# Patient Record
Sex: FEMALE | Race: OTHER | HISPANIC OR LATINO | Employment: UNEMPLOYED | URBAN - METROPOLITAN AREA
[De-identification: names, ages, dates, MRNs, and addresses within clinical notes are randomized per-mention and may not be internally consistent; named-entity substitution may affect disease eponyms.]

---

## 2017-01-09 ENCOUNTER — HOSPITAL ENCOUNTER (OUTPATIENT)
Dept: SLEEP CENTER | Facility: CLINIC | Age: 52
Discharge: HOME | End: 2017-01-09
Payer: COMMERCIAL

## 2017-01-09 DIAGNOSIS — G47.33 OSA (OBSTRUCTIVE SLEEP APNEA): ICD-10-CM

## 2017-01-09 PROCEDURE — 95810 POLYSOM 6/> YRS 4/> PARAM: CPT

## 2017-01-11 ENCOUNTER — TRANSCRIBE ORDERS (OUTPATIENT)
Dept: SLEEP CENTER | Facility: CLINIC | Age: 52
End: 2017-01-11

## 2017-01-11 DIAGNOSIS — G47.30 SLEEP-DISORDERED BREATHING: Primary | ICD-10-CM

## 2017-01-19 ENCOUNTER — ANESTHESIA EVENT (OUTPATIENT)
Dept: PERIOP | Facility: HOSPITAL | Age: 52
End: 2017-01-19
Payer: COMMERCIAL

## 2017-01-19 ENCOUNTER — HOSPITAL ENCOUNTER (OUTPATIENT)
Facility: HOSPITAL | Age: 52
Setting detail: OUTPATIENT SURGERY
Discharge: HOME | End: 2017-01-20
Attending: OBSTETRICS & GYNECOLOGY | Admitting: OBSTETRICS & GYNECOLOGY
Payer: COMMERCIAL

## 2017-01-19 ENCOUNTER — ANESTHESIA (OUTPATIENT)
Dept: PERIOP | Facility: HOSPITAL | Age: 52
End: 2017-01-19
Payer: COMMERCIAL

## 2017-01-19 PROBLEM — J45.909 ASTHMA: Status: ACTIVE | Noted: 2017-01-19

## 2017-01-19 PROBLEM — M19.90 ARTHRITIS: Status: ACTIVE | Noted: 2017-01-19

## 2017-01-19 PROBLEM — G89.29 CHRONIC LOW BACK PAIN: Status: ACTIVE | Noted: 2017-01-19

## 2017-01-19 PROBLEM — G47.33 OBSTRUCTIVE SLEEP APNEA SYNDROME: Status: ACTIVE | Noted: 2017-01-19

## 2017-01-19 PROBLEM — M54.50 CHRONIC LOW BACK PAIN: Status: ACTIVE | Noted: 2017-01-19

## 2017-01-19 PROBLEM — N39.46 MIXED INCONTINENCE: Status: ACTIVE | Noted: 2017-01-19

## 2017-01-19 PROBLEM — N39.3 STRESS INCONTINENCE IN FEMALE: Status: ACTIVE | Noted: 2017-01-19

## 2017-01-19 PROBLEM — K59.00 CONSTIPATION: Status: ACTIVE | Noted: 2017-01-19

## 2017-01-19 PROBLEM — E03.9 HYPOTHYROIDISM: Status: ACTIVE | Noted: 2017-01-19

## 2017-01-19 LAB
ABO GROUP BLD: NORMAL
BASOPHILS # BLD AUTO: 0.04 THOUSANDS/ΜL (ref 0–0.1)
BASOPHILS NFR BLD AUTO: 1 % (ref 0–1)
BLD GP AB SCN SERPL QL: NEGATIVE
EOSINOPHIL # BLD AUTO: 0.21 THOUSAND/ΜL (ref 0–0.61)
EOSINOPHIL NFR BLD AUTO: 3 % (ref 0–6)
ERYTHROCYTE [DISTWIDTH] IN BLOOD BY AUTOMATED COUNT: 13.3 % (ref 11.6–15.1)
EXT PREGNANCY TEST URINE: NORMAL
HCT VFR BLD AUTO: 37.7 % (ref 34.8–46.1)
HGB BLD-MCNC: 12.6 G/DL (ref 11.5–15.4)
LYMPHOCYTES # BLD AUTO: 2.43 THOUSANDS/ΜL (ref 0.6–4.47)
LYMPHOCYTES NFR BLD AUTO: 31 % (ref 14–44)
MCH RBC QN AUTO: 28.6 PG (ref 26.8–34.3)
MCHC RBC AUTO-ENTMCNC: 33.4 G/DL (ref 31.4–37.4)
MCV RBC AUTO: 86 FL (ref 82–98)
MONOCYTES # BLD AUTO: 1 THOUSAND/ΜL (ref 0.17–1.22)
MONOCYTES NFR BLD AUTO: 13 % (ref 4–12)
NEUTROPHILS # BLD AUTO: 4.19 THOUSANDS/ΜL (ref 1.85–7.62)
NEUTS SEG NFR BLD AUTO: 52 % (ref 43–75)
PLATELET # BLD AUTO: 387 THOUSANDS/UL (ref 149–390)
PMV BLD AUTO: 9.3 FL (ref 8.9–12.7)
RBC # BLD AUTO: 4.41 MILLION/UL (ref 3.81–5.12)
RH BLD: POSITIVE
WBC # BLD AUTO: 7.87 THOUSAND/UL (ref 4.31–10.16)

## 2017-01-19 PROCEDURE — 85025 COMPLETE CBC W/AUTO DIFF WBC: CPT | Performed by: OBSTETRICS & GYNECOLOGY

## 2017-01-19 PROCEDURE — 86900 BLOOD TYPING SEROLOGIC ABO: CPT | Performed by: OBSTETRICS & GYNECOLOGY

## 2017-01-19 PROCEDURE — C1771 REP DEV, URINARY, W/SLING: HCPCS | Performed by: OBSTETRICS & GYNECOLOGY

## 2017-01-19 PROCEDURE — 81025 URINE PREGNANCY TEST: CPT | Performed by: OBSTETRICS & GYNECOLOGY

## 2017-01-19 PROCEDURE — 86850 RBC ANTIBODY SCREEN: CPT | Performed by: OBSTETRICS & GYNECOLOGY

## 2017-01-19 PROCEDURE — 86901 BLOOD TYPING SEROLOGIC RH(D): CPT | Performed by: OBSTETRICS & GYNECOLOGY

## 2017-01-19 DEVICE — SINGLE INCISION SLING SYSTEM
Type: IMPLANTABLE DEVICE | Site: URINARY BLADDER | Status: FUNCTIONAL
Brand: ALTIS

## 2017-01-19 RX ORDER — MEPERIDINE HYDROCHLORIDE 50 MG/ML
12.5 INJECTION INTRAMUSCULAR; INTRAVENOUS; SUBCUTANEOUS AS NEEDED
Status: DISCONTINUED | OUTPATIENT
Start: 2017-01-19 | End: 2017-01-19 | Stop reason: HOSPADM

## 2017-01-19 RX ORDER — LEVOTHYROXINE SODIUM 0.12 MG/1
125 TABLET ORAL DAILY
COMMUNITY
End: 2018-02-14 | Stop reason: SDUPTHER

## 2017-01-19 RX ORDER — IBUPROFEN 600 MG/1
600 TABLET ORAL EVERY 6 HOURS SCHEDULED
Status: DISCONTINUED | OUTPATIENT
Start: 2017-01-19 | End: 2017-01-20 | Stop reason: HOSPADM

## 2017-01-19 RX ORDER — DOCUSATE SODIUM 100 MG/1
100 CAPSULE, LIQUID FILLED ORAL 2 TIMES DAILY PRN
COMMUNITY
End: 2018-04-10

## 2017-01-19 RX ORDER — ONDANSETRON 2 MG/ML
INJECTION INTRAMUSCULAR; INTRAVENOUS AS NEEDED
Status: DISCONTINUED | OUTPATIENT
Start: 2017-01-19 | End: 2017-01-19 | Stop reason: SURG

## 2017-01-19 RX ORDER — ONDANSETRON 2 MG/ML
4 INJECTION INTRAMUSCULAR; INTRAVENOUS EVERY 6 HOURS PRN
Status: DISCONTINUED | OUTPATIENT
Start: 2017-01-19 | End: 2017-01-20 | Stop reason: HOSPADM

## 2017-01-19 RX ORDER — MIDAZOLAM HYDROCHLORIDE 1 MG/ML
INJECTION INTRAMUSCULAR; INTRAVENOUS AS NEEDED
Status: DISCONTINUED | OUTPATIENT
Start: 2017-01-19 | End: 2017-01-19 | Stop reason: SURG

## 2017-01-19 RX ORDER — FENTANYL CITRATE/PF 50 MCG/ML
25 SYRINGE (ML) INJECTION
Status: DISCONTINUED | OUTPATIENT
Start: 2017-01-19 | End: 2017-01-19 | Stop reason: HOSPADM

## 2017-01-19 RX ORDER — PROPOFOL 10 MG/ML
INJECTION, EMULSION INTRAVENOUS AS NEEDED
Status: DISCONTINUED | OUTPATIENT
Start: 2017-01-19 | End: 2017-01-19 | Stop reason: SURG

## 2017-01-19 RX ORDER — PANTOPRAZOLE SODIUM 20 MG/1
20 TABLET, DELAYED RELEASE ORAL
Status: DISCONTINUED | OUTPATIENT
Start: 2017-01-20 | End: 2017-01-20 | Stop reason: HOSPADM

## 2017-01-19 RX ORDER — ACETAMINOPHEN 325 MG/1
650 TABLET ORAL EVERY 6 HOURS PRN
Status: DISCONTINUED | OUTPATIENT
Start: 2017-01-19 | End: 2017-01-20 | Stop reason: HOSPADM

## 2017-01-19 RX ORDER — DOCUSATE SODIUM 100 MG/1
100 CAPSULE, LIQUID FILLED ORAL 2 TIMES DAILY
Status: DISCONTINUED | OUTPATIENT
Start: 2017-01-19 | End: 2017-01-20 | Stop reason: HOSPADM

## 2017-01-19 RX ORDER — FENTANYL CITRATE 50 UG/ML
INJECTION, SOLUTION INTRAMUSCULAR; INTRAVENOUS AS NEEDED
Status: DISCONTINUED | OUTPATIENT
Start: 2017-01-19 | End: 2017-01-19 | Stop reason: SURG

## 2017-01-19 RX ORDER — PANTOPRAZOLE SODIUM 20 MG/1
20 TABLET, DELAYED RELEASE ORAL DAILY
COMMUNITY
End: 2018-04-10

## 2017-01-19 RX ORDER — DEXAMETHASONE SODIUM PHOSPHATE 4 MG/ML
INJECTION, SOLUTION INTRA-ARTICULAR; INTRALESIONAL; INTRAMUSCULAR; INTRAVENOUS; SOFT TISSUE AS NEEDED
Status: DISCONTINUED | OUTPATIENT
Start: 2017-01-19 | End: 2017-01-19 | Stop reason: SURG

## 2017-01-19 RX ORDER — CLINDAMYCIN PHOSPHATE 900 MG/50ML
900 INJECTION INTRAVENOUS ONCE
Status: COMPLETED | OUTPATIENT
Start: 2017-01-19 | End: 2017-01-19

## 2017-01-19 RX ORDER — SODIUM CHLORIDE 9 MG/ML
125 INJECTION, SOLUTION INTRAVENOUS CONTINUOUS
Status: DISCONTINUED | OUTPATIENT
Start: 2017-01-19 | End: 2017-01-19

## 2017-01-19 RX ORDER — ONDANSETRON 2 MG/ML
4 INJECTION INTRAMUSCULAR; INTRAVENOUS ONCE
Status: DISCONTINUED | OUTPATIENT
Start: 2017-01-19 | End: 2017-01-19 | Stop reason: HOSPADM

## 2017-01-19 RX ORDER — GENTAMICIN SULFATE 60 MG/50ML
1.5 INJECTION, SOLUTION INTRAVENOUS ONCE
Status: COMPLETED | OUTPATIENT
Start: 2017-01-19 | End: 2017-01-19

## 2017-01-19 RX ORDER — SODIUM CHLORIDE, SODIUM LACTATE, POTASSIUM CHLORIDE, CALCIUM CHLORIDE 600; 310; 30; 20 MG/100ML; MG/100ML; MG/100ML; MG/100ML
125 INJECTION, SOLUTION INTRAVENOUS CONTINUOUS
Status: DISCONTINUED | OUTPATIENT
Start: 2017-01-19 | End: 2017-01-20 | Stop reason: HOSPADM

## 2017-01-19 RX ORDER — MAGNESIUM HYDROXIDE 1200 MG/15ML
LIQUID ORAL AS NEEDED
Status: DISCONTINUED | OUTPATIENT
Start: 2017-01-19 | End: 2017-01-19 | Stop reason: HOSPADM

## 2017-01-19 RX ORDER — LIDOCAINE HYDROCHLORIDE AND EPINEPHRINE 20; 5 MG/ML; UG/ML
INJECTION, SOLUTION EPIDURAL; INFILTRATION; INTRACAUDAL; PERINEURAL AS NEEDED
Status: DISCONTINUED | OUTPATIENT
Start: 2017-01-19 | End: 2017-01-19 | Stop reason: SURG

## 2017-01-19 RX ORDER — PROPOFOL 10 MG/ML
INJECTION, EMULSION INTRAVENOUS CONTINUOUS PRN
Status: DISCONTINUED | OUTPATIENT
Start: 2017-01-19 | End: 2017-01-19 | Stop reason: SURG

## 2017-01-19 RX ADMIN — SODIUM CHLORIDE, SODIUM LACTATE, POTASSIUM CHLORIDE, AND CALCIUM CHLORIDE 125 ML/HR: .6; .31; .03; .02 INJECTION, SOLUTION INTRAVENOUS at 14:31

## 2017-01-19 RX ADMIN — PROPOFOL 50 MG: 10 INJECTION, EMULSION INTRAVENOUS at 11:50

## 2017-01-19 RX ADMIN — IBUPROFEN 600 MG: 600 TABLET ORAL at 17:48

## 2017-01-19 RX ADMIN — LIDOCAINE HYDROCHLORIDE 50 MG: 20 INJECTION, SOLUTION INTRAVENOUS at 11:16

## 2017-01-19 RX ADMIN — SODIUM CHLORIDE: 0.9 INJECTION, SOLUTION INTRAVENOUS at 11:50

## 2017-01-19 RX ADMIN — MIDAZOLAM HYDROCHLORIDE 2 MG: 1 INJECTION, SOLUTION INTRAMUSCULAR; INTRAVENOUS at 10:57

## 2017-01-19 RX ADMIN — DEXAMETHASONE SODIUM PHOSPHATE 4 MG: 4 INJECTION, SOLUTION INTRAMUSCULAR; INTRAVENOUS at 11:28

## 2017-01-19 RX ADMIN — GENTAMICIN SULFATE 120 MG: 60 INJECTION, SOLUTION INTRAVENOUS at 11:25

## 2017-01-19 RX ADMIN — CLINDAMYCIN PHOSPHATE 900 MG: 18 INJECTION, SOLUTION INTRAMUSCULAR; INTRAVENOUS at 11:18

## 2017-01-19 RX ADMIN — SODIUM CHLORIDE 125 ML/HR: 0.9 INJECTION, SOLUTION INTRAVENOUS at 10:03

## 2017-01-19 RX ADMIN — PROPOFOL 80 MCG/KG/MIN: 10 INJECTION, EMULSION INTRAVENOUS at 11:16

## 2017-01-19 RX ADMIN — LIDOCAINE HYDROCHLORIDE AND EPINEPHRINE 10 ML: 20; 5 INJECTION, SOLUTION EPIDURAL; INFILTRATION; INTRACAUDAL; PERINEURAL at 11:03

## 2017-01-19 RX ADMIN — FENTANYL CITRATE 100 MCG: 50 INJECTION, SOLUTION INTRAMUSCULAR; INTRAVENOUS at 10:57

## 2017-01-19 RX ADMIN — LIDOCAINE HYDROCHLORIDE AND EPINEPHRINE 5 ML: 20; 5 INJECTION, SOLUTION EPIDURAL; INFILTRATION; INTRACAUDAL; PERINEURAL at 11:57

## 2017-01-19 RX ADMIN — DOCUSATE SODIUM 100 MG: 100 CAPSULE, LIQUID FILLED ORAL at 17:47

## 2017-01-19 RX ADMIN — PROPOFOL 50 MG: 10 INJECTION, EMULSION INTRAVENOUS at 11:16

## 2017-01-19 RX ADMIN — ONDANSETRON HYDROCHLORIDE 4 MG: 2 INJECTION, SOLUTION INTRAVENOUS at 11:28

## 2017-01-20 VITALS
WEIGHT: 216.56 LBS | HEIGHT: 68 IN | DIASTOLIC BLOOD PRESSURE: 71 MMHG | TEMPERATURE: 98.7 F | BODY MASS INDEX: 32.82 KG/M2 | HEART RATE: 67 BPM | SYSTOLIC BLOOD PRESSURE: 125 MMHG | RESPIRATION RATE: 18 BRPM | OXYGEN SATURATION: 98 %

## 2017-01-20 PROBLEM — IMO0002 CYSTOCELE: Status: ACTIVE | Noted: 2017-01-20

## 2017-01-20 PROBLEM — N81.6 RECTOCELE: Status: ACTIVE | Noted: 2017-01-20

## 2017-01-20 PROBLEM — N36.41 HYPERMOBILITY OF URETHRA: Status: ACTIVE | Noted: 2017-01-20

## 2017-01-20 LAB
ERYTHROCYTE [DISTWIDTH] IN BLOOD BY AUTOMATED COUNT: 13.8 % (ref 11.6–15.1)
HCT VFR BLD AUTO: 34.2 % (ref 34.8–46.1)
HGB BLD-MCNC: 11.1 G/DL (ref 11.5–15.4)
MCH RBC QN AUTO: 28.5 PG (ref 26.8–34.3)
MCHC RBC AUTO-ENTMCNC: 32.5 G/DL (ref 31.4–37.4)
MCV RBC AUTO: 88 FL (ref 82–98)
PLATELET # BLD AUTO: 270 THOUSANDS/UL (ref 149–390)
PMV BLD AUTO: 10.3 FL (ref 8.9–12.7)
RBC # BLD AUTO: 3.89 MILLION/UL (ref 3.81–5.12)
WBC # BLD AUTO: 12.4 THOUSAND/UL (ref 4.31–10.16)

## 2017-01-20 PROCEDURE — 85027 COMPLETE CBC AUTOMATED: CPT | Performed by: OBSTETRICS & GYNECOLOGY

## 2017-01-20 RX ORDER — IBUPROFEN 600 MG/1
600 TABLET ORAL EVERY 6 HOURS SCHEDULED
Qty: 120 TABLET | Refills: 0
Start: 2017-01-20 | End: 2018-04-10

## 2017-01-20 RX ORDER — ACETAMINOPHEN 325 MG/1
TABLET ORAL
Qty: 30 TABLET | Refills: 0
Start: 2017-01-20 | End: 2020-01-20 | Stop reason: SDUPTHER

## 2017-01-20 RX ADMIN — DOCUSATE SODIUM 100 MG: 100 CAPSULE, LIQUID FILLED ORAL at 08:08

## 2017-01-20 RX ADMIN — IBUPROFEN 600 MG: 600 TABLET ORAL at 00:19

## 2017-01-20 RX ADMIN — LEVOTHYROXINE SODIUM 125 MCG: 100 TABLET ORAL at 05:45

## 2017-01-20 RX ADMIN — PANTOPRAZOLE SODIUM 20 MG: 20 TABLET, DELAYED RELEASE ORAL at 08:08

## 2017-01-20 RX ADMIN — SODIUM CHLORIDE, SODIUM LACTATE, POTASSIUM CHLORIDE, AND CALCIUM CHLORIDE 125 ML/HR: .6; .31; .03; .02 INJECTION, SOLUTION INTRAVENOUS at 00:45

## 2017-01-20 RX ADMIN — IBUPROFEN 600 MG: 600 TABLET ORAL at 05:46

## 2017-01-23 ENCOUNTER — HOSPITAL ENCOUNTER (OUTPATIENT)
Dept: SLEEP CENTER | Facility: CLINIC | Age: 52
Discharge: HOME | End: 2017-01-23
Payer: COMMERCIAL

## 2017-01-23 DIAGNOSIS — G47.30 SLEEP-DISORDERED BREATHING: ICD-10-CM

## 2017-01-26 ENCOUNTER — GENERIC CONVERSION - ENCOUNTER (OUTPATIENT)
Dept: OTHER | Facility: OTHER | Age: 52
End: 2017-01-26

## 2017-02-03 ENCOUNTER — APPOINTMENT (OUTPATIENT)
Dept: LAB | Facility: CLINIC | Age: 52
End: 2017-02-03
Payer: COMMERCIAL

## 2017-02-03 ENCOUNTER — GENERIC CONVERSION - ENCOUNTER (OUTPATIENT)
Dept: OTHER | Facility: OTHER | Age: 52
End: 2017-02-03

## 2017-02-03 ENCOUNTER — ALLSCRIPTS OFFICE VISIT (OUTPATIENT)
Dept: OTHER | Facility: OTHER | Age: 52
End: 2017-02-03

## 2017-02-03 DIAGNOSIS — E03.9 HYPOTHYROIDISM: ICD-10-CM

## 2017-02-03 DIAGNOSIS — G47.33 OBSTRUCTIVE SLEEP APNEA: ICD-10-CM

## 2017-02-03 LAB — TSH SERPL DL<=0.05 MIU/L-ACNC: 1.55 UIU/ML (ref 0.36–3.74)

## 2017-02-03 PROCEDURE — 36415 COLL VENOUS BLD VENIPUNCTURE: CPT

## 2017-02-03 PROCEDURE — 84443 ASSAY THYROID STIM HORMONE: CPT

## 2017-02-13 ENCOUNTER — ALLSCRIPTS OFFICE VISIT (OUTPATIENT)
Dept: OTHER | Facility: OTHER | Age: 52
End: 2017-02-13

## 2017-03-09 ENCOUNTER — GENERIC CONVERSION - ENCOUNTER (OUTPATIENT)
Dept: OTHER | Facility: OTHER | Age: 52
End: 2017-03-09

## 2017-04-26 ENCOUNTER — LAB (OUTPATIENT)
Dept: LAB | Facility: HOSPITAL | Age: 52
End: 2017-04-26
Payer: COMMERCIAL

## 2017-04-26 ENCOUNTER — TRANSCRIBE ORDERS (OUTPATIENT)
Dept: LAB | Facility: HOSPITAL | Age: 52
End: 2017-04-26

## 2017-04-26 DIAGNOSIS — E55.9 UNSPECIFIED VITAMIN D DEFICIENCY: ICD-10-CM

## 2017-04-26 DIAGNOSIS — R73.09 IMPAIRED GLUCOSE TOLERANCE TEST: ICD-10-CM

## 2017-04-26 DIAGNOSIS — E66.9 OBESITY, UNSPECIFIED: Primary | ICD-10-CM

## 2017-04-26 DIAGNOSIS — E78.5 HYPERLIPIDEMIA, UNSPECIFIED HYPERLIPIDEMIA TYPE: ICD-10-CM

## 2017-04-26 DIAGNOSIS — E66.9 OBESITY, UNSPECIFIED: ICD-10-CM

## 2017-04-26 LAB
25(OH)D3 SERPL-MCNC: 36.6 NG/ML (ref 30–100)
ALBUMIN SERPL BCP-MCNC: 3.7 G/DL (ref 3.5–5)
ALP SERPL-CCNC: 85 U/L (ref 46–116)
ALT SERPL W P-5'-P-CCNC: 27 U/L (ref 12–78)
ANION GAP SERPL CALCULATED.3IONS-SCNC: 7 MMOL/L (ref 4–13)
AST SERPL W P-5'-P-CCNC: 7 U/L (ref 5–45)
BILIRUB SERPL-MCNC: 0.37 MG/DL (ref 0.2–1)
BUN SERPL-MCNC: 13 MG/DL (ref 5–25)
CALCIUM SERPL-MCNC: 9.1 MG/DL (ref 8.3–10.1)
CHLORIDE SERPL-SCNC: 106 MMOL/L (ref 100–108)
CHOLEST SERPL-MCNC: 173 MG/DL (ref 50–200)
CO2 SERPL-SCNC: 26 MMOL/L (ref 21–32)
CREAT SERPL-MCNC: 0.66 MG/DL (ref 0.6–1.3)
EST. AVERAGE GLUCOSE BLD GHB EST-MCNC: 114 MG/DL
GFR SERPL CREATININE-BSD FRML MDRD: >60 ML/MIN/1.73SQ M
GLUCOSE P FAST SERPL-MCNC: 91 MG/DL (ref 65–99)
HBA1C MFR BLD: 5.6 % (ref 4.2–6.3)
HDLC SERPL-MCNC: 39 MG/DL (ref 40–60)
LDLC SERPL CALC-MCNC: 121 MG/DL (ref 0–100)
POTASSIUM SERPL-SCNC: 4 MMOL/L (ref 3.5–5.3)
PROT SERPL-MCNC: 7.2 G/DL (ref 6.4–8.2)
SODIUM SERPL-SCNC: 139 MMOL/L (ref 136–145)
T4 FREE SERPL-MCNC: 1.42 NG/DL (ref 0.76–1.46)
TRIGL SERPL-MCNC: 63 MG/DL
TSH SERPL DL<=0.05 MIU/L-ACNC: 0.31 UIU/ML (ref 0.36–3.74)

## 2017-04-26 PROCEDURE — 80053 COMPREHEN METABOLIC PANEL: CPT

## 2017-04-26 PROCEDURE — 84439 ASSAY OF FREE THYROXINE: CPT

## 2017-04-26 PROCEDURE — 83036 HEMOGLOBIN GLYCOSYLATED A1C: CPT

## 2017-04-26 PROCEDURE — 82306 VITAMIN D 25 HYDROXY: CPT

## 2017-04-26 PROCEDURE — 80061 LIPID PANEL: CPT

## 2017-04-26 PROCEDURE — 84443 ASSAY THYROID STIM HORMONE: CPT

## 2017-04-26 PROCEDURE — 36415 COLL VENOUS BLD VENIPUNCTURE: CPT

## 2017-05-19 ENCOUNTER — ALLSCRIPTS OFFICE VISIT (OUTPATIENT)
Dept: OTHER | Facility: OTHER | Age: 52
End: 2017-05-19

## 2017-05-19 DIAGNOSIS — Z12.39 ENCOUNTER FOR OTHER SCREENING FOR MALIGNANT NEOPLASM OF BREAST: ICD-10-CM

## 2017-05-19 DIAGNOSIS — N64.4 MASTODYNIA: ICD-10-CM

## 2017-06-22 ENCOUNTER — HOSPITAL ENCOUNTER (OUTPATIENT)
Dept: RADIOLOGY | Age: 52
Discharge: HOME | End: 2017-06-22
Payer: COMMERCIAL

## 2017-06-22 DIAGNOSIS — Z12.39 ENCOUNTER FOR OTHER SCREENING FOR MALIGNANT NEOPLASM OF BREAST: ICD-10-CM

## 2017-06-22 PROCEDURE — G0202 SCR MAMMO BI INCL CAD: HCPCS

## 2017-06-22 PROCEDURE — 77063 BREAST TOMOSYNTHESIS BI: CPT

## 2017-06-27 PROCEDURE — G0145 SCR C/V CYTO,THINLAYER,RESCR: HCPCS | Performed by: OBSTETRICS & GYNECOLOGY

## 2017-06-30 ENCOUNTER — ALLSCRIPTS OFFICE VISIT (OUTPATIENT)
Dept: OTHER | Facility: OTHER | Age: 52
End: 2017-06-30

## 2017-07-01 ENCOUNTER — LAB REQUISITION (OUTPATIENT)
Dept: LAB | Facility: HOSPITAL | Age: 52
End: 2017-07-01
Payer: COMMERCIAL

## 2017-07-01 DIAGNOSIS — Z01.419 ENCOUNTER FOR GYNECOLOGICAL EXAMINATION WITHOUT ABNORMAL FINDING: ICD-10-CM

## 2017-07-10 LAB
LAB AP GYN PRIMARY INTERPRETATION: NORMAL
LAB AP LMP: NORMAL
Lab: NORMAL

## 2017-07-28 ENCOUNTER — GENERIC CONVERSION - ENCOUNTER (OUTPATIENT)
Dept: OTHER | Facility: OTHER | Age: 52
End: 2017-07-28

## 2017-07-28 ENCOUNTER — APPOINTMENT (OUTPATIENT)
Dept: LAB | Facility: CLINIC | Age: 52
End: 2017-07-28
Payer: COMMERCIAL

## 2017-07-28 ENCOUNTER — ALLSCRIPTS OFFICE VISIT (OUTPATIENT)
Dept: OTHER | Facility: OTHER | Age: 52
End: 2017-07-28

## 2017-07-28 DIAGNOSIS — R13.10 DYSPHAGIA: ICD-10-CM

## 2017-07-28 DIAGNOSIS — G89.29 OTHER CHRONIC PAIN: ICD-10-CM

## 2017-07-28 DIAGNOSIS — M47.816 SPONDYLOSIS OF LUMBAR REGION WITHOUT MYELOPATHY OR RADICULOPATHY: ICD-10-CM

## 2017-07-28 DIAGNOSIS — K21.9 GASTRO-ESOPHAGEAL REFLUX DISEASE WITHOUT ESOPHAGITIS: ICD-10-CM

## 2017-07-28 DIAGNOSIS — R79.89 OTHER SPECIFIED ABNORMAL FINDINGS OF BLOOD CHEMISTRY: ICD-10-CM

## 2017-07-28 LAB
ALBUMIN SERPL BCP-MCNC: 3.4 G/DL (ref 3.5–5)
ALP SERPL-CCNC: 164 U/L (ref 46–116)
ALT SERPL W P-5'-P-CCNC: 185 U/L (ref 12–78)
ANION GAP SERPL CALCULATED.3IONS-SCNC: 8 MMOL/L (ref 4–13)
AST SERPL W P-5'-P-CCNC: 100 U/L (ref 5–45)
BILIRUB SERPL-MCNC: 0.38 MG/DL (ref 0.2–1)
BUN SERPL-MCNC: 12 MG/DL (ref 5–25)
CALCIUM SERPL-MCNC: 9 MG/DL (ref 8.3–10.1)
CHLORIDE SERPL-SCNC: 105 MMOL/L (ref 100–108)
CO2 SERPL-SCNC: 26 MMOL/L (ref 21–32)
CREAT SERPL-MCNC: 0.61 MG/DL (ref 0.6–1.3)
GFR SERPL CREATININE-BSD FRML MDRD: 106 ML/MIN/1.73SQ M
GLUCOSE P FAST SERPL-MCNC: 84 MG/DL (ref 65–99)
POTASSIUM SERPL-SCNC: 4.2 MMOL/L (ref 3.5–5.3)
PROT SERPL-MCNC: 7 G/DL (ref 6.4–8.2)
SODIUM SERPL-SCNC: 139 MMOL/L (ref 136–145)

## 2017-07-28 PROCEDURE — 80053 COMPREHEN METABOLIC PANEL: CPT

## 2017-07-28 PROCEDURE — 36415 COLL VENOUS BLD VENIPUNCTURE: CPT

## 2017-07-31 ENCOUNTER — ALLSCRIPTS OFFICE VISIT (OUTPATIENT)
Dept: OTHER | Facility: OTHER | Age: 52
End: 2017-07-31

## 2017-08-09 ENCOUNTER — GENERIC CONVERSION - ENCOUNTER (OUTPATIENT)
Dept: OTHER | Facility: OTHER | Age: 52
End: 2017-08-09

## 2017-08-09 ENCOUNTER — HOSPITAL ENCOUNTER (OUTPATIENT)
Dept: RADIOLOGY | Facility: HOSPITAL | Age: 52
Discharge: HOME | End: 2017-08-09
Attending: INTERNAL MEDICINE
Payer: COMMERCIAL

## 2017-08-09 DIAGNOSIS — R13.10 DYSPHAGIA: ICD-10-CM

## 2017-08-09 PROCEDURE — 74220 X-RAY XM ESOPHAGUS 1CNTRST: CPT

## 2017-08-10 ENCOUNTER — APPOINTMENT (OUTPATIENT)
Dept: LAB | Facility: CLINIC | Age: 52
End: 2017-08-10
Payer: COMMERCIAL

## 2017-08-10 ENCOUNTER — ALLSCRIPTS OFFICE VISIT (OUTPATIENT)
Dept: OTHER | Facility: OTHER | Age: 52
End: 2017-08-10

## 2017-08-10 DIAGNOSIS — R79.89 OTHER SPECIFIED ABNORMAL FINDINGS OF BLOOD CHEMISTRY: ICD-10-CM

## 2017-08-10 PROCEDURE — 86704 HEP B CORE ANTIBODY TOTAL: CPT

## 2017-08-10 PROCEDURE — 87340 HEPATITIS B SURFACE AG IA: CPT

## 2017-08-10 PROCEDURE — 86803 HEPATITIS C AB TEST: CPT

## 2017-08-10 PROCEDURE — 36415 COLL VENOUS BLD VENIPUNCTURE: CPT

## 2017-08-10 PROCEDURE — 86705 HEP B CORE ANTIBODY IGM: CPT

## 2017-08-11 ENCOUNTER — GENERIC CONVERSION - ENCOUNTER (OUTPATIENT)
Dept: OTHER | Facility: OTHER | Age: 52
End: 2017-08-11

## 2017-08-11 LAB
HBV CORE AB SER QL: NORMAL
HBV CORE IGM SER QL: NORMAL
HBV SURFACE AG SER QL: NORMAL
HCV AB SER QL: NORMAL

## 2017-08-15 ENCOUNTER — TRANSCRIBE ORDERS (OUTPATIENT)
Dept: ADMINISTRATIVE | Facility: HOSPITAL | Age: 52
End: 2017-08-15

## 2017-08-15 DIAGNOSIS — R10.9 ABDOMINAL PAIN, UNSPECIFIED LOCATION: Primary | ICD-10-CM

## 2017-08-16 ENCOUNTER — HOSPITAL ENCOUNTER (OUTPATIENT)
Dept: RADIOLOGY | Facility: HOSPITAL | Age: 52
Discharge: HOME | End: 2017-08-16
Payer: COMMERCIAL

## 2017-08-16 DIAGNOSIS — R10.9 ABDOMINAL PAIN, UNSPECIFIED LOCATION: ICD-10-CM

## 2017-08-16 PROCEDURE — 76705 ECHO EXAM OF ABDOMEN: CPT

## 2017-08-17 ENCOUNTER — HOSPITAL ENCOUNTER (OUTPATIENT)
Dept: ULTRASOUND IMAGING | Facility: CLINIC | Age: 52
Discharge: HOME | End: 2017-08-17
Payer: COMMERCIAL

## 2017-08-17 DIAGNOSIS — N64.4 MASTODYNIA: ICD-10-CM

## 2017-08-17 PROCEDURE — 76642 ULTRASOUND BREAST LIMITED: CPT

## 2017-08-22 ENCOUNTER — TRANSCRIBE ORDERS (OUTPATIENT)
Dept: ADMINISTRATIVE | Facility: HOSPITAL | Age: 52
End: 2017-08-22

## 2017-08-22 ENCOUNTER — APPOINTMENT (OUTPATIENT)
Dept: PHYSICAL THERAPY | Facility: REHABILITATION | Age: 52
End: 2017-08-22
Payer: COMMERCIAL

## 2017-08-22 DIAGNOSIS — G89.29 OTHER CHRONIC PAIN: ICD-10-CM

## 2017-08-22 DIAGNOSIS — M47.816 SPONDYLOSIS OF LUMBAR REGION WITHOUT MYELOPATHY OR RADICULOPATHY: ICD-10-CM

## 2017-08-22 DIAGNOSIS — R92.8 ABNORMAL FINDINGS ON DIAGNOSTIC IMAGING OF BREAST: Primary | ICD-10-CM

## 2017-08-22 PROCEDURE — 97161 PT EVAL LOW COMPLEX 20 MIN: CPT

## 2017-08-24 ENCOUNTER — APPOINTMENT (OUTPATIENT)
Dept: PHYSICAL THERAPY | Facility: REHABILITATION | Age: 52
End: 2017-08-24
Payer: COMMERCIAL

## 2017-08-24 PROCEDURE — 97110 THERAPEUTIC EXERCISES: CPT

## 2017-08-24 PROCEDURE — 97010 HOT OR COLD PACKS THERAPY: CPT

## 2017-08-24 PROCEDURE — 97140 MANUAL THERAPY 1/> REGIONS: CPT

## 2017-08-25 ENCOUNTER — GENERIC CONVERSION - ENCOUNTER (OUTPATIENT)
Dept: OTHER | Facility: OTHER | Age: 52
End: 2017-08-25

## 2017-08-30 ENCOUNTER — APPOINTMENT (OUTPATIENT)
Dept: PHYSICAL THERAPY | Facility: REHABILITATION | Age: 52
End: 2017-08-30
Payer: COMMERCIAL

## 2017-08-30 PROCEDURE — 97140 MANUAL THERAPY 1/> REGIONS: CPT

## 2017-08-30 PROCEDURE — 97110 THERAPEUTIC EXERCISES: CPT

## 2017-09-01 ENCOUNTER — APPOINTMENT (OUTPATIENT)
Dept: PHYSICAL THERAPY | Facility: REHABILITATION | Age: 52
End: 2017-09-01
Payer: COMMERCIAL

## 2017-09-01 PROCEDURE — 97140 MANUAL THERAPY 1/> REGIONS: CPT

## 2017-09-01 PROCEDURE — 97110 THERAPEUTIC EXERCISES: CPT

## 2017-09-06 ENCOUNTER — APPOINTMENT (OUTPATIENT)
Dept: PHYSICAL THERAPY | Facility: REHABILITATION | Age: 52
End: 2017-09-06
Payer: COMMERCIAL

## 2017-09-06 ENCOUNTER — ANESTHESIA EVENT (OUTPATIENT)
Dept: GASTROENTEROLOGY | Facility: HOSPITAL | Age: 52
End: 2017-09-06
Payer: COMMERCIAL

## 2017-09-06 PROCEDURE — 97010 HOT OR COLD PACKS THERAPY: CPT

## 2017-09-06 PROCEDURE — 97110 THERAPEUTIC EXERCISES: CPT

## 2017-09-06 PROCEDURE — 97140 MANUAL THERAPY 1/> REGIONS: CPT

## 2017-09-07 ENCOUNTER — ANESTHESIA (OUTPATIENT)
Dept: GASTROENTEROLOGY | Facility: HOSPITAL | Age: 52
End: 2017-09-07
Payer: COMMERCIAL

## 2017-09-07 ENCOUNTER — GENERIC CONVERSION - ENCOUNTER (OUTPATIENT)
Dept: OTHER | Facility: OTHER | Age: 52
End: 2017-09-07

## 2017-09-07 ENCOUNTER — HOSPITAL ENCOUNTER (OUTPATIENT)
Facility: HOSPITAL | Age: 52
Setting detail: OUTPATIENT SURGERY
Discharge: HOME | End: 2017-09-07
Attending: INTERNAL MEDICINE | Admitting: INTERNAL MEDICINE
Payer: COMMERCIAL

## 2017-09-07 VITALS
RESPIRATION RATE: 20 BRPM | WEIGHT: 200 LBS | TEMPERATURE: 97.9 F | SYSTOLIC BLOOD PRESSURE: 124 MMHG | OXYGEN SATURATION: 98 % | BODY MASS INDEX: 30.31 KG/M2 | HEART RATE: 66 BPM | DIASTOLIC BLOOD PRESSURE: 81 MMHG | HEIGHT: 68 IN

## 2017-09-07 DIAGNOSIS — K21.9 ESOPHAGEAL REFLUX: ICD-10-CM

## 2017-09-07 DIAGNOSIS — R13.10 DYSPHAGIA: ICD-10-CM

## 2017-09-07 PROCEDURE — 88305 TISSUE EXAM BY PATHOLOGIST: CPT | Performed by: INTERNAL MEDICINE

## 2017-09-07 PROCEDURE — 88342 IMHCHEM/IMCYTCHM 1ST ANTB: CPT | Performed by: INTERNAL MEDICINE

## 2017-09-07 RX ORDER — PROPOFOL 10 MG/ML
INJECTION, EMULSION INTRAVENOUS AS NEEDED
Status: DISCONTINUED | OUTPATIENT
Start: 2017-09-07 | End: 2017-09-07 | Stop reason: SURG

## 2017-09-07 RX ORDER — PROPOFOL 10 MG/ML
INJECTION, EMULSION INTRAVENOUS CONTINUOUS PRN
Status: DISCONTINUED | OUTPATIENT
Start: 2017-09-07 | End: 2017-09-07 | Stop reason: SURG

## 2017-09-07 RX ORDER — SODIUM CHLORIDE 9 MG/ML
125 INJECTION, SOLUTION INTRAVENOUS CONTINUOUS
Status: DISCONTINUED | OUTPATIENT
Start: 2017-09-07 | End: 2017-09-07 | Stop reason: HOSPADM

## 2017-09-07 RX ORDER — LIDOCAINE HYDROCHLORIDE 10 MG/ML
INJECTION, SOLUTION EPIDURAL; INFILTRATION; INTRACAUDAL; PERINEURAL AS NEEDED
Status: DISCONTINUED | OUTPATIENT
Start: 2017-09-07 | End: 2017-09-07 | Stop reason: SURG

## 2017-09-07 RX ADMIN — PROPOFOL 120 MCG/KG/MIN: 10 INJECTION, EMULSION INTRAVENOUS at 14:01

## 2017-09-07 RX ADMIN — SODIUM CHLORIDE 125 ML/HR: 0.9 INJECTION, SOLUTION INTRAVENOUS at 13:54

## 2017-09-07 RX ADMIN — PROPOFOL 80 MG: 10 INJECTION, EMULSION INTRAVENOUS at 14:01

## 2017-09-07 RX ADMIN — LIDOCAINE HYDROCHLORIDE 50 MG: 10 INJECTION, SOLUTION EPIDURAL; INFILTRATION; INTRACAUDAL; PERINEURAL at 14:01

## 2017-09-07 NOTE — OP NOTE
**** GI/ENDOSCOPY REPORT ****     PATIENT NAME: Lanie Lombardo - VISIT ID:  Patient ID: [id number]   YOB: 1965     INTRODUCTION: Esophagogastroduodenoscopy - A 50 female patient presents   for an outpatient Esophagogastroduodenoscopy at [address] [place] [address]  INDICATIONS: Dysphagia  Prior Heller mytopmy with Jaime fundoplication for   [facility] [employer]  CONSENT: The benefits, risks, and alternatives to the procedure were   discussed and informed consent was obtained from the patient  PREPARATION:  EKG, pulse, pulse oximetry and blood pressure were monitored   throughout the procedure  ASA Classification: Class 2 - Patient has mild   to moderate systemic disturbance that may or may not be related to the   disorder requiring surgery  Airway Assessment Classification: Airway class   2 - Visualization of the soft palate, fauces and uvula  Mallampati   Classification: Class 2 - Faucial pillars, soft palate visible  The   patient was kept NPO  MEDICATIONS: Anesthesia-check records     PROCEDURE:  The endoscope was passed without difficulty through the mouth   under direct visualization and advanced to the 2nd portion of the   duodenum  The scope was withdrawn and the mucosa was carefully examined  FINDINGS:   Esophagus: LA grad B erosive reflux-induced esophagitis was   found in the esophagus  A tongue of pink mucosa measuring less than 10 mm   noted at the distal esophagus  This is likely due to esophagitis  Two cold   forceps biopsies were taken to assess for Mcdonald's esophagus  Redundent   fold noted upon retroflexion from prior fundoplication  Scope was passed to   the stomach with out resistance at GEJ  Stomach: The antrum and body of   the stomach appeared to be normal   Duodenum: The duodenal bulb, 2nd   portion of the duodenum, and major papilla appeared to be normal      COMPLICATIONS: There were no complications       IMPRESSIONS: Mild eesophagitis at the distal esophagus  Two biopsies taken   from pink mucosa at the distal esophagus  Normal antrum and body of the   stomach  Normal duodenal bulb, 2nd portion of the duodenum, and major   papilla  RECOMMENDATIONS: Anti-reflux measures: Raise the head of the bed 4 to 6   inches  Avoid smoking  Avoid excess coffee, tea or other caffeinated   beverages  Avoid garments that fit tightly through the abdomen  Avoid   eating before bed  Start anti-reflux diet  Follow-up on the results of the   biopsy specimens  Continue omeprazole 40 mg po BID      ESTIMATED BLOOD LOSS:     PATHOLOGY SPECIMENS: Two cold forceps biopsies taken  Associated finding:   Esophagitis  PROCEDURE CODES:     ICD-9 Codes: 787 2 DYSPHAGIA     ICD-10 Codes: R13 10 Dysphagia, unspecified K20 9 Esophagitis, unspecified     PERFORMED BY: GABE Hay  on 09/07/2017  Version 1, electronically signed by GABE Brody  on 09/07/2017   at 14:14

## 2017-09-08 ENCOUNTER — APPOINTMENT (OUTPATIENT)
Dept: PHYSICAL THERAPY | Facility: REHABILITATION | Age: 52
End: 2017-09-08
Payer: COMMERCIAL

## 2017-09-08 PROCEDURE — 97110 THERAPEUTIC EXERCISES: CPT

## 2017-09-08 PROCEDURE — 97140 MANUAL THERAPY 1/> REGIONS: CPT

## 2017-09-11 ENCOUNTER — APPOINTMENT (OUTPATIENT)
Dept: PHYSICAL THERAPY | Facility: REHABILITATION | Age: 52
End: 2017-09-11
Payer: COMMERCIAL

## 2017-09-11 PROCEDURE — 97140 MANUAL THERAPY 1/> REGIONS: CPT

## 2017-09-11 PROCEDURE — 97110 THERAPEUTIC EXERCISES: CPT

## 2017-09-14 ENCOUNTER — APPOINTMENT (OUTPATIENT)
Dept: PHYSICAL THERAPY | Facility: REHABILITATION | Age: 52
End: 2017-09-14
Payer: COMMERCIAL

## 2017-09-14 PROCEDURE — 97110 THERAPEUTIC EXERCISES: CPT

## 2017-09-14 PROCEDURE — 97140 MANUAL THERAPY 1/> REGIONS: CPT

## 2017-09-20 ENCOUNTER — APPOINTMENT (OUTPATIENT)
Dept: PHYSICAL THERAPY | Facility: REHABILITATION | Age: 52
End: 2017-09-20
Payer: COMMERCIAL

## 2017-09-20 PROCEDURE — 97110 THERAPEUTIC EXERCISES: CPT

## 2017-09-20 PROCEDURE — 97140 MANUAL THERAPY 1/> REGIONS: CPT

## 2017-09-25 ENCOUNTER — APPOINTMENT (OUTPATIENT)
Dept: PHYSICAL THERAPY | Facility: REHABILITATION | Age: 52
End: 2017-09-25
Payer: COMMERCIAL

## 2017-09-28 ENCOUNTER — APPOINTMENT (OUTPATIENT)
Dept: PHYSICAL THERAPY | Facility: REHABILITATION | Age: 52
End: 2017-09-28
Payer: COMMERCIAL

## 2017-09-28 PROCEDURE — 97140 MANUAL THERAPY 1/> REGIONS: CPT

## 2017-09-28 PROCEDURE — 97110 THERAPEUTIC EXERCISES: CPT

## 2017-10-02 ENCOUNTER — APPOINTMENT (OUTPATIENT)
Dept: PHYSICAL THERAPY | Facility: REHABILITATION | Age: 52
End: 2017-10-02
Payer: COMMERCIAL

## 2017-10-02 PROCEDURE — 97110 THERAPEUTIC EXERCISES: CPT

## 2017-10-05 ENCOUNTER — APPOINTMENT (OUTPATIENT)
Dept: PHYSICAL THERAPY | Facility: REHABILITATION | Age: 52
End: 2017-10-05
Payer: COMMERCIAL

## 2017-10-05 PROCEDURE — 97110 THERAPEUTIC EXERCISES: CPT

## 2017-10-09 ENCOUNTER — ALLSCRIPTS OFFICE VISIT (OUTPATIENT)
Dept: OTHER | Facility: OTHER | Age: 52
End: 2017-10-09

## 2017-10-09 ENCOUNTER — GENERIC CONVERSION - ENCOUNTER (OUTPATIENT)
Dept: OTHER | Facility: OTHER | Age: 52
End: 2017-10-09

## 2017-10-09 DIAGNOSIS — A04.8 OTHER SPECIFIED BACTERIAL INTESTINAL INFECTIONS: ICD-10-CM

## 2017-10-09 DIAGNOSIS — E03.9 HYPOTHYROIDISM: ICD-10-CM

## 2017-10-09 DIAGNOSIS — R79.89 OTHER SPECIFIED ABNORMAL FINDINGS OF BLOOD CHEMISTRY: ICD-10-CM

## 2017-10-09 DIAGNOSIS — N63.0 MASS OF BREAST: ICD-10-CM

## 2017-10-10 ENCOUNTER — APPOINTMENT (OUTPATIENT)
Dept: PHYSICAL THERAPY | Facility: REHABILITATION | Age: 52
End: 2017-10-10
Payer: COMMERCIAL

## 2017-10-10 PROCEDURE — 97140 MANUAL THERAPY 1/> REGIONS: CPT

## 2017-10-10 PROCEDURE — 97110 THERAPEUTIC EXERCISES: CPT

## 2017-10-12 ENCOUNTER — APPOINTMENT (OUTPATIENT)
Dept: PHYSICAL THERAPY | Facility: REHABILITATION | Age: 52
End: 2017-10-12
Payer: COMMERCIAL

## 2017-10-12 PROCEDURE — 97110 THERAPEUTIC EXERCISES: CPT

## 2017-10-12 PROCEDURE — 97140 MANUAL THERAPY 1/> REGIONS: CPT

## 2017-10-12 PROCEDURE — 97112 NEUROMUSCULAR REEDUCATION: CPT

## 2017-10-17 ENCOUNTER — APPOINTMENT (OUTPATIENT)
Dept: PHYSICAL THERAPY | Facility: REHABILITATION | Age: 52
End: 2017-10-17
Payer: COMMERCIAL

## 2017-10-17 PROCEDURE — 97110 THERAPEUTIC EXERCISES: CPT

## 2017-10-19 ENCOUNTER — APPOINTMENT (OUTPATIENT)
Dept: PHYSICAL THERAPY | Facility: REHABILITATION | Age: 52
End: 2017-10-19
Payer: COMMERCIAL

## 2017-10-19 PROCEDURE — G8992 OTHER PT/OT  D/C STATUS: HCPCS

## 2017-10-19 PROCEDURE — 97110 THERAPEUTIC EXERCISES: CPT

## 2017-10-19 PROCEDURE — G8991 OTHER PT/OT GOAL STATUS: HCPCS

## 2017-10-24 ENCOUNTER — APPOINTMENT (OUTPATIENT)
Dept: PHYSICAL THERAPY | Facility: REHABILITATION | Age: 52
End: 2017-10-24
Payer: COMMERCIAL

## 2017-10-25 ENCOUNTER — GENERIC CONVERSION - ENCOUNTER (OUTPATIENT)
Dept: OTHER | Facility: OTHER | Age: 52
End: 2017-10-25

## 2017-10-25 ENCOUNTER — APPOINTMENT (OUTPATIENT)
Dept: LAB | Facility: CLINIC | Age: 52
End: 2017-10-25
Payer: COMMERCIAL

## 2017-10-25 DIAGNOSIS — R79.89 OTHER SPECIFIED ABNORMAL FINDINGS OF BLOOD CHEMISTRY: ICD-10-CM

## 2017-10-25 DIAGNOSIS — E03.9 HYPOTHYROIDISM: ICD-10-CM

## 2017-10-25 LAB
ALBUMIN SERPL BCP-MCNC: 3.7 G/DL (ref 3.5–5)
ALP SERPL-CCNC: 74 U/L (ref 46–116)
ALT SERPL W P-5'-P-CCNC: 27 U/L (ref 12–78)
ANION GAP SERPL CALCULATED.3IONS-SCNC: 9 MMOL/L (ref 4–13)
AST SERPL W P-5'-P-CCNC: 25 U/L (ref 5–45)
BILIRUB SERPL-MCNC: 0.5 MG/DL (ref 0.2–1)
BUN SERPL-MCNC: 13 MG/DL (ref 5–25)
CALCIUM SERPL-MCNC: 8.6 MG/DL (ref 8.3–10.1)
CHLORIDE SERPL-SCNC: 103 MMOL/L (ref 100–108)
CO2 SERPL-SCNC: 28 MMOL/L (ref 21–32)
CREAT SERPL-MCNC: 0.73 MG/DL (ref 0.6–1.3)
GFR SERPL CREATININE-BSD FRML MDRD: 96 ML/MIN/1.73SQ M
GLUCOSE P FAST SERPL-MCNC: 84 MG/DL (ref 65–99)
POTASSIUM SERPL-SCNC: 4.4 MMOL/L (ref 3.5–5.3)
PROT SERPL-MCNC: 7.2 G/DL (ref 6.4–8.2)
SODIUM SERPL-SCNC: 140 MMOL/L (ref 136–145)
T4 FREE SERPL-MCNC: 0.89 NG/DL (ref 0.76–1.46)
TSH SERPL DL<=0.05 MIU/L-ACNC: 20.83 UIU/ML (ref 0.36–3.74)

## 2017-10-25 PROCEDURE — 84443 ASSAY THYROID STIM HORMONE: CPT

## 2017-10-25 PROCEDURE — 80053 COMPREHEN METABOLIC PANEL: CPT

## 2017-10-25 PROCEDURE — 36415 COLL VENOUS BLD VENIPUNCTURE: CPT

## 2017-10-25 PROCEDURE — 84439 ASSAY OF FREE THYROXINE: CPT

## 2017-10-26 ENCOUNTER — APPOINTMENT (OUTPATIENT)
Dept: PHYSICAL THERAPY | Facility: REHABILITATION | Age: 52
End: 2017-10-26
Payer: COMMERCIAL

## 2017-10-28 NOTE — PROGRESS NOTES
Assessment    1  H  pylori infection (041 86) (A04 8)   2  Esophageal reflux (530 81) (K21 9)   3  Dysphagia (787 20) (R13 10)   4  Irritable bowel syndrome with constipation (564 1) (K58 1)    Plan  H  pylori infection    · Bismuth 262 MG Oral Tablet Chewable; CHEW AND SWALLOW 2 TABLETS 4TIMES DAILY   Rx By: Ortega Greta; Dispense: 14 Days ; #:112 Tablet Chewable; Refill: 0;For: H  pylori infection; DEUCE = N; Verified Transmission to [facility] Last Updated By: System, SureScripts; 10/9/2017 3:15:45 PM   · MetroNIDAZOLE 250 MG Oral Tablet; Take 1 tablet 4 times daily   Rx By: [place]; Dispense: 14 Days ; #:56 Tablet; Refill: 0;For: H  pylori infection; DEUCE = N; Verified Transmission to [facility]41 Last Updated By: System, SureScripts; 10/9/2017 3:15:43 PM   · Tetracycline HCl - 500 MG Oral Capsule; take 1 capsule 4 times daily   Rx By: [place]; Dispense: 14 Days ; #:56 Capsule; Refill: 0;H  pylori infection; DEUCE = N; Verified Transmission to [facility]375 Last Updated By: System, SureScripts; 10/9/2017 5:46:60 PM   · (1) HELICOBACTER PYLORI ANTIGEN, STOOL; Status:Active; Requested EZT:[id number];   Perform:[facility]; KAF:[id number]; Ordered;pylori infection; Ordered By:Noni Carlson;    Discussion/Summary  Discussion Summary:   1) Epigastric pain likely secondary to H  pylori infection: Stable  Confirmed on gastric biopsies from recent EGD  Since patient is allergic to PCN, I will prescribe quadruple therapy including metronidazole, tetracycline, Pepto-Bismol, and PPI twice daily for 2 weeks  After she completes therapy, she was instructed to give stool sample to test for H pylori stool antigen  This will help to confirm eradication  She was instructed to stop her PPI medication for 2 weeks prior to giving the stool sample as this can affect results    Chronic GERD with esophagitis: Recent EGD revealed LA class B erosive reflux induced esophagitis  Biopsies were negative for Mcdonald's esophagus  Continue PPI twice daily  I discussed in detail dietary and lifestyle modifications to prevent reflux  I gave her a handwritten list of foods to avoid including caffeine, carbonated beverages, tomato based products, citrus, spicy foods, chocolate  I encouraged her to avoid eating 2-3 hours before bedtime and to raise the head of her bed at night to prevent nighttime reflux  Dysphagia with history of achalasia s/p Heller myotomy and Jaime fundoplication: Stable  Recent barium swallow showed mild dysmotility from achalasia  No strictures or narrowing were found in the esophagus  I encouraged her to take small bites and chew carefully  I recommended she drink water after swallowing solid food  IBS with constipation: Stable  Her constipation is well controlled with MiraLAX daily  Continue MiraLAX 17 g daily  She may increase to twice daily if needed  Continue Bentyl for abdominal pain  Counseling Documentation With Imm: The patient was counseled regarding diagnostic results,-- instructions for management,-- risk factor reductions,-- prognosis,-- patient and family education,-- impressions,-- risks and benefits of treatment options,-- importance of compliance with treatment  Patient's Capacity to Self-Care: Patient is able to Self-Care  Patient Education: Educational resources provided: List of foods to avoid for reflux  Medication SE Review and Pt Understands Tx: Possible side effects of new medications were reviewed with the patient/guardian today  The treatment plan was reviewed with the patient/guardian  The patient/guardian understands and agrees with the treatment plan      Chief Complaint  Chief Complaint Free Text Note Form: Pt is here for her 2 month follow up; complains of abdominal pain, dysphagia and constipation  Chief Complaint Chronic Condition [facility]: Patient is here today for follow up of chronic conditions described in HPI  History of Present Illness  HPI: 26-year-old female with history of achalasia status post Heller myotomy and Jaime fundoplication in 9625 presenting for follow-up of epigastric pain, dysphagia, and constipation  She reports intermittent epigastric pain, nausea, and vomiting for the past 2-3 months  The pain feels like a burning sensation  THe pain happens randomly, not necessarily after eating  She underwent barium esophagram which showed mild dysmotility but was otherwise normal  EGD showed LA class B esophagitis  Gastric biopsies positive for H  pylori  She has not yet been treated for the infection  She still reports frequent dysphagia and coughing when trying to swallow solid food  She feels the food get stuck in her chest, but the sensation passes shortly after swallowing  She has frequent heartburn and indigestion after eating  She admits to eating spicy foods, ketchup, coffee, chocolate, and other acidic foods  Her constipation is generally well controlled with MiraLAX daily  She has a BM every other day  No rectal bleeding or abnorma weight loss  History Reviewed: The history was obtained today from the patient and I agree with the documented history  Review of Systems  Complete-Female GI Adult:  Constitutional: no fever-- and-- no chills  Eyes: no eyesight problems  ENT: no sore throat  Cardiovascular: no chest pain  Respiratory: cough, but-- no shortness of breath  Gastrointestinal: abdominal pain,-- constipation-- and-- dysphagia  Genitourinary: no dysuria  Musculoskeletal: no arthralgias  Integumentary: no rashes  Neurological: no headache  Psychiatric: no sleep disturbances  Hematologic/Lymphatic: no swollen glands in the neck  ROS Reviewed:   ROS reviewed  Active Problems  1  Achalasia (530 0) (K22 0)   2  Arthritis (716 90) (M19 90)   3  Asthma (493 90) (J45 909)   4  Atopic dermatitis (691 8) (L20 9)   5  Bacterial vaginosis (616 10,041 9) (N76 0,B96 89)   6   Bilateral edema of lower extremity (782 3) (R60 0)   7  Breast cancer screening (V76 10) (Z12 39)   8  Breast pain, right (611 71) (N64 4)   9  Chronic sinusitis with recurrent bronchitis (473 9,490) (J32 9,J40)   10  Constipation (564 00) (K59 00)   11  Depression (311) (F32 9)   12  Dysphagia (787 20) (R13 10)   13  Elevated LFTs (790 6) (R79 89)   14  Encounter for screening mammogram for breast cancer (V76 12) (Z12 31)   15  Encounter for smoking cessation counseling (V65 42,305 1) (Z71 6,Z72 0)   16  Esophageal reflux (530 81) (K21 9)   17  Headache, migraine (346 90) (G43 909)   18  Hypothyroidism (244 9) (E03 9)   19  Irritable bowel syndrome with constipation (564 1) (K58 1)   20  Need for immunization against influenza (V04 81) (Z23)   21  Need for pneumococcal vaccination (V03 82) (Z23)   22  Obesity (BMI 30-39 9) (278 00) (E66 9)   23  Other chronic pain (338 29) (G89 29)   24  Pain in eye, bilateral (379 91) (H57 13)   25  Pap smear for cervical cancer screening (V76 2) (Z12 4)   26  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 816)   27  Thyroid nodule (241 0) (E04 1)   28  Thyroiditis (245 9) (E06 9)   29  Tubular adenoma of colon (211 3) (D12 6)   30  Urge and stress incontinence (788 33) (N39 46)   31  Varicose veins with pain (454 8) (I83 819)   32  Visit for routine gyn exam (V7 31) (Z01 419)    Past Medical History  1  History of Acute upper respiratory infection (465 9) (J06 9)   2  History of Age At First Period 17 Years Old (Menarche)   3  History of Gastric ulcer (531 90) (K25 9)   4  History Of 4  Previous Pregnancies (V61 5)   5  History of urinary stone (V13 01) (Z87 442)   6  History of Obstructive sleep apnea (327 23) (G47 33)  Active Problems And Past Medical History Reviewed: The active problems and past medical history were reviewed and updated today  Surgical History    1  History of  Section   2  History of Heller Myotomy Laparoscopic Approach   3  History of Tubal Ligation   4  History of Vaginal Sling Operation For Stress Incontinence  Surgical History Reviewed: The surgical history was reviewed and updated today  Family History  Mother    1  Family history of Breast Cancer (V16 3)   2  Family history of malignant neoplasm of breast (V16 3) (Z80 3)   3  Family history of Hypertension  Father    4  Family history of Diabetes Mellitus (V18 0)   5  Family history of Hypertension  Grandparent    6  Family history of malignant neoplasm of breast (V16 3) (Z80 3)   7  Family history of Ovarian cancer  Maternal Grandmother    8  Family history of Malignant neoplasm of ovary, unspecified laterality  Maternal Grandfather    9  Family history of malignant neoplasm of tongue (V16 8) (Z80 8)  Maternal Aunt    10  Family history of Colon cancer  Family History Reviewed: The family history was reviewed and updated today  Social History     · Denied: Alcohol use (V49 89) (Z78 9)   · Current every day smoker (305 1) (F17 200)   · Denied: Drug use (305 90) (F19 90)   · Former smoker (G56 34) (J68 334)  Social History Reviewed: The social history was reviewed and updated today  Current Meds   1  Albuterol Sulfate (2 5 MG/3ML) 0 083% Inhalation Nebulization Solution; USE 1 UNIT DOSE IN NEBULIZER EVERY 4 HOURS AS NEEDED; Therapy: 98RIG0598 to (Last Rx:15Jun2017)  Requested for: 67DZQ5205 Ordered   2  Clotrimazole-Betamethasone 1-0 05 % External Cream; APPLY AND RUB IN A THIN FILM TO AFFECTED AREAS TWICE DAILY  (AM AND PM); Therapy: 83IZI3145 to (Evaluate:10Oct2017)  Requested for: 84Rec2928; Last Rx:13Kgf0103 Ordered   3  Colace 100 MG Oral Capsule; TAKE 1 CAPSULE TWICE DAILY; Therapy: 42ASE7564 to (Evaluate:03Jun2018)  Requested for: 28SKS9992; Last Rx:08Jun2017 Ordered   4  Cyclobenzaprine HCl - 7 5 MG Oral Tablet; TAKE 1 TABLET 3 TIMES DAILY AS NEEDED FOR MUSCLE SPASM;  Therapy: 66BDA0705 to (Evaluate:25Lsn1619)  Requested for: 10Aug2017; Last Rx:10Aug2017 Ordered   5  Dicyclomine HCl - 20 MG Oral Tablet; TAKE 1 TABLET EVERY 6 HOURS AS NEEDED; Therapy: 48KBD4508 to ()  Requested for: 50SWM0133; Last Rx:12Njs8921 Ordered   6  Fluticasone Propionate 50 MCG/ACT Nasal Suspension; use 1 to 2 sprays in each nostril once daily; Therapy: 45ETE9233 to ()  Requested for: 86PTJ0611; Last NN:26PWF9178 Ordered   7  Furosemide 20 MG Oral Tablet; take 1 tablet daily as needed; Therapy: 69ZVT2009 to (Evaluate:03Jun2018)  Requested for: 28XDL1806; Last Rx:08Jun2017 Ordered   8  Gabapentin 300 MG Oral Capsule; TAKE 1 CAPSULE AT BED TIME FOR 1 WEEK, THEN INCREASE DOSE TO TWICE A DAY; Therapy: 99KRA7675 to (PKNMOIDA:08ELA1025)  Requested for: 44WOA9434; Last Rx:30Jun2017 Ordered   9  Levothyroxine Sodium 125 MCG Oral Tablet; 1 tablet daily; Therapy: 98WKQ1559 to (Evaluate:94Itp5704)  Requested for: 21YBI0949; Last Rx:30Jun2017 Ordered   10  Loratadine 10 MG Oral Tablet; TAKE 1 TABLET DAILY; Therapy: 86Ilu2176 to (Evaluate:03Fft7865)  Requested for: 10Aug2017; Last  Rx:10Aug2017 Ordered   11  Pantoprazole Sodium 40 MG Oral Tablet Delayed Release; TAKE 1 TABLET TWICE DAILY  30 MINUTES BEFORE BREAKFAST AND DINNER; Therapy: 76ALE1612 to (JUZBFMDM:51KTM8286)  Requested for: 30AZG4262; Last  Rx:20Vnh4011 Ordered   12  Polyethylene Glycol 3350 Oral Powder; MIX 1 CAPFUL (17GM) IN 8 OUNCES OF WATER,  JUICE, OR TEA AND DRINK DAILY; Therapy: 05HYI4362 to (Evaluate:83Vgo5472)  Requested for: 10Aug2017; Last  Rx:10Aug2017 Ordered   13  Riboflavin 400 MG Oral Tablet; Take 1 tablet daily; Therapy: 25ZZJ4273 to ()  Requested for: 55IOG3257; Last  Rx:30Jun2017 Ordered   14  Sucralfate 1 GM Oral Tablet; TAKE 1 TABLET 3 times daily; Therapy: 14VPJ9963 to (Lyubov Saravia)  Requested for: 67TIL0305; Last  Rx:84Bjs9125 Ordered   15  Ventolin  (90 Base) MCG/ACT Inhalation Aerosol Solution; INHALE 2 PUFFS  EVERY 4-6 HOURS AS NEEDED;   Therapy: 62UBS2040 to Nichelle Gastelum)  Requested for: 30Jun2017; Last  Rx:30Jun2017 Ordered  Medication List Reviewed: The medication list was reviewed and updated today  Allergies  1  Penicillins  2  Latex   3  Seasonal    Vitals  Vital Signs    Recorded: 84WRC5021 02:43PM   Temperature 98 8 F, Tympanic   Heart Rate 76   Systolic 895, LUE, Sitting   Diastolic 77, LUE, Sitting   Height 5 ft 8 in   Weight 196 lb 4 oz   BMI Calculated 29 84   BSA Calculated 2 03   O2 Saturation 98, RA       Physical Exam   Constitutional  General appearance: No acute distress, well appearing and well nourished  Eyes No scleral icterus  Ears, Nose, Mouth, and Throat Moist mucous membranes  Pulmonary  Respiratory effort: No increased work of breathing or signs of respiratory distress  Auscultation of lungs: Clear to auscultation  Cardiovascular  Auscultation of heart: Normal rate and rhythm, normal S1 and S2, without murmurs  Examination of extremities for edema and/or varicosities: Normal    Abdomen  Abdomen: Abnormal  -- (Non-distended  Normal bowel sounds  Soft  Mild epigastric tenderness to palpation  No rebound or guarding)  Liver and spleen: No hepatomegaly or splenomegaly  Lymphatic  Palpation of lymph nodes in neck: No lymphadenopathy  Musculoskeletal  Gait and station: Normal    Skin  Skin and subcutaneous tissue: Normal without rashes or lesions     Psychiatric  Orientation to person, place, and time: Normal    Mood and affect: Normal          Results/Data  EGD 72Utt6013 04:56PM Roxann Guadarrama     Test Name Result Flag Reference   EGD [id number]       (1) TISSUE EXAM 07Sep2017 02:05PM Roxann Guadarrama     Test Name Result Flag Reference   LAB AP CASE REPORT (Report)       Surgical Pathology Report             Case: [id number]                  Authorizing Provider: Myriam Shen MD      Collected:      09/07/2017 1405       Ordering Location:   [address] [place] [address]   Received:      09/08/2017 [facility]                              Pathologist:      Lainey Peck DO                              Specimen:  Esophagus, Esophagus biopsy-cold   LAB AP FINAL DIAGNOSIS (Report)       A  Esophagus, biopsy: - Gastric glandular mucosa with acute and chronic inflammation and  bacterial organisms morphologically suggestive of  Helicobacter pylori, see note  - Junctional mucosa with chronic inflammation and reactive glandular  atypia  - No intestinal metaplasia or dysplasia identified  Note: Immunostain for Helicobacter pylori organisms is underway and the  results will be issued in an addendum  Interpretation performed at [address] [place] [address]  Electronically signed by Lainey Peck DO on 9/11/2017 at 9:49 AM   LAB AP SURGICAL ADDITIONAL INFORMATION (Report)       All controls performed with the immunohistochemical stains reported above  reacted appropriately  These tests were developed and their performance  characteristics determined by [facility] or  [address] [place] [address]  They may not be cleared or approved by the [place]  Food and Drug Administration  The FDA has determined that such clearance  or approval is not necessary  These tests are used for clinical purposes  They should not be regarded as investigational or for research  This  laboratory has been approved by [place] [address], designated as a high-complexity  laboratory and is qualified to perform these tests  LAB AP GROSS DESCRIPTION (Report)       A  The specimen is received in formalin, labeled with the patient's name  and hospital number, and is designated esophagus biopsy-cold, are  multiple irregularly shaped fragments of tan soft tissue measuring 0 5 x  0 5 x 0 1 cm in aggregate  Entirely submitted  One cassette  Note: The estimated total formalin fixation time based upon information  provided by the submitting clinician and the standard processing schedule  is 36 5 hours    RLR   LAB AP CLINICAL INFORMATION      Esophagitis  R/O Barretts   Esophagitis   R/O Barretts   LAB AP ADDENDUM 1        Immunostain for Helicobacter pylori is POSITIVE  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  Addendum electronically signed by Radha Castillo DO on 9/12/2017 at 1:20 PM     [facility] BARIUM SWALLOW [id number] 08:04AM Jt Lind Order Number: [id number]   - Patient Instructions: To schedule this appointment, please contact Central Scheduling at [phone]  Test Name Result Flag Reference   FL ESOPHAGRAM COMPLETE (Report)       BARIUM SWALLOW-ESOPHAGRAM   INDICATION: History of Heller myotomy for previous achalasia  Dysphagia  COMPARISON: Barium swallow 1/6/2016   IMAGES: 84   FLUOROSCOPY TIME:  3 33 MINUTES    TECHNIQUE:  The patient was given effervescent granules and barium by mouth and images of the esophagus were obtained  FINDINGS:   The esophagus is normal in caliber  Proximal esophagus is seen primarily in single-contrast without evidence of filling defect or extrinsic compression abnormality  Emptying of contrast from the esophagus is prompt on erect imaging  No mucosal lesion,   ulceration or evidence of fold thickening is seen  There was mild esophageal dysmotility noted during prone swallowing  Gastroesophageal reflux was not observed  There is no hiatal hernia  IMPRESSION:   Mild esophageal dysmotility  Otherwise, unremarkable esophagram     Workstation performed: [id number]   Signed by:  Thomas Troy DO  8/9/17     Health Management  Polyp, colonic   COLONOSCOPY; every 3 years; Last 37UZX2894; Next Due: 07GHQ5925; Active    Attending Note  Collaborating Physician Note: Collaborating Physician: I agree with the Advanced Practitioner note        Future Appointments    Date/Time Provider Specialty Site   12/04/2017 11:20 AM Kumar Garcia MD Gastroenterology Adult [facility] 9       Signatures   Electronically signed by Tyrese Michael, [facility]; Oct  9 2017  3:46PM EST                       (Author)    Electronically signed by : Corrina Alfaro MD; Oct 10 2017  9:14AM EST                       (Author)

## 2017-10-29 ENCOUNTER — GENERIC CONVERSION - ENCOUNTER (OUTPATIENT)
Dept: OTHER | Facility: OTHER | Age: 52
End: 2017-10-29

## 2017-10-31 ENCOUNTER — APPOINTMENT (OUTPATIENT)
Dept: PHYSICAL THERAPY | Facility: REHABILITATION | Age: 52
End: 2017-10-31
Payer: COMMERCIAL

## 2017-10-31 ENCOUNTER — APPOINTMENT (OUTPATIENT)
Dept: LAB | Facility: HOSPITAL | Age: 52
End: 2017-10-31
Payer: COMMERCIAL

## 2017-10-31 ENCOUNTER — HOSPITAL ENCOUNTER (OUTPATIENT)
Dept: ULTRASOUND IMAGING | Facility: CLINIC | Age: 52
Discharge: HOME | End: 2017-10-31
Payer: COMMERCIAL

## 2017-10-31 ENCOUNTER — HOSPITAL ENCOUNTER (OUTPATIENT)
Dept: MAMMOGRAPHY | Facility: CLINIC | Age: 52
Discharge: HOME | End: 2017-10-31
Payer: COMMERCIAL

## 2017-10-31 ENCOUNTER — TRANSCRIBE ORDERS (OUTPATIENT)
Dept: LAB | Facility: HOSPITAL | Age: 52
End: 2017-10-31

## 2017-10-31 VITALS — HEART RATE: 88 BPM | DIASTOLIC BLOOD PRESSURE: 68 MMHG | SYSTOLIC BLOOD PRESSURE: 110 MMHG

## 2017-10-31 DIAGNOSIS — R92.8 ABNORMAL SCREENING MAMMOGRAM: ICD-10-CM

## 2017-10-31 DIAGNOSIS — R92.8 ABNORMAL FINDINGS ON DIAGNOSTIC IMAGING OF BREAST: ICD-10-CM

## 2017-10-31 DIAGNOSIS — A04.8 OTHER SPECIFIED BACTERIAL INTESTINAL INFECTIONS: ICD-10-CM

## 2017-10-31 DIAGNOSIS — N63.0 MASS OF BREAST: ICD-10-CM

## 2017-10-31 PROCEDURE — 88305 TISSUE EXAM BY PATHOLOGIST: CPT | Performed by: INTERNAL MEDICINE

## 2017-10-31 PROCEDURE — 87338 HPYLORI STOOL AG IA: CPT

## 2017-10-31 PROCEDURE — G0206 DX MAMMO INCL CAD UNI: HCPCS

## 2017-10-31 PROCEDURE — 76642 ULTRASOUND BREAST LIMITED: CPT

## 2017-10-31 PROCEDURE — 19083 BX BREAST 1ST LESION US IMAG: CPT

## 2017-10-31 PROCEDURE — G0279 TOMOSYNTHESIS, MAMMO: HCPCS

## 2017-10-31 RX ORDER — LIDOCAINE HYDROCHLORIDE 10 MG/ML
5 INJECTION, SOLUTION INFILTRATION; PERINEURAL ONCE
Status: COMPLETED | OUTPATIENT
Start: 2017-10-31 | End: 2017-10-31

## 2017-10-31 RX ORDER — FUROSEMIDE 20 MG/1
TABLET ORAL
COMMUNITY
Start: 2017-06-08 | End: 2018-04-10

## 2017-10-31 RX ORDER — GABAPENTIN 300 MG/1
CAPSULE ORAL
COMMUNITY
Start: 2017-06-30 | End: 2018-04-10

## 2017-10-31 RX ORDER — METRONIDAZOLE 250 MG/1
TABLET ORAL
COMMUNITY
Start: 2017-10-09 | End: 2018-04-10

## 2017-10-31 RX ORDER — ALBUTEROL SULFATE 90 UG/1
AEROSOL, METERED RESPIRATORY (INHALATION)
COMMUNITY
Start: 2016-04-26 | End: 2018-01-26 | Stop reason: SDUPTHER

## 2017-10-31 RX ORDER — RIBOFLAVIN (VITAMIN B2) 400 MG
TABLET ORAL
COMMUNITY
Start: 2017-06-30 | End: 2018-04-10

## 2017-10-31 RX ADMIN — LIDOCAINE HYDROCHLORIDE 5 ML: 10 INJECTION, SOLUTION INFILTRATION; PERINEURAL at 11:03

## 2017-10-31 NOTE — DISCHARGE INSTR - OTHER ORDERS
POST LARGE CORE BREAST BIOPSY PATIENT INFORMATION      1  Place an ice pack inside your bra over the top of the dressing every hour for 20 minutes (20 minutes on, 60 minutes off) Do this until bedtime  2  Do not shower or bathe until the following morning       3  You may bathe your breast carefully with the steri-strips in place  Be careful    Not to loosen them  The steri-strips will fall off in 3-5 days  4  You may have mild discomfort, and you may have some bruising where the   Needle entered the skin  This should clear within 5-7 days  5  If you need medicine for discomfort, take acetaminophen products such as   Tylenol  You may also take Advil or Motrin products  6  Do not participate in strenuous activities such as-tennis, aerobics, skiing,  Weight lifting, etc  for 24 hours  Refrain from swimming for 72 hours  7  Wearing a bra for sleeping may be more comfortable for the first 24-48 hours  8  Watch for continued bleeding, pain or fever over 101     For procedures done at the [address] [place] [address] call:  Heriberto Villagomez RN at [phone] or  Giovanna Small RN at [phone]  After 4 PM call the Interventional Radiology Department at [phone] and ask to speak with the nurse on call  For procedures performed at [address] [place] [address] call: The Radiology Nurse at [phone]    After 4 PM call your physician, or go to the Emergency Department  9          The final results of your biopsy are usually available within one week

## 2017-10-31 NOTE — PROGRESS NOTES
Met with patient and Dr Fae Schlatter   regarding recommendation for;      __X___ RIGHT ______LEFT      __X___Ultrasound guided  ______Stereotactic  Breast biopsy  ___X__Verbalized understanding        Blood thinners:  _____yes _X____no    Date stopped: ___N/A________    Biopsy teaching sheet given:  _______yes ___X___no    Done as same day ad on

## 2017-10-31 NOTE — PROGRESS NOTES
Ice pack given:    __X___yes _____no    Discharge instructions signed by patient:    __X___yes _____no    Discharge instructions given to patient:    __X___yes _____no    Discharged via:    __X___amulatory    _____wheelchair    _____stretcher    Stable on discharge:    __X___yes ____no

## 2017-10-31 NOTE — PROGRESS NOTES
Procedure type:    __X__ultrasound guided _____stereotactic    Breast:    _____Left __X___Right    Location: 10:00 9CM FROM NIPPLE    Needle: 12G YAHIR    # of passes: 3    Clip: [facility]    Performed by: Dr Anastasia Park held for 5 minutes by: Ashu Sorto Strips:    __X___yes _____no    Band aid:    __X___yes_____no    Tape and guaze:    _____yes __X___no    Tolerated procedure:    __X___yes _____no

## 2017-11-01 ENCOUNTER — GENERIC CONVERSION - ENCOUNTER (OUTPATIENT)
Dept: OTHER | Facility: OTHER | Age: 52
End: 2017-11-01

## 2017-11-01 LAB — H PYLORI AG STL QL IA: NEGATIVE

## 2017-11-01 NOTE — PROGRESS NOTES
Post procedure call completed on 11/1/17 at 1100    Bleeding: _____yes __x___no    Pain: _____yes ___x___no    Redness/Swelling: ______yes __x____no    Band aid removed: __x___yes _____no    Steri-Strips intact: ___x___yes _____no

## 2017-11-06 ENCOUNTER — TRANSCRIBE ORDERS (OUTPATIENT)
Dept: ADMINISTRATIVE | Facility: HOSPITAL | Age: 52
End: 2017-11-06

## 2017-11-06 ENCOUNTER — GENERIC CONVERSION - ENCOUNTER (OUTPATIENT)
Dept: OTHER | Facility: OTHER | Age: 52
End: 2017-11-06

## 2017-11-06 DIAGNOSIS — G43.809 OTHER MIGRAINE WITHOUT STATUS MIGRAINOSUS, NOT INTRACTABLE: Primary | ICD-10-CM

## 2017-11-11 ENCOUNTER — HOSPITAL ENCOUNTER (OUTPATIENT)
Dept: RADIOLOGY | Facility: HOSPITAL | Age: 52
Discharge: HOME | End: 2017-11-11
Payer: COMMERCIAL

## 2017-11-11 DIAGNOSIS — G43.909 MIGRAINE WITHOUT STATUS MIGRAINOSUS, NOT INTRACTABLE: ICD-10-CM

## 2017-11-11 PROCEDURE — 70551 MRI BRAIN STEM W/O DYE: CPT

## 2017-12-04 ENCOUNTER — ALLSCRIPTS OFFICE VISIT (OUTPATIENT)
Dept: OTHER | Facility: OTHER | Age: 52
End: 2017-12-04

## 2017-12-06 NOTE — PROGRESS NOTES
Assessment    1  Esophageal reflux (530 81) (K21 9)   2  H  pylori infection (041 86) (A04 8)   3  Constipation (564 00) (K59 00)    Plan  Constipation    · Polyethylene Glycol 3350 Oral Powder (MiraLax); MIX 1 CAPFUL (17GM) IN 8OUNCES OF WATER, JUICE, OR TEA AND DRINK DAILY   Rx By: Lyle Lester; Dispense: 30 Days ; #:1 X 238 GM Bottle; Refill: 11; Constipation; DEUCE = N; Verified Transmission to [facility] Last Updated By: System, SureScripts; 12/4/2017 12:02:49 PM  Esophageal reflux    · Pantoprazole Sodium 40 MG Oral Tablet Delayed Release; TAKE 1 TABLETDAILY   Rx By: Lyle Lester; Dispense: 30 Days ; #:30 Tablet Delayed Release; Refill: 5;Esophageal reflux; DEUCE = N; Verified Transmission to [facility] Last Updated By: System, SureScripts; 12/4/2017 11:59:43 AM   · Follow-up visit in 6 months Evaluation and Treatment  Follow-up  Status: Hold For -Scheduling  Requested for: 05YJV1300   Ordered;Esophageal reflux; Ordered By: Lyle Lester Performed:  Due: 61MCR3435    Discussion/Summary  Discussion Summary:   1  H pylori infection-completed quadruple therapy as outlined above  H pylori stool antigen is negative  Gastroesophageal reflux disease-continue pantoprazole 40 mg once a day  Carafate 1 g t i d  Continue reflux precaution  Constipation-high-fiber diet  MiraLax 17 g daily  New prescription sent to her pharmacy  Personal history of adenomatous polyp- colonoscopy is recommended in 2018  Counseling Documentation With Imm: The patient was counseled regarding prognosis,-- patient and family education,-- impressions  Goals and Barriers: The patient has the current Goals: See above  The patent has the current Barriers: None  Chief Complaint  Chief Complaint Free Text Note Form: f/u for H  pylori infection  History of Present Illness  HPI: 70-year-old female here for follow-up visit   She had H pylori infection and completed quadruple therapy was metronidazole, tetracycline, Pepto-Bismol and PPI for 2 weeks  H pylori stool antigen was negative  reflux symptoms well controlled with pantoprazole 40 mg once a day and Carafate  also reports some improvement in her dysphagia  Review of Systems  Complete-Female GI Adult:  Constitutional: No fever, no chills, feels well, no tiredness, no recent weight gain or weight loss  Eyes: No complaints of eye pain, no red eyes, no eyesight problems, no discharge, no dry eyes, no itching of eyes  ENT: no complaints of earache, no loss of hearing, no nose bleeds, no nasal discharge, no sore throat, no hoarseness  Cardiovascular: No complaints of slow heart rate, no fast heart rate, no chest pain, no palpitations, no leg claudication, no lower extremity edema  Respiratory: No complaints of shortness of breath, no wheezing, no cough, no SOB on exertion, no orthopnea, no PND  Gastrointestinal: No complaints of abdominal pain, no constipation, no nausea or vomiting, no diarrhea, no bloody stools  Genitourinary: No complaints of dysuria, no incontinence, no pelvic pain, no dysmenorrhea, no vaginal discharge or bleeding  Musculoskeletal: No complaints of arthralgias, no myalgias, no joint swelling or stiffness, no limb pain or swelling  Integumentary: No complaints of skin rash or lesions, no itching, no skin wounds, no breast pain or lump  Neurological: No complaints of headache, no confusion, no convulsions, no numbness, no dizziness or fainting, no tingling, no limb weakness, no difficulty walking  Psychiatric: Not suicidal, no sleep disturbance, no anxiety or depression, no change in personality, no emotional problems  Endocrine: No complaints of proptosis, no hot flashes, no muscle weakness, no deepening of the voice, no feelings of weakness  Hematologic/Lymphatic: No complaints of swollen glands, no swollen glands in the neck, does not bleed easily, does not bruise easily  ROS Reviewed:   ROS reviewed  Active Problems  1  Achalasia (530 0) (K22 0)   2  Allergy, food (V15 05) (Z91 018)   3  Arthritis (716 90) (M19 90)   4  Asthma (493 90) (J45 909)   5  Atopic dermatitis (691 8) (L20 9)   6  Bilateral edema of lower extremity (782 3) (R60 0)   7  Breast cancer screening (V76 10) (Z12 39)   8  Breast pain, right (611 71) (N64 4)   9  Chronic sinusitis with recurrent bronchitis (473 9,490) (J32 9,J40)   10  Constipation (564 00) (K59 00)   11  Depression (311) (F32 9)   12  Dysphagia (787 20) (R13 10)   13  Encounter for screening mammogram for breast cancer (V76 12) (Z12 31)   14  Encounter for smoking cessation counseling (V65 42,305 1) (Z71 6,Z72 0)   15  Esophageal reflux (530 81) (K21 9)   16  H  pylori infection (041 86) (A04 8)   17  Headache, migraine (346 90) (G43 909)   18  Hypothyroidism (244 9) (E03 9)   19  Irritable bowel syndrome with constipation (564 1) (K58 1)   20  Lump or mass in breast (611 72) (N63 0)   21  Need for immunization against influenza (V04 81) (Z23)   22  Need for pneumococcal vaccination (V03 82) (Z23)   23  Obesity (BMI 30-39 9) (278 00) (E66 9)   24  Other chronic pain (338 29) (G89 29)   25  Pain in eye, bilateral (379 91) (H57 13)   26  Pap smear for cervical cancer screening (V76 2) (Z12 4)   27  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 816)   28  Thyroiditis (245 9) (E06 9)   29  Tubular adenoma of colon (211 3) (D12 6)   30  Urge and stress incontinence (788 33) (N39 46)   31  Varicose veins with pain (454 8) (I83 819)   32  Visit for routine gyn exam (V72 31) (Z01 419)    Past Medical History  1  History of Acute upper respiratory infection (465 9) (J06 9)   2  History of Age At First Period 17 Years Old (Menarche)   3  History of Bacterial vaginosis (616 10,041 9) (N76 0,B96 89)   4  History of Elevated LFTs (790 6) (R79 89)   5  History of Gastric ulcer (531 90) (K25 9)   6  History Of 4  Previous Pregnancies (V61 5)   7   History of thyroid nodule (V12 29) (Z86 39)   8  History of urinary stone (V13 01) (Z87 442)   9  History of Obstructive sleep apnea (327 23) (G47 33)  Active Problems And Past Medical History Reviewed: The active problems and past medical history were reviewed and updated today  Surgical History    1  History of  Section   2  History of Heller Myotomy Laparoscopic Approach   3  History of Tubal Ligation   4  History of Vaginal Sling Operation For Stress Incontinence  Surgical History Reviewed: The surgical history was reviewed and updated today  Family History  Mother    1  Family history of Breast Cancer (V16 3)   2  Family history of malignant neoplasm of breast (V16 3) (Z80 3)   3  Family history of Hypertension  Father    4  Family history of Diabetes Mellitus (V18 0)   5  Family history of Hypertension  Grandparent    6  Family history of malignant neoplasm of breast (V16 3) (Z80 3)   7  Family history of Ovarian cancer  Maternal Grandmother    8  Family history of Malignant neoplasm of ovary, unspecified laterality  Maternal Grandfather    9  Family history of malignant neoplasm of tongue (V16 8) (Z80 8)  Maternal Aunt    10  Family history of Colon cancer  Family History Reviewed: The family history was reviewed and updated today  Social History     · Denied: Alcohol use (V49 89) (Z78 9)   · Current every day smoker (305 1) (F17 200)   · Denied: Drug use (305 90) (F19 90)   · Former smoker (M85 69) (D31 401)  Social History Reviewed: The social history was reviewed and updated today  The social history was reviewed and is unchanged  Current Meds   1  Albuterol Sulfate (2 5 MG/3ML) 0 083% Inhalation Nebulization Solution; USE 1 UNIT DOSE IN NEBULIZER EVERY 4 HOURS AS NEEDED; Therapy: 08SNA5259 to (Last Rx:2017)  Requested for: 09BWD6618 Ordered   2  Clotrimazole-Betamethasone 1-0 05 % External Cream; APPLY AND RUB IN A THIN FILM TO AFFECTED AREAS TWICE DAILY  (AM AND PM);  Therapy: 2016 to (Evaluate:10Oct2017)  Requested for: 54Rqc7431; Last Rx:90Dhk4873 Ordered   3  Colace 100 MG Oral Capsule; TAKE 1 CAPSULE TWICE DAILY; Therapy: 71OLP1125 to (Evaluate:03Jun2018)  Requested for: 76GTO0111; Last Rx:08Jun2017 Ordered   4  Dicyclomine HCl - 20 MG Oral Tablet; TAKE 1 TABLET EVERY 6 HOURS AS NEEDED; Therapy: 11FST2900 to ([phone])  Requested for: 84IOV3927; Last Rx:72Kxr5248 Ordered   5  Fluticasone Propionate 50 MCG/ACT Nasal Suspension; use 1 to 2 sprays in each nostril once daily; Therapy: 34GCP6318 to (Daralyn Liter)  Requested for: 91LGA7620; Last Rx:07Nov2017 Ordered   6  Furosemide 20 MG Oral Tablet; take 1 tablet daily as needed; Therapy: 13BMS8937 to (Evaluate:03Jun2018)  Requested for: 77BDH5216; Last Rx:08Jun2017 Ordered   7  Gabapentin 300 MG Oral Capsule; TAKE 1 CAPSULE AT BED TIME FOR 1 WEEK, THEN INCREASE DOSE TO TWICE A DAY; Therapy: 75DUI2336 to (Teddyita [place])  Requested for: 29GEC8969; Last Rx:30Jun2017 Ordered   8  Levothyroxine Sodium 150 MCG Oral Tablet; TAKE 1 TABLET DAILY; Therapy: 93RTZ1036 to (Chikis Alcala)  Requested for: 16ZVP2299; Last Rx:06Nov2017 Ordered   9  Loratadine 10 MG Oral Tablet; TAKE 1 TABLET DAILY; Therapy: 40Xbs5296 to (Evaluate:64Olb0111)  Requested for: 27Egl9045; Last Rx:10Aug2017 Ordered   10  Meloxicam 15 MG Oral Tablet; TAKE 1 TABLET DAILY WITH FOOD; Therapy: 44QYN3848 to (Junita [place])  Requested for: 81GAJ8383; Last  Rx:09Nov2017 Ordered   11  Pantoprazole Sodium 40 MG Oral Tablet Delayed Release; TAKE 1 TABLET TWICE DAILY  30 MINUTES BEFORE BREAKFAST AND DINNER; Therapy: 67MRF0458 to (RWKNQLJM:63YJZ3038)  Requested for: 04GIS0051; Last  Rx:03Rri5116 Ordered   12  Polyethylene Glycol 3350 Oral Powder; MIX 1 CAPFUL (17GM) IN 8 OUNCES OF WATER,  JUICE, OR TEA AND DRINK DAILY; Therapy: 03LFC1261 to (Evaluate:40Qmw8307)  Requested for: 10Aug2017; Last  Rx:10Aug2017 Ordered   13  Riboflavin 400 MG Oral Tablet;  Take 1 tablet daily; Therapy: 03YVO3089 to ()  Requested for: 66VOQ3226; Last  Rx:30Jun2017 Ordered   14  Sucralfate 1 GM Oral Tablet; TAKE 1 TABLET 3 times daily; Therapy: 74DIY8672 to (Lyubov Saravia)  Requested for: 66JMB4290; Last  Rx:88Oro4121 Ordered   15  Tetracycline HCl - 500 MG Oral Capsule; take 1 capsule 4 times daily; Therapy: 80KUE9036 to ()  Requested for: 21XGU8789; Last  Rx:09Oct2017 Ordered   16  Topamax 50 MG Oral Tablet; TAKE 1/2 TABLET QHS for 1 week, then take 1 tablet QHS; Therapy: 23FQX3599 to (Last Rx:25Oct2017)  Requested for: 25Oct2017 Ordered   17  Ventolin  (90 Base) MCG/ACT Inhalation Aerosol Solution; INHALE 2 PUFFS  EVERY 4-6 HOURS AS NEEDED; Therapy: 11CHU3553 to [phone])  Requested for: ; Last  TO:52JQH7513 Ordered  Medication List Reviewed: The medication list was reviewed and updated today  Allergies  1  Penicillins  2  Latex   3  Seasonal    Vitals  Vital Signs    Recorded: 55CYX7028 11:44AM   Temperature 97 6 F, Tympanic   Heart Rate 59   Systolic 886   Diastolic 78   Height 5 ft 8 in   Weight 201 lb    BMI Calculated 30 56   BSA Calculated 2 05   O2 Saturation 99, RA       Physical Exam   Constitutional  General appearance: No acute distress, well appearing and well nourished  Ears, Nose, Mouth, and Throat  Oropharynx: Normal with no erythema, edema, exudate or lesions  Pulmonary  Respiratory effort: No increased work of breathing or signs of respiratory distress  Auscultation of lungs: Clear to auscultation  Cardiovascular  Auscultation of heart: Normal rate and rhythm, normal S1 and S2, without murmurs  Abdomen  Abdomen: Non-tender, no masses  Liver and spleen: No hepatomegaly or splenomegaly  Lymphatic  Palpation of lymph nodes in neck: No lymphadenopathy  Musculoskeletal  Digits and nails: Normal without clubbing or cyanosis     Skin  Skin and subcutaneous tissue: Normal without rashes or lesions  Neurologic  Reflexes: 2+ and symmetric  Psychiatric  Orientation to person, place, and time: Normal          Health Management  Polyp, colonic   COLONOSCOPY; every 3 years; Last 01CAN4118; Next Due: 68LYD2539;  Active    Future Appointments    Date/Time Provider Specialty Site   12/15/2017 09:05 AM Baron Oliverio DR Internal Medicine [address] PCP       Signatures   Electronically signed by : Kenyetta Samuels MD; Dec  4 2017  5:15PM EST                       (Author)

## 2017-12-15 ENCOUNTER — GENERIC CONVERSION - ENCOUNTER (OUTPATIENT)
Dept: OTHER | Facility: OTHER | Age: 52
End: 2017-12-15

## 2017-12-15 ENCOUNTER — APPOINTMENT (OUTPATIENT)
Dept: LAB | Facility: CLINIC | Age: 52
End: 2017-12-15
Payer: COMMERCIAL

## 2017-12-15 DIAGNOSIS — E03.9 HYPOTHYROIDISM: ICD-10-CM

## 2017-12-15 DIAGNOSIS — Z86.39 PERSONAL HISTORY OF OTHER ENDOCRINE, NUTRITIONAL AND METABOLIC DISEASE: ICD-10-CM

## 2017-12-15 LAB
T4 FREE SERPL-MCNC: 0.71 NG/DL (ref 0.76–1.46)
TSH SERPL DL<=0.05 MIU/L-ACNC: 38.7 UIU/ML (ref 0.36–3.74)

## 2017-12-15 PROCEDURE — 84443 ASSAY THYROID STIM HORMONE: CPT

## 2017-12-15 PROCEDURE — 84439 ASSAY OF FREE THYROXINE: CPT

## 2017-12-15 PROCEDURE — 36415 COLL VENOUS BLD VENIPUNCTURE: CPT

## 2018-01-02 ENCOUNTER — GENERIC CONVERSION - ENCOUNTER (OUTPATIENT)
Dept: INTERNAL MEDICINE CLINIC | Facility: CLINIC | Age: 53
End: 2018-01-02

## 2018-01-09 NOTE — MISCELLANEOUS
Dear Judy Gonzalez,  [address] [facility] office has attempted to contact you regarding results  Please give our office a call back at [phone]    Thank you,  [facility]      Electronically signed Aline Sun   Aug 11 2017  9:56AM EST

## 2018-01-09 NOTE — RESULT NOTES
Verified Results  ECHO COMPLETE WITH CONTRAST IF INDICATED, TTE / TRANSTHORACIC 30SIU9795 10:02AM Josiah Ulrich     Test Name Result Flag Reference   ECHO COMPLETE WITH CONTRAST IF INDICATED (Report)     Kali Ambrocio   [address], [place] [facility]   [phone]     Transthoracic Echocardiogram   2D, M-mode, Doppler, and Color Doppler     Study date: 2016     Patient: Gunnar Mike   MR number: [id number]   Account number: [de-identified]   : 1965   Age: 52 years   Gender: Female   Status: Outpatient   Location: Echo lab   Height: 67 in   Weight: 187 7 lb   BP: 120/ 70 mmHg     Indications: Bilateral LE edema  Diagnoses: R60 9 - Edema, unspecified     Sonographer: Melissa Gomez   Primary Physician: Martha Monte DO   Referring Physician: Jose Sewell MD   Group: [facility]   Interpreting Physician: Fay De Luna MD     SUMMARY     LEFT VENTRICLE:   Systolic function was normal  Ejection fraction was estimated to be 55 %  There were no regional wall motion abnormalities  RIGHT VENTRICLE:   The size was normal    Systolic function was normal      HISTORY: PRIOR HISTORY: Arthritis, edema, GERD, hypothyroidism  PROCEDURE: The procedure was performed in the echo lab  This was a routine   study  The transthoracic approach was used  The study included complete 2D   imaging, M-mode, complete spectral Doppler, and color Doppler  The heart rate   was 73 bpm, at the start of the study  Images were obtained from the   parasternal, apical, subcostal, and suprasternal notch acoustic windows  Image   quality was adequate  LEFT VENTRICLE: Size was normal  Systolic function was normal  Ejection   fraction was estimated to be 55 %  There were no regional wall motion   abnormalities   Wall thickness was normal  DOPPLER: Left ventricular diastolic   function parameters were normal      RIGHT VENTRICLE: The size was normal  Systolic function was normal  Wall   thickness was normal      LEFT ATRIUM: Size was normal      RIGHT ATRIUM: Size was normal      MITRAL VALVE: Valve structure was normal  There was normal leaflet separation  DOPPLER: The transmitral velocity was within the normal range  There was no   evidence for stenosis  There was no significant regurgitation  AORTIC VALVE: The valve was trileaflet  Leaflets exhibited normal thickness and   normal cuspal separation  DOPPLER: Transaortic velocity was within the normal   range  There was no evidence for stenosis  There was no significant   regurgitation  TRICUSPID VALVE: The valve structure was normal  There was normal leaflet   separation  DOPPLER: The transtricuspid velocity was within the normal range  There was no evidence for stenosis  There was no significant regurgitation  The   tricuspid jet envelope definition was inadequate for estimation of RV systolic   pressure  PULMONIC VALVE: Leaflets exhibited normal thickness, no calcification, and   normal cuspal separation  DOPPLER: The transpulmonic velocity was within the   normal range  There was no significant regurgitation  PERICARDIUM: There was no pericardial effusion  The pericardium was normal in   appearance  AORTA: The root exhibited normal size  SYSTEMIC VEINS: IVC: The inferior vena cava was normal in size       SYSTEM MEASUREMENT TABLES     2D   %FS: 37 95 %   AV Diam: 2 97 cm   Ao Diam: 2 84 cm   EDV(Teich): 132 08 ml   EF Biplane: 62 79 %   EF(Teich): 67 7 %   ESV(Teich): 42 66 ml   IVSd: 0 99 cm   LA Area: 17 2 cm2   LA Diam: 3 82 cm   LVEDV MOD A2C: 95 59 ml   LVEDV MOD A4C: 93 2 ml   LVEDV MOD BP: 96 72 ml   LVEF MOD A2C: 66 31 %   LVEF MOD A4C: 56 41 %   LVESV MOD A2C: 32 2 ml   LVESV MOD A4C: 40 62 ml   LVESV MOD BP: 35 99 ml   LVIDd: 5 24 cm   LVIDs: 3 25 cm   LVLd A2C: 8 45 cm   LVLd A4C: 8 01 cm   LVLs A2C: 6 3 cm   LVLs A4C: 6 27 cm   LVPWd: 0 86 cm   RA Area: 13 34 cm2   SV MOD A2C: 63 39 ml   SV MOD A4C: 52 57 ml   SV(Teich): 89 42 ml     MM   TAPSE: 2 44 cm     PW   E': 0 08 m/s   E/E': 9 26   MV A [place]: 0 57 m/s   MV Dec [place]: 3 78 m/s2   MV DecT: 202 96 ms   MV E [place]: 0 77 m/s   MV E/A Ratio: 1 33   MV PHT: 58 86 ms   MVA By PHT: 3 74 cm2     [employer] Accredited Echocardiography Laboratory     Prepared and electronically signed by     Hemal Aguilera MD   Signed [id number] 12:03:54

## 2018-01-10 NOTE — RESULT NOTES
Verified Results  US THYROID [id number] 01:46PM Dougie Parent Order Number: [id number]   TW Order Number: [id number]     Test Name Result Flag Reference   US THYROID (Report)     THYROID ULTRASOUND     INDICATION: Hypothyroidism     COMPARISON: 4/1/2014     TECHNIQUE:  Ultrasound of the thyroid was performed with a high frequency linear transducer in transverse and sagittal planes including volumetric imaging sweeps as well as traditional still imaging technique  FINDINGS:   Very heterogeneous echotexture bilaterally  Right gland: 4 2 x 1 4 x 1 6 cm  No dominant nodules  Left gland: 3 4 x 1 2 x 1 6 cm  No dominant nodules  Isthmus: 0 1 cm in AP dimension  No dominant nodules  IMPRESSION:      Very heterogeneous thyroid without discrete nodules         Workstation performed: [id number]     Signed by:   Khadijah Magaña MD   7/27/16

## 2018-01-11 NOTE — RESULT NOTES
Verified Results  (1) TISSUE EXAM [id number] 02:05PM Lisa Caballero     Test Name Result Flag Reference   LAB AP CASE REPORT (Report)     Surgical Pathology Report             Case: [id number]                   Authorizing Provider: Rosita Us MD      Collected:      09/07/2017 1405        Ordering Location:   [address] [place] [address]   Received:      09/08/2017 [address] [facility]                               Pathologist:      Thaddeus Vo DO                               Specimen:  Esophagus, Esophagus biopsy-cold   LAB AP FINAL DIAGNOSIS (Report)     A  Esophagus, biopsy:  - Gastric glandular mucosa with acute and chronic inflammation and   bacterial organisms morphologically suggestive of   Helicobacter pylori, see note  - Junctional mucosa with chronic inflammation and reactive glandular   atypia  - No intestinal metaplasia or dysplasia identified  Note: Immunostain for Helicobacter pylori organisms is underway and the   results will be issued in an addendum  Interpretation performed at [address]    Electronically signed by Thaddeus Vo DO on 9/11/2017 at 9:49 AM   LAB AP SURGICAL ADDITIONAL INFORMATION (Report)     All controls performed with the immunohistochemical stains reported above   reacted appropriately  These tests were developed and their performance   characteristics determined by [facility] or   [facility]  They may not be cleared or approved by the [place]  Food and Drug Administration  The FDA has determined that such clearance   or approval is not necessary  These tests are used for clinical purposes  They should not be regarded as investigational or for research  This   laboratory has been approved by CLIA 88, designated as a high-complexity   laboratory and is qualified to perform these tests  LAB AP GROSS DESCRIPTION (Report)     A   The specimen is received in formalin, labeled with the patient's name   and hospital number, and is designated esophagus biopsy-cold, are   multiple irregularly shaped fragments of tan soft tissue measuring 0 5 x   0 5 x 0 1 cm in aggregate  Entirely submitted  One cassette  Note: The estimated total formalin fixation time based upon information   provided by the submitting clinician and the standard processing schedule   is 36 5 hours  RLR   LAB AP CLINICAL INFORMATION      Esophagitis  R/O Barretts   Esophagitis   R/O Barretts   LAB AP ADDENDUM 1      Immunostain for Helicobacter pylori is POSITIVE  The findings are   consistent with Helicobacter pylori associated gastritis    Addendum electronically signed by Lizett Persaud DO on 9/12/2017 at 1:20 PM

## 2018-01-12 VITALS
DIASTOLIC BLOOD PRESSURE: 70 MMHG | BODY MASS INDEX: 28.04 KG/M2 | HEART RATE: 84 BPM | RESPIRATION RATE: 18 BRPM | SYSTOLIC BLOOD PRESSURE: 110 MMHG | TEMPERATURE: 98.5 F | WEIGHT: 185 LBS | HEIGHT: 68 IN

## 2018-01-12 NOTE — RESULT NOTES
Verified Results  (1) COMPREHENSIVE METABOLIC PANEL [id number] 39:42QA Fredi Hannah Order Number: [id number]     Test Name Result Flag Reference   SODIUM 139 mmol/L  136-145   POTASSIUM 4 2 mmol/L  3 5-5 3   CHLORIDE 105 mmol/L  100-108   CARBON DIOXIDE 26 mmol/L  21-32   ANION GAP (CALC) 8 mmol/L  4-13   BLOOD UREA NITROGEN 12 mg/dL  5-25   CREATININE 0 61 mg/dL  0 60-1 30   Standardized to IDMS reference method   CALCIUM 9 0 mg/dL  8 3-10 1   BILI, TOTAL 0 38 mg/dL  0 20-1 00   ALK PHOSPHATAS 164 U/L H    ALT (SGPT) 185 U/L H 12-78   AST(SGOT) 100 U/L H 5-45   ALBUMIN 3 4 g/dL L 3 5-5 0   TOTAL PROTEIN 7 0 g/dL  6 4-8 2   eGFR 106 ml/min/1 73sq m     National Kidney Disease Education Program recommendations are as follows:  GFR calculation is accurate only with a steady state creatinine  Chronic Kidney disease less than 60 ml/min/1 73 sq  meters  Kidney failure less than 15 ml/min/1 73 sq  meters     GLUCOSE FASTING 84 mg/dL  65-99

## 2018-01-12 NOTE — PROGRESS NOTES
Assessment    1  Urge and stress incontinence (788 33) (N39 46)    Plan  Spondylosis of lumbar region without myelopathy or radiculopathy    · Cyclobenzaprine HCl - 5 MG Oral Tablet; take one tablet every 8 hours as  needed for pain and muscle spasm   Rx By: Anderson Bolivar; Dispense: 0 Days ; #:30 Tablet; Refill: 3; For: Spondylosis of lumbar region without myelopathy or radiculopathy; DEUCE = N; Verified Transmission to [id number] Last Updated By: System, SureScripts; 8/25/2016 10:45:47 AM  Urge and stress incontinence    · VESIcare 5 MG Oral Tablet; Take 1 tablet by mouth dailly   Rx By: Erendira Nieto; Dispense: 30 Days ; #:30 Tablet; Refill: 3; For: Urge and stress incontinence; DEUCE = N; Verified Transmission to [facility] Last Updated By: System, SureScripts; 8/29/2016 8:48:08 PM   · Follow-up visit in 1 month Evaluation and Treatment  Follow-up  Status: Hold For -  Scheduling  Requested for: 54Jkx9894   Ordered; For: Urge and stress incontinence; Ordered By: Erendira Nieto Performed:  Due: 13Fuy1124    Discussion/Summary  Discussion Summary:   81HQ with mixed urinary incontinence  -Size 3 ring pessary with incontinence knob placed and tested  Pt satisfied with fit  -Will make appointment with PCP for continued right sided pelvic pain with normal USN  -RTC 1 month for follow up if pt not proceeding with surgical route  Chief Complaint  Chief Complaint Free Text Note Form: pt is here for her u/s results  History of Present Illness  HPI: 54yo here for USN results  Results discussed at length and all questions answered  Pt being evaluated by [place] for mixed urinary incontinence  Last note read possible surgery and initiation of Detrol  Pt desperate to begin therapy now due to extreme effect on quality of life  Discussed initiation of Vesicare and trial of pessary  Proper use and maintenance of pessary discussed  Active Problems    1  Achalasia (530 0) (K22 0)   2   Arthritis (890 90) (M19 90)   3  Asthma (493 90) (J45 909)   4  Atopic dermatitis (691 8) (L20 9)   5  Bacterial vaginosis (616 10,041 9) (N76 0,B96 89)   6  Bilateral edema of lower extremity (782 3) (R60 0)   7  Breast cancer screening (V76 10) (Z12 39)   8  Chronic low back pain (724 2,338 29) (M54 5,G89 29)   9  Chronic sinusitis (473 9) (J32 9)   10  Chronic sinusitis with recurrent bronchitis (473 9,490) (J32 9,J40)   11  Cyst of ovary, right (620 2) (N83 20)   12  Depression (311) (F32 9)   13  Dysphagia (787 20) (R13 10)   14  Dysuria (788 1) (R30 0)   15  Encounter for screening mammogram for breast cancer (V76 12) (Z12 31)   16  Encounter for smoking cessation counseling (V65 42,305 1) (Z71 6,Z72 0)   17  Esophageal reflux (530 81) (K21 9)   18  H  pylori infection (041 86) (A04 8)   19  Hospital discharge follow-up (V67 59) (Z09)   20  Hypothyroidism (244 9) (E03 9)   21  Need for immunization against influenza (V04 81) (Z23)   22  Need for pneumococcal vaccination (V03 82) (Z23)   23  Obstructive sleep apnea (327 23) (G47 33)   24  Pap smear for cervical cancer screening (V76 2) (Z12 4)   25  Pelvic pain in female (625 9) (R10 2)   26  Polyp, colonic (211 3) (K63 5)   27  Renal calcification (593 89) (N28 89)   28  SOB (shortness of breath) on exertion (786 05) (R06 02)   29  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 816)   30  Stress incontinence, female (625 6) (N39 3)   31  Thyroid nodule (241 0) (E04 1)   32  Thyroiditis (245 9) (E06 9)   33  Urgency of urination (788 63) (R39 15)   34  Vaginal odor (625 8) (N89 8)   35  Varicose veins with pain (454 8) (I83 819)   36  Visit for routine gyn exam (V72 31) (Z01 419)    Past Medical History    1  History of Acute upper respiratory infection (465 9) (J06 9)   2  History of Age At First Period 17 Years Old (Menarche)   3  History of Gastric ulcer (531 90) (K25 9)   4  History Of 4  Previous Pregnancies (V61 5)   5   History of urinary stone (V13 01) (O91 053)    Surgical History    1  History of  Section   2  History of Heller Myotomy Laparoscopic Approach   3  History of Tubal Ligation    Family History  Mother    1  Family history of Breast Cancer (V16 3)   2  Family history of Hypertension  Father    3  Family history of Diabetes Mellitus (V18 0)   4  Family history of Hypertension  Maternal Grandmother    5  Family history of Malignant neoplasm of ovary, unspecified laterality  Maternal Grandfather    6  Family history of malignant neoplasm of tongue (V16 8) (Z80 8)    Social History    · Denied: Alcohol use (V49 89) (Z78 9)   · Current every day smoker (305 1) (F17 200)   · Denied: Drug use (305 90) (F19 90)    Current Meds   1  Albuterol Sulfate (2 5 MG/3ML) 0 083% Inhalation Nebulization Solution; USE 1 UNIT   DOSE IN NEBULIZER EVERY 4 HOURS AS NEEDED; Therapy: 23DFL0499 to (Last Rx:2016)  Requested for: 2016 Ordered   2  Clotrimazole-Betamethasone 1-0 05 % External Cream; APPLY  AND RUB  IN A THIN   FILM TO AFFECTED AREAS TWICE DAILY  (AM AND PM); Therapy: 2016 to (Last Rx:45Opl4964)  Requested for: 24Azs9112 Ordered   3  Fluticasone Propionate 50 MCG/ACT Nasal Suspension; USE 1 TO 2 SPRAYS IN EACH   NOSTRIL ONCE DAILY; Therapy: 42OLN1778 to (Evaluate:2016)  Requested for: 06Jve9547; Last   Rx:71Thc4038 Ordered   4  Furosemide 20 MG Oral Tablet; take 1 tablet daily as needed; Therapy: 71CBD2985 to (Evaluate:50Fob9672)  Requested for: 58Kui1908; Last   Rx:66Nps7239 Ordered   5  Levothyroxine Sodium 137 MCG Oral Tablet; TAKE 1 TABLET DAILY AS DIRECTED; Therapy: 50CPQ7445 to (Evaluate:2017)  Requested for: 65Xqc7770; Last   Rx:35Ban0006 Ordered   6  Loratadine 10 MG Oral Tablet; TAKE 1 TABLET DAILY; Therapy: 58Cnv6802 to (Evaluate:2016)  Requested for: 38Fto8720; Last   Rx:23Sbw3669 Ordered   7  MetroNIDAZOLE 500 MG Oral Tablet; TAKE 1 TABLET TWICE DAILY UNTIL FINISHED;    Therapy: 39Wvs4129 to (Evaluate:83Cpb7664)  Requested for: 32Kqi3206; Last   Rx:80Xti2912 Ordered   8  Pantoprazole Sodium 40 MG Oral Tablet Delayed Release; TAKE 1 TABLET 30 MINUTES   BEFORE BREAKFAST DAILY; Therapy: 26RSW1773 to (Evaluate:17Jan2017)  Requested for: 76Nxs1231; Last   Rx:08Xxr2214 Ordered   9  Ventolin  (90 Base) MCG/ACT Inhalation Aerosol Solution; INHALE 2 PUFFS   EVERY 4-6 HOURS AS NEEDED; Therapy: 26Apr2016 to (Evaluate:31Mmd6155)  Requested for: 26Apr2016; Last   Rx:79Kta7474 Ordered    Allergies    1  Penicillins    2  Latex    Vitals  Vital Signs    Recorded: 18MUR4624 36:66AO   Systolic 219   Diastolic 64   Height 5 ft 7 in   Weight 209 lb 2 oz   BMI Calculated 32 75   BSA Calculated 2 06     Physical Exam    Constitutional   General appearance: No acute distress, well appearing and well nourished  Pulmonary   Respiratory effort: No increased work of breathing or signs of respiratory distress  Genitourinary   External genitalia: Normal and no lesions appreciated  relaxed genital hiatus  Vagina: Normal, no lesions or dryness appreciated  Urethra: Normal     Urethral meatus: Normal     Bladder: Abnormal   Palpation of the bladder revealed grade 2 cystocele  Psychiatric   Orientation to person, place, and time: Normal     Mood and affect: Normal        Results/Data  Screen Ebola Flowsheet 53Xen4752 02:15PM      Test Name Result Flag Reference   Exposure to Ebola Virus - Within 21 Days No       [address] [facility] 53Jxe3850 04:18PM Marisel Dress Order Number: [id number]    - Patient Instructions: To schedule this appointment, please contact Central Scheduling at [phone]  Test Name Result Flag Reference   US KIDNEY AND BLADDER (Report)     RENAL ULTRASOUND     INDICATION: Pelvic pain  History of renal stones  COMPARISON: CT scan from 7/11/2013       TECHNIQUE:  Ultrasound of the retroperitoneum was performed with a curvilinear transducer utilizing volumetric sweeps and still imaging techniques  FINDINGS:     KIDNEYS:   Symmetric and normal size  Right kidney: 12 5 x 4 7 cm  Normal echogenicity and contour  No suspicious masses detected  No hydronephrosis  No shadowing calculi  No perinephric fluid collections  Left kidney: 11 9 x 5 5 cm  Normal echogenicity and contour  No suspicious masses detected  No hydronephrosis  No shadowing calculi  No perinephric fluid collections  URETERS:   Nonvisualized  BLADDER:    Partially distended  No focal thickening or mass lesions  IMPRESSION:     Normal         Workstation performed: [id number]     Signed by: Jarred Conte MD   8/16/16     * US PELVIS COMPLETE [facility] AND TRANSVAGINAL) 41DSP8934 04:16PM Kath Sheets Order Number: [id number]    - Patient Instructions: To schedule this appointment, please contact Central Scheduling at [phone]   Order Number: [id number]    - Patient Instructions: To schedule this appointment, please contact Central Scheduling at [phone]  Test Name Result Flag Reference   US PELVIS COMPLETE (TRANSABDOMINAL AND TRANSVAGINAL) (Report)     PELVIC ULTRASOUND, COMPLETE     INDICATION: Right-sided pelvic pain  COMPARISON: Pelvic ultrasound 12/19/2015     TECHNIQUE:  Transabdominal pelvic ultrasound was performed in sagittal and transverse planes with a curvilinear transducer  Additional transvaginal imaging was performed to better evaluate the endometrium and ovaries  Imaging included volumetric    sweeps as well as traditional still imaging technique  FINDINGS:     UTERUS:   The uterus is anteverted in position, measuring 9 4 x 5 5 cm  Contour and echotexture appear normal      The cervix shows no suspicious abnormality  ENDOMETRIUM:    Normal caliber of 8 mm  Homogenous and normal in appearance  OVARIES/ADNEXA:   Right ovary: 2 4 x 1 8 cm  No suspicious right ovarian abnormality  Doppler flow within normal limits  Left ovary: 3 0 x 1 8 cm  No suspicious left ovarian abnormality  Doppler flow within normal limits  No suspicious adnexal mass or loculated collections  There is no free fluid  IMPRESSION:      Unremarkable pelvic ultrasound  Workstation performed: [id number]     Signed by: Александр Epperson MD   8/16/16     (1) URINALYSIS (will reflex a microscopy if leukocytes, occult blood, protein or nitrites are not within normal limits) 81ZMP0377 04:48PM [facility]     Test Name Result Flag Reference   COLOR Yellow     CLARITY Clear     SPECIFIC GRAVITY UA 1 024  1 003-1 030   PH UA 6 5  4 5-8 0   LEUKOCYTE ESTERASE UA Negative  Negative   NITRITE UA Negative  Negative   PROTEIN UA Negative mg/dl  Negative   GLUCOSE UA Negative mg/dl  Negative   KETONES UA Negative mg/dl  Negative   UROBILINOGEN UA 1 0 E U /dl  0 2, 1 0 E U /dl   BILIRUBIN UA Negative  Negative   BLOOD UA Negative  Negative     (1) URINE CULTURE 28Zpl1999 04:48PM [facility]     Test Name Result Flag Reference   CLINICAL REPORT (Report)     Test:        Urine culture  Specimen Type:   Urine  Specimen Date:   8/8/2016 4:48 PM  Result Date:    8/9/2016 2:04 PM  Result Status:   Final result  Resulting Lab:   [facility] [address] [place] [address]            Tel: [phone]      CULTURE                                       ------------------                                   No Growth <1000 cfu/mL     Health Management  Polyp, colonic   COLONOSCOPY; every 3 years; Last 86BVM9216; Next Due: 67BKQ3713;  Active    Future Appointments    Date/Time Provider Specialty Site   01/27/2017 08:30 AM Krishan Lorenzana MD Gastroenterology Adult [address]   09/15/2016 11:10 AM Specialty Clinic, ENT  [facility]   09/08/2016 02:30 PM Emeli Bateman 171, Urogyn Room  [facility] [place]   10/03/2016 02:45 PM [facility] Clinic Chief, Schedule  [facility]  [address]     Signatures   Electronically signed by : GABE Lopez ; Aug 29 2016  8:54PM EST                       (Author)    Electronically signed by : Chelsey Cadet MD; Sep  8 2016  9:48AM EST

## 2018-01-13 VITALS
HEART RATE: 76 BPM | DIASTOLIC BLOOD PRESSURE: 78 MMHG | TEMPERATURE: 98 F | BODY MASS INDEX: 33.55 KG/M2 | SYSTOLIC BLOOD PRESSURE: 120 MMHG | WEIGHT: 221.34 LBS | HEIGHT: 68 IN

## 2018-01-13 VITALS
DIASTOLIC BLOOD PRESSURE: 79 MMHG | WEIGHT: 192 LBS | HEIGHT: 68 IN | SYSTOLIC BLOOD PRESSURE: 123 MMHG | BODY MASS INDEX: 29.1 KG/M2

## 2018-01-13 VITALS
HEIGHT: 68 IN | HEART RATE: 80 BPM | SYSTOLIC BLOOD PRESSURE: 108 MMHG | BODY MASS INDEX: 28.07 KG/M2 | WEIGHT: 185.19 LBS | TEMPERATURE: 97.9 F | DIASTOLIC BLOOD PRESSURE: 70 MMHG

## 2018-01-13 VITALS
BODY MASS INDEX: 29.74 KG/M2 | WEIGHT: 196.25 LBS | HEIGHT: 68 IN | OXYGEN SATURATION: 98 % | HEART RATE: 76 BPM | SYSTOLIC BLOOD PRESSURE: 112 MMHG | TEMPERATURE: 98.8 F | DIASTOLIC BLOOD PRESSURE: 77 MMHG

## 2018-01-13 NOTE — RESULT NOTES
Verified Results  (1) TSH WITH FT4 REFLEX 25Oct2017 09:39AM Ruddy Ubiquigent Order Number: [id number]     Test Name Result Flag Reference   TSH 20 829 uIU/mL H 0 358-3 740   A FT4 has been ordered      Patients undergoing fluorescein dye angiography may retain small amounts of fluorescein in the body for 48-72 hours post procedure  Samples containing fluorescein can produce falsely depressed TSH values  If the patient had this procedure,a specimen should be resubmitted post fluorescein clearance  The recommended reference ranges for TSH during pregnancy are as follows:  First trimester 0 1 to 2 5 uIU/mL  Second trimester  0 2 to 3 0 uIU/mL  Third trimester 0 3 to 3 0 uIU/m     (1) COMPREHENSIVE METABOLIC PANEL 09SBL3700 89:38XP Rdudy Ub[employer]igent Order Number: [id number]     Test Name Result Flag Reference   SODIUM 140 mmol/L  136-145   POTASSIUM 4 4 mmol/L  3 5-5 3   CHLORIDE 103 mmol/L  100-108   CARBON DIOXIDE 28 mmol/L  21-32   ANION GAP (CALC) 9 mmol/L  4-13   BLOOD UREA NITROGEN 13 mg/dL  5-25   CREATININE 0 73 mg/dL  0 60-1 30   Standardized to IDMS reference method   CALCIUM 8 6 mg/dL  8 3-10 1   BILI, TOTAL 0 50 mg/dL  0 20-1 00   ALK PHOSPHATAS 74 U/L     ALT (SGPT) 27 U/L  12-78   Specimen collection should occur prior to Sulfasalazine administration due to the potential for falsely depressed results  AST(SGOT) 25 U/L  5-45   Specimen collection should occur prior to Sulfasalazine administration due to the potential for falsely depressed results  ALBUMIN 3 7 g/dL  3 5-5 0   TOTAL PROTEIN 7 2 g/dL  6 4-8 2   eGFR 96 ml/min/1 73sq m     [facility] Disease Education Program recommendations are as follows:  GFR calculation is accurate only with a steady state creatinine  Chronic Kidney disease less than 60 ml/min/1 73 sq  meters  Kidney failure less than 15 ml/min/1 73 sq  meters     GLUCOSE FASTING 84 mg/dL  65-99   Specimen collection should occur prior to Sulfasalazine administration due to the potential for falsely depressed results  Specimen collection should occur prior to Sulfapyridine administration due to the potential for falsely elevated results  (1) TSH WITH FT4 REFLEX 25Oct2017 09:39AM Lilo Garcia Order Number: [id number]     Test Name Result Flag Reference   TSH 20 829 uIU/mL H 0 358-3 740   A FT4 has been ordered      Patients undergoing fluorescein dye angiography may retain small amounts of fluorescein in the body for 48-72 hours post procedure  Samples containing fluorescein can produce falsely depressed TSH values  If the patient had this procedure,a specimen should be resubmitted post fluorescein clearance  The recommended reference ranges for TSH during pregnancy are as follows:  First trimester 0 1 to 2 5 uIU/mL  Second trimester  0 2 to 3 0 uIU/mL  Third trimester 0 3 to 3 0 uIU/m   T4,FREE 0 89 ng/dL  0 76-1 46   Specimen collection should occur prior to Sulfasalazine administration due to the potential for falsely elevated results

## 2018-01-14 VITALS
RESPIRATION RATE: 18 BRPM | OXYGEN SATURATION: 98 % | HEIGHT: 68 IN | SYSTOLIC BLOOD PRESSURE: 114 MMHG | TEMPERATURE: 99 F | BODY MASS INDEX: 33.34 KG/M2 | HEART RATE: 82 BPM | WEIGHT: 220 LBS | DIASTOLIC BLOOD PRESSURE: 72 MMHG

## 2018-01-14 VITALS
HEIGHT: 68 IN | TEMPERATURE: 98.3 F | HEART RATE: 68 BPM | WEIGHT: 190.04 LBS | DIASTOLIC BLOOD PRESSURE: 84 MMHG | SYSTOLIC BLOOD PRESSURE: 116 MMHG | BODY MASS INDEX: 28.8 KG/M2

## 2018-01-14 VITALS
WEIGHT: 189.6 LBS | TEMPERATURE: 98.1 F | SYSTOLIC BLOOD PRESSURE: 104 MMHG | DIASTOLIC BLOOD PRESSURE: 76 MMHG | HEART RATE: 80 BPM | HEIGHT: 68 IN | BODY MASS INDEX: 28.73 KG/M2

## 2018-01-15 NOTE — RESULT NOTES
Verified Results  (1) URINALYSIS w URINE C/S REFLEX (will reflex a microscopy if leukocytes, occult blood, or nitrites are not within normal limits) 29Jan2016 09:00AM Ruddy Montezjuanis Order Number: [id number]     Order Number: [id number]     Test Name Result Flag Reference   COLOR Yellow     CLARITY Clear     PH UA 5 5  4 5-8 0   LEUKOCYTE ESTERASE UA Negative  Negative   NITRITE UA Negative  Negative   PROTEIN UA Negative mg/dl  Negative   GLUCOSE UA Negative mg/dl  Negative   KETONES UA Negative mg/dl  Negative   UROBILINOGEN UA 0 2 E U /dl  0 2, 1 0 E U /dl   BILIRUBIN UA Negative  Negative   BLOOD UA Negative  Negative   SPECIFIC GRAVITY UA 1 028  1 003-1 030

## 2018-01-15 NOTE — RESULT NOTES
Verified Results  [facility] BARIUM SWALLOW 80FMT7237 08:04AM Jackie Williamson Order Number: [id number]    - Patient Instructions: To schedule this appointment, please contact Central Scheduling at [phone]  Test Name Result Flag Reference   FL ESOPHAGRAM COMPLETE (Report)     BARIUM SWALLOW-ESOPHAGRAM     INDICATION: History of Heller myotomy for previous achalasia  Dysphagia  COMPARISON: Barium swallow 1/6/2016     IMAGES: 84     FLUOROSCOPY TIME:  3 33 MINUTES      TECHNIQUE:   The patient was given effervescent granules and barium by mouth and images of the esophagus were obtained  FINDINGS:     The esophagus is normal in caliber  Proximal esophagus is seen primarily in single-contrast without evidence of filling defect or extrinsic compression abnormality  Emptying of contrast from the esophagus is prompt on erect imaging  No mucosal lesion,    ulceration or evidence of fold thickening is seen  There was mild esophageal dysmotility noted during prone swallowing  Gastroesophageal reflux was not observed  There is no hiatal hernia  IMPRESSION:     Mild esophageal dysmotility   Otherwise, unremarkable esophagram        Workstation performed: [id number]     Signed by:   Rafia Cabrera DO   8/9/17

## 2018-01-15 NOTE — RESULT NOTES
Verified Results  (1) TSH WITH FT4 REFLEX 02Ymh1858 11:44AM Too Roque Order Number: [id number]     Test Name Result Flag Reference   TSH 1 550 uIU/mL  0 358-3 740   Patients undergoing fluorescein dye angiography may retain small amounts of fluorescein in the body for 48-72 hours post procedure  Samples containing fluorescein can produce falsely depressed TSH values  If the patient had this procedure,a specimen should be resubmitted post fluorescein clearance            The recommended reference ranges for TSH during pregnancy are as follows:  First trimester 0 1 to 2 5 uIU/mL  Second trimester  0 2 to 3 0 uIU/mL  Third trimester 0 3 to 3 0 uIU/m

## 2018-01-15 NOTE — PROGRESS NOTES
Assessment    1  Achalasia (530 0) (K22 0)    Plan  Achalasia    · Follow-up PRN Evaluation and Treatment  Follow-up  Status: Complete  Done:  33HGY2618   Ordered; For: Achalasia; Ordered By: Brandon Jones Performed:  Due: 44NRH0651    Discussion/Summary    We had the pleasure of evaluating Diane Santiago following laparoscopic Heller myotomy and Jaime fundoplication  She is recovering well from surgery and Dr Rosa Mccoy has recommended slowly advancing her diet to regular over the next 2 weeks  She was also advised against heavy lifting until she is 6 weeks from surgery to avoid port site hernias  She understands and agrees with the plan as outlined above and will return to our office as needed  The treatment plan was reviewed with the patient/guardian  The patient/guardian understands and agrees with the treatment plan      Chief Complaint  no complaints      Post-Op  Thoracic History:   Diagnosis: 1  achalasia  Procedures: 1  laparoscopic Heller myotomy and [place] fundoplication January 6, 5207  HPI: Diane Santiago is a pleasant 82-year-old female with a history of achalasia  She underwent laparoscopic Heller myotomy and Jaime fundoplication January 6, 7674 for definitive treatment  Her postoperative course was unremarkable, she was advanced to a full liquid diet and discharged home on postoperative day one  She presents today for her first postoperative visit having undergone a chest x-ray January 18  This demonstrates mild left basilar atelectasis  On discussion with the patient, she has been tolerating liquids and some soft foods without difficulty  She denies dysphagia, reflux, regurgitation or bone pain  She does note early satiety  She is moving her bowels regularly  Active Problems    1  Achalasia (530 0) (K22 0)   2  Acute sinusitis (461 9) (J01 90)   3  Acute upper respiratory infection (465 9) (J06 9)   4  Arthritis (716 90) (M19 90)   5  Breast cancer screening (V76 10) (Z12 39)   6   Chronic sinusitis (473 9) (J32 9)   7  Cyst of ovary, right (620 2) (N83 20)   8  Depression (311) (F32 9)   9  Dysphagia (787 20) (R13 10)   10  Dysphagia (787 20) (R13 10)   11  Dysuria (788 1) (R30 0)   12  Encounter for smoking cessation counseling (V65 42,305 1) (Z71 6,Z72 0)   13  Esophageal dysmotilities (530 5) (K22 4)   14  Esophageal reflux (530 81) (K21 9)   15  H  pylori infection (041 86) (B96 81)   16  Hypothyroidism (244 9) (E03 9)   17  Localized swelling, mass or lump of neck (784 2) (R22 1)   18  Low back pain (724 2) (M54 5)   19  Need for immunization against influenza (V04 81) (Z23)   20  Pap smear for cervical cancer screening (V76 2) (Z12 4)   21  Polyp, colonic (211 3) (K63 5)   22  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 816)   23  Thyroiditis (245 9) (E06 9)   24  Urgency of urination (788 63) (R39 15)   25  Visit for routine gyn exam (V72 31) (Z01 419)    Social History    · Denied: Alcohol use (V49 89) (F10 99)   · Current every day smoker (305 1) (F17 200)   · Pt stated quit about a month ago   · Denied: Drug use (305 90) (F19 90)    Current Meds   1  Cyclobenzaprine HCl - 5 MG Oral Tablet; take one tablet every 8 hours as needed for pain   and muscle spasm; Therapy: 73NJV7770 to (Last Puente Blank)  Requested for: 25Jun2015 Ordered   2  Fluticasone Propionate 50 MCG/ACT Nasal Suspension; USE 1 TO 2 SPRAYS IN EACH   NOSTRIL ONCE DAILY; Therapy: 99PCV6040 to (Evaluate:23Oct2015); Last Rx:25Jun2015 Ordered   3  Levothyroxine Sodium 137 MCG Oral Tablet; TAKE 1 TABLET DAILY AS DIRECTED; Therapy: 68QVX6321 to (Evaluate:20Apr2016)  Requested for: 460 73 493; Last   Rx:23Oct2015 Ordered   4  Nicotine 14 MG/24HR Transdermal Patch 24 Hour; APPLY 1 PATCH DAILY AS   DIRECTED; Therapy: 51MBH7416 to (Lindsay Lorenzana)  Requested for: 989 38 317; Last   Rx:23Oct2015 Ordered   5  Omeprazole 20 MG Oral Capsule Delayed Release; TAKE 1 CAPSULE TWICE DAILY;    Therapy: 24GKU1759 to (Last MR:[id number])  Requested for: 20Oct2015 Ordered   6  Pantoprazole Sodium 40 MG Oral Tablet Delayed Release; TAKE 1 TABLET 30 MINUTES   BEFORE BREAKFAST DAILY; Therapy: 18VUA8506 to (Evaluate:89Dqf6097)  Requested for: 70TXE5778; Last   Rx:25Jun2015 Ordered    Allergies    1  Penicillins    2  Latex    Vitals   Recorded: [id number] 08:58AM   Temperature 96 9 F   Heart Rate 91   Systolic 729   Diastolic 63   Height 5 ft 7 in   Weight 188 lb    BMI Calculated 29 45   BSA Calculated 1 97     Physical Exam   General: Pleasant 46-year-old female in no acute distress  Respiratory: Clear to auscultation bilaterally  Cardiovascular: Regular rate and rhythm  GI: Abdomen is soft, nontender and nondistended  Incisions are healing well        Results/Data    X-ray Review Chest x-ray images January 18, 2016 were personally reviewed  Report is as follows: Cardiomediastinal silhouette appears unremarkable  There is mild left basilar atelectasis/scarring  The lungs are otherwise clear  No pneumothorax or pleural effusion  Visualized osseous structures appear within normal limits for the patient's age  IMPRESSION:    Mild left basilar atelectasis/scarring  Future Appointments    Date/Time Provider Specialty Site   01/26/2016 09:00 AM Anastacio Padilla MD Gastroenterology Adult [facility] GA[facility]OENTEROLOGY SPECIAL   01/28/2016 01:05 PM Soren Rowan MD Internal Medicine [address] PCP   01/22/2016 10:30 AM R Dire[address]-I[address] [place], Physician Schedule  [facility] [place]     End of Encounter Meds    1  Fluticasone Propionate 50 MCG/ACT Nasal Suspension; USE 1 TO 2 SPRAYS IN EACH   NOSTRIL ONCE DAILY; Therapy: 80DHO2576 to (Evaluate:23Oct2015); Last Rx:25Jun2015 Ordered    2  Nicotine 14 MG/24HR Transdermal Patch 24 Hour; APPLY 1 PATCH DAILY AS   DIRECTED; Therapy: 44ZCO8738 to (Serena Valdez)  Requested for: 460 73 493; Last   Rx:23Oct2015 Ordered    3   Pantoprazole Sodium 40 MG Oral Tablet Delayed Release; TAKE 1 TABLET 30 MINUTES   BEFORE BREAKFAST DAILY; Therapy: 51ZMY5493 to (Evaluate:66Mcd9601)  Requested for: 11XMJ0172; Last   Rx:25Jun2015 Ordered    4  Omeprazole 20 MG Oral Capsule Delayed Release; TAKE 1 CAPSULE TWICE DAILY; Therapy: 71UGA0604 to (Last Rx:20Oct2015)  Requested for: 20Oct2015 Ordered    5  Levothyroxine Sodium 137 MCG Oral Tablet; TAKE 1 TABLET DAILY AS DIRECTED; Therapy: 07KQE0164 to (Evaluate:20Apr2016)  Requested for: 460 73 493; Last   Rx:23Oct2015 Ordered    6  Cyclobenzaprine HCl - 5 MG Oral Tablet; take one tablet every 8 hours as needed for pain   and muscle spasm;    Therapy: 31OFG9464 to (Last Santiago Burnett)  Requested for: 25Jun2015 Ordered    Signatures   Electronically signed by : Kristie Bar, [facility]; Jan 19 2016  9:32AM EST                       (Author)    Electronically signed by : Naomy Razo MD; Jan 19 2016 11:33AM EST                       (Author)

## 2018-01-16 NOTE — RESULT NOTES
Discussion/Summary   Please let patient know stool test is negative for bacteria, good news! The antibiotics cleared the infection       Verified Results  (1) Tito Wells, STOOL [id number] 12:33PM Eulalio Anglin [facility] Number: [id number]     Test Name Result Flag Reference   H PYLORI ANTIGEN Negative  Negative   Performed at:  [facility] [address]  [id number]  : Austin Juárez MD, Phone:  [phone]

## 2018-01-16 NOTE — PROGRESS NOTES
Assessment    1  Stress incontinence, female (625 6) (N39 3)    Plan  Stress incontinence, female    · Follow-up visit in 2 weeks Evaluation and Treatment  Follow-up  Status: Hold For -  Scheduling  Requested for: 22Jan2016   Ordered; For: Stress incontinence, female; Ordered By: Bettey Carrel, Algie Stair Performed:  Due: 97DZM8855     1) urinary incontinence: likely stress incontinence  Discussed causes and concerns with patient, advised to f/u with urologic gynecology office in [facility] for full w/u  Discussion/Summary  Discussion Summary:   JAME: refer to [address] [place] [address]  Chief Complaint  Chief Complaint Free Text Note Form: Patient is here for incontinence  History of Present Illness  HPI: Pt is 51 y/o F who c/o urinary incontinence  Pt  describe incontinence during activities such as coughing running and lifting  She saw a urologist recently who prescribed cyclobenzaprine to relax her bladder  She has not had relief, and complains that incontinence has become worse since initiating medication  Hospital Based Practices Required Assessment:   Pain Assessment   the patient states they do not have pain  Abuse And Domestic Violence Screen    Yes, the patient is safe at home  The patient states no one is hurting them  Depression And Suicide Screen  No, the patient has not had thoughts of hurting themself  No, the patient has not felt depressed in the past 7 days  Prefered Language is  [place]  Primary Language is  English  Review of Systems   Female ROS: no dysuria and no bladder pain    The patient presents with complaints of urinary frequency (after starting cyclobenzaprine)  Focused-Female:   Constitutional: No fever, no chills, feels well, no tiredness, no recent weight gain or loss  ENT: no ear ache, no loss of hearing, no nosebleeds or nasal discharge, no sore throat or hoarseness     Cardiovascular: no complaints of slow or fast heart rate, no chest pain, no palpitations, no leg claudication or lower extremity edema  Respiratory: no complaints of shortness of breath, no wheezing, no dyspnea on exertion, no orthopnea or PND  Breasts: no complaints of breast pain, breast lump or nipple discharge  Gastrointestinal: no complaints of abdominal pain, no constipation, no nausea or diarrhea, no vomiting, no bloody stools  Genitourinary: as noted in HPI  Musculoskeletal: no complaints of arthralgia, no myalgia, no joint swelling or stiffness, no limb pain or swelling  Integumentary: no complaints of skin rash or lesion, no itching or dry skin, no skin wounds  Neurological: no complaints of headache, no confusion, no numbness or tingling, no dizziness or fainting  ROS Reviewed:   ROS reviewed  Active Problems    1  Achalasia (530 0) (K22 0)   2  Acute sinusitis (461 9) (J01 90)   3  Acute upper respiratory infection (465 9) (J06 9)   4  Arthritis (716 90) (M19 90)   5  Breast cancer screening (V76 10) (Z12 39)   6  Chronic sinusitis (473 9) (J32 9)   7  Cyst of ovary, right (620 2) (N83 20)   8  Depression (311) (F32 9)   9  Dysphagia (787 20) (R13 10)   10  Dysphagia (787 20) (R13 10)   11  Dysuria (788 1) (R30 0)   12  Encounter for smoking cessation counseling (V65 42,305 1) (Z71 6,Z72 0)   13  Esophageal dysmotilities (530 5) (K22 4)   14  Esophageal reflux (530 81) (K21 9)   15  H  pylori infection (041 86) (B96 81)   16  Hypothyroidism (244 9) (E03 9)   17  Localized swelling, mass or lump of neck (784 2) (R22 1)   18  Low back pain (724 2) (M54 5)   19  Need for immunization against influenza (V04 81) (Z23)   20  Pap smear for cervical cancer screening (V76 2) (Z12 4)   21  Polyp, colonic (211 3) (K63 5)   22  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 816)   23  Thyroiditis (245 9) (E06 9)   24  Urgency of urination (788 63) (R39 15)   25  Visit for routine gyn exam (V72 31) (Z01 405)    Past Medical History    1   History of Age At First Period 17 Years Old (Menarche)   2  History of Gastric ulcer (531 90) (K25 9)   3  History Of 4  Previous Pregnancies (V61 5)   4  History of urinary stone (V13 01) (H51 301)  Active Problems And Past Medical History Reviewed: The active problems and past medical history were reviewed and updated today  Surgical History    1  History of  Section   2  History of Tubal Ligation  Surgical History Reviewed: The surgical history was reviewed and updated today  Family History    1  Family history of Breast Cancer (V16 3)   2  Family history of Hypertension    3  Family history of Diabetes Mellitus (V18 0)   4  Family history of Hypertension    5  Family history of Malignant neoplasm of ovary, unspecified laterality    6  Family history of malignant neoplasm of tongue (V16 8) (Z80 8)  Family History Reviewed: The family history was reviewed and updated today  Social History    · Denied: Alcohol use (V49 89) (F10 99)   · Current every day smoker (305 1) (F17 200)   · Denied: Drug use (305 90) (F19 90)    Current Meds   1  Cyclobenzaprine HCl - 5 MG Oral Tablet; take one tablet every 8 hours as needed for pain   and muscle spasm; Therapy: 44MKA5787 to (Last Jose Escobedo)  Requested for: 2015 Ordered   2  Fluticasone Propionate 50 MCG/ACT Nasal Suspension; USE 1 TO 2 SPRAYS IN EACH   NOSTRIL ONCE DAILY; Therapy: 48QNX1726 to (Evaluate:2015); Last Rx:2015 Ordered   3  Levothyroxine Sodium 137 MCG Oral Tablet; TAKE 1 TABLET DAILY AS DIRECTED; Therapy: 31FGF9986 to (Evaluate:2016)  Requested for: 460 73 493; Last   Rx:2015 Ordered   4  Nicotine 14 MG/24HR Transdermal Patch 24 Hour; APPLY 1 PATCH DAILY AS   DIRECTED; Therapy: 64YSJ5489 to (Teofilo Nevarez)  Requested for: 460 73 493; Last   Rx:2015 Ordered   5  Omeprazole 20 MG Oral Capsule Delayed Release; TAKE 1 CAPSULE TWICE DAILY;    Therapy: 86RBN6694 to (Galen Gaona)  Requested for: 61YIK9750 Ordered   6  Pantoprazole Sodium 40 MG Oral Tablet Delayed Release; TAKE 1 TABLET 30 MINUTES   BEFORE BREAKFAST DAILY; Therapy: 59LZM6928 to (Evaluate:84Yvf0471)  Requested for: 75YEW1735; Last   Rx:25Jun2015 Ordered    Allergies    1  Penicillins    2  Latex    Vitals  Vital Signs [Data Includes: Current Encounter]    Recorded: 23KBF0525 41:33EG   Systolic 471   Diastolic 67   Height 5 ft 7 in   Weight 186 lb    BMI Calculated 29 13   BSA Calculated 1 96   LMP 78TDN6773     Physical Exam    Constitutional   General appearance: No acute distress, well appearing and well nourished  Neck   Neck: Normal, supple, trachea midline, no masses  Thyroid: Normal, no thyromegaly  Chest   Breasts: Normal and no dimpling or skin changes noted  Abdomen   Abdomen: Normal, non-tender, and no organomegaly noted  Psychiatric   Orientation to person, place, and time: Normal     Mood and affect: Normal        Results/Data  Encounter Results   Screen Ebola Flowsheet 42Lrm6755 10:47AM      Test Name Result Flag Reference   Exposure to Ebola Virus - Within 21 Days No         Health Management  Polyp, colonic   COLONOSCOPY; every 3 years; Last 04NFI7094; Next Due: 68SNA1270;  Active    Future Appointments    Date/Time Provider Specialty Site   01/26/2016 09:00 AM Pastor Ang MD Gastroenterology Adult [facility] GASTROENTEROLOGY SPECIAL   01/28/2016 01:05 PM Corky Mcarthur MD Internal Medicine [address] PCP     Signatures   Electronically signed by : GABE Longoria ; Jan 22 2016 12:06PM EST                       (Author)

## 2018-01-17 NOTE — PROGRESS NOTES
Assessment    1  Achalasia (530 0) (K22 0)   2  Dysphagia (787 20) (R13 10)   3  Esophageal reflux (530 81) (K21 9)    Plan  Achalasia, Polyp, colonic    · Follow-up visit in 1 year Evaluation and Treatment  Follow-up  Status: Complete  Done:  20RUA6945   Ordered; For: Achalasia, Polyp, colonic; Ordered By: Brian Nicole Performed:  Due: 17FSJ2793; Last Updated By: Flores Longoria; 1/27/2016 9:02:55 AM    Discussion/Summary  Discussion Summary:   52year old female with achalasia status post laparascopic Heller's myotomy and Jaime fundoplication  She is doing well  Her dysphagia has improved  She reports occasional heartburn and reflux symptoms  We reviewed her reflux precautions as outlined above  Continue protonix 40 mg po daily  Follow up with me in one year or sooner if symptomatic  Her next colonoscopy is due in Oct 2018  Chief Complaint  Chief Complaint Free Text Note Form: Follow up for achalasia      History of Present Illness  HPI: Azael Negron is here for follow up after Heller's myotomy for achalasia  Her dysphagia has significantly improved  She reports mild heartburn and reflux symptoms  Her weight is stable  She takes Protonix 40 mg daily   History Reviewed: The history was obtained today from the patient and I agree with the documented history  Review of Systems  Complete-Female GI Adult:   Constitutional: No fever, no chills, feels well, no tiredness, no recent weight gain or weight loss  Eyes: No complaints of eye pain, no red eyes, no eyesight problems, no discharge, no dry eyes, no itching of eyes  ENT: no complaints of earache, no loss of hearing, no nose bleeds, no nasal discharge, no sore throat, no hoarseness  Cardiovascular: No complaints of slow heart rate, no fast heart rate, no chest pain, no palpitations, no leg claudication, no lower extremity edema  Respiratory: No complaints of shortness of breath, no wheezing, no cough, no SOB on exertion, no orthopnea, no PND  Gastrointestinal: No complaints of abdominal pain, no constipation, no nausea or vomiting, no diarrhea, no bloody stools  Genitourinary: No complaints of dysuria, no incontinence, no pelvic pain, no dysmenorrhea, no vaginal discharge or bleeding  Musculoskeletal: No complaints of arthralgias, no myalgias, no joint swelling or stiffness, no limb pain or swelling  Integumentary: No complaints of skin rash or lesions, no itching, no skin wounds, no breast pain or lump  Neurological: No complaints of headache, no confusion, no convulsions, no numbness, no dizziness or fainting, no tingling, no limb weakness, no difficulty walking  Psychiatric: Not suicidal, no sleep disturbance, no anxiety or depression, no change in personality, no emotional problems  Endocrine: No complaints of proptosis, no hot flashes, no muscle weakness, no deepening of the voice, no feelings of weakness  Hematologic/Lymphatic: No complaints of swollen glands, no swollen glands in the neck, does not bleed easily, does not bruise easily  ROS Reviewed:   ROS reviewed  Active Problems    1  Achalasia (530 0) (K22 0)   2  Acute sinusitis (461 9) (J01 90)   3  Acute upper respiratory infection (465 9) (J06 9)   4  Arthritis (716 90) (M19 90)   5  Breast cancer screening (V76 10) (Z12 39)   6  Chronic sinusitis (473 9) (J32 9)   7  Cyst of ovary, right (620 2) (N83 20)   8  Depression (311) (F32 9)   9  Dysphagia (787 20) (R13 10)   10  Dysphagia (787 20) (R13 10)   11  Dysuria (788 1) (R30 0)   12  Encounter for smoking cessation counseling (V65 42,305 1) (Z71 6,Z72 0)   13  Esophageal dysmotilities (530 5) (K22 4)   14  Esophageal reflux (530 81) (K21 9)   15  H  pylori infection (041 86) (B96 81)   16  Hypothyroidism (244 9) (E03 9)   17  Localized swelling, mass or lump of neck (784 2) (R22 1)   18  Low back pain (724 2) (M54 5)   19  Need for immunization against influenza (V04 81) (Z23)   20   Pap smear for cervical cancer screening (V76 2) (Z12 4)   21  Polyp, colonic (211 3) (K63 5)   22  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 816)   23  Stress incontinence, female (625 6) (N39 3)   24  Thyroiditis (245 9) (E06 9)   25  Urgency of urination (788 63) (R39 15)   26  Visit for routine gyn exam (V72 31) (Z01 419)    Past Medical History    1  History of Age At First Period 17 Years Old (Menarche)   2  History of Gastric ulcer (531 90) (K25 9)   3  History Of 4  Previous Pregnancies (V61 5)   4  History of urinary stone (V13 01) (Z14 549)  Active Problems And Past Medical History Reviewed: The active problems and past medical history were reviewed and updated today  Surgical History    1  History of  Section   2  History of Tubal Ligation  Surgical History Reviewed: The surgical history was reviewed and updated today  Family History    1  Family history of Breast Cancer (V16 3)   2  Family history of Hypertension    3  Family history of Diabetes Mellitus (V18 0)   4  Family history of Hypertension    5  Family history of Malignant neoplasm of ovary, unspecified laterality    6  Family history of malignant neoplasm of tongue (V16 8) (Z80 8)  Family History Reviewed: The family history was reviewed and updated today  Social History    · Denied: Alcohol use (V49 89) (F10 99)   · Current every day smoker (305 1) (F17 200)   · Denied: Drug use (305 90) (F19 90)  Social History Reviewed: The social history was reviewed and updated today  The social history was reviewed and is unchanged  Current Meds   1  Fluticasone Propionate 50 MCG/ACT Nasal Suspension; USE 1 TO 2 SPRAYS IN EACH   NOSTRIL ONCE DAILY; Therapy: 71XVT3742 to (Evaluate:2015); Last Rx:2015 Ordered   2  Levothyroxine Sodium 137 MCG Oral Tablet; TAKE 1 TABLET DAILY AS DIRECTED; Therapy: 45EMW9593 to (Evaluate:2016)  Requested for: 460 73 493; Last   Rx:2015 Ordered   3   Nicotine 14 MG/24HR Transdermal Patch 24 Hour; APPLY 1 PATCH DAILY AS   DIRECTED; Therapy: 92DKD7843 to (Anna Marei Dunne)  Requested for: 460 73 493; Last   Rx:23Oct2015 Ordered   4  Omeprazole 20 MG Oral Capsule Delayed Release; TAKE 1 CAPSULE TWICE DAILY; Therapy: 72UON7412 to (Last Rx:73Ilv1413)  Requested for: 20Oct2015 Ordered   5  Pantoprazole Sodium 40 MG Oral Tablet Delayed Release; TAKE 1 TABLET 30 MINUTES   BEFORE BREAKFAST DAILY; Therapy: 83RUU8259 to (Evaluate:39Xqg7742)  Requested for: 34ILQ3376; Last   Rx:50Fnq0145 Ordered  Medication List Reviewed: The medication list was reviewed and updated today  Allergies    1  Penicillins    2  Latex    Vitals  Vital Signs [Data Includes: Current Encounter]    Recorded: 06DAZ7714 08:22AM   Temperature 96 9 F   Heart Rate 85   Respiration 16   Systolic 588   Diastolic 72   Height 5 ft 7 in   Weight 187 lb    BMI Calculated 29 29   BSA Calculated 1 96     Physical Exam    Constitutional   General appearance: No acute distress, well appearing and well nourished  Eyes   Conjunctiva and lids: No swelling, erythema or discharge  Pupils and irises: Equal, round and reactive to light  Ears, Nose, Mouth, and Throat   External inspection of ears and nose: Normal     Nasal mucosa, septum, and turbinates: Normal without edema or erythema  Oropharynx: Normal with no erythema, edema, exudate or lesions  Pulmonary   Respiratory effort: No increased work of breathing or signs of respiratory distress  Auscultation of lungs: Clear to auscultation  Cardiovascular   Palpation of heart: Normal PMI, no thrills  Auscultation of heart: Normal rate and rhythm, normal S1 and S2, without murmurs  Examination of extremities for edema and/or varicosities: Normal     Carotid pulses: Normal     Abdomen   Abdomen: Non-tender, no masses  Liver and spleen: No hepatomegaly or splenomegaly  Lymphatic   Palpation of lymph nodes in neck: No lymphadenopathy  Musculoskeletal   Gait and station: Normal     Digits and nails: Normal without clubbing or cyanosis  Inspection/palpation of joints, bones, and muscles: Normal     Skin   Skin and subcutaneous tissue: Normal without rashes or lesions  Neurologic   Cranial nerves: Cranial nerves 2-12 intact  Reflexes: 2+ and symmetric  Sensation: No sensory loss  Psychiatric   Orientation to person, place, and time: Normal     Mood and affect: Normal          Results/Data  Diagnostic Studies Reviewed:   Lab Review  Hb 11 9  Health Management  Polyp, colonic   COLONOSCOPY; every 3 years; Last 04BCP7935; Next Due: 39VZI1593;  Active    Future Appointments    Date/Time Provider Specialty Site   01/27/2017 08:30 AM Ramin Washburn MD Gastroenterology Adult [facility] GASTROENTEROLOGY SPECIAL   01/28/2016 01:05 PM Ria Tillman MD Internal Medicine [address] PCP     Signatures   Electronically signed by : Volodymyr Catalan MD; Jan 27 2016  9:53AM EST                       (Author)

## 2018-01-22 VITALS
BODY MASS INDEX: 33.5 KG/M2 | HEIGHT: 68 IN | SYSTOLIC BLOOD PRESSURE: 122 MMHG | DIASTOLIC BLOOD PRESSURE: 76 MMHG | WEIGHT: 221.05 LBS

## 2018-01-22 VITALS
DIASTOLIC BLOOD PRESSURE: 80 MMHG | BODY MASS INDEX: 29.74 KG/M2 | HEART RATE: 76 BPM | TEMPERATURE: 98 F | HEIGHT: 68 IN | WEIGHT: 196.21 LBS | SYSTOLIC BLOOD PRESSURE: 106 MMHG

## 2018-01-22 VITALS
BODY MASS INDEX: 33.23 KG/M2 | WEIGHT: 219.25 LBS | HEIGHT: 68 IN | DIASTOLIC BLOOD PRESSURE: 81 MMHG | SYSTOLIC BLOOD PRESSURE: 118 MMHG

## 2018-01-22 VITALS
HEART RATE: 80 BPM | HEIGHT: 68 IN | SYSTOLIC BLOOD PRESSURE: 110 MMHG | DIASTOLIC BLOOD PRESSURE: 80 MMHG | BODY MASS INDEX: 29.74 KG/M2 | TEMPERATURE: 97.7 F | WEIGHT: 196.21 LBS

## 2018-01-23 VITALS
TEMPERATURE: 97.6 F | OXYGEN SATURATION: 99 % | HEIGHT: 68 IN | DIASTOLIC BLOOD PRESSURE: 78 MMHG | WEIGHT: 201 LBS | HEART RATE: 59 BPM | BODY MASS INDEX: 30.46 KG/M2 | SYSTOLIC BLOOD PRESSURE: 114 MMHG

## 2018-01-24 VITALS
TEMPERATURE: 97.9 F | BODY MASS INDEX: 30.41 KG/M2 | HEIGHT: 68 IN | DIASTOLIC BLOOD PRESSURE: 82 MMHG | WEIGHT: 200.62 LBS | HEART RATE: 80 BPM | SYSTOLIC BLOOD PRESSURE: 110 MMHG

## 2018-01-26 DIAGNOSIS — J45.909 ASTHMA, UNSPECIFIED ASTHMA SEVERITY, UNSPECIFIED WHETHER COMPLICATED, UNSPECIFIED WHETHER PERSISTENT: Primary | ICD-10-CM

## 2018-01-30 ENCOUNTER — TELEPHONE (OUTPATIENT)
Dept: INTERNAL MEDICINE CLINIC | Facility: CLINIC | Age: 53
End: 2018-01-30

## 2018-01-30 RX ORDER — ALBUTEROL SULFATE 90 UG/1
2 AEROSOL, METERED RESPIRATORY (INHALATION) EVERY 4 HOURS PRN
Qty: 1 INHALER | Refills: 5 | Status: SHIPPED | OUTPATIENT
Start: 2018-01-30 | End: 2018-04-10 | Stop reason: SDUPTHER

## 2018-01-30 NOTE — TELEPHONE ENCOUNTER
----- Message from Love Jones DO sent at 1/30/2018  3:55 PM EST -----  Regarding: Patient requesting test results  Did patient specify which lab test results she wanted know about? The only recent labs I found were TSH and free T4 levels  If these are the results she is asking about, please inform her that her TSH level was high at 38 and that her free T4 level was slightly low at 0 7  If she has any further questions, please message me  Thanks

## 2018-01-31 NOTE — TELEPHONE ENCOUNTER
I called the patient and discussed plan with her  No changes to her medication or additional testing at this time  Instructed patient to get other tests that were ordered but not performed yet  She will get them done before next follow-up appointment  Patient will call to schedule appointment with PCP for 3 month follow-up  Thanks

## 2018-02-13 RX ORDER — CLOTRIMAZOLE AND BETAMETHASONE DIPROPIONATE 10; .64 MG/G; MG/G
CREAM TOPICAL
COMMUNITY
Start: 2016-04-26 | End: 2018-04-10 | Stop reason: SDUPTHER

## 2018-02-13 RX ORDER — TETRACYCLINE HYDROCHLORIDE 500 MG/1
1 CAPSULE ORAL 4 TIMES DAILY
COMMUNITY
Start: 2017-10-09 | End: 2018-04-10

## 2018-02-13 RX ORDER — RIBOFLAVIN (VITAMIN B2) 400 MG
1 TABLET ORAL DAILY
COMMUNITY
Start: 2017-06-30 | End: 2018-04-10

## 2018-02-13 RX ORDER — FLUTICASONE PROPIONATE 50 MCG
1-2 SPRAY, SUSPENSION (ML) NASAL DAILY
COMMUNITY
Start: 2014-03-27 | End: 2018-02-20 | Stop reason: SDUPTHER

## 2018-02-13 RX ORDER — PROCHLORPERAZINE MALEATE 10 MG
TABLET ORAL
Refills: 0 | COMMUNITY
Start: 2017-12-13 | End: 2020-01-20 | Stop reason: SDUPTHER

## 2018-02-13 RX ORDER — LORATADINE 10 MG/1
1 TABLET ORAL DAILY
COMMUNITY
Start: 2016-07-21 | End: 2018-04-10 | Stop reason: SDUPTHER

## 2018-02-13 RX ORDER — DOCUSATE SODIUM 100 MG/1
1 CAPSULE, LIQUID FILLED ORAL 2 TIMES DAILY
COMMUNITY
Start: 2017-03-09 | End: 2018-04-10

## 2018-02-13 RX ORDER — DICYCLOMINE HCL 20 MG
1 TABLET ORAL EVERY 6 HOURS PRN
COMMUNITY
Start: 2017-07-31 | End: 2018-03-14 | Stop reason: SDUPTHER

## 2018-02-13 RX ORDER — LEVOTHYROXINE SODIUM 0.15 MG/1
150 TABLET ORAL DAILY
Refills: 3 | COMMUNITY
Start: 2018-01-14 | End: 2018-04-10 | Stop reason: SDUPTHER

## 2018-02-13 RX ORDER — TOPIRAMATE 50 MG/1
TABLET, FILM COATED ORAL
Refills: 5 | COMMUNITY
Start: 2018-01-25 | End: 2018-04-10

## 2018-02-13 RX ORDER — ALBUTEROL SULFATE 90 UG/1
2 AEROSOL, METERED RESPIRATORY (INHALATION)
COMMUNITY
Start: 2016-04-26 | End: 2018-04-10

## 2018-02-13 RX ORDER — ALBUTEROL SULFATE 2.5 MG/3ML
SOLUTION RESPIRATORY (INHALATION) EVERY 4 HOURS PRN
Refills: 5 | COMMUNITY
Start: 2017-12-05 | End: 2018-02-20 | Stop reason: SDUPTHER

## 2018-02-13 RX ORDER — DOXEPIN HYDROCHLORIDE 25 MG/1
CAPSULE ORAL
Refills: 0 | COMMUNITY
Start: 2018-01-14 | End: 2020-10-13 | Stop reason: SINTOL

## 2018-02-13 RX ORDER — SUCRALFATE 1 G/1
1 TABLET ORAL AS NEEDED
COMMUNITY
Start: 2017-07-31 | End: 2018-06-21 | Stop reason: SDUPTHER

## 2018-02-13 RX ORDER — FUROSEMIDE 20 MG/1
1 TABLET ORAL DAILY PRN
COMMUNITY
Start: 2016-06-15 | End: 2018-04-10

## 2018-02-13 RX ORDER — METRONIDAZOLE 250 MG/1
1 TABLET ORAL 4 TIMES DAILY
COMMUNITY
Start: 2017-10-09 | End: 2018-04-10

## 2018-02-13 RX ORDER — PANTOPRAZOLE SODIUM 40 MG/1
1 TABLET, DELAYED RELEASE ORAL DAILY
COMMUNITY
Start: 2015-06-25 | End: 2018-06-21 | Stop reason: SDUPTHER

## 2018-02-13 RX ORDER — POLYETHYLENE GLYCOL 3350 17 G/17G
17 POWDER, FOR SOLUTION ORAL AS NEEDED
Refills: 11 | COMMUNITY
Start: 2017-12-04 | End: 2019-03-14 | Stop reason: SDUPTHER

## 2018-02-13 RX ORDER — BISMUTH SUBSALICYLATE 262 MG
2 TABLET,CHEWABLE ORAL 4 TIMES DAILY
COMMUNITY
Start: 2017-10-09 | End: 2018-04-10

## 2018-02-13 RX ORDER — HYDROXYZINE PAMOATE 50 MG/1
CAPSULE ORAL
Refills: 1 | COMMUNITY
Start: 2018-01-14 | End: 2018-04-10

## 2018-02-13 RX ORDER — TOPIRAMATE 50 MG/1
TABLET, FILM COATED ORAL
COMMUNITY
Start: 2017-10-25 | End: 2018-04-10 | Stop reason: SINTOL

## 2018-02-13 RX ORDER — OXYCODONE HYDROCHLORIDE AND ACETAMINOPHEN 5; 325 MG/1; MG/1
TABLET ORAL
Refills: 0 | COMMUNITY
Start: 2017-11-16 | End: 2018-04-10

## 2018-02-13 RX ORDER — VILAZODONE HYDROCHLORIDE 40 MG/1
40 TABLET ORAL DAILY
Refills: 0 | COMMUNITY
Start: 2018-01-14 | End: 2020-10-13 | Stop reason: DRUGHIGH

## 2018-02-13 RX ORDER — GABAPENTIN 300 MG/1
CAPSULE ORAL
COMMUNITY
Start: 2017-06-30 | End: 2018-04-10

## 2018-02-13 RX ORDER — LEVOTHYROXINE SODIUM 0.15 MG/1
1 TABLET ORAL DAILY
COMMUNITY
Start: 2014-03-27 | End: 2018-02-14 | Stop reason: SDUPTHER

## 2018-02-13 RX ORDER — ARIPIPRAZOLE 5 MG/1
5 TABLET ORAL
Refills: 1 | COMMUNITY
Start: 2018-01-14 | End: 2020-01-20 | Stop reason: SDUPTHER

## 2018-02-13 RX ORDER — MELOXICAM 15 MG/1
1 TABLET ORAL DAILY
COMMUNITY
Start: 2017-11-09 | End: 2018-04-10 | Stop reason: SDUPTHER

## 2018-02-14 ENCOUNTER — OFFICE VISIT (OUTPATIENT)
Dept: INTERNAL MEDICINE CLINIC | Facility: CLINIC | Age: 53
End: 2018-02-14
Payer: COMMERCIAL

## 2018-02-14 ENCOUNTER — APPOINTMENT (OUTPATIENT)
Dept: LAB | Facility: CLINIC | Age: 53
End: 2018-02-14
Payer: COMMERCIAL

## 2018-02-14 VITALS
WEIGHT: 194 LBS | DIASTOLIC BLOOD PRESSURE: 70 MMHG | SYSTOLIC BLOOD PRESSURE: 102 MMHG | HEIGHT: 68 IN | HEART RATE: 88 BPM | BODY MASS INDEX: 29.4 KG/M2 | TEMPERATURE: 98 F

## 2018-02-14 DIAGNOSIS — E03.9 HYPOTHYROIDISM, UNSPECIFIED TYPE: Primary | ICD-10-CM

## 2018-02-14 DIAGNOSIS — E03.9 HYPOTHYROIDISM, UNSPECIFIED TYPE: ICD-10-CM

## 2018-02-14 DIAGNOSIS — E03.9 HYPOTHYROIDISM: ICD-10-CM

## 2018-02-14 DIAGNOSIS — Z86.39 PERSONAL HISTORY OF OTHER ENDOCRINE, NUTRITIONAL AND METABOLIC DISEASE: ICD-10-CM

## 2018-02-14 PROBLEM — G43.719 INTRACTABLE CHRONIC MIGRAINE WITHOUT AURA AND WITHOUT STATUS MIGRAINOSUS: Status: ACTIVE | Noted: 2017-12-13

## 2018-02-14 PROBLEM — D12.6 TUBULAR ADENOMA OF COLON: Status: ACTIVE | Noted: 2017-02-03

## 2018-02-14 PROBLEM — E66.9 OBESITY (BMI 30-39.9): Status: ACTIVE | Noted: 2017-02-13

## 2018-02-14 PROBLEM — G43.909 HEADACHE, MIGRAINE: Status: ACTIVE | Noted: 2017-06-30

## 2018-02-14 PROBLEM — A04.8 H. PYLORI INFECTION: Status: ACTIVE | Noted: 2017-10-09

## 2018-02-14 PROBLEM — N63.0 BREAST MASS: Status: ACTIVE | Noted: 2017-10-25

## 2018-02-14 PROBLEM — G89.29 OTHER CHRONIC PAIN: Status: ACTIVE | Noted: 2017-06-30

## 2018-02-14 PROBLEM — Z91.018 FOOD ALLERGY: Status: ACTIVE | Noted: 2018-02-14

## 2018-02-14 PROBLEM — N64.4 BREAST PAIN, RIGHT: Status: ACTIVE | Noted: 2017-05-19

## 2018-02-14 LAB
CHOLEST SERPL-MCNC: 194 MG/DL (ref 50–200)
EST. AVERAGE GLUCOSE BLD GHB EST-MCNC: 123 MG/DL
HBA1C MFR BLD: 5.9 % (ref 4.2–6.3)
HDLC SERPL-MCNC: 50 MG/DL (ref 40–60)
LDLC SERPL CALC-MCNC: 131 MG/DL (ref 0–100)
T4 FREE SERPL-MCNC: 0.84 NG/DL (ref 0.76–1.46)
TRIGL SERPL-MCNC: 67 MG/DL
TSH SERPL DL<=0.05 MIU/L-ACNC: 24.8 UIU/ML (ref 0.36–3.74)

## 2018-02-14 PROCEDURE — 84443 ASSAY THYROID STIM HORMONE: CPT

## 2018-02-14 PROCEDURE — 80061 LIPID PANEL: CPT

## 2018-02-14 PROCEDURE — 36415 COLL VENOUS BLD VENIPUNCTURE: CPT

## 2018-02-14 PROCEDURE — 99213 OFFICE O/P EST LOW 20 MIN: CPT | Performed by: INTERNAL MEDICINE

## 2018-02-14 PROCEDURE — 83036 HEMOGLOBIN GLYCOSYLATED A1C: CPT

## 2018-02-14 PROCEDURE — 84439 ASSAY OF FREE THYROXINE: CPT

## 2018-02-14 RX ORDER — ARIPIPRAZOLE 5 MG/1
5 TABLET ORAL
COMMUNITY
Start: 2016-03-05 | End: 2018-05-24

## 2018-02-14 RX ORDER — LEVOTHYROXINE SODIUM 137 UG/1
150 TABLET ORAL
COMMUNITY
Start: 2017-02-09 | End: 2018-02-14 | Stop reason: SDUPTHER

## 2018-02-14 RX ORDER — BETAMETHASONE DIPROPIONATE 0.5 MG/G
CREAM TOPICAL
COMMUNITY
Start: 2016-01-28 | End: 2018-04-10

## 2018-02-14 RX ORDER — PANTOPRAZOLE SODIUM 40 MG/1
40 TABLET, DELAYED RELEASE ORAL
COMMUNITY
Start: 2016-02-17 | End: 2018-04-10

## 2018-02-14 RX ORDER — POLYETHYLENE GLYCOL 1000
POWDER (GRAM) MISCELLANEOUS
COMMUNITY
Start: 2017-02-03 | End: 2018-05-24

## 2018-02-14 RX ORDER — HYDROXYZINE PAMOATE 50 MG/1
50 CAPSULE ORAL 3 TIMES DAILY PRN
COMMUNITY
Start: 2016-02-23 | End: 2020-10-13 | Stop reason: CLARIF

## 2018-02-14 RX ORDER — DOCUSATE SODIUM 100 MG/1
CAPSULE, LIQUID FILLED ORAL
COMMUNITY
Start: 2017-01-13 | End: 2019-03-14 | Stop reason: ALTCHOICE

## 2018-02-14 RX ORDER — FUROSEMIDE 20 MG/1
TABLET ORAL
COMMUNITY
Start: 2017-02-05 | End: 2018-04-10

## 2018-02-14 RX ORDER — ACETAMINOPHEN 325 MG/1
TABLET ORAL
Qty: 30 TABLET | Refills: 0 | Status: CANCELLED
Start: 2018-02-14

## 2018-02-14 RX ORDER — VILAZODONE HYDROCHLORIDE 40 MG/1
40 TABLET ORAL
COMMUNITY
Start: 2016-02-23 | End: 2018-05-24

## 2018-02-14 RX ORDER — DOXEPIN HYDROCHLORIDE 25 MG/1
25 CAPSULE ORAL
COMMUNITY
Start: 2016-02-23 | End: 2018-04-10

## 2018-02-14 RX ORDER — TOPIRAMATE 25 MG/1
TABLET ORAL
COMMUNITY
Start: 2017-12-13 | End: 2018-04-10 | Stop reason: SINTOL

## 2018-02-14 RX ORDER — FLUTICASONE PROPIONATE 50 MCG
SPRAY, SUSPENSION (ML) NASAL
COMMUNITY
Start: 2017-02-09 | End: 2018-04-10

## 2018-02-14 RX ORDER — CLONAZEPAM 0.5 MG/1
0.5 TABLET ORAL DAILY
COMMUNITY
Start: 2016-01-31 | End: 2020-01-20

## 2018-02-14 RX ORDER — PROCHLORPERAZINE MALEATE 10 MG
TABLET ORAL
COMMUNITY
Start: 2017-12-13 | End: 2018-04-10

## 2018-02-14 RX ORDER — LORATADINE 10 MG/1
TABLET ORAL
COMMUNITY
Start: 2017-01-24 | End: 2018-04-10

## 2018-02-14 RX ORDER — PROMETHAZINE HYDROCHLORIDE AND CODEINE PHOSPHATE 6.25; 1 MG/5ML; MG/5ML
2.5 SYRUP ORAL EVERY 6 HOURS
COMMUNITY
Start: 2016-01-02 | End: 2019-02-07 | Stop reason: ALTCHOICE

## 2018-02-14 NOTE — PROGRESS NOTES
Assessment/Plan:     Diagnoses and all orders for this visit:    Hypothyroidism, unspecified type  -most recent TSH from December 2017 was elevated at 38 and free T4 slightly low at 0 7 however medication was titrated after this to 150 mcg daily  -in the setting of this will recheck TSH and free T4 at this time to determine further titration needs for patient   -will set up patient for follow-up with PCP Dr Carmella De La Vega  -if repeat laboratory studies are unimproved may have to consider possibility of poor absorption of levothyroxine and may have to initiate malabsorption workup at that time  Unknown food allergy  -patient instructed to keep food diary to determine offending agent  -will have patient seen by PCP Dr Carmella De La Vega in April or sooner if necessary at which time if food diarrhea unrevealing will have patient seen by Allergy specialist    Other orders  -     albuterol (2 5 mg/3 mL) 0 083 % nebulizer solution; Take by nebulization every 4 (four) hours as needed  -     ARIPiprazole (ABILIFY) 5 mg tablet; Take 5 mg by mouth daily at bedtime  -     Bismuth 262 MG CHEW; Chew 2 tablets 4 (four) times a day  -     clotrimazole-betamethasone (LOTRISONE) 1-0 05 % cream; Apply topically  -     dicyclomine (BENTYL) 20 mg tablet; Take 1 tablet by mouth every 6 (six) hours as needed  -     doxepin (SINEquan) 25 mg capsule; TAKE 1-2 CAPSULES BY MOUTH DAILY AT BEDTIME  -     fluticasone (FLONASE) 50 mcg/act nasal spray; 1-2 sprays into each nostril daily  -     hydrOXYzine pamoate (VISTARIL) 50 mg capsule; TAKE 1 CAPSULE BY MOUTH 2-3 TIMES A DAY FOR ANXIETY  -     levothyroxine 150 mcg tablet; Take 150 mcg by mouth daily  -     loratadine (CLARITIN) 10 mg tablet; Take 1 tablet by mouth daily  -     meloxicam (MOBIC) 15 mg tablet;  Take 1 tablet by mouth Daily  -     oxyCODONE-acetaminophen (PERCOCET) 5-325 mg per tablet; TAKE 1 TABLET BY MOUTH TWICE DAILY AS NEEDED FOR POST OPERATIVE PAIN  -     polyethylene glycol (GLYCOLAX) powder; Take 17 g by mouth daily Mix 1 capful in 8 ounces of water, juice or tea  -     Polyethylene Glycol 3350-GRX POWD; Take by mouth daily  -     prochlorperazine (COMPAZINE) 10 mg tablet; TAKE 1 TABLET TWICE A DAY AS NEEDED FOR HEADACHE OR NAUSEA, LIMIT 3 DAYS PER WEEK  -     sucralfate (CARAFATE) 1 g tablet; Take 1 tablet by mouth 3 (three) times a day  -     tetracycline (ACHROMYCIN,SUMYCIN) 500 MG capsule; Take 1 capsule by mouth 4 (four) times a day  -     topiramate (TOPAMAX) 50 MG tablet; Take by mouth  -     topiramate (TOPAMAX) 50 MG tablet; TAKE 1/2 TABLET AT BEDTIME FOR 1 WEEK, THEN TAKE 1 TABLET AT BEDTIME  -     VIIBRYD 40 MG tablet; Take 40 mg by mouth daily With food  -     docusate sodium (COLACE) 100 mg capsule; Take 1 capsule by mouth 2 (two) times a day  -     furosemide (LASIX) 20 mg tablet; Take 1 tablet by mouth daily as needed  -     gabapentin (NEURONTIN) 300 mg capsule; Take by mouth  -     Discontinue: levothyroxine 150 mcg tablet; Take 1 tablet by mouth daily  -     metroNIDAZOLE (FLAGYL) 250 mg tablet; Take 1 tablet by mouth 4 (four) times a day  -     pantoprazole (PROTONIX) 40 mg tablet; Take 1 tablet by mouth daily  -     Riboflavin 400 MG TABS; Take 1 tablet by mouth daily  -     albuterol (VENTOLIN HFA) 90 mcg/act inhaler; Inhale 2 puffs  -     betamethasone dipropionate (DIPROSONE) 0 05 % cream; Apply topically  -     clonazePAM (KlonoPIN) 0 5 mg tablet; Take 0 5 mg by mouth  -     docusate sodium (COLACE) 100 mg capsule; TAKE 1 CAPSULE TWICE DAILY AS NEEDED   -     Polyethylene Glycol 1000 POWD; MIX 1 CAPFUL (17GM) IN 8 OUNCES OF WATER, JUICE, OR TEA AND DRINK DAILY  -     promethazine-codeine (PHENERGAN WITH CODEINE) 6 25-10 mg/5 mL syrup; Take 2 5 mL by mouth every 6 (six) hours  -     topiramate (TOPAMAX) 25 mg tablet;   -     ARIPiprazole (ABILIFY) 5 mg tablet; Take 5 mg by mouth  -     doxepin (SINEquan) 25 mg capsule;  Take 25 mg by mouth  -     fluticasone (FLONASE) 50 mcg/act nasal spray;   -     furosemide (LASIX) 20 mg tablet; TAKE 1 TABLET DAILY AS NEEDED   -     hydrOXYzine pamoate (VISTARIL) 50 mg capsule; Take 50 mg by mouth  -     Discontinue: levothyroxine 137 mcg tablet; 150 mcg  -     loratadine (CLARITIN) 10 mg tablet; TAKE 1 TABLET DAILY  -     pantoprazole (PROTONIX) 40 mg tablet; Take 40 mg by mouth  -     prochlorperazine (COMPAZINE) 10 mg tablet; Take 1 tab twice a day as needed for headache or nausea, limit 3 days per week  -     topiramate (TOPAMAX) 25 mg tablet; Take 1 tab PO QHS for 1 week, 2 tabs po qhs for 1 week, 3 tabs po qhs for 1 week, after that 4 tabs po qhs  -     vilazodone (VIIBRYD) 40 mg tablet; Take 40 mg by mouth  -     acetaminophen (TYLENOL) 325 mg tablet; Every 4 hours as needed        Subjective:      Patient ID: Mili Cunningham is a 50 y o  female  Hypothyroidism  -patient was originally diagnosed with hypothyroidism approximately 23 years ago  Patient currently on 150 mcg of levothyroxine daily  Which was started in December of 2017 after the most recent lab work showed a TSH that was elevated at 38 and a free T4 that was slightly low at 0 7  The patient states she takes her levothyroxine in the early morning approximately 1 hour before any other medication or eating  She denies any side Effexor or difficulty taking the medication and takes it every day  The following portions of the patient's history were reviewed and updated as appropriate: allergies, current medications, past family history, past medical history, past social history, past surgical history and problem list     Review of Systems   Constitutional: Negative for fatigue and fever  HENT: Negative for trouble swallowing  Eyes: Negative for pain  Respiratory: Negative for cough and shortness of breath  Cardiovascular: Positive for leg swelling  Negative for chest pain and palpitations     Gastrointestinal: Negative for abdominal pain, blood in stool, constipation, diarrhea, nausea and vomiting  Genitourinary: Negative for dysuria  Musculoskeletal: Negative for neck pain and neck stiffness  Neurological: Negative for seizures, syncope and weakness  Psychiatric/Behavioral: Negative for agitation  All other systems reviewed and are negative  Objective:    Vitals:    02/14/18 1017   BP: 102/70   Pulse: 88   Temp: 98 °F (36 7 °C)        Physical Exam   Constitutional: She appears well-developed and well-nourished  No distress  HENT:   Head: Normocephalic and atraumatic  Eyes: Conjunctivae are normal  Pupils are equal, round, and reactive to light  No scleral icterus  Neck: No tracheal deviation present  Cardiovascular: Normal rate and regular rhythm  Pulmonary/Chest: Effort normal and breath sounds normal  No respiratory distress  Abdominal: Soft  Bowel sounds are normal  She exhibits no distension  There is no tenderness  Musculoskeletal: Edema: +1 edema in B/L lower extremities  Neurological: She is alert  Able to move all extremities bilaterally   Skin: Skin is warm and dry  She is not diaphoretic  No erythema  Psychiatric: She has a normal mood and affect

## 2018-02-17 DIAGNOSIS — J31.0 RHINITIS: Primary | ICD-10-CM

## 2018-02-19 ENCOUNTER — TRANSCRIBE ORDERS (OUTPATIENT)
Dept: ADMINISTRATIVE | Facility: HOSPITAL | Age: 53
End: 2018-02-19

## 2018-02-19 ENCOUNTER — HOSPITAL ENCOUNTER (OUTPATIENT)
Dept: ULTRASOUND IMAGING | Facility: CLINIC | Age: 53
Discharge: HOME | End: 2018-02-19
Payer: COMMERCIAL

## 2018-02-19 DIAGNOSIS — N64.4 BREAST PAIN: ICD-10-CM

## 2018-02-19 PROCEDURE — 76642 ULTRASOUND BREAST LIMITED: CPT

## 2018-02-19 RX ORDER — FLUTICASONE PROPIONATE 50 MCG
SPRAY, SUSPENSION (ML) NASAL
Qty: 1 BOTTLE | Refills: 2 | Status: SHIPPED | OUTPATIENT
Start: 2018-02-19 | End: 2018-04-10

## 2018-02-20 ENCOUNTER — TELEPHONE (OUTPATIENT)
Dept: INTERNAL MEDICINE CLINIC | Facility: CLINIC | Age: 53
End: 2018-02-20

## 2018-02-20 DIAGNOSIS — J45.909 ASTHMA: Primary | ICD-10-CM

## 2018-02-20 RX ORDER — FLUTICASONE PROPIONATE 50 MCG
1-2 SPRAY, SUSPENSION (ML) NASAL DAILY
Qty: 16 G | Refills: 0 | OUTPATIENT
Start: 2018-02-20

## 2018-02-20 RX ORDER — ALBUTEROL SULFATE 2.5 MG/3ML
SOLUTION RESPIRATORY (INHALATION) EVERY 4 HOURS PRN
Qty: 75 ML | Refills: 0 | OUTPATIENT
Start: 2018-02-20

## 2018-02-20 RX ORDER — FLUTICASONE PROPIONATE 50 MCG
1-2 SPRAY, SUSPENSION (ML) NASAL DAILY
Qty: 16 G | Refills: 0 | Status: SHIPPED | OUTPATIENT
Start: 2018-02-20 | End: 2018-12-03 | Stop reason: SDUPTHER

## 2018-02-20 RX ORDER — ALBUTEROL SULFATE 2.5 MG/3ML
2.5 SOLUTION RESPIRATORY (INHALATION) EVERY 4 HOURS PRN
Qty: 75 ML | Refills: 0 | Status: SHIPPED | OUTPATIENT
Start: 2018-02-20 | End: 2018-04-10 | Stop reason: SDUPTHER

## 2018-02-20 NOTE — TELEPHONE ENCOUNTER
Yudith,    He is in ICU so all of his tasks get pushed to my inbasket, he cant physically get tasks this month  Do you want me to text him to ask for him to correct it?

## 2018-02-20 NOTE — TELEPHONE ENCOUNTER
Hello for this patient's office visit on 02/14/18 in her vitals patient was marked as being pregnant, this is sending me a claim edit error asking for a last menstrual period date, please create an addendum and change that status for this patient's visit  Thank you

## 2018-02-20 NOTE — TELEPHONE ENCOUNTER
I dont know why epic prompted me to task Dr Viktoriya Mcintyre, hi can you please update this patient's visit info thanks

## 2018-03-01 ENCOUNTER — TRANSCRIBE ORDERS (OUTPATIENT)
Dept: LAB | Facility: HOSPITAL | Age: 53
End: 2018-03-01

## 2018-03-01 ENCOUNTER — APPOINTMENT (OUTPATIENT)
Dept: LAB | Facility: HOSPITAL | Age: 53
End: 2018-03-01
Payer: COMMERCIAL

## 2018-03-01 DIAGNOSIS — E03.9 HYPOTHYROIDISM, UNSPECIFIED TYPE: ICD-10-CM

## 2018-03-01 DIAGNOSIS — E03.9 HYPOTHYROIDISM, UNSPECIFIED TYPE: Primary | ICD-10-CM

## 2018-03-01 PROCEDURE — 86255 FLUORESCENT ANTIBODY SCREEN: CPT

## 2018-03-01 PROCEDURE — 36415 COLL VENOUS BLD VENIPUNCTURE: CPT

## 2018-03-01 PROCEDURE — 82784 ASSAY IGA/IGD/IGG/IGM EACH: CPT

## 2018-03-01 PROCEDURE — 83516 IMMUNOASSAY NONANTIBODY: CPT

## 2018-03-02 LAB
ENDOMYSIUM IGA SER QL: NEGATIVE
GLIADIN PEPTIDE IGA SER-ACNC: 4 UNITS (ref 0–19)
GLIADIN PEPTIDE IGG SER-ACNC: 4 UNITS (ref 0–19)
IGA SERPL-MCNC: 154 MG/DL (ref 87–352)
TTG IGA SER-ACNC: <2 U/ML (ref 0–3)
TTG IGG SER-ACNC: 3 U/ML (ref 0–5)

## 2018-03-14 DIAGNOSIS — K58.0 IRRITABLE BOWEL SYNDROME WITH DIARRHEA: Primary | ICD-10-CM

## 2018-03-14 RX ORDER — DICYCLOMINE HCL 20 MG
TABLET ORAL
Qty: 120 TABLET | Refills: 2 | Status: SHIPPED | OUTPATIENT
Start: 2018-03-14 | End: 2018-06-21 | Stop reason: SDUPTHER

## 2018-04-06 ENCOUNTER — TRANSCRIBE ORDERS (OUTPATIENT)
Dept: LAB | Facility: HOSPITAL | Age: 53
End: 2018-04-06

## 2018-04-06 ENCOUNTER — LAB (OUTPATIENT)
Dept: LAB | Facility: HOSPITAL | Age: 53
End: 2018-04-06
Payer: COMMERCIAL

## 2018-04-06 DIAGNOSIS — Z79.899 NEED FOR PROPHYLACTIC CHEMOTHERAPY: ICD-10-CM

## 2018-04-06 DIAGNOSIS — F33.0 MAJOR DEPRESSIVE DISORDER, RECURRENT EPISODE, MILD (HCC): ICD-10-CM

## 2018-04-06 DIAGNOSIS — F33.0 MAJOR DEPRESSIVE DISORDER, RECURRENT EPISODE, MILD (HCC): Primary | ICD-10-CM

## 2018-04-06 LAB
25(OH)D3 SERPL-MCNC: 30.8 NG/ML (ref 30–100)
ALBUMIN SERPL BCP-MCNC: 3.6 G/DL (ref 3.5–5)
ALP SERPL-CCNC: 83 U/L (ref 46–116)
ALT SERPL W P-5'-P-CCNC: 23 U/L (ref 12–78)
ANION GAP SERPL CALCULATED.3IONS-SCNC: 7 MMOL/L (ref 4–13)
AST SERPL W P-5'-P-CCNC: 13 U/L (ref 5–45)
BASOPHILS # BLD AUTO: 0.04 THOUSANDS/ΜL (ref 0–0.1)
BASOPHILS NFR BLD AUTO: 0 % (ref 0–1)
BILIRUB SERPL-MCNC: 0.3 MG/DL (ref 0.2–1)
BUN SERPL-MCNC: 15 MG/DL (ref 5–25)
CALCIUM SERPL-MCNC: 8.6 MG/DL (ref 8.3–10.1)
CHLORIDE SERPL-SCNC: 109 MMOL/L (ref 100–108)
CHOLEST SERPL-MCNC: 181 MG/DL (ref 50–200)
CO2 SERPL-SCNC: 26 MMOL/L (ref 21–32)
CREAT SERPL-MCNC: 0.72 MG/DL (ref 0.6–1.3)
EOSINOPHIL # BLD AUTO: 0.24 THOUSAND/ΜL (ref 0–0.61)
EOSINOPHIL NFR BLD AUTO: 3 % (ref 0–6)
ERYTHROCYTE [DISTWIDTH] IN BLOOD BY AUTOMATED COUNT: 13.3 % (ref 11.6–15.1)
GFR SERPL CREATININE-BSD FRML MDRD: 97 ML/MIN/1.73SQ M
GLUCOSE P FAST SERPL-MCNC: 99 MG/DL (ref 65–99)
HCT VFR BLD AUTO: 40.7 % (ref 34.8–46.1)
HDLC SERPL-MCNC: 51 MG/DL (ref 40–60)
HGB BLD-MCNC: 12.9 G/DL (ref 11.5–15.4)
LDLC SERPL CALC-MCNC: 116 MG/DL (ref 0–100)
LYMPHOCYTES # BLD AUTO: 3.59 THOUSANDS/ΜL (ref 0.6–4.47)
LYMPHOCYTES NFR BLD AUTO: 39 % (ref 14–44)
MCH RBC QN AUTO: 28.8 PG (ref 26.8–34.3)
MCHC RBC AUTO-ENTMCNC: 31.7 G/DL (ref 31.4–37.4)
MCV RBC AUTO: 91 FL (ref 82–98)
MONOCYTES # BLD AUTO: 1.01 THOUSAND/ΜL (ref 0.17–1.22)
MONOCYTES NFR BLD AUTO: 11 % (ref 4–12)
NEUTROPHILS # BLD AUTO: 4.25 THOUSANDS/ΜL (ref 1.85–7.62)
NEUTS SEG NFR BLD AUTO: 47 % (ref 43–75)
NONHDLC SERPL-MCNC: 130 MG/DL
NRBC BLD AUTO-RTO: 0 /100 WBCS
PLATELET # BLD AUTO: 370 THOUSANDS/UL (ref 149–390)
PMV BLD AUTO: 9.3 FL (ref 8.9–12.7)
POTASSIUM SERPL-SCNC: 4.1 MMOL/L (ref 3.5–5.3)
PROT SERPL-MCNC: 7.3 G/DL (ref 6.4–8.2)
RBC # BLD AUTO: 4.48 MILLION/UL (ref 3.81–5.12)
SODIUM SERPL-SCNC: 142 MMOL/L (ref 136–145)
TRIGL SERPL-MCNC: 72 MG/DL
TSH SERPL DL<=0.05 MIU/L-ACNC: 2.66 UIU/ML (ref 0.36–3.74)
WBC # BLD AUTO: 9.15 THOUSAND/UL (ref 4.31–10.16)

## 2018-04-06 PROCEDURE — 36415 COLL VENOUS BLD VENIPUNCTURE: CPT

## 2018-04-06 PROCEDURE — 85025 COMPLETE CBC W/AUTO DIFF WBC: CPT

## 2018-04-06 PROCEDURE — 84443 ASSAY THYROID STIM HORMONE: CPT

## 2018-04-06 PROCEDURE — 80053 COMPREHEN METABOLIC PANEL: CPT

## 2018-04-06 PROCEDURE — 80061 LIPID PANEL: CPT

## 2018-04-06 PROCEDURE — 82306 VITAMIN D 25 HYDROXY: CPT

## 2018-04-09 RX ORDER — BISMUTH SUBSALICYLATE 262 MG/1
2 TABLET, CHEWABLE ORAL
COMMUNITY
Start: 2017-10-09 | End: 2018-04-23

## 2018-04-09 RX ORDER — ACETAMINOPHEN 325 MG/1
325 TABLET ORAL
COMMUNITY
Start: 2017-01-20 | End: 2018-04-10

## 2018-04-09 RX ORDER — GABAPENTIN 100 MG/1
CAPSULE ORAL
COMMUNITY
Start: 2018-02-23 | End: 2018-05-24

## 2018-04-09 RX ORDER — LEVOFLOXACIN 500 MG/1
TABLET, FILM COATED ORAL
COMMUNITY
Start: 2018-03-06 | End: 2018-04-10

## 2018-04-10 ENCOUNTER — OFFICE VISIT (OUTPATIENT)
Dept: INTERNAL MEDICINE CLINIC | Facility: CLINIC | Age: 53
End: 2018-04-10
Payer: COMMERCIAL

## 2018-04-10 VITALS
DIASTOLIC BLOOD PRESSURE: 72 MMHG | BODY MASS INDEX: 31.49 KG/M2 | WEIGHT: 200.62 LBS | TEMPERATURE: 98.8 F | HEIGHT: 67 IN | HEART RATE: 84 BPM | SYSTOLIC BLOOD PRESSURE: 112 MMHG

## 2018-04-10 DIAGNOSIS — R21 SKIN RASH: ICD-10-CM

## 2018-04-10 DIAGNOSIS — L84 CORN: ICD-10-CM

## 2018-04-10 DIAGNOSIS — J45.909 ASTHMA, UNSPECIFIED ASTHMA SEVERITY, UNSPECIFIED WHETHER COMPLICATED, UNSPECIFIED WHETHER PERSISTENT: ICD-10-CM

## 2018-04-10 DIAGNOSIS — M54.9 BACK PAIN: ICD-10-CM

## 2018-04-10 DIAGNOSIS — Z12.11 SCREENING FOR COLON CANCER: Primary | ICD-10-CM

## 2018-04-10 DIAGNOSIS — E03.9 HYPOTHYROIDISM: ICD-10-CM

## 2018-04-10 DIAGNOSIS — M79.7 FIBROMYALGIA: ICD-10-CM

## 2018-04-10 DIAGNOSIS — J45.909 ASTHMA: ICD-10-CM

## 2018-04-10 DIAGNOSIS — J30.2 SEASONAL ALLERGIES: ICD-10-CM

## 2018-04-10 PROCEDURE — 99213 OFFICE O/P EST LOW 20 MIN: CPT | Performed by: INTERNAL MEDICINE

## 2018-04-10 RX ORDER — FLUTICASONE FUROATE AND VILANTEROL TRIFENATATE 100; 25 UG/1; UG/1
POWDER RESPIRATORY (INHALATION)
COMMUNITY
Start: 2018-04-05 | End: 2020-01-20 | Stop reason: SDUPTHER

## 2018-04-10 RX ORDER — CLOTRIMAZOLE AND BETAMETHASONE DIPROPIONATE 10; .64 MG/G; MG/G
CREAM TOPICAL 2 TIMES DAILY PRN
Qty: 30 G | Refills: 2 | Status: SHIPPED | OUTPATIENT
Start: 2018-04-10 | End: 2019-05-30 | Stop reason: SDUPTHER

## 2018-04-10 RX ORDER — ALBUTEROL SULFATE 2.5 MG/3ML
2.5 SOLUTION RESPIRATORY (INHALATION) EVERY 4 HOURS PRN
Qty: 75 ML | Refills: 3 | Status: SHIPPED | OUTPATIENT
Start: 2018-04-10 | End: 2018-06-14 | Stop reason: SDUPTHER

## 2018-04-10 RX ORDER — ALBUTEROL SULFATE 90 UG/1
2 AEROSOL, METERED RESPIRATORY (INHALATION) EVERY 4 HOURS PRN
Qty: 1 INHALER | Refills: 3 | Status: SHIPPED | OUTPATIENT
Start: 2018-04-10 | End: 2019-07-03 | Stop reason: SDUPTHER

## 2018-04-10 RX ORDER — LORATADINE 10 MG/1
10 TABLET ORAL DAILY
Qty: 90 TABLET | Refills: 0 | Status: SHIPPED | OUTPATIENT
Start: 2018-04-10 | End: 2020-01-20 | Stop reason: ALTCHOICE

## 2018-04-10 RX ORDER — MELOXICAM 15 MG/1
15 TABLET ORAL DAILY
Qty: 30 TABLET | Refills: 6 | Status: SHIPPED | OUTPATIENT
Start: 2018-04-10 | End: 2020-01-20 | Stop reason: SDUPTHER

## 2018-04-10 RX ORDER — LEVOTHYROXINE SODIUM 0.15 MG/1
150 TABLET ORAL DAILY
Qty: 90 TABLET | Refills: 3 | Status: SHIPPED | OUTPATIENT
Start: 2018-04-10 | End: 2018-07-25 | Stop reason: SDUPTHER

## 2018-04-10 RX ORDER — GABAPENTIN 300 MG/1
300 CAPSULE ORAL
Qty: 90 CAPSULE | Refills: 3 | Status: SHIPPED | OUTPATIENT
Start: 2018-04-10 | End: 2019-02-07

## 2018-04-10 NOTE — PROGRESS NOTES
INTERNAL MEDICINE FOLLOW-UP OFFICE VISIT  [facility]  [address] [place] [address]    NAME: Jacquelyn Moralez  AGE: 48 y o  SEX: female    DATE OF ENCOUNTER: 4/10/2018    Assessment and Plan     1  Screening for colon cancer  Patient last colonoscopy in 2015 at which time she found had adenomatous polyp  Patient follows with Dr Alfonso Wagoner regularly  Patient states she will call to schedule colonoscopy as she is due this year  2  Asthma  Well controlled on current regimen  Patient has not had to increase her albuterol use  - albuterol (2 5 mg/3 mL) 0 083 % nebulizer solution; Take 3 mL (2 5 mg total) by nebulization every 4 (four) hours as needed for wheezing or shortness of breath  Dispense: 75 mL; Refill: 3  - albuterol (VENTOLIN HFA) 90 mcg/act inhaler; Inhale 2 puffs every 4 (four) hours as needed for wheezing or shortness of breath  Dispense: 1 Inhaler; Refill: 3    3  Hypothyroidism  Patient had lab work done last week per her psychiatrist   TSH is 2 66  Prior levels had been quite elevated in the 20s to 30s range  Patient reports compliance with her levothyroxine  Patient denies any symptoms of fatigue, decreased energy, etc  Patient has a upcoming appointment with Endocrinology  - levothyroxine 150 mcg tablet; Take 1 tablet (150 mcg total) by mouth daily  Dispense: 90 tablet; Refill: 3    4  [place]  Patient reports over the past few weeks she has developed corn on the bottom her bilateral feet  Patient reports no increase in size however she continues to have pain  Patient elective see Podiatry for a definitive solution   - Ambulatory referral to Podiatry;  Future    Orders Placed This Encounter   Procedures    Ambulatory referral to Podiatry       - Counseling Documentation: patient was counseled regarding: diagnostic results, instructions for management, risk factor reductions, prognosis, patient and family education, impressions, risks and benefits of treatment options and importance of compliance with treatment  - Counseling Time: not applicable  - Medication Side Effects: Adverse side effects of medications were reviewed with the patient/guardian today  Chief Complaint     Chief Complaint   Patient presents with    Follow-up     foot  History of Present Illness     Ms Gerber Turpin is a 48 year lady with a past medical history significant for hypothyroidism, GERD, MICHAEL, depression, migraines, tubular adenoma of colon, chronic low back pain, and fibromyalgia who presents for follow up for her thyroid and refill of medications  Patient reports compliance with the levothyroxine  Patient reports she had improvement in her energy level  She denies depressed mood, fatigue, dry skin, hair loss, or abdominal pain  Patient reports some weight gain  Patient is complaining of bilateral foot pain  Patient reports from wearing high heels she has developed a corn and having pain in the same area  Patient denies any other trauma, bleeding, or infection in that area  Patient has tried home remedies that have not worked for her  Patient reports she has to use her albuterol nebulizer regularly at home however currently denies any wheezing, cough, chest pain, shortness of breath, dyspnea on exertion  Patient was a former smoker and quit in 2015  Patient follows with Gastroenterology on a regular basis  Patient last had a colonoscopy in 2015 that revealed tubular adenoma  Patient denies any weight loss, bloody stools, or change in stool caliber  Patient reports her GERD has been well controlled on current regmen  Patient follows regularly with Psychiatry for her chronic psychiatric issues  She denies any suicidal ideation, homicidal ideation, auditory/visual/tactile hallucinations    Patient refuses influenza vaccination          The following portions of the patient's history were reviewed and updated as appropriate: allergies, current medications, past family history, past medical history, past social history, past surgical history and problem list     Review of Systems     Review of Systems   Constitutional: Negative for activity change (Chronic), appetite change, chills, fatigue, fever and unexpected weight change  HENT: Negative for congestion, postnasal drip, rhinorrhea and voice change  Eyes: Negative for visual disturbance  Respiratory: Negative for apnea, cough, choking, shortness of breath and wheezing  Cardiovascular: Negative for chest pain, palpitations and leg swelling  Gastrointestinal: Negative for abdominal pain, blood in stool, constipation, diarrhea, nausea and vomiting  Genitourinary: Negative for vaginal bleeding  Musculoskeletal: Negative for arthralgias and back pain  Skin: Negative for color change  Neurological: Negative for tremors, weakness, light-headedness, numbness and headaches  Hematological: Negative for adenopathy  Psychiatric/Behavioral: Negative for agitation, behavioral problems, dysphoric mood, hallucinations, sleep disturbance and suicidal ideas  The patient is not nervous/anxious and is not hyperactive  Active Problem List     Patient Active Problem List   Diagnosis    Mixed incontinence    Stress incontinence in female    Obstructive sleep apnea syndrome    Hypothyroidism    Constipation    Chronic low back pain    Asthma    Arthritis    Gastritis    Rectocele    Cystocele    Hypermobility of urethra    Achalasia    Bilateral edema of lower extremity    Breast pain, right    Chronic sinusitis with recurrent bronchitis    Depression    Esophageal reflux    H  pylori infection    Headache, migraine    Intractable chronic migraine without aura and without status migrainosus    Breast mass    Obesity (BMI 30-39  9)    Other chronic pain    Spondylosis of lumbar region without myelopathy or radiculopathy    Thyroiditis    Tubular adenoma of colon    Food allergy       Objective /72 (BP Location: Right arm, Patient Position: Sitting, Cuff Size: Standard)   Pulse 84   Temp 98 8 °F (37 1 °C) (Oral)   Ht 5' 7" (1 702 m)   Wt 91 kg (200 lb 9 9 oz)   BMI 31 42 kg/m²     Physical Exam   Constitutional: She is oriented to person, place, and time  She appears well-developed and well-nourished  No distress  HENT:   Head: Normocephalic and atraumatic  Mouth/Throat: No oropharyngeal exudate  Eyes: Conjunctivae and EOM are normal  Pupils are equal, round, and reactive to light  No scleral icterus  Neck: Normal range of motion  Cardiovascular: Normal rate, regular rhythm, normal heart sounds and intact distal pulses  Exam reveals no gallop and no friction rub  No murmur heard  Pulmonary/Chest: Effort normal and breath sounds normal  No respiratory distress  She has no wheezes  She has no rales  She exhibits no tenderness  Abdominal: Soft  Bowel sounds are normal  She exhibits no distension and no mass  There is no tenderness  There is no rebound and no guarding  Musculoskeletal: She exhibits edema (trace pitting edema bilateral LE )  She exhibits no tenderness  Lymphadenopathy:     She has no cervical adenopathy  Neurological: She is alert and oriented to person, place, and time  No cranial nerve deficit  Skin: Skin is warm and dry  No rash noted  She is not diaphoretic  No erythema  No pallor  Psychiatric: She has a normal mood and affect  Her behavior is normal    Vitals reviewed        Pertinent Laboratory/Diagnostic Studies:  CBC:   Lab Results   Component Value Date/Time    WBC 9 15 04/06/2018 08:23 AM    WBC 15 61 (H) 01/07/2016 05:39 AM    RBC 4 48 04/06/2018 08:23 AM    RBC 4 00 01/07/2016 05:39 AM    HGB 12 9 04/06/2018 08:23 AM    HGB 11 0 (L) 01/07/2016 05:39 AM    HCT 40 7 04/06/2018 08:23 AM    HCT 34 7 (L) 01/07/2016 05:39 AM    MCV 91 04/06/2018 08:23 AM    MCV 87 01/07/2016 05:39 AM    MCH 28 8 04/06/2018 08:23 AM    MCH 27 5 01/07/2016 05:39 AM MCHC 31 7 04/06/2018 08:23 AM    MCHC 31 7 01/07/2016 05:39 AM    RDW 13 3 04/06/2018 08:23 AM    RDW 14 1 01/07/2016 05:39 AM    MPV 9 3 04/06/2018 08:23 AM    MPV 9 3 01/07/2016 05:39 AM     04/06/2018 08:23 AM     01/07/2016 05:39 AM    NRBC 0 04/06/2018 08:23 AM    NEUTOPHILPCT 47 04/06/2018 08:23 AM    NEUTOPHILPCT 63 12/03/2015 10:43 AM    LYMPHOPCT 39 04/06/2018 08:23 AM    LYMPHOPCT 24 12/03/2015 10:43 AM    MONOPCT 11 04/06/2018 08:23 AM    MONOPCT 12 12/03/2015 10:43 AM    EOSPCT 3 04/06/2018 08:23 AM    EOSPCT 1 12/03/2015 10:43 AM    BASOPCT 0 04/06/2018 08:23 AM    BASOPCT 1 05/01/2015 12:43 PM    NEUTROABS 4 25 04/06/2018 08:23 AM    NEUTROABS 5 67 12/03/2015 10:43 AM    LYMPHSABS 3 59 04/06/2018 08:23 AM    LYMPHSABS 2 16 12/03/2015 10:43 AM    MONOSABS 1 01 04/06/2018 08:23 AM    MONOSABS 1 08 12/03/2015 10:43 AM    EOSABS 0 24 04/06/2018 08:23 AM    EOSABS 0 09 12/03/2015 10:43 AM     Chemistry Profile:   Lab Results   Component Value Date/Time     04/06/2018 08:23 AM     01/07/2016 05:39 AM    K 4 1 04/06/2018 08:23 AM    K 4 3 01/07/2016 05:39 AM     (H) 04/06/2018 08:23 AM     01/07/2016 05:39 AM    CO2 26 04/06/2018 08:23 AM    CO2 24 01/07/2016 05:39 AM    ANIONGAP 7 04/06/2018 08:23 AM    ANIONGAP 6 01/07/2016 05:39 AM    BUN 15 04/06/2018 08:23 AM    BUN 12 01/07/2016 05:39 AM    CREATININE 0 72 04/06/2018 08:23 AM    CREATININE 0 62 01/07/2016 05:39 AM    GLUCOSE 86 07/21/2016 11:36 AM    GLUCOSE 104 01/07/2016 05:39 AM    CALCIUM 8 6 04/06/2018 08:23 AM    CALCIUM 8 3 01/07/2016 05:39 AM    AST 13 04/06/2018 08:23 AM    AST 56 (H) 12/03/2015 10:43 AM    ALT 23 04/06/2018 08:23 AM     (H) 12/03/2015 10:43 AM    ALKPHOS 83 04/06/2018 08:23 AM    ALKPHOS 86 12/03/2015 10:43 AM    PROT 7 3 04/06/2018 08:23 AM    PROT 6 6 12/03/2015 10:43 AM    BILITOT 0 30 04/06/2018 08:23 AM    BILITOT 0 36 12/03/2015 10:43 AM    EGFR 97 04/06/2018 08:23 AM CBC:   Results from last 6 Months  Lab Units 04/06/18  0823   WBC Thousand/uL 9 15   RBC Million/uL 4 48   HEMOGLOBIN g/dL 12 9   HEMATOCRIT % 40 7   MCV fL 91   MCH pg 28 8   MCHC g/dL 31 7   RDW % 13 3   MPV fL 9 3   PLATELETS Thousands/uL 370   NRBC AUTO /100 WBCs 0   NEUTROS PCT % 47   LYMPHS PCT % 39   MONOS PCT % 11   EOS PCT % 3   BASOS PCT % 0   NEUTROS ABS Thousands/µL 4 25   LYMPHS ABS Thousands/µL 3 59   MONOS ABS Thousand/µL 1 01   EOS ABS Thousand/µL 0 24     Chemistry Profile:   Results from last 6 Months  Lab Units 04/06/18  0823   SODIUM mmol/L 142   POTASSIUM mmol/L 4 1   CHLORIDE mmol/L 109*   CO2 mmol/L 26   ANION GAP mmol/L 7   BUN mg/dL 15   CREATININE mg/dL 0 72   GLUCOSE FASTING mg/dL 99   CALCIUM mg/dL 8 6   AST U/L 13   ALT U/L 23   ALK PHOS U/L 83   TOTAL PROTEIN g/dL 7 3   BILIRUBIN TOTAL mg/dL 0 30   EGFR ml/min/1 73sq m 97       Current Medications     Current Outpatient Prescriptions:     acetaminophen (TYLENOL) 325 mg tablet, Every 4 hours as needed, Disp: 30 tablet, Rfl: 0    ARIPiprazole (ABILIFY) 5 mg tablet, Take 5 mg by mouth daily at bedtime, Disp: , Rfl: 1    bismuth subsalicylate (PEPTO BISMOL) 262 MG chewable tablet, Chew 2 tablets, Disp: , Rfl:     Botulinum Toxin Type A 200 units SOLR, Inject 155 units into face and neck IM every 90 days, Disp: , Rfl:     BREO ELLIPTA, , Disp: , Rfl:     clonazePAM (KlonoPIN) 0 5 mg tablet, Take 0 5 mg by mouth, Disp: , Rfl:     clotrimazole-betamethasone (LOTRISONE) 1-0 05 % cream, Apply topically 2 (two) times a day as needed (skin rash), Disp: 30 g, Rfl: 2    dicyclomine (BENTYL) 20 mg tablet, TAKE 1 TABLET BY MOUTH EVERY 6 HOURS AS NEEDED, Disp: 120 tablet, Rfl: 2    doxepin (SINEquan) 25 mg capsule, TAKE 1-2 CAPSULES BY MOUTH DAILY AT BEDTIME , Disp: , Rfl: 0    gabapentin (NEURONTIN) 100 mg capsule, , Disp: , Rfl:     gabapentin (NEURONTIN) 300 mg capsule, Take 1 capsule (300 mg total) by mouth daily at bedtime, Disp: 90 capsule, Rfl: 3    hydrOXYzine pamoate (VISTARIL) 50 mg capsule, Take 50 mg by mouth, Disp: , Rfl:     levothyroxine 150 mcg tablet, Take 1 tablet (150 mcg total) by mouth daily, Disp: 90 tablet, Rfl: 3    loratadine (CLARITIN) 10 mg tablet, Take 1 tablet (10 mg total) by mouth daily, Disp: 90 tablet, Rfl: 0    meloxicam (MOBIC) 15 mg tablet, Take 1 tablet (15 mg total) by mouth daily, Disp: 30 tablet, Rfl: 6    pantoprazole (PROTONIX) 40 mg tablet, Take 1 tablet by mouth daily, Disp: , Rfl:     polyethylene glycol (GLYCOLAX) powder, Take 17 g by mouth daily Mix 1 capful in 8 ounces of water, juice or tea , Disp: , Rfl: 11    Polyethylene Glycol 1000 POWD, MIX 1 CAPFUL (17GM) IN 8 OUNCES OF WATER, JUICE, OR TEA AND DRINK DAILY  , Disp: , Rfl:     prochlorperazine (COMPAZINE) 10 mg tablet, TAKE 1 TABLET TWICE A DAY AS NEEDED FOR HEADACHE OR NAUSEA, LIMIT 3 DAYS PER WEEK, Disp: , Rfl: 0    promethazine-codeine (PHENERGAN WITH CODEINE) 6 25-10 mg/5 mL syrup, Take 2 5 mL by mouth every 6 (six) hours, Disp: , Rfl:     sucralfate (CARAFATE) 1 g tablet, Take 1 tablet by mouth 3 (three) times a day, Disp: , Rfl:     VIIBRYD 40 MG tablet, Take 40 mg by mouth daily With food, Disp: , Rfl: 0    albuterol (2 5 mg/3 mL) 0 083 % nebulizer solution, Take 3 mL (2 5 mg total) by nebulization every 4 (four) hours as needed for wheezing or shortness of breath, Disp: 75 mL, Rfl: 3    albuterol (VENTOLIN HFA) 90 mcg/act inhaler, Inhale 2 puffs every 4 (four) hours as needed for wheezing or shortness of breath, Disp: 1 Inhaler, Rfl: 3    ARIPiprazole (ABILIFY) 5 mg tablet, Take 5 mg by mouth, Disp: , Rfl:     docusate sodium (COLACE) 100 mg capsule, TAKE 1 CAPSULE TWICE DAILY AS NEEDED , Disp: , Rfl:     fluticasone (FLONASE) 50 mcg/act nasal spray, 1-2 sprays into each nostril daily, Disp: 16 g, Rfl: 0    Polyethylene Glycol 3350-GRX POWD, Take by mouth daily, Disp: , Rfl:     vilazodone (VIIBRYD) 40 mg tablet, Take 40 mg by mouth, Disp: , Rfl:     Health Maintenance     Health Maintenance   Topic Date Due    HIV SCREENING  1965    COLONOSCOPY  1965    Depression Screening PHQ-9  03/23/1979    DTaP,Tdap,and Td Vaccines (2 - Td) 05/22/2025    INFLUENZA VACCINE  Completed    PNEUMOCOCCAL POLYSACCHARIDE VACCINE AGE 2-64 HIGH RISK  Completed     Immunization History   Administered Date(s) Administered    Influenza Quadrivalent Preservative Free 3 years and older IM 11/06/2017    Influenza TIV (IM) 10/23/2015    Pneumococcal Polysaccharide PPV23 01/28/2016       Gal Hu MD   Internal Medicine PGY-1  4/10/2018 12:28 PM

## 2018-04-10 NOTE — PATIENT INSTRUCTIONS
The following appointments have been scheduled:      With: [facility]      Time: 10:20 AM            Length: 20 min      Date: 6/28/2018           Visit Type: PAM SCREENING BILAT W CAD      Made By: Cara Roldan on 7/3/2017      Hipotiroidismo   CUIDADO AMBULATORIO:   Hipotiroidismo  es dione afección que se desarrolla cuando la glándula tiroides no produce suficiente hormona tiroidea  Las hormonas de la tiroides ayudan a controlar la temperatura del cuerpo, el ritmo cardíaco, el crecimiento, y peso corporal   Los signos y síntomas más comunes incluyen los siguientes:  Lo signos y síntomas pueden desarrollarse lentamente, a veces en el transcurso de varios años  · Agotamiento    · Sensibilidad al frío    · Shellie de jan o disminución en la capacidad de concentración    · Shellie o debilidad muscular    · Estreñimiento     · Piel seca y escamosa o uñas quebradizas    · Adelgazamiento del sandy    · Periodos menstruales vish o irregulares    · Depresión o irritabilidad  Llame al 911 en ileana de presentar lo siguiente:   · Usted tiene dolor de pecho repentino o falta de aire  · Usted sufre dione convulsión  · Usted siente que se va a desmayar  Busque atención médica de inmediato si:   · Usted tiene diarrea, temblores o dificultad para dormir  · Janet piernas, tobillos o pies están inflamados  Pregúntele a beck Joanie Petra vitaminas y minerales son adecuados para usted  · Usted tiene fiebre  · Usted tiene escalofríos, tos o se siente débil y adolorido  · Usted tiene dolor e inflamación en los músculos y articulaciones  · Usted tiene comezón en la piel, inflamación o un sarpullido  · Janet signos y síntomas regresan o Charly Rico, aún después del [place]  · Usted tiene preguntas o inquietudes acerca de beck condición o cuidado  Tratamiento:  El medicamento para reemplazar la hormona tiroidea puede devolver beck nivel de hormona tiroidea a la normalidad   Solicite más información a pena médico sobre otros medicamentos que usted podría necesitar  Acuda a aneta consultas de control con pena médico según le indicaron  Es probable que tenga que regresar para realizarse más exámenes de elmer y controlar aneta niveles de la hormona tiroides  Los exámenes mostrarán si usted está recibiendo la cantidad correcta de medicamentos para la tiroides  Anote aneta preguntas para que se acuerde de hacerlas adan aneta visitas  © 2017 [address] [employer] Information is for End User's use only and may not be sold, redistributed or otherwise used for commercial purposes  All illustrations and images included in CareNotes® are the copyrighted property of A D A M , Inc  or Adi Ruiz  Esta información es sólo para uso en educación  Pena intención no es darle un consejo médico sobre enfermedades o tratamientos  Colsulte con pena Jennifer Bart farmacéutico antes de seguir cualquier régimen médico para saber si es seguro y efectivo para usted

## 2018-04-11 ENCOUNTER — TELEPHONE (OUTPATIENT)
Dept: GASTROENTEROLOGY | Facility: CLINIC | Age: 53
End: 2018-04-11

## 2018-04-11 NOTE — TELEPHONE ENCOUNTER
Cathleen Calvo  1965  [address]  [phone]  Cell Phone     Screened by: Katelyn Mora  ]    Referring Dr :     Pre- Screening:   Has patient been referred for a routine screening Colonoscopy? yes  Is the patient over 48years of age? yes    If the answer is YES to both questions, proceed to the medical questions  Do you have any of the following symptoms? Have you had a coronary or vascular stent within the last year? no    Have you had a heart attack or stroke in the last 6 months? no    Have you had intestinal surgery in the last 3 months? no    Do you have problems with:    Do you use:  Oxygen no  CPAP/BiPAP no    Have you been hospitalized in the last Month? no    Have you been diagnosed with a bleeding disorder or anemia? no    Have you had chest pain (angina) or breathing problems  (COPD) in the last 3 months? no     Do you have any difficulty walking up a flight of stairs? no    Have you had Kidney failure or insufficiency? no    Have you had heart valve surgery? no    Are you confined to a wheelchair? no    Do you take     Do you take insulin for Diabetes no  Name of medication:    : If patient answers NO to medical questions, then schedule procedure  If patient answers YES to medical questions, then schedule office appointment  Previous Colonoscopy (yes)   (if yes) Date and Facility of last colonoscopy? Patient scheduled for procedure:   Scheduled by:     Time:   Provider:   Location:     Insurance:   Referral Required? Were instructions Mailed? Were instructions sent to [facility]:   Was the prep sent to Pharmacy?      Comments: [[place]/DR MCKEON  ]

## 2018-04-11 NOTE — TELEPHONE ENCOUNTER
Pt is due for a colon recall with dr Boaz Hernandez in 10/2018  last colon done 10/2015 polyps removed per dr Boaz Hernandez pt should have colon repeat in 3 years (10/2018)   Pt is aware we will call her to sched in 10/2018 with dr Boaz Hernandez recall entered

## 2018-04-12 ENCOUNTER — OFFICE VISIT (OUTPATIENT)
Dept: OBGYN CLINIC | Facility: CLINIC | Age: 53
End: 2018-04-12
Payer: COMMERCIAL

## 2018-04-12 VITALS
HEIGHT: 68 IN | SYSTOLIC BLOOD PRESSURE: 123 MMHG | BODY MASS INDEX: 30.25 KG/M2 | DIASTOLIC BLOOD PRESSURE: 71 MMHG | WEIGHT: 199.6 LBS | HEART RATE: 78 BPM

## 2018-04-12 DIAGNOSIS — N81.10 FEMALE CYSTOCELE: Primary | ICD-10-CM

## 2018-04-12 PROCEDURE — 99213 OFFICE O/P EST LOW 20 MIN: CPT | Performed by: OBSTETRICS & GYNECOLOGY

## 2018-04-12 NOTE — PROGRESS NOTES
S:  The the patient presents to the office today for follow-up  She is status post a rectocele repair and a single incision sling in January of 2017  The patient states that she is doing well  She no longer has any stress associated leakage  However the patient presents today complaining of recurrent bulge,  rectal pressure, and a popping sensation with voiding  The patient is not sexually active and has no desire to be sexually active  She denies any abnormal vaginal bleeding or discharge    Objective:  Vital signs are stable she is afebrile    General examination she is awake alert oriented x4    Abdominal examination she is obese good bowel sounds were auscultated    Pelvic examination was performed  The patient had good vaginal tone with cable  Her vaginal mucosa rugae a appeared to be normal she was well estrogenized  Point AA was 0, point genital hiatus was three, perineal body was three, point AP and BP were at minus two    Rectal examination was performed this was normal with normal tone stool in vault, no fo foreign body noted  No pain was elicited    Assessment this is a 82-year-old  female with symptomatic vaginal bulge  She has attempted a size four ring pessary with knob which alleviated her symptoms however she desires to proceed with surgical management  Her vaginal apex/cervix is well supported      Plan:  Native tissue anterior repair    Cystoscopy

## 2018-04-17 ENCOUNTER — OFFICE VISIT (OUTPATIENT)
Dept: MULTI SPECIALTY CLINIC | Facility: CLINIC | Age: 53
End: 2018-04-17
Payer: COMMERCIAL

## 2018-04-17 VITALS
WEIGHT: 200.62 LBS | HEIGHT: 68 IN | BODY MASS INDEX: 30.41 KG/M2 | SYSTOLIC BLOOD PRESSURE: 100 MMHG | TEMPERATURE: 97.8 F | HEART RATE: 88 BPM | DIASTOLIC BLOOD PRESSURE: 70 MMHG

## 2018-04-17 DIAGNOSIS — L85.3 XEROSIS CUTIS: ICD-10-CM

## 2018-04-17 DIAGNOSIS — B35.1 ONYCHOMYCOSIS: Primary | ICD-10-CM

## 2018-04-17 DIAGNOSIS — L84 CORN: ICD-10-CM

## 2018-04-17 DIAGNOSIS — L84 CALLUS: ICD-10-CM

## 2018-04-17 PROCEDURE — 99213 OFFICE O/P EST LOW 20 MIN: CPT | Performed by: PODIATRIST

## 2018-04-17 RX ORDER — TERBINAFINE HYDROCHLORIDE 250 MG/1
250 TABLET ORAL DAILY
Qty: 30 TABLET | Refills: 2 | Status: SHIPPED | OUTPATIENT
Start: 2018-04-17 | End: 2018-07-16

## 2018-04-17 NOTE — PROGRESS NOTES
[facility] 48 y o  female MRN: [id number]  Encounter: [id number]    Assessment/Plan     Assessment:  1  Onychomycosis  2  Callus  3  Xerosis     Plan:  - Patient was seen/examined  All questions and concerns addressed  - calluses were sharply trimmed down to normal epithelium with 15 blade without incident    -patient was instructed to use pumice stone to reduce the growth of calluses  She was also instructed to apply over-the-counter lotion daily for her dry skin on her plantar feet  - CMP was reviewed  Hepatic function is WNL  Prescription for oral Lamisil was given  Repeat LFT prescription was given, and patient was instructed to get her repeat LFTs in 5 weeks  -RTC in 6 weeks      History of Present Illness     HPI:  Rolando Carey is a 48 y o  female who presents with discolored toenails bilaterally, and painful calluses bilaterally  She states that the calluses have been present for years, and she normally cuts them herself  However she states that they are growing into her foot and have become too painful to cut  She also states that she has discolored toenails  She states that 5 years ago she used oral Lamisil, which helped to discoloration of the toenails  She wishes to have the Lamisil prescribed to her again today  She states that she recently had blood work completed about 2 weeks ago, and she states all of her lab work came back  The patient denies any nausea, vomiting, fever, chills, shortness of breath, or chest pains  Consults  Review of Systems   Constitutional: Negative  HENT: Negative  Eyes: Negative  Respiratory: Negative  Cardiovascular: Negative  Gastrointestinal: Negative  Musculoskeletal: Negative   Skin:  Discolored toenails, calluses bilateral  Neurological: Negative          Historical Information   Past Medical History:   Diagnosis Date    Achalasia     Asthma     Constipation     Depression     Fibromyalgia     GERD (gastroesophageal reflux disease)     Hypothyroidism     Migraines      Past Surgical History:   Procedure Laterality Date     SECTION      NASAL SINUS SURGERY      OTHER SURGICAL HISTORY      achalasia repair    PILONIDAL CYST / SINUS EXCISION      NC CYSTOURETHROSCOPY N/A 2017    Procedure: CYSTOSCOPY;  Surgeon: Romayne Draft, MD;  [facility];  Service: UroGynecology           NC ESOPHAGOGASTRODUODENOSCOPY TRANSORAL DIAGNOSTIC N/A 2017    Procedure: ESOPHAGOGASTRODUODENOSCOPY (EGD); Surgeon: Lee Salazar MD;  [facility]; Service: Gastroenterology    NC POST COLPORRHAPHY,RECTUM/VAGINA N/A 2017    Procedure: COLPORRHAPHY POSTERIOR;  Surgeon: Romayne Draft, MD;  [facility];  Service: UroGynecology           NC SLING OPER STRES INCONTINENCE N/A 2017    Procedure: INSERTION PUBOVAGINAL SLING /SINGLE INCISION ;  Surgeon: Romayne Draft, MD;  [facility];  Service: UroGynecology           TONSILLECTOMY      TUBAL LIGATION       Social History   History   Alcohol Use No     History   Drug Use No     History   Smoking Status    Former Smoker    Packs/day: 1 50    Years: 30 00   Smokeless Tobacco    Never Used     Comment: 2016     Family History:   Family History   Problem Relation Age of Onset    Cancer Mother     Dementia Father     Stroke Father     Diabetes Father     Cancer Maternal Grandmother     Cancer Maternal Grandfather     Hypertension Maternal Grandfather     Stroke Paternal Grandmother        Meds/Allergies     (Not in a hospital admission)  Allergies   Allergen Reactions    Gabapentin      Annotation - [id number]: Neuropsych effects per patient      Latex Dermatitis    Penicillins Hives       Objective     Current Vitals:   Blood Pressure: 100/70 (18)  Pulse: 88 (18)  Temperature: 97 8 °F (36 6 °C) (18)  Temp Source: Oral (18)  Height: 5' 7 5" (171 5 cm) (18)  Weight - Scale: 91 kg (200 lb 9 9 oz) (04/17/18 0849)        /70 (BP Location: Left arm, Patient Position: Sitting, Cuff Size: Adult)   Pulse 88   Temp 97 8 °F (36 6 °C) (Oral)   Ht 5' 7 5" (1 715 m)   Wt 91 kg (200 lb 9 9 oz)   BMI 30 96 kg/m²       Lower Extremity Exam:    Musculoskeletal:  MMT is 5/5 to all compartments of the LE, +0/4 edema B/L, Digital ROM is intact, tenderness noted sub met 2 around the calluses  Pulses:   R DP is +2/4, R PT is +2/4, L DP is +2/4, L PT is +2/4, CFT< 3sec to all digits  Pedal hair is Present     Skin:  Sub met 2 IPK calluses noted bilaterally  Brown discoloration the toenails noted to all digits bilaterally  No open lesions noted bilaterally  Skin of the LE is normal texture, turgor  Neurologic:  Gross sensation is intact   Protective sensation is Intact

## 2018-04-23 ENCOUNTER — OFFICE VISIT (OUTPATIENT)
Dept: ENDOCRINOLOGY | Facility: CLINIC | Age: 53
End: 2018-04-23
Payer: COMMERCIAL

## 2018-04-23 VITALS
BODY MASS INDEX: 30.8 KG/M2 | SYSTOLIC BLOOD PRESSURE: 108 MMHG | DIASTOLIC BLOOD PRESSURE: 70 MMHG | WEIGHT: 203.2 LBS | HEIGHT: 68 IN | HEART RATE: 72 BPM

## 2018-04-23 DIAGNOSIS — E66.9 OBESITY (BMI 30-39.9): ICD-10-CM

## 2018-04-23 DIAGNOSIS — E03.9 HYPOTHYROIDISM, UNSPECIFIED TYPE: Primary | ICD-10-CM

## 2018-04-23 PROCEDURE — 99244 OFF/OP CNSLTJ NEW/EST MOD 40: CPT | Performed by: INTERNAL MEDICINE

## 2018-04-23 NOTE — PROGRESS NOTES
Ruth Geiger 48 y o  female MRN: [id number]    Encounter: [id number]      Assessment/Plan   Problem List Items Addressed This Visit     Hypothyroidism - Primary     TSH has improved-counseled on the importance of taking levothyroxine regularly and properly on an empty stomach just with water in at least 1 hour before breakfast   Continue current dose of levothyroxine and repeat thyroid function test in the next 6-8 weeks  If TSH stable will refer back to primary care         Relevant Orders    T4, free Lab Collect    TSH, 3rd generation Lab Collect    Thyroid Antibodies Panel Lab Collect    Obesity (BMI 30-39  9)     Patient counseled on metabolic complications of obesity, counseled on dietary and lifestyle modifications and weight loss  Referred for nutrition evaluation  Relevant Orders    Ambulatory referral to Nutrition Services        CC:   Hypothyroidism  History of Present Illness      HPI:  42-year-old woman referred here for evaluation of hypothyroidism,  History was hypothyroidism for the past 20 years , TSH was high in the past 6 months, dose of levothyroxine was increased from 125-150 mcg daily  It appears that she was taking levothyroxine along with sucralfate and other medications in the morning  Currently on LT4 -150 mcg daily for the past couple of months - takes regularly and properly  Doesn't take any iron , calcium , MVI  C/o fatigue - improved   Weight gain -20 lbs in the past 2 years ,  C/o constipation   Sleep disturbances   menstrual cycles irregular - for the past few months   Cols intolernace       Review of Systems   Constitutional: Negative for fatigue and unexpected weight change  Respiratory: Positive for cough  Negative for shortness of breath  Cardiovascular: Negative for chest pain, palpitations and leg swelling  Gastrointestinal: Positive for constipation  Negative for abdominal pain, diarrhea and nausea  Endocrine: Positive for polyuria   Negative for polydipsia  Musculoskeletal: Positive for arthralgias and myalgias  Skin: Negative for color change, pallor, rash and wound  Psychiatric/Behavioral: Positive for sleep disturbance  All other systems reviewed and are negative  Historical Information   Past Medical History:   Diagnosis Date    Achalasia     Asthma     Constipation     Depression     Fibromyalgia     GERD (gastroesophageal reflux disease)     Hypothyroidism     Migraines      Past Surgical History:   Procedure Laterality Date     SECTION      NASAL SINUS SURGERY      OTHER SURGICAL HISTORY      achalasia repair    PILONIDAL CYST / SINUS EXCISION      MO CYSTOURETHROSCOPY N/A 2017    Procedure: CYSTOSCOPY;  Surgeon: Edmundo Patricia MD;  [facility];  Service: UroGynecology           MO ESOPHAGOGASTRODUODENOSCOPY TRANSORAL DIAGNOSTIC N/A 2017    Procedure: ESOPHAGOGASTRODUODENOSCOPY (EGD); Surgeon: John Pedro MD;  [facility];   Service: Gastroenterology    MO POST COLPORRHAPHY,RECTUM/VAGINA N/A 2017    Procedure: COLPORRHAPHY POSTERIOR;  Surgeon: Edmundo Patricia MD;  [facility];  Service: UroGynecology           MO SLING OPER STRES INCONTINENCE N/A 2017    Procedure: [place] PUBOVAGINAL SLING /SINGLE INCISION ;  Surgeon: Edmundo Patricia MD;  [facility];  Service: UroGynecology           TONSILLECTOMY      TUBAL LIGATION       Social History   History   Alcohol Use No     History   Drug Use No     History   Smoking Status    Former Smoker    Packs/day: 1 50    Years: 30 00   Smokeless Tobacco    Never Used     Comment: 2016     Family History:   Family History   Problem Relation Age of Onset    Cancer Mother     Dementia Father     Stroke Father     Diabetes Father     Cancer Maternal Grandmother     Cancer Maternal Grandfather     Hypertension Maternal Grandfather     Stroke Paternal Grandmother     Thyroid disease unspecified Brother     Thyroid disease unspecified Maternal Aunt        Meds/Allergies   Current Outpatient Prescriptions   Medication Sig Dispense Refill    acetaminophen (TYLENOL) 325 mg tablet Every 4 hours as needed 30 tablet 0    albuterol (2 5 mg/3 mL) 0 083 % nebulizer solution Take 3 mL (2 5 mg total) by nebulization every 4 (four) hours as needed for wheezing or shortness of breath 75 mL 3    albuterol (VENTOLIN HFA) 90 mcg/act inhaler Inhale 2 puffs every 4 (four) hours as needed for wheezing or shortness of breath 1 Inhaler 3    ARIPiprazole (ABILIFY) 5 mg tablet Take 5 mg by mouth daily at bedtime  1    clonazePAM (KlonoPIN) 0 5 mg tablet Take 0 5 mg by mouth      clotrimazole-betamethasone (LOTRISONE) 1-0 05 % cream Apply topically 2 (two) times a day as needed (skin rash) 30 g 2    dicyclomine (BENTYL) 20 mg tablet TAKE 1 TABLET BY MOUTH EVERY 6 HOURS AS NEEDED 120 tablet 2    docusate sodium (COLACE) 100 mg capsule TAKE 1 CAPSULE TWICE DAILY AS NEEDED   doxepin (SINEquan) 25 mg capsule TAKE 1-2 CAPSULES BY MOUTH DAILY AT BEDTIME   0    fluticasone (FLONASE) 50 mcg/act nasal spray 1-2 sprays into each nostril daily 16 g 0    gabapentin (NEURONTIN) 100 mg capsule       gabapentin (NEURONTIN) 300 mg capsule Take 1 capsule (300 mg total) by mouth daily at bedtime 90 capsule 3    hydrOXYzine pamoate (VISTARIL) 50 mg capsule Take 50 mg by mouth      levothyroxine 150 mcg tablet Take 1 tablet (150 mcg total) by mouth daily 90 tablet 3    loratadine (CLARITIN) 10 mg tablet Take 1 tablet (10 mg total) by mouth daily 90 tablet 0    meloxicam (MOBIC) 15 mg tablet Take 1 tablet (15 mg total) by mouth daily 30 tablet 6    pantoprazole (PROTONIX) 40 mg tablet Take 1 tablet by mouth daily      polyethylene glycol (GLYCOLAX) powder Take 17 g by mouth as needed Mix 1 capful in 8 ounces of water, juice or tea  11    Polyethylene Glycol 1000 POWD MIX 1 CAPFUL (17GM) IN 8 OUNCES OF WATER, JUICE, OR TEA AND DRINK DAILY        Polyethylene Glycol 3350-GRX POWD Take by mouth daily      prochlorperazine (COMPAZINE) 10 mg tablet TAKE 1 TABLET TWICE A DAY AS NEEDED FOR HEADACHE OR NAUSEA, LIMIT 3 DAYS PER WEEK  0    promethazine-codeine (PHENERGAN WITH CODEINE) 6 25-10 mg/5 mL syrup Take 2 5 mL by mouth every 6 (six) hours      sucralfate (CARAFATE) 1 g tablet Take 1 tablet by mouth as needed        terbinafine (LamISIL) 250 mg tablet Take 1 tablet (250 mg total) by mouth daily for 90 days 30 tablet 2    VIIBRYD 40 MG tablet Take 40 mg by mouth daily With food  0    vilazodone (VIIBRYD) 40 mg tablet Take 40 mg by mouth      ARIPiprazole (ABILIFY) 5 mg tablet Take 5 mg by mouth      Botulinum Toxin Type A 200 units SOLR Inject 155 units into face and neck IM every 90 days      ELIOT MCLEOD        No current facility-administered medications for this visit  Allergies   Allergen Reactions    Gabapentin      Annotation - [id number]: Neuropsych effects per patient   Latex Dermatitis    Penicillins Hives       Objective   Vitals: Blood pressure 108/70, pulse 72, height 5' 8" (1 727 m), weight 92 2 kg (203 lb 3 2 oz), unknown if currently breastfeeding  Physical Exam   Constitutional: She is oriented to person, place, and time  She appears well-developed and well-nourished  No distress  HENT:   Head: Normocephalic and atraumatic  Mouth/Throat: No oropharyngeal exudate  Eyes: Conjunctivae and EOM are normal  No scleral icterus  Neck: Normal range of motion  Neck supple  No thyromegaly present  Cardiovascular: Normal rate, regular rhythm and normal heart sounds  No murmur heard  Pulmonary/Chest: Breath sounds normal  No respiratory distress  She has no wheezes  She has no rales  Abdominal: Soft  Bowel sounds are normal  She exhibits no distension  There is no tenderness  There is no rebound  Musculoskeletal: Normal range of motion  She exhibits no edema or deformity     Lymphadenopathy:     She has no cervical adenopathy  Neurological: She is alert and oriented to person, place, and time  Skin: Skin is warm and dry  No rash noted  No erythema  No pallor  Psychiatric: She has a normal mood and affect  Her behavior is normal  Judgment and thought content normal        The history was obtained from the review of the chart, patient  Lab Results:   Lab Results   Component Value Date/Time    TSH 3RD GENERATON 2 660 04/06/2018 08:23 AM    TSH 3RD GENERATON 24 800 (H) 02/14/2018 11:07 AM    TSH 3RD GENERATON 38 700 (H) 12/15/2017 10:05 AM    Free T4 0 84 02/14/2018 11:07 AM    Free T4 0 71 (L) 12/15/2017 10:05 AM    Free T4 0 89 10/25/2017 09:39 AM     Imaging Studies:     Study Result     THYROID ULTRASOUND     INDICATION: Hypothyroidism     COMPARISON: 4/1/2014     TECHNIQUE:   Ultrasound of the thyroid was performed with a high frequency linear transducer in transverse and sagittal planes including volumetric imaging sweeps as well as traditional still imaging technique      FINDINGS:  Very heterogeneous echotexture bilaterally      Right gland:  4 2 x 1 4 x 1 6 cm  No dominant nodules           Left gland:  3 4 x 1 2 x 1 6 cm  No dominant nodules           Isthmus: 0 1 cm in AP dimension  No dominant nodules           IMPRESSION:     Very heterogeneous thyroid without discrete nodules              I have personally reviewed pertinent reports  Portions of the record may have been created with voice recognition software  Occasional wrong word or "sound a like" substitutions may have occurred due to the inherent limitations of voice recognition software  Read the chart carefully and recognize, using context, where substitutions have occurred

## 2018-04-23 NOTE — LETTER
April 23, 2018     [place]  [address] [facility] [address] [place] [address]    Patient: Gideon Brooks   YOB: 1965   Date of Visit: 4/23/2018       Dear Dr Norman Strauss:    Thank you for referring Junaid Nelson to me for evaluation  Below are my notes for this consultation  If you have questions, please do not hesitate to call me  I look forward to following your patient along with you  Sincerely,        Venessa Wu MD        CC: No Recipients  Venessa Wu MD  4/23/2018  1:50 PM  Sign at close encounter   Gideon Brooks 48 y o  female MRN: [id number]    Encounter: [id number]      Assessment/Plan   Problem List Items Addressed This Visit     Hypothyroidism - Primary    Relevant Orders    T4, free Lab Collect    TSH, 3rd generation Lab Collect    Thyroid Antibodies Panel Lab Collect    Obesity (BMI 30-39  9)    Relevant Orders    Ambulatory referral to Nutrition Services        CC:   ***    History of Present Illness      HPI:  History was hypothyroidism for the past 20 years   Currently on LT4 -150 mcg daily for the past couple of months - takes regularly and properly  Doesn't take any iron , calcium , MVI  C/o fatigue - improved   Weight gain -20 lbs in the past 2 years ,  C/o constipation   Sleep disturbances   menstrual cycles irregular - for the past few months   Cols intolernace       Review of Systems   Constitutional: Negative for fatigue and unexpected weight change  Respiratory: Positive for cough  Negative for shortness of breath  Cardiovascular: Negative for chest pain, palpitations and leg swelling  Gastrointestinal: Positive for constipation  Negative for abdominal pain, diarrhea and nausea  Endocrine: Positive for polyuria  Negative for polydipsia  Musculoskeletal: Positive for arthralgias and myalgias  Skin: Negative for color change, pallor, rash and wound  Psychiatric/Behavioral: Positive for sleep disturbance  All other systems reviewed and are negative  Historical Information   Past Medical History:   Diagnosis Date    Achalasia     Asthma     Constipation     Depression     Fibromyalgia     GERD (gastroesophageal reflux disease)     Hypothyroidism     Migraines      Past Surgical History:   Procedure Laterality Date     SECTION      NASAL SINUS SURGERY      OTHER SURGICAL HISTORY      achalasia repair    PILONIDAL CYST / SINUS EXCISION      AR CYSTOURETHROSCOPY N/A 2017    Procedure: CYSTOSCOPY;  Surgeon: Calvin Zazueta MD;  [facility];  Service: UroGynecology           AR ESOPHAGOGASTRODUODENOSCOPY TRANSORAL DIAGNOSTIC N/A 2017    Procedure: ESOPHAGOGASTRODUODENOSCOPY (EGD); Surgeon: Elsa Mitchell MD;  [facility];   Service: Gastroenterology    AR POST COLPORRHAPHY,RECTUM/VAGINA N/A 2017    Procedure: COLPORRHAPHY POSTERIOR;  Surgeon: Calvin Zazueta MD;  [facility];  Service: UroGynecology           AR SLING OPER STRES INCONTINENCE N/A 2017    Procedure: Dao Limon PUBOVAGINAL SLING /SINGLE INCISION ;  Surgeon: Calvin Zazueta MD;  [facility];  Service: UroGynecology           TONSILLECTOMY      TUBAL LIGATION       Social History   History   Alcohol Use No     History   Drug Use No     History   Smoking Status    Former Smoker    Packs/day: 1 50    Years: 30 00   Smokeless Tobacco    Never Used     Comment: 2016     Family History:   Family History   Problem Relation Age of Onset    Cancer Mother     Dementia Father     Stroke Father     Diabetes Father     Cancer Maternal Grandmother     Cancer Maternal Grandfather     Hypertension Maternal Grandfather     Stroke Paternal Grandmother     Thyroid disease unspecified Brother     Thyroid disease unspecified Maternal Aunt        Meds/Allergies   Current Outpatient Prescriptions   Medication Sig Dispense Refill    acetaminophen (TYLENOL) 325 mg tablet Every 4 hours as needed 30 tablet 0    albuterol (2 5 mg/3 mL) 0 083 % nebulizer solution Take 3 mL (2 5 mg total) by nebulization every 4 (four) hours as needed for wheezing or shortness of breath 75 mL 3    albuterol (VENTOLIN HFA) 90 mcg/act inhaler Inhale 2 puffs every 4 (four) hours as needed for wheezing or shortness of breath 1 Inhaler 3    ARIPiprazole (ABILIFY) 5 mg tablet Take 5 mg by mouth daily at bedtime  1    clonazePAM (KlonoPIN) 0 5 mg tablet Take 0 5 mg by mouth      clotrimazole-betamethasone (LOTRISONE) 1-0 05 % cream Apply topically 2 (two) times a day as needed (skin rash) 30 g 2    dicyclomine (BENTYL) 20 mg tablet TAKE 1 TABLET BY MOUTH EVERY 6 HOURS AS NEEDED 120 tablet 2    docusate sodium (COLACE) 100 mg capsule TAKE 1 CAPSULE TWICE DAILY AS NEEDED   doxepin (SINEquan) 25 mg capsule TAKE 1-2 CAPSULES BY MOUTH DAILY AT BEDTIME   0    fluticasone (FLONASE) 50 mcg/act nasal spray 1-2 sprays into each nostril daily 16 g 0    gabapentin (NEURONTIN) 100 mg capsule       gabapentin (NEURONTIN) 300 mg capsule Take 1 capsule (300 mg total) by mouth daily at bedtime 90 capsule 3    hydrOXYzine pamoate (VISTARIL) 50 mg capsule Take 50 mg by mouth      levothyroxine 150 mcg tablet Take 1 tablet (150 mcg total) by mouth daily 90 tablet 3    loratadine (CLARITIN) 10 mg tablet Take 1 tablet (10 mg total) by mouth daily 90 tablet 0    meloxicam (MOBIC) 15 mg tablet Take 1 tablet (15 mg total) by mouth daily 30 tablet 6    pantoprazole (PROTONIX) 40 mg tablet Take 1 tablet by mouth daily      polyethylene glycol (GLYCOLAX) powder Take 17 g by mouth as needed Mix 1 capful in 8 ounces of water, juice or tea  11    Polyethylene Glycol 1000 POWD MIX 1 CAPFUL (17GM) IN 8 OUNCES OF WATER, JUICE, OR TEA AND DRINK DAILY        Polyethylene Glycol 3350-GRX POWD Take by mouth daily      prochlorperazine (COMPAZINE) 10 mg tablet TAKE 1 TABLET TWICE A DAY AS NEEDED FOR HEADACHE OR NAUSEA, LIMIT 3 DAYS PER WEEK 0    promethazine-codeine (PHENERGAN WITH CODEINE) 6 25-10 mg/5 mL syrup Take 2 5 mL by mouth every 6 (six) hours      sucralfate (CARAFATE) 1 g tablet Take 1 tablet by mouth as needed        terbinafine (LamISIL) 250 mg tablet Take 1 tablet (250 mg total) by mouth daily for 90 days 30 tablet 2    VIIBRYD 40 MG tablet Take 40 mg by mouth daily With food  0    vilazodone (VIIBRYD) 40 mg tablet Take 40 mg by mouth      ARIPiprazole (ABILIFY) 5 mg tablet Take 5 mg by mouth      Botulinum Toxin Type A 200 units SOLR Inject 155 units into face and neck IM every 90 days      BREO SHANI        No current facility-administered medications for this visit  Allergies   Allergen Reactions    Gabapentin      [facility] - [id number]: Neuropsych effects per patient   Latex Dermatitis    Penicillins Hives       Objective   Vitals: Blood pressure 108/70, pulse 72, height 5' 8" (1 727 m), weight 92 2 kg (203 lb 3 2 oz), unknown if currently breastfeeding  Physical Exam    The history was obtained from the review of the chart, {HISTORY OBTAINED FROM:5800855397}  Lab Results:   Lab Results   Component Value Date/Time    TSH 3RD GENERATON 2 660 04/06/2018 08:23 AM    TSH 3RD GENERATON 24 800 (H) 02/14/2018 11:07 AM    TSH 3RD GENERATON 38 700 (H) 12/15/2017 10:05 AM    Free T4 0 84 02/14/2018 11:07 AM    Free T4 0 71 (L) 12/15/2017 10:05 AM    Free T4 0 89 10/25/2017 09:39 AM     Imaging Studies:          {Results Review Statement:96112}    Portions of the record may have been created with voice recognition software  Occasional wrong word or "sound a like" substitutions may have occurred due to the inherent limitations of voice recognition software  Read the chart carefully and recognize, using context, where substitutions have occurred

## 2018-04-23 NOTE — LETTER
April 23, 2018     Moises Maher DO  [address] [facility] [address] [place] [address]    Patient: Cammie Record   YOB: 1965   Date of Visit: 4/23/2018       Dear Dr Moreno Lower:    Thank you for referring Dilia Pace to me for evaluation  Below are my notes for this consultation  If you have questions, please do not hesitate to call me  I look forward to following your patient along with you  Sincerely,        Devika Harris MD        CC: No Recipients  Devika Harris MD  4/23/2018  1:57 PM  Sign at close encounter   Cammie Record 48 y o  female MRN: [id number]    Encounter: [id number]      Assessment/Plan   Problem List Items Addressed This Visit     Hypothyroidism - Primary     TSH has improved-counseled on the importance of taking levothyroxine regularly and properly on an empty stomach just with water in at least 1 hour before breakfast   Continue current dose of levothyroxine and repeat thyroid function test in the next 6-8 weeks  If TSH stable will refer back to primary care         Relevant Orders    T4, free Lab Collect    TSH, 3rd generation Lab Collect    Thyroid Antibodies Panel Lab Collect    Obesity (BMI 30-39  9)     Patient counseled on metabolic complications of obesity, counseled on dietary and lifestyle modifications and weight loss  Referred for nutrition evaluation  Relevant Orders    Ambulatory referral to Nutrition Services        CC:   Hypothyroidism  History of Present Illness      HPI:  65-year-old woman referred here for evaluation of hypothyroidism,  History was hypothyroidism for the past 20 years , TSH was high in the past 6 months, dose of levothyroxine was increased from 125-150 mcg daily  It appears that she was taking levothyroxine along with sucralfate and other medications in the morning  Currently on LT4 -150 mcg daily for the past couple of months - takes regularly and properly    Doesn't take any iron , calcium , MVI  C/o fatigue - improved   Weight gain -20 lbs in the past 2 years ,  C/o constipation   Sleep disturbances   menstrual cycles irregular - for the past few months   Cols intolernace       Review of Systems   Constitutional: Negative for fatigue and unexpected weight change  Respiratory: Positive for cough  Negative for shortness of breath  Cardiovascular: Negative for chest pain, palpitations and leg swelling  Gastrointestinal: Positive for constipation  Negative for abdominal pain, diarrhea and nausea  Endocrine: Positive for polyuria  Negative for polydipsia  Musculoskeletal: Positive for arthralgias and myalgias  Skin: Negative for color change, pallor, rash and wound  Psychiatric/Behavioral: Positive for sleep disturbance  All other systems reviewed and are negative  Historical Information   Past Medical History:   Diagnosis Date    Achalasia     Asthma     Constipation     Depression     Fibromyalgia     GERD (gastroesophageal reflux disease)     Hypothyroidism     Migraines      Past Surgical History:   Procedure Laterality Date     SECTION      NASAL SINUS SURGERY      OTHER SURGICAL HISTORY      achalasia repair    PILONIDAL CYST / SINUS EXCISION      DE CYSTOURETHROSCOPY N/A 2017    Procedure: CYSTOSCOPY;  Surgeon: Adi Diggs MD;  [facility];  Service: UroGynecology           DE ESOPHAGOGASTRODUODENOSCOPY TRANSORAL DIAGNOSTIC N/A 2017    Procedure: ESOPHAGOGASTRODUODENOSCOPY (EGD); Surgeon: Elena Jacob MD;  [facility];   Service: Gastroenterology    DE POST COLPORRHAPHY,RECTUM/VAGINA N/A 2017    Procedure: COLPORRHAPHY POSTERIOR;  Surgeon: Adi Diggs MD;  [facility];  Service: UroGynecology           DE SLING OPER STRES INCONTINENCE N/A 2017    Procedure: INSERTION PUBOVAGINAL SLING /SINGLE INCISION ;  Surgeon: Adi Diggs MD;  [facility];  Service: UroGynecology           TONSILLECTOMY      TUBAL LIGATION       Social History   History   Alcohol Use No     History   Drug Use No     History   Smoking Status    Former Smoker    Packs/day: 1 50    Years: 30 00   Smokeless Tobacco    Never Used     Comment: 2016     Family History:   Family History   Problem Relation Age of Onset    Cancer Mother     Dementia Father     Stroke Father     Diabetes Father     Cancer Maternal Grandmother     Cancer Maternal Grandfather     Hypertension Maternal Grandfather     Stroke Paternal Grandmother     Thyroid disease unspecified Brother     Thyroid disease unspecified Maternal Aunt        Meds/Allergies   Current Outpatient Prescriptions   Medication Sig Dispense Refill    acetaminophen (TYLENOL) 325 mg tablet Every 4 hours as needed 30 tablet 0    albuterol (2 5 mg/3 mL) 0 083 % nebulizer solution Take 3 mL (2 5 mg total) by nebulization every 4 (four) hours as needed for wheezing or shortness of breath 75 mL 3    albuterol (VENTOLIN HFA) 90 mcg/act inhaler Inhale 2 puffs every 4 (four) hours as needed for wheezing or shortness of breath 1 Inhaler 3    ARIPiprazole (ABILIFY) 5 mg tablet Take 5 mg by mouth daily at bedtime  1    clonazePAM (KlonoPIN) 0 5 mg tablet Take 0 5 mg by mouth      clotrimazole-betamethasone (LOTRISONE) 1-0 05 % cream Apply topically 2 (two) times a day as needed (skin rash) 30 g 2    dicyclomine (BENTYL) 20 mg tablet TAKE 1 TABLET BY MOUTH EVERY 6 HOURS AS NEEDED 120 tablet 2    docusate sodium (COLACE) 100 mg capsule TAKE 1 CAPSULE TWICE DAILY AS NEEDED        doxepin (SINEquan) 25 mg capsule TAKE 1-2 CAPSULES BY MOUTH DAILY AT BEDTIME   0    fluticasone (FLONASE) 50 mcg/act nasal spray 1-2 sprays into each nostril daily 16 g 0    gabapentin (NEURONTIN) 100 mg capsule       gabapentin (NEURONTIN) 300 mg capsule Take 1 capsule (300 mg total) by mouth daily at bedtime 90 capsule 3    hydrOXYzine pamoate (VISTARIL) 50 mg capsule Take 50 mg by mouth  levothyroxine 150 mcg tablet Take 1 tablet (150 mcg total) by mouth daily 90 tablet 3    loratadine (CLARITIN) 10 mg tablet Take 1 tablet (10 mg total) by mouth daily 90 tablet 0    meloxicam (MOBIC) 15 mg tablet Take 1 tablet (15 mg total) by mouth daily 30 tablet 6    pantoprazole (PROTONIX) 40 mg tablet Take 1 tablet by mouth daily      polyethylene glycol (GLYCOLAX) powder Take 17 g by mouth as needed Mix 1 capful in 8 ounces of water, juice or tea  11    Polyethylene Glycol 1000 POWD MIX 1 CAPFUL (17GM) IN 8 OUNCES OF WATER, JUICE, OR TEA AND DRINK DAILY   Polyethylene Glycol 3350-GRX POWD Take by mouth daily      prochlorperazine (COMPAZINE) 10 mg tablet TAKE 1 TABLET TWICE A DAY AS NEEDED FOR HEADACHE OR NAUSEA, LIMIT 3 DAYS PER WEEK  0    promethazine-codeine (PHENERGAN WITH CODEINE) 6 25-10 mg/5 mL syrup Take 2 5 mL by mouth every 6 (six) hours      sucralfate (CARAFATE) 1 g tablet Take 1 tablet by mouth as needed        terbinafine (LamISIL) 250 mg tablet Take 1 tablet (250 mg total) by mouth daily for 90 days 30 tablet 2    VIIBRYD 40 MG tablet Take 40 mg by mouth daily With food  0    vilazodone (VIIBRYD) 40 mg tablet Take 40 mg by mouth      ARIPiprazole (ABILIFY) 5 mg tablet Take 5 mg by mouth      Botulinum Toxin Type A 200 units SOLR Inject 155 units into face and neck IM every 90 days      ELIOT MCLEOD        No current facility-administered medications for this visit  Allergies   Allergen Reactions    Gabapentin      [facility] - [id number]: Neuropsych effects per patient   Latex Dermatitis    Penicillins Hives       Objective   Vitals: Blood pressure 108/70, pulse 72, height 5' 8" (1 727 m), weight 92 2 kg (203 lb 3 2 oz), unknown if currently breastfeeding  Physical Exam   Constitutional: She is oriented to person, place, and time  She appears well-developed and well-nourished  No distress  HENT:   Head: Normocephalic and atraumatic     Mouth/Throat: No oropharyngeal exudate  Eyes: Conjunctivae and EOM are normal  No scleral icterus  Neck: Normal range of motion  Neck supple  No thyromegaly present  Cardiovascular: Normal rate, regular rhythm and normal heart sounds  No murmur heard  Pulmonary/Chest: Breath sounds normal  No respiratory distress  She has no wheezes  She has no rales  Abdominal: Soft  Bowel sounds are normal  She exhibits no distension  There is no tenderness  There is no rebound  Musculoskeletal: Normal range of motion  She exhibits no edema or deformity  Lymphadenopathy:     She has no cervical adenopathy  Neurological: She is alert and oriented to person, place, and time  Skin: Skin is warm and dry  No rash noted  No erythema  No pallor  Psychiatric: She has a normal mood and affect  Her behavior is normal  Judgment and thought content normal        The history was obtained from the review of the chart, patient  Lab Results:   Lab Results   Component Value Date/Time    TSH 3RD GENERATON 2 660 04/06/2018 08:23 AM    TSH 3RD GENERATON 24 800 (H) 02/14/2018 11:07 AM    TSH 3RD GENERATON 38 700 (H) 12/15/2017 10:05 AM    Free T4 0 84 02/14/2018 11:07 AM    Free T4 0 71 (L) 12/15/2017 10:05 AM    Free T4 0 89 10/25/2017 09:39 AM     Imaging Studies:     Study Result     THYROID ULTRASOUND     INDICATION: Hypothyroidism     COMPARISON: 4/1/2014     TECHNIQUE:   Ultrasound of the thyroid was performed with a high frequency linear transducer in transverse and sagittal planes including volumetric imaging sweeps as well as traditional still imaging technique      FINDINGS:  Very heterogeneous echotexture bilaterally      Right gland:  4 2 x 1 4 x 1 6 cm  No dominant nodules           Left gland:  3 4 x 1 2 x 1 6 cm  No dominant nodules           Isthmus: 0 1 cm in AP dimension    No dominant nodules           IMPRESSION:     Very heterogeneous thyroid without discrete nodules              I have personally reviewed pertinent reports  Portions of the record may have been created with voice recognition software  Occasional wrong word or "sound a like" substitutions may have occurred due to the inherent limitations of voice recognition software  Read the chart carefully and recognize, using context, where substitutions have occurred

## 2018-04-23 NOTE — ASSESSMENT & PLAN NOTE
Patient counseled on metabolic complications of obesity, counseled on dietary and lifestyle modifications and weight loss  Referred for nutrition evaluation

## 2018-04-23 NOTE — ASSESSMENT & PLAN NOTE
TSH has improved-counseled on the importance of taking levothyroxine regularly and properly on an empty stomach just with water in at least 1 hour before breakfast   Continue current dose of levothyroxine and repeat thyroid function test in the next 6-8 weeks    If TSH stable will refer back to primary care

## 2018-04-26 LAB — PREGNANCY TEST URINE (HISTORICAL): NEGATIVE

## 2018-05-10 ENCOUNTER — TRANSCRIBE ORDERS (OUTPATIENT)
Dept: NEUROSURGERY | Facility: CLINIC | Age: 53
End: 2018-05-10

## 2018-05-10 DIAGNOSIS — M54.5 LOW BACK PAIN, UNSPECIFIED BACK PAIN LATERALITY, UNSPECIFIED CHRONICITY, WITH SCIATICA PRESENCE UNSPECIFIED: Primary | ICD-10-CM

## 2018-05-16 ENCOUNTER — CLINICAL SUPPORT (OUTPATIENT)
Dept: NUTRITION | Facility: HOSPITAL | Age: 53
End: 2018-05-16
Attending: INTERNAL MEDICINE
Payer: COMMERCIAL

## 2018-05-16 VITALS — BODY MASS INDEX: 33.23 KG/M2 | HEIGHT: 66 IN | WEIGHT: 206.8 LBS

## 2018-05-16 DIAGNOSIS — E66.9 OBESITY (BMI 30-39.9): ICD-10-CM

## 2018-05-16 PROCEDURE — S9470 NUTRITIONAL COUNSELING, DIET: HCPCS

## 2018-05-16 NOTE — PROGRESS NOTES
Initial Nutrition Assessment Form    Patient Name: Ruth Geiger    YOB: 1965    Sex: Female     Assessment Date: 5/16/2018  Start Time: 1:15pm Stop Time: 2:15pm Total Minutes: 60min     Data:  Present at session: Self and Spouse Russel    Patient Concerns: " I am eating too much, I want to fit into a dress size 10"   Medical Dx/Reason for Referral: [id number]    Past Medical History:   Diagnosis Date    Achalasia     Asthma     Constipation     Depression     Fibromyalgia     GERD (gastroesophageal reflux disease)     Hypothyroidism     Migraines        Current Outpatient Prescriptions   Medication Sig Dispense Refill    acetaminophen (TYLENOL) 325 mg tablet Every 4 hours as needed 30 tablet 0    albuterol (2 5 mg/3 mL) 0 083 % nebulizer solution Take 3 mL (2 5 mg total) by nebulization every 4 (four) hours as needed for wheezing or shortness of breath 75 mL 3    albuterol (VENTOLIN HFA) 90 mcg/act inhaler Inhale 2 puffs every 4 (four) hours as needed for wheezing or shortness of breath 1 Inhaler 3    ARIPiprazole (ABILIFY) 5 mg tablet Take 5 mg by mouth daily at bedtime  1    ARIPiprazole (ABILIFY) 5 mg tablet Take 5 mg by mouth      Botulinum Toxin Type A 200 units SOLR Inject 155 units into face and neck IM every 90 days      BREO ELLIPTA       clonazePAM (KlonoPIN) 0 5 mg tablet Take 0 5 mg by mouth      clotrimazole-betamethasone (LOTRISONE) 1-0 05 % cream Apply topically 2 (two) times a day as needed (skin rash) 30 g 2    dicyclomine (BENTYL) 20 mg tablet TAKE 1 TABLET BY MOUTH EVERY 6 HOURS AS NEEDED 120 tablet 2    docusate sodium (COLACE) 100 mg capsule TAKE 1 CAPSULE TWICE DAILY AS NEEDED        doxepin (SINEquan) 25 mg capsule TAKE 1-2 CAPSULES BY MOUTH DAILY AT BEDTIME   0    fluticasone (FLONASE) 50 mcg/act nasal spray 1-2 sprays into each nostril daily 16 g 0    gabapentin (NEURONTIN) 100 mg capsule       gabapentin (NEURONTIN) 300 mg capsule Take 1 capsule (300 mg total) by mouth daily at bedtime 90 capsule 3    hydrOXYzine pamoate (VISTARIL) 50 mg capsule Take 50 mg by mouth      levothyroxine 150 mcg tablet Take 1 tablet (150 mcg total) by mouth daily 90 tablet 3    loratadine (CLARITIN) 10 mg tablet Take 1 tablet (10 mg total) by mouth daily 90 tablet 0    meloxicam (MOBIC) 15 mg tablet Take 1 tablet (15 mg total) by mouth daily 30 tablet 6    pantoprazole (PROTONIX) 40 mg tablet Take 1 tablet by mouth daily      polyethylene glycol (GLYCOLAX) powder Take 17 g by mouth as needed Mix 1 capful in 8 ounces of water, juice or tea  11    Polyethylene Glycol 1000 POWD MIX 1 CAPFUL (17GM) IN 8 OUNCES OF WATER, JUICE, OR TEA AND DRINK DAILY   Polyethylene Glycol 3350-GRX POWD Take by mouth daily      prochlorperazine (COMPAZINE) 10 mg tablet TAKE 1 TABLET TWICE A DAY AS NEEDED FOR HEADACHE OR NAUSEA, LIMIT 3 DAYS PER WEEK  0    promethazine-codeine (PHENERGAN WITH CODEINE) 6 25-10 mg/5 mL syrup Take 2 5 mL by mouth every 6 (six) hours      sucralfate (CARAFATE) 1 g tablet Take 1 tablet by mouth as needed        terbinafine (LamISIL) 250 mg tablet Take 1 tablet (250 mg total) by mouth daily for 90 days 30 tablet 2    VIIBRYD 40 MG tablet Take 40 mg by mouth daily With food  0    vilazodone (VIIBRYD) 40 mg tablet Take 40 mg by mouth       No current facility-administered medications for this visit  Additional Meds/Supplements: Vit B 12   Special Learning Needs: none   Height: HC Readings from Last 3 Encounters:   No data found for [facility]       Weight: Wt Readings from Last 3 Encounters:   04/23/18 92 2 kg (203 lb 3 2 oz)   04/17/18 91 kg (200 lb 9 9 oz)   04/12/18 90 5 kg (199 lb 9 6 oz)     Estimated body mass index is 33 13 kg/m² as calculated from the following:    Height as of this encounter: 5' 6 25" (1 683 m)  Weight as of this encounter: 93 8 kg (206 lb 12 8 oz)       Recent Weight Change: []Yes     []No  Amount: Lowest adult weight 150 lb in 2003 Energy Needs: Mirela Pulido Equation:  1469 kcal x 1 60=0121 kcal -500 kcal =1500kcal   Allergies   Allergen Reactions    Gabapentin      Annotation - [id number]: Neuropsych effects per patient   Latex Dermatitis    Penicillins Hives       History   Alcohol Use No       History   Smoking Status    Former Smoker    Packs/day: 1 50    Years: 30 00   Smokeless Tobacco    Never Used     Comment: 2016       Who shops? patient/spouse   Who cooks? patient/spouse   Exercise:  nothing sctrucrured    Prior Counseling? []Yes     [x]No  When: N/A   Why: N/A        Diet Hx:  Breakfast:9:00am  Bagel with butter  Grapefruit Juice   Lunch:skip/2:00pm  Skips or [facility] Utilities: no structured time maybe 5:00 or 7:00pm  [place] ham and cheese  Rice/Beans  Water or Juice or Lemonade       Snacks:admits to large snacking amounts especially at night Cheese, crackers, yogurt,ice cream        Nutrition Diagnosis:   Overweight/obesity  related to Excess energy intake as evidenced by  BMI more than normative standard for age and sex (obesity-grade I 26-30  9)       Medical Nutrition Therapy Intervention:  [x]Individualized Meal Plan 1500 kcal, 75 gram Protein []Understanding Lab Values   [x]Basic Pathophysiology of Disease []Food/Medication Interactions   [x]Food Diary myfitnesspal []Exercise   [x]Lifestyle/Behavior Modification Techniques []Medication, Mechanism of Action   [x]Label Reading serv/fiber/protein []Self Blood Glucose Monitoring   [x]Weight/BMI Goals 1st goal -10% (186 lb ) then BMI 25 [x]Other - provided my plate in [place] and Indonesian and AND 1500 kcal Nutrition Therapy   Other Notes:        Comprehension: []Excellent  []Very Good  [x]Good  []Fair   []Poor    Receptivity: []Excellent  []Very Good  [x]Good  []Fair   []Poor    Expected Compliance: []Excellent  []Very Good  [x]Good  []Fair   []Poor        Goals:  1 1-2 lb weight loss/week until BMI 25 acheived   2  Food Journal with myfitnesspal compliance with nutrition plan as discussed   3   Report reading food labels and consuming 2 serv fruit and 4-6 Cups Vegetables daily       Follow Up June 20,2018  Labs:  [facility]  Lab Results   Component Value Date     04/06/2018    K 4 1 04/06/2018     (H) 04/06/2018    CO2 26 04/06/2018    ANIONGAP 7 04/06/2018    BUN 15 04/06/2018    CREATININE 0 72 04/06/2018    GLUCOSE 86 07/21/2016    GLUF 99 04/06/2018    CALCIUM 8 6 04/06/2018    AST 13 04/06/2018    ALT 23 04/06/2018    ALKPHOS 83 04/06/2018    PROT 7 3 04/06/2018    BILITOT 0 30 04/06/2018    EGFR 97 04/06/2018       BMP  Lab Results   Component Value Date    GLUCOSE 86 07/21/2016    CALCIUM 8 6 04/06/2018     04/06/2018    K 4 1 04/06/2018    CO2 26 04/06/2018     (H) 04/06/2018    BUN 15 04/06/2018    CREATININE 0 72 04/06/2018       Lipids  Lab Results   Component Value Date    CHOL 181 04/06/2018    CHOL 194 02/14/2018    CHOL 173 04/26/2017     Lab Results   Component Value Date    HDL 51 04/06/2018    HDL 50 02/14/2018    HDL 39 (L) 04/26/2017     Lab Results   Component Value Date    LDLCALC 116 (H) 04/06/2018    LDLCALC 131 (H) 02/14/2018    LDLCALC 121 (H) 04/26/2017     Lab Results   Component Value Date    TRIG 72 04/06/2018    TRIG 67 02/14/2018    TRIG 63 04/26/2017     No components found for: CHOLHDL    Hemoglobin A1C  Lab Results   Component Value Date    HGBA1C 5 9 02/14/2018       Fasting Glucose  Lab Results   Component Value Date    GLUF 99 04/06/2018       Insulin     Thyroid  No results found for: TSH, C8GYGUX, T9ZSQFM, THYROIDAB    Hepatic Function Panel  Lab Results   Component Value Date    ALT 23 04/06/2018    AST 13 04/06/2018    ALKPHOS 83 04/06/2018    BILITOT 0 30 04/06/2018       Celiac Disease Antibody Panel  Endomysial IgA   Date Value Ref Range Status   03/01/2018 Negative Negative Final     Gliadin IgA   Date Value Ref Range Status   03/01/2018 4 0 - 19 units Final     Comment:                        Negative 0 - 19                     Weak Positive             20 - 30                     Moderate to Strong Positive   >30     Gliadin IgG   Date Value Ref Range Status   03/01/2018 4 0 - 19 units Final     Comment:                        Negative                   0 - 19                     Weak Positive             20 - 30                     Moderate to Strong Positive   >30     IgA   Date Value Ref Range Status   03/01/2018 154 87 - 352 mg/dL Final     Tissue Transglut Ab IGG   Date Value Ref Range Status   03/01/2018 3 0 - 5 U/mL Final     Comment:                                   Negative        0 - 5                                Weak Positive   6 - 9                                Positive           >9     TISSUE TRANSGLUTAMINASE IGA   Date Value Ref Range Status   03/01/2018 <2 0 - 3 U/mL Final     Comment:                                   Negative        0 -  3                                Weak Positive   4 - 10                                Positive           >10   Tissue Transglutaminase (tTG) has been identified   as the endomysial antigen  Studies have demonstr-   ated that endomysial IgA antibodies have over 99%   specificity for gluten sensitive enteropathy        Iron  No results found for: IRON, TIBC, FERRITIN    Vitamins  No results found for: VITAMIN B2   No results found for: NICOTINAMIDE, NICOTINIC ACID   No results found for: VITAMINB6  No results found for: KYTJITWV23  No results found for: VITB5  No results found for: X0UZWFCP  No results found for: THYROGLB  No results found for: VITAMIN K   No results found for: 25-HYDROXY VIT D   No results found for: [address]  [address] [place] [id number]

## 2018-05-24 ENCOUNTER — LAB (OUTPATIENT)
Dept: LAB | Facility: HOSPITAL | Age: 53
End: 2018-05-24
Payer: COMMERCIAL

## 2018-05-24 ENCOUNTER — TRANSCRIBE ORDERS (OUTPATIENT)
Dept: RADIOLOGY | Facility: HOSPITAL | Age: 53
End: 2018-05-24

## 2018-05-24 ENCOUNTER — TRANSCRIBE ORDERS (OUTPATIENT)
Dept: LAB | Facility: HOSPITAL | Age: 53
End: 2018-05-24

## 2018-05-24 ENCOUNTER — HOSPITAL ENCOUNTER (OUTPATIENT)
Dept: RADIOLOGY | Facility: HOSPITAL | Age: 53
Discharge: HOME | End: 2018-05-24
Payer: COMMERCIAL

## 2018-05-24 ENCOUNTER — OFFICE VISIT (OUTPATIENT)
Dept: NEUROSURGERY | Facility: CLINIC | Age: 53
End: 2018-05-24
Payer: COMMERCIAL

## 2018-05-24 VITALS
BODY MASS INDEX: 33.56 KG/M2 | RESPIRATION RATE: 16 BRPM | TEMPERATURE: 98 F | WEIGHT: 208.8 LBS | DIASTOLIC BLOOD PRESSURE: 59 MMHG | SYSTOLIC BLOOD PRESSURE: 121 MMHG | HEART RATE: 81 BPM | HEIGHT: 66 IN

## 2018-05-24 DIAGNOSIS — M54.5 LOW BACK PAIN, UNSPECIFIED BACK PAIN LATERALITY, UNSPECIFIED CHRONICITY, WITH SCIATICA PRESENCE UNSPECIFIED: ICD-10-CM

## 2018-05-24 DIAGNOSIS — G89.4 CHRONIC PAIN SYNDROME: ICD-10-CM

## 2018-05-24 DIAGNOSIS — B35.1 ONYCHOMYCOSIS: ICD-10-CM

## 2018-05-24 DIAGNOSIS — M79.651 RIGHT THIGH PAIN: ICD-10-CM

## 2018-05-24 DIAGNOSIS — M54.31 SCIATICA, RIGHT SIDE: Primary | ICD-10-CM

## 2018-05-24 DIAGNOSIS — E03.9 HYPOTHYROIDISM, UNSPECIFIED TYPE: ICD-10-CM

## 2018-05-24 DIAGNOSIS — E03.9 HYPOTHYROIDISM: ICD-10-CM

## 2018-05-24 DIAGNOSIS — M54.31 SCIATICA, RIGHT SIDE: ICD-10-CM

## 2018-05-24 DIAGNOSIS — M54.16 LUMBAR RADICULOPATHY: ICD-10-CM

## 2018-05-24 DIAGNOSIS — E03.9 ACQUIRED HYPOTHYROIDISM: Primary | ICD-10-CM

## 2018-05-24 DIAGNOSIS — M70.61 TROCHANTERIC BURSITIS, RIGHT HIP: ICD-10-CM

## 2018-05-24 LAB
ALBUMIN SERPL BCP-MCNC: 3.3 G/DL (ref 3.5–5)
ALP SERPL-CCNC: 81 U/L (ref 46–116)
ALT SERPL W P-5'-P-CCNC: 46 U/L (ref 12–78)
AST SERPL W P-5'-P-CCNC: 32 U/L (ref 5–45)
BILIRUB DIRECT SERPL-MCNC: 0.1 MG/DL (ref 0–0.2)
BILIRUB SERPL-MCNC: 0.27 MG/DL (ref 0.2–1)
PROT SERPL-MCNC: 6.8 G/DL (ref 6.4–8.2)
T4 FREE SERPL-MCNC: 0.96 NG/DL (ref 0.76–1.46)
TSH SERPL DL<=0.05 MIU/L-ACNC: 0.18 UIU/ML (ref 0.36–3.74)

## 2018-05-24 PROCEDURE — 72110 X-RAY EXAM L-2 SPINE 4/>VWS: CPT

## 2018-05-24 PROCEDURE — 80076 HEPATIC FUNCTION PANEL: CPT

## 2018-05-24 PROCEDURE — 86800 THYROGLOBULIN ANTIBODY: CPT

## 2018-05-24 PROCEDURE — 99203 OFFICE O/P NEW LOW 30 MIN: CPT | Performed by: PHYSICIAN ASSISTANT

## 2018-05-24 PROCEDURE — 84439 ASSAY OF FREE THYROXINE: CPT

## 2018-05-24 PROCEDURE — 83516 IMMUNOASSAY NONANTIBODY: CPT | Performed by: PHYSICIAN ASSISTANT

## 2018-05-24 PROCEDURE — 86376 MICROSOMAL ANTIBODY EACH: CPT

## 2018-05-24 PROCEDURE — 36415 COLL VENOUS BLD VENIPUNCTURE: CPT

## 2018-05-24 PROCEDURE — 84443 ASSAY THYROID STIM HORMONE: CPT

## 2018-05-24 PROCEDURE — 82784 ASSAY IGA/IGD/IGG/IGM EACH: CPT | Performed by: PHYSICIAN ASSISTANT

## 2018-05-24 PROCEDURE — 86255 FLUORESCENT ANTIBODY SCREEN: CPT | Performed by: PHYSICIAN ASSISTANT

## 2018-05-24 NOTE — PROGRESS NOTES
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  She has had MRI of the lumbar spine 3/22/18, the study was carefully reviewed in detail there is a mild amount of foraminal stenosis noted at right L3-L4, and slightly less at L4-L5, there is no central stenosis and/or other foraminal stenosis  The studies were also reviewed with the patient and her     On clinical examination she is exquisitely tender over the right trochanteric bursitis, as well as in the right sciatic notch tenderness  Motor examination of the lower extremities is 5 x 5, sensation is intact throughout, deep tendon reflexes are +2 and symmetric for Patella and Achilles  Straight leg raise is 90° bilaterally with some discomfort on the right at 90°, standing flexion is 80° again with some discomfort on the right  She has a history of physical therapy, August through September of 2017 with no improvement  She has seen pain management Dr Rolan Verde and received transforaminal lumbar injections on the right x2 (November and April), the patient feels as though she had 3rd, but records review only indicates the 2  She makes note that after a these episodes she had no relief  Flexion-extension films of the lumbar spine are requested, pain management consultation with Dr China Lopez is also requested I did discuss spinal cord stimulator trial with the patient for her chronic pain syndrome as this is more than 6 years in duration, in addition I suggested that her right hip issue (trochanteric bursitis) also should be addressed      Further follow-up is planned with Neurosurgery in approximately 7-8 weeks to assess for the effectiveness of these management modalities  These findings, impressions and recommendations are reviewed in great detail with the patient and her , they expressed understanding and agreement, their questions were answered completely and to their satisfaction  Follow up has been scheduled  Diagnoses and all orders for this visit:    Sciatica, right side  -     X-ray lumbar spine flexion and extension only 4+ views; Future  -     Ambulatory referral to Pain Management; Future    Trochanteric bursitis, right hip  -     Ambulatory referral to Pain Management; Future    Low back pain, unspecified back pain laterality, unspecified chronicity, with sciatica presence unspecified  -     X-ray lumbar spine flexion and extension only 4+ views; Future  -     Ambulatory referral to Pain Management; Future    Right thigh pain  -     X-ray lumbar spine flexion and extension only 4+ views; Future  -     Ambulatory referral to Pain Management; Future    Chronic pain syndrome  -     X-ray lumbar spine flexion and extension only 4+ views; Future  -     Ambulatory referral to Pain Management; Future    Lumbar radiculopathy  -     X-ray lumbar spine flexion and extension only 4+ views; Future  -     Ambulatory referral to Pain Management; Future          Return in about 7 weeks (around 7/12/2018)  Subjective:      Patient ID: Rolando Carey is a 48 y o  female  Very pleasant 57-year-old female, accompanied by her , presents for complaint of right low back, right buttocks, right hip, and right leg pain  As noted she reports this had its onset approximately 6 years ago, reporting this was approximately 2012 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  She has had recent MRI of her lumbar spine 3/22/18  She also has history of MRI of the lumbar spine 2/29/12 at which time she was told she had "bulging discs" of the lumbar spine      She has been followed by Dr Xavier Maldonado, Pain Management, and has history of right L3 and L4 lumbar transforaminal epidural steroid injection, 4/26/18, and right L4 and L5 lumbar transforaminal epidural steroid injection 11/16/17  Note she also reports she received a epidural steroid injection in March of 2018 although review of Dr Poornima Tavarez is records does not reveal this  The patient reports with these injections she had no relief   (please see records from Dr Poornima Tavarez in the chart)    She reports she has had a course of physical therapy in [facility], August through September of 2017 again with no improvement, she felt as though it action was exacerbated the symptoms  She reports her pain is present regardless of position, and is aggravated by standing, walking, twisting, bending, or sitting for an extended period of time  She reports the pain also affects her ability to sleep, she is awakened at times by her back pain  She is on Neurontin at this time for pain relief again with very little improved  The following portions of the patient's history were reviewed and updated as appropriate: allergies, current medications, past family history, past medical history, past social history and past surgical history  Review of Systems   HENT: Negative  Eyes: Negative  Respiratory: Negative  Cardiovascular: Negative  Gastrointestinal: Negative  Endocrine: Negative  Genitourinary: Negative  Musculoskeletal: Positive for back pain (constant low back into the right leg all the way down to foot and toes; per patient her back pain has been present since 2012) and gait problem (due to pain)  Neurological: Positive for weakness (right leg at times) and numbness (right leg)  Hematological: Negative  Psychiatric/Behavioral: Negative  All other systems reviewed and are negative  Objective:    Physical Exam   Constitutional: She is oriented to person, place, and time  She appears well-developed and well-nourished  HENT:   Head: Normocephalic and atraumatic  Eyes: Pupils are equal, round, and reactive to light  Cardiovascular: Normal rate, regular rhythm and normal heart sounds  Pulmonary/Chest: Effort normal and breath sounds normal    Musculoskeletal:   Back examination;    Straight leg raise seated 90° bilaterally with discomfort in the sciatic notch at 90° on the right  She is exquisitely tender to palpation over the right trochanteric bursa region  She has tenderness to palpation in the right sciatic notch but not the left  Deep tendon reflexes are intact and symmetric  Strength/power to the lower extremities is 5 x 5  She does have some hypersensitivity over the right thigh medially and laterally  Neurological: She is oriented to person, place, and time  She has a normal Heel to [employer]  Gait normal    Reflex Scores:       Patellar reflexes are 2+ on the right side and 2+ on the left side  Achilles reflexes are 2+ on the right side and 2+ on the left side  Neurologic Exam     Mental Status   Oriented to person, place, and time  Level of consciousness: alert    Cranial Nerves     CN III, IV, VI   Pupils are equal, round, and reactive to light      Motor Exam   Muscle bulk: normal  Overall muscle tone: normal    Strength   Right quadriceps: 5/5  Left quadriceps: 5/5  Right hamstrin/5  Left hamstrin/5  Right glutei: 5/5  Left glutei: 5/5  Right anterior tibial: 5/5  Left anterior tibial: 5/5  Right posterior tibial: 5/5  Left posterior tibial: 5/5    Sensory Exam   Light touch normal      Gait, Coordination, and Reflexes     Gait  Gait: normal    Coordination   Heel to shin coordination: normal    Reflexes   Right patellar: 2+  Left patellar: 2+  Right achilles: 2+  Left achilles: 2+

## 2018-05-24 NOTE — PATIENT INSTRUCTIONS
Consultation with pain management, Dr Rod Barnard as discussed  X-rays of the lumbar spine, flexion-extension views  Further follow-up with Neurosurgery in approximately 7 weeks post pain management consultation to assess for results

## 2018-05-24 NOTE — LETTER
May 24, 2018     Lewis Gillette MD  [address]    Patient: Cathleen Calvo   YOB: 1965   Date of Visit: 5/24/2018       Dear Dr Dana Sanchez:    Thank you for referring Holly Murphy to me for evaluation  Below are my notes for this consultation  If you have questions, please do not hesitate to call me  I look forward to following your patient along with you  Sincerely,        Iva Nuñez PA-C        CC: Nikko Marycruz, DO Iva Nuñez PA-C  5/24/2018  3:43 PM  Sign at close encounter  Assessment/Plan:    Very pleasant 30-year-old female, accompanied by her , presents with complaint of chronic low back pain on the right, right sciatic pain, right hip pain, and leg pain on the right  She reports her symptoms have been persistent since 2012, and makes mention that they had their onset 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  She has had MRI of the lumbar spine 3/22/18, the study was carefully reviewed in detail there is a mild amount of foraminal stenosis noted at right L3-L4, and slightly less at L4-L5, there is no central stenosis and/or other foraminal stenosis  The studies were also reviewed with the patient and her     On clinical examination she is exquisitely tender over the right trochanteric bursitis, as well as in the right sciatic notch tenderness  Motor examination of the lower extremities is 5 x 5, sensation is intact throughout, deep tendon reflexes are +2 and symmetric for Patella and Achilles  Straight leg raise is 90° bilaterally with some discomfort on the right at 90°, standing flexion is 80° again with some discomfort on the right  She has a history of physical therapy, August through September of 2017 with no improvement      She has seen pain management Dr Dana Sanchez and received transforaminal lumbar injections on the right x2 (November and April), the patient feels as though she had 3rd, but records review only indicates the 2  She makes note that after a these episodes she had no relief  Flexion-extension films of the lumbar spine are requested, pain management consultation with Dr Rico Covarrubias is also requested I did discuss spinal cord stimulator trial with the patient for her chronic pain syndrome as this is more than 6 years in duration, in addition I suggested that her right hip issue (trochanteric bursitis) also should be addressed  Further follow-up is planned with Neurosurgery in approximately 7-8 weeks to assess for the effectiveness of these management modalities  These findings, impressions and recommendations are reviewed in great detail with the patient and her , they expressed understanding and agreement, their questions were answered completely and to their satisfaction  Follow up has been scheduled  Diagnoses and all orders for this visit:    Sciatica, right side  -     X-ray lumbar spine flexion and extension only 4+ views; Future  -     Ambulatory referral to Pain Management; Future    Trochanteric bursitis, right hip  -     Ambulatory referral to Pain Management; Future    Low back pain, unspecified back pain laterality, unspecified chronicity, with sciatica presence unspecified  -     X-ray lumbar spine flexion and extension only 4+ views; Future  -     Ambulatory referral to Pain Management; Future    Right thigh pain  -     X-ray lumbar spine flexion and extension only 4+ views; Future  -     Ambulatory referral to Pain Management; Future    Chronic pain syndrome  -     X-ray lumbar spine flexion and extension only 4+ views; Future  -     Ambulatory referral to Pain Management; Future    Lumbar radiculopathy  -     X-ray lumbar spine flexion and extension only 4+ views; Future  -     Ambulatory referral to Pain Management; Future          Return in about 7 weeks (around 7/12/2018)  Subjective:      Patient ID: Thony Becker is a 48 y o  female      Very pleasant 82-year-old female, accompanied by her , presents for complaint of right low back, right buttocks, right hip, and right leg pain  As noted she reports this had its onset approximately 6 years ago, reporting this was approximately 2012 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  She has had recent MRI of her lumbar spine 3/22/18  She also has history of MRI of the lumbar spine 2/29/12 at which time she was told she had "bulging discs" of the lumbar spine  She has been followed by Dr Devonte Guzman, Pain Management, and has history of right L3 and L4 lumbar transforaminal epidural steroid injection, 4/26/18, and right L4 and L5 lumbar transforaminal epidural steroid injection 11/16/17  Note she also reports she received a epidural steroid injection in March of 2018 although review of Dr Devonte Guzman is records does not reveal this  The patient reports with these injections she had no relief   (please see records from Dr Devonte Guzman in the chart)    She reports she has had a course of physical therapy in [facility], August through September of 2017 again with no improvement, she felt as though it action was exacerbated the symptoms  She reports her pain is present regardless of position, and is aggravated by standing, walking, twisting, bending, or sitting for an extended period of time  She reports the pain also affects her ability to sleep, she is awakened at times by her back pain  She is on Neurontin at this time for pain relief again with very little improved  The following portions of the patient's history were reviewed and updated as appropriate: allergies, current medications, past family history, past medical history, past social history and past surgical history  Review of Systems   HENT: Negative  Eyes: Negative  Respiratory: Negative  Cardiovascular: Negative  Gastrointestinal: Negative  Endocrine: Negative  Genitourinary: Negative  Musculoskeletal: Positive for back pain (constant low back into the right leg all the way down to foot and toes; per patient her back pain has been present since 2012) and gait problem (due to pain)  Neurological: Positive for weakness (right leg at times) and numbness (right leg)  Hematological: Negative  Psychiatric/Behavioral: Negative  All other systems reviewed and are negative  Objective:    Physical Exam   Constitutional: She is oriented to person, place, and time  She appears well-developed and well-nourished  HENT:   Head: Normocephalic and atraumatic  Eyes: Pupils are equal, round, and reactive to light  Cardiovascular: Normal rate, regular rhythm and normal heart sounds  Pulmonary/Chest: Effort normal and breath sounds normal    Musculoskeletal:   Back examination;    Straight leg raise seated 90° bilaterally with discomfort in the sciatic notch at 90° on the right  She is exquisitely tender to palpation over the right trochanteric bursa region  She has tenderness to palpation in the right sciatic notch but not the left  Deep tendon reflexes are intact and symmetric  Strength/power to the lower extremities is 5 x 5  She does have some hypersensitivity over the right thigh medially and laterally  Neurological: She is oriented to person, place, and time  She has a normal Heel to [employer]  Gait normal    Reflex Scores:       Patellar reflexes are 2+ on the right side and 2+ on the left side  Achilles reflexes are 2+ on the right side and 2+ on the left side  Neurologic Exam     Mental Status   Oriented to person, place, and time  Level of consciousness: alert    Cranial Nerves     CN III, IV, VI   Pupils are equal, round, and reactive to light      Motor Exam   Muscle bulk: normal  Overall muscle tone: normal    Strength   Right quadriceps: 5/5  Left quadriceps: 5/5  Right hamstrin/5  Left hamstrin/5  Right glutei: 5/5  Left glutei: 5/5  Right anterior tibial: 5/5  Left anterior tibial: 5/5  Right posterior tibial: 5/5  Left posterior tibial: 5/5    Sensory Exam   Light touch normal      Gait, Coordination, and Reflexes     Gait  Gait: normal    Coordination   Heel to shin coordination: normal    Reflexes   Right patellar: 2+  Left patellar: 2+  Right achilles: 2+  Left achilles: 2+

## 2018-05-25 ENCOUNTER — TELEPHONE (OUTPATIENT)
Dept: ENDOCRINOLOGY | Facility: CLINIC | Age: 53
End: 2018-05-25

## 2018-05-25 DIAGNOSIS — E03.9 HYPOTHYROIDISM, UNSPECIFIED TYPE: Primary | ICD-10-CM

## 2018-05-25 LAB
ENDOMYSIUM IGA SER QL: NEGATIVE
GLIADIN PEPTIDE IGA SER-ACNC: 3 UNITS (ref 0–19)
GLIADIN PEPTIDE IGG SER-ACNC: 3 UNITS (ref 0–19)
IGA SERPL-MCNC: 137 MG/DL (ref 87–352)
THYROGLOB AB SERPL-ACNC: 349.2 IU/ML (ref 0–0.9)
THYROPEROXIDASE AB SERPL-ACNC: 324 IU/ML (ref 0–34)
TTG IGA SER-ACNC: <2 U/ML (ref 0–3)
TTG IGG SER-ACNC: <2 U/ML (ref 0–5)

## 2018-05-25 NOTE — TELEPHONE ENCOUNTER
----- Message from Shira Childs MD sent at 5/25/2018  8:48 AM EDT -----  Please call the patient regarding her abnormal result  tsh slightly low-decrease levothyroxine to 1 tablet Monday to Saturday and half on Sunday    Repeat thyroid function test, TSH and free T4 in 2 months

## 2018-05-25 NOTE — PROGRESS NOTES
Please call the patient regarding her abnormal result  tsh slightly low-decrease levothyroxine to 1 tablet Monday to Saturday and half on Sunday    Repeat thyroid function test, TSH and free T4 in 2 months

## 2018-05-29 ENCOUNTER — OFFICE VISIT (OUTPATIENT)
Dept: MULTI SPECIALTY CLINIC | Facility: CLINIC | Age: 53
End: 2018-05-29
Payer: COMMERCIAL

## 2018-05-29 ENCOUNTER — TELEPHONE (OUTPATIENT)
Dept: ENDOCRINOLOGY | Facility: CLINIC | Age: 53
End: 2018-05-29

## 2018-05-29 VITALS
BODY MASS INDEX: 33.16 KG/M2 | WEIGHT: 206.35 LBS | HEIGHT: 66 IN | TEMPERATURE: 98 F | DIASTOLIC BLOOD PRESSURE: 70 MMHG | HEART RATE: 80 BPM | SYSTOLIC BLOOD PRESSURE: 100 MMHG

## 2018-05-29 DIAGNOSIS — B35.1 ONYCHOMYCOSIS: ICD-10-CM

## 2018-05-29 PROCEDURE — 99213 OFFICE O/P EST LOW 20 MIN: CPT | Performed by: PODIATRIST

## 2018-05-29 RX ORDER — PROCHLORPERAZINE MALEATE 10 MG
TABLET ORAL
COMMUNITY
Start: 2018-05-23 | End: 2019-02-07 | Stop reason: SDUPTHER

## 2018-05-29 NOTE — PROGRESS NOTES
[address] [facility] 48 y o  female MRN: [id number]  Encounter: [id number]    Assessment/Plan     Assessment:  1  Onychomycosis  2  xerosis    Plan:  - Patient was seen/examined  All questions and concerns addressed  -  Reviewed LFT lab results with WNL hepatic function  Pt has already received full 3 mo lamisil prescription, instructed pt to c/w lamisil pulse therapy as LFT WNL  - advised pt that it takes up to a year for the nail to grow out and to see full results  - advised to continue use of pumice stone/OTC moisturizer but to avoid lotion in between toes  - RTC prn    History of Present Illness     HPI:  Tierra Nicole is a 48 y o  female who RTC for onychomycosis  Pt states she got her lab work last week and that she has been taking the medication daily  She does not see too much of a difference but does notice the nail is growing out with less yellow discoloration  She denies any other pedal complaints  The patient denies any nausea, vomiting, fever, chills, shortness of breath, or chest pains  Consults  Review of Systems   Constitutional: Negative  HENT: Negative  Eyes: Negative  Respiratory: Negative  Cardiovascular: Negative  Gastrointestinal: Negative  Musculoskeletal: Negative   Skin: Neg  Neurological: Negative          Historical Information   Past Medical History:   Diagnosis Date    Achalasia     Asthma     Constipation     Depression     Fibromyalgia     GERD (gastroesophageal reflux disease)     Hypothyroidism     Migraines      Past Surgical History:   Procedure Laterality Date     SECTION      NASAL SINUS SURGERY      OTHER SURGICAL HISTORY      achalasia repair    PILONIDAL CYST / SINUS EXCISION      PA CYSTOURETHROSCOPY N/A 2017    Procedure: CYSTOSCOPY;  Surgeon: Yun Gtz MD;  [facility];  Service: UroGynecology           PA ESOPHAGOGASTRODUODENOSCOPY TRANSORAL DIAGNOSTIC N/A 2017    Procedure: ESOPHAGOGASTRODUODENOSCOPY (EGD); Surgeon: Ruben Paiz MD;  [facility]; Service: Gastroenterology    OH POST COLPORRHAPHY,RECTUM/VAGINA N/A 1/19/2017    Procedure: COLPORRHAPHY POSTERIOR;  Surgeon: Karla Tavarez MD;  [facility];  Service: UroGynecology           OH SLING OPER STRES INCONTINENCE N/A 1/19/2017    Procedure: Twilla Claude PUBOVAGINAL SLING /SINGLE INCISION ;  Surgeon: Karla Tavarez MD;  [facility];  Service: UroGynecology           TONSILLECTOMY      TUBAL LIGATION       Social History   History   Alcohol Use No     History   Drug Use No     History   Smoking Status    Former Smoker    Packs/day: 1 50    Years: 30 00    Quit date: 2016   Smokeless Tobacco    Never Used     Family History:   Family History   Problem Relation Age of Onset    Cancer Mother     Dementia Father     Stroke Father     Diabetes Father     Cancer Maternal Grandmother     Cancer Maternal Grandfather     Hypertension Maternal Grandfather     Stroke Paternal Grandmother     Thyroid disease unspecified Brother     Thyroid disease unspecified Maternal Aunt        Meds/Allergies     (Not in a hospital admission)  Allergies   Allergen Reactions    Gabapentin      Annotation - [id number]: Neuropsych effects per patient      Latex Dermatitis    Penicillins Hives       Objective     Current Vitals:   Blood Pressure: 100/70 (05/29/18 0828)  Pulse: 80 (05/29/18 0828)  Temperature: 98 °F (36 7 °C) (05/29/18 0828)  Temp Source: Oral (05/29/18 0828)  Height: 5' 6 25" (168 3 cm) (05/29/18 5199)  Weight - Scale: 93 6 kg (206 lb 5 6 oz) (05/29/18 0828)        /70 (BP Location: Right arm, Patient Position: Sitting, Cuff Size: Standard)   Pulse 80   Temp 98 °F (36 7 °C) (Oral)   Ht 5' 6 25" (1 683 m)   Wt 93 6 kg (206 lb 5 6 oz)   BMI 33 05 kg/m²       Lower Extremity Exam:    Musculoskeletal:  MMT is 5/5 to all compartments of the LE, +0/4 edema B/L, Digital ROM is intact,      Pulses: R DP is +2/4, R PT is +2/4, L DP is +2/4, L PT is +2/4, CFT< 3sec to all digits  Pedal hair is Present     Skin:  No open Lesions  Mild xerosis noted on plantar heel b/l  No ID maceration  No edema, erythema or clinical signs of infection  Yellow discolored, dystrophic nails       Neurologic:  Gross sensation is intact   Protective sensation is Intact

## 2018-05-29 NOTE — TELEPHONE ENCOUNTER
----- Message from Krzysztof Martinez MD sent at 5/26/2018  2:32 PM EDT -----  Please call the patient regarding her abnormal result   Celiac screen negative

## 2018-06-14 ENCOUNTER — OFFICE VISIT (OUTPATIENT)
Dept: INTERNAL MEDICINE CLINIC | Facility: CLINIC | Age: 53
End: 2018-06-14
Payer: COMMERCIAL

## 2018-06-14 VITALS
DIASTOLIC BLOOD PRESSURE: 68 MMHG | HEART RATE: 100 BPM | TEMPERATURE: 98 F | SYSTOLIC BLOOD PRESSURE: 104 MMHG | BODY MASS INDEX: 33.38 KG/M2 | HEIGHT: 66 IN | WEIGHT: 207.67 LBS

## 2018-06-14 DIAGNOSIS — J45.41 MODERATE PERSISTENT ASTHMA WITH ACUTE EXACERBATION: ICD-10-CM

## 2018-06-14 PROCEDURE — 99213 OFFICE O/P EST LOW 20 MIN: CPT | Performed by: PHYSICIAN ASSISTANT

## 2018-06-14 RX ORDER — PREDNISONE 20 MG/1
40 TABLET ORAL DAILY
Qty: 10 TABLET | Refills: 0 | Status: SHIPPED | OUTPATIENT
Start: 2018-06-14 | End: 2018-06-19

## 2018-06-14 RX ORDER — ALBUTEROL SULFATE 2.5 MG/3ML
2.5 SOLUTION RESPIRATORY (INHALATION) EVERY 4 HOURS PRN
Qty: 75 ML | Refills: 1 | Status: SHIPPED | OUTPATIENT
Start: 2018-06-14 | End: 2018-12-19 | Stop reason: SDUPTHER

## 2018-06-14 RX ORDER — AZITHROMYCIN 250 MG/1
TABLET, FILM COATED ORAL
Qty: 6 TABLET | Refills: 0 | Status: SHIPPED | OUTPATIENT
Start: 2018-06-14 | End: 2018-06-18

## 2018-06-14 RX ORDER — BENZONATATE 100 MG/1
100 CAPSULE ORAL 3 TIMES DAILY PRN
Qty: 20 CAPSULE | Refills: 0 | Status: SHIPPED | OUTPATIENT
Start: 2018-06-14 | End: 2018-06-21

## 2018-06-14 NOTE — PATIENT INSTRUCTIONS
I have sent the prednisone and Tessalon on as we discussed at your visit today  I have also sent a refill for your solution for your nebulizer  Please continue to use as needed every 4-6 hours  As we discussed I have provided you with a written prescription for azithromycin  You are aware that you do not need to take this now however in the next 2 or 3 days if you feel the medication is not working or you feel your symptoms are getting worse then you may fill the antibiotic  Please call here as well if you have any changes in symptoms

## 2018-06-14 NOTE — PROGRESS NOTES
Assessment/Plan:    No problem-specific Assessment & Plan notes found for this encounter  Diagnoses and all orders for this visit:    Moderate persistent asthma with acute exacerbation  -     predniSONE 20 mg tablet; Take 2 tablets (40 mg total) by mouth daily for 5 days  -     benzonatate (TESSALON PERLES) 100 mg capsule; Take 1 capsule (100 mg total) by mouth 3 (three) times a day as needed for cough for up to 7 days  -     azithromycin (ZITHROMAX) 250 mg tablet; Take 2 pills together on day one then one pill a day for next four days  -     albuterol (2 5 mg/3 mL) 0 083 % nebulizer solution; Take 1 vial (2 5 mg total) by nebulization every 4 (four) hours as needed for wheezing or shortness of breath for up to 90 days          Subjective:      Patient ID: Pool Reyes is a 48 y o  female  Pt here for [facility] with c/o cough and congestion for the past week  Patient states  had the same before her  States the cough is the worst , is affecting her sleep as well  Not chest pain but hurts from coughing so much  Patient's past medical history is positive for asthma  Patient reports she has been using her nebulizer every 4-6 hours for the past 5 days with only slight improvement in symptoms  Patient confirmed penicillin allergy        The following portions of the patient's history were reviewed and updated as appropriate: allergies, current medications, past family history, past medical history, past social history, past surgical history and problem list     Review of Systems   Constitutional: Positive for chills and fatigue  Negative for fever  HENT: Positive for congestion, postnasal drip, sore throat and voice change  Eyes: Negative  Respiratory: Positive for cough, chest tightness and wheezing  Cardiovascular: Negative for chest pain and palpitations  Gastrointestinal: Negative for diarrhea, nausea and vomiting  Musculoskeletal: Positive for myalgias     Skin: Negative for rash    Allergic/Immunologic: Positive for environmental allergies  Objective:      /68 (BP Location: Right arm, Patient Position: Sitting, Cuff Size: Adult)   Pulse 100   Temp 98 °F (36 7 °C) (Oral)   Ht 5' 6 25" (1 683 m)   Wt 94 2 kg (207 lb 10 8 oz)   BMI 33 27 kg/m²          Physical Exam   Constitutional: She is oriented to person, place, and time  She appears well-developed and well-nourished  Non-toxic appearance  She does not have a sickly appearance  She appears ill  No distress  HENT:   Head: Normocephalic  Nose: Mucosal edema present  Mouth/Throat: Mucous membranes are normal  No oropharyngeal exudate, posterior oropharyngeal edema or posterior oropharyngeal erythema  Eyes: Pupils are equal, round, and reactive to light  Cardiovascular: Regular rhythm, S1 normal and S2 normal   Tachycardia present  Pulmonary/Chest: No accessory muscle usage  No respiratory distress  She has wheezes  Abdominal: Soft  Lymphadenopathy:     She has no cervical adenopathy  Neurological: She is alert and oriented to person, place, and time  Skin: Skin is warm and dry  Psychiatric: She has a normal mood and affect   Her behavior is normal

## 2018-06-21 DIAGNOSIS — K58.0 IRRITABLE BOWEL SYNDROME WITH DIARRHEA: ICD-10-CM

## 2018-06-21 DIAGNOSIS — K21.9 CHRONIC GERD: Primary | ICD-10-CM

## 2018-06-21 RX ORDER — PANTOPRAZOLE SODIUM 40 MG/1
TABLET, DELAYED RELEASE ORAL
Qty: 30 TABLET | Refills: 5 | Status: SHIPPED | OUTPATIENT
Start: 2018-06-21 | End: 2019-01-16 | Stop reason: SDUPTHER

## 2018-06-21 RX ORDER — SUCRALFATE 1 G/1
TABLET ORAL
Qty: 90 TABLET | Refills: 3 | Status: SHIPPED | OUTPATIENT
Start: 2018-06-21 | End: 2018-11-02 | Stop reason: SDUPTHER

## 2018-06-21 RX ORDER — DICYCLOMINE HCL 20 MG
TABLET ORAL
Qty: 120 TABLET | Refills: 2 | Status: SHIPPED | OUTPATIENT
Start: 2018-06-21 | End: 2018-10-01 | Stop reason: SDUPTHER

## 2018-06-25 ENCOUNTER — LAB (OUTPATIENT)
Dept: LAB | Facility: HOSPITAL | Age: 53
End: 2018-06-25
Attending: INTERNAL MEDICINE
Payer: COMMERCIAL

## 2018-06-25 DIAGNOSIS — E03.9 HYPOTHYROIDISM, UNSPECIFIED TYPE: ICD-10-CM

## 2018-06-25 LAB
T4 FREE SERPL-MCNC: 0.81 NG/DL (ref 0.76–1.46)
TSH SERPL DL<=0.05 MIU/L-ACNC: 1.93 UIU/ML (ref 0.36–3.74)

## 2018-06-25 PROCEDURE — 84443 ASSAY THYROID STIM HORMONE: CPT

## 2018-06-25 PROCEDURE — 84439 ASSAY OF FREE THYROXINE: CPT

## 2018-06-25 PROCEDURE — 36415 COLL VENOUS BLD VENIPUNCTURE: CPT

## 2018-06-27 ENCOUNTER — TELEPHONE (OUTPATIENT)
Dept: ENDOCRINOLOGY | Facility: CLINIC | Age: 53
End: 2018-06-27

## 2018-06-27 NOTE — PROGRESS NOTES
Please call the patient regarding her abnormal result   tsh ok- continue levothyroxine at current dose

## 2018-06-27 NOTE — TELEPHONE ENCOUNTER
Please call the patient regarding her abnormal result   tsh ok- continue levothyroxine at current dose    Left message for pt and to call if any further questions

## 2018-07-18 ENCOUNTER — TELEPHONE (OUTPATIENT)
Dept: SURGERY | Facility: HOSPITAL | Age: 53
End: 2018-07-18

## 2018-07-19 ENCOUNTER — HOSPITAL ENCOUNTER (OUTPATIENT)
Dept: INTERVENTIONAL RADIOLOGY/VASCULAR | Facility: HOSPITAL | Age: 53
Discharge: HOME | End: 2018-07-19
Payer: COMMERCIAL

## 2018-07-19 VITALS
SYSTOLIC BLOOD PRESSURE: 116 MMHG | WEIGHT: 210 LBS | HEART RATE: 70 BPM | BODY MASS INDEX: 32.96 KG/M2 | HEIGHT: 67 IN | DIASTOLIC BLOOD PRESSURE: 73 MMHG | OXYGEN SATURATION: 97 % | RESPIRATION RATE: 18 BRPM | TEMPERATURE: 97 F

## 2018-07-19 DIAGNOSIS — M54.50 LUMBAGO: ICD-10-CM

## 2018-07-19 DIAGNOSIS — M54.16 LUMBAR RADICULOPATHY: ICD-10-CM

## 2018-07-19 DIAGNOSIS — M54.17 LUMBOSACRAL NEURITIS: ICD-10-CM

## 2018-07-19 LAB — EXT PREGNANCY TEST URINE: NEGATIVE

## 2018-07-19 PROCEDURE — 99152 MOD SED SAME PHYS/QHP 5/>YRS: CPT

## 2018-07-19 PROCEDURE — 81025 URINE PREGNANCY TEST: CPT | Performed by: PHYSICAL MEDICINE & REHABILITATION

## 2018-07-19 PROCEDURE — 99153 MOD SED SAME PHYS/QHP EA: CPT

## 2018-07-19 RX ORDER — FENTANYL CITRATE 50 UG/ML
INJECTION, SOLUTION INTRAMUSCULAR; INTRAVENOUS CODE/TRAUMA/SEDATION MEDICATION
Status: COMPLETED | OUTPATIENT
Start: 2018-07-19 | End: 2018-07-19

## 2018-07-19 RX ORDER — LIDOCAINE HYDROCHLORIDE 10 MG/ML
INJECTION, SOLUTION EPIDURAL; INFILTRATION; INTRACAUDAL; PERINEURAL CODE/TRAUMA/SEDATION MEDICATION
Status: COMPLETED | OUTPATIENT
Start: 2018-07-19 | End: 2018-07-19

## 2018-07-19 RX ORDER — KETOROLAC TROMETHAMINE 30 MG/ML
INJECTION, SOLUTION INTRAMUSCULAR; INTRAVENOUS CODE/TRAUMA/SEDATION MEDICATION
Status: COMPLETED | OUTPATIENT
Start: 2018-07-19 | End: 2018-07-19

## 2018-07-19 RX ORDER — PAPAVERINE HCL 150 MG
CAPSULE, EXTENDED RELEASE ORAL CODE/TRAUMA/SEDATION MEDICATION
Status: COMPLETED | OUTPATIENT
Start: 2018-07-19 | End: 2018-07-19

## 2018-07-19 RX ORDER — MIDAZOLAM HYDROCHLORIDE 1 MG/ML
INJECTION INTRAMUSCULAR; INTRAVENOUS CODE/TRAUMA/SEDATION MEDICATION
Status: COMPLETED | OUTPATIENT
Start: 2018-07-19 | End: 2018-07-19

## 2018-07-19 RX ORDER — SODIUM CHLORIDE 9 MG/ML
50 INJECTION, SOLUTION INTRAVENOUS CONTINUOUS
Status: DISCONTINUED | OUTPATIENT
Start: 2018-07-19 | End: 2018-07-23 | Stop reason: HOSPADM

## 2018-07-19 RX ADMIN — LIDOCAINE HYDROCHLORIDE 5 ML: 10 INJECTION, SOLUTION EPIDURAL; INFILTRATION; INTRACAUDAL; PERINEURAL at 09:46

## 2018-07-19 RX ADMIN — MIDAZOLAM HYDROCHLORIDE 2 MG: 1 INJECTION, SOLUTION INTRAMUSCULAR; INTRAVENOUS at 09:32

## 2018-07-19 RX ADMIN — FENTANYL CITRATE 50 MCG: 50 INJECTION INTRAMUSCULAR; INTRAVENOUS at 09:33

## 2018-07-19 RX ADMIN — KETOROLAC TROMETHAMINE 30 MG: 30 INJECTION, SOLUTION INTRAMUSCULAR; INTRAVENOUS at 09:33

## 2018-07-19 RX ADMIN — SODIUM BICARBONATE 1.5 MEQ: 84 INJECTION PARENTERAL at 09:47

## 2018-07-19 RX ADMIN — DEXAMETHASONE SODIUM PHOSPHATE 10 MG: 10 INJECTION, SOLUTION INTRAMUSCULAR; INTRAVENOUS at 09:47

## 2018-07-19 NOTE — NURSING NOTE
Pt received back to Rm 442 bed 1  Pt AAOx3  VSS  2 band-aids C/D/I to right lower back  Pt denies pain  IV fluids infusion  Acme and PO fluids provided to pt  Pt tolerating without incidence  Call bell within reach

## 2018-07-19 NOTE — H&P
History of Present Illness: The patient is a 48 y o  female who presents with complaints of low back pain, lumbar bulging disc and right lower limb radiculopathy    Patient Active Problem List   Diagnosis    Mixed incontinence    Stress incontinence in female    Obstructive sleep apnea syndrome    Hypothyroidism    Constipation    Chronic low back pain    Asthma    Arthritis    Gastritis    Rectocele    Cystocele    Hypermobility of urethra    Achalasia    Bilateral edema of lower extremity    Breast pain, right    Chronic sinusitis with recurrent bronchitis    Depression    Esophageal reflux    H  pylori infection    Headache, migraine    Intractable chronic migraine without aura and without status migrainosus    Breast mass    Obesity (BMI 30-39  9)    Other chronic pain    Spondylosis of lumbar region without myelopathy or radiculopathy    Thyroiditis    Tubular adenoma of colon    Food allergy    POP-Q stage 2 cystocele    Moderate persistent asthma with exacerbation       Past Medical History:   Diagnosis Date    Achalasia     Asthma     Constipation     Depression     Elevated LFTs     last assessed 10/25/17    Fibromyalgia     Gastric ulcer     GERD (gastroesophageal reflux disease)     Hypothyroidism     Migraines     MICHAEL (obstructive sleep apnea)     last assessed 16    Thyroid nodule     last assessed 17       Past Surgical History:   Procedure Laterality Date     SECTION      MYOTOMY HELLER LAPAROSCOPIC  2016    Dr Anil Baldwin, Coincident lorena fundoplication    NASAL SINUS SURGERY      OTHER SURGICAL HISTORY      achalasia repair    PILONIDAL CYST / SINUS EXCISION      DC CYSTOURETHROSCOPY N/A 2017    Procedure: CYSTOSCOPY;  Surgeon: Michael Lagos MD;  [facility];  Service: UroGynecology           DC ESOPHAGOGASTRODUODENOSCOPY TRANSORAL DIAGNOSTIC N/A 2017    Procedure: ESOPHAGOGASTRODUODENOSCOPY (EGD);   Surgeon: Mary Faustin MD Ana;  [facility];   Service: Gastroenterology    ND POST COLPORRHAPHY,RECTUM/VAGINA N/A 1/19/2017    Procedure: COLPORRHAPHY POSTERIOR;  Surgeon: Ion Wagner MD;  [facility];  Service: UroGynecology           ND SLING OPER STRES INCONTINENCE N/A 1/19/2017    Procedure: INSERTION PUBOVAGINAL SLING /SINGLE INCISION ;  Surgeon: Ion Wagner MD;  [facility];  Service: UroGynecology           TONSILLECTOMY      TUBAL LIGATION           Current Outpatient Prescriptions:     acetaminophen (TYLENOL) 325 mg tablet, Every 4 hours as needed, Disp: 30 tablet, Rfl: 0    albuterol (2 5 mg/3 mL) 0 083 % nebulizer solution, Take 1 vial (2 5 mg total) by nebulization every 4 (four) hours as needed for wheezing or shortness of breath for up to 90 days, Disp: 75 mL, Rfl: 1    albuterol (VENTOLIN HFA) 90 mcg/act inhaler, Inhale 2 puffs every 4 (four) hours as needed for wheezing or shortness of breath, Disp: 1 Inhaler, Rfl: 3    ARIPiprazole (ABILIFY) 5 mg tablet, Take 5 mg by mouth daily at bedtime, Disp: , Rfl: 1    BREO ELLIPTA, 1 puff daily , Disp: , Rfl:     clonazePAM (KlonoPIN) 0 5 mg tablet, Take 0 5 mg by mouth daily  , Disp: , Rfl:     clotrimazole-betamethasone (LOTRISONE) 1-0 05 % cream, Apply topically 2 (two) times a day as needed (skin rash), Disp: 30 g, Rfl: 2    dicyclomine (BENTYL) 20 mg tablet, TAKE 1 TABLET BY MOUTH EVERY 6 HOURS AS NEEDED, Disp: 120 tablet, Rfl: 2    doxepin (SINEquan) 25 mg capsule, TAKE 1-2 CAPSULES BY MOUTH DAILY AT BEDTIME , Disp: , Rfl: 0    fluticasone (FLONASE) 50 mcg/act nasal spray, 1-2 sprays into each nostril daily, Disp: 16 g, Rfl: 0    gabapentin (NEURONTIN) 300 mg capsule, Take 1 capsule (300 mg total) by mouth daily at bedtime, Disp: 90 capsule, Rfl: 3    hydrOXYzine pamoate (VISTARIL) 50 mg capsule, Take 50 mg by mouth 3 (three) times a day as needed  , Disp: , Rfl:     levothyroxine 150 mcg tablet, Take 1 tablet (150 mcg total) by mouth daily, Disp: 90 tablet, Rfl: 3    loratadine (CLARITIN) 10 mg tablet, Take 1 tablet (10 mg total) by mouth daily, Disp: 90 tablet, Rfl: 0    meloxicam (MOBIC) 15 mg tablet, Take 1 tablet (15 mg total) by mouth daily, Disp: 30 tablet, Rfl: 6    pantoprazole (PROTONIX) 40 mg tablet, TAKE 1 TABLET DAILY  , Disp: 30 tablet, Rfl: 5    polyethylene glycol (GLYCOLAX) powder, Take 17 g by mouth as needed Mix 1 capful in 8 ounces of water, juice or tea  , Disp: , Rfl: 11    prochlorperazine (COMPAZINE) 10 mg tablet, TAKE 1 TABLET TWICE A DAY AS NEEDED FOR HEADACHE OR NAUSEA, LIMIT 3 DAYS PER WEEK, Disp: , Rfl: 0    prochlorperazine (COMPAZINE) 10 mg tablet, TAKE 1 TABLET TWICE A DAY AS NEEDED FOR HEADACHE OR NAUSEA, LIMIT 3 DAYS PER WEEK, Disp: , Rfl:     sucralfate (CARAFATE) 1 g tablet, TAKE 1 TABLET BY MOUTH 3 TIMES DAILY, Disp: 90 tablet, Rfl: 3    VIIBRYD 40 MG tablet, Take 40 mg by mouth daily With food, Disp: , Rfl: 0    Botulinum Toxin Type A 200 units SOLR, Inject 155 units into face and neck IM every 90 days, Disp: , Rfl:     docusate sodium (COLACE) 100 mg capsule, TAKE 1 CAPSULE TWICE DAILY AS NEEDED , Disp: , Rfl:     promethazine-codeine (PHENERGAN WITH CODEINE) 6 25-10 mg/5 mL syrup, Take 2 5 mL by mouth every 6 (six) hours, Disp: , Rfl:     Current Facility-Administered Medications:     sodium chloride 0 9 % infusion, 50 mL/hr, Intravenous, Continuous, Gene MD Ruth    Allergies   Allergen Reactions    Gabapentin      Annotation - [id number]: Neuropsych effects per patient      Latex Dermatitis    Penicillins Hives       Physical Exam:   Vitals:    07/19/18 0949   BP: 112/60   Pulse: 62   Resp:    Temp:    SpO2: 100%     General: Awake, Alert, Oriented x 3, Mood and affect appropriate  Respiratory: Respirations even and unlabored  Cardiovascular: Peripheral pulses intact; no edema  Musculoskeletal Exam:   Positive straight le raise right side, lumbar limited range of motion and spasm gMusculoskeletal Exam:        Assessment:   1  Lumbar radiculopathy    2  Lumbosacral neuritis    3   Lumbago        Plan:   Righ L4 and L5 transforaminal epidural steroid injections under fluoroscopic guidance tPlan:

## 2018-07-19 NOTE — DISCHARGE INSTRUCTIONS
epideural  Low Back Strain   WHAT YOU NEED TO KNOW:   Low back strain is an injury to your lower back muscles or tendons  Tendons are strong tissues that connect muscles to bones  The lower back supports most of your body weight and helps you move, twist, and bend  DISCHARGE INSTRUCTIONS:   Return to the emergency department if:   · You hear or feel a pop in your lower back  · You have increased swelling or pain in your lower back  · You have trouble moving your legs  · Your legs are numb  Contact your healthcare provider if:   · You have a fever  · Your pain does not go away, even after treatment  · You have questions or concerns about your condition or care  Medicines: The following medicines may be ordered by your healthcare provider:  · Acetaminophen decreases pain and fever  It is available without a doctor's order  Ask how much to take and how often to take it  Follow directions  Acetaminophen can cause liver damage if not taken correctly  · NSAIDs , such as ibuprofen, help decrease swelling, pain, and fever  This medicine is available with or without a doctor's order  NSAIDs can cause stomach bleeding or kidney problems in certain people  If you take blood thinner medicine, always ask your healthcare provider if NSAIDs are safe for you  Always read the medicine label and follow directions  · Muscle relaxers  help decrease pain and muscle spasms  · Prescription pain medicine  may be given  Ask how to take this medicine safely  · Take your medicine as directed  Contact your healthcare provider if you think your medicine is not helping or if you have side effects  Tell him or her if you are allergic to any medicine  Keep a list of the medicines, vitamins, and herbs you take  Include the amounts, and when and why you take them  Bring the list or the pill bottles to follow-up visits  Carry your medicine list with you in case of an emergency  Self-care:   · Rest  as directed  You may need to rest in bed for a period of time after your injury  Do not lift heavy objects  · Apply ice  on your back for 15 to 20 minutes every hour or as directed  Use an ice pack, or put crushed ice in a plastic bag  Cover it with a towel  Ice helps prevent tissue damage and decreases swelling and pain  · Apply heat  on your lower back for 20 to 30 minutes every 2 hours for as many days as directed  Heat helps decrease pain and muscle spasms  · Slowly start to increase your activity  as the pain decreases, or as directed  Prevent another low back strain:   · Use correct body movements  ¨ Bend at the hips and knees when you  objects  Do not bend from the waist  Use your leg muscles as you lift the load  Do not use your back  Keep the object close to your chest as you lift it  Try not to twist or lift anything above your waist     ¨ Change your position often when you stand for long periods of time  Rest one foot on a small box or footrest, and then switch to the other foot often  ¨ Try not to sit for long periods of time  When you do, sit in a straight-backed chair with your feet flat on the floor  ¨ Never reach, pull, or push while you are sitting  · Warm up before you exercise  Do exercises that strengthen your back muscles  Ask your healthcare provider about the best exercise plan for you  · Maintain a healthy weight  Ask your healthcare provider how much you should weigh  Ask him to help you create a weight loss plan if you are overweight  © 2017 [address] [employer] Information is for End User's use only and may not be sold, redistributed or otherwise used for commercial purposes  All illustrations and images included in CareNotes® are the copyrighted property of [employer]  or [employer]  The above information is an  only  It is not intended as medical advice for individual conditions or treatments   Talk to your doctor, nurse or pharmacist before following any medical regimen to see if it is safe and effective for you

## 2018-07-24 NOTE — PROGRESS NOTES
Established Patient Progress Note       Chief Complaint   Patient presents with    Hypothyroidism        History of Present Illness:     Princess Martins is a 48 y o  female with a history of hypothyroidism and prediabetes  For the hypothyroidism she is currently taking levothyroxine 150 mcg daily  She reports she is taking this regularly and properly  Her most recent TSH was 1 930 and T4 was 0 81  She reports weight gain  For the prediabetes her A1C in 2018 was 5 9  She has strong family history of type 2 diabetes  She is not currently on medication  Patient Active Problem List   Diagnosis    Mixed incontinence    Stress incontinence in female    Obstructive sleep apnea syndrome    Hypothyroidism    Constipation    Chronic low back pain    Asthma    Arthritis    Gastritis    Rectocele    Cystocele    Hypermobility of urethra    Achalasia    Bilateral edema of lower extremity    Breast pain, right    Chronic sinusitis with recurrent bronchitis    Depression    Esophageal reflux    H  pylori infection    Headache, migraine    Intractable chronic migraine without aura and without status migrainosus    Breast mass    Obesity (BMI 30-39  9)    Other chronic pain    Spondylosis of lumbar region without myelopathy or radiculopathy    Thyroiditis    Tubular adenoma of colon    Food allergy    POP-Q stage 2 cystocele    Moderate persistent asthma with exacerbation    Prediabetes      Past Medical History:   Diagnosis Date    Achalasia     Asthma     Constipation     Depression     Elevated LFTs     last assessed 10/25/17    Fibromyalgia     Gastric ulcer     GERD (gastroesophageal reflux disease)     Hypothyroidism     Migraines     MICHAEL (obstructive sleep apnea)     last assessed 16    Thyroid nodule     last assessed 17      Past Surgical History:   Procedure Laterality Date     SECTION      MYOTOMY HELLER LAPAROSCOPIC  2016    Dr Emma Caballero, Coincident lorena fundoplication    NASAL SINUS SURGERY      OTHER SURGICAL HISTORY      achalasia repair    PILONIDAL CYST / SINUS EXCISION      TN CYSTOURETHROSCOPY N/A 1/19/2017    Procedure: Marco Antonio Ananth;  Surgeon: Geovanny Luque MD;  Location[facility];  Service: UroGynecology           TN ESOPHAGOGASTRODUODENOSCOPY TRANSORAL DIAGNOSTIC N/A 9/7/2017    Procedure: ESOPHAGOGASTRODUODENOSCOPY (EGD); Surgeon: Nidia Merino MD;  Location: [facility]; Service: Gastroenterology    TN POST COLPORRHAPHY,RECTUM/VAGINA N/A 1/19/2017    Procedure: COLPORRHAPHY POSTERIOR;  Surgeon: Geovanny Luque MD;  [facility];  Service: UroGynecology           TN SLING OPER STRES INCONTINENCE N/A 1/19/2017    Procedure: Pop Vázquez PUBOVAGINAL SLING /SINGLE INCISION ;  Surgeon: Geovanny Luque MD;  [facility];  Service: UroGynecology           TONSILLECTOMY      TUBAL LIGATION        Family History   Problem Relation Age of Onset    Cancer Mother     Breast cancer Mother     Hypertension Mother     Dementia Father     Stroke Father     Diabetes Father     Hypertension Father     Cancer Maternal Grandmother     Ovarian cancer Maternal Grandmother         unspecified laterality    Cancer Maternal Grandfather     Hypertension Maternal Grandfather     Tongue cancer Maternal Grandfather     Stroke Paternal Grandmother     Thyroid disease unspecified Brother     Thyroid disease unspecified Maternal Aunt     Colon cancer Maternal Aunt     Breast cancer Family      Social History   Substance Use Topics    Smoking status: Former Smoker     Packs/day: 1 50     Years: 30 00     Quit date: 2016    Smokeless tobacco: Never Used      Comment: pt stated quit about a month ago, last assessed 4/30/14 per [employer]    Alcohol use No     Allergies   Allergen Reactions    Gabapentin      Annotation - [id number]: Neuropsych effects per patient      Latex Dermatitis    Penicillins Hives       Current Outpatient Prescriptions:     acetaminophen (TYLENOL) 325 mg tablet, Every 4 hours as needed, Disp: 30 tablet, Rfl: 0    albuterol (2 5 mg/3 mL) 0 083 % nebulizer solution, Take 1 vial (2 5 mg total) by nebulization every 4 (four) hours as needed for wheezing or shortness of breath for up to 90 days, Disp: 75 mL, Rfl: 1    albuterol (VENTOLIN HFA) 90 mcg/act inhaler, Inhale 2 puffs every 4 (four) hours as needed for wheezing or shortness of breath, Disp: 1 Inhaler, Rfl: 3    ARIPiprazole (ABILIFY) 5 mg tablet, Take 5 mg by mouth daily at bedtime, Disp: , Rfl: 1    Botulinum Toxin Type A 200 units SOLR, Inject 155 units into face and neck IM every 90 days, Disp: , Rfl:     BREO ELLIPTA, 1 puff daily , Disp: , Rfl:     clonazePAM (KlonoPIN) 0 5 mg tablet, Take 0 5 mg by mouth daily  , Disp: , Rfl:     clotrimazole-betamethasone (LOTRISONE) 1-0 05 % cream, Apply topically 2 (two) times a day as needed (skin rash), Disp: 30 g, Rfl: 2    dicyclomine (BENTYL) 20 mg tablet, TAKE 1 TABLET BY MOUTH EVERY 6 HOURS AS NEEDED, Disp: 120 tablet, Rfl: 2    docusate sodium (COLACE) 100 mg capsule, TAKE 1 CAPSULE TWICE DAILY AS NEEDED , Disp: , Rfl:     doxepin (SINEquan) 25 mg capsule, TAKE 1-2 CAPSULES BY MOUTH DAILY AT BEDTIME , Disp: , Rfl: 0    gabapentin (NEURONTIN) 300 mg capsule, Take 1 capsule (300 mg total) by mouth daily at bedtime, Disp: 90 capsule, Rfl: 3    hydrOXYzine pamoate (VISTARIL) 50 mg capsule, Take 50 mg by mouth 3 (three) times a day as needed  , Disp: , Rfl:     levothyroxine 150 mcg tablet, Take 1 tablet (150 mcg total) by mouth daily, Disp: 90 tablet, Rfl: 3    loratadine (CLARITIN) 10 mg tablet, Take 1 tablet (10 mg total) by mouth daily, Disp: 90 tablet, Rfl: 0    meloxicam (MOBIC) 15 mg tablet, Take 1 tablet (15 mg total) by mouth daily, Disp: 30 tablet, Rfl: 6    pantoprazole (PROTONIX) 40 mg tablet, TAKE 1 TABLET DAILY  , Disp: 30 tablet, Rfl: 5    polyethylene glycol (GLYCOLAX) powder, Take 17 g by mouth as needed Mix 1 capful in 8 ounces of water, juice or tea  , Disp: , Rfl: 11    prochlorperazine (COMPAZINE) 10 mg tablet, TAKE 1 TABLET TWICE A DAY AS NEEDED FOR HEADACHE OR NAUSEA, LIMIT 3 DAYS PER WEEK, Disp: , Rfl: 0    prochlorperazine (COMPAZINE) 10 mg tablet, TAKE 1 TABLET TWICE A DAY AS NEEDED FOR HEADACHE OR NAUSEA, LIMIT 3 DAYS PER WEEK, Disp: , Rfl:     promethazine-codeine (PHENERGAN WITH CODEINE) 6 25-10 mg/5 mL syrup, Take 2 5 mL by mouth every 6 (six) hours, Disp: , Rfl:     sucralfate (CARAFATE) 1 g tablet, TAKE 1 TABLET BY MOUTH 3 TIMES DAILY, Disp: 90 tablet, Rfl: 3    VIIBRYD 40 MG tablet, Take 40 mg by mouth daily With food, Disp: , Rfl: 0    fluticasone (FLONASE) 50 mcg/act nasal spray, 1-2 sprays into each nostril daily, Disp: 16 g, Rfl: 0    Review of Systems   Constitutional: Negative for chills and fever  Positive for weight gain    HENT: Negative for sore throat and trouble swallowing  Eyes: Negative for visual disturbance  Respiratory: Negative for shortness of breath  Cardiovascular: Negative for chest pain and palpitations  Gastrointestinal: Negative for abdominal pain, constipation and diarrhea  Endocrine: Negative for cold intolerance, heat intolerance, polydipsia, polyphagia and polyuria  Genitourinary: Negative for frequency  Musculoskeletal: Negative for arthralgias and myalgias  Skin: Negative for rash  Neurological: Negative for dizziness and tremors  Hematological: Negative for adenopathy  Psychiatric/Behavioral: Negative for sleep disturbance  All other systems reviewed and are negative  Physical Exam:  Body mass index is 32 53 kg/m²    /72   Pulse 70   Ht 5' 7" (1 702 m)   Wt 94 2 kg (207 lb 11 2 oz)   LMP 07/11/2018   BMI 32 53 kg/m²    Wt Readings from Last 3 Encounters:   07/25/18 94 2 kg (207 lb 11 2 oz)   07/19/18 95 3 kg (210 lb)   06/14/18 94 2 kg (207 lb 10 8 oz)       Physical Exam   Constitutional: She is oriented to person, place, and time  She appears well-developed and well-nourished  No distress  HENT:   Head: Normocephalic and atraumatic  Eyes: Conjunctivae are normal  Pupils are equal, round, and reactive to light  Neck: Normal range of motion  Neck supple  No thyromegaly present  Cardiovascular: Normal rate, regular rhythm and normal heart sounds  Pulmonary/Chest: Effort normal and breath sounds normal  No respiratory distress  She has no wheezes  She has no rales  Abdominal: Soft  Bowel sounds are normal  She exhibits no distension  There is no tenderness  Musculoskeletal: Normal range of motion  She exhibits no edema  Neurological: She is alert and oriented to person, place, and time  Skin: Skin is warm and dry  Psychiatric: She has a normal mood and affect  Vitals reviewed  Labs:        Component      Latest Ref Rng & Units 6/25/2018   Free T4      0 76 - 1 46 ng/dL 0 81   TSH 3RD GENERATON      0 358 - 3 740 uIU/mL 1 930        Component      Latest Ref Rng & Units 2/14/2018   Hemoglobin A1C      4 2 - 6 3 % 5 9       Impression & Plan:    Problem List Items Addressed This Visit     Hypothyroidism - Primary     Continue current dose of levothyroxine  Check TSH and T4 prior to next visit  Relevant Medications    levothyroxine 150 mcg tablet    Other Relevant Orders    T4, free    TSH, 3rd generation    Prediabetes     Focus on dietary and lifestyle modifications  Will continue to monitor for now  Relevant Orders    Hemoglobin A1C    Comprehensive metabolic panel    Lipid Panel with Direct LDL reflex          Orders Placed This Encounter   Procedures    T4, free     Standing Status:   Future     Standing Expiration Date:   7/25/2019    TSH, 3rd generation     This is a patient instruction: This test is non-fasting  Please drink two glasses of water morning of bloodwork          Standing Status:   Future     Standing Expiration Date:   7/25/2019    Hemoglobin A1C    Comprehensive metabolic panel     This is a patient instruction: Patient fasting for 8 hours or longer recommended   Lipid Panel with Direct LDL reflex     This is a patient instruction: This test requires patient fasting for 10-12 hours or longer  Drinking of black coffee or black tea is acceptable  Discussed with the patient and all questioned fully answered  She will call me if any problems arise  Follow-up appointment in 6 months       Counseled patient on diagnostic results, prognosis, risk and benefit of treatment options, instruction for management, importance of treatment compliance, Risk  factor reduction and impressions      Sera Meraz  Will Moraes

## 2018-07-25 ENCOUNTER — OFFICE VISIT (OUTPATIENT)
Dept: ENDOCRINOLOGY | Facility: CLINIC | Age: 53
End: 2018-07-25
Payer: COMMERCIAL

## 2018-07-25 VITALS
DIASTOLIC BLOOD PRESSURE: 72 MMHG | WEIGHT: 207.7 LBS | BODY MASS INDEX: 32.6 KG/M2 | SYSTOLIC BLOOD PRESSURE: 112 MMHG | HEART RATE: 70 BPM | HEIGHT: 67 IN

## 2018-07-25 DIAGNOSIS — E03.9 HYPOTHYROIDISM, UNSPECIFIED TYPE: Primary | ICD-10-CM

## 2018-07-25 DIAGNOSIS — R73.03 PREDIABETES: ICD-10-CM

## 2018-07-25 PROCEDURE — 99213 OFFICE O/P EST LOW 20 MIN: CPT | Performed by: NURSE PRACTITIONER

## 2018-07-25 RX ORDER — LEVOTHYROXINE SODIUM 0.15 MG/1
150 TABLET ORAL DAILY
Qty: 90 TABLET | Refills: 3 | Status: SHIPPED | OUTPATIENT
Start: 2018-07-25 | End: 2019-01-31 | Stop reason: SDUPTHER

## 2018-08-02 ENCOUNTER — HOSPITAL ENCOUNTER (OUTPATIENT)
Dept: RADIOLOGY | Age: 53
Discharge: HOME | End: 2018-08-02
Payer: COMMERCIAL

## 2018-08-02 ENCOUNTER — TELEPHONE (OUTPATIENT)
Dept: FAMILY MEDICINE CLINIC | Facility: CLINIC | Age: 53
End: 2018-08-02

## 2018-08-02 ENCOUNTER — TELEPHONE (OUTPATIENT)
Dept: INTERNAL MEDICINE CLINIC | Facility: CLINIC | Age: 53
End: 2018-08-02

## 2018-08-02 ENCOUNTER — TRANSCRIBE ORDERS (OUTPATIENT)
Dept: ADMINISTRATIVE | Age: 53
End: 2018-08-02

## 2018-08-02 DIAGNOSIS — Z12.39 SCREENING FOR BREAST CANCER: ICD-10-CM

## 2018-08-02 DIAGNOSIS — Z12.39 SCREENING FOR BREAST CANCER: Primary | ICD-10-CM

## 2018-08-02 DIAGNOSIS — Z12.31 ENCOUNTER FOR SCREENING MAMMOGRAM FOR MALIGNANT NEOPLASM OF BREAST: ICD-10-CM

## 2018-08-02 PROCEDURE — 77063 BREAST TOMOSYNTHESIS BI: CPT

## 2018-08-02 PROCEDURE — 77067 SCR MAMMO BI INCL CAD: CPT

## 2018-08-06 RX ORDER — TOPIRAMATE 25 MG/1
TABLET ORAL
Refills: 1 | COMMUNITY
Start: 2018-06-19 | End: 2020-01-20

## 2018-08-07 ENCOUNTER — OFFICE VISIT (OUTPATIENT)
Dept: INTERNAL MEDICINE CLINIC | Facility: CLINIC | Age: 53
End: 2018-08-07
Payer: COMMERCIAL

## 2018-08-07 ENCOUNTER — TELEPHONE (OUTPATIENT)
Dept: INTERNAL MEDICINE CLINIC | Facility: CLINIC | Age: 53
End: 2018-08-07

## 2018-08-07 VITALS
BODY MASS INDEX: 33.22 KG/M2 | HEIGHT: 67 IN | HEART RATE: 68 BPM | TEMPERATURE: 97.7 F | DIASTOLIC BLOOD PRESSURE: 80 MMHG | SYSTOLIC BLOOD PRESSURE: 122 MMHG | WEIGHT: 211.64 LBS

## 2018-08-07 DIAGNOSIS — H92.01 DISCOMFORT OF RIGHT EAR: ICD-10-CM

## 2018-08-07 DIAGNOSIS — N64.4 BREAST PAIN, RIGHT: Primary | ICD-10-CM

## 2018-08-07 DIAGNOSIS — Z23 NEED FOR SHINGLES VACCINE: ICD-10-CM

## 2018-08-07 PROCEDURE — 99213 OFFICE O/P EST LOW 20 MIN: CPT | Performed by: PHYSICIAN ASSISTANT

## 2018-08-07 NOTE — PATIENT INSTRUCTIONS
We will call breast center to find out what information you were provided and if require follow up   as discussed please stop using Q-Tips inside of her ears  There is no infection just some irritation from cleaning them    Please schedule follow up with your PCP    Script was provided for you to take to the pharmacy for the 1st in a series of two Shingrix vaccine

## 2018-08-07 NOTE — TELEPHONE ENCOUNTER
Called radiology department  Spoke with Perfecto Weinberg RN  She stated that the letter mailed to the patient says results are normal and should repeat a mammogram in 1 Year  However, they have to advise that she may benefit from an U/S for further evaluation being that they found dense breast tissue  She does have a family history of breast cancer, mom and maternal aunt  Being that she did have a 3 D mammogram the U/S is not usually covered and may need a pre auth  I Put her on the schedule to see you on 8/21 so that you may explain all this to her in person and determine wether she should go for her U/S or not  Patient was called and advised, but didn't express understanding

## 2018-08-07 NOTE — PROGRESS NOTES
Assessment/Plan:    No problem-specific Assessment & Plan notes found for this encounter  Diagnoses and all orders for this visit:    Breast pain, right    Need for shingles vaccine  -     Cancel: Zoster Vaccine Recombinant IM  -     Zoster Vac Recomb Adjuvanted ([address]) 50 MCG SUSR; Inject 0 5 mL into a muscle once for 1 dose    Discomfort of right ear          Subjective:      Patient ID: Tiffanie Hanson is a 48 y o  female  Pt states received a letter about her mammogram that she has scar tissue and to follow up  Advised patient there is nothing in her mammo about that  We will call breast center and advise if she requires any follow up  Pt states had a biopsy R breast and a clip in October 2017  Patient reports she is having some slight discomfort in the right breast but that this only started after having the mammogram     Patient confirm she did not have this pain prior to her visit  Patient is also reporting some discomfort in her right ear  Patient does admit that she uses Q-Tips every day to clean her ears  Patient also requesting a prescription for a shingles vaccine  Patient states that the pharmacy in [facility] has it and she will need the script to take their  The following portions of the patient's history were reviewed and updated as appropriate: allergies, current medications, past family history, past medical history, past social history, past surgical history and problem list     Review of Systems   Constitutional: Negative  HENT: Positive for ear pain  Negative for congestion, hearing loss, postnasal drip and tinnitus  Respiratory: Negative  Negative for wheezing  Cardiovascular: Negative  Musculoskeletal:        As noted in HPI slight breast discomfort after mammogram   Skin: Negative            Objective:      /80   Pulse 68   Temp 97 7 °F (36 5 °C)   Ht 5' 7" (1 702 m)   Wt 96 kg (211 lb 10 3 oz)   LMP 07/11/2018   BMI 33 15 kg/m² Physical Exam   Constitutional: She is oriented to person, place, and time  She appears well-developed and well-nourished  HENT:   Head: Normocephalic and atraumatic  Left Ear: External ear normal    Mouth/Throat: Oropharynx is clear and moist    Patient's ear exam is normal except for minimal redness to right canal with normal TM  Patient has no tenderness with manipulation no discharge or swelling  Patient has slight irritation in right ear status post Q-tip use  No Q-tip or fibers are noted to be in the canal    Cardiovascular: Normal rate and regular rhythm  Pulmonary/Chest: Effort normal and breath sounds normal  She has no wheezes  Neurological: She is alert and oriented to person, place, and time  Skin: Skin is warm and dry  Psychiatric: She has a normal mood and affect   Her behavior is normal

## 2018-08-12 NOTE — TELEPHONE ENCOUNTER
Deepak Ya is currently on nights, but will speak to the patient regarding the issue during her next appointment on the 21st

## 2018-08-15 ENCOUNTER — TELEPHONE (OUTPATIENT)
Dept: SURGERY | Facility: HOSPITAL | Age: 53
End: 2018-08-15

## 2018-08-15 RX ORDER — SODIUM CHLORIDE 9 MG/ML
50 INJECTION, SOLUTION INTRAVENOUS CONTINUOUS
Status: CANCELLED | OUTPATIENT
Start: 2018-08-15

## 2018-08-16 ENCOUNTER — HOSPITAL ENCOUNTER (OUTPATIENT)
Dept: INTERVENTIONAL RADIOLOGY/VASCULAR | Facility: HOSPITAL | Age: 53
Discharge: HOME | End: 2018-08-16
Admitting: PHYSICAL MEDICINE & REHABILITATION
Payer: COMMERCIAL

## 2018-08-16 VITALS
OXYGEN SATURATION: 97 % | WEIGHT: 210 LBS | HEART RATE: 69 BPM | SYSTOLIC BLOOD PRESSURE: 100 MMHG | RESPIRATION RATE: 16 BRPM | TEMPERATURE: 96.9 F | HEIGHT: 67 IN | BODY MASS INDEX: 32.96 KG/M2 | DIASTOLIC BLOOD PRESSURE: 70 MMHG

## 2018-08-16 DIAGNOSIS — M54.16 LUMBAR RADICULOPATHY: ICD-10-CM

## 2018-08-16 LAB — EXT PREGNANCY TEST URINE: NEGATIVE

## 2018-08-16 PROCEDURE — 81025 URINE PREGNANCY TEST: CPT | Performed by: PHYSICAL MEDICINE & REHABILITATION

## 2018-08-16 PROCEDURE — 99152 MOD SED SAME PHYS/QHP 5/>YRS: CPT

## 2018-08-16 RX ORDER — KETOROLAC TROMETHAMINE 30 MG/ML
INJECTION, SOLUTION INTRAMUSCULAR; INTRAVENOUS CODE/TRAUMA/SEDATION MEDICATION
Status: COMPLETED | OUTPATIENT
Start: 2018-08-16 | End: 2018-08-16

## 2018-08-16 RX ORDER — PAPAVERINE HCL 150 MG
CAPSULE, EXTENDED RELEASE ORAL CODE/TRAUMA/SEDATION MEDICATION
Status: DISCONTINUED | OUTPATIENT
Start: 2018-08-16 | End: 2018-08-20 | Stop reason: HOSPADM

## 2018-08-16 RX ORDER — SODIUM CHLORIDE 9 MG/ML
50 INJECTION, SOLUTION INTRAVENOUS CONTINUOUS
Status: DISCONTINUED | OUTPATIENT
Start: 2018-08-16 | End: 2018-08-20 | Stop reason: HOSPADM

## 2018-08-16 RX ORDER — LIDOCAINE HYDROCHLORIDE 10 MG/ML
INJECTION, SOLUTION EPIDURAL; INFILTRATION; INTRACAUDAL; PERINEURAL CODE/TRAUMA/SEDATION MEDICATION
Status: DISCONTINUED | OUTPATIENT
Start: 2018-08-16 | End: 2018-08-20 | Stop reason: HOSPADM

## 2018-08-16 RX ORDER — FENTANYL CITRATE 50 UG/ML
INJECTION, SOLUTION INTRAMUSCULAR; INTRAVENOUS CODE/TRAUMA/SEDATION MEDICATION
Status: COMPLETED | OUTPATIENT
Start: 2018-08-16 | End: 2018-08-16

## 2018-08-16 RX ORDER — MIDAZOLAM HYDROCHLORIDE 1 MG/ML
INJECTION INTRAMUSCULAR; INTRAVENOUS CODE/TRAUMA/SEDATION MEDICATION
Status: COMPLETED | OUTPATIENT
Start: 2018-08-16 | End: 2018-08-16

## 2018-08-16 RX ADMIN — SODIUM BICARBONATE 25 MEQ: 84 INJECTION PARENTERAL at 10:18

## 2018-08-16 RX ADMIN — KETOROLAC TROMETHAMINE 30 MG: 30 INJECTION, SOLUTION INTRAMUSCULAR; INTRAVENOUS at 10:18

## 2018-08-16 RX ADMIN — FENTANYL CITRATE 50 MCG: 50 INJECTION INTRAMUSCULAR; INTRAVENOUS at 10:20

## 2018-08-16 RX ADMIN — LIDOCAINE HYDROCHLORIDE 6 ML: 10 INJECTION, SOLUTION EPIDURAL; INFILTRATION; INTRACAUDAL; PERINEURAL at 10:18

## 2018-08-16 RX ADMIN — SODIUM CHLORIDE 50 ML/HR: 9 INJECTION, SOLUTION INTRAVENOUS at 08:45

## 2018-08-16 RX ADMIN — DEXAMETHASONE SODIUM PHOSPHATE 10 MG: 10 INJECTION, SOLUTION INTRAMUSCULAR; INTRAVENOUS at 10:18

## 2018-08-16 RX ADMIN — IOHEXOL 6 ML: 300 INJECTION, SOLUTION INTRAVENOUS at 10:18

## 2018-08-16 RX ADMIN — MIDAZOLAM HYDROCHLORIDE 2 MG: 1 INJECTION, SOLUTION INTRAMUSCULAR; INTRAVENOUS at 10:19

## 2018-08-16 NOTE — NURSING NOTE
No distress  Ready for discharge  Moves all extremities  Ambulating without difficulty  IV removed at 1110  Band-aids on back dry and intact  No drainage  Discharge instructions given

## 2018-08-16 NOTE — NURSING NOTE
Left via ambulatory with family  No distress  Prescription for Percocet was given prior to discharge

## 2018-08-16 NOTE — DISCHARGE INSTRUCTIONS
Epidural Steroid Injection   WHAT YOU NEED TO KNOW:   An epidural steroid injection (ERNESTINA) is a procedure to inject steroid medicine into the epidural space  The epidural space is between your spinal cord and vertebrae  Steroids reduce inflammation and fluid buildup in your spine that may be causing pain  You may be given pain medicine along with the steroids  DISCHARGE INSTRUCTIONS:   Seek care immediately if:   · Blood soaks through your bandage  · Your wound is red, swollen, or draining pus  · You have a fever or chills, severe back pain, and the procedure area is sensitive to the touch  · You have a seizure  · You have trouble moving your legs  · You have weakness or numbness in your legs  · You cannot control when you urinate or have a bowel movement  Contact your healthcare provider if:   · You have nausea or are vomiting  · Your face or neck is red for a few days and you feel warm  · You have more pain than you had before the procedure  · You have swelling in your hands or feet  · You have questions or concerns about your condition or care  Follow up with your healthcare provider as directed:  Write down your questions so you remember to ask them during your visits  Care for your wound as directed: You may remove the bandage before you go to bed the day of your procedure  You may take a shower, but do not take a bath for at least 24 hours  Self-care:   · Do not drive,  use machines, or do strenuous activity for 24 hours after your procedure or as directed  · Continue other treatments  as directed  Steroid injections alone will not control your pain  The injections are meant to be used with other treatments, such as physical therapy  © 2017 [address] [facility] Information is for End User's use only and may not be sold, redistributed or otherwise used for commercial purposes   All illustrations and images included in CareNotes® are the copyrighted property of [employer]  or [employer]  The above information is an  only  It is not intended as medical advice for individual conditions or treatments  Talk to your doctor, nurse or pharmacist before following any medical regimen to see if it is safe and effective for you

## 2018-08-16 NOTE — H&P
History of Present Illness: The patient is a 48 y o  female who presents with complaints of Low back pain left lumbar radiculopathy in    Patient Active Problem List   Diagnosis    Mixed incontinence    Stress incontinence in female    Obstructive sleep apnea syndrome    Hypothyroidism    Constipation    Chronic low back pain    Asthma    Arthritis    Gastritis    Rectocele    Cystocele    Hypermobility of urethra    Achalasia    Bilateral edema of lower extremity    Breast pain, right    Chronic sinusitis with recurrent bronchitis    Depression    Esophageal reflux    H  pylori infection    Headache, migraine    Intractable chronic migraine without aura and without status migrainosus    Breast mass    Obesity (BMI 30-39  9)    Other chronic pain    Spondylosis of lumbar region without myelopathy or radiculopathy    Thyroiditis    Tubular adenoma of colon    Food allergy    POP-Q stage 2 cystocele    Moderate persistent asthma with exacerbation    Prediabetes    Need for shingles vaccine    Discomfort of right ear       Past Medical History:   Diagnosis Date    Achalasia     Asthma     Constipation     Depression     Elevated LFTs     last assessed 10/25/17    Fibromyalgia     Gastric ulcer     GERD (gastroesophageal reflux disease)     Hypothyroidism     Migraines     MICHAEL (obstructive sleep apnea)     last assessed 16    Thyroid nodule     last assessed 17       Past Surgical History:   Procedure Laterality Date     SECTION      MYOTOMY HELLER LAPAROSCOPIC  2016    Dr Bravo Rosas, Coincident lorena fundoplication    NASAL SINUS SURGERY      OTHER SURGICAL HISTORY      achalasia repair    PILONIDAL CYST / SINUS EXCISION      TX CYSTOURETHROSCOPY N/A 2017    Procedure: CYSTOSCOPY;  Surgeon: Kapil Mccray MD;  [facility];  Service: UroGynecology           TX ESOPHAGOGASTRODUODENOSCOPY TRANSORAL DIAGNOSTIC N/A 2017    Procedure: ESOPHAGOGASTRODUODENOSCOPY (EGD); Surgeon: Lenora Servin MD;  [facility];   Service: Gastroenterology    SC POST COLPORRHAPHY,RECTUM/VAGINA N/A 1/19/2017    Procedure: COLPORRHAPHY POSTERIOR;  Surgeon: Gabe Wolff MD;  [facility];  Service: UroGynecology           SC SLING OPER STRES INCONTINENCE N/A 1/19/2017    Procedure: INSERTION PUBOVAGINAL SLING /SINGLE INCISION ;  Surgeon: Gabe Wolff MD;  [facility];  Service: UroGynecology           TONSILLECTOMY      TUBAL LIGATION           Current Outpatient Prescriptions:     acetaminophen (TYLENOL) 325 mg tablet, Every 4 hours as needed, Disp: 30 tablet, Rfl: 0    clotrimazole-betamethasone (LOTRISONE) 1-0 05 % cream, Apply topically 2 (two) times a day as needed (skin rash), Disp: 30 g, Rfl: 2    levothyroxine 150 mcg tablet, Take 1 tablet (150 mcg total) by mouth daily, Disp: 90 tablet, Rfl: 3    prochlorperazine (COMPAZINE) 10 mg tablet, TAKE 1 TABLET TWICE A DAY AS NEEDED FOR HEADACHE OR NAUSEA, LIMIT 3 DAYS PER WEEK, Disp: , Rfl: 0    prochlorperazine (COMPAZINE) 10 mg tablet, TAKE 1 TABLET TWICE A DAY AS NEEDED FOR HEADACHE OR NAUSEA, LIMIT 3 DAYS PER WEEK, Disp: , Rfl:     promethazine-codeine (PHENERGAN WITH CODEINE) 6 25-10 mg/5 mL syrup, Take 2 5 mL by mouth every 6 (six) hours, Disp: , Rfl:     albuterol (2 5 mg/3 mL) 0 083 % nebulizer solution, Take 1 vial (2 5 mg total) by nebulization every 4 (four) hours as needed for wheezing or shortness of breath for up to 90 days, Disp: 75 mL, Rfl: 1    albuterol (VENTOLIN HFA) 90 mcg/act inhaler, Inhale 2 puffs every 4 (four) hours as needed for wheezing or shortness of breath, Disp: 1 Inhaler, Rfl: 3    ARIPiprazole (ABILIFY) 5 mg tablet, Take 5 mg by mouth daily at bedtime, Disp: , Rfl: 1    Botulinum Toxin Type A 200 units SOLR, Inject 155 units into face and neck IM every 90 days, Disp: , Rfl:     BREO ELLIPTA, 1 puff daily , Disp: , Rfl:     clonazePAM (KlonoPIN) 0 5 mg tablet, Take 0 5 mg by mouth daily  , Disp: , Rfl:     dicyclomine (BENTYL) 20 mg tablet, TAKE 1 TABLET BY MOUTH EVERY 6 HOURS AS NEEDED, Disp: 120 tablet, Rfl: 2    docusate sodium (COLACE) 100 mg capsule, TAKE 1 CAPSULE TWICE DAILY AS NEEDED , Disp: , Rfl:     doxepin (SINEquan) 25 mg capsule, TAKE 1-2 CAPSULES BY MOUTH DAILY AT BEDTIME , Disp: , Rfl: 0    fluticasone (FLONASE) 50 mcg/act nasal spray, 1-2 sprays into each nostril daily, Disp: 16 g, Rfl: 0    gabapentin (NEURONTIN) 300 mg capsule, Take 1 capsule (300 mg total) by mouth daily at bedtime, Disp: 90 capsule, Rfl: 3    hydrOXYzine pamoate (VISTARIL) 50 mg capsule, Take 50 mg by mouth 3 (three) times a day as needed  , Disp: , Rfl:     loratadine (CLARITIN) 10 mg tablet, Take 1 tablet (10 mg total) by mouth daily, Disp: 90 tablet, Rfl: 0    meloxicam (MOBIC) 15 mg tablet, Take 1 tablet (15 mg total) by mouth daily, Disp: 30 tablet, Rfl: 6    pantoprazole (PROTONIX) 40 mg tablet, TAKE 1 TABLET DAILY  , Disp: 30 tablet, Rfl: 5    polyethylene glycol (GLYCOLAX) powder, Take 17 g by mouth as needed Mix 1 capful in 8 ounces of water, juice or tea  , Disp: , Rfl: 11    sucralfate (CARAFATE) 1 g tablet, TAKE 1 TABLET BY MOUTH 3 TIMES DAILY, Disp: 90 tablet, Rfl: 3    topiramate (TOPAMAX) 25 mg tablet, TAKE 4 PILLS AT BEDTIME , Disp: , Rfl: 1    VIIBRYD 40 MG tablet, Take 40 mg by mouth daily With food, Disp: , Rfl: 0    Current Facility-Administered Medications:     sodium chloride 0 9 % infusion, 50 mL/hr, Intravenous, Continuous, Asael Miranda MD, Last Rate: 50 mL/hr at 08/16/18 0845, 50 mL/hr at 08/16/18 0845    Allergies   Allergen Reactions    Gabapentin      Annotation - [id number]: Neuropsych effects per patient      Latex Dermatitis    Penicillins Hives       Physical Exam:   Vitals:    08/16/18 1015   BP: 125/64   Pulse: 67   Resp: 16   Temp:    SpO2: 100%     General: Awake, Alert, Oriented x 3, Mood and affect appropriate  Respiratory: Respirations even and unlabored  Cardiovascular: Peripheral pulses intact; no edema  Musculoskeletal Exam:   Positive straight leg raise left side only, low back pain, limited lumbar range of motion      Assessment:   1   Lumbar radiculopathy        Plan:  Left L4 and L5 transforaminal epidural steroid injections

## 2018-08-16 NOTE — NURSING NOTE
Received patient awake and alert post pain injection  VSS  Pain level 1/10  Bandaids dry and intact   Tolerating liquids and toast

## 2018-08-21 ENCOUNTER — OFFICE VISIT (OUTPATIENT)
Dept: INTERNAL MEDICINE CLINIC | Facility: CLINIC | Age: 53
End: 2018-08-21
Payer: COMMERCIAL

## 2018-08-21 VITALS
HEART RATE: 76 BPM | HEIGHT: 67 IN | BODY MASS INDEX: 32.53 KG/M2 | TEMPERATURE: 98.4 F | WEIGHT: 207.23 LBS | SYSTOLIC BLOOD PRESSURE: 110 MMHG | DIASTOLIC BLOOD PRESSURE: 80 MMHG

## 2018-08-21 DIAGNOSIS — N60.01 BREAST CYST, RIGHT: Primary | ICD-10-CM

## 2018-08-21 PROCEDURE — 99212 OFFICE O/P EST SF 10 MIN: CPT | Performed by: INTERNAL MEDICINE

## 2018-08-21 NOTE — PROGRESS NOTES
ESTABLISHMENT OF CARE VISIT    ASSESSMENT/PLAN:  Problem List Items Addressed This Visit     None      Visit Diagnoses     Breast cyst, right    -  Primary    Relevant Orders    Ambulatory Referral to Breast Surgery      Will refer patient to breast surgery for possible excision of this cyst   Patient is very frustrated with the pain and seems reasonable to have it evaluated for possible excision  On physical exam her right breast when compared to left is extremely tender to palpation in the right upper quadrant and in the axilla  There is a right mobile cyst that is palpable her however no adenopathy is appreciated  Skin is non erythematous, no rash, and the nipple is not inverted  A patient to follow up with gynecology for her consistent  She is perimenopausal period  Schedule a follow-up appointment in 3 months  HISTORY OF PRESENTING ILLNESS:  Shira Mendoza is a 48 y o  with PMH significant for asthma, hypothyroidism, chronic bilateral lower extremity edema, GERD, obesity, and chronic right breast this  Shira Mendoza is in clinic today for follow-up of her mammogram results and to discuss chronic right breast cyst   Patient had a mammogram performed on August 2nd, 2018 which exacerbated her right breast pain  Patient reports is breast pain is chronic and has always worsened with her     However since her mammogram the pain is significantly worse and patient is unable to carry things, lay flat on the bed, hurts with all kind of movement  Patient denies any fever, chills, weight loss, night sweats, hot flashes, or rashes  Patient has had this cyst for quite some time and in October of 2017 she underwent ultrasound-guided breast biopsy  The biopsy at that time was negative for any malignancy  Patient is very frustrated with this pain lying excision of the cyst      Of note patient has also had inconsistent periods with past years with the past few months has been having periods twice month  Patient reports her bleeding is stable  Patient also had some weight gain secondary to decreased exercise  Review of Systems   Constitutional: Negative for activity change, appetite change, chills, fatigue, fever and unexpected weight change  Respiratory: Negative for apnea, cough, choking, chest tightness, shortness of breath, wheezing and stridor  Cardiovascular: Positive for leg swelling  Negative for palpitations  Gastrointestinal: Negative for abdominal distention, abdominal pain, constipation, diarrhea, nausea and vomiting  Endocrine:        Breast pain     Genitourinary: Negative for difficulty urinating, vaginal bleeding and vaginal discharge  Musculoskeletal: Negative for arthralgias and back pain  Skin: Negative for color change and pallor  Neurological: Negative for dizziness, tremors, seizures, facial asymmetry, speech difficulty, numbness and headaches  Hematological: Negative for adenopathy  Does not bruise/bleed easily  Psychiatric/Behavioral: Negative for behavioral problems  OBJECTIVE:  Vitals:    08/21/18 1322   BP: 110/80   Pulse: 76   Temp: 98 4 °F (36 9 °C)   Weight: 94 kg (207 lb 3 7 oz)   Height: 5' 7" (1 702 m)     Physical Exam   Constitutional: She is oriented to person, place, and time  She appears well-developed and well-nourished  No distress  HENT:   Head: Normocephalic and atraumatic  Mouth/Throat: No oropharyngeal exudate  Eyes: Conjunctivae are normal  Pupils are equal, round, and reactive to light  Neck: Normal range of motion  Neck supple  No JVD present  Cardiovascular: Normal rate, regular rhythm, normal heart sounds and intact distal pulses  Exam reveals no gallop and no friction rub  No murmur heard  Pulmonary/Chest: Effort normal and breath sounds normal  No respiratory distress  She has no wheezes  She has no rales  Right breast exhibits mass   Right breast exhibits no inverted nipple, no nipple discharge, no skin change and no tenderness  Left breast exhibits no inverted nipple, no mass, no nipple discharge, no skin change and no tenderness  Right upper quadrant mobile mass, extremely tender to palpation  Nonerythematous, not warm to touch  No axillary adenopathy appreciated  Abdominal: Soft  Bowel sounds are normal  She exhibits no distension  There is no tenderness  There is no guarding  Genitourinary: There is breast tenderness  Musculoskeletal: She exhibits edema ( trace bilateral lower extremity edema)  She exhibits no tenderness  Lymphadenopathy:     She has no cervical adenopathy  Neurological: She is alert and oriented to person, place, and time  No cranial nerve deficit  Skin: Skin is warm and dry  She is not diaphoretic  No erythema           Current Outpatient Prescriptions:     acetaminophen (TYLENOL) 325 mg tablet, Every 4 hours as needed, Disp: 30 tablet, Rfl: 0    albuterol (2 5 mg/3 mL) 0 083 % nebulizer solution, Take 1 vial (2 5 mg total) by nebulization every 4 (four) hours as needed for wheezing or shortness of breath for up to 90 days, Disp: 75 mL, Rfl: 1    albuterol (VENTOLIN HFA) 90 mcg/act inhaler, Inhale 2 puffs every 4 (four) hours as needed for wheezing or shortness of breath, Disp: 1 Inhaler, Rfl: 3    ARIPiprazole (ABILIFY) 5 mg tablet, Take 5 mg by mouth daily at bedtime, Disp: , Rfl: 1    Botulinum Toxin Type A 200 units SOLR, Inject 155 units into face and neck IM every 90 days, Disp: , Rfl:     BREO ELLIPTA, 1 puff daily , Disp: , Rfl:     clonazePAM (KlonoPIN) 0 5 mg tablet, Take 0 5 mg by mouth daily  , Disp: , Rfl:     clotrimazole-betamethasone (LOTRISONE) 1-0 05 % cream, Apply topically 2 (two) times a day as needed (skin rash), Disp: 30 g, Rfl: 2    dicyclomine (BENTYL) 20 mg tablet, TAKE 1 TABLET BY MOUTH EVERY 6 HOURS AS NEEDED, Disp: 120 tablet, Rfl: 2    docusate sodium (COLACE) 100 mg capsule, TAKE 1 CAPSULE TWICE DAILY AS NEEDED , Disp: , Rfl:     doxepin (SINEquan) 25 mg capsule, TAKE 1-2 CAPSULES BY MOUTH DAILY AT BEDTIME , Disp: , Rfl: 0    fluticasone (FLONASE) 50 mcg/act nasal spray, 1-2 sprays into each nostril daily, Disp: 16 g, Rfl: 0    gabapentin (NEURONTIN) 300 mg capsule, Take 1 capsule (300 mg total) by mouth daily at bedtime, Disp: 90 capsule, Rfl: 3    hydrOXYzine pamoate (VISTARIL) 50 mg capsule, Take 50 mg by mouth 3 (three) times a day as needed  , Disp: , Rfl:     levothyroxine 150 mcg tablet, Take 1 tablet (150 mcg total) by mouth daily, Disp: 90 tablet, Rfl: 3    loratadine (CLARITIN) 10 mg tablet, Take 1 tablet (10 mg total) by mouth daily, Disp: 90 tablet, Rfl: 0    meloxicam (MOBIC) 15 mg tablet, Take 1 tablet (15 mg total) by mouth daily, Disp: 30 tablet, Rfl: 6    pantoprazole (PROTONIX) 40 mg tablet, TAKE 1 TABLET DAILY  , Disp: 30 tablet, Rfl: 5    polyethylene glycol (GLYCOLAX) powder, Take 17 g by mouth as needed Mix 1 capful in 8 ounces of water, juice or tea  , Disp: , Rfl: 11    prochlorperazine (COMPAZINE) 10 mg tablet, TAKE 1 TABLET TWICE A DAY AS NEEDED FOR HEADACHE OR NAUSEA, LIMIT 3 DAYS PER WEEK, Disp: , Rfl: 0    prochlorperazine (COMPAZINE) 10 mg tablet, TAKE 1 TABLET TWICE A DAY AS NEEDED FOR HEADACHE OR NAUSEA, LIMIT 3 DAYS PER WEEK, Disp: , Rfl:     promethazine-codeine (PHENERGAN WITH CODEINE) 6 25-10 mg/5 mL syrup, Take 2 5 mL by mouth every 6 (six) hours, Disp: , Rfl:     sucralfate (CARAFATE) 1 g tablet, TAKE 1 TABLET BY MOUTH 3 TIMES DAILY, Disp: 90 tablet, Rfl: 3    topiramate (TOPAMAX) 25 mg tablet, TAKE 4 PILLS AT BEDTIME , Disp: , Rfl: 1    VIIBRYD 40 MG tablet, Take 40 mg by mouth daily With food, Disp: , Rfl: 0    Past Medical History:   Diagnosis Date    Achalasia     Asthma     Constipation     Depression     Elevated LFTs     last assessed 10/25/17    Fibromyalgia     Gastric ulcer     GERD (gastroesophageal reflux disease)     Hypothyroidism     Migraines     MICHAEL (obstructive sleep apnea) last assessed 16    Thyroid nodule     last assessed 17     Past Surgical History:   Procedure Laterality Date     SECTION      MYOTOMY HELLER LAPAROSCOPIC  2016    Dr Cuba Encinas, Coincident lorena fundoplication    NASAL SINUS SURGERY      OTHER SURGICAL HISTORY      achalasia repair    PILONIDAL CYST / SINUS EXCISION      IA CYSTOURETHROSCOPY N/A 2017    Procedure: CYSTOSCOPY;  Surgeon: Christine Stanley MD;  [facility];  Service: UroGynecology           IA ESOPHAGOGASTRODUODENOSCOPY TRANSORAL DIAGNOSTIC N/A 2017    Procedure: ESOPHAGOGASTRODUODENOSCOPY (EGD); Surgeon: Rosita Us MD;  [facility]; Service: Gastroenterology    IA POST COLPORRHAPHY,RECTUM/VAGINA N/A 2017    Procedure: COLPORRHAPHY POSTERIOR;  Surgeon: Christine Stanley MD;  [facility];  Service: UroGynecology           IA SLING OPER STRES INCONTINENCE N/A 2017    Procedure: INSERTION PUBOVAGINAL SLING /SINGLE INCISION ;  Surgeon: Christine Stanley MD;  [facility];  Service: UroGynecology           TONSILLECTOMY      TUBAL LIGATION       Social History     Social History    Marital status: Single     Spouse name: N/A    Number of children: N/A    Years of education: N/A     Occupational History    Not on file       Social History Main Topics    Smoking status: Former Smoker     Packs/day: 1 50     Years: 30 00     Quit date:     Smokeless tobacco: Never Used      Comment: pt stated quit about a month ago, last assessed 14 per [employer]    Alcohol use No    Drug use: No    Sexual activity: No     Other Topics Concern    Not on file     Social History Narrative    No narrative on file     Family History   Problem Relation Age of Onset    Cancer Mother     Breast cancer Mother     Hypertension Mother     Dementia Father     Stroke Father     Diabetes Father     Hypertension Father     Cancer Maternal Grandmother     Ovarian cancer Maternal Grandmother         unspecified laterality    Cancer Maternal Grandfather     Hypertension Maternal Grandfather     Tongue cancer Maternal Grandfather     Stroke Paternal Grandmother     Thyroid disease unspecified Brother     Thyroid disease unspecified Maternal Aunt     Colon cancer Maternal Aunt     Breast cancer Family        ==  Cassandra Nuñez MD   Internal Medicine PGY-2  8/21/2018 1:52 PM    [facility]  [address]   [facility] , [address] [address] [facility] [facility]  Office: [phone]  Fax: [phone]

## 2018-08-24 ENCOUNTER — OFFICE VISIT (OUTPATIENT)
Dept: NEUROSURGERY | Facility: CLINIC | Age: 53
End: 2018-08-24
Payer: COMMERCIAL

## 2018-08-24 VITALS
HEIGHT: 67 IN | TEMPERATURE: 98.3 F | DIASTOLIC BLOOD PRESSURE: 70 MMHG | RESPIRATION RATE: 16 BRPM | SYSTOLIC BLOOD PRESSURE: 124 MMHG | BODY MASS INDEX: 32.33 KG/M2 | WEIGHT: 206 LBS | HEART RATE: 80 BPM

## 2018-08-24 DIAGNOSIS — G89.4 CHRONIC PAIN SYNDROME: ICD-10-CM

## 2018-08-24 DIAGNOSIS — M70.61 TROCHANTERIC BURSITIS OF RIGHT HIP: ICD-10-CM

## 2018-08-24 DIAGNOSIS — M54.16 LUMBAR RADICULOPATHY: Primary | ICD-10-CM

## 2018-08-24 PROCEDURE — 99213 OFFICE O/P EST LOW 20 MIN: CPT | Performed by: PHYSICIAN ASSISTANT

## 2018-08-24 NOTE — PATIENT INSTRUCTIONS
As discussed consultation with pain management, Dr Main to discuss spinal cord stimulator trial     Consultation as discussed with Orthopedic surgery to evaluate right hip pain  Further follow-up with Neurosurgery will be on an as-needed basis

## 2018-08-24 NOTE — PROGRESS NOTES
Assessment/Plan:    Very pleasant 61-year-old female, returns for 3 month follow-up, chronic right low back,  right buttocks, right hip, and right leg pain  She reports these are unchanged from her last visit  She has had flexion-extension films of lumbar spine 5/24/18 these were carefully reviewed in detail by Dr Pippa Latham and the revealed no lumbar instability  She has had MRI reviewed her last visit 3/22/18, this study was reviewed again by Dr Pippa Latham who concurs with Dr Zabrina Sultana findings, L3-L4 mild stenosis on the right, and slightly less foraminal stenosis on the right at L4-L5, no central stenosis or other foraminal narrowing is appreciated  There are no lesions requiring neurosurgical intervention  She has had epidural steroid injections right L4-L5 7/19/18, and left L4-L5 8/16/18, neither of which have given her any relief she reports  As reported in the past her symptoms had their origination approximately 2012 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  On examination today she is markedly tender over the right greater trochanter  As well as in the right sciatic notch  Motor examination lower extremities is 5 x 5, sensation she, sharp, doll is intact throughout, deep tendon reflexes are +2 and symmetric for the patella and Achilles  Straight leg raise again is 90° bilaterally without discomfort  Standing forward flexion is to approximately 80° this does result in some pain in the right hip and right back  Scooter's test is positive on the right negative on the left    Pain management consultation is advised for stay spinal cord stimulator trial     As she lives in [facility] [facility] and [place] management physician she will ask for someone locally, consultation with Dr Rico Covarrubias is requested      She also has persistent right hip pain which appears to be a trochanteric bursitis, orthopedic consultation is also requested  Further follow-up with Neurosurgery will be on an as-needed basis  These findings, impressions and recommendations are reviewed in great detail with the patient, she expressed understanding and agreement, her questions were answered completely and to her satisfaction  Diagnoses and all orders for this visit:    Lumbar radiculopathy  -     Ambulatory referral to Pain Management; Future    Chronic pain syndrome  -     Ambulatory referral to Pain Management; Future    Trochanteric bursitis of right hip  -     Ambulatory referral to Orthopedic Surgery; Future          No Follow-up on file  Subjective:      Patient ID: Jordan Hines is a 48 y o  female  HPI    The following portions of the patient's history were reviewed and updated as appropriate: allergies, current medications, past family history, past medical history, past social history and past surgical history  Review of Systems   HENT: Negative  Eyes: Negative  Respiratory: Negative  Cardiovascular: Negative  Gastrointestinal: Negative  Endocrine: Negative  Genitourinary: Negative  Musculoskeletal: Positive for back pain (radiates to right leg)  Skin: Negative  Allergic/Immunologic: Negative  Neurological: Positive for weakness (right leg)  Hematological: Negative  Psychiatric/Behavioral: Negative  Objective:    Physical Exam   Constitutional: She is oriented to person, place, and time  She appears well-developed  HENT:   Head: Normocephalic and atraumatic  Eyes: EOM are normal  Pupils are equal, round, and reactive to light  Cardiovascular: Normal rate, regular rhythm and normal heart sounds  Pulmonary/Chest: Effort normal and breath sounds normal    Neurological: She is alert and oriented to person, place, and time  She has normal reflexes  Gait normal    Skin: Skin is warm and dry  Psychiatric: She has a normal mood and affect         Neurologic Exam     Mental Status Oriented to person, place, and time  Level of consciousness: alert    Cranial Nerves     CN III, IV, VI   Pupils are equal, round, and reactive to light  Extraocular motions are normal      Motor Exam   Muscle bulk: normal  Overall muscle tone: normal  Right arm pronator drift: absent  Left arm pronator drift: absent    Sensory Exam   Light touch normal      Gait, Coordination, and Reflexes     Gait  Gait: normal     LUMBAR SPINE   5/24/18     INDICATION:   M54 31: Sciatica, right side  M54 5: Low back pain  M79 651: Pain in right thigh  G89 4: Chronic pain syndrome  M54 16: Radiculopathy, lumbar region      COMPARISON:  2/1/2012     VIEWS:  XR SPINE LUMBAR MINIMUM 4 VIEWS NON INJURY  Images: 4     FINDINGS:     There is no evidence of acute fracture or destructive osseous lesion      Minimal retrolisthesis at L2-L3 and L3-L4 noted      Mild disc space narrowing and spondylosis at L2-L3 identified      The pedicles appear intact      Visualized soft tissues appear unremarkable      IMPRESSION:     No acute osseous abnormality        Degenerative changes as described  MRI Lumbar Spine 3/22/18    History: Lower back pain  Injury in 2012     Procedure: MRI of the lumbar spine was obtained with the following  sequences: Sagittal T1, sagittal T2, sagittal STIR and axial T2  weighted images      Comparison: None     Findings: For the purposes of this dictation, the lumbar vertebrae  are labeled from a caudal to cranial direction, the first vertebra  with lumbar morphology is labeled as L5      Conus and lower thoracic cord: Normal signal intensity and position  of the conus medullaris  The cord terminates at the T12-L1 level      Marrow and Alignment: There are degenerative endplate the signal  changes at L2-3 with disc narrowing and suggestion of minimal  retrolisthesis  Mild retrolisthesis is also suggested at L3-4    There are oval lesions demonstrating hyperintense T1 and T2 signal  involving T12 and L1 bodies, compatible with hemangioma or focal fat  deposition  No compression fracture or marrow edema     L1-L2: Normal     L2-L3 : Diffuse annular bulge, mildly indenting the dural sac      L3-L4: Annual bulge and right foraminal disc protrusion which mildly  indents the dural sac and the right neural foramen     L4-L5: Annual bulge which results in bilateral inferior foraminal  narrowing, greater on the right side     L5-S1: Mild asymmetric annular bulge which results in slight  narrowing of left neural foramen     Impression:  1  Small disc protrusion at L3-4 as well as multilevel annular  bulges at L2-3 to L5-S1, without significant spinal stenosis

## 2018-08-27 ENCOUNTER — OFFICE VISIT (OUTPATIENT)
Dept: OBGYN CLINIC | Facility: HOSPITAL | Age: 53
End: 2018-08-27
Payer: COMMERCIAL

## 2018-08-27 ENCOUNTER — HOSPITAL ENCOUNTER (OUTPATIENT)
Dept: RADIOLOGY | Facility: HOSPITAL | Age: 53
Discharge: HOME | End: 2018-08-27
Payer: COMMERCIAL

## 2018-08-27 VITALS
WEIGHT: 211 LBS | HEIGHT: 68 IN | HEART RATE: 59 BPM | BODY MASS INDEX: 31.98 KG/M2 | SYSTOLIC BLOOD PRESSURE: 107 MMHG | DIASTOLIC BLOOD PRESSURE: 65 MMHG

## 2018-08-27 DIAGNOSIS — M70.61 TROCHANTERIC BURSITIS OF RIGHT HIP: ICD-10-CM

## 2018-08-27 PROCEDURE — 73502 X-RAY EXAM HIP UNI 2-3 VIEWS: CPT

## 2018-08-27 PROCEDURE — 20610 DRAIN/INJ JOINT/BURSA W/O US: CPT | Performed by: PHYSICIAN ASSISTANT

## 2018-08-27 PROCEDURE — 99203 OFFICE O/P NEW LOW 30 MIN: CPT | Performed by: PHYSICIAN ASSISTANT

## 2018-08-27 RX ADMIN — LIDOCAINE HYDROCHLORIDE 2 ML: 10 INJECTION, SOLUTION INFILTRATION; PERINEURAL at 09:39

## 2018-08-27 RX ADMIN — METHYLPREDNISOLONE ACETATE 2 ML: 40 INJECTION, SUSPENSION INTRA-ARTICULAR; INTRALESIONAL; INTRAMUSCULAR; SOFT TISSUE at 09:39

## 2018-08-27 RX ADMIN — BUPIVACAINE HYDROCHLORIDE 2 ML: 2.5 INJECTION, SOLUTION INFILTRATION; PERINEURAL at 09:39

## 2018-08-27 NOTE — PROGRESS NOTES
Assessment/Plan   Diagnoses and all orders for this visit:    Trochanteric bursitis of right hip  -     Ambulatory referral to Orthopedic Surgery  -     XR hip/pelv 2-3 vws right if performed; Future          Subjective   Patient ID: Shaheed Hummel is a 48 y o  female  Vitals:    18 [id number]   BP: 107/65   Pulse: 61     49 yo female comes in for an evaluation of her right hip  She has been having lateral hip pain since  and is seeing neuro and pain management for this  At her last visit, she was very tender over the greater trochanter and there was thought to be an element of trochanteric bursitis contributing to her current pain  Teresa Eliana states she had a trochanteric bursa cortisone injection a few years ago and it was very helpful  She would like to try that again  Her pain increases with walking, and with direct touch  She is unable to lay on her right side to sleep  The pain is dull in character, mild in severity            The following portions of the patient's history were reviewed and updated as appropriate: allergies, current medications, past family history, past medical history, past social history, past surgical history and problem list     Review of Systems  Ortho Exam  Past Medical History:   Diagnosis Date    Achalasia     Asthma     Constipation     Depression     Elevated LFTs     last assessed 10/25/17    Fibromyalgia     Gastric ulcer     GERD (gastroesophageal reflux disease)     Hypothyroidism     Migraines     MICHAEL (obstructive sleep apnea)     last assessed 16    Thyroid nodule     last assessed 17     Past Surgical History:   Procedure Laterality Date     SECTION      MYOTOMY HELLER LAPAROSCOPIC  2016    Dr Sarah Ortega, Coincident lorena fundoplication    NASAL SINUS SURGERY      OTHER SURGICAL HISTORY      achalasia repair    PILONIDAL CYST / SINUS EXCISION      WA CYSTOURETHROSCOPY N/A 2017    Procedure: CYSTOSCOPY;  Surgeon: Stanley Patterson MD;  Location: [facility];  Service: UroGynecology           ME ESOPHAGOGASTRODUODENOSCOPY TRANSORAL DIAGNOSTIC N/A 9/7/2017    Procedure: ESOPHAGOGASTRODUODENOSCOPY (EGD); Surgeon: Baylee Cruz MD;  Location: [facility]; Service: Gastroenterology    ME POST COLPORRHAPHY,RECTUM/VAGINA N/A 1/19/2017    Procedure: COLPORRHAPHY POSTERIOR;  Surgeon: Michael Lagos MD;  Location[facility];  Service: UroGynecology           ME SLING OPER STRES INCONTINENCE N/A 1/19/2017    Procedure: INSERTION PUBOVAGINAL SLING /SINGLE INCISION ;  Surgeon: Michael Lagos MD;  Location[facility];  Service: UroGynecology           TONSILLECTOMY      TUBAL LIGATION       Family History   Problem Relation Age of Onset    Cancer Mother     Breast cancer Mother     Hypertension Mother     Dementia Father     Stroke Father     Diabetes Father     Hypertension Father     Cancer Maternal Grandmother     Ovarian cancer Maternal Grandmother         unspecified laterality    Cancer Maternal Grandfather     Hypertension Maternal Grandfather     Tongue cancer Maternal Grandfather     Stroke Paternal Grandmother     Thyroid disease unspecified Brother     Thyroid disease unspecified Maternal Aunt     Colon cancer Maternal Aunt     Breast cancer Family      Social History     Occupational History    Not on file  Social History Main Topics    Smoking status: Former Smoker     Packs/day: 1 50     Years: 30 00     Quit date: 2016    Smokeless tobacco: Never Used      Comment: pt stated quit about a month ago, last assessed 4/30/14 per [employer]    Alcohol use No    Drug use: No    Sexual activity: No       Review of Systems   Constitutional: Negative  HENT: Negative  Eyes: Negative  Respiratory: Negative  Cardiovascular: Negative  Gastrointestinal: Negative  Endocrine: Negative  Genitourinary: Negative  Musculoskeletal: As below      Allergic/Immunologic: Negative  Neurological: Negative  Hematological: Negative  Psychiatric/Behavioral: Negative  Objective   Physical Exam      I have personally reviewed pertinent films in PACS and my interpretation is no fracture      · Constitutional: Awake, Alert, Oriented  · Eyes: EOMI  · Psych: Mood and affect appropriate  · Heart: regular rate and rhythm  · Lungs: No audible wheezing  · Abdomen: soft  · Lymph: no lymphedema       right Hip:  - Appearance   No swelling, discoloration, deformity, or ecchymosis  - Palpation   Greater trochanter  right tenderness to palpation  - ROM   Flexion: 90, ER: 30, IR: 20   - Special Tests   No pain with log rolling + Jaden  - Motor   normal 5/5 in all hip planes  - NVI distally    Large joint arthrocentesis  Date/Time: 8/27/2018 9:39 AM  Consent given by: patient  Site marked: site marked  Timeout: Immediately prior to procedure a time out was called to verify the correct patient, procedure, equipment, support staff and site/side marked as required   Supporting Documentation  Indications: pain   Procedure Details  Location: hip - R greater trochanteric bursa  Preparation: Patient was prepped and draped in the usual sterile fashion  Needle size: 22 G  Ultrasound guidance: no  Approach: lateral  Medications administered: 2 mL bupivacaine 0 25 %; 2 mL lidocaine 1 %; 2 mL methylPREDNISolone acetate 40 mg/mL    Patient tolerance: patient tolerated the procedure well with no immediate complications  Dressing:  Sterile dressing applied

## 2018-08-30 RX ORDER — LIDOCAINE HYDROCHLORIDE 10 MG/ML
2 INJECTION, SOLUTION INFILTRATION; PERINEURAL
Status: COMPLETED | OUTPATIENT
Start: 2018-08-27 | End: 2018-08-27

## 2018-08-30 RX ORDER — METHYLPREDNISOLONE ACETATE 40 MG/ML
2 INJECTION, SUSPENSION INTRA-ARTICULAR; INTRALESIONAL; INTRAMUSCULAR; SOFT TISSUE
Status: COMPLETED | OUTPATIENT
Start: 2018-08-27 | End: 2018-08-27

## 2018-08-30 RX ORDER — BUPIVACAINE HYDROCHLORIDE 2.5 MG/ML
2 INJECTION, SOLUTION INFILTRATION; PERINEURAL
Status: COMPLETED | OUTPATIENT
Start: 2018-08-27 | End: 2018-08-27

## 2018-10-01 DIAGNOSIS — K58.0 IRRITABLE BOWEL SYNDROME WITH DIARRHEA: ICD-10-CM

## 2018-10-03 RX ORDER — DICYCLOMINE HCL 20 MG
TABLET ORAL
Qty: 120 TABLET | Refills: 2 | Status: SHIPPED | OUTPATIENT
Start: 2018-10-03 | End: 2019-01-09 | Stop reason: SDUPTHER

## 2018-10-05 ENCOUNTER — OFFICE VISIT (OUTPATIENT)
Dept: OBGYN CLINIC | Facility: OTHER | Age: 53
End: 2018-10-05
Payer: COMMERCIAL

## 2018-10-05 VITALS
HEART RATE: 81 BPM | WEIGHT: 211 LBS | HEIGHT: 67 IN | BODY MASS INDEX: 33.12 KG/M2 | DIASTOLIC BLOOD PRESSURE: 88 MMHG | SYSTOLIC BLOOD PRESSURE: 116 MMHG

## 2018-10-05 DIAGNOSIS — M70.61 TROCHANTERIC BURSITIS OF RIGHT HIP: Primary | ICD-10-CM

## 2018-10-05 DIAGNOSIS — R20.0 NUMBNESS OF TOES: ICD-10-CM

## 2018-10-05 DIAGNOSIS — M25.551 PAIN IN RIGHT HIP: ICD-10-CM

## 2018-10-05 DIAGNOSIS — M79.604 RIGHT LEG PAIN: ICD-10-CM

## 2018-10-05 PROCEDURE — 99214 OFFICE O/P EST MOD 30 MIN: CPT | Performed by: ORTHOPAEDIC SURGERY

## 2018-10-05 NOTE — PROGRESS NOTES
Assessment:       1  Trochanteric bursitis of right hip    2  Pain in right hip    3  Numbness of toes    4  Right leg pain          Plan:        Explain my current clinical findings to Susanna woo today  Her right hip and lower extremity symptoms are nonspecific  Clinical exam does reveal trochanteric bursa tenderness but she also has multiple other areas of right hip and gluteal pain with discomfort on multiple hip exam   Notably, she also has tenderness of her right foot 2nd metatarsal space and her right foot 2nd toe numbness may potentially be secondary to a Morris's neuroma  At this time, I have advised her to do an EMG study of her lower extremities for further assessment following which she will be reviewed in the office  I have also referred for a course of physical therapy to focus on hip and back strengthening exercises and iliotibial band stretching  She may consider the use of a TENS machine for her right lateral hip pain as well  Subjective:     Patient ID: Ramirez Pedro is a 48 y o  female  Chief Complaint:    HPI   Susanna woo is a 73-year-old lady who is here today for evaluation of right hip pain  She was earlier seen in this regard on 8/27/2018 by orthopedic LORENZO Melton and was suspected to have a right trochanteric bursitis  She received a right trochanteric bursa cortisone injection which only gave her minimal improvement of symptoms  She continues to experience pain in her right hip  Further questioning reveals that Susanna woo has multiple areas of pain with start from her right upper gluteal area as well as the lower gluteal area and the lateral aspect of the hip  There is intermittent radiation of this pain down her right lateral thigh  Additionally, she complains of intermittent numbness of her right foot 2nd toe which only happens with prolonged weight-bearing and ambulation    Notably, she has also been on the care of neuro surgery in pain management in the past    An MRI of her lumbar spine early this year revealed L3-L4 mild stenosis on the right, slightly less foraminal stenosis on the right at L4-L5, no central stenosis or other foraminal narrowing     She has had epidural steroid injections of the right L4-L5 in July 2018 and left L4-L5 in August 2018 without significant improvement of her symptoms  All her symptoms reportedly started around 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  She denies any focal weakness of her right lower extremity this time and denies new onset bowel or bladder control problems  Social History     Occupational History    Not on file  Social History Main Topics    Smoking status: Former Smoker     Packs/day: 1 50     Years: 30 00     Quit date: 2016    Smokeless tobacco: Never Used      Comment: pt stated quit about a month ago, last assessed 4/30/14 per [employer]    Alcohol use No    Drug use: No    Sexual activity: No      Review of Systems   Constitutional: Negative  HENT: Negative  Eyes: Negative  Respiratory: Negative  Cardiovascular: Negative  Gastrointestinal: Negative  Endocrine: Negative  Genitourinary: Negative  Skin: Negative  Allergic/Immunologic: Negative  Neurological: Negative  Hematological: Negative  Psychiatric/Behavioral: Negative  Objective:     Ortho ExamPhysical Exam   Constitutional: She is oriented to person, place, and time  She appears well-developed and well-nourished  HENT:   Head: Normocephalic and atraumatic  Eyes: Pupils are equal, round, and reactive to light  Conjunctivae are normal    Cardiovascular: Normal rate and regular rhythm  Pulmonary/Chest: Effort normal  No respiratory distress  Neurological: She is alert and oriented to person, place, and time  No cranial nerve deficit  Skin: Skin is warm and dry  No erythema  Psychiatric: She has a normal mood and affect   Her behavior is normal  Judgment and thought content normal  Right hip examination:   Tender to palpation over the trochanteric bursa  Also has tenderness of the piriformis, gluteus medius and the SI joint  Jaden's test positive  SLR negative bilaterally  Multiple movements of the right hip cause discomfort including the NILA test         Right foot examination:   Tender to palpation over the 2nd intermetatarsal space with a positive Tinel sign and increased pain with metatarsal squeeze  I have personally reviewed pertinent films in PACS

## 2018-10-06 ENCOUNTER — APPOINTMENT (OUTPATIENT)
Dept: LAB | Facility: HOSPITAL | Age: 53
End: 2018-10-06
Payer: COMMERCIAL

## 2018-10-06 DIAGNOSIS — N92.0 EXCESSIVE OR FREQUENT MENSTRUATION: Primary | ICD-10-CM

## 2018-10-06 LAB
ALBUMIN SERPL BCP-MCNC: 3.4 G/DL (ref 3.5–5)
ALP SERPL-CCNC: 80 U/L (ref 46–116)
ALT SERPL W P-5'-P-CCNC: 23 U/L (ref 12–78)
ANION GAP SERPL CALCULATED.3IONS-SCNC: 4 MMOL/L (ref 4–13)
AST SERPL W P-5'-P-CCNC: 10 U/L (ref 5–45)
BASOPHILS # BLD AUTO: 0.06 THOUSANDS/ΜL (ref 0–0.1)
BASOPHILS NFR BLD AUTO: 1 % (ref 0–1)
BILIRUB SERPL-MCNC: 0.34 MG/DL (ref 0.2–1)
BUN SERPL-MCNC: 13 MG/DL (ref 5–25)
CALCIUM SERPL-MCNC: 8.7 MG/DL (ref 8.3–10.1)
CHLORIDE SERPL-SCNC: 104 MMOL/L (ref 100–108)
CHOLEST SERPL-MCNC: 181 MG/DL (ref 50–200)
CO2 SERPL-SCNC: 28 MMOL/L (ref 21–32)
CREAT SERPL-MCNC: 0.63 MG/DL (ref 0.6–1.3)
EOSINOPHIL # BLD AUTO: 0.13 THOUSAND/ΜL (ref 0–0.61)
EOSINOPHIL NFR BLD AUTO: 2 % (ref 0–6)
ERYTHROCYTE [DISTWIDTH] IN BLOOD BY AUTOMATED COUNT: 13.1 % (ref 11.6–15.1)
EST. AVERAGE GLUCOSE BLD GHB EST-MCNC: 114 MG/DL
GFR SERPL CREATININE-BSD FRML MDRD: 104 ML/MIN/1.73SQ M
GLUCOSE P FAST SERPL-MCNC: 86 MG/DL (ref 65–99)
HBA1C MFR BLD: 5.6 % (ref 4.2–6.3)
HCT VFR BLD AUTO: 39.4 % (ref 34.8–46.1)
HDLC SERPL-MCNC: 41 MG/DL (ref 40–60)
HGB BLD-MCNC: 12.4 G/DL (ref 11.5–15.4)
IMM GRANULOCYTES # BLD AUTO: 0.03 THOUSAND/UL (ref 0–0.2)
IMM GRANULOCYTES NFR BLD AUTO: 0 % (ref 0–2)
LDLC SERPL CALC-MCNC: 117 MG/DL (ref 0–100)
LYMPHOCYTES # BLD AUTO: 1.83 THOUSANDS/ΜL (ref 0.6–4.47)
LYMPHOCYTES NFR BLD AUTO: 23 % (ref 14–44)
MCH RBC QN AUTO: 28.6 PG (ref 26.8–34.3)
MCHC RBC AUTO-ENTMCNC: 31.5 G/DL (ref 31.4–37.4)
MCV RBC AUTO: 91 FL (ref 82–98)
MONOCYTES # BLD AUTO: 1.14 THOUSAND/ΜL (ref 0.17–1.22)
MONOCYTES NFR BLD AUTO: 14 % (ref 4–12)
NEUTROPHILS # BLD AUTO: 4.72 THOUSANDS/ΜL (ref 1.85–7.62)
NEUTS SEG NFR BLD AUTO: 60 % (ref 43–75)
NRBC BLD AUTO-RTO: 0 /100 WBCS
PLATELET # BLD AUTO: 408 THOUSANDS/UL (ref 149–390)
PMV BLD AUTO: 9.1 FL (ref 8.9–12.7)
POTASSIUM SERPL-SCNC: 4.2 MMOL/L (ref 3.5–5.3)
PROT SERPL-MCNC: 7.1 G/DL (ref 6.4–8.2)
RBC # BLD AUTO: 4.34 MILLION/UL (ref 3.81–5.12)
SODIUM SERPL-SCNC: 136 MMOL/L (ref 136–145)
TRIGL SERPL-MCNC: 114 MG/DL
TSH SERPL DL<=0.05 MIU/L-ACNC: 0.66 UIU/ML (ref 0.36–3.74)
WBC # BLD AUTO: 7.91 THOUSAND/UL (ref 4.31–10.16)

## 2018-10-06 PROCEDURE — 80061 LIPID PANEL: CPT | Performed by: NURSE PRACTITIONER

## 2018-10-06 PROCEDURE — 83036 HEMOGLOBIN GLYCOSYLATED A1C: CPT | Performed by: NURSE PRACTITIONER

## 2018-10-06 PROCEDURE — 36415 COLL VENOUS BLD VENIPUNCTURE: CPT | Performed by: NURSE PRACTITIONER

## 2018-10-06 PROCEDURE — 84443 ASSAY THYROID STIM HORMONE: CPT

## 2018-10-06 PROCEDURE — 80053 COMPREHEN METABOLIC PANEL: CPT | Performed by: NURSE PRACTITIONER

## 2018-10-06 PROCEDURE — 85025 COMPLETE CBC W/AUTO DIFF WBC: CPT

## 2018-10-09 ENCOUNTER — TELEPHONE (OUTPATIENT)
Dept: ENDOCRINOLOGY | Facility: CLINIC | Age: 53
End: 2018-10-09

## 2018-10-09 NOTE — TELEPHONE ENCOUNTER
----- Message from [place] sent at 10/9/2018  1:46 PM EDT -----  Please call the patient regarding her results   Lab results normal

## 2018-10-10 ENCOUNTER — HOSPITAL ENCOUNTER (OUTPATIENT)
Dept: NEUROLOGY | Facility: AMBULATORY SURGERY CENTER | Age: 53
Discharge: HOME | End: 2018-10-10
Payer: COMMERCIAL

## 2018-10-10 DIAGNOSIS — R20.0 NUMBNESS OF TOES: ICD-10-CM

## 2018-10-10 DIAGNOSIS — M79.604 RIGHT LEG PAIN: ICD-10-CM

## 2018-10-10 DIAGNOSIS — M25.551 PAIN IN RIGHT HIP: ICD-10-CM

## 2018-10-10 PROCEDURE — 95910 NRV CNDJ TEST 7-8 STUDIES: CPT

## 2018-10-10 PROCEDURE — 95910 NRV CNDJ TEST 7-8 STUDIES: CPT | Performed by: PSYCHIATRY & NEUROLOGY

## 2018-10-10 PROCEDURE — 95886 MUSC TEST DONE W/N TEST COMP: CPT | Performed by: PSYCHIATRY & NEUROLOGY

## 2018-10-10 PROCEDURE — 95886 MUSC TEST DONE W/N TEST COMP: CPT

## 2018-10-15 ENCOUNTER — OFFICE VISIT (OUTPATIENT)
Dept: OBGYN CLINIC | Facility: OTHER | Age: 53
End: 2018-10-15
Payer: COMMERCIAL

## 2018-10-15 VITALS
DIASTOLIC BLOOD PRESSURE: 76 MMHG | HEART RATE: 83 BPM | SYSTOLIC BLOOD PRESSURE: 116 MMHG | BODY MASS INDEX: 32.28 KG/M2 | HEIGHT: 68 IN | WEIGHT: 213 LBS

## 2018-10-15 DIAGNOSIS — M70.61 TROCHANTERIC BURSITIS OF RIGHT HIP: Primary | ICD-10-CM

## 2018-10-15 PROCEDURE — 99213 OFFICE O/P EST LOW 20 MIN: CPT | Performed by: ORTHOPAEDIC SURGERY

## 2018-10-15 NOTE — PROGRESS NOTES
Assessment:       1  Trochanteric bursitis of right hip          Plan:        Explain my current clinical findings and reviewed EMG findings with Landon Primrose  At this time, I have advised her to initiate physical therapy rehabilitation as well as do some TENS therapy for her lateral hip pain  In the future, if she does persist with significant hip symptoms, we can do a trial of diagnostic cortisone injections including a repeat trochanteric bursa injection versus intra-articular hip injection  Will see her back for follow-up in about 3 months time  Subjective:     Patient ID: Aubrie Rivers is a 48 y o  female  Chief Complaint:    HPI  Landon Primrose is here today for a follow-up of her right hip pain and lower extremity pain  Since her last office visit, she did have a nerve conduction study/EMG of bilateral lower extremities  This did not reveal any significant focal neuropathy or radiculopathy  She continues to experience pain at multiple areas involving her lower back and the right hip as well as the leg  She has previously had lumbar epidural injections which did not give her significant periods of relief  Lastly, she has had a right trochanteric bursa injection on 8/27/2018 which gave her brief period of relief  She has not yet started physical therapy rehabilitation for her right hip pain since she reports to be scheduled for an obstetrical procedure in about 1 week time  Currently, her most concerning pain is localized around the right lateral hip as well as around the ASIS with radiation bows posteriorly to the gluteal area as well as anteriorly to the joint line  Made worse with all types of right hip movement  Social History     Occupational History    Not on file       Social History Main Topics    Smoking status: Former Smoker     Packs/day: 1 50     Years: 30 00     Quit date: 2016    Smokeless tobacco: Never Used      Comment: pt stated quit about a month ago, last assessed 4/30/14 per [employer]    Alcohol use No    Drug use: No    Sexual activity: No      Review of Systems   Constitutional: Negative  HENT: Negative  Eyes: Positive for pain  Respiratory: Positive for cough  Cardiovascular: Negative  Gastrointestinal: Negative  Endocrine: Negative  Genitourinary: Positive for frequency  Skin: Negative  Allergic/Immunologic: Negative  Neurological: Negative  Hematological: Negative  Psychiatric/Behavioral: Positive for decreased concentration  The patient is nervous/anxious  Objective:     Ortho ExamPhysical Exam    Right hip examination:   Diffusely tender to palpation in multiple areas but most prominently over the greater trochanter as well as some tenderness over the anterior joint line, ASIS, gluteus medius and piriformis  Also has a positive NILA and FADIR  SLR negative bilaterally  I have personally reviewed pertinent films in PACS

## 2018-11-02 DIAGNOSIS — K21.9 CHRONIC GERD: ICD-10-CM

## 2018-11-02 DIAGNOSIS — K58.0 IRRITABLE BOWEL SYNDROME WITH DIARRHEA: ICD-10-CM

## 2018-11-02 RX ORDER — SUCRALFATE 1 G/1
TABLET ORAL
Qty: 90 TABLET | Refills: 3 | Status: SHIPPED | OUTPATIENT
Start: 2018-11-02 | End: 2019-03-14 | Stop reason: SDUPTHER

## 2018-11-30 RX ORDER — AZITHROMYCIN 250 MG/1
TABLET, FILM COATED ORAL
Refills: 0 | COMMUNITY
Start: 2018-11-08 | End: 2019-02-07 | Stop reason: ALTCHOICE

## 2018-11-30 RX ORDER — OXYCODONE HYDROCHLORIDE AND ACETAMINOPHEN 5; 325 MG/1; MG/1
1 TABLET ORAL 2 TIMES DAILY
Refills: 0 | COMMUNITY
Start: 2018-08-29 | End: 2021-03-25

## 2018-11-30 RX ORDER — METHOCARBAMOL 750 MG/1
750 TABLET, FILM COATED ORAL 3 TIMES DAILY
Refills: 0 | COMMUNITY
Start: 2018-11-22

## 2018-11-30 RX ORDER — IBUPROFEN 600 MG/1
600 TABLET ORAL 3 TIMES DAILY PRN
Refills: 0 | COMMUNITY
Start: 2018-08-29 | End: 2019-10-22

## 2018-11-30 RX ORDER — ZOLPIDEM TARTRATE 10 MG/1
TABLET ORAL
Refills: 0 | COMMUNITY
Start: 2018-09-25 | End: 2020-01-20 | Stop reason: ALTCHOICE

## 2018-11-30 RX ORDER — BUPROPION HYDROCHLORIDE 150 MG/1
150 TABLET ORAL EVERY MORNING
Refills: 0 | COMMUNITY
Start: 2018-10-11 | End: 2020-10-13 | Stop reason: ALTCHOICE

## 2018-12-03 ENCOUNTER — OFFICE VISIT (OUTPATIENT)
Dept: INTERNAL MEDICINE CLINIC | Facility: CLINIC | Age: 53
End: 2018-12-03
Payer: COMMERCIAL

## 2018-12-03 VITALS
DIASTOLIC BLOOD PRESSURE: 72 MMHG | SYSTOLIC BLOOD PRESSURE: 118 MMHG | HEART RATE: 84 BPM | BODY MASS INDEX: 32.01 KG/M2 | TEMPERATURE: 97.9 F | HEIGHT: 68 IN | WEIGHT: 211.2 LBS

## 2018-12-03 DIAGNOSIS — E03.9 HYPOTHYROIDISM, UNSPECIFIED TYPE: ICD-10-CM

## 2018-12-03 DIAGNOSIS — J45.909 ASTHMA: ICD-10-CM

## 2018-12-03 DIAGNOSIS — J20.8 ACUTE VIRAL BRONCHITIS: Primary | ICD-10-CM

## 2018-12-03 PROCEDURE — 3008F BODY MASS INDEX DOCD: CPT | Performed by: HOSPITALIST

## 2018-12-03 PROCEDURE — 99213 OFFICE O/P EST LOW 20 MIN: CPT | Performed by: HOSPITALIST

## 2018-12-03 RX ORDER — BENZONATATE 100 MG/1
100 CAPSULE ORAL 3 TIMES DAILY PRN
Qty: 20 CAPSULE | Refills: 0 | Status: SHIPPED | OUTPATIENT
Start: 2018-12-03 | End: 2019-02-07 | Stop reason: ALTCHOICE

## 2018-12-03 RX ORDER — GUAIFENESIN/DEXTROMETHORPHAN 100-10MG/5
5 SYRUP ORAL 3 TIMES DAILY PRN
Qty: 118 ML | Refills: 0 | Status: SHIPPED | OUTPATIENT
Start: 2018-12-03 | End: 2018-12-19 | Stop reason: SDUPTHER

## 2018-12-03 RX ORDER — IPRATROPIUM BROMIDE 21 UG/1
2 SPRAY, METERED NASAL EVERY 12 HOURS
Qty: 30 ML | Refills: 0 | Status: SHIPPED | OUTPATIENT
Start: 2018-12-03 | End: 2018-12-30 | Stop reason: SDUPTHER

## 2018-12-03 RX ORDER — FLUTICASONE PROPIONATE 50 MCG
1-2 SPRAY, SUSPENSION (ML) NASAL DAILY
Qty: 16 G | Refills: 0 | Status: SHIPPED | OUTPATIENT
Start: 2018-12-03 | End: 2019-03-04 | Stop reason: SDUPTHER

## 2018-12-03 NOTE — PROGRESS NOTES
INTERNAL MEDICINE FOLLOW-UP OFFICE VISIT  [facility]  [address] [place] [address]    NAME: Jaswant Hearn  AGE: 48 y o  SEX: female    DATE OF ENCOUNTER: 12/3/2018    Assessment and Plan     1  Asthma  · Patient has been generally mild persistent however since Thursday has had increased requirement of her albuterol  · Has been compliant with her Breo  She no longer smokes cigarettes  · Will not make any changes at this time  This change in her symptom control is likely secondary to acute illness  Will continue to monitor   - fluticasone (FLONASE) 50 mcg/act nasal spray; 1-2 sprays into each nostril daily  Dispense: 16 g; Refill: 0    2  Acute viral bronchitis  · Since Thursday patient has had rhinorrhea, sore throat, nonproductive cough, and some ear discomfort  Patient denies fever, myalgias, skin rash  Patient denies any sick contacts  · Patient currently appears nontoxic, erythematous nasal mucosa, postnasal drip, and bronchial hyper responsiveness on auscultation  Her tympanic membranes are nonerythematous, however there is cerumen  · At this time will treat as a viral bronchitis with supportive care  - dextromethorphan-guaiFENesin (ROBITUSSIN DM)  mg/5 mL syrup; Take 5 mL by mouth 3 (three) times a day as needed for cough  Dispense: 118 mL; Refill: 0  - benzonatate (TESSALON PERLES) 100 mg capsule; Take 1 capsule (100 mg total) by mouth 3 (three) times a day as needed for cough  Dispense: 20 capsule; Refill: 0  - ipratropium (ATROVENT) 0 03 % nasal spray; 2 sprays into each nostril every 12 (twelve) hours  Dispense: 30 mL; Refill: 0    3  Hypothyroidism, unspecified type  · TSH within normal limits in October of 2018  Will not make any changes in her regimen at this time  Continue Synthroid        No orders of the defined types were placed in this encounter       - Counseling Documentation: patient was counseled regarding: diagnostic results, instructions for management, risk factor reductions, prognosis, patient and family education, impressions, risks and benefits of treatment options and importance of compliance with treatment  - Medication Side Effects: Adverse side effects of medications were reviewed with the patient/guardian today  Chief Complaint     Chief Complaint   Patient presents with    Follow-up     routine 3 month; has bronchitis? History of Present Illness     Aileen Merida is a 42-year-old lady with a past medical history hypothyroidism, asthma, past nicotine dependence, prediabetes, and fibromyalgia presents for follow-up of chronic medical conditions and for acute complaints  Patient has been compliant with all her medications  She does admit to some weight gain recently  She also has baseline bilateral lower extremity edema that has been stable  She denies any nausea, vomiting, epigastric pain, diarrhea, abdominal pain, headaches, blurry vision, or cold intolerance     Since Thursday she has been experiencing cold-like symptoms with runny nose, sore throat, nonproductive cough  She denies fever, chills, myalgias, skin rash, recent sick contacts, or recent travel  She has had increased requirement of her albuterol is would comply with her Breo inhaler  She has not received her influenza vaccination  Reviewed patient's laboratory studies three  Patient has made changes in her diet and uses an air prior but continues to eat lots of red meat      Patient is up-to-date with her Pap smear and follows with doctors cuff [de-identified] in [place] who is her gynecologist   She recently had pelvic surgery with cyst removal         The following portions of the patient's history were reviewed and updated as appropriate: allergies, current medications, past family history, past medical history, past social history, past surgical history and problem list     Review of Systems     Review of Systems   Constitutional: Negative for activity change, appetite change, chills, diaphoresis, fatigue, fever and unexpected weight change  HENT: Positive for congestion, ear pain, postnasal drip, rhinorrhea, sinus pain and sore throat  Negative for sinus pressure, sneezing and voice change  Eyes: Negative for visual disturbance  Respiratory: Positive for cough and shortness of breath  Negative for apnea, chest tightness and wheezing  Cardiovascular: Positive for leg swelling  Negative for chest pain and palpitations  Gastrointestinal: Negative for abdominal distention, abdominal pain, constipation, diarrhea, nausea and vomiting  Genitourinary: Negative for dysuria and pelvic pain  Musculoskeletal: Negative for arthralgias and back pain  Skin: Negative for color change and pallor  Hematological: Negative for adenopathy  Does not bruise/bleed easily  Psychiatric/Behavioral: Negative for agitation and behavioral problems  Active Problem List     Patient Active Problem List   Diagnosis    Mixed incontinence    Stress incontinence in female    Obstructive sleep apnea syndrome    Hypothyroidism    Constipation    Chronic low back pain    Asthma    Arthritis    Gastritis    Rectocele    Cystocele    Hypermobility of urethra    Achalasia    Bilateral edema of lower extremity    Breast pain, right    Chronic sinusitis with recurrent bronchitis    Depression    Esophageal reflux    H  pylori infection    Headache, migraine    Intractable chronic migraine without aura and without status migrainosus    Breast mass    Obesity (BMI 30-39  9)    Other chronic pain    Spondylosis of lumbar region without myelopathy or radiculopathy    Thyroiditis    Tubular adenoma of colon    Food allergy    POP-Q stage 2 cystocele    Moderate persistent asthma with exacerbation    Prediabetes    Need for shingles vaccine    Discomfort of right ear    Trochanteric bursitis of right hip    Pain in right hip    Numbness of toes    Acute viral bronchitis       Objective     /72   Pulse 84   Temp 97 9 °F (36 6 °C)   Ht 5' 8" (1 727 m)   Wt 95 8 kg (211 lb 3 2 oz)   BMI 32 11 kg/m²     Physical Exam   Constitutional: She is oriented to person, place, and time  Vital signs are normal  She appears well-developed and well-nourished  No distress  HENT:   Head: Normocephalic  Right Ear: Hearing normal  No swelling or tenderness  Tympanic membrane is not injected, not scarred and not erythematous  No middle ear effusion  Left Ear: Hearing normal  No swelling or tenderness  Tympanic membrane is not injected, not scarred and not erythematous  No middle ear effusion  Nose: Mucosal edema and rhinorrhea present  Cerumen in bilateral ears  Post nasal drip, erythematous oropharynx, no pharyngeal exudates    No cervical adenopathy    Eyes: Pupils are equal, round, and reactive to light  Conjunctivae are normal  No scleral icterus  Cardiovascular: Normal rate, regular rhythm, normal heart sounds and intact distal pulses  Exam reveals no gallop  No murmur heard  Pulmonary/Chest: Effort normal  She has no decreased breath sounds  She has no rhonchi  She has no rales  Bronchial hyperresponsiveness  Bilateral lung fields otherwise clear to ausculation, no wheezing  Abdominal: Soft  Bowel sounds are normal  She exhibits no distension  There is no tenderness  There is no rebound  Musculoskeletal: She exhibits edema  Lymphadenopathy:     She has no cervical adenopathy  Neurological: She is alert and oriented to person, place, and time  A cranial nerve deficit is present  Skin: Skin is warm and dry  She is not diaphoretic  No erythema  Psychiatric: She has a normal mood and affect   Her behavior is normal        Pertinent Laboratory/Diagnostic Studies:  CBC:   Lab Results   Component Value Date/Time    WBC 7 91 10/06/2018 08:53 AM    WBC 15 61 (H) 01/07/2016 05:39 AM    RBC 4 34 10/06/2018 08:53 AM    RBC 4 00 01/07/2016 05:39 AM    HGB 12 4 10/06/2018 08:53 AM    HGB 11 0 (L) 01/07/2016 05:39 AM    HCT 39 4 10/06/2018 08:53 AM    HCT 34 7 (L) 01/07/2016 05:39 AM    MCV 91 10/06/2018 08:53 AM    MCV 87 01/07/2016 05:39 AM    MCH 28 6 10/06/2018 08:53 AM    MCH 27 5 01/07/2016 05:39 AM    MCHC 31 5 10/06/2018 08:53 AM    MCHC 31 7 01/07/2016 05:39 AM    RDW 13 1 10/06/2018 08:53 AM    RDW 14 1 01/07/2016 05:39 AM    MPV 9 1 10/06/2018 08:53 AM    MPV 9 3 01/07/2016 05:39 AM     (H) 10/06/2018 08:53 AM     01/07/2016 05:39 AM    NRBC 0 10/06/2018 08:53 AM    NEUTOPHILPCT 60 10/06/2018 08:53 AM    NEUTOPHILPCT 63 12/03/2015 10:43 AM    LYMPHOPCT 23 10/06/2018 08:53 AM    LYMPHOPCT 24 12/03/2015 10:43 AM    MONOPCT 14 (H) 10/06/2018 08:53 AM    MONOPCT 12 12/03/2015 10:43 AM    EOSPCT 2 10/06/2018 08:53 AM    EOSPCT 1 12/03/2015 10:43 AM    BASOPCT 1 10/06/2018 08:53 AM    BASOPCT 1 05/01/2015 12:43 PM    NEUTROABS 4 72 10/06/2018 08:53 AM    NEUTROABS 5 67 12/03/2015 10:43 AM    LYMPHSABS 1 83 10/06/2018 08:53 AM    LYMPHSABS 2 16 12/03/2015 10:43 AM    MONOSABS 1 14 10/06/2018 08:53 AM    MONOSABS 1 08 12/03/2015 10:43 AM    EOSABS 0 13 10/06/2018 08:53 AM    EOSABS 0 09 12/03/2015 10:43 AM     Chemistry Profile:   Lab Results   Component Value Date/Time     01/07/2016 05:39 AM    K 4 2 10/06/2018 08:53 AM    K 4 3 01/07/2016 05:39 AM     10/06/2018 08:53 AM     01/07/2016 05:39 AM    CO2 28 10/06/2018 08:53 AM    CO2 24 01/07/2016 05:39 AM    ANIONGAP 6 01/07/2016 05:39 AM    BUN 13 10/06/2018 08:53 AM    BUN 12 01/07/2016 05:39 AM    CREATININE 0 63 10/06/2018 08:53 AM    CREATININE 0 62 01/07/2016 05:39 AM    GLUC 86 07/21/2016 11:36 AM    GLUF 86 10/06/2018 08:53 AM    GLUCOSE 104 01/07/2016 05:39 AM    CALCIUM 8 7 10/06/2018 08:53 AM    CALCIUM 8 3 01/07/2016 05:39 AM    AST 10 10/06/2018 08:53 AM    AST 56 (H) 12/03/2015 10:43 AM    ALT 23 10/06/2018 08:53 AM     (H) 12/03/2015 10:43 AM    ALKPHOS 80 10/06/2018 08:53 AM    ALKPHOS 86 12/03/2015 10:43 AM    PROT 6 6 12/03/2015 10:43 AM    BILITOT 0 36 12/03/2015 10:43 AM    EGFR 104 10/06/2018 08:53 AM     Coagulation Studies:   Lab Results   Component Value Date/Time    PROTIME 12 8 12/24/2015 11:37 AM    INR 0 98 12/24/2015 11:37 AM    PTT 27 12/24/2015 11:37 AM     Cardiac Studies: No results found for: NTBNP, BNP, TROPONINI, POCTROP  Hepatology:   Lab Results   Component Value Date/Time    LIPASE 109 05/01/2015 12:43 PM     Endocrine Studies:   Lab Results   Component Value Date/Time    HGBA1C 5 6 10/06/2018 08:53 AM    [id number] 0 658 10/06/2018 08:53 AM    QWM6HFSFUBHK 0 885 10/23/2015 11:40 AM    FREET4 0 81 06/25/2018 08:16 AM    FREET4 1 1 10/09/2014 10:01 AM    THYMICROANTI 324 (H) 05/24/2018 04:05 PM    THYMICROANTI 401 (H) 10/09/2014 10:01 AM    THGAB 349 2 (H) 05/24/2018 04:05 PM    THGAB 573 4 (H) 10/09/2014 10:01 AM    TRIG 114 10/06/2018 08:53 AM    TRIG 63 12/03/2015 10:43 AM    CHOL 207 12/03/2015 10:43 AM    CHOLESTEROL 181 10/06/2018 08:53 AM    HDL 41 10/06/2018 08:53 AM    HDL 51 12/03/2015 10:43 AM    LDLCALC 117 (H) 10/06/2018 08:53 AM    LDLCALC 143 (H) 12/03/2015 10:43 AM    NONHDLC 130 04/06/2018 08:23 AM    [id number] 30 8 04/06/2018 08:23 AM     Iron Studies: No results found for: LABIRON, IRON, TIBC, FERRITIN  Health Maintenance:   Lab Results   Component Value Date/Time    HEPCAB Non-reactive 08/10/2017 10:38 AM     Toxicology: No results found for: Moise Cordial, BDZUR, COCAINEUR, COCAINEUR, OPIATEUR, PCPUR, THCUR, ETOH, ACTMNPHEN, SALICYLATE  Urine Protein/Creatinine Ratio: No results found for: Florette Sing, UTPCR, LABMICR, MICROALBCRE  Urinalysis:   Lab Results   Component Value Date/Time    COLORU Yellow 08/08/2016 04:48 PM    CLARITYU Clear 08/08/2016 04:48 PM    SPECGRAV 1 024 08/08/2016 04:48 PM    SPECGRAV 1 015 08/08/2016 02:18 PM    PHUR 6 5 08/08/2016 04:48 PM    PHUR 6 5 08/08/2016 02:18 PM    LEUKOCYTESUR Negative 08/08/2016 04:48 PM    LEUKOCYTESUR Negative 08/08/2016 02:18 PM    NITRITE Negative 08/08/2016 04:48 PM    NITRITE Negative 08/08/2016 02:18 PM    PROTEINUA Negative 08/08/2016 02:18 PM    GLUCOSEU Negative 08/08/2016 04:48 PM    KETONESU Negative 08/08/2016 04:48 PM    KETONESU negative 08/08/2016 02:18 PM    BILIRUBINUR Negative 08/08/2016 04:48 PM    BILIRUBINUR Negative 08/08/2016 02:18 PM    BLOODU Negative 08/08/2016 04:48 PM    BLOODU Negative 08/08/2016 02:18 PM      Urine Micro: No results found for: RBCUA, WBCUA, EPIS, BACTERIA, AMORPHPHOS  Urine Dip: No components found for: SLAMBCOLORUA, SLAMBCLAIRTY, SLAMBSG, SLAMBPHUA, SLAMBLEUKOUA, SLAMBNITRITE, SLAMBGLUCOSE, SLAMBKETONES, SLAMBBILIRUB, [address]  Hematology: No results found for: FOLATE, [id number], LDH, IRF, RETICCTPCT, RETIC, RETICHGB  CBC:   Results from last 6 Months  Lab Units 10/06/18  0853   WBC Thousand/uL 7 91   RBC Million/uL 4 34   HEMOGLOBIN g/dL 12 4   HEMATOCRIT % 39 4   MCV fL 91   MCH pg 28 6   MCHC g/dL 31 5   RDW % 13 1   MPV fL 9 1   PLATELETS Thousands/uL 408*   NRBC AUTO /100 WBCs 0   NEUTROS PCT % 60   LYMPHS PCT % 23   MONOS PCT % 14*   EOS PCT % 2   BASOS PCT % 1   NEUTROS ABS Thousands/µL 4 72   LYMPHS ABS Thousands/µL 1 83   MONOS ABS Thousand/µL 1 14   EOS ABS Thousand/µL 0 13     Chemistry Profile:   Results from last 6 Months  Lab Units 10/06/18  0853   POTASSIUM mmol/L 4 2   CHLORIDE mmol/L 104   CO2 mmol/L 28   BUN mg/dL 13   CREATININE mg/dL 0 63   GLUCOSE FASTING mg/dL 86   CALCIUM mg/dL 8 7   AST U/L 10   ALT U/L 23   ALK PHOS U/L 80   EGFR ml/min/1 73sq m 104     Coagulation Studies:     Cardiac Studies:     Hepatology:     Endocrine Studies:   Results from last 6 Months  Lab Units 10/06/18  0853 06/25/18  0816   HEMOGLOBIN A1C % 5 6  --    TSH 3RD GENERATON uIU/mL 0 658 1 930   FREE T4 ng/dL  --  0 81   TRIGLYCERIDES mg/dL 114  --    CHOLESTEROL mg/dL 181  --    HDL mg/dL 41  --    LDL CALC mg/dL 117*  --      Iron Studies:     Health Maintenance:     Toxicology:     Urinalysis:       Invalid input(s): URIBILINOGEN   Urine Micro:     Urine Dip:  Invalid input(s): SLAMBCOLORUA, SLAMBCLAIRTY, SLAMBSG, SLAMBPHUA, SLAMBLEUKOUA, SLAMBNITRITE, SLAMBGLUCOSE, SLAMBKETONES, SLAMBBILIRUB, SLAMBBLOODUA    Current Medications     Current Outpatient Prescriptions:     acetaminophen (TYLENOL) 325 mg tablet, Every 4 hours as needed, Disp: 30 tablet, Rfl: 0    albuterol (VENTOLIN HFA) 90 mcg/act inhaler, Inhale 2 puffs every 4 (four) hours as needed for wheezing or shortness of breath, Disp: 1 Inhaler, Rfl: 3    ARIPiprazole (ABILIFY) 5 mg tablet, Take 5 mg by mouth daily at bedtime, Disp: , Rfl: 1    Botulinum Toxin Type A 200 units SOLR, Inject 155 units into face and neck IM every 90 days, Disp: , Rfl:     BREO ELLIPTA, 1 puff daily , Disp: , Rfl:     buPROPion (WELLBUTRIN XL) 150 mg 24 hr tablet, Take 150 mg by mouth every morning, Disp: , Rfl: 0    clonazePAM (KlonoPIN) 0 5 mg tablet, Take 0 5 mg by mouth daily  , Disp: , Rfl:     clotrimazole-betamethasone (LOTRISONE) 1-0 05 % cream, Apply topically 2 (two) times a day as needed (skin rash), Disp: 30 g, Rfl: 2    diclofenac sodium (VOLTAREN) 1 %, APPLY 4 GRAMS TO AFFECTED AREA(S) THREE TIMES A DAY, Disp: , Rfl: 3    dicyclomine (BENTYL) 20 mg tablet, TAKE 1 TABLET BY MOUTH EVERY 6 HOURS AS NEEDED, Disp: 120 tablet, Rfl: 2    docusate sodium (COLACE) 100 mg capsule, TAKE 1 CAPSULE TWICE DAILY AS NEEDED , Disp: , Rfl:     doxepin (SINEquan) 25 mg capsule, TAKE 1-2 CAPSULES BY MOUTH DAILY AT BEDTIME , Disp: , Rfl: 0    fluticasone (FLONASE) 50 mcg/act nasal spray, 1-2 sprays into each nostril daily, Disp: 16 g, Rfl: 0    gabapentin (NEURONTIN) 300 mg capsule, Take 1 capsule (300 mg total) by mouth daily at bedtime, Disp: 90 capsule, Rfl: 3    hydrOXYzine pamoate (VISTARIL) 50 mg capsule, Take 50 mg by mouth 3 (three) times a day as needed  , Disp: , Rfl:     ibuprofen (MOTRIN) 600 mg tablet, Take 600 mg by mouth 3 (three) times a day as needed, Disp: , Rfl: 0    levothyroxine 150 mcg tablet, Take 1 tablet (150 mcg total) by mouth daily, Disp: 90 tablet, Rfl: 3    loratadine (CLARITIN) 10 mg tablet, Take 1 tablet (10 mg total) by mouth daily, Disp: 90 tablet, Rfl: 0    meloxicam (MOBIC) 15 mg tablet, Take 1 tablet (15 mg total) by mouth daily, Disp: 30 tablet, Rfl: 6    methocarbamol (ROBAXIN) 750 mg tablet, Take 750 mg by mouth 3 (three) times a day, Disp: , Rfl: 0    Nerve Stimulator (TENS THERAPY PAIN RELIEF) VIKASH, 1 application by Does not apply route 2 (two) times a day as needed (twice daily for hip pain), Disp: 1 Device, Rfl: 0    oxyCODONE-acetaminophen (PERCOCET) 5-325 mg per tablet, Take 1 tablet by mouth 2 (two) times a day, Disp: , Rfl: 0    pantoprazole (PROTONIX) 40 mg tablet, TAKE 1 TABLET DAILY  , Disp: 30 tablet, Rfl: 5    polyethylene glycol (GLYCOLAX) powder, Take 17 g by mouth as needed Mix 1 capful in 8 ounces of water, juice or tea  , Disp: , Rfl: 11    prochlorperazine (COMPAZINE) 10 mg tablet, TAKE 1 TABLET TWICE A DAY AS NEEDED FOR HEADACHE OR NAUSEA, LIMIT 3 DAYS PER WEEK, Disp: , Rfl: 0    prochlorperazine (COMPAZINE) 10 mg tablet, TAKE 1 TABLET TWICE A DAY AS NEEDED FOR HEADACHE OR NAUSEA, LIMIT 3 DAYS PER WEEK, Disp: , Rfl:     promethazine-codeine (PHENERGAN WITH CODEINE) 6 25-10 mg/5 mL syrup, Take 2 5 mL by mouth every 6 (six) hours, Disp: , Rfl:     sucralfate (CARAFATE) 1 g tablet, TAKE 1 TABLET BY MOUTH 3 TIMES DAILY, Disp: 90 tablet, Rfl: 3    topiramate (TOPAMAX) 25 mg tablet, TAKE 4 PILLS AT BEDTIME , Disp: , Rfl: 1    VIIBRYD 40 MG tablet, Take 40 mg by mouth daily With food, Disp: , Rfl: 0    zolpidem (AMBIEN) 10 mg tablet, TAKE 1 TABLETS AS NEEDED FOR SLEEP, Disp: , Rfl: 0    azithromycin (ZITHROMAX) 250 mg tablet, TWO TABLETS ON DAY 1, THEN ONE TABLET DAILY  , Disp: , Rfl: 0   benzonatate (TESSALON PERLES) 100 mg capsule, Take 1 capsule (100 mg total) by mouth 3 (three) times a day as needed for cough, Disp: 20 capsule, Rfl: 0    dextromethorphan-guaiFENesin (ROBITUSSIN DM)  mg/5 mL syrup, Take 5 mL by mouth 3 (three) times a day as needed for cough, Disp: 118 mL, Rfl: 0    ipratropium (ATROVENT) 0 03 % nasal spray, 2 sprays into each nostril every 12 (twelve) hours, Disp: 30 mL, Rfl: 0    Health Maintenance     Health Maintenance   Topic Date Due    CRC Screening: Colonoscopy  10/15/2018    INFLUENZA VACCINE  01/03/2019 (Originally 7/1/2018)    Pneumococcal PPSV23 Highest Risk Adult (2 of 3 - PCV13) 06/03/2019 (Originally 1/28/2017)    MAMMOGRAM  08/02/2019    DTaP,Tdap,and Td Vaccines (2 - Td) 05/22/2025     Immunization History   Administered Date(s) Administered    Influenza 11/07/2006, 11/15/2014    Influenza Quadrivalent Preservative Free 3 years and older IM 11/06/2017    Influenza TIV (IM) 10/23/2015    Pneumococcal Polysaccharide PPV23 01/28/2016    Tdap 05/22/2015       Daysi Elizabeth MD   Internal Medicine PGY-2  12/3/2018 9:42 AM

## 2018-12-19 DIAGNOSIS — J20.8 ACUTE VIRAL BRONCHITIS: ICD-10-CM

## 2018-12-19 DIAGNOSIS — J45.41 MODERATE PERSISTENT ASTHMA WITH ACUTE EXACERBATION: ICD-10-CM

## 2018-12-19 RX ORDER — ALBUTEROL SULFATE 2.5 MG/3ML
SOLUTION RESPIRATORY (INHALATION)
Qty: 75 ML | Refills: 0 | Status: SHIPPED | OUTPATIENT
Start: 2018-12-19 | End: 2019-03-04 | Stop reason: SDUPTHER

## 2018-12-19 RX ORDER — GUAIFENESIN AND DEXTROMETHORPHAN HYDROBROMIDE 100; 10 MG/5ML; MG/5ML
5 LIQUID ORAL 3 TIMES DAILY PRN
Qty: 118 ML | Refills: 0 | Status: SHIPPED | OUTPATIENT
Start: 2018-12-19 | End: 2019-02-07 | Stop reason: ALTCHOICE

## 2018-12-30 DIAGNOSIS — J20.8 ACUTE VIRAL BRONCHITIS: ICD-10-CM

## 2019-01-03 RX ORDER — IPRATROPIUM BROMIDE 21 UG/1
SPRAY, METERED NASAL
Qty: 30 ML | Refills: 1 | Status: SHIPPED | OUTPATIENT
Start: 2019-01-03 | End: 2019-02-26 | Stop reason: SDUPTHER

## 2019-01-09 DIAGNOSIS — K58.0 IRRITABLE BOWEL SYNDROME WITH DIARRHEA: ICD-10-CM

## 2019-01-09 RX ORDER — DICYCLOMINE HCL 20 MG
TABLET ORAL
Qty: 120 TABLET | Refills: 2 | Status: SHIPPED | OUTPATIENT
Start: 2019-01-09 | End: 2019-11-11 | Stop reason: SDUPTHER

## 2019-01-15 ENCOUNTER — OFFICE VISIT (OUTPATIENT)
Dept: OBGYN CLINIC | Facility: OTHER | Age: 54
End: 2019-01-15
Payer: COMMERCIAL

## 2019-01-15 VITALS
SYSTOLIC BLOOD PRESSURE: 123 MMHG | DIASTOLIC BLOOD PRESSURE: 81 MMHG | HEIGHT: 68 IN | BODY MASS INDEX: 32.58 KG/M2 | WEIGHT: 215 LBS | HEART RATE: 69 BPM

## 2019-01-15 DIAGNOSIS — E66.09 CLASS 1 OBESITY DUE TO EXCESS CALORIES WITH BODY MASS INDEX (BMI) OF 34.0 TO 34.9 IN ADULT, UNSPECIFIED WHETHER SERIOUS COMORBIDITY PRESENT: Primary | ICD-10-CM

## 2019-01-15 DIAGNOSIS — M70.61 TROCHANTERIC BURSITIS OF BOTH HIPS: ICD-10-CM

## 2019-01-15 DIAGNOSIS — M54.5 CHRONIC BILATERAL LOW BACK PAIN, WITH SCIATICA PRESENCE UNSPECIFIED: ICD-10-CM

## 2019-01-15 DIAGNOSIS — G89.29 CHRONIC BILATERAL LOW BACK PAIN, WITH SCIATICA PRESENCE UNSPECIFIED: ICD-10-CM

## 2019-01-15 DIAGNOSIS — M70.62 TROCHANTERIC BURSITIS OF BOTH HIPS: ICD-10-CM

## 2019-01-15 PROBLEM — E66.811 CLASS 1 OBESITY DUE TO EXCESS CALORIES WITH BODY MASS INDEX (BMI) OF 34.0 TO 34.9 IN ADULT: Status: ACTIVE | Noted: 2019-01-15

## 2019-01-15 PROCEDURE — 99214 OFFICE O/P EST MOD 30 MIN: CPT | Performed by: ORTHOPAEDIC SURGERY

## 2019-01-16 ENCOUNTER — TELEPHONE (OUTPATIENT)
Dept: GASTROENTEROLOGY | Facility: CLINIC | Age: 54
End: 2019-01-16

## 2019-01-16 DIAGNOSIS — K21.9 CHRONIC GERD: ICD-10-CM

## 2019-01-16 DIAGNOSIS — K58.0 IRRITABLE BOWEL SYNDROME WITH DIARRHEA: ICD-10-CM

## 2019-01-16 RX ORDER — PANTOPRAZOLE SODIUM 40 MG/1
40 TABLET, DELAYED RELEASE ORAL DAILY
Qty: 30 TABLET | Refills: 0 | Status: SHIPPED | OUTPATIENT
Start: 2019-01-16 | End: 2019-03-14 | Stop reason: SDUPTHER

## 2019-01-16 NOTE — TELEPHONE ENCOUNTER
I refilled this for 1 month but she has not been seen in over a year, can you please schedule her for a follow-up?

## 2019-01-16 NOTE — PROGRESS NOTES
Assessment:       1  Class 1 obesity due to excess calories with body mass index (BMI) of 34 0 to 34 9 in adult, unspecified whether serious comorbidity present    2  Chronic bilateral low back pain, with sciatica presence unspecified    3  Trochanteric bursitis of both hips          Plan:        I explained my current clinical findings to Pratik [place]  Her back, hip and lower extremity symptoms seem to be multifactorial   I counseled her on healthy weight loss and explained that this may improve several of her current symptoms  Referral to weight management was also made in this regard  She has already had multiple cortisone injections of her lumbar spine as well as of her right trochanteric bursa with no significant duration of relief of symptoms  Hence, we would defer any further cortisone injections at this time  Her refer her for a course of physical therapy for her back and hip pain  If symptoms persist, she may consider following up with pain management for chronic pain  Subjective:     Patient ID: Mili Cunningham is a 48 y o  female  Chief Complaint:   Lower back and hip pain    HPI  Namrata Moran is here today for a follow-up of her multiple body joint area pain including her lower back and bilateral hips as well as bilateral lower extremities  In past, her evaluation and management has included epidural steroid injections of bilateral L4-L5 levels without symptom improvement  Subsequently, she has also had right trochanteric bursa cortisone injection for suspicion of trochanteric bursitis and this only provided a brief period of relief  Now she reports a recurrence of her low back pain which radiates to her bilateral lower extremity but most prominently to her bilateral hips on the lateral aspect  Slightly worse on the right as compared to the left  Also reports associated tenderness of the bilateral hips on the lateral aspect as well as diffuse bilateral gluteal pain   Reportedly has mild intermittent tingling sensation of bilateral lower extremities no persistent numbness or new onset/worsening focal weakness of her lower extremities  Her low back pain is no and worse with prolonged ambulation and her hip pain is made worse with prolonged ambulation as well as sleeping on her sides  Background history is notable for fibromyalgia, migraines, gastroesophageal reflux disease, obstructive sleep apnea, depression  She is a former smoker with a 45 pack year history smoking and reportedly quit smoking in 2016  Social History     Occupational History    Not on file  Social History Main Topics    Smoking status: Former Smoker     Packs/day: 1 50     Years: 30 00     Quit date: 2016    Smokeless tobacco: Never Used      Comment: pt stated quit about a month ago, last assessed 4/30/14 per [employer]    Alcohol use No    Drug use: No    Sexual activity: No      Review of Systems   Constitutional: Negative  HENT: Negative  Eyes: Negative  Respiratory: Negative  Cardiovascular: Negative  Gastrointestinal: Negative  Endocrine: Negative  Genitourinary: Negative  Skin: Negative  Allergic/Immunologic: Negative  Neurological: Negative  Hematological: Negative  Psychiatric/Behavioral: Negative  Objective:     Ortho ExamPhysical Exam   Constitutional: She is oriented to person, place, and time  She appears well-developed and well-nourished  Obese   HENT:   Head: Normocephalic and atraumatic  Eyes: Pupils are equal, round, and reactive to light  Conjunctivae are normal    Cardiovascular: Normal rate and regular rhythm  Pulmonary/Chest: Effort normal  No respiratory distress  Neurological: She is alert and oriented to person, place, and time  No cranial nerve deficit  Skin: Skin is warm and dry  No erythema  Psychiatric: She has a normal mood and affect   Her behavior is normal  Judgment and thought content normal        Lumbar spine exam:  Some diffuse tenderness to palpation of the lower lumbar spine more prominently at the L4-S1, bilateral SI joints, bilateral gluteal area as well as bilateral greater trochanter  NILA induces discomfort bilaterally in the lower back  SLR positive on the left at 80° and on the right at 70°  Normal sensation to light touch of bilateral lower extremity all dermatomes and grade 5/5 strength of bilateral lower extremity all myotomes  I have personally reviewed pertinent films in PACS

## 2019-01-31 ENCOUNTER — OFFICE VISIT (OUTPATIENT)
Dept: ENDOCRINOLOGY | Facility: CLINIC | Age: 54
End: 2019-01-31
Payer: COMMERCIAL

## 2019-01-31 VITALS
HEART RATE: 69 BPM | SYSTOLIC BLOOD PRESSURE: 110 MMHG | BODY MASS INDEX: 30.01 KG/M2 | WEIGHT: 198 LBS | DIASTOLIC BLOOD PRESSURE: 80 MMHG | HEIGHT: 68 IN

## 2019-01-31 DIAGNOSIS — E66.09 CLASS 1 OBESITY DUE TO EXCESS CALORIES WITH BODY MASS INDEX (BMI) OF 34.0 TO 34.9 IN ADULT, UNSPECIFIED WHETHER SERIOUS COMORBIDITY PRESENT: ICD-10-CM

## 2019-01-31 DIAGNOSIS — E03.9 HYPOTHYROIDISM, UNSPECIFIED TYPE: Primary | ICD-10-CM

## 2019-01-31 DIAGNOSIS — R73.03 PREDIABETES: ICD-10-CM

## 2019-01-31 DIAGNOSIS — E06.3 HASHIMOTO'S THYROIDITIS: ICD-10-CM

## 2019-01-31 PROCEDURE — 99214 OFFICE O/P EST MOD 30 MIN: CPT | Performed by: INTERNAL MEDICINE

## 2019-01-31 RX ORDER — LEVOTHYROXINE SODIUM 0.15 MG/1
150 TABLET ORAL DAILY
Qty: 90 TABLET | Refills: 3 | Status: SHIPPED | OUTPATIENT
Start: 2019-01-31 | End: 2019-03-04 | Stop reason: SDUPTHER

## 2019-01-31 NOTE — PROGRESS NOTES
Sami Amezcua 48 y o  female MRN: [id number]    Encounter: [id number]      Assessment/Plan     Problem List Items Addressed This Visit     Hypothyroidism - Primary     Will repeat thyroid function test-if TSH stable she can follow up with her primary care-she will need monitoring of thyroid function tests every 6 months         Relevant Medications    levothyroxine 150 mcg tablet    Other Relevant Orders    TSH, 3rd generation Lab Collect    T4, free Lab Collect    Prediabetes     Counseled on dietary and lifestyle modifications and weight loss  Will repeat fasting glucose as well as hemoglobin A1c         Relevant Orders    Comprehensive metabolic panel Lab Collect    HEMOGLOBIN A1C W/ EAG ESTIMATION Lab Collect    Class 1 obesity due to excess calories with body mass index (BMI) of 34 0 to 34 9 in adult    Relevant Orders    Comprehensive metabolic panel Lab Collect    HEMOGLOBIN A1C W/ EAG ESTIMATION Lab Collect    Hashimoto's thyroiditis    Relevant Medications    levothyroxine 150 mcg tablet        CC:   Hypothyroidism and pre diabetes    History of Present Illness     HPI:  65-year-old woman with history of hypothyroidism secondary to Hashimoto thyroiditis, pre diabetes and obesity here for follow-up  For hypothyroidism she is currently on Levothyroxine 150 mcg daily and has been taking it regularly and properly  Generally feels well, fatigue has improved since starting CPAP for sleep apnea  Lost 10 lbs in the past year      Sleep apnea - started on CPAP    Review of Systems   Constitutional: Positive for fatigue  Negative for unexpected weight change  Respiratory: Negative for cough and shortness of breath  Cardiovascular: Positive for leg swelling  Negative for palpitations  Gastrointestinal: Negative for constipation, diarrhea, nausea and vomiting  Musculoskeletal: Positive for arthralgias  Negative for gait problem  Skin: Negative for color change and wound     Psychiatric/Behavioral: Negative for sleep disturbance  All other systems reviewed and are negative  Historical Information   Past Medical History:   Diagnosis Date    Achalasia     Asthma     Constipation     Depression     Elevated LFTs     last assessed 10/25/17    Fibromyalgia     Gastric ulcer     GERD (gastroesophageal reflux disease)     Hypothyroidism     Migraines     MICHAEL (obstructive sleep apnea)     last assessed 16    Thyroid nodule     last assessed 17     Past Surgical History:   Procedure Laterality Date     SECTION      MYOTOMY HELLER LAPAROSCOPIC  2016    Dr Nichelle Perrin, Coincident lorena fundoplication    NASAL SINUS SURGERY      OTHER SURGICAL HISTORY      achalasia repair    PILONIDAL CYST / SINUS EXCISION      ME CYSTOURETHROSCOPY N/A 2017    Procedure: CYSTOSCOPY;  Surgeon: Rosamaria Archuleta MD;  [facility];  Service: UroGynecology           ME ESOPHAGOGASTRODUODENOSCOPY TRANSORAL DIAGNOSTIC N/A 2017    Procedure: ESOPHAGOGASTRODUODENOSCOPY (EGD); Surgeon: Indigo Mccoy MD;  [facility];   Service: Gastroenterology    ME POST COLPORRHAPHY,RECTUM/VAGINA N/A 2017    Procedure: COLPORRHAPHY POSTERIOR;  Surgeon: Rosamaria Archuleta MD;  [facility];  Service: UroGynecology           ME SLING OPER STRES INCONTINENCE N/A 2017    Procedure: INSERTION PUBOVAGINAL SLING /SINGLE INCISION ;  Surgeon: Rosamaria Archuleta MD;  [facility];  Service: UroGynecology           TONSILLECTOMY      TUBAL LIGATION      US GUIDED BREAST BIOPSY RIGHT COMPLETE Right 10/31/2017     Social History   History   Alcohol Use No     History   Drug Use No     History   Smoking Status    Former Smoker    Packs/day: 1 50    Years: 30 00    Quit date:    Smokeless Tobacco    Never Used     Comment: pt stated quit about a month ago, last assessed 14 per [employer]     Family History:   Family History   Problem Relation Age of Onset    Cancer Mother    Rick Soto Breast cancer Mother     Hypertension Mother     Dementia Father     Stroke Father     Diabetes Father     Hypertension Father     Cancer Maternal Grandmother     Ovarian cancer Maternal Grandmother         unspecified laterality    Cancer Maternal Grandfather     Hypertension Maternal Grandfather     Tongue cancer Maternal Grandfather     Stroke Paternal Grandmother     Thyroid disease unspecified Brother     Thyroid disease unspecified Maternal Aunt     Colon cancer Maternal Aunt     Breast cancer Family        Meds/Allergies   Current Outpatient Prescriptions   Medication Sig Dispense Refill    acetaminophen (TYLENOL) 325 mg tablet Every 4 hours as needed 30 tablet 0    albuterol (2 5 mg/3 mL) 0 083 % nebulizer solution USE 1 VIAL VIA NEBULIZER EVERY 4 HOURS AS NEEDED FOR WHEEZING OR SHORTNESS OF BREATH 75 mL 0    albuterol (VENTOLIN HFA) 90 mcg/act inhaler Inhale 2 puffs every 4 (four) hours as needed for wheezing or shortness of breath 1 Inhaler 3    ARIPiprazole (ABILIFY) 5 mg tablet Take 5 mg by mouth daily at bedtime  1    azithromycin (ZITHROMAX) 250 mg tablet TWO TABLETS ON DAY 1, THEN ONE TABLET DAILY    0    benzonatate (TESSALON PERLES) 100 mg capsule Take 1 capsule (100 mg total) by mouth 3 (three) times a day as needed for cough 20 capsule 0    Botulinum Toxin Type A 200 units SOLR Inject 155 units into face and neck IM every 90 days      BREO ELLIPTA 1 puff daily       buPROPion (WELLBUTRIN XL) 150 mg 24 hr tablet Take 150 mg by mouth every morning  0    clonazePAM (KlonoPIN) 0 5 mg tablet Take 0 5 mg by mouth daily        clotrimazole-betamethasone (LOTRISONE) 1-0 05 % cream Apply topically 2 (two) times a day as needed (skin rash) 30 g 2    diclofenac sodium (VOLTAREN) 1 % APPLY 4 GRAMS TO AFFECTED AREA(S) THREE TIMES A DAY  3    dicyclomine (BENTYL) 20 mg tablet TAKE 1 TABLET BY MOUTH EVERY 6 HOURS AS NEEDED 120 tablet 2    docusate sodium (COLACE) 100 mg capsule TAKE 1 CAPSULE TWICE DAILY AS NEEDED   doxepin (SINEquan) 25 mg capsule TAKE 1-2 CAPSULES BY MOUTH DAILY AT BEDTIME   0    EXTRA ACTION COUGH 100-10 MG/5ML oral syrup TAKE 5 ML BY MOUTH 3 (THREE) TIMES A DAY AS NEEDED FOR COUGH 118 mL 0    fluticasone (FLONASE) 50 mcg/act nasal spray 1-2 sprays into each nostril daily 16 g 0    gabapentin (NEURONTIN) 300 mg capsule Take 1 capsule (300 mg total) by mouth daily at bedtime 90 capsule 3    hydrOXYzine pamoate (VISTARIL) 50 mg capsule Take 50 mg by mouth 3 (three) times a day as needed        ibuprofen (MOTRIN) 600 mg tablet Take 600 mg by mouth 3 (three) times a day as needed  0    ipratropium (ATROVENT) 0 03 % nasal spray USE 2 SPRAYS INTO EACH NOSTRIL EVERY 12 (TWELVE) HOURS 30 mL 1    levothyroxine 150 mcg tablet Take 1 tablet (150 mcg total) by mouth daily 90 tablet 3    loratadine (CLARITIN) 10 mg tablet Take 1 tablet (10 mg total) by mouth daily 90 tablet 0    meloxicam (MOBIC) 15 mg tablet Take 1 tablet (15 mg total) by mouth daily 30 tablet 6    methocarbamol (ROBAXIN) 750 mg tablet Take 750 mg by mouth 3 (three) times a day  0    Nerve Stimulator (TENS THERAPY PAIN RELIEF) VIKASH 1 application by Does not apply route 2 (two) times a day as needed (twice daily for hip pain) 1 Device 0    oxyCODONE-acetaminophen (PERCOCET) 5-325 mg per tablet Take 1 tablet by mouth 2 (two) times a day  0    pantoprazole (PROTONIX) 40 mg tablet Take 1 tablet (40 mg total) by mouth daily 30 tablet 0    polyethylene glycol (GLYCOLAX) powder Take 17 g by mouth as needed Mix 1 capful in 8 ounces of water, juice or tea     11    prochlorperazine (COMPAZINE) 10 mg tablet TAKE 1 TABLET TWICE A DAY AS NEEDED FOR HEADACHE OR NAUSEA, LIMIT 3 DAYS PER WEEK  0    prochlorperazine (COMPAZINE) 10 mg tablet TAKE 1 TABLET TWICE A DAY AS NEEDED FOR HEADACHE OR NAUSEA, LIMIT 3 DAYS PER WEEK      promethazine-codeine (PHENERGAN WITH CODEINE) 6 25-10 mg/5 mL syrup Take 2 5 mL by mouth every 6 (six) hours      sucralfate (CARAFATE) 1 g tablet TAKE 1 TABLET BY MOUTH 3 TIMES DAILY 90 tablet 3    topiramate (TOPAMAX) 25 mg tablet TAKE 4 PILLS AT BEDTIME  1    VIIBRYD 40 MG tablet Take 40 mg by mouth daily With food  0    zolpidem (AMBIEN) 10 mg tablet TAKE 1 TABLETS AS NEEDED FOR SLEEP  0     No current facility-administered medications for this visit  Allergies   Allergen Reactions    Gabapentin      Annotation - [id number]: Neuropsych effects per patient   Latex Dermatitis    Penicillins Hives       Objective   Vitals: Blood pressure 110/80, pulse 69, height 5' 8" (1 727 m), weight 89 8 kg (198 lb), unknown if currently breastfeeding  Physical Exam   Constitutional: She is oriented to person, place, and time  She appears well-developed and well-nourished  No distress  HENT:   Head: Normocephalic and atraumatic  Mouth/Throat: No oropharyngeal exudate  Eyes: Conjunctivae and EOM are normal  No scleral icterus  Neck: Normal range of motion  Neck supple  No thyromegaly present  Cardiovascular: Normal rate, regular rhythm and normal heart sounds  Pulmonary/Chest: Effort normal and breath sounds normal  No respiratory distress  She has no wheezes  She has no rales  Abdominal: Soft  Bowel sounds are normal  She exhibits no distension  There is no tenderness  There is no rebound  Musculoskeletal: Normal range of motion  She exhibits no edema or deformity  Lymphadenopathy:     She has no cervical adenopathy  Neurological: She is alert and oriented to person, place, and time  Skin: Skin is warm and dry  No rash noted  No erythema  No pallor  Psychiatric: She has a normal mood and affect  Her behavior is normal  Judgment and thought content normal        The history was obtained from the review of the chart, patient      Lab Results:   Lab Results   Component [facility] Date/[facility]    [facility] 10/06/2018 08:53 AM    TSH 3RD [facility] 1 930 06/25/2018 08:16 AM TSH 3RD GENERATON 0 180 (L) 05/24/2018 04:05 PM    Free T4 0 81 06/25/2018 08:16 AM    Free T4 0 96 05/24/2018 04:05 PM    Free T4 0 84 02/14/2018 11:07 AM       Imaging Studies:   Results for orders placed during the hospital encounter of 07/26/16   US thyroid    Impression    Very heterogeneous thyroid without discrete nodules  Workstation performed: [id number]         I have personally reviewed pertinent reports  Portions of the record may have been created with voice recognition software  Occasional wrong word or "sound a like" substitutions may have occurred due to the inherent limitations of voice recognition software  Read the chart carefully and recognize, using context, where substitutions have occurred

## 2019-01-31 NOTE — ASSESSMENT & PLAN NOTE
Will repeat thyroid function test-if TSH stable she can follow up with her primary care-she will need monitoring of thyroid function tests every 6 months

## 2019-01-31 NOTE — ASSESSMENT & PLAN NOTE
Counseled on dietary and lifestyle modifications and weight loss    Will repeat fasting glucose as well as hemoglobin A1c

## 2019-02-06 ENCOUNTER — TELEPHONE (OUTPATIENT)
Dept: SURGERY | Facility: HOSPITAL | Age: 54
End: 2019-02-06

## 2019-02-07 ENCOUNTER — HOSPITAL ENCOUNTER (OUTPATIENT)
Dept: INTERVENTIONAL RADIOLOGY/VASCULAR | Facility: HOSPITAL | Age: 54
Discharge: HOME | End: 2019-02-07
Payer: COMMERCIAL

## 2019-02-07 VITALS
OXYGEN SATURATION: 98 % | BODY MASS INDEX: 29.86 KG/M2 | HEART RATE: 72 BPM | TEMPERATURE: 97 F | DIASTOLIC BLOOD PRESSURE: 80 MMHG | RESPIRATION RATE: 18 BRPM | SYSTOLIC BLOOD PRESSURE: 143 MMHG | WEIGHT: 197 LBS | HEIGHT: 68 IN

## 2019-02-07 DIAGNOSIS — M51.16 NEURITIS OR RADICULITIS DUE TO RUPTURE OF LUMBAR INTERVERTEBRAL DISC: ICD-10-CM

## 2019-02-07 DIAGNOSIS — M99.53 INTERVERTEBRAL DISC STENOSIS OF NEURAL CANAL OF LUMBAR REGION: ICD-10-CM

## 2019-02-07 LAB — EXT PREGNANCY TEST URINE: NEGATIVE

## 2019-02-07 PROCEDURE — 99152 MOD SED SAME PHYS/QHP 5/>YRS: CPT

## 2019-02-07 PROCEDURE — 99153 MOD SED SAME PHYS/QHP EA: CPT

## 2019-02-07 PROCEDURE — 81025 URINE PREGNANCY TEST: CPT | Performed by: PHYSICAL MEDICINE & REHABILITATION

## 2019-02-07 RX ORDER — PAPAVERINE HCL 150 MG
CAPSULE, EXTENDED RELEASE ORAL CODE/TRAUMA/SEDATION MEDICATION
Status: COMPLETED | OUTPATIENT
Start: 2019-02-07 | End: 2019-02-07

## 2019-02-07 RX ORDER — MIDAZOLAM HYDROCHLORIDE 1 MG/ML
INJECTION INTRAMUSCULAR; INTRAVENOUS CODE/TRAUMA/SEDATION MEDICATION
Status: COMPLETED | OUTPATIENT
Start: 2019-02-07 | End: 2019-02-07

## 2019-02-07 RX ORDER — SODIUM CHLORIDE 9 MG/ML
50 INJECTION, SOLUTION INTRAVENOUS CONTINUOUS
Status: DISCONTINUED | OUTPATIENT
Start: 2019-02-07 | End: 2019-02-11 | Stop reason: HOSPADM

## 2019-02-07 RX ORDER — LIDOCAINE HYDROCHLORIDE 10 MG/ML
INJECTION, SOLUTION EPIDURAL; INFILTRATION; INTRACAUDAL; PERINEURAL CODE/TRAUMA/SEDATION MEDICATION
Status: COMPLETED | OUTPATIENT
Start: 2019-02-07 | End: 2019-02-07

## 2019-02-07 RX ORDER — FENTANYL CITRATE 50 UG/ML
INJECTION, SOLUTION INTRAMUSCULAR; INTRAVENOUS CODE/TRAUMA/SEDATION MEDICATION
Status: COMPLETED | OUTPATIENT
Start: 2019-02-07 | End: 2019-02-07

## 2019-02-07 RX ORDER — KETOROLAC TROMETHAMINE 30 MG/ML
INJECTION, SOLUTION INTRAMUSCULAR; INTRAVENOUS CODE/TRAUMA/SEDATION MEDICATION
Status: COMPLETED | OUTPATIENT
Start: 2019-02-07 | End: 2019-02-07

## 2019-02-07 RX ADMIN — SODIUM CHLORIDE 50 ML/HR: 9 INJECTION, SOLUTION INTRAVENOUS at 10:05

## 2019-02-07 RX ADMIN — MIDAZOLAM HYDROCHLORIDE 1 MG: 1 INJECTION, SOLUTION INTRAMUSCULAR; INTRAVENOUS at 10:35

## 2019-02-07 RX ADMIN — KETOROLAC TROMETHAMINE 30 MG: 30 INJECTION, SOLUTION INTRAMUSCULAR; INTRAVENOUS at 10:35

## 2019-02-07 RX ADMIN — LIDOCAINE HYDROCHLORIDE 3 ML: 10 INJECTION, SOLUTION EPIDURAL; INFILTRATION; INTRACAUDAL; PERINEURAL at 11:02

## 2019-02-07 RX ADMIN — IOHEXOL 50 ML: 300 INJECTION, SOLUTION INTRAVENOUS at 11:08

## 2019-02-07 RX ADMIN — MIDAZOLAM HYDROCHLORIDE 1 MG: 1 INJECTION, SOLUTION INTRAMUSCULAR; INTRAVENOUS at 10:52

## 2019-02-07 RX ADMIN — MIDAZOLAM HYDROCHLORIDE 1 MG: 1 INJECTION, SOLUTION INTRAMUSCULAR; INTRAVENOUS at 10:40

## 2019-02-07 RX ADMIN — FENTANYL CITRATE 50 MCG: 50 INJECTION INTRAMUSCULAR; INTRAVENOUS at 10:35

## 2019-02-07 RX ADMIN — LIDOCAINE HYDROCHLORIDE 5 ML: 10 INJECTION, SOLUTION EPIDURAL; INFILTRATION; INTRACAUDAL; PERINEURAL at 11:02

## 2019-02-07 RX ADMIN — FENTANYL CITRATE 25 MCG: 50 INJECTION INTRAMUSCULAR; INTRAVENOUS at 11:02

## 2019-02-07 RX ADMIN — MIDAZOLAM HYDROCHLORIDE 1 MG: 1 INJECTION, SOLUTION INTRAMUSCULAR; INTRAVENOUS at 11:02

## 2019-02-07 RX ADMIN — DEXAMETHASONE SODIUM PHOSPHATE 10 MG: 10 INJECTION, SOLUTION INTRAMUSCULAR; INTRAVENOUS at 11:02

## 2019-02-07 RX ADMIN — FENTANYL CITRATE 25 MCG: 50 INJECTION INTRAMUSCULAR; INTRAVENOUS at 10:52

## 2019-02-07 NOTE — DISCHARGE INSTRUCTIONS
Epidural Steroid Injection   WHAT YOU NEED TO KNOW:   An epidural steroid injection (ERNESTINA) is a procedure to inject steroid medicine into the epidural space  The epidural space is between your spinal cord and vertebrae  Steroids reduce inflammation and fluid buildup in your spine that may be causing pain  You may be given pain medicine along with the steroids  DISCHARGE INSTRUCTIONS:   Seek care immediately if:   · Blood soaks through your bandage  · Your wound is red, swollen, or draining pus  · You have a fever or chills, severe back pain, and the procedure area is sensitive to the touch  · You have a seizure  · You have trouble moving your legs  · You have weakness or numbness in your legs  · You cannot control when you urinate or have a bowel movement  Contact your healthcare provider if:   · You have nausea or are vomiting  · Your face or neck is red for a few days and you feel warm  · You have more pain than you had before the procedure  · You have swelling in your hands or feet  · You have questions or concerns about your condition or care  Follow up with your healthcare provider as directed:  Write down your questions so you remember to ask them during your visits  Care for your wound as directed: You may remove the bandage before you go to bed the day of your procedure  You may take a shower, but do not take a bath for at least 24 hours  Self-care:   · Do not drive,  use machines, or do strenuous activity for 24 hours after your procedure or as directed  · Continue other treatments  as directed  Steroid injections alone will not control your pain  The injections are meant to be used with other treatments, such as physical therapy  © 2017 [address] [facility] Information is for End User's use only and may not be sold, redistributed or otherwise used for commercial purposes   All illustrations and images included in CareNotes® are the copyrighted property of [employer]  or [employer]  The above information is an  only  It is not intended as medical advice for individual conditions or treatments  Talk to your doctor, nurse or pharmacist before following any medical regimen to see if it is safe and effective for you  Lumbar Disc Herniation   WHAT YOU NEED TO KNOW:   Lumbar disc herniation occurs when a disc in your lumbar spine (lower back) bulges out  Lumbar discs are spongy cushions between the vertebrae (bones) in your spine  The herniated disc may press on your nerves or spinal cord  DISCHARGE INSTRUCTIONS:   Seek care immediately if:   · You cannot control when you urinate or have a bowel movement  · You are unable to move one or both of your legs  · You lose feeling in your groin or buttocks  Contact your healthcare provider if:   · You have numbness in one or both of your legs  · You have low back pain while resting  · You have trouble moving one or both of your legs  · You begin leaking urine or bowel movement, and it is not normal for you  · Your pain gets worse, even after you take medicine  · You have questions or concerns about your condition or care  Medicines: You may need any of the following:  · NSAIDs , such as ibuprofen, help decrease swelling, pain, and fever  NSAIDs can cause stomach bleeding or kidney problems in certain people  If you take blood thinner medicine, always ask your healthcare provider if NSAIDs are safe for you  Always read the medicine label and follow directions  · Prescription pain medicine  may be given  Ask how to take this medicine safely  · Muscle relaxers  decrease pain and muscle spasms  · Take your medicine as directed  Contact your healthcare provider if you think your medicine is not helping or if you have side effects  Tell him or her if you are allergic to any medicine  Keep a list of the medicines, vitamins, and herbs you take   Include the amounts, and when and why you take them  Bring the list or the pill bottles to follow-up visits  Carry your medicine list with you in case of an emergency  Rest:  Your healthcare provider may have you rest in bed for a few days  It is best to rest on your side with your knees bent  Put a cushion between your knees to help decrease the pressure on your spine and nerves  Ask how long you should rest and when you can return to your daily activities  Heat:  Apply heat on your lower back for 20 to 30 minutes every 2 hours for as many days as directed  Heat helps decrease pain and muscle spasms  Physical therapy:  A physical therapist teaches you exercises to help improve movement and strength, and to decrease pain  A physical therapist can teach you safe ways to bend, lift, sit, and stand to help relieve back pain  Exercise and activity:  Exercises that do not stress your back muscles may help decrease your pain  Examples of low-stress exercises are walking, swimming, and biking  Avoid heavy lifting while your back is healing  Try not to sit for long periods of time  Talk to your healthcare provider before you start any new exercise program   Follow up with your healthcare provider as directed:  Write down your questions so you remember to ask them during your visits  © 2017 [address] [employer]  Information is for End User's use only and may not be sold, redistributed or otherwise used for commercial purposes  All illustrations and images included in CareNotes® are the copyrighted property of [employer]  or [address]  The above information is an  only  It is not intended as medical advice for individual conditions or treatments  Talk to your doctor, nurse or pharmacist before following any medical regimen to see if it is safe and effective for you

## 2019-02-07 NOTE — H&P
History of Present Illness: The patient is a 48 y o  female who presents with complaints of low back pain, lumbar spinal stenosis and lumbar radiculopathy    Patient Active Problem List   Diagnosis    Mixed incontinence    Stress incontinence in female    Obstructive sleep apnea syndrome    Hypothyroidism    Constipation    Chronic low back pain    Asthma    Arthritis    Gastritis    Rectocele    Cystocele    Hypermobility of urethra    Achalasia    Bilateral edema of lower extremity    Breast pain, right    Chronic sinusitis with recurrent bronchitis    Depression    Esophageal reflux    H  pylori infection    Headache, migraine    Intractable chronic migraine without aura and without status migrainosus    Breast mass    Obesity (BMI 30-39  9)    Other chronic pain    Spondylosis of lumbar region without myelopathy or radiculopathy    Thyroiditis    Tubular adenoma of colon    Food allergy    POP-Q stage 2 cystocele    Moderate persistent asthma with exacerbation    Prediabetes    Need for shingles vaccine    Discomfort of right ear    Trochanteric bursitis of right hip    Pain in right hip    Numbness of toes    Acute viral bronchitis    Class 1 obesity due to excess calories with body mass index (BMI) of 34 0 to 34 9 in adult    Hashimoto's thyroiditis       Past Medical History:   Diagnosis Date    Achalasia     Asthma     Constipation     Depression     Elevated LFTs     last assessed 10/25/17    Fibromyalgia     Gastric ulcer     GERD (gastroesophageal reflux disease)     Hypothyroidism     Kidney stone     Migraines     Migraines     MICHAEL (obstructive sleep apnea)     last assessed 16    Sleep apnea 2019    Uses CPAP    Thyroid nodule     last assessed 17       Past Surgical History:   Procedure Laterality Date     SECTION      MYOTOMY HELLER LAPAROSCOPIC  2016    Dr Brook Caceres, Coincident lorena fundoplication    NASAL SINUS SURGERY  OTHER SURGICAL HISTORY      achalasia repair    PILONIDAL CYST / SINUS EXCISION      UT CYSTOURETHROSCOPY N/A 1/19/2017    Procedure: Ly Ram;  Surgeon: Arnie Adams MD;  [facility];  Service: UroGynecology           UT ESOPHAGOGASTRODUODENOSCOPY TRANSORAL DIAGNOSTIC N/A 9/7/2017    Procedure: ESOPHAGOGASTRODUODENOSCOPY (EGD); Surgeon: Joseph Connell MD;  [facility];   Service: Gastroenterology    UT POST COLPORRHAPHY,RECTUM/VAGINA N/A 1/19/2017    Procedure: COLPORRHAPHY POSTERIOR;  Surgeon: Arnie Adams MD;  [facility];  Service: UroGynecology           UT SLING OPER STRES INCONTINENCE N/A 1/19/2017    Procedure: INSERTION PUBOVAGINAL SLING /SINGLE INCISION ;  Surgeon: Arnie Adams MD;  [facility];  Service: UroGynecology           TONSILLECTOMY      TUBAL LIGATION      US GUIDED BREAST BIOPSY RIGHT COMPLETE Right 10/31/2017         Current Outpatient Prescriptions:     acetaminophen (TYLENOL) 325 mg tablet, Every 4 hours as needed, Disp: 30 tablet, Rfl: 0    albuterol (2 5 mg/3 mL) 0 083 % nebulizer solution, USE 1 VIAL VIA NEBULIZER EVERY 4 HOURS AS NEEDED FOR WHEEZING OR SHORTNESS OF BREATH, Disp: 75 mL, Rfl: 0    albuterol (VENTOLIN HFA) 90 mcg/act inhaler, Inhale 2 puffs every 4 (four) hours as needed for wheezing or shortness of breath, Disp: 1 Inhaler, Rfl: 3    ARIPiprazole (ABILIFY) 5 mg tablet, Take 5 mg by mouth daily at bedtime, Disp: , Rfl: 1    Botulinum Toxin Type A 200 units SOLR, Inject 155 units into face and neck IM every 90 days, Disp: , Rfl:     BREO ELLIPTA, 1 puff daily , Disp: , Rfl:     buPROPion (WELLBUTRIN XL) 150 mg 24 hr tablet, Take 150 mg by mouth every morning, Disp: , Rfl: 0    clonazePAM (KlonoPIN) 0 5 mg tablet, Take 0 5 mg by mouth daily  , Disp: , Rfl:     clotrimazole-betamethasone (LOTRISONE) 1-0 05 % cream, Apply topically 2 (two) times a day as needed (skin rash), Disp: 30 g, Rfl: 2    diclofenac sodium (VOLTAREN) 1 %, APPLY 4 GRAMS TO AFFECTED AREA(S) THREE TIMES A DAY, Disp: , Rfl: 3    dicyclomine (BENTYL) 20 mg tablet, TAKE 1 TABLET BY MOUTH EVERY 6 HOURS AS NEEDED, Disp: 120 tablet, Rfl: 2    docusate sodium (COLACE) 100 mg capsule, TAKE 1 CAPSULE TWICE DAILY AS NEEDED , Disp: , Rfl:     doxepin (SINEquan) 25 mg capsule, TAKE 1-2 CAPSULES BY MOUTH DAILY AT BEDTIME , Disp: , Rfl: 0    fluticasone (FLONASE) 50 mcg/act nasal spray, 1-2 sprays into each nostril daily, Disp: 16 g, Rfl: 0    ibuprofen (MOTRIN) 600 mg tablet, Take 600 mg by mouth 3 (three) times a day as needed, Disp: , Rfl: 0    ipratropium (ATROVENT) 0 03 % nasal spray, USE 2 SPRAYS INTO EACH NOSTRIL EVERY 12 (TWELVE) HOURS, Disp: 30 mL, Rfl: 1    levothyroxine 150 mcg tablet, Take 1 tablet (150 mcg total) by mouth daily, Disp: 90 tablet, Rfl: 3    loratadine (CLARITIN) 10 mg tablet, Take 1 tablet (10 mg total) by mouth daily, Disp: 90 tablet, Rfl: 0    meloxicam (MOBIC) 15 mg tablet, Take 1 tablet (15 mg total) by mouth daily, Disp: 30 tablet, Rfl: 6    methocarbamol (ROBAXIN) 750 mg tablet, Take 750 mg by mouth 3 (three) times a day, Disp: , Rfl: 0    pantoprazole (PROTONIX) 40 mg tablet, Take 1 tablet (40 mg total) by mouth daily, Disp: 30 tablet, Rfl: 0    prochlorperazine (COMPAZINE) 10 mg tablet, TAKE 1 TABLET TWICE A DAY AS NEEDED FOR HEADACHE OR NAUSEA, LIMIT 3 DAYS PER WEEK, Disp: , Rfl: 0    zolpidem (AMBIEN) 10 mg tablet, TAKE 1 TABLETS AS NEEDED FOR SLEEP, Disp: , Rfl: 0    hydrOXYzine pamoate (VISTARIL) 50 mg capsule, Take 50 mg by mouth 3 (three) times a day as needed  , Disp: , Rfl:     Nerve Stimulator (TENS THERAPY PAIN RELIEF) VIKASH, 1 application by Does not apply route 2 (two) times a day as needed (twice daily for hip pain), Disp: 1 Device, Rfl: 0    oxyCODONE-acetaminophen (PERCOCET) 5-325 mg per tablet, Take 1 tablet by mouth 2 (two) times a day, Disp: , Rfl: 0    polyethylene glycol (GLYCOLAX) powder, Take 17 g by mouth as needed Mix 1 capful in 8 ounces of water, juice or tea  , Disp: , Rfl: 11    sucralfate (CARAFATE) 1 g tablet, TAKE 1 TABLET BY MOUTH 3 TIMES DAILY, Disp: 90 tablet, Rfl: 3    topiramate (TOPAMAX) 25 mg tablet, TAKE 4 PILLS AT BEDTIME , Disp: , Rfl: 1    VIIBRYD 40 MG tablet, Take 40 mg by mouth daily With food, Disp: , Rfl: 0    Current Facility-Administered Medications:     sodium chloride 0 9 % infusion, 50 mL/hr, Intravenous, Continuous, Asael Miranda MD, Stopped at 02/07/19 1200    Allergies   Allergen Reactions    Gabapentin      Annotation - [id number]: Neuropsych effects per patient   Latex Dermatitis    Penicillins Hives       Physical Exam:   Vitals:    02/07/19 1145   BP: 143/80   Pulse: 72   Resp: 18   Temp:    SpO2:      General: Awake, Alert, Oriented x 3, Mood and affect appropriate  Respiratory: Respirations even and unlabored  Cardiovascular: Peripheral pulses intact; no edema  Musculoskeletal Exam:  Positive straight leg raise bilaterally, limited lumbar range of motion      Assessment:   1  Intervertebral disc stenosis of neural canal of lumbar region    2   Neuritis or radiculitis due to rupture of lumbar intervertebral disc        Plan:  Bilateral L5 transforaminal epidural injection

## 2019-02-12 DIAGNOSIS — K21.9 CHRONIC GERD: ICD-10-CM

## 2019-02-13 RX ORDER — PANTOPRAZOLE SODIUM 40 MG/1
TABLET, DELAYED RELEASE ORAL
Qty: 30 TABLET | Refills: 0 | OUTPATIENT
Start: 2019-02-13

## 2019-02-26 DIAGNOSIS — J20.8 ACUTE VIRAL BRONCHITIS: ICD-10-CM

## 2019-02-26 RX ORDER — IPRATROPIUM BROMIDE 21 UG/1
SPRAY, METERED NASAL
Qty: 30 ML | Refills: 1 | Status: SHIPPED | OUTPATIENT
Start: 2019-02-26 | End: 2019-03-04 | Stop reason: SDUPTHER

## 2019-02-28 ENCOUNTER — OFFICE VISIT (OUTPATIENT)
Dept: GYNECOLOGY | Facility: CLINIC | Age: 54
End: 2019-02-28
Payer: COMMERCIAL

## 2019-02-28 VITALS
BODY MASS INDEX: 29.86 KG/M2 | HEIGHT: 68 IN | DIASTOLIC BLOOD PRESSURE: 76 MMHG | SYSTOLIC BLOOD PRESSURE: 124 MMHG | WEIGHT: 197 LBS

## 2019-02-28 DIAGNOSIS — N20.0 LEFT NEPHROLITHIASIS: ICD-10-CM

## 2019-02-28 DIAGNOSIS — R19.7 DIARRHEA, UNSPECIFIED TYPE: ICD-10-CM

## 2019-02-28 DIAGNOSIS — N83.202 LEFT OVARIAN CYST: ICD-10-CM

## 2019-02-28 DIAGNOSIS — R14.0 ABDOMINAL BLOATING: ICD-10-CM

## 2019-02-28 DIAGNOSIS — R10.30 LOWER ABDOMINAL PAIN: Primary | ICD-10-CM

## 2019-02-28 DIAGNOSIS — Z98.890 HISTORY OF ENDOMETRIAL ABLATION: ICD-10-CM

## 2019-02-28 PROCEDURE — 99215 OFFICE O/P EST HI 40 MIN: CPT | Performed by: OBSTETRICS & GYNECOLOGY

## 2019-02-28 RX ORDER — BETAMETHASONE DIPROPIONATE 0.5 MG/G
CREAM TOPICAL
COMMUNITY
Start: 2016-01-28 | End: 2019-07-03 | Stop reason: SDUPTHER

## 2019-02-28 RX ORDER — PROMETHAZINE HYDROCHLORIDE AND CODEINE PHOSPHATE 6.25; 1 MG/5ML; MG/5ML
2.5 SOLUTION ORAL EVERY 6 HOURS PRN
COMMUNITY
Start: 2016-01-02 | End: 2019-03-08

## 2019-02-28 RX ORDER — BISMUTH SUBSALICYLATE 262 MG/1
2 TABLET, CHEWABLE ORAL
COMMUNITY
Start: 2017-10-09 | End: 2022-05-19 | Stop reason: SDUPTHER

## 2019-02-28 RX ORDER — GABAPENTIN 300 MG/1
300 CAPSULE ORAL
Status: ON HOLD | COMMUNITY
Start: 2017-06-30 | End: 2019-04-25

## 2019-02-28 RX ORDER — GUAIFENESIN 100 MG/5ML
LIQUID ORAL
Refills: 0 | COMMUNITY
Start: 2018-12-19 | End: 2019-03-04 | Stop reason: SDUPTHER

## 2019-02-28 RX ORDER — A/SINGAPORE/GP1908/2015 IVR-180 (H1N1) (AN A/MICHIGAN/45/2015-LIKE VIRUS), A/SINGAPORE/GP2050/2015 (H3N2) (AN A/HONG KONG/4801/2014 - LIKE VIRUS), B/UTAH/9/2014 (A B/PHUKET/3073/2013-LIKE VIRUS), B/HONG KONG/259/2010 (A B/BRISBANE/60/08-LIKE VIRUS) 15; 15; 15; 15 UG/.5ML; UG/.5ML; UG/.5ML; UG/.5ML
INJECTION, SUSPENSION INTRAMUSCULAR
COMMUNITY
Start: 2019-01-14 | End: 2022-05-23 | Stop reason: ALTCHOICE

## 2019-02-28 NOTE — PROGRESS NOTES
Assessment/Plan:  Simple cyst of the left ovary, 3 9 cm- symptoms are more severe than this would typically generate  Left nephrolithiasis  Recent bloating and diarrhea   History of chronic back pain  Discussed ovarian cysts are usually limited and resolve within a month or so  She will obtain the operative report, pathology report, history and physical, and labs from her October surgery  Another ultrasound may be of value  Continue Naprosyn for now  RTO 1 wk  I reviewed the CT scan, ultrasound, and labs from her  visit to the emergency room  There was a simple 3 9 cm cyst of the left ovary without evidence of torsion  GC and chlamydia cultures were negative  Hemoglobin was 12 8 and a white count was 8 4  Platelets were elevated at 457,000  A urinalysis revealed trace protein and 15 ketones  There was no blood  Today's visit took 50 minutes  > than 50% was spent on counseling and review of records  Diagnoses and all orders for this visit:    Lower abdominal pain    Left ovarian cyst    History of endometrial ablation    Left nephrolithiasis    Other orders  -     FLUCELVAX QUADRIVALENT  -     COUGH SYRUP 100 MG/5ML syrup; TAKE 5 ML BY MOUTH 3 (THREE) TIMES A DAY AS NEEDED FOR COUGH  -     gabapentin (NEURONTIN) 300 mg capsule; Take 300 mg by mouth  -     betamethasone dipropionate (DIPROSONE) 0 05 % cream; Apply topically  -     Pseudoephedrine-APAP  MG TABS; Take 325 mg by mouth  -     Promethazine-Codeine 6 25-10 MG/5ML SOLN; Take 2 5 mL by mouth every 6 (six) hours as needed  -     bismuth subsalicylate (PEPTO BISMOL) 262 MG chewable tablet; Chew 2 tablets  -     Albuterol Sulfate 108 (90 Base) MCG/ACT AEPB; Inhale 2 puffs              Subjective:        Patient ID: Sami Amezcua is a 48 y o  female      Lovely Padmini is here for a 2nd opinion regarding a left ovarian cyst    She is a 61-year-old  4 para 3 black female who developed lower abdominal pain at the end of January  She described this cramping and is if everything was going to fall out of her vagina she initially thought she had a kidney stone and began hydrating herself  The pain gradually got worse and was associated with nausea and vomiting  She felt pressure from the vagina to the rectum  Bloating and diarrhea were present as well as chills  She used to have a history of constipation  Urinary frequency is present which she attributes to the cyst pushing on her bladder  She was seen at [facility] emergency room on February 14th  A CT scan ultrasound were performed which revealed a left ovarian cyst    She was placed on naproxen  The pain is interfering with her daily activities  It is worse with bending over, sitting for an extended period time and standing  She is not employed  She saw Dr Thaddeus García 2 weeks ago following the visit to the emergency room and he just suggested performing another ultrasound  Last October 23rd she underwent a laparoscopy for a left ovarian cystectomy, lysis of adhesions, and an endometrial ablation was performed for abnormal periods  She did not understand that ovarian cysts can be recurrent  She was also concerned because [place] questioned her about a procedure that was billed for but not performed  There word fraud was used which scared her  She had a 1 day menses in December and bled on February 23rd  The following portions of the patient's history were reviewed and updated as appropriate: She  has a past medical history of Achalasia, Asthma, Constipation, Depression, Elevated LFTs, Fibromyalgia, Gastric ulcer, GERD (gastroesophageal reflux disease), Hypothyroidism, Kidney stone, Migraines, Migraines, MICHAEL (obstructive sleep apnea), Sleep apnea (01/05/2019), and Thyroid nodule    Patient Active Problem List    Diagnosis Date Noted    Hashimoto's thyroiditis 01/31/2019    Class 1 obesity due to excess calories with body mass index (BMI) of 34 0 to 34 9 in adult 01/15/2019    Acute viral bronchitis 2018    Trochanteric bursitis of right hip 10/05/2018    Pain in right hip 10/05/2018    Numbness of toes 10/05/2018    Need for shingles vaccine 2018    Discomfort of right ear 2018    Prediabetes 2018    Moderate persistent asthma with exacerbation 2018    POP-Q stage 2 cystocele 2018    Food allergy 2018    Intractable chronic migraine without aura and without status migrainosus 2017    Breast mass 10/25/2017    H  pylori infection 10/09/2017    Headache, migraine 2017    Other chronic pain 2017    Breast pain, right 2017    Obesity (BMI 30-39 9) 2017    Tubular adenoma of colon 2017    Rectocele 2017    Cystocele 2017    Hypermobility of urethra 2017    Mixed incontinence 2017    Stress incontinence in female 2017    Obstructive sleep apnea syndrome 2017    Hypothyroidism 2017    Constipation 2017    Chronic low back pain 2017    Asthma 2017    Arthritis 2017    Chronic sinusitis with recurrent bronchitis 2016    Bilateral edema of lower extremity 2016    Achalasia 10/23/2015    Spondylosis of lumbar region without myelopathy or radiculopathy 10/23/2015    Gastritis 2015    Thyroiditis 2014    Depression 2014    Esophageal reflux 10/08/2012   PMH:  Menarche 14  G4, P4; , C/S for Breech,  x 2  Bronchitis  Tubal ligation '04  Tobacco abuse- quit '15  Hypothyroidism 30  Pilonidal cystectomy  Sinusitis  Asthma '12  Migraine headaches  Insomnia  Achalasia '15  IBS  Chronic back pain from arthritis  Stress urinary incontinence- TV Sling '16  Sleep apnea   She  has a past surgical history that includes  section; Other surgical history;  Nasal sinus surgery; Pilonidal cyst / sinus excision; Tubal ligation; Tonsillectomy; pr sling oper stres incontinence (N/A, 1/19/2017); pr post colporrhaphy,rectum/vagina (N/A, 1/19/2017); pr cystourethroscopy (N/A, 1/19/2017); pr esophagogastroduodenoscopy transoral diagnostic (N/A, 9/7/2017); MYOTOMY HELLER LAPAROSCOPIC (01/06/2016); and US guided breast biopsy right complete (Right, 10/31/2017)  Her family history includes Breast cancer in her family and mother; Cancer in her maternal grandfather, maternal grandmother, and mother; Colon cancer in her maternal aunt; Dementia in her father; Diabetes in her father; Hypertension in her father, maternal grandfather, and mother; Ovarian cancer in her maternal grandmother; Stroke in her father and paternal grandmother; Thyroid disease unspecified in her brother and maternal aunt; Tongue cancer in her maternal grandfather  She  reports that she quit smoking about 3 years ago  She has a 45 00 pack-year smoking history  She has never used smokeless tobacco  She reports that she does not drink alcohol or use drugs   '91  Not employed    Current Outpatient Medications   Medication Sig Dispense Refill    Albuterol Sulfate 108 (90 Base) MCG/ACT AEPB Inhale 2 puffs      betamethasone dipropionate (DIPROSONE) 0 05 % cream Apply topically      bismuth subsalicylate (PEPTO BISMOL) 262 MG chewable tablet Chew 2 tablets      gabapentin (NEURONTIN) 300 mg capsule Take 300 mg by mouth      Promethazine-Codeine 6 25-10 MG/5ML SOLN Take 2 5 mL by mouth every 6 (six) hours as needed      Pseudoephedrine-APAP  MG TABS Take 325 mg by mouth      acetaminophen (TYLENOL) 325 mg tablet Every 4 hours as needed 30 tablet 0    albuterol (2 5 mg/3 mL) 0 083 % nebulizer solution USE 1 VIAL VIA NEBULIZER EVERY 4 HOURS AS NEEDED FOR WHEEZING OR SHORTNESS OF BREATH 75 mL 0    albuterol (VENTOLIN HFA) 90 mcg/act inhaler Inhale 2 puffs every 4 (four) hours as needed for wheezing or shortness of breath 1 Inhaler 3    ARIPiprazole (ABILIFY) 5 mg tablet Take 5 mg by mouth daily at bedtime  1    Botulinum Toxin Type A 200 units SOLR Inject 155 units into face and neck IM every 90 days      BREO ELLIPTA 1 puff daily       buPROPion (WELLBUTRIN XL) 150 mg 24 hr tablet Take 150 mg by mouth every morning  0    clonazePAM (KlonoPIN) 0 5 mg tablet Take 0 5 mg by mouth daily        clotrimazole-betamethasone (LOTRISONE) 1-0 05 % cream Apply topically 2 (two) times a day as needed (skin rash) 30 g 2    COUGH SYRUP 100 MG/5ML syrup TAKE 5 ML BY MOUTH 3 (THREE) TIMES A DAY AS NEEDED FOR COUGH  0    diclofenac sodium (VOLTAREN) 1 % APPLY 4 GRAMS TO AFFECTED AREA(S) THREE TIMES A DAY  3    dicyclomine (BENTYL) 20 mg tablet TAKE 1 TABLET BY MOUTH EVERY 6 HOURS AS NEEDED 120 tablet 2    docusate sodium (COLACE) 100 mg capsule TAKE 1 CAPSULE TWICE DAILY AS NEEDED        doxepin (SINEquan) 25 mg capsule TAKE 1-2 CAPSULES BY MOUTH DAILY AT BEDTIME   0    FLUCELVAX QUADRIVALENT       fluticasone (FLONASE) 50 mcg/act nasal spray 1-2 sprays into each nostril daily 16 g 0    hydrOXYzine pamoate (VISTARIL) 50 mg capsule Take 50 mg by mouth 3 (three) times a day as needed        ibuprofen (MOTRIN) 600 mg tablet Take 600 mg by mouth 3 (three) times a day as needed  0    ipratropium (ATROVENT) 0 03 % nasal spray USE 2 SPRAYS INTO EACH NOSTRIL EVERY 12 (TWELVE) HOURS 30 mL 1    levothyroxine 150 mcg tablet Take 1 tablet (150 mcg total) by mouth daily 90 tablet 3    loratadine (CLARITIN) 10 mg tablet Take 1 tablet (10 mg total) by mouth daily 90 tablet 0    meloxicam (MOBIC) 15 mg tablet Take 1 tablet (15 mg total) by mouth daily 30 tablet 6    methocarbamol (ROBAXIN) 750 mg tablet Take 750 mg by mouth 3 (three) times a day  0    Nerve Stimulator (TENS THERAPY PAIN RELIEF) VIKASH 1 application by Does not apply route 2 (two) times a day as needed (twice daily for hip pain) 1 Device 0    oxyCODONE-acetaminophen (PERCOCET) 5-325 mg per tablet Take 1 tablet by mouth 2 (two) times a day  0    pantoprazole (PROTONIX) 40 mg tablet Take 1 tablet (40 mg total) by mouth daily 30 tablet 0    polyethylene glycol (GLYCOLAX) powder Take 17 g by mouth as needed Mix 1 capful in 8 ounces of water, juice or tea  11    prochlorperazine (COMPAZINE) 10 mg tablet TAKE 1 TABLET TWICE A DAY AS NEEDED FOR HEADACHE OR NAUSEA, LIMIT 3 DAYS PER WEEK  0    sucralfate (CARAFATE) 1 g tablet TAKE 1 TABLET BY MOUTH 3 TIMES DAILY 90 tablet 3    topiramate (TOPAMAX) 25 mg tablet TAKE 4 PILLS AT BEDTIME  1    VIIBRYD 40 MG tablet Take 40 mg by mouth daily With food  0    zolpidem (AMBIEN) 10 mg tablet TAKE 1 TABLETS AS NEEDED FOR SLEEP  0     No current facility-administered medications for this visit        Current Outpatient Medications on File Prior to Visit   Medication Sig    Albuterol Sulfate 108 (90 Base) MCG/ACT AEPB Inhale 2 puffs    betamethasone dipropionate (DIPROSONE) 0 05 % cream Apply topically    bismuth subsalicylate (PEPTO BISMOL) 262 MG chewable tablet Chew 2 tablets    gabapentin (NEURONTIN) 300 mg capsule Take 300 mg by mouth    Promethazine-Codeine 6 25-10 MG/5ML SOLN Take 2 5 mL by mouth every 6 (six) hours as needed    Pseudoephedrine-APAP  MG TABS Take 325 mg by mouth    acetaminophen (TYLENOL) 325 mg tablet Every 4 hours as needed    albuterol (2 5 mg/3 mL) 0 083 % nebulizer solution USE 1 VIAL VIA NEBULIZER EVERY 4 HOURS AS NEEDED FOR WHEEZING OR SHORTNESS OF BREATH    albuterol (VENTOLIN HFA) 90 mcg/act inhaler Inhale 2 puffs every 4 (four) hours as needed for wheezing or shortness of breath    ARIPiprazole (ABILIFY) 5 mg tablet Take 5 mg by mouth daily at bedtime    Botulinum Toxin Type A 200 units SOLR Inject 155 units into face and neck IM every 90 days    BREO ELLIPTA 1 puff daily     buPROPion (WELLBUTRIN XL) 150 mg 24 hr tablet Take 150 mg by mouth every morning    clonazePAM (KlonoPIN) 0 5 mg tablet Take 0 5 mg by mouth daily      clotrimazole-betamethasone (LOTRISONE) 1-0 05 % cream Apply topically 2 (two) times a day as needed (skin rash)    COUGH SYRUP 100 MG/5ML syrup TAKE 5 ML BY MOUTH 3 (THREE) TIMES A DAY AS NEEDED FOR COUGH    diclofenac sodium (VOLTAREN) 1 % APPLY 4 GRAMS TO AFFECTED AREA(S) THREE TIMES A DAY    dicyclomine (BENTYL) 20 mg tablet TAKE 1 TABLET BY MOUTH EVERY 6 HOURS AS NEEDED    docusate sodium (COLACE) 100 mg capsule TAKE 1 CAPSULE TWICE DAILY AS NEEDED   doxepin (SINEquan) 25 mg capsule TAKE 1-2 CAPSULES BY MOUTH DAILY AT BEDTIME   FLUCELVAX QUADRIVALENT     fluticasone (FLONASE) 50 mcg/act nasal spray 1-2 sprays into each nostril daily    hydrOXYzine pamoate (VISTARIL) 50 mg capsule Take 50 mg by mouth 3 (three) times a day as needed      ibuprofen (MOTRIN) 600 mg tablet Take 600 mg by mouth 3 (three) times a day as needed    ipratropium (ATROVENT) 0 03 % nasal spray USE 2 SPRAYS INTO EACH NOSTRIL EVERY 12 (TWELVE) HOURS    levothyroxine 150 mcg tablet Take 1 tablet (150 mcg total) by mouth daily    loratadine (CLARITIN) 10 mg tablet Take 1 tablet (10 mg total) by mouth daily    meloxicam (MOBIC) 15 mg tablet Take 1 tablet (15 mg total) by mouth daily    methocarbamol (ROBAXIN) 750 mg tablet Take 750 mg by mouth 3 (three) times a day    Nerve Stimulator (TENS THERAPY PAIN RELIEF) VIKASH 1 application by Does not apply route 2 (two) times a day as needed (twice daily for hip pain)    oxyCODONE-acetaminophen (PERCOCET) 5-325 mg per tablet Take 1 tablet by mouth 2 (two) times a day    pantoprazole (PROTONIX) 40 mg tablet Take 1 tablet (40 mg total) by mouth daily    polyethylene glycol (GLYCOLAX) powder Take 17 g by mouth as needed Mix 1 capful in 8 ounces of water, juice or tea       prochlorperazine (COMPAZINE) 10 mg tablet TAKE 1 TABLET TWICE A DAY AS NEEDED FOR HEADACHE OR NAUSEA, LIMIT 3 DAYS PER WEEK    sucralfate (CARAFATE) 1 g tablet TAKE 1 TABLET BY MOUTH 3 TIMES DAILY    topiramate (TOPAMAX) 25 mg tablet TAKE 4 PILLS AT BEDTIME   VIIBRYD 40 MG tablet Take 40 mg by mouth daily With food    zolpidem (AMBIEN) 10 mg tablet TAKE 1 TABLETS AS NEEDED FOR SLEEP     No current facility-administered medications on file prior to visit  She is allergic to gabapentin; latex; and penicillins       Review of Systems   Constitutional: Positive for chills  Negative for activity change, appetite change, fatigue and unexpected weight change  See the HPI for part of the review of systems   Eyes: Negative for visual disturbance  Respiratory: Negative for cough, chest tightness, shortness of breath and wheezing  Cardiovascular: Negative for chest pain, palpitations and leg swelling  Breast: Patient denies tenderness, nipple discharge, masses, or erythema  Gastrointestinal: Positive for abdominal distention and diarrhea  Negative for abdominal pain, blood in stool, constipation, nausea and vomiting  Endocrine: Negative for cold intolerance and heat intolerance  Genitourinary: Positive for dyspareunia ([place] she is to be 3 times a week  It was last attempted 2 days ago but was too painful to complete and she had to stop) and frequency  Negative for decreased urine volume, difficulty urinating, dysuria, hematuria, menstrual problem, pelvic pain, urgency, vaginal bleeding, vaginal discharge and vaginal pain  Musculoskeletal: Positive for arthralgias (Both hips) and back pain (Chronic back pain from arthritis  )  Skin: Negative for rash  Neurological: Negative for weakness, light-headedness, numbness and headaches  Hematological: Does not bruise/bleed easily  Psychiatric/Behavioral: Negative for agitation, behavioral problems and sleep disturbance  The patient is not nervous/anxious            Objective:    Vitals:    02/28/19 1152   BP: 124/76   BP Location: Right arm   Patient Position: Sitting   Cuff Size: Large   Weight: 89 4 kg (197 lb)   Height: 5' 8" (1 727 m) Physical Exam   Constitutional: She appears well-developed and well-nourished  No distress  HENT:   Head: Normocephalic and atraumatic  Pulmonary/Chest: Effort normal and breath sounds normal  No respiratory distress  Abdominal: Soft  She exhibits no distension and no mass  There is tenderness in the right upper quadrant and suprapubic area  There is guarding and CVA tenderness (Left)  There is no rebound  Genitourinary: Pelvic exam was performed with patient supine  There is no rash, tenderness or injury on the right labia  There is no rash, tenderness, lesion or injury on the left labia  Uterus is tender  Uterus is not deviated, not enlarged and not fixed  Cervix exhibits motion tenderness  Cervix exhibits no discharge  Right adnexum displays no mass, no tenderness and no fullness  Left adnexum displays fullness  Left adnexum displays no mass and no tenderness  There is tenderness in the vagina  No erythema or bleeding in the vagina  No foreign body in the vagina  No signs of injury around the vagina  No vaginal discharge found  Musculoskeletal:        Right upper leg: She exhibits no swelling  Left upper leg: She exhibits no edema  Right lower leg: She exhibits no edema  Left lower leg: She exhibits no edema  Raising right leg causes back pain  Raising left leg causes left thigh pain  Nursing note and vitals reviewed

## 2019-03-04 ENCOUNTER — OFFICE VISIT (OUTPATIENT)
Dept: BARIATRICS | Facility: CLINIC | Age: 54
End: 2019-03-04
Payer: COMMERCIAL

## 2019-03-04 ENCOUNTER — EVALUATION (OUTPATIENT)
Dept: PHYSICAL THERAPY | Facility: OTHER | Age: 54
End: 2019-03-04
Payer: COMMERCIAL

## 2019-03-04 ENCOUNTER — OFFICE VISIT (OUTPATIENT)
Dept: INTERNAL MEDICINE CLINIC | Facility: CLINIC | Age: 54
End: 2019-03-04

## 2019-03-04 VITALS
RESPIRATION RATE: 18 BRPM | BODY MASS INDEX: 29.34 KG/M2 | HEART RATE: 94 BPM | TEMPERATURE: 98.4 F | HEIGHT: 68 IN | SYSTOLIC BLOOD PRESSURE: 110 MMHG | WEIGHT: 193.6 LBS | DIASTOLIC BLOOD PRESSURE: 64 MMHG

## 2019-03-04 VITALS
WEIGHT: 192.24 LBS | SYSTOLIC BLOOD PRESSURE: 112 MMHG | BODY MASS INDEX: 30.17 KG/M2 | DIASTOLIC BLOOD PRESSURE: 78 MMHG | HEIGHT: 67 IN | HEART RATE: 96 BPM | TEMPERATURE: 98 F

## 2019-03-04 DIAGNOSIS — E66.09 CLASS 1 OBESITY DUE TO EXCESS CALORIES WITH BODY MASS INDEX (BMI) OF 34.0 TO 34.9 IN ADULT, UNSPECIFIED WHETHER SERIOUS COMORBIDITY PRESENT: ICD-10-CM

## 2019-03-04 DIAGNOSIS — J30.2 SEASONAL ALLERGIES: ICD-10-CM

## 2019-03-04 DIAGNOSIS — E03.9 HYPOTHYROIDISM, UNSPECIFIED TYPE: ICD-10-CM

## 2019-03-04 DIAGNOSIS — M54.5 CHRONIC BILATERAL LOW BACK PAIN, WITH SCIATICA PRESENCE UNSPECIFIED: ICD-10-CM

## 2019-03-04 DIAGNOSIS — K21.9 GASTROESOPHAGEAL REFLUX DISEASE, ESOPHAGITIS PRESENCE NOT SPECIFIED: ICD-10-CM

## 2019-03-04 DIAGNOSIS — J06.9 VIRAL UPPER RESPIRATORY TRACT INFECTION: Primary | ICD-10-CM

## 2019-03-04 DIAGNOSIS — R21 SKIN RASH: ICD-10-CM

## 2019-03-04 DIAGNOSIS — M70.62 TROCHANTERIC BURSITIS OF BOTH HIPS: ICD-10-CM

## 2019-03-04 DIAGNOSIS — F32.A DEPRESSION, UNSPECIFIED DEPRESSION TYPE: ICD-10-CM

## 2019-03-04 DIAGNOSIS — J45.909 ASTHMA: ICD-10-CM

## 2019-03-04 DIAGNOSIS — J20.8 ACUTE VIRAL BRONCHITIS: ICD-10-CM

## 2019-03-04 DIAGNOSIS — E66.3 OVERWEIGHT: Primary | ICD-10-CM

## 2019-03-04 DIAGNOSIS — J45.41 MODERATE PERSISTENT ASTHMA WITH ACUTE EXACERBATION: ICD-10-CM

## 2019-03-04 DIAGNOSIS — G47.33 OBSTRUCTIVE SLEEP APNEA SYNDROME: ICD-10-CM

## 2019-03-04 DIAGNOSIS — G89.29 CHRONIC BILATERAL LOW BACK PAIN, WITH SCIATICA PRESENCE UNSPECIFIED: ICD-10-CM

## 2019-03-04 DIAGNOSIS — M70.61 TROCHANTERIC BURSITIS OF BOTH HIPS: ICD-10-CM

## 2019-03-04 DIAGNOSIS — G47.33 OSA (OBSTRUCTIVE SLEEP APNEA): ICD-10-CM

## 2019-03-04 PROCEDURE — 97162 PT EVAL MOD COMPLEX 30 MIN: CPT

## 2019-03-04 PROCEDURE — 99213 OFFICE O/P EST LOW 20 MIN: CPT | Performed by: INTERNAL MEDICINE

## 2019-03-04 PROCEDURE — 99244 OFF/OP CNSLTJ NEW/EST MOD 40: CPT | Performed by: PHYSICIAN ASSISTANT

## 2019-03-04 RX ORDER — ALBUTEROL SULFATE 2.5 MG/3ML
2.5 SOLUTION RESPIRATORY (INHALATION) EVERY 6 HOURS PRN
Qty: 75 ML | Refills: 1 | Status: SHIPPED | OUTPATIENT
Start: 2019-03-04 | End: 2019-07-03 | Stop reason: SDUPTHER

## 2019-03-04 RX ORDER — FLUTICASONE PROPIONATE 50 MCG
1-2 SPRAY, SUSPENSION (ML) NASAL DAILY
Qty: 16 G | Refills: 0 | Status: SHIPPED | OUTPATIENT
Start: 2019-03-04 | End: 2019-07-03 | Stop reason: SDUPTHER

## 2019-03-04 RX ORDER — CETIRIZINE HYDROCHLORIDE 10 MG/1
10 TABLET ORAL DAILY
Qty: 30 TABLET | Refills: 3 | Status: SHIPPED | OUTPATIENT
Start: 2019-03-04 | End: 2020-01-20 | Stop reason: SDUPTHER

## 2019-03-04 RX ORDER — GUAIFENESIN 100 MG/5ML
100 LIQUID ORAL 3 TIMES DAILY PRN
Qty: 120 ML | Refills: 0 | Status: SHIPPED | OUTPATIENT
Start: 2019-03-04 | End: 2019-03-08

## 2019-03-04 RX ORDER — LEVOTHYROXINE SODIUM 0.15 MG/1
150 TABLET ORAL DAILY
Qty: 90 TABLET | Refills: 3 | Status: SHIPPED | OUTPATIENT
Start: 2019-03-04 | End: 2019-04-03 | Stop reason: DRUGHIGH

## 2019-03-04 RX ORDER — IPRATROPIUM BROMIDE 21 UG/1
2 SPRAY, METERED NASAL EVERY 6 HOURS PRN
Qty: 30 ML | Refills: 1 | Status: SHIPPED | OUTPATIENT
Start: 2019-03-04 | End: 2019-10-22

## 2019-03-04 RX ORDER — BENZONATATE 200 MG/1
200 CAPSULE ORAL 3 TIMES DAILY PRN
Qty: 30 CAPSULE | Refills: 1 | Status: SHIPPED | OUTPATIENT
Start: 2019-03-04 | End: 2019-06-03 | Stop reason: ALTCHOICE

## 2019-03-04 NOTE — PATIENT INSTRUCTIONS
Goals: Food log (ie ) www myfitnesspal com,sparkpeople  com,loseit com,calorieking  com,etc    No sugary beverages  At least 64oz of water daily  Increase physical activity by 10 minutes daily   Gradually increase physical activity to a goal of 5 days per week for 30 minutes of MODERATE intensity PLUS 2 days per week of FULL BODY resistance training  5746-3092 calories per day  5-10 servings of fruits and vegetables per day

## 2019-03-04 NOTE — PROGRESS NOTES
Assessment/Plan:    Overweight  -Discussed options of [facility]-Intensive Lifestyle Intervention Program, Very Low Calorie Diet-VLCD and Conservative Program and the role of weight loss medications   -Initial weight loss goal of 5-10% weight loss for improved health  -Screening labs: reviewed  -Patient is interested in pursuing conservative program    Hypothyroidism  -most recent TSH WNL   -on levothyroxine    Obstructive sleep apnea syndrome  -compliant with CPAP  -weight loss should improve this condition    Depression  -On Abilify, Wellbutrin and Viibryd  -followed by psychiatry  -previous elevated HgbA1c, most recent has normalized but is due for repeat again  Can consider Metformin to help with insulin resistance associated with some of her psychiatric medicaions    Esophageal reflux  -hx achalasia s/p Heller Myotomy  -on PPI  -weight loss may help improve symptoms    Goals:    Food log (ie ) www myfitnesspal com,sparkpeople  com,loseit com,calorieking  com,etc    No sugary beverages  At least 64oz of water daily  Increase physical activity by 10 minutes daily  Gradually increase physical activity to a goal of 5 days per week for 30 minutes of MODERATE intensity PLUS 2 days per week of FULL BODY resistance training  6232-7906 calories per day  5-10 servings of fruits and vegetables per day    Follow up in approximately 1 month with Non-Surgical Physician/Advanced Practitioner  Diagnoses and all orders for this visit:    Overweight  -     Ambulatory referral to Weight Management    Hypothyroidism, unspecified type    Obstructive sleep apnea syndrome    Depression, unspecified depression type    Gastroesophageal reflux disease, esophagitis presence not specified          Subjective:   Chief Complaint   Patient presents with    Consult     mwm consult  Patient ID: Fern Fraga  is a 48 y o  female with excess weight/obesity here to pursue weight managment      Past Medical History:   Diagnosis Date  Achalasia     Asthma     Constipation     Depression     Elevated LFTs     last assessed 10/25/17    Fibromyalgia     Gastric ulcer     GERD (gastroesophageal reflux disease)     Hypothyroidism     Kidney stone     Migraines     Migraines     MICHAEL (obstructive sleep apnea)     last assessed 7/21/16    Sleep apnea 01/05/2019    Uses CPAP    Thyroid nodule     last assessed 11/6/17         HPI:  Obesity/Excess Weight:  Severity: Mild  Onset:  20 years ago when diagnosed with hypothyroidism, reports gained more weight when she had surgery to correct achalasia   Modifiers: reports cut out juice, soda, bread, eating small portion of starches  Contributing factors: Poor Food Choices  Associated symptoms: edema, decreased mobility, unable to do certain activities    Goals: 150-160 lbs    Wakes: 730 AM  B: skips  L (3-4pm) protein + salad +  Fried plantains  S: snacks on fruit, potato chips, sweets    Hydration: water 5-7 bottles of water, occasional fruit juice  Exercise: no formal exercise      The following portions of the patient's history were reviewed and updated as appropriate: allergies, current medications, past family history, past medical history, past social history, past surgical history and problem list     Review of Systems   Constitutional: Negative for chills and fever  HENT: Negative for sore throat  Respiratory: Positive for cough (has URI)  Negative for shortness of breath  Cardiovascular: Negative for chest pain and palpitations  Gastrointestinal: Positive for diarrhea and vomiting (reports a recent few episodes)  Negative for abdominal pain (lower left abdominal pain - has csyt on left ovary), constipation and nausea  Genitourinary: Negative for dysuria  Musculoskeletal: Positive for arthralgias and back pain  Skin: Negative for rash  Neurological: Positive for dizziness (occasionally) and headaches (Hx Migraine's - no known food triggers)     Psychiatric/Behavioral: Reports feels down when she has a bad headache     Objective:    /64 (BP Location: Left arm, Patient Position: Sitting, Cuff Size: Large)   Pulse 94   Temp 98 4 °F (36 9 °C) (Tympanic)   Resp 18   Ht 5' 8" (1 727 m)   Wt 87 8 kg (193 lb 9 6 oz)   BMI 29 44 kg/m²     Physical Exam   Nursing note and vitals reviewed  Constitutional   General appearance: Abnormal   well developed and overweight  Eyes No conjunctival pallor  Ears, Nose, Mouth, and Throat Oral mucosa moist    Pulmonary   Respiratory effort: No increased work of breathing or signs of respiratory distress  Auscultation of lungs: Clear to auscultation, equal breath sounds bilaterally, no wheezes, no rales, no rhonci  Cardiovascular   Auscultation of heart: Normal rate and rhythm, normal S1 and S2, without murmurs  Examination of extremities for edema and/or varicosities: Normal   no edema  Abdomen   Abdomen: Abnormal   Bowel sounds were normal  The abdomen was soft and nontender     Musculoskeletal   Gait and station: Normal     Psychiatric   Orientation to person, place and time: Normal     Affect: appropriate

## 2019-03-04 NOTE — PROGRESS NOTES
Assessment/Plan   Diagnoses and all orders for this visit:    Viral upper respiratory tract infection  -     COUGH SYRUP 100 MG/5ML syrup; Take 5 mL (100 mg total) by mouth 3 (three) times a day as needed for cough  -     benzonatate (TESSALON) 200 MG capsule; Take 1 capsule (200 mg total) by mouth 3 (three) times a day as needed for cough    Hypothyroidism, unspecified type  -     levothyroxine 150 mcg tablet; Take 1 tablet (150 mcg total) by mouth daily    Asthma  -     fluticasone (FLONASE) 50 mcg/act nasal spray; 1-2 sprays into each nostril daily    Skin rash  -     hydrocortisone 2 5 % cream; Apply topically 4 (four) times a day as needed (scalp/hairline rash)    Moderate persistent asthma with acute exacerbation  -     albuterol (2 5 mg/3 mL) 0 083 % nebulizer solution; Take 1 vial (2 5 mg total) by nebulization every 6 (six) hours as needed for wheezing or shortness of breath    Acute viral bronchitis  -     ipratropium (ATROVENT) 0 03 % nasal spray; 2 sprays into each nostril every 6 (six) hours as needed for rhinitis    Seasonal allergies  -     cetirizine (ZyrTEC) 10 mg tablet; Take 1 tablet (10 mg total) by mouth daily    MICHAEL (obstructive sleep apnea)  -     Cpap New DME      Plan:   1  Viral URI- tessalon perles, cough syrup sent to pharmacy    2  Seasonal allergies- Flonase and atrovent nasal spray sent to pharmacy  Change loratidine to zyrtec  Please call: in 7-10 days if symptoms do not improve and will consider antibiotic therapy      Subjective   Patient ID: Cathy Pickett is a 48 y o  female  CC: Cold symptoms/mef refill    History of Present Illness: The patient is here with complaints of cold symptoms/nasal congestion  She started with a cough three weeks ago  The cough is dry  Then she got a sore throat and nasal congestion, body aches 1 week ago  She did have the flu shot last month  +chills, denies fevers  She has been drinking a lot of water, but not juice   Denies recent travel or sick contacts  Does not still have a sore throat  Flonase and atrovent nasal spray was working for her nasal congestion but she ran out  Also feels like she has allergies with sneezing and itching  Cough drops, cough syrup, mucinex have not helped her cold symptoms  Claritin was working for her allergies in the past, but is no longer working  Review of Systems   Constitutional: Positive for chills and fatigue  Negative for diaphoresis and fever  HENT: Positive for congestion, rhinorrhea, sinus pain and sneezing  Negative for ear discharge, ear pain, sore throat, tinnitus and trouble swallowing  Respiratory: Negative for chest tightness and shortness of breath  Cardiovascular: Positive for leg swelling  Negative for chest pain  Gastrointestinal: Negative for abdominal pain  Musculoskeletal: Positive for myalgias  Skin: Negative for rash  Neurological: Positive for dizziness  Negative for weakness and headaches  Objective   Vitals:    03/04/19 1624   BP: 112/78   Pulse: 96   Temp: 98 °F (36 7 °C)     Physical Exam  GEN: AAOx3, NAD  HEENT: MM moist  No scleral icterus  +boggy and enlarged nasal turbinates  No tonsillar erythema/swelling or exudates  Bilateral tympanic membranes WNL  No lymphadenopathy noted  +sinus pain over maxillary sinuses     CV: RRR, nml S1/S2, no murmur appreciated  Lungs: CTA bilaterally, no w/r/r  Skin: No rashes or lesions noted  Psych: Appears anxious      Current Outpatient Medications:     albuterol (2 5 mg/3 mL) 0 083 % nebulizer solution, USE 1 VIAL VIA NEBULIZER EVERY 4 HOURS AS NEEDED FOR WHEEZING OR SHORTNESS OF BREATH, Disp: 75 mL, Rfl: 0    albuterol (VENTOLIN HFA) 90 mcg/act inhaler, Inhale 2 puffs every 4 (four) hours as needed for wheezing or shortness of breath, Disp: 1 Inhaler, Rfl: 3    Albuterol Sulfate 108 (90 Base) MCG/ACT AEPB, Inhale 2 puffs, Disp: , Rfl:     ARIPiprazole (ABILIFY) 5 mg tablet, Take 5 mg by mouth daily at bedtime, Disp: , Rfl: 1    betamethasone dipropionate (DIPROSONE) 0 05 % cream, Apply topically, Disp: , Rfl:     bismuth subsalicylate (PEPTO BISMOL) 262 MG chewable tablet, Chew 2 tablets, Disp: , Rfl:     Botulinum Toxin Type A 200 units SOLR, Inject 155 units into face and neck IM every 90 days, Disp: , Rfl:     BREO ELLIPTA, 1 puff daily , Disp: , Rfl:     buPROPion (WELLBUTRIN XL) 150 mg 24 hr tablet, Take 150 mg by mouth every morning, Disp: , Rfl: 0    clonazePAM (KlonoPIN) 0 5 mg tablet, Take 0 5 mg by mouth daily  , Disp: , Rfl:     clotrimazole-betamethasone (LOTRISONE) 1-0 05 % cream, Apply topically 2 (two) times a day as needed (skin rash), Disp: 30 g, Rfl: 2    COUGH SYRUP 100 MG/5ML syrup, TAKE 5 ML BY MOUTH 3 (THREE) TIMES A DAY AS NEEDED FOR COUGH, Disp: , Rfl: 0    diclofenac sodium (VOLTAREN) 1 %, APPLY 4 GRAMS TO AFFECTED AREA(S) THREE TIMES A DAY, Disp: , Rfl: 3    dicyclomine (BENTYL) 20 mg tablet, TAKE 1 TABLET BY MOUTH EVERY 6 HOURS AS NEEDED, Disp: 120 tablet, Rfl: 2    docusate sodium (COLACE) 100 mg capsule, TAKE 1 CAPSULE TWICE DAILY AS NEEDED , Disp: , Rfl:     doxepin (SINEquan) 25 mg capsule, TAKE 1-2 CAPSULES BY MOUTH DAILY AT BEDTIME , Disp: , Rfl: 0    FLUCELVAX QUADRIVALENT, , Disp: , Rfl:     fluticasone (FLONASE) 50 mcg/act nasal spray, 1-2 sprays into each nostril daily, Disp: 16 g, Rfl: 0    gabapentin (NEURONTIN) 300 mg capsule, Take 300 mg by mouth, Disp: , Rfl:     hydrOXYzine pamoate (VISTARIL) 50 mg capsule, Take 50 mg by mouth 3 (three) times a day as needed  , Disp: , Rfl:     ibuprofen (MOTRIN) 600 mg tablet, Take 600 mg by mouth 3 (three) times a day as needed, Disp: , Rfl: 0    ipratropium (ATROVENT) 0 03 % nasal spray, USE 2 SPRAYS INTO EACH NOSTRIL EVERY 12 (TWELVE) HOURS, Disp: 30 mL, Rfl: 1    levothyroxine 150 mcg tablet, Take 1 tablet (150 mcg total) by mouth daily, Disp: 90 tablet, Rfl: 3    loratadine (CLARITIN) 10 mg tablet, Take 1 tablet (10 mg total) by mouth daily, Disp: 90 tablet, Rfl: 0    meloxicam (MOBIC) 15 mg tablet, Take 1 tablet (15 mg total) by mouth daily, Disp: 30 tablet, Rfl: 6    methocarbamol (ROBAXIN) 750 mg tablet, Take 750 mg by mouth 3 (three) times a day, Disp: , Rfl: 0    Nerve Stimulator (TENS THERAPY PAIN RELIEF) VIKASH, 1 application by Does not apply route 2 (two) times a day as needed (twice daily for hip pain), Disp: 1 Device, Rfl: 0    oxyCODONE-acetaminophen (PERCOCET) 5-325 mg per tablet, Take 1 tablet by mouth 2 (two) times a day, Disp: , Rfl: 0    pantoprazole (PROTONIX) 40 mg tablet, Take 1 tablet (40 mg total) by mouth daily, Disp: 30 tablet, Rfl: 0    polyethylene glycol (GLYCOLAX) powder, Take 17 g by mouth as needed Mix 1 capful in 8 ounces of water, juice or tea  , Disp: , Rfl: 11    prochlorperazine (COMPAZINE) 10 mg tablet, TAKE 1 TABLET TWICE A DAY AS NEEDED FOR HEADACHE OR NAUSEA, LIMIT 3 DAYS PER WEEK, Disp: , Rfl: 0    Promethazine-Codeine 6 25-10 MG/5ML SOLN, Take 2 5 mL by mouth every 6 (six) hours as needed, Disp: , Rfl:     Pseudoephedrine-APAP  MG TABS, Take 325 mg by mouth, Disp: , Rfl:     sucralfate (CARAFATE) 1 g tablet, TAKE 1 TABLET BY MOUTH 3 TIMES DAILY, Disp: 90 tablet, Rfl: 3    topiramate (TOPAMAX) 25 mg tablet, TAKE 4 PILLS AT BEDTIME , Disp: , Rfl: 1    VIIBRYD 40 MG tablet, Take 40 mg by mouth daily With food, Disp: , Rfl: 0    zolpidem (AMBIEN) 10 mg tablet, TAKE 1 TABLETS AS NEEDED FOR SLEEP, Disp: , Rfl: 0    acetaminophen (TYLENOL) 325 mg tablet, Every 4 hours as needed (Patient not taking: Reported on 3/4/2019), Disp: 30 tablet, Rfl: 0        DO Kyara Hinkle [facility] Internal Medicine PGY-3    [facility]  [address] [facility], [address] [place] [address]  [phone]

## 2019-03-04 NOTE — PROGRESS NOTES
PT Evaluation     Today's date: 3/4/2019  Patient name: Ramirez Pedro  : 1965  MRN: [id number]  Referring provider: Ronal Fair MD  Dx:   Encounter Diagnosis     ICD-10-CM    1  Chronic bilateral low back pain, with sciatica presence unspecified M54 5 Ambulatory referral to Physical Therapy    G89 29    2  Trochanteric bursitis of both hips M70 61 Ambulatory referral to Physical Therapy    M70 62    3  Class 1 obesity due to excess calories with body mass index (BMI) of 34 0 to 34 9 in adult, unspecified whether serious comorbidity present E66 09 Ambulatory referral to Physical Therapy    Z68 34        Start Time: 1100  Stop Time: 1150  Total time in clinic (min): 50 minutes    Assessment  Assessment details: Ramirez Pedro is a pleasant, 48 y o  Female who presents with chronic R hip and low back pain with patient's chief complaint being R hip pain at greater trochanter that radiates into R buttock and second toe with numbness  Patient is unable to complete ADLs without difficulty and is currently not working partially due to presence of pain since   Impairments:  1  R hip pain   2  Chronic LBP  3  Decreased hip strength  Understanding of Dx/Px/POC: good   Prognosis: good    Goals  STGs to be achieved in 4 weeks:   1  Patient will be independent and compliant with HEP  2  Patient will achieve full AROM with < 5/10 pain  3  Patient will achieve 4/5 R hip strength  LTGs to be achieved in 8 weeks:  1  Patient will report improved FOTO score to 55   2  Patient will achieve 5/5 R hip strength  3  Patient will achieve full, painfree AROM of R hip         Plan  Patient would benefit from: skilled physical therapy  Planned modality interventions: low level laser therapy  Planned therapy interventions: manual therapy, joint mobilization, behavior modification, therapeutic exercise, stretching, strengthening, patient education, neuromuscular re-education, graded exercise, graded activity, home exercise program, flexibility and functional ROM exercises  Frequency: 2x week  Duration in weeks: 8        Subjective Evaluation    History of Present Illness  Date of onset: 3/4/2012  Mechanism of injury: R hip pain began in  due to 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  Pain has been constant since then  Received cortisone shot for LBP recently with mild improvements in pain  Pain  Current pain ratin  At best pain ratin  At worst pain ratin  Quality: sharp    Treatments  Previous treatment: injection treatment  Patient Goals  Patient goals for therapy: decreased pain and independence with ADLs/IADLs          Objective     Tenderness     Right Hip   Tenderness in the greater trochanter  Additional Tenderness Details  Posterolateral thigh    Active Range of Motion   Left Hip   Normal active range of motion    Additional Active Range of Motion Details  R AROM limited due to pain    Passive Range of Motion   Left Hip   Normal passive range of motion    Right Hip   Normal passive range of motion  Flexion: with pain  Abduction: with pain  External rotation (90/90): with pain  Internal rotation (90/90): with pain    Joint Play     Additional Joint Play Details  Joint Mobility WFL    Strength/Myotome Testing     Left Hip   Planes of Motion   Flexion: 4  Extension: 4  Abduction: 4    Isolated Muscles   Gluteus alber: 4-    Right Hip   Planes of Motion   Flexion: 3+  Extension: 3+  Abduction: 3+    Isolated Muscles   Gluteus maximums: 3+    Tests     Right Hip   Negative Ely's, NILA, FADIR, long sit, modified Jaden and scour       Additional Tests Details  SLR - difficult to determine presence of neural tension vs  Hamstring tightness; will continue to assess    General Comments:      Lumbar Comments  Pain with AROM throughout directions; difficult to determine change in back pain compared to presence of R hip pain - will continue to assess throughout plan of care    Hypomobile lumbar and thoracic spine     Hypersensitive sensation in S1 and S2 dermatome    Patient points to spinous process of lumbar spine as painful    Patient also reports numbness in 2nd toe of RLE  Flowsheet Rows      Most Recent Value   PT/OT G-Codes   Current Score  36   Projected Score  53          Precautions: History of Chronic Pain     Daily Treatment Diary     Manual                                                     KT Taping              Laser                 Exercise Diary  3/4            Bike             HS Stretch 10" x10            SLR  2x10            Bridging 2x10            Slump Nerve Glides 5" 2x10            Clams             SLR Hip Abd               Mini Squats                          PBall Crushers             TrA Bracing                                                                                                                                       Modalities

## 2019-03-04 NOTE — ASSESSMENT & PLAN NOTE
-Discussed options of [facility]-Intensive Lifestyle Intervention Program, Very Low Calorie Diet-VLCD and Conservative Program and the role of weight loss medications   -Initial weight loss goal of 5-10% weight loss for improved health  -Screening labs: reviewed  -Patient is interested in pursuing conservative program

## 2019-03-04 NOTE — ASSESSMENT & PLAN NOTE
-On Abilify, Wellbutrin and Viibryd  -followed by psychiatry  -previous elevated HgbA1c, most recent has normalized but is due for repeat again   Can consider Metformin to help with insulin resistance associated with some of her psychiatric medicaions

## 2019-03-05 ENCOUNTER — TELEPHONE (OUTPATIENT)
Dept: INTERNAL MEDICINE CLINIC | Facility: CLINIC | Age: 54
End: 2019-03-05

## 2019-03-05 DIAGNOSIS — M79.89 LEG SWELLING: Primary | ICD-10-CM

## 2019-03-05 RX ORDER — FUROSEMIDE 20 MG/1
20 TABLET ORAL DAILY PRN
Qty: 30 TABLET | Refills: 2 | Status: SHIPPED | OUTPATIENT
Start: 2019-03-05 | End: 2021-11-17 | Stop reason: SDUPTHER

## 2019-03-05 NOTE — TELEPHONE ENCOUNTER
Patient called in requesting refills on Surosemidem 20mg, did not see this on patients med list, she stated its a water pill and it was prescribed to her by her prev pcp here in the clinic    You saw pt yesterday 3/4/19 and she stated you were aware about her needing this medication

## 2019-03-05 NOTE — TELEPHONE ENCOUNTER
There is no medication called Surosemidem, I am guessing she is requesting Furosemide 20 mg which is a water pill  Will send to pharmacy now

## 2019-03-06 ENCOUNTER — OFFICE VISIT (OUTPATIENT)
Dept: PHYSICAL THERAPY | Facility: OTHER | Age: 54
End: 2019-03-06
Payer: COMMERCIAL

## 2019-03-06 DIAGNOSIS — M54.5 CHRONIC BILATERAL LOW BACK PAIN, WITH SCIATICA PRESENCE UNSPECIFIED: ICD-10-CM

## 2019-03-06 DIAGNOSIS — G89.29 CHRONIC BILATERAL LOW BACK PAIN, WITH SCIATICA PRESENCE UNSPECIFIED: ICD-10-CM

## 2019-03-06 DIAGNOSIS — M70.62 TROCHANTERIC BURSITIS OF BOTH HIPS: Primary | ICD-10-CM

## 2019-03-06 DIAGNOSIS — M70.61 TROCHANTERIC BURSITIS OF BOTH HIPS: Primary | ICD-10-CM

## 2019-03-06 PROCEDURE — 97112 NEUROMUSCULAR REEDUCATION: CPT | Performed by: PHYSICAL THERAPIST

## 2019-03-06 PROCEDURE — 97110 THERAPEUTIC EXERCISES: CPT | Performed by: PHYSICAL THERAPIST

## 2019-03-06 NOTE — PROGRESS NOTES
Daily Note     Today's date: 3/6/2019  Patient name: Ramirez Pedro  : 1965  MRN: [id number]  Referring provider: Ronal Fair MD  Dx:   Encounter Diagnosis     ICD-10-CM    1  Trochanteric bursitis of both hips M70 61     M70 62    2  Chronic bilateral low back pain, with sciatica presence unspecified M54 5     G89 29        Start Time: 0900  Stop Time: 1000  Total time in clinic (min): 60 minutes   9:00-9:30 IEP  9:30-10:00 1 on 1     Subjective: Patient reports R hip pain was irritated after last session with no improvements in pain  Objective: See treatment diary below      Assessment: Tolerated treatment well  Patient exhibited good technique with therapeutic exercises and would benefit from continued PT  Repeated prone press up created no change in numbness of 2nd toe  Incorporated core stability today and more hip strengthening  Addition of laser therapy - assess at next visit  Patient taking several tests with R leg hanging off edge of table to manage pain  Plan: Continue per plan of care  Progress treatment as tolerated  Continue to assess back pain and parasthesias throughout treatment         Precautions: History of Chronic Pain     Daily Treatment Diary     Manual  3/6                                                   KT Taping              Laser R hip 15 W 4min                Exercise Diary  3/4 3/6           Bike  5'           HS Stretch 10" x10 10" x10           SLR  2x10 2x10           Bridging 2x10 2x10           Slump Nerve Glides 5" 2x10 x20           Clams  2x10           SLR Hip Abd   2x10           Mini Squats  NV           LTR  x20           PBall Crushers  5" x20           TrA Bracing   5" x20                                                                                                                                    Modalities

## 2019-03-07 ENCOUNTER — OFFICE VISIT (OUTPATIENT)
Dept: GYNECOLOGY | Facility: CLINIC | Age: 54
End: 2019-03-07

## 2019-03-07 ENCOUNTER — TRANSCRIBE ORDERS (OUTPATIENT)
Dept: GASTROENTEROLOGY | Facility: CLINIC | Age: 54
End: 2019-03-07

## 2019-03-07 ENCOUNTER — TELEPHONE (OUTPATIENT)
Dept: GASTROENTEROLOGY | Facility: CLINIC | Age: 54
End: 2019-03-07

## 2019-03-07 VITALS
DIASTOLIC BLOOD PRESSURE: 60 MMHG | WEIGHT: 194.2 LBS | SYSTOLIC BLOOD PRESSURE: 112 MMHG | BODY MASS INDEX: 29.43 KG/M2 | HEART RATE: 102 BPM | HEIGHT: 68 IN

## 2019-03-07 DIAGNOSIS — R14.0 ABDOMINAL BLOATING: ICD-10-CM

## 2019-03-07 DIAGNOSIS — N83.202 LEFT OVARIAN CYST: ICD-10-CM

## 2019-03-07 DIAGNOSIS — R19.7 DIARRHEA, UNSPECIFIED TYPE: ICD-10-CM

## 2019-03-07 DIAGNOSIS — N20.0 LEFT NEPHROLITHIASIS: ICD-10-CM

## 2019-03-07 DIAGNOSIS — Z98.890 HISTORY OF ENDOMETRIAL ABLATION: ICD-10-CM

## 2019-03-07 DIAGNOSIS — R10.30 LOWER ABDOMINAL PAIN: Primary | ICD-10-CM

## 2019-03-07 DIAGNOSIS — K22.0 ACHALASIA OF DIGESTIVE TRACT: ICD-10-CM

## 2019-03-07 PROCEDURE — 99214 OFFICE O/P EST MOD 30 MIN: CPT | Performed by: OBSTETRICS & GYNECOLOGY

## 2019-03-07 NOTE — PROGRESS NOTES
Assessment/Plan:  Left ovarian cyst with symptoms however out of proportion to the 3 9 cm mass  Diarrhea and history of achalasia- she has an appointment with her gastroenterologist March 14th and a colonoscopy is planned  Review of the records suggest she received good care from Dr Solitario Read  If her pain persists after the diarrhea is controlled then she should see Dr Solitario Read again  It seems the endometrial ablation is working  I was able to review the records with the patient- history and physical, operative report, and pathology report  She presented with menorrhagia and a 3 cm left lower quadrant cyst which could have represented a cystic degeneration of a fibroid or a left ovarian cyst    A preoperative endometrial biopsy was normal   She underwent a D&C, endometrial ablation followed by a laparoscopy with lysis of adhesions and removal of a 4 cm cyst on the left ovary and a small undescribed right ovarian cyst    The sigmoid colon serosa was partially denuded and required over-sewing which Dr Solitario Read did himself  The uterus was 9-10 week size  The pathology report on both ovarian cyst walls revealed benign fibro connective tissue with a focus of luteal cells    Today's visit took 26 minutes with more than 70% spent on counseling and review of records with the patient  Diagnoses and all orders for this visit:    Lower abdominal pain    Left ovarian cyst    History of endometrial ablation    Left nephrolithiasis              Subjective:        Patient ID: Benjamin Louise is a 48 y o  female  Lexie Dorantes feels the same  She was able to obtain medical records from [facility]   The diarrhea persists        The following portions of the patient's history were reviewed and updated as appropriate: She  has a past medical history of Achalasia, Asthma, Constipation, Depression, Elevated LFTs, Fibromyalgia, Gastric ulcer, GERD (gastroesophageal reflux disease), Hypothyroidism, Kidney stone, Migraines, Migraines, MICHAEL (obstructive sleep apnea), Sleep apnea (2019), and Thyroid nodule  Patient Active Problem List    Diagnosis Date Noted    Lower abdominal pain 2019    Left ovarian cyst 2019    History of endometrial ablation 2019    Left nephrolithiasis 2019    Abdominal bloating 2019    Diarrhea 2019    Hashimoto's thyroiditis 2019    Overweight 01/15/2019    Acute viral bronchitis 2018    Trochanteric bursitis of right hip 10/05/2018    Pain in right hip 10/05/2018    Numbness of toes 10/05/2018    Need for shingles vaccine 2018    Discomfort of right ear 2018    Prediabetes 2018    Moderate persistent asthma with exacerbation 2018    POP-Q stage 2 cystocele 2018    Food allergy 2018    Intractable chronic migraine without aura and without status migrainosus 2017    Breast mass 10/25/2017    H  pylori infection 10/09/2017    Headache, migraine 2017    Other chronic pain 2017    Breast pain, right 2017    Obesity (BMI 30-39 9) 2017    Tubular adenoma of colon 2017    Rectocele 2017    Cystocele 2017    Hypermobility of urethra 2017    Mixed incontinence 2017    Stress incontinence in female 2017    Obstructive sleep apnea syndrome 2017    Hypothyroidism 2017    Constipation 2017    Chronic low back pain 2017    Asthma 2017    Arthritis 2017    Chronic sinusitis with recurrent bronchitis 2016    Bilateral edema of lower extremity 2016    Achalasia 10/23/2015    Spondylosis of lumbar region without myelopathy or radiculopathy 10/23/2015    Gastritis 2015    Thyroiditis 2014    Depression 2014    Esophageal reflux 10/08/2012     She  has a past surgical history that includes  section; Other surgical history;  Nasal sinus surgery; Pilonidal cyst / sinus excision; Tubal ligation; Tonsillectomy; pr sling oper stres incontinence (N/A, 1/19/2017); pr post colporrhaphy,rectum/vagina (N/A, 1/19/2017); pr cystourethroscopy (N/A, 1/19/2017); pr esophagogastroduodenoscopy transoral diagnostic (N/A, 9/7/2017); MYOTOMY HELLER LAPAROSCOPIC (01/06/2016); US guided breast biopsy right complete (Right, 10/31/2017); Endometrial ablation; and Ovarian cyst removal   Her family history includes Breast cancer in her family and mother; Cancer in her maternal grandfather, maternal grandmother, and mother; Colon cancer in her maternal aunt; Dementia in her father; Diabetes in her father; Hypertension in her father, maternal grandfather, and mother; Ovarian cancer in her maternal grandmother; Stroke in her father and paternal grandmother; Thyroid disease unspecified in her brother and maternal aunt; Tongue cancer in her maternal grandfather  She  reports that she quit smoking about 3 years ago  She has a 45 00 pack-year smoking history  She has never used smokeless tobacco  She reports that she does not drink alcohol or use drugs    Current Outpatient Medications   Medication Sig Dispense Refill    acetaminophen (TYLENOL) 325 mg tablet Every 4 hours as needed (Patient not taking: Reported on 3/4/2019) 30 tablet 0    albuterol (2 5 mg/3 mL) 0 083 % nebulizer solution Take 1 vial (2 5 mg total) by nebulization every 6 (six) hours as needed for wheezing or shortness of breath 75 mL 1    albuterol (VENTOLIN HFA) 90 mcg/act inhaler Inhale 2 puffs every 4 (four) hours as needed for wheezing or shortness of breath 1 Inhaler 3    Albuterol Sulfate 108 (90 Base) MCG/ACT AEPB Inhale 2 puffs      ARIPiprazole (ABILIFY) 5 mg tablet Take 5 mg by mouth daily at bedtime  1    benzonatate (TESSALON) 200 MG capsule Take 1 capsule (200 mg total) by mouth 3 (three) times a day as needed for cough 30 capsule 1    betamethasone dipropionate (DIPROSONE) 0 05 % cream Apply topically      bismuth subsalicylate (PEPTO BISMOL) 262 MG chewable tablet Chew 2 tablets      Botulinum Toxin Type A 200 units SOLR Inject 155 units into face and neck IM every 90 days      BREO ELLIPTA 1 puff daily       buPROPion (WELLBUTRIN XL) 150 mg 24 hr tablet Take 150 mg by mouth every morning  0    cetirizine (ZyrTEC) 10 mg tablet Take 1 tablet (10 mg total) by mouth daily 30 tablet 3    clonazePAM (KlonoPIN) 0 5 mg tablet Take 0 5 mg by mouth daily        clotrimazole-betamethasone (LOTRISONE) 1-0 05 % cream Apply topically 2 (two) times a day as needed (skin rash) 30 g 2    COUGH SYRUP 100 MG/5ML syrup Take 5 mL (100 mg total) by mouth 3 (three) times a day as needed for cough 120 mL 0    diclofenac sodium (VOLTAREN) 1 % APPLY 4 GRAMS TO AFFECTED AREA(S) THREE TIMES A DAY  3    dicyclomine (BENTYL) 20 mg tablet TAKE 1 TABLET BY MOUTH EVERY 6 HOURS AS NEEDED 120 tablet 2    docusate sodium (COLACE) 100 mg capsule TAKE 1 CAPSULE TWICE DAILY AS NEEDED        doxepin (SINEquan) 25 mg capsule TAKE 1-2 CAPSULES BY MOUTH DAILY AT BEDTIME   0    FLUCELVAX QUADRIVALENT       fluticasone (FLONASE) 50 mcg/act nasal spray 1-2 sprays into each nostril daily 16 g 0    furosemide (LASIX) 20 mg tablet Take 1 tablet (20 mg total) by mouth daily as needed (leg swelling) 30 tablet 2    gabapentin (NEURONTIN) 300 mg capsule Take 300 mg by mouth      hydrocortisone 2 5 % cream Apply topically 4 (four) times a day as needed (scalp/hairline rash) 30 g 0    hydrOXYzine pamoate (VISTARIL) 50 mg capsule Take 50 mg by mouth 3 (three) times a day as needed        ibuprofen (MOTRIN) 600 mg tablet Take 600 mg by mouth 3 (three) times a day as needed  0    ipratropium (ATROVENT) 0 03 % nasal spray 2 sprays into each nostril every 6 (six) hours as needed for rhinitis 30 mL 1    levothyroxine 150 mcg tablet Take 1 tablet (150 mcg total) by mouth daily 90 tablet 3    loratadine (CLARITIN) 10 mg tablet Take 1 tablet (10 mg total) by mouth daily 90 tablet 0    meloxicam (MOBIC) 15 mg tablet Take 1 tablet (15 mg total) by mouth daily 30 tablet 6    methocarbamol (ROBAXIN) 750 mg tablet Take 750 mg by mouth 3 (three) times a day  0    Nerve Stimulator (TENS THERAPY PAIN RELIEF) VIKASH 1 application by Does not apply route 2 (two) times a day as needed (twice daily for hip pain) 1 Device 0    oxyCODONE-acetaminophen (PERCOCET) 5-325 mg per tablet Take 1 tablet by mouth 2 (two) times a day  0    pantoprazole (PROTONIX) 40 mg tablet Take 1 tablet (40 mg total) by mouth daily 30 tablet 0    polyethylene glycol (GLYCOLAX) powder Take 17 g by mouth as needed Mix 1 capful in 8 ounces of water, juice or tea  11    prochlorperazine (COMPAZINE) 10 mg tablet TAKE 1 TABLET TWICE A DAY AS NEEDED FOR HEADACHE OR NAUSEA, LIMIT 3 DAYS PER WEEK  0    Promethazine-Codeine 6 25-10 MG/5ML SOLN Take 2 5 mL by mouth every 6 (six) hours as needed      Pseudoephedrine-APAP  MG TABS Take 325 mg by mouth      sucralfate (CARAFATE) 1 g tablet TAKE 1 TABLET BY MOUTH 3 TIMES DAILY 90 tablet 3    topiramate (TOPAMAX) 25 mg tablet TAKE 4 PILLS AT BEDTIME  1    VIIBRYD 40 MG tablet Take 40 mg by mouth daily With food  0    zolpidem (AMBIEN) 10 mg tablet TAKE 1 TABLETS AS NEEDED FOR SLEEP  0     No current facility-administered medications for this visit        Current Outpatient Medications on File Prior to Visit   Medication Sig    acetaminophen (TYLENOL) 325 mg tablet Every 4 hours as needed (Patient not taking: Reported on 3/4/2019)    albuterol (2 5 mg/3 mL) 0 083 % nebulizer solution Take 1 vial (2 5 mg total) by nebulization every 6 (six) hours as needed for wheezing or shortness of breath    albuterol (VENTOLIN HFA) 90 mcg/act inhaler Inhale 2 puffs every 4 (four) hours as needed for wheezing or shortness of breath    Albuterol Sulfate 108 (90 Base) MCG/ACT AEPB Inhale 2 puffs    ARIPiprazole (ABILIFY) 5 mg tablet Take 5 mg by mouth daily at bedtime    benzonatate (TESSALON) 200 MG capsule Take 1 capsule (200 mg total) by mouth 3 (three) times a day as needed for cough    betamethasone dipropionate (DIPROSONE) 0 05 % cream Apply topically    bismuth subsalicylate (PEPTO BISMOL) 262 MG chewable tablet Chew 2 tablets    Botulinum Toxin Type A 200 units SOLR Inject 155 units into face and neck IM every 90 days    BREO ELLIPTA 1 puff daily     buPROPion (WELLBUTRIN XL) 150 mg 24 hr tablet Take 150 mg by mouth every morning    cetirizine (ZyrTEC) 10 mg tablet Take 1 tablet (10 mg total) by mouth daily    clonazePAM (KlonoPIN) 0 5 mg tablet Take 0 5 mg by mouth daily      clotrimazole-betamethasone (LOTRISONE) 1-0 05 % cream Apply topically 2 (two) times a day as needed (skin rash)    COUGH SYRUP 100 MG/5ML syrup Take 5 mL (100 mg total) by mouth 3 (three) times a day as needed for cough    diclofenac sodium (VOLTAREN) 1 % APPLY 4 GRAMS TO AFFECTED AREA(S) THREE TIMES A DAY    dicyclomine (BENTYL) 20 mg tablet TAKE 1 TABLET BY MOUTH EVERY 6 HOURS AS NEEDED    docusate sodium (COLACE) 100 mg capsule TAKE 1 CAPSULE TWICE DAILY AS NEEDED   doxepin (SINEquan) 25 mg capsule TAKE 1-2 CAPSULES BY MOUTH DAILY AT BEDTIME      FLUCELVAX QUADRIVALENT     fluticasone (FLONASE) 50 mcg/act nasal spray 1-2 sprays into each nostril daily    furosemide (LASIX) 20 mg tablet Take 1 tablet (20 mg total) by mouth daily as needed (leg swelling)    gabapentin (NEURONTIN) 300 mg capsule Take 300 mg by mouth    hydrocortisone 2 5 % cream Apply topically 4 (four) times a day as needed (scalp/hairline rash)    hydrOXYzine pamoate (VISTARIL) 50 mg capsule Take 50 mg by mouth 3 (three) times a day as needed      ibuprofen (MOTRIN) 600 mg tablet Take 600 mg by mouth 3 (three) times a day as needed    ipratropium (ATROVENT) 0 03 % nasal spray 2 sprays into each nostril every 6 (six) hours as needed for rhinitis    levothyroxine 150 mcg tablet Take 1 tablet (150 mcg total) by mouth daily    loratadine (CLARITIN) 10 mg tablet Take 1 tablet (10 mg total) by mouth daily    meloxicam (MOBIC) 15 mg tablet Take 1 tablet (15 mg total) by mouth daily    methocarbamol (ROBAXIN) 750 mg tablet Take 750 mg by mouth 3 (three) times a day    Nerve Stimulator (TENS THERAPY PAIN RELIEF) VIKASH 1 application by Does not apply route 2 (two) times a day as needed (twice daily for hip pain)    oxyCODONE-acetaminophen (PERCOCET) 5-325 mg per tablet Take 1 tablet by mouth 2 (two) times a day    pantoprazole (PROTONIX) 40 mg tablet Take 1 tablet (40 mg total) by mouth daily    polyethylene glycol (GLYCOLAX) powder Take 17 g by mouth as needed Mix 1 capful in 8 ounces of water, juice or tea   prochlorperazine (COMPAZINE) 10 mg tablet TAKE 1 TABLET TWICE A DAY AS NEEDED FOR HEADACHE OR NAUSEA, LIMIT 3 DAYS PER WEEK    Promethazine-Codeine 6 25-10 MG/5ML SOLN Take 2 5 mL by mouth every 6 (six) hours as needed    Pseudoephedrine-APAP  MG TABS Take 325 mg by mouth    sucralfate (CARAFATE) 1 g tablet TAKE 1 TABLET BY MOUTH 3 TIMES DAILY    topiramate (TOPAMAX) 25 mg tablet TAKE 4 PILLS AT BEDTIME   VIIBRYD 40 MG tablet Take 40 mg by mouth daily With food    zolpidem (AMBIEN) 10 mg tablet TAKE 1 TABLETS AS NEEDED FOR SLEEP     No current facility-administered medications on file prior to visit  She is allergic to gabapentin; latex; and penicillins       Review of Systems   Constitutional: Positive for chills  Negative for activity change, appetite change, fatigue and unexpected weight change  Eyes: Negative for visual disturbance  Respiratory: Negative for cough, chest tightness, shortness of breath and wheezing  Cardiovascular: Negative for chest pain, palpitations and leg swelling  Gastrointestinal: Positive for abdominal distention and diarrhea  Negative for abdominal pain, blood in stool, constipation, nausea and vomiting     Endocrine: Negative for cold intolerance and heat intolerance  Genitourinary: Positive for dyspareunia (Gabbs she is to be 3 times a week  It was last attempted 2 days ago but was too painful to complete and she had to stop) and frequency  Negative for decreased urine volume, difficulty urinating, dysuria, hematuria, menstrual problem, pelvic pain, urgency, vaginal bleeding, vaginal discharge and vaginal pain  Musculoskeletal: Positive for arthralgias (Both hips) and back pain (Chronic back pain from arthritis  )  Skin: Negative for rash  Neurological: Negative for weakness, light-headedness, numbness and headaches  Hematological: Does not bruise/bleed easily  Psychiatric/Behavioral: Negative for agitation, behavioral problems and sleep disturbance  The patient is not nervous/anxious  Objective: There were no vitals filed for this visit  Physical Exam   Constitutional: She appears well-developed and well-nourished  No distress  HENT:   Head: Normocephalic and atraumatic  Pulmonary/Chest: Effort normal  No respiratory distress  Abdominal: Soft  She exhibits no distension and no mass  There is tenderness in the right upper quadrant and suprapubic area  There is guarding and CVA tenderness (Now right whereas last week was the left)  There is no rebound  Genitourinary: Pelvic exam was performed with patient supine  There is no rash, tenderness or injury on the right labia  There is no rash, tenderness, lesion or injury on the left labia  Uterus is tender  Uterus is not deviated, not enlarged and not fixed  Cervix exhibits motion tenderness  Cervix exhibits no discharge  Right adnexum displays no mass, no tenderness and no fullness  Left adnexum displays fullness  Left adnexum displays no mass and no tenderness  There is tenderness in the vagina  No erythema or bleeding in the vagina  No foreign body in the vagina  No signs of injury around the vagina  No vaginal discharge found     Nursing note and vitals reviewed

## 2019-03-08 ENCOUNTER — TELEPHONE (OUTPATIENT)
Dept: MULTI SPECIALTY CLINIC | Facility: CLINIC | Age: 54
End: 2019-03-08

## 2019-03-08 ENCOUNTER — TELEPHONE (OUTPATIENT)
Dept: INTERNAL MEDICINE CLINIC | Facility: CLINIC | Age: 54
End: 2019-03-08

## 2019-03-08 DIAGNOSIS — J06.9 VIRAL UPPER RESPIRATORY TRACT INFECTION: ICD-10-CM

## 2019-03-08 DIAGNOSIS — J20.8 ACUTE VIRAL BRONCHITIS: ICD-10-CM

## 2019-03-08 DIAGNOSIS — J20.8 ACUTE VIRAL BRONCHITIS: Primary | ICD-10-CM

## 2019-03-08 RX ORDER — GUAIFENESIN 100 MG/5ML
LIQUID ORAL
Qty: 118 ML | Refills: 0 | OUTPATIENT
Start: 2019-03-08

## 2019-03-08 RX ORDER — GUAIFENESIN/DEXTROMETHORPHAN 100-10MG/5
5 SYRUP ORAL 3 TIMES DAILY PRN
Qty: 118 ML | Refills: 0 | Status: SHIPPED | OUTPATIENT
Start: 2019-03-08 | End: 2019-06-03 | Stop reason: ALTCHOICE

## 2019-03-08 NOTE — TELEPHONE ENCOUNTER
Patient called stating that this medication is not covered by insurance  benzonatate (TESSALON) 200 MG capsule    Can you please send an alternative?   Dr Bolivar patient, you are covering her team this week

## 2019-03-08 NOTE — TELEPHONE ENCOUNTER
I saw this message after I sent over the Mucinex pills  Can you call patient and tell her I sent dextromethorphan-guaifenesin solution that she wanted and she can take that instead of the Mucinex pills I  sent to her pharmacy  She should not take both   Thanks

## 2019-03-08 NOTE — TELEPHONE ENCOUNTER
Patient called, she was given cough syrup on 3/4/19 patient stated that this medication is not covered by insurance and this is not what she requested  Patient requested Dextromethorphan-Guaifenesin 100-10 MG/5ML 5  She stated that it helped her the last time that she had the same symptoms back in December  Can you refill this for the patient? Dr Bolivar patient, you are covering her team this week

## 2019-03-11 ENCOUNTER — TELEPHONE (OUTPATIENT)
Dept: INTERNAL MEDICINE CLINIC | Facility: CLINIC | Age: 54
End: 2019-03-11

## 2019-03-11 ENCOUNTER — OFFICE VISIT (OUTPATIENT)
Dept: PHYSICAL THERAPY | Facility: OTHER | Age: 54
End: 2019-03-11
Payer: COMMERCIAL

## 2019-03-11 DIAGNOSIS — M54.5 CHRONIC BILATERAL LOW BACK PAIN, WITH SCIATICA PRESENCE UNSPECIFIED: ICD-10-CM

## 2019-03-11 DIAGNOSIS — M70.61 TROCHANTERIC BURSITIS OF BOTH HIPS: Primary | ICD-10-CM

## 2019-03-11 DIAGNOSIS — M70.62 TROCHANTERIC BURSITIS OF BOTH HIPS: Primary | ICD-10-CM

## 2019-03-11 DIAGNOSIS — G89.29 CHRONIC BILATERAL LOW BACK PAIN, WITH SCIATICA PRESENCE UNSPECIFIED: ICD-10-CM

## 2019-03-11 PROCEDURE — 97112 NEUROMUSCULAR REEDUCATION: CPT | Performed by: PHYSICAL THERAPIST

## 2019-03-11 PROCEDURE — 97140 MANUAL THERAPY 1/> REGIONS: CPT | Performed by: PHYSICAL THERAPIST

## 2019-03-11 PROCEDURE — 97110 THERAPEUTIC EXERCISES: CPT | Performed by: PHYSICAL THERAPIST

## 2019-03-11 NOTE — PROGRESS NOTES
Daily Note     Today's date: 3/11/2019  Patient name: Jaswant Hearn  : 1965  MRN: [id number]  Referring provider: Estefania Chandler MD  Dx:   Encounter Diagnosis     ICD-10-CM    1  Trochanteric bursitis of both hips M70 61     M70 62    2  Chronic bilateral low back pain, with sciatica presence unspecified M54 5     G89 29        Start Time: 0845  Stop Time: 0940  Total time in clinic (min): 55 minutes   1 on 1 for entirety        Subjective: Patient reports no improvements in hip pain with 7/10 pain  Continues to move throughout the day between walking and sitting to manage pain  Objective: See treatment diary below      Assessment: Tolerated treatment well  Patient exhibited good technique with therapeutic exercises and would benefit from continued PT  Patient reports increase in LE symptoms with palpation of R piriformis  Implemented piriformis stretching  Patient reports improvements in pain following trigger point release of R piriformis with decrease to 6/10  Plan: Continue per plan of care  Progress treatment as tolerated           Precautions: History of Chronic Pain     Daily Treatment Diary     Manual  3/6 3/11           TrP Release Piriformis  ES                                     KT Taping              Laser R hip 15 W 4min                Exercise Diary  3/4 3/6 3/11          Bike  5' 7 min          HS Stretch 10" x10 10" x10 10" x10          Piriformis Stretch   10" x10          SLR 3 way with glute press 2x10 2x10 3x10          Slump Nerve Glides 5" 2x10 x20 x20          Clams  2x10 PTB 3x10          Mini Squats  NV 2x10          LTR  x20 x20          PBall Crushers  5" x20 10" x20          TrA Bracing   5" x20 10" x20          PBall Walkouts   NV                                                                                                                      Modalities

## 2019-03-11 NOTE — TELEPHONE ENCOUNTER
ALYSSA for Neita Odell sent to [facility] 3/11/19

## 2019-03-12 ENCOUNTER — OFFICE VISIT (OUTPATIENT)
Dept: PHYSICAL THERAPY | Facility: OTHER | Age: 54
End: 2019-03-12
Payer: COMMERCIAL

## 2019-03-12 DIAGNOSIS — M70.62 TROCHANTERIC BURSITIS OF BOTH HIPS: Primary | ICD-10-CM

## 2019-03-12 DIAGNOSIS — M54.5 CHRONIC BILATERAL LOW BACK PAIN, WITH SCIATICA PRESENCE UNSPECIFIED: ICD-10-CM

## 2019-03-12 DIAGNOSIS — M70.61 TROCHANTERIC BURSITIS OF BOTH HIPS: Primary | ICD-10-CM

## 2019-03-12 DIAGNOSIS — G89.29 CHRONIC BILATERAL LOW BACK PAIN, WITH SCIATICA PRESENCE UNSPECIFIED: ICD-10-CM

## 2019-03-12 PROCEDURE — 97140 MANUAL THERAPY 1/> REGIONS: CPT | Performed by: PHYSICAL THERAPIST

## 2019-03-12 PROCEDURE — 97112 NEUROMUSCULAR REEDUCATION: CPT | Performed by: PHYSICAL THERAPIST

## 2019-03-12 PROCEDURE — 97110 THERAPEUTIC EXERCISES: CPT | Performed by: PHYSICAL THERAPIST

## 2019-03-12 NOTE — PROGRESS NOTES
Daily Note     Today's date: 3/12/2019  Patient name: Jackson Marte  : 1965  MRN: [id number]  Referring provider: Penelope Stevens MD  Dx:   Encounter Diagnosis     ICD-10-CM    1  Trochanteric bursitis of both hips M70 61     M70 62    2  Chronic bilateral low back pain, with sciatica presence unspecified M54 5     G89 29        Start Time: 1325  Stop Time: 1425  Total time in clinic (min): 60 minutes   1:25-2:10 1 on 1   2:10-2:25 IEP       Subjective: Patient continues to report no change in hip pain or parasthesias into R leg  Objective: See treatment diary below      Assessment: Tolerated treatment well  Patient exhibited good technique with therapeutic exercises and would benefit from continued PT  Assessment of lumbar spine and SI today with patient having complete relief of parasthesias with prone long-axis distraction  Plan: Continue per plan of care  Progress treatment as tolerated  Continue with LA distraction in treatment to address parasthesias  Progress strengthening as able         Precautions: History of Chronic Pain     Daily Treatment Diary     Manual  3/6 3/11 3/12     TrP Release Piriformis  ES      Prone LA Distraction   ES             KT Taping         Laser R hip 15 W 4min           Exercise Diary  3/4 3/6 3/11 3/12     Bike  5' 7 min 8 min L2     HS Stretch 10" x10 10" x10 10" x10      Piriformis Stretch   10" x10 30" x3     SLR 3 way with glute press 2x10 2x10 3x10 3x10     Slump Nerve Glides 5" 2x10 x20 x20 x20     Clams  2x10 PTB 3x10      Mini Squats  NV 2x10 3x10     LTR  x20 x20 20     PBall Crushers  5" x20 10" x20 2min     TrA Bracing   5" x20 10" x20      PBall Walkouts   NV      Supine Hip Flexor Stretch    30" x3     Step Ups                                                                   Modalities

## 2019-03-14 ENCOUNTER — OFFICE VISIT (OUTPATIENT)
Dept: GASTROENTEROLOGY | Facility: CLINIC | Age: 54
End: 2019-03-14
Payer: COMMERCIAL

## 2019-03-14 VITALS
BODY MASS INDEX: 29.64 KG/M2 | TEMPERATURE: 99.2 F | DIASTOLIC BLOOD PRESSURE: 78 MMHG | HEIGHT: 68 IN | WEIGHT: 195.6 LBS | HEART RATE: 76 BPM | SYSTOLIC BLOOD PRESSURE: 117 MMHG

## 2019-03-14 DIAGNOSIS — K22.0 ACHALASIA: ICD-10-CM

## 2019-03-14 DIAGNOSIS — R13.19 ESOPHAGEAL DYSPHAGIA: Primary | ICD-10-CM

## 2019-03-14 DIAGNOSIS — K21.9 CHRONIC GERD: ICD-10-CM

## 2019-03-14 DIAGNOSIS — D12.6 TUBULAR ADENOMA OF COLON: ICD-10-CM

## 2019-03-14 DIAGNOSIS — K58.1 IRRITABLE BOWEL SYNDROME WITH CONSTIPATION: ICD-10-CM

## 2019-03-14 PROBLEM — R19.7 DIARRHEA: Status: RESOLVED | Noted: 2019-02-28 | Resolved: 2019-03-14

## 2019-03-14 PROBLEM — A04.8 H. PYLORI INFECTION: Status: RESOLVED | Noted: 2017-10-09 | Resolved: 2019-03-14

## 2019-03-14 PROCEDURE — 99214 OFFICE O/P EST MOD 30 MIN: CPT | Performed by: PHYSICIAN ASSISTANT

## 2019-03-14 RX ORDER — POLYETHYLENE GLYCOL 3350 17 G/17G
17 POWDER, FOR SOLUTION ORAL DAILY
Qty: 578 G | Refills: 3 | Status: SHIPPED | OUTPATIENT
Start: 2019-03-14 | End: 2019-09-19 | Stop reason: SDUPTHER

## 2019-03-14 RX ORDER — PANTOPRAZOLE SODIUM 40 MG/1
40 TABLET, DELAYED RELEASE ORAL DAILY
Qty: 30 TABLET | Refills: 0 | Status: SHIPPED | OUTPATIENT
Start: 2019-03-14 | End: 2019-04-17 | Stop reason: SDUPTHER

## 2019-03-14 RX ORDER — SUCRALFATE 1 G/1
1 TABLET ORAL 3 TIMES DAILY
Qty: 90 TABLET | Refills: 3 | Status: SHIPPED | OUTPATIENT
Start: 2019-03-14 | End: 2019-08-26 | Stop reason: SDUPTHER

## 2019-03-14 NOTE — LETTER
March 14, 2019     Tisha Canada MD  [address] [place] [address]    Patient: Dash Brown   YOB: 1965   Date of Visit: 3/14/2019       Dear Dr Royetta Paget: Thank you for referring Dot Fleming to me for evaluation  Below are my notes for this consultation  If you have questions, please do not hesitate to call me  I look forward to following your patient along with you  Sincerely,        Michelle Unger PA-C        CC: No Recipients  Michelle Unger PA-C  3/14/2019  1:49 PM  Sign at close encounter  [facility] - Outpatient Follow-up Note  Dash Brown 48 y o  female MRN: [id number]  Encounter: [id number]          ASSESSMENT AND PLAN:      1  Esophageal dysphagia  2  Achalasia  3  Chronic GERD  She has history of dysphagia secondary to achalasia status post Heller myotomy and fundoplication in 0520  She had recurrent dysphagia in 2017 and underwent EGD which showed LA class B erosive esophagitis  Prior barium esophagram showed mild esophageal dysmotility following surgery  She again reports recurrent dysphagia for solids over the past 6 months  Unclear whether she has recurrent reflux esophagitis, structural abnormality including stricture, ring, less likely malignancy, or esophageal dysmotility  Recommend repeat EGD  Sent refills for Protonix 40 mg daily and Carafate 3 times daily  - Case request operating room: ESOPHAGOGASTRODUODENOSCOPY (EGD), COLONOSCOPY; Standing  - Case request operating room: ESOPHAGOGASTRODUODENOSCOPY (EGD), COLONOSCOPY  - pantoprazole (PROTONIX) 40 mg tablet; Take 1 tablet (40 mg total) by mouth daily  Dispense: 30 tablet; Refill: 0  - sucralfate (CARAFATE) 1 g tablet; Take 1 tablet (1 g total) by mouth 3 (three) times a day  Dispense: 90 tablet; Refill: 3    4  Irritable bowel syndrome with constipation  Continue MiraLAX 17 g daily and add Metamucil twice daily   Gave patient educational handout in German on high-fiber diet  Continue drinking plenty of water daily  - psyllium (METAMUCIL) 58 6 % powder; Take 1 packet by mouth 2 (two) times a day  Dispense: 60 packet; Refill: 3  - polyethylene glycol (GLYCOLAX) powder; Take 17 g by mouth daily Mix 1 capful in 8 ounces of water, juice or tea  Dispense: 578 g; Refill: 3    5  Tubular adenoma of colon  She is due for colonoscopy for screening for personal history of adenoma  We will schedule colonoscopy today  Gave bowel prep instructions for GoLYTELY/Dulcolax  - polyethylene glycol (GOLYTELY) 4000 mL solution; Take 4,000 mL by mouth once for 1 dose  Dispense: 4000 mL; Refill: 0  - Case request operating room: ESOPHAGOGASTRODUODENOSCOPY (EGD), COLONOSCOPY; Standing  - Case request operating room: ESOPHAGOGASTRODUODENOSCOPY (EGD), COLONOSCOPY    Follow-up after procedures  ______________________________________________________________________    SUBJECTIVE:  65-year-old female with hypothyroidism, obstructive sleep apnea, and migraines presenting for office follow-up of achalasia, GERD, and IBS  She underwent Heller myotomy and fundoplication for achalasia in 2015  Her dysphagia significantly improved following the surgery and she gained a considerable amount of weight back  She now reports dysphagia for solid foods for the past 6 months  She is particularly bothered by rice, bread, and cake  She needs to drink lots of water to get the food down  She has occasional heartburn but this seems controlled with Protonix 40 mg daily and sucralfate  She sleeps with the head of her bed raised at night to prevent regurgitation  She has occasional upper abdominal pain if she lies on her stomach  She takes MiraLax daily for constipation  She has 1 bowel movement daily which ranges between 2 and 4 on the [facility] stool chart  She feels like she strains to have a bowel movement  She denies any blood in the stool  She is due for colonoscopy for personal history of tubular adenoma  REVIEW OF SYSTEMS IS OTHERWISE NEGATIVE  Historical Information   Past Medical History:   Diagnosis Date    Achalasia     Asthma     Constipation     Depression     Diarrhea 2019    Elevated LFTs     last assessed 10/25/17    Fibromyalgia     Gastric ulcer     GERD (gastroesophageal reflux disease)     H  pylori infection 10/9/2017    [facility] [id number]: Treated, f/u stool antigen negative    Hypothyroidism     Kidney stone     Migraines     Migraines     MICHAEL (obstructive sleep apnea)     last assessed 16    Sleep apnea 2019    Uses CPAP    Thyroid nodule     last assessed 17     Past Surgical History:   Procedure Laterality Date     SECTION      COLONOSCOPY      ENDOMETRIAL ABLATION      MYOTOMY HELLER LAPAROSCOPIC  2016    Dr Marely Ricardo, Coincident lorena fundoplication    NASAL SINUS SURGERY      OTHER SURGICAL HISTORY      achalasia repair    OVARIAN CYST REMOVAL      PILONIDAL CYST / SINUS EXCISION      FL CYSTOURETHROSCOPY N/A 2017    Procedure: CYSTOSCOPY;  Surgeon: Olin Hammans, MD;  [facility];  Service: UroGynecology           FL ESOPHAGOGASTRODUODENOSCOPY TRANSORAL DIAGNOSTIC N/A 2017    Procedure: ESOPHAGOGASTRODUODENOSCOPY (EGD); Surgeon: Geovani Hughes MD;  [facility];   Service: Gastroenterology    FL POST COLPORRHAPHY,RECTUM/VAGINA N/A 2017    Procedure: COLPORRHAPHY POSTERIOR;  Surgeon: Olin Hammans, MD;  [facility];  Service: UroGynecology           FL SLING OPER STRES INCONTINENCE N/A 2017    Procedure: INSERTION PUBOVAGINAL SLING /SINGLE INCISION ;  Surgeon: Olin Hammans, MD;  [facility];  Service: UroGynecology           TONSILLECTOMY      TUBAL LIGATION      UPPER GASTROINTESTINAL ENDOSCOPY      US GUIDED BREAST BIOPSY RIGHT COMPLETE Right 10/31/2017     Social History   Social History     Substance and Sexual Activity   Alcohol Use No     Social History     Substance and Sexual Activity   Drug Use No     Social History     Tobacco Use   Smoking Status Former Smoker    Packs/day: 1 50    Years: 30 00    Pack years: 37 1    Last attempt to quit: 2016    Years since quitting: 3 2   Smokeless Tobacco Never Used   Tobacco Comment    pt stated quit about a month ago, last assessed 4/30/14 per [employer]     Family History   Problem Relation Age of Onset    Cancer Mother     Breast cancer Mother     Hypertension Mother     Dementia Father     Stroke Father     Diabetes Father     Hypertension Father     Cancer Maternal Grandmother         ovarian    Ovarian cancer Maternal Grandmother         unspecified laterality    Cancer Maternal Grandfather     Hypertension Maternal Grandfather     Tongue cancer Maternal Grandfather     Stroke Paternal Grandmother     Thyroid disease unspecified Brother     Thyroid disease unspecified Maternal Aunt     Colon cancer Maternal Aunt     Breast cancer Family     Diabetes Sister     No Known Problems Daughter     No Known Problems Son     No Known Problems Daughter     No Known Problems Son        Meds/Allergies       Current Outpatient Medications:     acetaminophen (TYLENOL) 325 mg tablet    albuterol (2 5 mg/3 mL) 0 083 % nebulizer solution    albuterol (VENTOLIN HFA) 90 mcg/act inhaler    Albuterol Sulfate 108 (90 Base) MCG/ACT AEPB    ARIPiprazole (ABILIFY) 5 mg tablet    benzonatate (TESSALON) 200 MG capsule    betamethasone dipropionate (DIPROSONE) 0 05 % cream    bismuth subsalicylate (PEPTO BISMOL) 262 MG chewable tablet    Botulinum Toxin Type A 200 units SOLR    BREO ELLIPTA    buPROPion (WELLBUTRIN XL) 150 mg 24 hr tablet    cetirizine (ZyrTEC) 10 mg tablet    clonazePAM (KlonoPIN) 0 5 mg tablet    clotrimazole-betamethasone (LOTRISONE) 1-0 05 % cream    dextromethorphan-guaiFENesin (ROBITUSSIN DM)  mg/5 mL syrup    diclofenac sodium (VOLTAREN) 1 %    dicyclomine (BENTYL) 20 mg tablet   doxepin (SINEquan) 25 mg capsule    FLUCELVAX QUADRIVALENT    fluticasone (FLONASE) 50 mcg/act nasal spray    furosemide (LASIX) 20 mg tablet    gabapentin (NEURONTIN) 300 mg capsule    hydrocortisone 2 5 % cream    hydrOXYzine pamoate (VISTARIL) 50 mg capsule    ibuprofen (MOTRIN) 600 mg tablet    ipratropium (ATROVENT) 0 03 % nasal spray    levothyroxine 150 mcg tablet    loratadine (CLARITIN) 10 mg tablet    meloxicam (MOBIC) 15 mg tablet    methocarbamol (ROBAXIN) 750 mg tablet    Nerve Stimulator (TENS THERAPY PAIN RELIEF) VIKASH    oxyCODONE-acetaminophen (PERCOCET) 5-325 mg per tablet    pantoprazole (PROTONIX) 40 mg tablet    polyethylene glycol (GLYCOLAX) powder    prochlorperazine (COMPAZINE) 10 mg tablet    Pseudoephedrine-APAP  MG TABS    sucralfate (CARAFATE) 1 g tablet    topiramate (TOPAMAX) 25 mg tablet    VIIBRYD 40 MG tablet    zolpidem (AMBIEN) 10 mg tablet    polyethylene glycol (GOLYTELY) 4000 mL solution    psyllium (METAMUCIL) 58 6 % powder    Allergies   Allergen Reactions    Gabapentin      Annotation - [id number]: Neuropsych effects per patient   Latex Dermatitis    Penicillins Hives           Objective     Blood pressure 117/78, pulse 76, temperature 99 2 °F (37 3 °C), temperature source Tympanic, height 5' 8" (1 727 m), weight 88 7 kg (195 lb 9 6 oz), last menstrual period 02/23/2019, not currently breastfeeding  Body mass index is 29 74 kg/m²  PHYSICAL EXAM:      General Appearance:   Alert, cooperative, no distress   HEENT:   Normocephalic, atraumatic, anicteric      Neck:  Supple, symmetrical, trachea midline   Lungs:   Clear to auscultation bilaterally; no rales, rhonchi or wheezing; respirations unlabored    Heart[de-identified]   Regular rate and rhythm; no murmur, rub, or gallop     Abdomen:   Soft, non-tender, non-distended; normal bowel sounds; no masses, no organomegaly    Genitalia:   Deferred    Rectal:   Deferred    Extremities:  No cyanosis, clubbing or edema    Pulses:  2+ and symmetric    Skin:  No jaundice, rashes, or lesions    Lymph nodes:  No palpable cervical lymphadenopathy        Lab Results:   No visits with results within 1 Day(s) from this visit  Latest known visit with results is:   Hospital Outpatient Visit on 02/07/2019   Component Date Value    EXT Preg Test, Ur 02/07/2019 Negative          Radiology Results:   No results found

## 2019-03-14 NOTE — PATIENT INSTRUCTIONS
Dieta con alto contenido de Eitzen   LO QUE NECESITA SABER:   Catherene Punches dieta con alto contenido de fibra incluye alimentos con dione victor m cantidad de fibra  La fibra es la parte de las frutas, los vegetales y los granos, que no se descompone al ser ingerida por beck cuerpo  La fibra ayuda a regular las evacuaciones intestinales  La fibra además ayuda a bajar el colesterol, controlar la glucosa en la elmer en las personas que sufren de diabetes y para aliviar el estreñimiento  También la fibra podría ayudarle a controlar beck peso debido a que le da la sensación de llenura más rápidamente  La mayoría de los adultos deberían consumir entre 25 a 35 gramos de fibra al día  Consulte con beck dietista o médico sobre la cantidad de fibra que usted necesita  INSTRUCCIONES SOBRE EL [facility]:   Buenas smith de fibra:   · Alimentos que contienen al menos 4 gramos de fibra por porción:      ¨ ? a ½ de dione taza de cereal con alto contenido de fibra (niko la etiqueta nutricional en la caja)    ¨ ½ taza de moras o frambuesas    ¨ 4 ciruelas pasas    ¨ 1 alcachofa cocida    ¨ ½ taza de legumbres cocidas, doug lentejas o frijoles rojos o pintos    · Workable contienen 1 a 3 gramos de Rachna por porción:      ¨ 1 rebanada de pan de naomy integral, pan integral de montelongo o pan de montelongo    ¨ ½ taza de arroz integral cocido    ¨ 4 galletas integrales    ¨ 1 taza de anaya    ¨ ½ taza de cereal con 1 a 3 gramos de Eitzen por porción (niko la etiqueta nutricional en la caja)    ¨ 1 porción pequeña de fruta doug manzana, banana, dandy, kiwi o naranja    ¨ 3 dátiles    ¨ ½ taza de albaricoques enlatados, ensalada de frutas, duraznos o peras    ¨ ½ taza de verduras crudas o cocidas, doug zanahorias, coliflor, repollo, espinaca, calabaza o maíz  Formas en que puede aumentar la fibra en beck dieta:   · Escoja arroz integral o yecenia en vez de arroz samuel      · Use harina de grano integral en las recetas en vez de harina jennifer       · Durham Armor legumbres y arvejas a los guisos o las sopas  · Memo Lather y verduras frescas con la cascara en vez de jugos  Otras pautas dietéticas que debe seguir:   · Geralynn Nipper a pena dieta lentamente  Usted podría presentar malestar o inflamación estomacal y gases si añade fibra a pena dieta demasiado rápido  · Jaylene mucho líquido a medida que agrega fibra a pena dieta  Es posible que tenga náuseas o desarrolle estreñimiento si no dana suficiente agua  Pregunte cuánto líquido debe brigette cada día y cuáles líquidos son los más adecuados para usted  © 2017 [address] [employer] Information is for End User's use only and may not be sold, redistributed or otherwise used for commercial purposes  All illustrations and images included in CareNotes® are the copyrighted property of A D A M , Inc  or Adi Ruiz  Esta información es sólo para uso en educación  Pena intención no es darle un consejo médico sobre enfermedades o tratamientos  Colsulte con pena [place] farmacéutico antes de seguir cualquier régimen médico para saber si es seguro y efectivo para usted  Colon/EGD scheduled 4/25/19 with Ana at [facility] instructions given in office

## 2019-03-14 NOTE — PROGRESS NOTES
[facility] - Outpatient Follow-up Note  Molly De La Rosa 48 y o  female MRN: [id number]  Encounter: [id number]          ASSESSMENT AND PLAN:      1  Esophageal dysphagia  2  Achalasia  3  Chronic GERD  She has history of dysphagia secondary to achalasia status post Heller myotomy and fundoplication in 2554  She had recurrent dysphagia in 2017 and underwent EGD which showed LA class B erosive esophagitis  Prior barium esophagram showed mild esophageal dysmotility following surgery  She again reports recurrent dysphagia for solids over the past 6 months  Unclear whether she has recurrent reflux esophagitis, structural abnormality including stricture, ring, less likely malignancy, or esophageal dysmotility  Recommend repeat EGD  Sent refills for Protonix 40 mg daily and Carafate 3 times daily  - Case request operating room: ESOPHAGOGASTRODUODENOSCOPY (EGD), COLONOSCOPY; Standing  - Case request operating room: ESOPHAGOGASTRODUODENOSCOPY (EGD), COLONOSCOPY  - pantoprazole (PROTONIX) 40 mg tablet; Take 1 tablet (40 mg total) by mouth daily  Dispense: 30 tablet; Refill: 0  - sucralfate (CARAFATE) 1 g tablet; Take 1 tablet (1 g total) by mouth 3 (three) times a day  Dispense: 90 tablet; Refill: 3    4  Irritable bowel syndrome with constipation  Continue MiraLAX 17 g daily and add Metamucil twice daily  Gave patient educational handout in Korean on high-fiber diet  Continue drinking plenty of water daily  - psyllium (METAMUCIL) 58 6 % powder; Take 1 packet by mouth 2 (two) times a day  Dispense: 60 packet; Refill: 3  - polyethylene glycol (GLYCOLAX) powder; Take 17 g by mouth daily Mix 1 capful in 8 ounces of water, juice or tea  Dispense: 578 g; Refill: 3    5  Tubular adenoma of colon  She is due for colonoscopy for screening for personal history of adenoma  We will schedule colonoscopy today  Gave bowel prep instructions for GoLYTELY/Dulcolax      - polyethylene glycol (GOLYTELY) 4000 mL solution; Take 4,000 mL by mouth once for 1 dose  Dispense: 4000 mL; Refill: 0  - Case request operating room: ESOPHAGOGASTRODUODENOSCOPY (EGD), COLONOSCOPY; Standing  - Case request operating room: ESOPHAGOGASTRODUODENOSCOPY (EGD), COLONOSCOPY    Follow-up after procedures  ______________________________________________________________________    SUBJECTIVE:  53-year-old female with hypothyroidism, obstructive sleep apnea, and migraines presenting for office follow-up of achalasia, GERD, and IBS  She underwent Heller myotomy and fundoplication for achalasia in 2015  Her dysphagia significantly improved following the surgery and she gained a considerable amount of weight back  She now reports dysphagia for solid foods for the past 6 months  She is particularly bothered by rice, bread, and cake  She needs to drink lots of water to get the food down  She has occasional heartburn but this seems controlled with Protonix 40 mg daily and sucralfate  She sleeps with the head of her bed raised at night to prevent regurgitation  She has occasional upper abdominal pain if she lies on her stomach  She takes MiraLax daily for constipation  She has 1 bowel movement daily which ranges between 2 and 4 on the [facility] stool chart  She feels like she strains to have a bowel movement  She denies any blood in the stool  She is due for colonoscopy for personal history of tubular adenoma  REVIEW OF SYSTEMS IS OTHERWISE NEGATIVE        Historical Information   Past Medical History:   Diagnosis Date    Achalasia     Asthma     Constipation     Depression     Diarrhea 2/28/2019    Elevated LFTs     last assessed 10/25/17    Fibromyalgia     Gastric ulcer     GERD (gastroesophageal reflux disease)     H  pylori infection 10/9/2017    Annotation - [id number]: Treated, f/u stool antigen negative    Hypothyroidism     Kidney stone     Migraines     Migraines     MICHAEL (obstructive sleep apnea)     last assessed 7/21/16  Sleep apnea 2019    Uses CPAP    Thyroid nodule     last assessed 17     Past Surgical History:   Procedure Laterality Date     SECTION      COLONOSCOPY      ENDOMETRIAL ABLATION      MYOTOMY HELLER LAPAROSCOPIC  2016    Dr José Luis Galvin, Coincident lorena fundoplication    NASAL SINUS SURGERY      OTHER SURGICAL HISTORY      achalasia repair    OVARIAN CYST REMOVAL      PILONIDAL CYST / SINUS EXCISION      TX CYSTOURETHROSCOPY N/A 2017    Procedure: CYSTOSCOPY;  Surgeon: Malcolm Courtney MD;  [facility];  Service: UroGynecology           TX ESOPHAGOGASTRODUODENOSCOPY TRANSORAL DIAGNOSTIC N/A 2017    Procedure: ESOPHAGOGASTRODUODENOSCOPY (EGD); Surgeon: Yovanny Crandall MD;  [facility];   Service: Gastroenterology    TX POST COLPORRHAPHY,RECTUM/VAGINA N/A 2017    Procedure: COLPORRHAPHY POSTERIOR;  Surgeon: Malcolm Courtney MD;  [facility];  Service: UroGynecology           TX SLING OPER STRES INCONTINENCE N/A 2017    Procedure: INSERTION PUBOVAGINAL SLING /SINGLE INCISION ;  Surgeon: Malcolm Courtney MD;  [facility];  Service: UroGynecology           TONSILLECTOMY      TUBAL LIGATION      UPPER GASTROINTESTINAL ENDOSCOPY      US GUIDED BREAST BIOPSY RIGHT COMPLETE Right 10/31/2017     Social History   Social History     Substance and Sexual Activity   Alcohol Use No     Social History     Substance and Sexual Activity   Drug Use No     Social History     Tobacco Use   Smoking Status Former Smoker    Packs/day: 1 50    Years: 30 00    Pack years: 37 1    Last attempt to quit: 2016    Years since quitting: 3 2   Smokeless Tobacco Never Used   Tobacco Comment    pt stated quit about a month ago, last assessed 14 per [employer]     Family History   Problem Relation Age of Onset    Cancer Mother     Breast cancer Mother     Hypertension Mother     Dementia Father     Stroke Father     Diabetes Father     Hypertension Father  Cancer Maternal Grandmother         ovarian    Ovarian cancer Maternal Grandmother         unspecified laterality    Cancer Maternal Grandfather     Hypertension Maternal Grandfather     Tongue cancer Maternal Grandfather     Stroke Paternal Grandmother     Thyroid disease unspecified Brother     Thyroid disease unspecified Maternal Aunt     Colon cancer Maternal Aunt     Breast cancer Family     Diabetes Sister     No Known Problems Daughter     No Known Problems Son     No Known Problems Daughter     No Known Problems Son        Meds/Allergies       Current Outpatient Medications:     acetaminophen (TYLENOL) 325 mg tablet    albuterol (2 5 mg/3 mL) 0 083 % nebulizer solution    albuterol (VENTOLIN HFA) 90 mcg/act inhaler    Albuterol Sulfate 108 (90 Base) MCG/ACT AEPB    ARIPiprazole (ABILIFY) 5 mg tablet    benzonatate (TESSALON) 200 MG capsule    betamethasone dipropionate (DIPROSONE) 0 05 % cream    bismuth subsalicylate (PEPTO BISMOL) 262 MG chewable tablet    Botulinum Toxin Type A 200 units SOLR    BREO ELLIPTA    buPROPion (WELLBUTRIN XL) 150 mg 24 hr tablet    cetirizine (ZyrTEC) 10 mg tablet    clonazePAM (KlonoPIN) 0 5 mg tablet    clotrimazole-betamethasone (LOTRISONE) 1-0 05 % cream    dextromethorphan-guaiFENesin (ROBITUSSIN DM)  mg/5 mL syrup    diclofenac sodium (VOLTAREN) 1 %    dicyclomine (BENTYL) 20 mg tablet    doxepin (SINEquan) 25 mg capsule    FLUCELVAX QUADRIVALENT    fluticasone (FLONASE) 50 mcg/act nasal spray    furosemide (LASIX) 20 mg tablet    gabapentin (NEURONTIN) 300 mg capsule    hydrocortisone 2 5 % cream    hydrOXYzine pamoate (VISTARIL) 50 mg capsule    ibuprofen (MOTRIN) 600 mg tablet    ipratropium (ATROVENT) 0 03 % nasal spray    levothyroxine 150 mcg tablet    loratadine (CLARITIN) 10 mg tablet    meloxicam (MOBIC) 15 mg tablet    methocarbamol (ROBAXIN) 750 mg tablet    Nerve Stimulator (TENS THERAPY PAIN RELIEF) VIKASH    oxyCODONE-acetaminophen (PERCOCET) 5-325 mg per tablet    pantoprazole (PROTONIX) 40 mg tablet    polyethylene glycol (GLYCOLAX) powder    prochlorperazine (COMPAZINE) 10 mg tablet    Pseudoephedrine-APAP  MG TABS    sucralfate (CARAFATE) 1 g tablet    topiramate (TOPAMAX) 25 mg tablet    VIIBRYD 40 MG tablet    zolpidem (AMBIEN) 10 mg tablet    polyethylene glycol (GOLYTELY) 4000 mL solution    psyllium (METAMUCIL) 58 6 % powder    Allergies   Allergen Reactions    Gabapentin      Annotation - [id number]: Neuropsych effects per patient   Latex Dermatitis    Penicillins Hives           Objective     Blood pressure 117/78, pulse 76, temperature 99 2 °F (37 3 °C), temperature source Tympanic, height 5' 8" (1 727 m), weight 88 7 kg (195 lb 9 6 oz), last menstrual period 02/23/2019, not currently breastfeeding  Body mass index is 29 74 kg/m²  PHYSICAL EXAM:      General Appearance:   Alert, cooperative, no distress   HEENT:   Normocephalic, atraumatic, anicteric      Neck:  Supple, symmetrical, trachea midline   Lungs:   Clear to auscultation bilaterally; no rales, rhonchi or wheezing; respirations unlabored    Heart[de-identified]   Regular rate and rhythm; no murmur, rub, or gallop  Abdomen:   Soft, non-tender, non-distended; normal bowel sounds; no masses, no organomegaly    Genitalia:   Deferred    Rectal:   Deferred    Extremities:  No cyanosis, clubbing or edema    Pulses:  2+ and symmetric    Skin:  No jaundice, rashes, or lesions    Lymph nodes:  No palpable cervical lymphadenopathy        Lab Results:   No visits with results within 1 Day(s) from this visit  Latest known visit with results is:   Hospital Outpatient Visit on 02/07/2019   Component Date Value    EXT Preg Test, Ur 02/07/2019 Negative          Radiology Results:   No results found

## 2019-03-18 ENCOUNTER — OFFICE VISIT (OUTPATIENT)
Dept: PHYSICAL THERAPY | Facility: OTHER | Age: 54
End: 2019-03-18
Payer: COMMERCIAL

## 2019-03-18 DIAGNOSIS — M70.61 TROCHANTERIC BURSITIS OF BOTH HIPS: Primary | ICD-10-CM

## 2019-03-18 DIAGNOSIS — G89.29 CHRONIC BILATERAL LOW BACK PAIN, WITH SCIATICA PRESENCE UNSPECIFIED: ICD-10-CM

## 2019-03-18 DIAGNOSIS — M54.5 CHRONIC BILATERAL LOW BACK PAIN, WITH SCIATICA PRESENCE UNSPECIFIED: ICD-10-CM

## 2019-03-18 DIAGNOSIS — M70.62 TROCHANTERIC BURSITIS OF BOTH HIPS: Primary | ICD-10-CM

## 2019-03-18 PROCEDURE — 97110 THERAPEUTIC EXERCISES: CPT | Performed by: PHYSICAL THERAPIST

## 2019-03-18 PROCEDURE — 97112 NEUROMUSCULAR REEDUCATION: CPT | Performed by: PHYSICAL THERAPIST

## 2019-03-20 ENCOUNTER — OFFICE VISIT (OUTPATIENT)
Dept: PHYSICAL THERAPY | Facility: OTHER | Age: 54
End: 2019-03-20
Payer: COMMERCIAL

## 2019-03-20 DIAGNOSIS — M70.62 TROCHANTERIC BURSITIS OF BOTH HIPS: Primary | ICD-10-CM

## 2019-03-20 DIAGNOSIS — M54.5 CHRONIC BILATERAL LOW BACK PAIN, WITH SCIATICA PRESENCE UNSPECIFIED: ICD-10-CM

## 2019-03-20 DIAGNOSIS — M70.61 TROCHANTERIC BURSITIS OF BOTH HIPS: Primary | ICD-10-CM

## 2019-03-20 DIAGNOSIS — G89.29 CHRONIC BILATERAL LOW BACK PAIN, WITH SCIATICA PRESENCE UNSPECIFIED: ICD-10-CM

## 2019-03-20 PROCEDURE — 97110 THERAPEUTIC EXERCISES: CPT | Performed by: PEDIATRICS

## 2019-03-20 PROCEDURE — 97112 NEUROMUSCULAR REEDUCATION: CPT | Performed by: PEDIATRICS

## 2019-03-20 NOTE — PROGRESS NOTES
Daily Note     Today's date: 3/20/2019  Patient name: Ramirez Pedro  : 1965  MRN: [id number]  Referring provider: Ronal Fair MD  Dx:   Encounter Diagnosis     ICD-10-CM    1  Trochanteric bursitis of both hips M70 61     M70 62    2  Chronic bilateral low back pain, with sciatica presence unspecified M54 5     G89 29                 Subjective: Pt  Reports no changes since last treatment session  Objective: See treatment diary below  Precautions: History of Chronic Pain     Daily Treatment Diary     Manual  3/6 3/11 3/12 3/20    TrP Release Piriformis  ES      Prone LA Distraction   ES             KT Taping         Laser R hip 15 W 4min           Exercise Diary  3/4 3/6 3/11 3/12 3/18 3/20   Bike  5' 7 min 8 min L2 10m L2 10 min L2   HS Stretch 10" x10 10" x10 10" x10      Piriformis Stretch   10" x10 30" x3 30" x3 30" x 3   SLR 3 way with glute press 2x10 2x10 3x10 3x10 3x10 3 x 10   Slump Nerve Glides 5" 2x10 x20 x20 x20 x20 X 20   Clams  2x10 PTB 3x10      Mini Squats  NV 2x10 3x10 3x10 3 x 10   LTR  x20 x20 20 20x 20 x   PBall Crushers  5" x20 10" x20 2min 10" x20 3 x 10  5"   TrA Bracing   5" x20 10" x20      PBall Walkouts   NV      Supine Hip Flexor Stretch    30" x3 30" x3 30" x 3   Step Ups         Bridges      10x2 2 x 10                                                    Modalities                                                           Assessment: Tolerated treatment well  Pt  Demonstrates good knowledge of current ex program   She requires cuing during clamshells to avoid lumbar compensation  Also requires cuing during squats Patient would benefit from continued PT      Plan: Continue per plan of care

## 2019-03-25 ENCOUNTER — OFFICE VISIT (OUTPATIENT)
Dept: PHYSICAL THERAPY | Facility: OTHER | Age: 54
End: 2019-03-25
Payer: COMMERCIAL

## 2019-03-25 DIAGNOSIS — M70.62 TROCHANTERIC BURSITIS OF BOTH HIPS: Primary | ICD-10-CM

## 2019-03-25 DIAGNOSIS — M70.61 TROCHANTERIC BURSITIS OF BOTH HIPS: Primary | ICD-10-CM

## 2019-03-25 DIAGNOSIS — G89.29 CHRONIC BILATERAL LOW BACK PAIN, WITH SCIATICA PRESENCE UNSPECIFIED: ICD-10-CM

## 2019-03-25 DIAGNOSIS — M54.5 CHRONIC BILATERAL LOW BACK PAIN, WITH SCIATICA PRESENCE UNSPECIFIED: ICD-10-CM

## 2019-03-25 PROCEDURE — 97140 MANUAL THERAPY 1/> REGIONS: CPT | Performed by: PHYSICAL THERAPIST

## 2019-03-25 PROCEDURE — 97112 NEUROMUSCULAR REEDUCATION: CPT | Performed by: PHYSICAL THERAPIST

## 2019-03-25 PROCEDURE — 97110 THERAPEUTIC EXERCISES: CPT | Performed by: PHYSICAL THERAPIST

## 2019-03-25 NOTE — PROGRESS NOTES
Daily Note     Today's date: 3/25/2019  Patient name: Prachi Ledezma  : 1965  MRN: [id number]  Referring provider: Kate Magana MD  Dx:   Encounter Diagnosis     ICD-10-CM    1  Trochanteric bursitis of both hips M70 61     M70 62    2  Chronic bilateral low back pain, with sciatica presence unspecified M54 5     G89 29      Start Time: 915  Stop Time: 1015  Total time in clinic (min): 60 minutes  9:15-9:55 1 on 1  9:55-10:15 IEP    Subjective: Patient's primary compliant today is R lateral hip pain 7/10; reports no numbness of 2nd toe today  Objective: See treatment diary below    Assessment: Tolerated treatment well  Patient would benefit from continued PT  Patient reports feeling good throughout session with decreased pain  Mild numbness/tingling into 2nd toe at end of treatment session today with completion of long axis distraction with numbness relief  Progress to weightbearing exercises for strengthening  Plan: Continue per plan of care  Continue to reassess at each visit to determine cause of pain - complete manuals each treatment session       Precautions: History of Chronic Pain     Daily Treatment Diary     Manual  3/6 3/11 3/12 3/25    TrP Release Piriformis  ES      Prone LA Distraction   ES ES            KT Taping         Laser R hip 15 W 4min           Exercise Diary  3/12 3/18 3/20 3/25    Bike 8 min L2 10m L2 10 min L2 8' L3    HS Stretch    Manual 10" x10    Piriformis Stretch 30" x3 30" x3 30" x 3 manual 10" x10    SLR 3 way with glute press 3x10 3x10 3 x 10 3 x 10 2#    Slump Nerve Glides x20 x20 X 20 x20    Clams        Mini Squats 3x10 3x10 3 x 10 3x10    LTR 20 20x 20 x     PBall Crushers 2min 10" x20 3 x 10  5"     TrA Bracing         PBall Walkouts        Supine Hip Flexor Stretch 30" x3 30" x3 30" x 3     Step Ups    6" 3x10    Bridges   10x2 2 x 10 3x10    Band sidestepping    OTB 3 laps                                         Modalities

## 2019-03-27 ENCOUNTER — OFFICE VISIT (OUTPATIENT)
Dept: PHYSICAL THERAPY | Facility: OTHER | Age: 54
End: 2019-03-27
Payer: COMMERCIAL

## 2019-03-27 DIAGNOSIS — M70.61 TROCHANTERIC BURSITIS OF BOTH HIPS: Primary | ICD-10-CM

## 2019-03-27 DIAGNOSIS — G89.29 CHRONIC BILATERAL LOW BACK PAIN, WITH SCIATICA PRESENCE UNSPECIFIED: ICD-10-CM

## 2019-03-27 DIAGNOSIS — M70.62 TROCHANTERIC BURSITIS OF BOTH HIPS: Primary | ICD-10-CM

## 2019-03-27 DIAGNOSIS — M54.5 CHRONIC BILATERAL LOW BACK PAIN, WITH SCIATICA PRESENCE UNSPECIFIED: ICD-10-CM

## 2019-03-27 PROCEDURE — 97112 NEUROMUSCULAR REEDUCATION: CPT | Performed by: PHYSICAL THERAPIST

## 2019-03-27 PROCEDURE — 97110 THERAPEUTIC EXERCISES: CPT | Performed by: PHYSICAL THERAPIST

## 2019-03-27 PROCEDURE — 97140 MANUAL THERAPY 1/> REGIONS: CPT | Performed by: PHYSICAL THERAPIST

## 2019-03-27 NOTE — PROGRESS NOTES
Daily Note     Today's date: 3/27/2019  Patient name: Lucio Padilla  : 1965  MRN: [id number]  Referring provider: Sandra Coe MD  Dx:   Encounter Diagnosis     ICD-10-CM    1  Trochanteric bursitis of both hips M70 61     M70 62    2  Chronic bilateral low back pain, with sciatica presence unspecified M54 5     G89 29      Start Time: 09  Stop Time: 1005  Total time in clinic (min): 45 minutes  1 on 1 for entirety     Subjective: Patient comes to PT today with 5/10 R hip pain with mild numbness in 2nd right toe  Reports overall improvements in pain levels since last visit  Objective: See treatment diary below    Assessment: Tolerated treatment well  Patient would benefit from continued PT  Relief of numbness in second toe following LA distraction  Continued strengthening  Plan: Continue per plan of care  Continue to reassess at each visit to determine cause of pain - complete manuals each treatment session  Patient scheduled two more visits for next week but has operation 19  Instructed patient to call after operation to return when able       Precautions: History of Chronic Pain     Daily Treatment Diary     Manual  3/6 3/11 3/12 3/25 3/27   TrP Release Piriformis  ES      Prone LA Distraction   ES ES ES           KT Taping         Laser R hip 15 W 4min           Exercise Diary  3/12 3/18 3/20 3/25 3/27   Bike 8 min L2 10m L2 10 min L2 8' L3 8' L3   HS Stretch    Manual 10" x10 Manual 10" x10   Piriformis Stretch 30" x3 30" x3 30" x 3 manual 10" x10 Manual 10" x10   SLR 3 way with glute press 3x10 3x10 3 x 10 3 x 10 2# 2# 3x10   Slump Nerve Glides x20 x20 X 20 x20 5" x20   Mini Squats 3x10 3x10 3 x 10 3x10 3x10   LTR 20 20x 20 x  x20   PBall Crushers 2min 10" x20 3 x 10  5"     Supine Hip Flexor Stretch 30" x3 30" x3 30" x 3     Step Ups    6" 3x10 8" 3x10   Bridges   10x2 2 x 10 3x10 3x10   Band sidestepping    OTB 3 laps  GTB 4 laps    Rockerboard AP/ML     NV Modalities

## 2019-03-29 ENCOUNTER — LAB (OUTPATIENT)
Dept: LAB | Facility: HOSPITAL | Age: 54
End: 2019-03-29
Attending: INTERNAL MEDICINE
Payer: COMMERCIAL

## 2019-03-29 DIAGNOSIS — R73.03 PREDIABETES: ICD-10-CM

## 2019-03-29 DIAGNOSIS — E66.09 CLASS 1 OBESITY DUE TO EXCESS CALORIES WITH BODY MASS INDEX (BMI) OF 34.0 TO 34.9 IN ADULT, UNSPECIFIED WHETHER SERIOUS COMORBIDITY PRESENT: ICD-10-CM

## 2019-03-29 DIAGNOSIS — E03.9 HYPOTHYROIDISM, UNSPECIFIED TYPE: ICD-10-CM

## 2019-03-29 LAB
ALBUMIN SERPL BCP-MCNC: 3.6 G/DL (ref 3.5–5)
ALP SERPL-CCNC: 91 U/L (ref 46–116)
ALT SERPL W P-5'-P-CCNC: 31 U/L (ref 12–78)
ANION GAP SERPL CALCULATED.3IONS-SCNC: 2 MMOL/L (ref 4–13)
AST SERPL W P-5'-P-CCNC: 15 U/L (ref 5–45)
BILIRUB SERPL-MCNC: 0.48 MG/DL (ref 0.2–1)
BUN SERPL-MCNC: 12 MG/DL (ref 5–25)
CALCIUM SERPL-MCNC: 8.7 MG/DL (ref 8.3–10.1)
CHLORIDE SERPL-SCNC: 106 MMOL/L (ref 100–108)
CO2 SERPL-SCNC: 27 MMOL/L (ref 21–32)
CREAT SERPL-MCNC: 0.68 MG/DL (ref 0.6–1.3)
EST. AVERAGE GLUCOSE BLD GHB EST-MCNC: 108 MG/DL
GFR SERPL CREATININE-BSD FRML MDRD: 101 ML/MIN/1.73SQ M
GLUCOSE P FAST SERPL-MCNC: 84 MG/DL (ref 65–99)
HBA1C MFR BLD: 5.4 % (ref 4.2–6.3)
POTASSIUM SERPL-SCNC: 3.9 MMOL/L (ref 3.5–5.3)
PROT SERPL-MCNC: 7 G/DL (ref 6.4–8.2)
SODIUM SERPL-SCNC: 135 MMOL/L (ref 136–145)
T4 FREE SERPL-MCNC: 1.7 NG/DL (ref 0.76–1.46)
TSH SERPL DL<=0.05 MIU/L-ACNC: 0.28 UIU/ML (ref 0.36–3.74)

## 2019-03-29 PROCEDURE — 83036 HEMOGLOBIN GLYCOSYLATED A1C: CPT

## 2019-03-29 PROCEDURE — 80053 COMPREHEN METABOLIC PANEL: CPT

## 2019-03-29 PROCEDURE — 36415 COLL VENOUS BLD VENIPUNCTURE: CPT

## 2019-03-29 PROCEDURE — 84439 ASSAY OF FREE THYROXINE: CPT

## 2019-03-29 PROCEDURE — 84443 ASSAY THYROID STIM HORMONE: CPT

## 2019-04-01 ENCOUNTER — OFFICE VISIT (OUTPATIENT)
Dept: PHYSICAL THERAPY | Facility: OTHER | Age: 54
End: 2019-04-01
Payer: COMMERCIAL

## 2019-04-01 DIAGNOSIS — M54.5 CHRONIC BILATERAL LOW BACK PAIN, WITH SCIATICA PRESENCE UNSPECIFIED: ICD-10-CM

## 2019-04-01 DIAGNOSIS — M70.61 TROCHANTERIC BURSITIS OF BOTH HIPS: Primary | ICD-10-CM

## 2019-04-01 DIAGNOSIS — G89.29 CHRONIC BILATERAL LOW BACK PAIN, WITH SCIATICA PRESENCE UNSPECIFIED: ICD-10-CM

## 2019-04-01 DIAGNOSIS — M70.62 TROCHANTERIC BURSITIS OF BOTH HIPS: Primary | ICD-10-CM

## 2019-04-01 PROCEDURE — 97112 NEUROMUSCULAR REEDUCATION: CPT | Performed by: PHYSICAL THERAPIST

## 2019-04-01 PROCEDURE — 97140 MANUAL THERAPY 1/> REGIONS: CPT | Performed by: PHYSICAL THERAPIST

## 2019-04-01 PROCEDURE — 97110 THERAPEUTIC EXERCISES: CPT | Performed by: PHYSICAL THERAPIST

## 2019-04-02 ENCOUNTER — OFFICE VISIT (OUTPATIENT)
Dept: PHYSICAL THERAPY | Facility: OTHER | Age: 54
End: 2019-04-02
Payer: COMMERCIAL

## 2019-04-02 DIAGNOSIS — G89.29 CHRONIC BILATERAL LOW BACK PAIN, WITH SCIATICA PRESENCE UNSPECIFIED: ICD-10-CM

## 2019-04-02 DIAGNOSIS — J30.2 SEASONAL ALLERGIES: ICD-10-CM

## 2019-04-02 DIAGNOSIS — M70.61 TROCHANTERIC BURSITIS OF BOTH HIPS: Primary | ICD-10-CM

## 2019-04-02 DIAGNOSIS — M70.62 TROCHANTERIC BURSITIS OF BOTH HIPS: Primary | ICD-10-CM

## 2019-04-02 DIAGNOSIS — M54.5 CHRONIC BILATERAL LOW BACK PAIN, WITH SCIATICA PRESENCE UNSPECIFIED: ICD-10-CM

## 2019-04-02 PROCEDURE — 97110 THERAPEUTIC EXERCISES: CPT | Performed by: PHYSICAL THERAPIST

## 2019-04-02 PROCEDURE — 97112 NEUROMUSCULAR REEDUCATION: CPT | Performed by: PHYSICAL THERAPIST

## 2019-04-02 PROCEDURE — 97140 MANUAL THERAPY 1/> REGIONS: CPT | Performed by: PHYSICAL THERAPIST

## 2019-04-03 ENCOUNTER — TELEPHONE (OUTPATIENT)
Dept: ENDOCRINOLOGY | Facility: CLINIC | Age: 54
End: 2019-04-03

## 2019-04-03 DIAGNOSIS — E03.9 HYPOTHYROIDISM, UNSPECIFIED TYPE: ICD-10-CM

## 2019-04-03 DIAGNOSIS — E03.9 HYPOTHYROIDISM, UNSPECIFIED TYPE: Primary | ICD-10-CM

## 2019-04-03 RX ORDER — LORATADINE 10 MG/1
10 TABLET ORAL DAILY
Qty: 90 TABLET | Refills: 0 | OUTPATIENT
Start: 2019-04-03

## 2019-04-03 RX ORDER — LEVOTHYROXINE SODIUM 0.15 MG/1
150 TABLET ORAL
COMMUNITY
End: 2020-01-20 | Stop reason: SDUPTHER

## 2019-04-09 ENCOUNTER — TELEPHONE (OUTPATIENT)
Dept: INTERNAL MEDICINE CLINIC | Facility: CLINIC | Age: 54
End: 2019-04-09

## 2019-04-12 ENCOUNTER — ANESTHESIA EVENT (OUTPATIENT)
Dept: PERIOP | Facility: AMBULARY SURGERY CENTER | Age: 54
End: 2019-04-12
Payer: COMMERCIAL

## 2019-04-15 ENCOUNTER — OFFICE VISIT (OUTPATIENT)
Dept: BARIATRICS | Facility: CLINIC | Age: 54
End: 2019-04-15
Payer: COMMERCIAL

## 2019-04-15 VITALS
BODY MASS INDEX: 29.13 KG/M2 | RESPIRATION RATE: 18 BRPM | HEIGHT: 68 IN | WEIGHT: 192.2 LBS | SYSTOLIC BLOOD PRESSURE: 118 MMHG | DIASTOLIC BLOOD PRESSURE: 70 MMHG | HEART RATE: 88 BPM | TEMPERATURE: 97.7 F

## 2019-04-15 DIAGNOSIS — R63.5 ABNORMAL WEIGHT GAIN: Primary | ICD-10-CM

## 2019-04-15 DIAGNOSIS — E66.3 OVERWEIGHT: ICD-10-CM

## 2019-04-15 PROCEDURE — 99214 OFFICE O/P EST MOD 30 MIN: CPT | Performed by: PHYSICIAN ASSISTANT

## 2019-04-17 DIAGNOSIS — K21.9 CHRONIC GERD: ICD-10-CM

## 2019-04-17 RX ORDER — PANTOPRAZOLE SODIUM 40 MG/1
TABLET, DELAYED RELEASE ORAL
Qty: 90 TABLET | Refills: 2 | Status: SHIPPED | OUTPATIENT
Start: 2019-04-17 | End: 2019-05-15 | Stop reason: SDUPTHER

## 2019-04-23 ENCOUNTER — TELEPHONE (OUTPATIENT)
Dept: GASTROENTEROLOGY | Facility: CLINIC | Age: 54
End: 2019-04-23

## 2019-04-25 ENCOUNTER — HOSPITAL ENCOUNTER (OUTPATIENT)
Facility: AMBULARY SURGERY CENTER | Age: 54
Setting detail: OUTPATIENT SURGERY
Discharge: HOME | End: 2019-04-25
Attending: INTERNAL MEDICINE | Admitting: INTERNAL MEDICINE
Payer: COMMERCIAL

## 2019-04-25 ENCOUNTER — ANESTHESIA (OUTPATIENT)
Dept: PERIOP | Facility: AMBULARY SURGERY CENTER | Age: 54
End: 2019-04-25
Payer: COMMERCIAL

## 2019-04-25 VITALS
OXYGEN SATURATION: 97 % | DIASTOLIC BLOOD PRESSURE: 78 MMHG | BODY MASS INDEX: 28.49 KG/M2 | WEIGHT: 188 LBS | TEMPERATURE: 97.1 F | SYSTOLIC BLOOD PRESSURE: 119 MMHG | RESPIRATION RATE: 17 BRPM | HEART RATE: 81 BPM | HEIGHT: 68 IN

## 2019-04-25 DIAGNOSIS — R13.19 ESOPHAGEAL DYSPHAGIA: ICD-10-CM

## 2019-04-25 DIAGNOSIS — D12.6 TUBULAR ADENOMA OF COLON: ICD-10-CM

## 2019-04-25 PROCEDURE — 43235 EGD DIAGNOSTIC BRUSH WASH: CPT | Performed by: INTERNAL MEDICINE

## 2019-04-25 PROCEDURE — NC001 PR NO CHARGE: Performed by: INTERNAL MEDICINE

## 2019-04-25 PROCEDURE — 88305 TISSUE EXAM BY PATHOLOGIST: CPT | Performed by: PATHOLOGY

## 2019-04-25 PROCEDURE — 45380 COLONOSCOPY AND BIOPSY: CPT | Performed by: INTERNAL MEDICINE

## 2019-04-25 RX ORDER — SODIUM CHLORIDE, SODIUM LACTATE, POTASSIUM CHLORIDE, CALCIUM CHLORIDE 600; 310; 30; 20 MG/100ML; MG/100ML; MG/100ML; MG/100ML
125 INJECTION, SOLUTION INTRAVENOUS CONTINUOUS
Status: DISCONTINUED | OUTPATIENT
Start: 2019-04-25 | End: 2019-04-25 | Stop reason: HOSPADM

## 2019-04-25 RX ORDER — LIDOCAINE HYDROCHLORIDE 20 MG/ML
INJECTION, SOLUTION INFILTRATION; PERINEURAL AS NEEDED
Status: DISCONTINUED | OUTPATIENT
Start: 2019-04-25 | End: 2019-04-25 | Stop reason: SURG

## 2019-04-25 RX ORDER — SODIUM CHLORIDE 9 MG/ML
INJECTION, SOLUTION INTRAVENOUS CONTINUOUS PRN
Status: DISCONTINUED | OUTPATIENT
Start: 2019-04-25 | End: 2019-04-25 | Stop reason: SURG

## 2019-04-25 RX ORDER — PROPOFOL 10 MG/ML
INJECTION, EMULSION INTRAVENOUS AS NEEDED
Status: DISCONTINUED | OUTPATIENT
Start: 2019-04-25 | End: 2019-04-25 | Stop reason: SURG

## 2019-04-25 RX ADMIN — PROPOFOL 40 MG: 10 INJECTION, EMULSION INTRAVENOUS at 09:51

## 2019-04-25 RX ADMIN — PROPOFOL 30 MG: 10 INJECTION, EMULSION INTRAVENOUS at 09:46

## 2019-04-25 RX ADMIN — PROPOFOL 40 MG: 10 INJECTION, EMULSION INTRAVENOUS at 09:43

## 2019-04-25 RX ADMIN — PROPOFOL 30 MG: 10 INJECTION, EMULSION INTRAVENOUS at 10:04

## 2019-04-25 RX ADMIN — PROPOFOL 30 MG: 10 INJECTION, EMULSION INTRAVENOUS at 09:48

## 2019-04-25 RX ADMIN — SODIUM CHLORIDE: 0.9 INJECTION, SOLUTION INTRAVENOUS at 09:35

## 2019-04-25 RX ADMIN — PROPOFOL 50 MG: 10 INJECTION, EMULSION INTRAVENOUS at 09:40

## 2019-04-25 RX ADMIN — PROPOFOL 40 MG: 10 INJECTION, EMULSION INTRAVENOUS at 09:44

## 2019-04-25 RX ADMIN — PROPOFOL 40 MG: 10 INJECTION, EMULSION INTRAVENOUS at 09:57

## 2019-04-25 RX ADMIN — PROPOFOL 30 MG: 10 INJECTION, EMULSION INTRAVENOUS at 10:14

## 2019-04-25 RX ADMIN — PROPOFOL 40 MG: 10 INJECTION, EMULSION INTRAVENOUS at 09:59

## 2019-04-25 RX ADMIN — PROPOFOL 40 MG: 10 INJECTION, EMULSION INTRAVENOUS at 10:10

## 2019-04-25 RX ADMIN — LIDOCAINE HYDROCHLORIDE 5 ML: 20 INJECTION, SOLUTION INFILTRATION; PERINEURAL at 09:37

## 2019-04-25 RX ADMIN — PROPOFOL 150 MG: 10 INJECTION, EMULSION INTRAVENOUS at 09:37

## 2019-04-25 RX ADMIN — PROPOFOL 40 MG: 10 INJECTION, EMULSION INTRAVENOUS at 09:54

## 2019-05-08 ENCOUNTER — TELEPHONE (OUTPATIENT)
Dept: INTERNAL MEDICINE CLINIC | Facility: CLINIC | Age: 54
End: 2019-05-08

## 2019-05-15 ENCOUNTER — OFFICE VISIT (OUTPATIENT)
Dept: GASTROENTEROLOGY | Facility: CLINIC | Age: 54
End: 2019-05-15
Payer: COMMERCIAL

## 2019-05-15 VITALS
WEIGHT: 196.6 LBS | HEART RATE: 73 BPM | TEMPERATURE: 99.4 F | DIASTOLIC BLOOD PRESSURE: 73 MMHG | HEIGHT: 68 IN | SYSTOLIC BLOOD PRESSURE: 130 MMHG | BODY MASS INDEX: 29.8 KG/M2

## 2019-05-15 DIAGNOSIS — K21.9 GASTROESOPHAGEAL REFLUX DISEASE, ESOPHAGITIS PRESENCE NOT SPECIFIED: ICD-10-CM

## 2019-05-15 DIAGNOSIS — K21.9 CHRONIC GERD: ICD-10-CM

## 2019-05-15 DIAGNOSIS — K22.0 ACHALASIA: ICD-10-CM

## 2019-05-15 DIAGNOSIS — K58.1 IRRITABLE BOWEL SYNDROME WITH CONSTIPATION: ICD-10-CM

## 2019-05-15 DIAGNOSIS — R13.10 DYSPHAGIA, UNSPECIFIED TYPE: Primary | ICD-10-CM

## 2019-05-15 PROCEDURE — 99214 OFFICE O/P EST MOD 30 MIN: CPT | Performed by: PHYSICIAN ASSISTANT

## 2019-05-15 RX ORDER — IBUPROFEN 800 MG/1
TABLET ORAL
COMMUNITY
Start: 2019-05-12 | End: 2020-01-20

## 2019-05-15 RX ORDER — MONTELUKAST SODIUM 10 MG/1
10 TABLET ORAL DAILY
Refills: 6 | COMMUNITY
Start: 2019-05-08

## 2019-05-15 RX ORDER — AZITHROMYCIN 250 MG/1
TABLET, FILM COATED ORAL
Refills: 0 | COMMUNITY
Start: 2019-05-08 | End: 2019-06-03 | Stop reason: ALTCHOICE

## 2019-05-15 RX ORDER — PANTOPRAZOLE SODIUM 40 MG/1
40 TABLET, DELAYED RELEASE ORAL 2 TIMES DAILY
Qty: 90 TABLET | Refills: 2 | Status: SHIPPED | OUTPATIENT
Start: 2019-05-15 | End: 2019-09-19 | Stop reason: SDUPTHER

## 2019-05-15 RX ORDER — BROMPHENIRAMINE MALEATE, PSEUDOEPHEDRINE HYDROCHLORIDE, AND DEXTROMETHORPHAN HYDROBROMIDE 2; 30; 10 MG/5ML; MG/5ML; MG/5ML
SYRUP ORAL
Refills: 2 | COMMUNITY
Start: 2019-05-08 | End: 2019-06-03 | Stop reason: ALTCHOICE

## 2019-05-30 DIAGNOSIS — R21 SKIN RASH: ICD-10-CM

## 2019-05-30 RX ORDER — CLOTRIMAZOLE AND BETAMETHASONE DIPROPIONATE 10; .64 MG/G; MG/G
CREAM TOPICAL 2 TIMES DAILY PRN
Qty: 30 G | Refills: 0 | Status: SHIPPED | OUTPATIENT
Start: 2019-05-30 | End: 2019-06-06 | Stop reason: ALTCHOICE

## 2019-06-03 ENCOUNTER — OFFICE VISIT (OUTPATIENT)
Dept: BARIATRICS | Facility: CLINIC | Age: 54
End: 2019-06-03
Payer: COMMERCIAL

## 2019-06-03 VITALS
RESPIRATION RATE: 16 BRPM | BODY MASS INDEX: 30.37 KG/M2 | TEMPERATURE: 97.8 F | HEIGHT: 68 IN | WEIGHT: 200.4 LBS | SYSTOLIC BLOOD PRESSURE: 120 MMHG | DIASTOLIC BLOOD PRESSURE: 70 MMHG | HEART RATE: 76 BPM

## 2019-06-03 DIAGNOSIS — R63.5 ABNORMAL WEIGHT GAIN: Primary | ICD-10-CM

## 2019-06-03 DIAGNOSIS — Z87.898 HISTORY OF PREDIABETES: ICD-10-CM

## 2019-06-03 DIAGNOSIS — R73.03 PREDIABETES: ICD-10-CM

## 2019-06-03 DIAGNOSIS — E66.9 CLASS 1 OBESITY: ICD-10-CM

## 2019-06-03 PROBLEM — E66.811 CLASS 1 OBESITY: Status: ACTIVE | Noted: 2017-02-13

## 2019-06-03 PROCEDURE — 99214 OFFICE O/P EST MOD 30 MIN: CPT | Performed by: PHYSICIAN ASSISTANT

## 2019-06-04 ENCOUNTER — LAB (OUTPATIENT)
Dept: LAB | Facility: HOSPITAL | Age: 54
End: 2019-06-04
Attending: INTERNAL MEDICINE
Payer: COMMERCIAL

## 2019-06-04 DIAGNOSIS — Z87.898 HISTORY OF PREDIABETES: ICD-10-CM

## 2019-06-04 DIAGNOSIS — E03.9 HYPOTHYROIDISM, UNSPECIFIED TYPE: ICD-10-CM

## 2019-06-04 LAB
INSULIN SERPL-ACNC: 4.2 MU/L (ref 3–25)
T4 FREE SERPL-MCNC: 1.29 NG/DL (ref 0.76–1.46)
TSH SERPL DL<=0.05 MIU/L-ACNC: 0.47 UIU/ML (ref 0.36–3.74)

## 2019-06-04 PROCEDURE — 84443 ASSAY THYROID STIM HORMONE: CPT

## 2019-06-04 PROCEDURE — 84439 ASSAY OF FREE THYROXINE: CPT

## 2019-06-04 PROCEDURE — 36415 COLL VENOUS BLD VENIPUNCTURE: CPT

## 2019-06-04 PROCEDURE — 83525 ASSAY OF INSULIN: CPT

## 2019-06-05 ENCOUNTER — TELEPHONE (OUTPATIENT)
Dept: SURGERY | Facility: HOSPITAL | Age: 54
End: 2019-06-05

## 2019-06-05 RX ORDER — SODIUM CHLORIDE 9 MG/ML
50 INJECTION, SOLUTION INTRAVENOUS CONTINUOUS
Status: CANCELLED | OUTPATIENT
Start: 2019-06-05

## 2019-06-06 ENCOUNTER — HOSPITAL ENCOUNTER (OUTPATIENT)
Dept: INTERVENTIONAL RADIOLOGY/VASCULAR | Facility: HOSPITAL | Age: 54
Discharge: HOME | End: 2019-06-06
Admitting: PHYSICAL MEDICINE & REHABILITATION
Payer: COMMERCIAL

## 2019-06-06 ENCOUNTER — TELEPHONE (OUTPATIENT)
Dept: ENDOCRINOLOGY | Facility: CLINIC | Age: 54
End: 2019-06-06

## 2019-06-06 VITALS
RESPIRATION RATE: 18 BRPM | SYSTOLIC BLOOD PRESSURE: 118 MMHG | WEIGHT: 194 LBS | HEART RATE: 73 BPM | DIASTOLIC BLOOD PRESSURE: 72 MMHG | OXYGEN SATURATION: 97 % | TEMPERATURE: 97.5 F | HEIGHT: 67 IN | BODY MASS INDEX: 30.45 KG/M2

## 2019-06-06 DIAGNOSIS — M51.16 NEURITIS OR RADICULITIS DUE TO RUPTURE OF LUMBAR INTERVERTEBRAL DISC: ICD-10-CM

## 2019-06-06 DIAGNOSIS — Z87.898 HISTORY OF PREDIABETES: ICD-10-CM

## 2019-06-06 DIAGNOSIS — E66.9 CLASS 1 OBESITY: ICD-10-CM

## 2019-06-06 DIAGNOSIS — R73.03 PREDIABETES: Primary | ICD-10-CM

## 2019-06-06 DIAGNOSIS — M99.53 INTERVERTEBRAL DISC STENOSIS OF NEURAL CANAL OF LUMBAR REGION: ICD-10-CM

## 2019-06-06 PROCEDURE — 99152 MOD SED SAME PHYS/QHP 5/>YRS: CPT

## 2019-06-06 PROCEDURE — 99153 MOD SED SAME PHYS/QHP EA: CPT

## 2019-06-06 RX ORDER — MIDAZOLAM HYDROCHLORIDE 1 MG/ML
INJECTION INTRAMUSCULAR; INTRAVENOUS CODE/TRAUMA/SEDATION MEDICATION
Status: COMPLETED | OUTPATIENT
Start: 2019-06-06 | End: 2019-06-06

## 2019-06-06 RX ORDER — LIDOCAINE HYDROCHLORIDE 10 MG/ML
INJECTION, SOLUTION EPIDURAL; INFILTRATION; INTRACAUDAL; PERINEURAL CODE/TRAUMA/SEDATION MEDICATION
Status: COMPLETED | OUTPATIENT
Start: 2019-06-06 | End: 2019-06-06

## 2019-06-06 RX ORDER — KETOROLAC TROMETHAMINE 30 MG/ML
INJECTION, SOLUTION INTRAMUSCULAR; INTRAVENOUS CODE/TRAUMA/SEDATION MEDICATION
Status: COMPLETED | OUTPATIENT
Start: 2019-06-06 | End: 2019-06-06

## 2019-06-06 RX ORDER — SODIUM CHLORIDE 9 MG/ML
50 INJECTION, SOLUTION INTRAVENOUS CONTINUOUS
Status: DISCONTINUED | OUTPATIENT
Start: 2019-06-06 | End: 2019-06-10 | Stop reason: HOSPADM

## 2019-06-06 RX ORDER — METFORMIN HYDROCHLORIDE 500 MG/1
TABLET, EXTENDED RELEASE ORAL
Qty: 60 TABLET | Refills: 1 | Status: SHIPPED | OUTPATIENT
Start: 2019-06-06 | End: 2019-07-29 | Stop reason: SDUPTHER

## 2019-06-06 RX ORDER — PAPAVERINE HCL 150 MG
CAPSULE, EXTENDED RELEASE ORAL CODE/TRAUMA/SEDATION MEDICATION
Status: COMPLETED | OUTPATIENT
Start: 2019-06-06 | End: 2019-06-06

## 2019-06-06 RX ORDER — FENTANYL CITRATE 50 UG/ML
INJECTION, SOLUTION INTRAMUSCULAR; INTRAVENOUS CODE/TRAUMA/SEDATION MEDICATION
Status: COMPLETED | OUTPATIENT
Start: 2019-06-06 | End: 2019-06-06

## 2019-06-06 RX ADMIN — LIDOCAINE HYDROCHLORIDE 5 ML: 10 INJECTION, SOLUTION EPIDURAL; INFILTRATION; INTRACAUDAL; PERINEURAL at 11:23

## 2019-06-06 RX ADMIN — FENTANYL CITRATE 25 MCG: 50 INJECTION INTRAMUSCULAR; INTRAVENOUS at 11:24

## 2019-06-06 RX ADMIN — KETOROLAC TROMETHAMINE 30 MG: 30 INJECTION, SOLUTION INTRAMUSCULAR; INTRAVENOUS at 11:17

## 2019-06-06 RX ADMIN — MIDAZOLAM HYDROCHLORIDE 2 MG: 1 INJECTION, SOLUTION INTRAMUSCULAR; INTRAVENOUS at 11:14

## 2019-06-06 RX ADMIN — FENTANYL CITRATE 25 MCG: 50 INJECTION INTRAMUSCULAR; INTRAVENOUS at 11:18

## 2019-06-06 RX ADMIN — FENTANYL CITRATE 50 MCG: 50 INJECTION INTRAMUSCULAR; INTRAVENOUS at 11:14

## 2019-06-06 RX ADMIN — MIDAZOLAM HYDROCHLORIDE 1 MG: 1 INJECTION, SOLUTION INTRAMUSCULAR; INTRAVENOUS at 11:18

## 2019-06-06 RX ADMIN — SODIUM CHLORIDE 50 ML/HR: 0.9 INJECTION, SOLUTION INTRAVENOUS at 08:28

## 2019-06-06 RX ADMIN — MIDAZOLAM HYDROCHLORIDE 1 MG: 1 INJECTION, SOLUTION INTRAMUSCULAR; INTRAVENOUS at 11:24

## 2019-06-06 RX ADMIN — DEXAMETHASONE SODIUM PHOSPHATE 10 MG: 10 INJECTION, SOLUTION INTRAMUSCULAR; INTRAVENOUS at 11:21

## 2019-06-06 RX ADMIN — IOHEXOL 3 ML: 300 INJECTION, SOLUTION INTRAVENOUS at 11:17

## 2019-06-11 ENCOUNTER — TELEPHONE (OUTPATIENT)
Dept: GASTROENTEROLOGY | Facility: CLINIC | Age: 54
End: 2019-06-11

## 2019-07-03 ENCOUNTER — TELEPHONE (OUTPATIENT)
Dept: INTERNAL MEDICINE CLINIC | Facility: CLINIC | Age: 54
End: 2019-07-03

## 2019-07-03 ENCOUNTER — OFFICE VISIT (OUTPATIENT)
Dept: INTERNAL MEDICINE CLINIC | Facility: CLINIC | Age: 54
End: 2019-07-03

## 2019-07-03 VITALS
BODY MASS INDEX: 30.41 KG/M2 | WEIGHT: 200.62 LBS | HEIGHT: 68 IN | DIASTOLIC BLOOD PRESSURE: 80 MMHG | HEART RATE: 80 BPM | TEMPERATURE: 97.4 F | SYSTOLIC BLOOD PRESSURE: 126 MMHG

## 2019-07-03 DIAGNOSIS — N63.0 BREAST MASS: Primary | ICD-10-CM

## 2019-07-03 DIAGNOSIS — J45.909 ASTHMA, UNSPECIFIED ASTHMA SEVERITY, UNSPECIFIED WHETHER COMPLICATED, UNSPECIFIED WHETHER PERSISTENT: ICD-10-CM

## 2019-07-03 DIAGNOSIS — J45.41 MODERATE PERSISTENT ASTHMA WITH ACUTE EXACERBATION: ICD-10-CM

## 2019-07-03 DIAGNOSIS — J45.909 ASTHMA: ICD-10-CM

## 2019-07-03 PROCEDURE — 99213 OFFICE O/P EST LOW 20 MIN: CPT | Performed by: INTERNAL MEDICINE

## 2019-07-03 RX ORDER — TRIAMCINOLONE ACETONIDE 1 MG/G
CREAM TOPICAL
COMMUNITY
Start: 2019-06-24 | End: 2020-01-20

## 2019-07-03 RX ORDER — SUMATRIPTAN 100 MG/1
100 TABLET, FILM COATED ORAL
COMMUNITY
Start: 2019-06-05 | End: 2020-01-20 | Stop reason: SDUPTHER

## 2019-07-03 RX ORDER — BETAMETHASONE DIPROPIONATE 0.5 MG/G
CREAM TOPICAL DAILY
Qty: 30 G | Refills: 0 | Status: SHIPPED | OUTPATIENT
Start: 2019-07-03 | End: 2020-01-20 | Stop reason: SDUPTHER

## 2019-07-03 RX ORDER — ALBUTEROL SULFATE 90 UG/1
2 AEROSOL, METERED RESPIRATORY (INHALATION) EVERY 4 HOURS PRN
Qty: 1 INHALER | Refills: 0 | Status: SHIPPED | OUTPATIENT
Start: 2019-07-03 | End: 2020-01-20 | Stop reason: SDUPTHER

## 2019-07-03 RX ORDER — NAPROXEN 500 MG/1
500 TABLET ORAL EVERY 12 HOURS
Refills: 2 | COMMUNITY
Start: 2019-05-31 | End: 2020-01-20

## 2019-07-03 RX ORDER — FLUTICASONE PROPIONATE 50 MCG
1-2 SPRAY, SUSPENSION (ML) NASAL DAILY
Qty: 16 G | Refills: 0 | Status: SHIPPED | OUTPATIENT
Start: 2019-07-03 | End: 2020-01-20 | Stop reason: SDUPTHER

## 2019-07-03 RX ORDER — ALBUTEROL SULFATE 2.5 MG/3ML
2.5 SOLUTION RESPIRATORY (INHALATION) EVERY 6 HOURS PRN
Qty: 75 ML | Refills: 0 | Status: SHIPPED | OUTPATIENT
Start: 2019-07-03 | End: 2020-01-20 | Stop reason: SDUPTHER

## 2019-07-03 NOTE — TELEPHONE ENCOUNTER
Patient states arm are tingling and nubmness is new, but chest pain has come and gone for months  Patient denies SOB, palpitations, nausea, or headache  She will attend today's appointment  Patient is also asking for referral to Pain Specialist and Allergist   New referrals needed  Please place orders

## 2019-07-03 NOTE — TELEPHONE ENCOUNTER
Patient called office to report B/l Breast pain, lump in right breast, numbness and tingling down her right arm, chest pains  Patient states symptoms started months but getting worse   Telephone call transferred to [facility] to discuss further  Appointment was scheduled for 7/3/19 with Dr Jes Marino as well

## 2019-07-03 NOTE — TELEPHONE ENCOUNTER
Patient seen today in office by myself  Orders placed for breast US and general surgery consult  Allergy/Asthma meds refilled

## 2019-07-04 NOTE — PROGRESS NOTES
INTERNAL MEDICINE FOLLOW-UP OFFICE VISIT  [facility]  [address] [place] [address]    NAME: Jordan Hines  AGE: 48 y o  SEX: female    DATE OF ENCOUNTER: 7/3/2019    Assessment and Plan     Breast mass: Significant previous workup including 10/17 breast biopsy negative for atypia or malignancy, 2/18 mammogram birad 2 with stable appearing 7mm right breast cyst, 8/18 mammogram birad 1  Patient reports continued right breast pain without signs or symptoms of infection  Without nipple discharge  With history of mother with breast ca at age 62  Patient desires surgical management of breast mass  -US breast ordered  -Ambulatory referral to general surgery  Orders Placed This Encounter   Procedures    US BREAST BILATERAL LIMITED (DIAGNOSTIC)    Ambulatory referral to General Surgery       Chief Complaint     Chief Complaint   Patient presents with    Breast Mass     right breast lump       History of Present Illness     HPI     48year old female with three year history of right breast mass presents desiring surgical removal of the mass  Previous workup includes  10/17 breast biopsy negative for atypia or malignancy, 2/18 mammogram birad 2 with stable appearing 7mm right breast cyst, 8/18 mammogram birad 1  Patient reports continued right breast pain without signs or symptoms of infection  Without nipple discharge or retraction  With history of mother with breast ca at age 62  Patient reports that the mass increases in size and in pain level during menses  The following portions of the patient's history were reviewed and updated as appropriate: allergies, current medications, past family history, past medical history, past social history, past surgical history and problem list     Review of Systems     Review of Systems   Constitutional: Negative for activity change, appetite change, chills, diaphoresis, fatigue, fever and unexpected weight change  Respiratory: Negative for apnea, cough, choking, chest tightness, shortness of breath, wheezing and stridor  Cardiovascular: Negative for chest pain, palpitations and leg swelling  Gastrointestinal: Negative for abdominal distention, abdominal pain, anal bleeding, blood in stool, constipation, diarrhea, nausea, rectal pain and vomiting  Musculoskeletal: Negative for arthralgias, back pain, gait problem, joint swelling, myalgias, neck pain and neck stiffness  Skin: Negative for color change, pallor, rash and wound  Neurological: Negative for dizziness, tremors, seizures, syncope, facial asymmetry, speech difficulty, weakness, light-headedness, numbness and headaches         Active Problem List     Patient Active Problem List   Diagnosis    Mixed incontinence    Stress incontinence in female    Obstructive sleep apnea syndrome    Hypothyroidism    Irritable bowel syndrome with constipation    Chronic low back pain    Asthma    Arthritis    Gastritis    Rectocele    Cystocele    Hypermobility of urethra    Achalasia    Bilateral edema of lower extremity    Breast pain, right    Chronic sinusitis with recurrent bronchitis    Depression    Esophageal reflux    Headache, migraine    Intractable chronic migraine without aura and without status migrainosus    Breast mass    Class 1 obesity    Other chronic pain    Spondylosis of lumbar region without myelopathy or radiculopathy    Thyroiditis    Tubular adenoma of colon    Food allergy    POP-Q stage 2 cystocele    Moderate persistent asthma with exacerbation    Prediabetes    Need for shingles vaccine    Discomfort of right ear    Trochanteric bursitis of right hip    Pain in right hip    Numbness of toes    Acute viral bronchitis    Overweight    Hashimoto's thyroiditis    Lower abdominal pain    Left ovarian cyst    History of endometrial ablation    Left nephrolithiasis    Abdominal bloating    Achalasia of digestive tract    Esophageal dysphagia       Objective     /80 (BP Location: Left arm, Patient Position: Sitting, Cuff Size: Adult)   Pulse 80   Temp (!) 97 4 °F (36 3 °C) (Oral)   Ht 5' 8" (1 727 m)   Wt 91 kg (200 lb 9 9 oz)   BMI 30 50 kg/m²     Physical Exam   Constitutional: She is oriented to person, place, and time  She appears well-developed and well-nourished  No distress  HENT:   Head: Normocephalic and atraumatic  Right Ear: External ear normal    Left Ear: External ear normal    Nose: Nose normal    Mouth/Throat: Oropharynx is clear and moist  No oropharyngeal exudate  Cardiovascular: Normal rate, regular rhythm, normal heart sounds and intact distal pulses  Exam reveals no gallop and no friction rub  No murmur heard  Pulmonary/Chest: Effort normal and breath sounds normal  No stridor  No respiratory distress  She has no wheezes  She has no rales  She exhibits no tenderness  Abdominal: Soft  Bowel sounds are normal  She exhibits no distension and no mass  There is no tenderness  There is no rebound and no guarding  No hernia  Neurological: She is alert and oriented to person, place, and time  She displays normal reflexes  No cranial nerve deficit or sensory deficit  She exhibits normal muscle tone  Coordination normal    Skin: She is not diaphoretic  External inspection of the breast without abnormalities  No erythema or warmth over the breasts  No nipple discharge or retraction  Palpation reveals Lateral right sided breast mass at 9 o clock from examiners perspective  ~ 1 cm in length and ~ 5mm in width  Vitals reviewed        Pertinent Laboratory/Diagnostic Studies:  CBC:   Lab Results   Component Value Date/Time    WBC 7 91 10/06/2018 08:53 AM    WBC 15 61 (H) 01/07/2016 05:39 AM    RBC 4 34 10/06/2018 08:53 AM    RBC 4 00 01/07/2016 05:39 AM    HGB 12 4 10/06/2018 08:53 AM    HGB 11 0 (L) 01/07/2016 05:39 AM    HCT 39 4 10/06/2018 08:53 AM    HCT 34 7 (L) 01/07/2016 05:39 AM    MCV 91 10/06/2018 08:53 AM    MCV 87 01/07/2016 05:39 AM    MCH 28 6 10/06/2018 08:53 AM    MCH 27 5 01/07/2016 05:39 AM    MCHC 31 5 10/06/2018 08:53 AM    MCHC 31 7 01/07/2016 05:39 AM    RDW 13 1 10/06/2018 08:53 AM    RDW 14 1 01/07/2016 05:39 AM    MPV 9 1 10/06/2018 08:53 AM    MPV 9 3 01/07/2016 05:39 AM     (H) 10/06/2018 08:53 AM     01/07/2016 05:39 AM    NRBC 0 10/06/2018 08:53 AM    NEUTOPHILPCT 60 10/06/2018 08:53 AM    NEUTOPHILPCT 63 12/03/2015 10:43 AM    LYMPHOPCT 23 10/06/2018 08:53 AM    LYMPHOPCT 24 12/03/2015 10:43 AM    MONOPCT 14 (H) 10/06/2018 08:53 AM    MONOPCT 12 12/03/2015 10:43 AM    EOSPCT 2 10/06/2018 08:53 AM    EOSPCT 1 12/03/2015 10:43 AM    BASOPCT 1 10/06/2018 08:53 AM    BASOPCT 1 05/01/2015 12:43 PM    NEUTROABS 4 72 10/06/2018 08:53 AM    NEUTROABS 5 67 12/03/2015 10:43 AM    LYMPHSABS 1 83 10/06/2018 08:53 AM    LYMPHSABS 2 16 12/03/2015 10:43 AM    MONOSABS 1 14 10/06/2018 08:53 AM    MONOSABS 1 08 12/03/2015 10:43 AM    EOSABS 0 13 10/06/2018 08:53 AM    EOSABS 0 09 12/03/2015 10:43 AM     Chemistry Profile:   Lab Results   Component Value Date/Time     01/07/2016 05:39 AM    K 3 9 03/29/2019 10:08 AM    K 4 3 01/07/2016 05:39 AM     03/29/2019 10:08 AM     01/07/2016 05:39 AM    CO2 27 03/29/2019 10:08 AM    CO2 24 01/07/2016 05:39 AM    ANIONGAP 6 01/07/2016 05:39 AM    BUN 12 03/29/2019 10:08 AM    BUN 12 01/07/2016 05:39 AM    CREATININE 0 68 03/29/2019 10:08 AM    CREATININE 0 62 01/07/2016 05:39 AM    GLUC 86 07/21/2016 11:36 AM    GLUF 84 03/29/2019 10:08 AM    GLUCOSE 104 01/07/2016 05:39 AM    CALCIUM 8 7 03/29/2019 10:08 AM    CALCIUM 8 3 01/07/2016 05:39 AM    AST 15 03/29/2019 10:08 AM    AST 56 (H) 12/03/2015 10:43 AM    ALT 31 03/29/2019 10:08 AM     (H) 12/03/2015 10:43 AM    ALKPHOS 91 03/29/2019 10:08 AM    ALKPHOS 86 12/03/2015 10:43 AM    PROT 6 6 12/03/2015 10:43 AM    BILITOT 0 36 12/03/2015 10:43 AM EGFR 101 03/29/2019 10:08 AM     CBC:     Chemistry Profile:   Results from last 6 Months   Lab Units 03/29/19  1008   POTASSIUM mmol/L 3 9   CHLORIDE mmol/L 106   CO2 mmol/L 27   BUN mg/dL 12   CREATININE mg/dL 0 68   GLUCOSE FASTING mg/dL 84   CALCIUM mg/dL 8 7   AST U/L 15   ALT U/L 31   ALK PHOS U/L 91   EGFR ml/min/1 73sq m 101       Current Medications     Current Outpatient Medications:     SUMAtriptan (IMITREX) 100 mg tablet, Take 100 mg by mouth, Disp: , Rfl:     acetaminophen (TYLENOL) 325 mg tablet, Every 4 hours as needed, Disp: 30 tablet, Rfl: 0    albuterol (2 5 mg/3 mL) 0 083 % nebulizer solution, Take 1 vial (2 5 mg total) by nebulization every 6 (six) hours as needed for wheezing or shortness of breath, Disp: 75 mL, Rfl: 0    albuterol (VENTOLIN HFA) 90 mcg/act inhaler, Inhale 2 puffs every 4 (four) hours as needed for wheezing or shortness of breath, Disp: 1 Inhaler, Rfl: 0    ARIPiprazole (ABILIFY) 5 mg tablet, Take 5 mg by mouth daily at bedtime, Disp: , Rfl: 1    betamethasone dipropionate (DIPROSONE) 0 05 % cream, Apply topically daily, Disp: 30 g, Rfl: 0    bismuth subsalicylate (PEPTO BISMOL) 262 MG chewable tablet, Chew 2 tablets, Disp: , Rfl:     Botulinum Toxin Type A 200 units SOLR, Inject 155 units into face and neck IM every 90 days, Disp: , Rfl:     BREO ELLIPTA, 1 puff daily , Disp: , Rfl:     buPROPion (WELLBUTRIN XL) 150 mg 24 hr tablet, Take 150 mg by mouth every morning, Disp: , Rfl: 0    cetirizine (ZyrTEC) 10 mg tablet, Take 1 tablet (10 mg total) by mouth daily, Disp: 30 tablet, Rfl: 3    clonazePAM (KlonoPIN) 0 5 mg tablet, Take 0 5 mg by mouth daily  , Disp: , Rfl:     diclofenac sodium (VOLTAREN) 1 %, APPLY 4 GRAMS TO AFFECTED AREA(S) THREE TIMES A DAY, Disp: , Rfl: 3    dicyclomine (BENTYL) 20 mg tablet, TAKE 1 TABLET BY MOUTH EVERY 6 HOURS AS NEEDED, Disp: 120 tablet, Rfl: 2    doxepin (SINEquan) 25 mg capsule, TAKE 1-2 CAPSULES BY MOUTH DAILY AT BEDTIME , Disp: , Rfl: 0    FLUCELVAX QUADRIVALENT, , Disp: , Rfl:     fluticasone (FLONASE) 50 mcg/act nasal spray, 1-2 sprays into each nostril daily, Disp: 16 g, Rfl: 0    furosemide (LASIX) 20 mg tablet, Take 1 tablet (20 mg total) by mouth daily as needed (leg swelling), Disp: 30 tablet, Rfl: 2    hydrOXYzine pamoate (VISTARIL) 50 mg capsule, Take 50 mg by mouth 3 (three) times a day as needed  , Disp: , Rfl:     ibuprofen (MOTRIN) 600 mg tablet, Take 600 mg by mouth 3 (three) times a day as needed, Disp: , Rfl: 0    ibuprofen (MOTRIN) 800 mg tablet, , Disp: , Rfl:     ipratropium (ATROVENT) 0 02 % nebulizer solution, , Disp: , Rfl:     ipratropium (ATROVENT) 0 03 % nasal spray, 2 sprays into each nostril every 6 (six) hours as needed for rhinitis, Disp: 30 mL, Rfl: 1    levothyroxine 150 mcg tablet, Take 150 mcg by mouth 1 tab mon-sat and 1/2 on Sunday, Disp: , Rfl:     loratadine (CLARITIN) 10 mg tablet, Take 1 tablet (10 mg total) by mouth daily, Disp: 90 tablet, Rfl: 0    meloxicam (MOBIC) 15 mg tablet, Take 1 tablet (15 mg total) by mouth daily, Disp: 30 tablet, Rfl: 6    metFORMIN (GLUCOPHAGE-XR) 500 mg 24 hr tablet, Take 1 tablet daily in AM for 2 weeks and then increase to 2 tablets daily, Disp: 60 tablet, Rfl: 1    methocarbamol (ROBAXIN) 750 mg tablet, Take 750 mg by mouth 3 (three) times a day, Disp: , Rfl: 0    montelukast (SINGULAIR) 10 mg tablet, Take 10 mg by mouth daily, Disp: , Rfl: 6    naproxen (NAPROSYN) 500 mg tablet, Take 500 mg by mouth every 12 (twelve) hours, Disp: , Rfl: 2    Nerve Stimulator (TENS THERAPY PAIN RELIEF) VIKASH, 1 application by Does not apply route 2 (two) times a day as needed (twice daily for hip pain), Disp: 1 Device, Rfl: 0    oxyCODONE-acetaminophen (PERCOCET) 5-325 mg per tablet, Take 1 tablet by mouth 2 (two) times a day, Disp: , Rfl: 0    pantoprazole (PROTONIX) 40 mg tablet, Take 1 tablet (40 mg total) by mouth 2 (two) times a day, Disp: 90 tablet, Rfl: 2    polyethylene glycol (GLYCOLAX) powder, Take 17 g by mouth daily Mix 1 capful in 8 ounces of water, juice or tea , Disp: 578 g, Rfl: 3    prochlorperazine (COMPAZINE) 10 mg tablet, TAKE 1 TABLET TWICE A DAY AS NEEDED FOR HEADACHE OR NAUSEA, LIMIT 3 DAYS PER WEEK, Disp: , Rfl: 0    psyllium (METAMUCIL) 58 6 % powder, Take 1 packet by mouth 2 (two) times a day, Disp: 60 packet, Rfl: 3    sodium chloride (BRONCHO SALINE) inhaler solution, Take 1 spray by nebulization as needed for wheezing, Disp: 90 mL, Rfl: 0    sucralfate (CARAFATE) 1 g tablet, Take 1 tablet (1 g total) by mouth 3 (three) times a day, Disp: 90 tablet, Rfl: 3    topiramate (TOPAMAX) 25 mg tablet, TAKE 4 PILLS AT BEDTIME , Disp: , Rfl: 1    triamcinolone (KENALOG) 0 1 % cream, , Disp: , Rfl:     VIIBRYD 40 MG tablet, Take 40 mg by mouth daily With food, Disp: , Rfl: 0    zolpidem (AMBIEN) 10 mg tablet, TAKE 1 TABLETS AS NEEDED FOR SLEEP, Disp: , Rfl: 0    Health Maintenance     Health Maintenance   Topic Date Due    BMI: Followup Plan  03/23/1985    Pneumococcal Vaccine: Pediatrics (0 to 5 Years) and At-Risk Patients (6 to 59 Years) (2 of 3 - PCV13) 01/28/2017    INFLUENZA VACCINE  07/01/2019    MAMMOGRAM  08/02/2019    BMI: Adult  07/03/2020    CRC Screening: Colonoscopy  04/25/2022    DTaP,Tdap,and Td Vaccines (2 - Td) 05/22/2025    Pneumococcal Vaccine: 65+ Years (1 of 2 - PCV13) 03/23/2032    HEPATITIS B VACCINES  Aged Out     Immunization History   Administered Date(s) Administered    INFLUENZA 11/07/2006, 11/15/2014, 11/01/2017, 12/10/2018, 01/14/2019    Influenza Injectable, MDCK, Preservative Free, Quadrivalent, 0 5 mL 01/14/2019    Influenza Quadrivalent Preservative Free 3 years and older IM 11/06/2017    Influenza TIV (IM) 10/23/2015    Pneumococcal Polysaccharide PPV23 01/28/2016    Tdap 05/22/2015       Roberta Medina  7/3/2019 9:34 PM

## 2019-07-08 ENCOUNTER — HOSPITAL ENCOUNTER (OUTPATIENT)
Dept: ULTRASOUND IMAGING | Facility: CLINIC | Age: 54
Discharge: HOME | End: 2019-07-08
Payer: COMMERCIAL

## 2019-07-08 ENCOUNTER — HOSPITAL ENCOUNTER (OUTPATIENT)
Dept: MAMMOGRAPHY | Facility: CLINIC | Age: 54
Discharge: HOME | End: 2019-07-08
Payer: COMMERCIAL

## 2019-07-08 VITALS — WEIGHT: 200 LBS | BODY MASS INDEX: 30.31 KG/M2 | HEIGHT: 68 IN

## 2019-07-08 DIAGNOSIS — N63.0 BREAST MASS: ICD-10-CM

## 2019-07-08 PROCEDURE — 76642 ULTRASOUND BREAST LIMITED: CPT

## 2019-07-08 PROCEDURE — 77066 DX MAMMO INCL CAD BI: CPT

## 2019-07-08 PROCEDURE — G0279 TOMOSYNTHESIS, MAMMO: HCPCS

## 2019-07-17 ENCOUNTER — TRANSCRIBE ORDERS (OUTPATIENT)
Dept: ADMINISTRATIVE | Facility: HOSPITAL | Age: 54
End: 2019-07-17

## 2019-07-17 DIAGNOSIS — Z12.39 BREAST SCREENING, UNSPECIFIED: Primary | ICD-10-CM

## 2019-07-24 ENCOUNTER — HOSPITAL ENCOUNTER (OUTPATIENT)
Dept: GASTROENTEROLOGY | Facility: HOSPITAL | Age: 54
Discharge: HOME | End: 2019-07-24
Attending: INTERNAL MEDICINE

## 2019-07-24 VITALS
DIASTOLIC BLOOD PRESSURE: 73 MMHG | HEART RATE: 75 BPM | TEMPERATURE: 98.4 F | SYSTOLIC BLOOD PRESSURE: 119 MMHG | OXYGEN SATURATION: 98 % | RESPIRATION RATE: 18 BRPM

## 2019-07-24 NOTE — PERIOPERATIVE NURSING NOTE
Patient brought in the room and educated on procedure  Lidocaine 2% topical solution inserted via nostrils  Manometry catheter inserted via left nostril and positioned and secured  10 liquid swallow, 10 viscous swallow and 1 rapid swallow performed  Patient tolerated procedure  Catheter removed intact  Discharge instructions given to patient and patient left the room in stable condition

## 2019-07-29 ENCOUNTER — OFFICE VISIT (OUTPATIENT)
Dept: BARIATRICS | Facility: CLINIC | Age: 54
End: 2019-07-29
Payer: COMMERCIAL

## 2019-07-29 VITALS
RESPIRATION RATE: 18 BRPM | SYSTOLIC BLOOD PRESSURE: 118 MMHG | HEART RATE: 88 BPM | HEIGHT: 68 IN | TEMPERATURE: 98 F | BODY MASS INDEX: 29.48 KG/M2 | DIASTOLIC BLOOD PRESSURE: 72 MMHG | WEIGHT: 194.5 LBS

## 2019-07-29 DIAGNOSIS — R73.03 PREDIABETES: ICD-10-CM

## 2019-07-29 DIAGNOSIS — R63.5 ABNORMAL WEIGHT GAIN: Primary | ICD-10-CM

## 2019-07-29 DIAGNOSIS — Z87.898 HISTORY OF PREDIABETES: ICD-10-CM

## 2019-07-29 DIAGNOSIS — E66.9 CLASS 1 OBESITY: ICD-10-CM

## 2019-07-29 PROCEDURE — 99214 OFFICE O/P EST MOD 30 MIN: CPT | Performed by: PHYSICIAN ASSISTANT

## 2019-07-29 RX ORDER — METFORMIN HYDROCHLORIDE 500 MG/1
TABLET, EXTENDED RELEASE ORAL
Qty: 60 TABLET | Refills: 1 | Status: SHIPPED | OUTPATIENT
Start: 2019-07-29 | End: 2019-07-29 | Stop reason: CLARIF

## 2019-07-29 RX ORDER — METFORMIN HYDROCHLORIDE 500 MG/1
TABLET, EXTENDED RELEASE ORAL
Qty: 60 TABLET | Refills: 1 | Status: SHIPPED | OUTPATIENT
Start: 2019-07-29 | End: 2020-01-20 | Stop reason: SDUPTHER

## 2019-07-29 NOTE — PROGRESS NOTES
Assessment/Plan:    Class 1 obesity  -Patient is pursuing Conservative Program  -Initial weight loss goal of 5-10% weight loss for improved health  -HgbA1c and fasting insulin within normal limits  Still may see some benefit from metformin due to insulin resistance associated with Abilify  Started at last appointment and tolerating well  -inconsistent with dietary changes, interval eating  She is not food logging or measuring portions   -discussed benefit of food logging to help keep her in recommended calorie range for weight loss  -reviewed appropriate portion sizes and benefit of measuring to allow for better accuracy  -would like her to increase fruits and vegetables  -she has increased exercise in the form of walking    Initial: 193 6 lbs (200 4 kbs at highest)  Current: 194 5 lbs lbs   Change: +0 9 lbs from initial, -5 9 lbs from last visit    Prediabetes  -most recent HgbA1c and fasting insulin WNL  -informed of increased chance of insulin resistance due to medication side effects related to Abilify  -dietary and lifestyle changes  -Started on Metformin and tolerating well    Goals:    Food log (ie ) www myfitnesspal com,sparkpeople  com,loseit com,calorieking  com,etc    No sugary beverages  At least 64oz of water daily  Increase physical activity by 10 minutes daily  Gradually increase physical activity to a goal of 5 days per week for 30 minutes of MODERATE intensity PLUS 2 days per week of FULL BODY resistance training  8564-4386 calories per day  5-10 servings of fruits and vegetables per day  Uncle Ranjeet's whole grain rice  Serving size: 3 oz for lean protein, 1 cup of nonstarchy veggies, 1/2 cup starch   Serving of nuts: 1/4 cup   1 oz cheese = serving size    Follow up in approximately 2 months with Non-Surgical Physician/Advanced Practitioner  25 minute visit, >50% face-to-face time spent counseling patient on diet behavior and exercise modification for weight loss       Diagnoses and all orders for this visit:    Abnormal weight gain  Comments:  see plan under class 1 obesity  Orders:  -     metFORMIN (GLUCOPHAGE-XR) 500 mg 24 hr tablet; Take 2 tablets daily in AM    Class 1 obesity  -     Discontinue: metFORMIN (GLUCOPHAGE-XR) 500 mg 24 hr tablet; Take 1 tablet daily in AM for 2 weeks and then increase to 2 tablets daily    Prediabetes  -     metFORMIN (GLUCOPHAGE-XR) 500 mg 24 hr tablet; Take 2 tablets daily in AM    History of prediabetes  -     Discontinue: metFORMIN (GLUCOPHAGE-XR) 500 mg 24 hr tablet; Take 1 tablet daily in AM for 2 weeks and then increase to 2 tablets daily          Subjective:   Chief Complaint   Patient presents with    Follow-up     8wk fu        Patient ID: Josette Headley  is a 48 y o  female with excess weight/obesity here to pursue weight managment  Patient is pursuing Conservative Program      HPI Presents for MWM follow up  Continue to skip meals and eats a large meal/snack in the evening  Has incorporated regular walking daily  Tolerating metformin without side effects  Food logging: denies  Increased appetite/cravings: + hunger in evening after dinner  Fruit/Vegetable servings: 2 servings of fruits, 1-2 veggies per day   Exercise: walking daily x 2 hours   Hydration:  Doing well with hydration - exclusively water    The following portions of the patient's history were reviewed and updated as appropriate: allergies, current medications, past family history, past medical history, past social history, past surgical history and problem list     Review of Systems   Gastrointestinal: Negative for abdominal pain and diarrhea     Psychiatric/Behavioral:        Reports mood stable       Objective:    /72 (BP Location: Left arm, Patient Position: Sitting, Cuff Size: Large)   Pulse 88   Temp 98 °F (36 7 °C) (Tympanic)   Resp 18   Ht 5' 8" (1 727 m)   Wt 88 2 kg (194 lb 8 oz)   LMP 06/02/2019 (Exact Date)   BMI 29 57 kg/m²      Physical Exam   Constitutional: She is oriented to person, place, and time  She appears well-developed  HENT:   Head: Normocephalic  Pulmonary/Chest: Effort normal    Musculoskeletal: Normal range of motion  Neurological: She is alert and oriented to person, place, and time  Psychiatric: She has a normal mood and affect  Her behavior is normal  Thought content normal    Nursing note and vitals reviewed

## 2019-07-29 NOTE — ASSESSMENT & PLAN NOTE
-Patient is pursuing Conservative Program  -Initial weight loss goal of 5-10% weight loss for improved health  -HgbA1c and fasting insulin within normal limits  Still may see some benefit from metformin due to insulin resistance associated with Abilify  Started at last appointment and tolerating well  -inconsistent with dietary changes, interval eating  She is not food logging or measuring portions   -discussed benefit of food logging to help keep her in recommended calorie range for weight loss    -reviewed appropriate portion sizes and benefit of measuring to allow for better accuracy  -would like her to increase fruits and vegetables  -she has increased exercise in the form of walking    Initial: 193 6 lbs (200 4 kbs at highest)  Current: 194 5 lbs lbs   Change: +0 9 lbs from initial, -5 9 lbs from last visit

## 2019-07-29 NOTE — PATIENT INSTRUCTIONS
Goals: Food log (ie ) www myfitnesspal com,sparkpeople  com,better.it com,calorieking  com,etc    No sugary beverages  At least 64oz of water daily  Increase physical activity by 10 minutes daily   Gradually increase physical activity to a goal of 5 days per week for 30 minutes of MODERATE intensity PLUS 2 days per week of FULL BODY resistance training  [id number] calories per day  5-10 servings of fruits and vegetables per day  Uncle Ranjeet's whole grain rice  Serving size: 3 oz for lean protein, 1 cup of nonstarchy veggies, 1/2 cup starch   Serving of nuts: 1/4 cup   1 oz cheese = serving size

## 2019-08-19 ENCOUNTER — TRANSCRIBE ORDERS (OUTPATIENT)
Dept: RADIOLOGY | Facility: HOSPITAL | Age: 54
End: 2019-08-19

## 2019-08-19 ENCOUNTER — HOSPITAL ENCOUNTER (OUTPATIENT)
Dept: RADIOLOGY | Facility: HOSPITAL | Age: 54
Discharge: HOME | End: 2019-08-19
Payer: COMMERCIAL

## 2019-08-19 DIAGNOSIS — M25.551 PAIN OF RIGHT HIP JOINT: ICD-10-CM

## 2019-08-19 DIAGNOSIS — M25.551 PAIN OF RIGHT HIP JOINT: Primary | ICD-10-CM

## 2019-08-19 DIAGNOSIS — M25.571 RIGHT ANKLE PAIN, UNSPECIFIED CHRONICITY: ICD-10-CM

## 2019-08-19 PROCEDURE — 73502 X-RAY EXAM HIP UNI 2-3 VIEWS: CPT

## 2019-08-19 PROCEDURE — 73610 X-RAY EXAM OF ANKLE: CPT

## 2019-08-26 DIAGNOSIS — K21.9 CHRONIC GERD: ICD-10-CM

## 2019-08-26 RX ORDER — SUCRALFATE 1 G/1
1 TABLET ORAL 3 TIMES DAILY
Qty: 90 TABLET | Refills: 3 | Status: SHIPPED | OUTPATIENT
Start: 2019-08-26 | End: 2019-09-19 | Stop reason: HOSPADM

## 2019-09-16 ENCOUNTER — TELEPHONE (OUTPATIENT)
Dept: GASTROENTEROLOGY | Facility: AMBULARY SURGERY CENTER | Age: 54
End: 2019-09-16

## 2019-09-18 ENCOUNTER — DOCUMENTATION (OUTPATIENT)
Dept: GASTROENTEROLOGY | Facility: MEDICAL CENTER | Age: 54
End: 2019-09-18

## 2019-09-18 NOTE — PROGRESS NOTES
Indication- dysphagia    Esophageal manometry-    Esophageal motility- 10/10 study showed esophageal contractibility consistent with ineffective esophageal motility  Mean DCI was 5 mmHg  s cm  LES- median IRP is 9 mmHg  Impedance- poor clearance of esophageal liquid swallow    Lemitar classification- ineffective esophageal motility with diminutive hiatal hernia

## 2019-09-18 NOTE — TELEPHONE ENCOUNTER
I sent message to our motility nurse as this was not part of the reading workque    I placed reading under documentation

## 2019-09-19 ENCOUNTER — OFFICE VISIT (OUTPATIENT)
Dept: GASTROENTEROLOGY | Facility: CLINIC | Age: 54
End: 2019-09-19
Payer: COMMERCIAL

## 2019-09-19 VITALS
BODY MASS INDEX: 29.52 KG/M2 | TEMPERATURE: 99 F | HEART RATE: 86 BPM | DIASTOLIC BLOOD PRESSURE: 85 MMHG | WEIGHT: 194.8 LBS | SYSTOLIC BLOOD PRESSURE: 133 MMHG | HEIGHT: 68 IN

## 2019-09-19 DIAGNOSIS — D12.6 TUBULAR ADENOMA OF COLON: ICD-10-CM

## 2019-09-19 DIAGNOSIS — K21.9 CHRONIC GERD: ICD-10-CM

## 2019-09-19 DIAGNOSIS — K22.0 ACHALASIA: ICD-10-CM

## 2019-09-19 DIAGNOSIS — K58.1 IRRITABLE BOWEL SYNDROME WITH CONSTIPATION: ICD-10-CM

## 2019-09-19 DIAGNOSIS — K22.4 INEFFECTIVE ESOPHAGEAL MOTILITY: Primary | ICD-10-CM

## 2019-09-19 PROCEDURE — 99214 OFFICE O/P EST MOD 30 MIN: CPT | Performed by: PHYSICIAN ASSISTANT

## 2019-09-19 RX ORDER — DICYCLOMINE HYDROCHLORIDE 10 MG/1
10 CAPSULE ORAL EVERY 6 HOURS PRN
Qty: 30 CAPSULE | Refills: 2 | Status: SHIPPED | OUTPATIENT
Start: 2019-09-19 | End: 2019-10-22

## 2019-09-19 RX ORDER — PANTOPRAZOLE SODIUM 40 MG/1
40 TABLET, DELAYED RELEASE ORAL 2 TIMES DAILY
Qty: 180 TABLET | Refills: 2 | Status: SHIPPED | OUTPATIENT
Start: 2019-09-19 | End: 2020-09-09

## 2019-09-19 RX ORDER — TRIAMCINOLONE ACETONIDE 55 UG/1
2 SPRAY, METERED NASAL DAILY
COMMUNITY
Start: 2019-08-27 | End: 2020-01-20

## 2019-09-19 RX ORDER — POLYETHYLENE GLYCOL 3350 17 G/17G
17 POWDER, FOR SOLUTION ORAL DAILY
Qty: 578 G | Refills: 3 | Status: SHIPPED | OUTPATIENT
Start: 2019-09-19 | End: 2020-01-20 | Stop reason: SDUPTHER

## 2019-09-19 RX ORDER — LEVOCETIRIZINE DIHYDROCHLORIDE 5 MG/1
5 TABLET, FILM COATED ORAL
COMMUNITY
Start: 2019-08-27 | End: 2020-01-20

## 2019-09-19 NOTE — PROGRESS NOTES
[facility] - Outpatient Follow-up Note  Ange Lopez 48 y o  female MRN: [id number]  Encounter: [id number]          ASSESSMENT AND PLAN:      1  Ineffective esophageal motility  2  Achalasia  3  Chronic GERD  She was initially diagnosed with achalasia and underwent Heller myotomy and fundoplication in 3223  Her symptoms significantly improved following surgery, however, she now reports dysphagia for dry solid food for the past year  EGD from April 2019 revealed mild esophagitis, widely patent GE junction, and evidence of prior fundoplication  Her esophagus was empirically dilated to 60 Fr which did temporarily relieve her dysphagia  Recent esophageal manometry consistent with ineffective esophageal motility (IEM)  We discussed that IEM has an association with acid reflux and behavioral modifications are essential and include:     -Continue pantoprazole 40 mg twice daily   -We discussed the importance of eating small, frequent meals    -Sit upright while eating    -Wait 3 hours after eating before laying down     -Swallow pills with at least 8 oz of water and remain upright for 1 hour after taking them  -Exercise before meals, not afterward    -Discussed the importance of weight loss     -Swallow once per bite  Wait 15 seconds after every swallow before initiating a second swallow, as swallowing right away can diminish esophageal motility   -We can consider repeat EGD with dilation or Botox injection to the GE junction if symptoms persist    4  Irritable bowel syndrome with constipation  Well controlled with MiraLAX 17 g daily  - polyethylene glycol (GLYCOLAX) powder; Take 17 g by mouth daily Mix 1 capful in 8 ounces of water, juice or tea  Dispense: 578 g; Refill: 3    5  Tubular adenoma of colon  She had 3 adenomas removed during recent colonoscopy from April 2019  Recommend repeat colonoscopy in 3 years      Follow-up in 3 months with   Ana  ______________________________________________________________________    SUBJECTIVE:  17-year-old female with hypothyroidism, obstructive sleep apnea, migraines, GERD, and IBS presenting for follow-up  She underwent Heller myotomy and fundoplication for achalasia in 2015  Her dysphagia significantly improved following the surgery and she gained weight  However she reports dysphagia for solid, dry foods for the past year  She has difficulty swallowing rice, bread, and meat  She feels the food gets stuck around her supraclavicular notch  She needs to drink lots of water to get the food down  Sometimes she regurgitates "chunks" of saliva  She denies odynophagia and chest pain with swallowing  Her heartburn is generally well controlled with Protonix 40 mg twice daily  She gets occasional heartburn after eating greasy and spicy foods  She takes MiraLax daily for constipation  She has 1 bowel movement daily which ranges between 2 and 4 on the [facility] stool chart  She denies any blood in the stool  Her weight is stable overall  Esophageal manometry showed ineffective esophageal motility  She underwent recent EGD in April 2019 which revealed mild esophagitis in the distal esophagus, widely patent GE junction, redundant fold in the fundus from prior fundoplication  Empiric dilation was performed using a Savary dilator  She was dilated up to 60 Fr  She does admit that her dysphagia improved after the dilation  However now her symptoms are back  REVIEW OF SYSTEMS IS OTHERWISE NEGATIVE        Historical Information   Past Medical History:   Diagnosis Date    Achalasia     Asthma     Constipation     Depression     Diarrhea 2/28/2019    Elevated LFTs     last assessed 10/25/17    Fibromyalgia     Gastric ulcer     GERD (gastroesophageal reflux disease)     H  pylori infection 10/9/2017    Annotation - [id number]: Treated, f/u stool antigen negative    Hypothyroidism     Kidney stone     Migraines  Migraines     MICHAEL (obstructive sleep apnea)     last assessed 16    Sleep apnea 2019    Uses CPAP    Thyroid nodule     last assessed 17     Past Surgical History:   Procedure Laterality Date    BREAST BIOPSY       SECTION      COLONOSCOPY      ENDOMETRIAL ABLATION      MYOTOMY HELLER LAPAROSCOPIC  2016    Dr Anil Baldwin, Coincident lorena fundoplication    NASAL SINUS SURGERY      OTHER SURGICAL HISTORY      achalasia repair    OVARIAN CYST REMOVAL      PILONIDAL CYST / SINUS EXCISION      GA COLONOSCOPY FLX DX W/COLLJ SPEC WHEN PFRMD N/A 2019    Procedure: COLONOSCOPY;  Surgeon: Baylee Cruz MD;  [facility]; Service: Gastroenterology    GA CYSTOURETHROSCOPY N/A 2017    Procedure: Sheryle Lehmann;  Surgeon: Michael Lagos MD;  [facility];  Service: UroGynecology           GA ESOPHAGOGASTRODUODENOSCOPY TRANSORAL DIAGNOSTIC N/A 2017    Procedure: ESOPHAGOGASTRODUODENOSCOPY (EGD); Surgeon: Baylee Cruz MD;  [facility]; Service: Gastroenterology    GA ESOPHAGOGASTRODUODENOSCOPY TRANSORAL DIAGNOSTIC N/A 2019    Procedure: ESOPHAGOGASTRODUODENOSCOPY (EGD); Surgeon: Baylee Cruz MD;  [facility];   Service: Gastroenterology    GA POST COLPORRHAPHY,RECTUM/VAGINA N/A 2017    Procedure: COLPORRHAPHY POSTERIOR;  Surgeon: Michael Lagos MD;  [facility];  Service: UroGynecology           GA SLING OPER STRES INCONTINENCE N/A 2017    Procedure: INSERTION PUBOVAGINAL SLING /SINGLE INCISION ;  Surgeon: Michael Lagos MD;  [facility];  Service: UroGynecology           TONSILLECTOMY      TUBAL LIGATION      UPPER GASTROINTESTINAL ENDOSCOPY      US GUIDED BREAST BIOPSY RIGHT COMPLETE Right 10/31/2017     Social History   Social History     Substance and Sexual Activity   Alcohol Use No     Social History     Substance and Sexual Activity   Drug Use No     Social History     Tobacco Use   Smoking Status Former Smoker    Packs/day: 1 50    Years: 30 00    Pack years: 45 00    Last attempt to quit: 2016    Years since quitting: 3 7   Smokeless Tobacco Never Used   Tobacco Comment    pt stated quit about a month ago, last assessed 4/30/14 per [employer]     Family History   Problem Relation Age of Onset    Cancer Mother     Breast cancer Mother 62    Hypertension Mother     Dementia Father     Stroke Father     Diabetes Father     Hypertension Father     Cancer Maternal Grandmother         ovarian    Ovarian cancer Maternal Grandmother         unspecified laterality    Cancer Maternal Grandfather     Hypertension Maternal Grandfather     Tongue cancer Maternal Grandfather     Stroke Paternal Grandmother     Thyroid disease unspecified Brother     Thyroid disease unspecified Maternal Aunt     Colon cancer Maternal Aunt     Breast cancer Family     Diabetes Sister     BRCA1 Negative Sister     BRCA2 Negative Sister     No Known Problems Daughter     No Known Problems Son     No Known Problems Daughter     No Known Problems Son        Meds/Allergies       Current Outpatient Medications:     acetaminophen (TYLENOL) 325 mg tablet    albuterol (2 5 mg/3 mL) 0 083 % nebulizer solution    albuterol (VENTOLIN HFA) 90 mcg/act inhaler    ARIPiprazole (ABILIFY) 5 mg tablet    betamethasone dipropionate (DIPROSONE) 0 05 % cream    bismuth subsalicylate (PEPTO BISMOL) 262 MG chewable tablet    Botulinum Toxin Type A 200 units SOLR    BREO ELLIPTA    buPROPion (WELLBUTRIN XL) 150 mg 24 hr tablet    cetirizine (ZyrTEC) 10 mg tablet    clonazePAM (KlonoPIN) 0 5 mg tablet    diclofenac sodium (VOLTAREN) 1 %    dicyclomine (BENTYL) 20 mg tablet    doxepin (SINEquan) 25 mg capsule    FLUCELVAX QUADRIVALENT    fluticasone (FLONASE) 50 mcg/act nasal spray    furosemide (LASIX) 20 mg tablet    hydrOXYzine pamoate (VISTARIL) 50 mg capsule    ibuprofen (MOTRIN) 600 mg tablet    ibuprofen (MOTRIN) 800 mg tablet    ipratropium (ATROVENT) 0 02 % nebulizer solution    ipratropium (ATROVENT) 0 03 % nasal spray    levocetirizine (XYZAL) 5 MG tablet    levothyroxine 150 mcg tablet    loratadine (CLARITIN) 10 mg tablet    meloxicam (MOBIC) 15 mg tablet    metFORMIN (GLUCOPHAGE-XR) 500 mg 24 hr tablet    methocarbamol (ROBAXIN) 750 mg tablet    montelukast (SINGULAIR) 10 mg tablet    naproxen (NAPROSYN) 500 mg tablet    Nerve Stimulator (TENS THERAPY PAIN RELIEF) VIKASH    oxyCODONE-acetaminophen (PERCOCET) 5-325 mg per tablet    pantoprazole (PROTONIX) 40 mg tablet    polyethylene glycol (GLYCOLAX) powder    prochlorperazine (COMPAZINE) 10 mg tablet    psyllium (METAMUCIL) 58 6 % powder    sodium chloride (BRONCHO SALINE) inhaler solution    SUMAtriptan (IMITREX) 100 mg tablet    topiramate (TOPAMAX) 25 mg tablet    triamcinolone (KENALOG) 0 1 % cream    Triamcinolone Acetonide 55 MCG/ACT AERO    VIIBRYD 40 MG tablet    zolpidem (AMBIEN) 10 mg tablet    Allergies   Allergen Reactions    Gabapentin      Annotation - [id number]: Neuropsych effects per patient   Latex Dermatitis    Penicillins Hives           Objective     Blood pressure 133/85, pulse 86, temperature 99 °F (37 2 °C), temperature source Tympanic, height 5' 8" (1 727 m), weight 88 4 kg (194 lb 12 8 oz), last menstrual period 06/02/2019, not currently breastfeeding  Body mass index is 29 62 kg/m²        PHYSICAL EXAM:      General Appearance:   Alert, cooperative, no distress   HEENT:   Normocephalic, atraumatic, anicteric      Neck:  Supple, symmetrical, trachea midline   Lungs:   Clear to auscultation bilaterally   Heart[de-identified]   Regular rate and rhythm   Abdomen:   Soft, non-tender, non-distended; normal bowel sounds; no masses, no organomegaly    Genitalia:   Deferred    Rectal:   Deferred    Extremities:  No cyanosis, clubbing or edema    Pulses:  2+ and symmetric    Skin:  No jaundice, rashes, or lesions    Lymph nodes:  No palpable cervical lymphadenopathy        Lab Results:   No visits with results within 1 Day(s) from this visit  Latest known visit with results is:   Lab on 06/04/2019   Component Date Value    Free T4 06/04/2019 1 29     TSH 3RD GENERATON 06/04/2019 0 466     Insulin, Fasting 06/04/2019 4 2          Radiology Results:   No results found

## 2019-09-19 NOTE — LETTER
September 19, 2019     Austin Bo MD  [facility] [address]    Patient: Tierra Nicole   YOB: 1965   Date of Visit: 9/19/2019       Dear Dr Kiser Beams: Thank you for referring Alfonso Hollis to me for evaluation  Below are my notes for this consultation  If you have questions, please do not hesitate to call me  I look forward to following your patient along with you  Sincerely,        Jose Raul Morales PA-C        CC: No Recipients  Jose Raul Morales PA-C  9/19/2019  3:59 PM  Sign at close encounter  [facility] - Outpatient Follow-up Note  Tierra Nicole 48 y o  female MRN: [id number]  Encounter: [id number]          ASSESSMENT AND PLAN:      1  Ineffective esophageal motility  2  Achalasia  3  Chronic GERD  She was initially diagnosed with achalasia and underwent Heller myotomy and fundoplication in 7911  Her symptoms significantly improved following surgery, however, she now reports dysphagia for dry solid food for the past year  EGD from April 2019 revealed mild esophagitis, widely patent GE junction, and evidence of prior fundoplication  Her esophagus was empirically dilated to 60 Fr which did temporarily relieve her dysphagia  Recent esophageal manometry consistent with ineffective esophageal motility (IEM)  We discussed that IEM has an association with acid reflux and behavioral modifications are essential and include:     -Continue pantoprazole 40 mg twice daily   -We discussed the importance of eating small, frequent meals    -Sit upright while eating    -Wait 3 hours after eating before laying down     -Swallow pills with at least 8 oz of water and remain upright for 1 hour after taking them  -Exercise before meals, not afterward    -Discussed the importance of weight loss     -Swallow once per bite   Wait 15 seconds after every swallow before initiating a second swallow, as swallowing right away can diminish esophageal motility   -We can consider repeat EGD with dilation or Botox injection to the GE junction if symptoms persist    4  Irritable bowel syndrome with constipation  Well controlled with MiraLAX 17 g daily  - polyethylene glycol (GLYCOLAX) powder; Take 17 g by mouth daily Mix 1 capful in 8 ounces of water, juice or tea  Dispense: 578 g; Refill: 3    5  Tubular adenoma of colon  She had 3 adenomas removed during recent colonoscopy from April 2019  Recommend repeat colonoscopy in 3 years  Follow-up in 3 months with Dr Alanna Jett  ______________________________________________________________________    SUBJECTIVE:  61-year-old female with hypothyroidism, obstructive sleep apnea, migraines , GERD, and IBS presenting for follow-up  She underwent Heller myotomy and fundoplication for achalasia in 2015  Her dysphagia significantly improved following the surgery and she gained weight  However she reports dysphagia for solid, dry foods for the past year  She  has difficulty swallowing rice, bread, and meat  She feels the food gets stuck around her supraclavicular notch  She needs to drink lots of water to get the food down  Sometimes she regurgitates "chunks" of saliva  She denies odynophagia and chest pain with swallowing  Her heartburn is generally well controlled with Protonix 40 mg twice daily  She gets occasional heartburn after eating greasy and spicy foods  She takes MiraLax daily for constipation  She has 1 bowel movement daily which ranges between 2 and 4 on the [facility] stool chart  She denies any blood in the stool  Her weight is stable overall  Esophageal manometry showed ineffective esophageal motility  She underwent recent EGD in April 2019 which revealed mild esophagitis in the distal esophagus, widely patent GE junction, redundant fold in the fundus from prior fundoplication  Empiric dilation was performed using a Savary dilator  She was dilated up to 60 Fr    She does admit that her dysphagia improved after the dilation  However now her symptoms are back  REVIEW OF SYSTEMS IS OTHERWISE NEGATIVE  Historical Information   Past Medical History:   Diagnosis Date    Achalasia     Asthma     Constipation     Depression     Diarrhea 2019    Elevated LFTs     last assessed 10/25/17    Fibromyalgia     Gastric ulcer     GERD (gastroesophageal reflux disease)     H  pylori infection 10/9/2017    [facility] [id number]: Treated, f/u stool antigen negative    Hypothyroidism     Kidney stone     Migraines     Migraines     MICHAEL (obstructive sleep apnea)     last assessed 16    Sleep apnea 2019    Uses CPAP    Thyroid nodule     last assessed 17     Past Surgical History:   Procedure Laterality Date    BREAST BIOPSY       SECTION      COLONOSCOPY      ENDOMETRIAL ABLATION      MYOTOMY HELLER LAPAROSCOPIC  2016    Dr Rj Stephens, Coincident lorena fundoplication    NASAL SINUS SURGERY      OTHER SURGICAL HISTORY      achalasia repair    OVARIAN CYST REMOVAL      PILONIDAL CYST / SINUS EXCISION      GA COLONOSCOPY FLX DX W/COLLJ SPEC WHEN PFRMD N/A 2019    Procedure: COLONOSCOPY;  Surgeon: Elena Jacob MD;  [facility]; Service: Gastroenterology    GA CYSTOURETHROSCOPY N/A 2017    Procedure: Diaz Harrington;  Surgeon: Adi Diggs MD;  [facility];  Service: UroGynecology           GA ESOPHAGOGASTRODUODENOSCOPY TRANSORAL DIAGNOSTIC N/A 2017    Procedure: ESOPHAGOGASTRODUODENOSCOPY (EGD); Surgeon: Elena Jacob MD;  [facility]; Service: Gastroenterology    GA ESOPHAGOGASTRODUODENOSCOPY TRANSORAL DIAGNOSTIC N/A 2019    Procedure: ESOPHAGOGASTRODUODENOSCOPY (EGD); Surgeon: Elena Jacob MD;  [facility];   Service: Gastroenterology    GA POST COLPORRHAPHY,RECTUM/VAGINA N/A 2017    Procedure: COLPORRHAPHY POSTERIOR;  Surgeon: Adi Diggs MD;  [facility];  Service: UroGynecology           GA SLING OPER STRES INCONTINENCE N/A 1/19/2017    Procedure: INSERTION PUBOVAGINAL SLING /SINGLE INCISION ;  Surgeon: Ira Alvarez MD;  [facility];  Service: UroGynecology           TONSILLECTOMY      TUBAL LIGATION      UPPER GASTROINTESTINAL ENDOSCOPY      US GUIDED BREAST BIOPSY RIGHT COMPLETE Right 10/31/2017     Social History   Social History     Substance and Sexual Activity   Alcohol Use No     Social History     Substance and Sexual Activity   Drug Use No     Social History     Tobacco Use   Smoking Status Former Smoker    Packs/day: 1 50    Years: 30 00    Pack years: 37 1    Last attempt to quit: 2016    Years since quitting: 3 7   Smokeless Tobacco Never Used   Tobacco Comment    pt stated quit about a month ago, last assessed 4/30/14 per [employer]     Family History   Problem Relation Age of Onset    Cancer Mother     Breast cancer Mother 62    Hypertension Mother     Dementia Father     Stroke Father     Diabetes Father     Hypertension Father     Cancer Maternal Grandmother         ovarian    Ovarian cancer Maternal Grandmother         unspecified laterality    Cancer Maternal Grandfather     Hypertension Maternal Grandfather     Tongue cancer Maternal Grandfather     Stroke Paternal Grandmother     Thyroid disease unspecified Brother     Thyroid disease unspecified Maternal Aunt     Colon cancer Maternal Aunt     Breast cancer Family     Diabetes Sister     BRCA1 Negative Sister     BRCA2 Negative Sister     No Known Problems Daughter     No Known Problems Son     No Known Problems Daughter     No Known Problems Son        Meds/Allergies       Current Outpatient Medications:     acetaminophen (TYLENOL) 325 mg tablet    albuterol (2 5 mg/3 mL) 0 083 % nebulizer solution    albuterol (VENTOLIN HFA) 90 mcg/act inhaler    ARIPiprazole (ABILIFY) 5 mg tablet    betamethasone dipropionate (DIPROSONE) 0 05 % cream    bismuth subsalicylate (PEPTO BISMOL) 262 MG chewable tablet    Botulinum Toxin Type A 200 units SOLR    BREO ELLIPTA    buPROPion (WELLBUTRIN XL) 150 mg 24 hr tablet    cetirizine (ZyrTEC) 10 mg tablet    clonazePAM (KlonoPIN) 0 5 mg tablet    diclofenac sodium (VOLTAREN) 1 %    dicyclomine (BENTYL) 20 mg tablet    doxepin (SINEquan) 25 mg capsule    FLUCELVAX QUADRIVALENT    fluticasone (FLONASE) 50 mcg/act nasal spray    furosemide (LASIX) 20 mg tablet    hydrOXYzine pamoate (VISTARIL) 50 mg capsule    ibuprofen (MOTRIN) 600 mg tablet    ibuprofen (MOTRIN) 800 mg tablet    ipratropium (ATROVENT) 0 02 % nebulizer solution    ipratropium (ATROVENT) 0 03 % nasal spray    levocetirizine (XYZAL) 5 MG tablet    levothyroxine 150 mcg tablet    loratadine (CLARITIN) 10 mg tablet    meloxicam (MOBIC) 15 mg tablet    metFORMIN (GLUCOPHAGE-XR) 500 mg 24 hr tablet    methocarbamol (ROBAXIN) 750 mg tablet    montelukast (SINGULAIR) 10 mg tablet    naproxen (NAPROSYN) 500 mg tablet    Nerve Stimulator (TENS THERAPY PAIN RELIEF) VIKASH    oxyCODONE-acetaminophen (PERCOCET) 5-325 mg per tablet    pantoprazole (PROTONIX) 40 mg tablet    polyethylene glycol (GLYCOLAX) powder    prochlorperazine (COMPAZINE) 10 mg tablet    psyllium (METAMUCIL) 58 6 % powder    sodium chloride (BRONCHO SALINE) inhaler solution    SUMAtriptan (IMITREX) 100 mg tablet    topiramate (TOPAMAX) 25 mg tablet    triamcinolone (KENALOG) 0 1 % cream    Triamcinolone Acetonide 55 MCG/ACT AERO    VIIBRYD 40 MG tablet    zolpidem (AMBIEN) 10 mg tablet    Allergies   Allergen Reactions    Gabapentin      Annotation - [id number]: Neuropsych effects per patient   Latex Dermatitis    Penicillins Hives           Objective     Blood pressure 133/85, pulse 86, temperature 99 °F (37 2 °C), temperature source Tympanic, height 5' 8" (1 727 m), weight 88 4 kg (194 lb 12 8 oz), last menstrual period 06/02/2019, not currently breastfeeding   Body mass index is 29 62 kg/m²       PHYSICAL EXAM:      General Appearance:   Alert, cooperative, no distress   HEENT:   Normocephalic, atraumatic, anicteric      Neck:  Supple, symmetrical, trachea midline   Lungs:   Clear to auscultation bilaterally   Heart[de-identified]   Regular rate and rhythm   Abdomen:   Soft, non-tender, non-distended; normal bowel sounds; no masses, no organomegaly    Genitalia:   Deferred    Rectal:   Deferred    Extremities:  No cyanosis, clubbing or edema    Pulses:  2+ and symmetric    Skin:  No jaundice, rashes, or lesions    Lymph nodes:  No palpable cervical lymphadenopathy        Lab Results:   No visits with results within 1 Day(s) from this visit  Latest known visit with results is:   Lab on 06/04/2019   Component Date Value    Free T4 06/04/2019 1 29     TSH 3RD GENERATON 06/04/2019 0 466     Insulin, Fasting 06/04/2019 4 2          Radiology Results:   No results found

## 2019-09-19 NOTE — PATIENT INSTRUCTIONS
Eat small frequent meals (6 kyree per day)  Liquid moist foods  Never lay down after eating, wait 2-3 hours before lying down  Drink 8-12 oz of water with your pills  Sit upright for 1 hour after taking pills    Avoid acidic foods that trigger acid reflux

## 2019-09-24 ENCOUNTER — TELEPHONE (OUTPATIENT)
Dept: GASTROENTEROLOGY | Facility: MEDICAL CENTER | Age: 54
End: 2019-09-24

## 2019-09-24 NOTE — TELEPHONE ENCOUNTER
Adryan Dahl,    This is a regular EGD please schedule at [facility] or Sarah Beth Batch has opening at [address] end on 10/3/2019  Thanks

## 2019-09-24 NOTE — TELEPHONE ENCOUNTER
LMOM for patient to call office to schedule EGD w/esophageal dilation   Can offer 10/3/19 with Dr Dinesh Rashid at [facility]

## 2019-09-24 NOTE — TELEPHONE ENCOUNTER
----- Message from Karen Gray PA-C sent at 9/24/2019  8:34 AM EDT -----  Please schedule Jodi Tillman for EGD with esophageal dilation with Dr Luis Enrique Hill at outpatient endo center  I entered orders  Thanks!

## 2019-09-26 NOTE — TELEPHONE ENCOUNTER
Called and spoke to patient to schedule EGD with dilation  Patient is scheduled 10/22/19 with Dr Abeba Machado at [facility]  Instructions mailed to patient's home

## 2019-10-08 ENCOUNTER — ANESTHESIA EVENT (OUTPATIENT)
Dept: GASTROENTEROLOGY | Facility: AMBULARY SURGERY CENTER | Age: 54
End: 2019-10-08

## 2019-10-22 ENCOUNTER — ANESTHESIA (OUTPATIENT)
Dept: GASTROENTEROLOGY | Facility: AMBULARY SURGERY CENTER | Age: 54
End: 2019-10-22

## 2019-10-22 ENCOUNTER — HOSPITAL ENCOUNTER (OUTPATIENT)
Dept: GASTROENTEROLOGY | Facility: AMBULARY SURGERY CENTER | Age: 54
Setting detail: OUTPATIENT SURGERY
Discharge: HOME | End: 2019-10-22
Payer: COMMERCIAL

## 2019-10-22 VITALS
RESPIRATION RATE: 18 BRPM | TEMPERATURE: 97.3 F | OXYGEN SATURATION: 98 % | SYSTOLIC BLOOD PRESSURE: 122 MMHG | HEIGHT: 67 IN | WEIGHT: 198 LBS | HEART RATE: 85 BPM | BODY MASS INDEX: 31.08 KG/M2 | DIASTOLIC BLOOD PRESSURE: 77 MMHG

## 2019-10-22 DIAGNOSIS — K21.9 CHRONIC GERD: ICD-10-CM

## 2019-10-22 DIAGNOSIS — K22.4 INEFFECTIVE ESOPHAGEAL MOTILITY: ICD-10-CM

## 2019-10-22 DIAGNOSIS — R13.10 DYSPHAGIA, UNSPECIFIED TYPE: ICD-10-CM

## 2019-10-22 PROCEDURE — C1726 CATH, BAL DIL, NON-VASCULAR: HCPCS

## 2019-10-22 PROCEDURE — 43249 ESOPH EGD DILATION <30 MM: CPT | Performed by: INTERNAL MEDICINE

## 2019-10-22 RX ORDER — PROPOFOL 10 MG/ML
INJECTION, EMULSION INTRAVENOUS AS NEEDED
Status: DISCONTINUED | OUTPATIENT
Start: 2019-10-22 | End: 2019-10-22 | Stop reason: SURG

## 2019-10-22 RX ORDER — LIDOCAINE HYDROCHLORIDE 10 MG/ML
INJECTION, SOLUTION INFILTRATION; PERINEURAL AS NEEDED
Status: DISCONTINUED | OUTPATIENT
Start: 2019-10-22 | End: 2019-10-22 | Stop reason: SURG

## 2019-10-22 RX ORDER — SODIUM CHLORIDE, SODIUM LACTATE, POTASSIUM CHLORIDE, CALCIUM CHLORIDE 600; 310; 30; 20 MG/100ML; MG/100ML; MG/100ML; MG/100ML
125 INJECTION, SOLUTION INTRAVENOUS CONTINUOUS
Status: DISCONTINUED | OUTPATIENT
Start: 2019-10-22 | End: 2019-10-26 | Stop reason: HOSPADM

## 2019-10-22 RX ADMIN — PROPOFOL 50 MG: 10 INJECTION, EMULSION INTRAVENOUS at 10:39

## 2019-10-22 RX ADMIN — PROPOFOL 50 MG: 10 INJECTION, EMULSION INTRAVENOUS at 10:38

## 2019-10-22 RX ADMIN — LIDOCAINE HYDROCHLORIDE 50 MG: 10 INJECTION, SOLUTION INFILTRATION; PERINEURAL at 10:36

## 2019-10-22 RX ADMIN — PROPOFOL 100 MG: 10 INJECTION, EMULSION INTRAVENOUS at 10:36

## 2019-10-22 RX ADMIN — SODIUM CHLORIDE, SODIUM LACTATE, POTASSIUM CHLORIDE, AND CALCIUM CHLORIDE: .6; .31; .03; .02 INJECTION, SOLUTION INTRAVENOUS at 10:15

## 2019-10-22 RX ADMIN — PROPOFOL 50 MG: 10 INJECTION, EMULSION INTRAVENOUS at 10:41

## 2019-10-22 NOTE — ANESTHESIA POSTPROCEDURE EVALUATION
Post-Op Assessment Note    CV Status:  Stable    Pain management: adequate     Mental Status:  Alert and awake   Hydration Status:  Euvolemic   PONV Controlled:  Controlled   Airway Patency:  Patent   Post Op Vitals Reviewed: Yes      Staff: CRNA           BP   116/68   Temp     Pulse 87   Resp 16   SpO2 96

## 2019-10-22 NOTE — H&P
History and Physical - [facility]  Charo Solis 48 y o  female MRN: [id number]    HPI: Charo Solis is a 48y o  year old female with history of plegia status post Heller's myotomy  now with dysphagia  He has for EGD with dilation  Review of Systems    Historical Information   Past Medical History:   Diagnosis Date    Achalasia     Asthma     Constipation     Depression     Diarrhea 2019    Elevated LFTs     last assessed 10/25/17    Fibromyalgia     Gastric ulcer     GERD (gastroesophageal reflux disease)     H  pylori infection 10/9/2017    [facility] [id number]: Treated, f/u stool antigen negative    Hypothyroidism     Kidney stone     Migraines     Migraines     MICHAEL (obstructive sleep apnea)     last assessed 16    Sleep apnea 2019    Uses CPAP    Thyroid nodule     last assessed 17     Past Surgical History:   Procedure Laterality Date    BREAST BIOPSY       SECTION      COLONOSCOPY      ENDOMETRIAL ABLATION      MYOTOMY HELLER LAPAROSCOPIC  2016    Dr Ramsey Pride, Coincident lorena fundoplication    NASAL SINUS SURGERY      OTHER SURGICAL HISTORY      achalasia repair    OVARIAN CYST REMOVAL      PILONIDAL CYST / SINUS EXCISION      FL COLONOSCOPY FLX DX W/COLLJ SPEC WHEN PFRMD N/A 2019    Procedure: COLONOSCOPY;  Surgeon: Day Jorge MD;  [facility]; Service: Gastroenterology    FL CYSTOURETHROSCOPY N/A 2017    Procedure: Elisa Rojas;  Surgeon: Fransisca Abdalla MD;  [facility];  Service: UroGynecology           FL ESOPHAGOGASTRODUODENOSCOPY TRANSORAL DIAGNOSTIC N/A 2017    Procedure: ESOPHAGOGASTRODUODENOSCOPY (EGD); Surgeon: Day Jorge MD;  [facility]; Service: Gastroenterology    FL ESOPHAGOGASTRODUODENOSCOPY TRANSORAL DIAGNOSTIC N/A 2019    Procedure: ESOPHAGOGASTRODUODENOSCOPY (EGD); Surgeon: Day Jorge MD;  [facility];   Service: Gastroenterology  ND POST COLPORRHAPHY,RECTUM/VAGINA N/A 1/19/2017    Procedure: COLPORRHAPHY POSTERIOR;  Surgeon: Kori Sandy MD;  [facility];  Service: UroGynecology           ND SLING OPER STRES INCONTINENCE N/A 1/19/2017    Procedure: INSERTION PUBOVAGINAL SLING /SINGLE INCISION ;  Surgeon: Kori Sandy MD;  [facility];  Service: UroGynecology           TONSILLECTOMY      TUBAL LIGATION      UPPER GASTROINTESTINAL ENDOSCOPY      US GUIDED BREAST BIOPSY RIGHT COMPLETE Right 10/31/2017     Social History   Social History     Substance and Sexual Activity   Alcohol Use No     Social History     Substance and Sexual Activity   Drug Use No     Social History     Tobacco Use   Smoking Status Former Smoker    Packs/day: 1 50    Years: 30 00    Pack years: 37 1    Last attempt to quit: 2016    Years since quitting: 3 8   Smokeless Tobacco Never Used   Tobacco Comment    pt stated quit about a month ago, last assessed 4/30/14 per [employer]     Family History   Problem Relation Age of Onset    Cancer Mother     Breast cancer Mother 62    Hypertension Mother     Dementia Father     Stroke Father     Diabetes Father     Hypertension Father     Cancer Maternal Grandmother         ovarian    Ovarian cancer Maternal Grandmother         unspecified laterality    Cancer Maternal Grandfather     Hypertension Maternal Grandfather     Tongue cancer Maternal Grandfather     Stroke Paternal Grandmother     Thyroid disease unspecified Brother     Thyroid disease unspecified Maternal Aunt     Colon cancer Maternal Aunt     Breast cancer Family     Diabetes Sister     BRCA1 Negative Sister     BRCA2 Negative Sister     No Known Problems Daughter     No Known Problems Son     No Known Problems Daughter     No Known Problems Son        Meds/Allergies       (Not in a hospital admission)    Allergies   Allergen Reactions    Gabapentin      Annotation - [id number]: Neuropsych effects per patient   Latex Dermatitis    Penicillins Hives       Objective     /70   Pulse 69   Temp (!) 96 8 °F (36 °C) (Temporal)   Resp 18   Ht 5' 7" (1 702 m)   Wt 89 8 kg (198 lb)   LMP 06/02/2019 (Exact Date)   SpO2 99%   BMI 31 01 kg/m²       PHYSICAL EXAM    Gen: NAD  CV: RRR  CHEST: Clear  ABD: soft, NT/ND  EXT: no edema  Neuro: AAO      ASSESSMENT/PLAN:  This is a 48y o  year old female here for EGD with dilation  She has history of achalasia status post Heller myotomy    PLAN:   Procedure:  EGD with dilation

## 2019-11-11 DIAGNOSIS — K58.0 IRRITABLE BOWEL SYNDROME WITH DIARRHEA: ICD-10-CM

## 2019-11-13 RX ORDER — DICYCLOMINE HCL 20 MG
TABLET ORAL
Qty: 120 TABLET | Refills: 2 | Status: SHIPPED | OUTPATIENT
Start: 2019-11-13 | End: 2020-03-11 | Stop reason: SDUPTHER

## 2019-12-20 ENCOUNTER — OFFICE VISIT (OUTPATIENT)
Dept: GASTROENTEROLOGY | Facility: CLINIC | Age: 54
End: 2019-12-20
Payer: COMMERCIAL

## 2019-12-20 VITALS
DIASTOLIC BLOOD PRESSURE: 79 MMHG | WEIGHT: 204 LBS | SYSTOLIC BLOOD PRESSURE: 146 MMHG | BODY MASS INDEX: 32.02 KG/M2 | HEIGHT: 67 IN | HEART RATE: 71 BPM | TEMPERATURE: 98.4 F

## 2019-12-20 DIAGNOSIS — R13.19 ESOPHAGEAL DYSPHAGIA: Primary | ICD-10-CM

## 2019-12-20 PROCEDURE — 99214 OFFICE O/P EST MOD 30 MIN: CPT | Performed by: INTERNAL MEDICINE

## 2019-12-20 NOTE — PROGRESS NOTES
[facility] - Outpatient Follow-up Note  Sushma Wood 48 y o  female MRN: [id number]  Encounter: [id number]          ASSESSMENT AND PLAN:  Rosalia Sharpe is a 48 y o female here as a follow up pt regarding Dysphagia management  Esophageal Dysphagia:  She had history of achalasia status post laparoscopic Heller myotomy with lorena fundoplication in 5760  Now experiencing dysphagia to solid foods and occasionally to liquids over the last 1 year  Recent EGD showed mild esophagitis but no evidence of stricture or hiatal hernia  Repeat manometry showed it ineffective esophageal motility with normal IRP  Empiric dilation was performed which gave her short-term relief  Her weight is stable  We discussed about dietary modification including surgical soft diet, cut meat and vegetable into small pieces and chew well before swallowing  Will also control reflux symptoms  Gastroesophageal reflux disease -she does report intermittent regurgitation and heartburn  Will increase her pantoprazole to 40 mg twice a day  We reviewed reflux precaution including elevating head of bed at night  Avoid any food that triggers C reflux symptoms  Follow-up in 3 months  ______________________________________________________________________    SUBJECTIVE:  Sushma Wood is a 48 y o  female here as a follow up pt regarding esophageal dysphagia  She has history of achalasia she underwent Heller myotomy and fundoplication in 0849  Her dysphagia significantly improved following surgery and she gained some weight after her procedure  About a year ago she started having dysphagia  Her dysphagia is mostly to solid but she also reports intermittent dysphagia to liquids  She also reports some reflux symptoms including heartburn  She is currently on Protonix and Carafate  Repeat esophageal manometry showed ineffective esophageal motility with diminutive hiatal hernia    Her IRP was normal at 9 mmHg  EGD done on 2019 showed mild esophagitis at the GE junction  Empiric dilation of GE junction was performed using CRE balloon to 20 mm  Normal stomach and duodenum  REVIEW OF SYSTEMS IS OTHERWISE NEGATIVE  Historical Information   Past Medical History:   Diagnosis Date    Achalasia     Asthma     Constipation     Depression     Diarrhea 2019    Elevated LFTs     last assessed 10/25/17    Fibromyalgia     Gastric ulcer     GERD (gastroesophageal reflux disease)     H  pylori infection 10/9/2017    [facility] [id number]: Treated, f/u stool antigen negative    Hypothyroidism     Kidney stone     Migraines     Migraines     MICHAEL (obstructive sleep apnea)     last assessed 16    Pneumonia     Sleep apnea 2019    Uses CPAP    Thyroid nodule     last assessed 17     Past Surgical History:   Procedure Laterality Date    BREAST BIOPSY       SECTION      COLONOSCOPY      ENDOMETRIAL ABLATION      MYOTOMY HELLER LAPAROSCOPIC  2016    Dr Lou Kathleen, Coincident lorena fundoplication    NASAL SINUS SURGERY      OTHER SURGICAL HISTORY      achalasia repair    OVARIAN CYST REMOVAL      PILONIDAL CYST / SINUS EXCISION      KS COLONOSCOPY FLX DX W/COLLJ SPEC WHEN PFRMD N/A 2019    Procedure: COLONOSCOPY;  Surgeon: Mari Polanco MD;  [facility]; Service: Gastroenterology    KS CYSTOURETHROSCOPY N/A 2017    Procedure: Jessica Toribio;  Surgeon: Murtaza Bates MD;  [facility];  Service: UroGynecology           KS ESOPHAGOGASTRODUODENOSCOPY TRANSORAL DIAGNOSTIC N/A 2017    Procedure: ESOPHAGOGASTRODUODENOSCOPY (EGD); Surgeon: Mari Polanco MD;  [facility]; Service: Gastroenterology    KS ESOPHAGOGASTRODUODENOSCOPY TRANSORAL DIAGNOSTIC N/A 2019    Procedure: ESOPHAGOGASTRODUODENOSCOPY (EGD); Surgeon: Mari Polanco MD;  [facility];   Service: Gastroenterology    KS POST COLPORRHAPHY,RECTUM/VAGINA N/A 1/19/2017    Procedure: COLPORRHAPHY POSTERIOR;  Surgeon: Joe Lino MD;  [facility];  Service: UroGynecology           CA SLING OPER STRES INCONTINENCE N/A 1/19/2017    Procedure: INSERTION PUBOVAGINAL SLING /SINGLE INCISION ;  Surgeon: Joe Lino MD;  [facility];  Service: UroGynecology           TONSILLECTOMY      TUBAL LIGATION      UPPER GASTROINTESTINAL ENDOSCOPY      US GUIDED BREAST BIOPSY RIGHT COMPLETE Right 10/31/2017     Social History   Social History     Substance and Sexual Activity   Alcohol Use No     Social History     Substance and Sexual Activity   Drug Use No     Social History     Tobacco Use   Smoking Status Former Smoker    Packs/day: 1 50    Years: 30 00    Pack years: 37 1    Last attempt to quit: 2016    Years since quitting: 3 9   Smokeless Tobacco Never Used   Tobacco Comment    pt stated quit about a month ago, last assessed 4/30/14 per [employer]     Family History   Problem Relation Age of Onset    Cancer Mother     Breast cancer Mother 62    Hypertension Mother     Dementia Father     Stroke Father     Diabetes Father     Hypertension Father     Cancer Maternal Grandmother         ovarian    Ovarian cancer Maternal Grandmother         unspecified laterality    Cancer Maternal Grandfather     Hypertension Maternal Grandfather     Tongue cancer Maternal Grandfather     Stroke Paternal Grandmother     Thyroid disease unspecified Brother     Thyroid disease unspecified Maternal Aunt     Colon cancer Maternal Aunt     Breast cancer Family     Diabetes Sister     BRCA1 Negative Sister     BRCA2 Negative Sister     No Known Problems Daughter     No Known Problems Son     No Known Problems Daughter     No Known Problems Son        Meds/Allergies       Current Outpatient Medications:     acetaminophen (TYLENOL) 325 mg tablet    albuterol (2 5 mg/3 mL) 0 083 % nebulizer solution    albuterol (VENTOLIN HFA) 90 mcg/act inhaler   ARIPiprazole (ABILIFY) 5 mg tablet    betamethasone dipropionate (DIPROSONE) 0 05 % cream    bismuth subsalicylate (PEPTO BISMOL) 262 MG chewable tablet    Botulinum Toxin Type A 200 units SOLR    BREO ELLIPTA    buPROPion (WELLBUTRIN XL) 150 mg 24 hr tablet    cetirizine (ZyrTEC) 10 mg tablet    clonazePAM (KlonoPIN) 0 5 mg tablet    diclofenac sodium (VOLTAREN) 1 %    dicyclomine (BENTYL) 20 mg tablet    doxepin (SINEquan) 25 mg capsule    FLUCELVAX QUADRIVALENT    fluticasone (FLONASE) 50 mcg/act nasal spray    furosemide (LASIX) 20 mg tablet    hydrOXYzine pamoate (VISTARIL) 50 mg capsule    ibuprofen (MOTRIN) 800 mg tablet    ipratropium (ATROVENT) 0 02 % nebulizer solution    levocetirizine (XYZAL) 5 MG tablet    levothyroxine 150 mcg tablet    loratadine (CLARITIN) 10 mg tablet    meloxicam (MOBIC) 15 mg tablet    metFORMIN (GLUCOPHAGE-XR) 500 mg 24 hr tablet    methocarbamol (ROBAXIN) 750 mg tablet    montelukast (SINGULAIR) 10 mg tablet    naproxen (NAPROSYN) 500 mg tablet    Nerve Stimulator (TENS THERAPY PAIN RELIEF) VIKASH    oxyCODONE-acetaminophen (PERCOCET) 5-325 mg per tablet    pantoprazole (PROTONIX) 40 mg tablet    polyethylene glycol (GLYCOLAX) powder    prochlorperazine (COMPAZINE) 10 mg tablet    psyllium (METAMUCIL) 58 6 % powder    sodium chloride (BRONCHO SALINE) inhaler solution    SUMAtriptan (IMITREX) 100 mg tablet    topiramate (TOPAMAX) 25 mg tablet    triamcinolone (KENALOG) 0 1 % cream    Triamcinolone Acetonide 55 MCG/ACT AERO    VIIBRYD 40 MG tablet    zolpidem (AMBIEN) 10 mg tablet    Allergies   Allergen Reactions    Gabapentin      Annotation - [id number]: Neuropsych effects per patient   Latex Dermatitis    Penicillins Hives           Objective     Blood pressure 146/79, pulse 71, temperature 98 4 °F (36 9 °C), temperature source Tympanic, height 5' 7" (1 702 m), weight 92 5 kg (204 lb), not currently breastfeeding   Body mass index is 31 95 kg/m²       PHYSICAL EXAM:      General Appearance:   Alert, cooperative, no distress   HEENT:   Normocephalic, atraumatic, anicteric      Neck:  Supple, symmetrical, trachea midline   Lungs:   Clear to auscultation bilaterally; no rales, rhonchi or wheezing; respirations unlabored    Heart[de-identified]   Regular rate and rhythm; no murmur, rub, or gallop  Abdomen:   Soft, non-tender, non-distended; normal bowel sounds; no masses, no organomegaly    Genitalia:   Deferred    Rectal:   Deferred    Extremities:  No cyanosis, clubbing or edema    Pulses:  2+ and symmetric    Skin:  No jaundice, rashes, or lesions    Lymph nodes:  No palpable cervical lymphadenopathy        Lab Results:   No visits with results within 1 Day(s) from this visit  Latest known visit with results is:   Lab on 06/04/2019   Component Date Value    Free T4 06/04/2019 1 29     TSH 3RD GENERATON 06/04/2019 0 466     Insulin, Fasting 06/04/2019 4 2          Radiology Results:   No results found  Attestation:   By signing my name below, Johan Gastelum attest that this documentation has been prepared under the direction and in the presence of Vitor Wise MD  Electronically Signed: Oriana Ocasio  12/20/19      I, Vitor Wise, personally performed the services described in this documentation  All medical record entries made by the oriana were at my direction and in my presence  I have reviewed the chart and discharge instructions and agree that the record reflects my personal performance and is accurate and complete   Vtior Wise MD  12/20/19

## 2019-12-23 ENCOUNTER — TRANSCRIBE ORDERS (OUTPATIENT)
Dept: RADIOLOGY | Facility: HOSPITAL | Age: 54
End: 2019-12-23

## 2019-12-23 ENCOUNTER — HOSPITAL ENCOUNTER (OUTPATIENT)
Dept: RADIOLOGY | Facility: HOSPITAL | Age: 54
Discharge: HOME | End: 2019-12-23
Attending: INTERNAL MEDICINE
Payer: COMMERCIAL

## 2019-12-23 DIAGNOSIS — R13.19 ESOPHAGEAL DYSPHAGIA: ICD-10-CM

## 2019-12-23 PROCEDURE — 74220 X-RAY XM ESOPHAGUS 1CNTRST: CPT

## 2020-01-20 ENCOUNTER — OFFICE VISIT (OUTPATIENT)
Dept: INTERNAL MEDICINE CLINIC | Facility: CLINIC | Age: 55
End: 2020-01-20

## 2020-01-20 ENCOUNTER — APPOINTMENT (OUTPATIENT)
Dept: LAB | Facility: CLINIC | Age: 55
End: 2020-01-20
Payer: COMMERCIAL

## 2020-01-20 VITALS
WEIGHT: 209.44 LBS | BODY MASS INDEX: 32.87 KG/M2 | OXYGEN SATURATION: 98 % | HEART RATE: 78 BPM | HEIGHT: 67 IN | TEMPERATURE: 97.7 F | DIASTOLIC BLOOD PRESSURE: 82 MMHG | SYSTOLIC BLOOD PRESSURE: 124 MMHG

## 2020-01-20 DIAGNOSIS — Z00.00 ANNUAL PHYSICAL EXAM: ICD-10-CM

## 2020-01-20 DIAGNOSIS — J30.2 SEASONAL ALLERGIES: ICD-10-CM

## 2020-01-20 DIAGNOSIS — J45.909 ASTHMA, UNSPECIFIED ASTHMA SEVERITY, UNSPECIFIED WHETHER COMPLICATED, UNSPECIFIED WHETHER PERSISTENT: ICD-10-CM

## 2020-01-20 DIAGNOSIS — E03.9 HYPOTHYROIDISM, UNSPECIFIED TYPE: Primary | ICD-10-CM

## 2020-01-20 DIAGNOSIS — R63.5 ABNORMAL WEIGHT GAIN: ICD-10-CM

## 2020-01-20 DIAGNOSIS — G47.00 INSOMNIA, UNSPECIFIED TYPE: ICD-10-CM

## 2020-01-20 DIAGNOSIS — E03.9 HYPOTHYROIDISM, UNSPECIFIED TYPE: ICD-10-CM

## 2020-01-20 DIAGNOSIS — G43.909 MIGRAINE WITHOUT STATUS MIGRAINOSUS, NOT INTRACTABLE, UNSPECIFIED MIGRAINE TYPE: ICD-10-CM

## 2020-01-20 DIAGNOSIS — G89.29 CHRONIC BILATERAL LOW BACK PAIN, UNSPECIFIED WHETHER SCIATICA PRESENT: ICD-10-CM

## 2020-01-20 DIAGNOSIS — J45.40 MODERATE PERSISTENT ASTHMA WITHOUT COMPLICATION: ICD-10-CM

## 2020-01-20 DIAGNOSIS — J45.909 ASTHMA: ICD-10-CM

## 2020-01-20 DIAGNOSIS — T28.0XXA BURN OF MOUTH, INITIAL ENCOUNTER: ICD-10-CM

## 2020-01-20 DIAGNOSIS — K58.1 IRRITABLE BOWEL SYNDROME WITH CONSTIPATION: ICD-10-CM

## 2020-01-20 DIAGNOSIS — F32.A DEPRESSION, UNSPECIFIED DEPRESSION TYPE: ICD-10-CM

## 2020-01-20 DIAGNOSIS — M54.9 BACK PAIN: ICD-10-CM

## 2020-01-20 DIAGNOSIS — R73.03 PREDIABETES: ICD-10-CM

## 2020-01-20 DIAGNOSIS — F31.81 BIPOLAR 2 DISORDER (HCC): ICD-10-CM

## 2020-01-20 DIAGNOSIS — M54.50 CHRONIC BILATERAL LOW BACK PAIN, UNSPECIFIED WHETHER SCIATICA PRESENT: ICD-10-CM

## 2020-01-20 DIAGNOSIS — J45.41 MODERATE PERSISTENT ASTHMA WITH ACUTE EXACERBATION: ICD-10-CM

## 2020-01-20 LAB
25(OH)D3 SERPL-MCNC: 25.4 NG/ML (ref 30–100)
CHOLEST SERPL-MCNC: 175 MG/DL (ref 50–200)
HDLC SERPL-MCNC: 48 MG/DL
LDLC SERPL CALC-MCNC: 117 MG/DL (ref 0–100)
NONHDLC SERPL-MCNC: 127 MG/DL
TRIGL SERPL-MCNC: 52 MG/DL
TSH SERPL DL<=0.05 MIU/L-ACNC: 0.43 UIU/ML (ref 0.36–3.74)

## 2020-01-20 PROCEDURE — 99213 OFFICE O/P EST LOW 20 MIN: CPT | Performed by: INTERNAL MEDICINE

## 2020-01-20 PROCEDURE — 84443 ASSAY THYROID STIM HORMONE: CPT

## 2020-01-20 PROCEDURE — 1036F TOBACCO NON-USER: CPT | Performed by: INTERNAL MEDICINE

## 2020-01-20 PROCEDURE — 36415 COLL VENOUS BLD VENIPUNCTURE: CPT

## 2020-01-20 PROCEDURE — 80061 LIPID PANEL: CPT

## 2020-01-20 PROCEDURE — 3008F BODY MASS INDEX DOCD: CPT | Performed by: INTERNAL MEDICINE

## 2020-01-20 PROCEDURE — 82306 VITAMIN D 25 HYDROXY: CPT

## 2020-01-20 RX ORDER — POLYETHYLENE GLYCOL 3350 17 G/17G
17 POWDER, FOR SOLUTION ORAL DAILY
Qty: 578 G | Refills: 3 | Status: SHIPPED | OUTPATIENT
Start: 2020-01-20

## 2020-01-20 RX ORDER — FLUTICASONE PROPIONATE 50 MCG
1-2 SPRAY, SUSPENSION (ML) NASAL DAILY
Qty: 16 G | Refills: 0 | Status: SHIPPED | OUTPATIENT
Start: 2020-01-20 | End: 2020-02-18 | Stop reason: SDUPTHER

## 2020-01-20 RX ORDER — SUMATRIPTAN 100 MG/1
100 TABLET, FILM COATED ORAL ONCE AS NEEDED
Qty: 5 TABLET | Refills: 0 | Status: SHIPPED | OUTPATIENT
Start: 2020-01-20 | End: 2020-06-03 | Stop reason: SDUPTHER

## 2020-01-20 RX ORDER — PROCHLORPERAZINE MALEATE 10 MG
5 TABLET ORAL EVERY 8 HOURS PRN
Qty: 30 TABLET | Refills: 0 | Status: SHIPPED | OUTPATIENT
Start: 2020-01-20 | End: 2020-07-24

## 2020-01-20 RX ORDER — ARIPIPRAZOLE 5 MG/1
5 TABLET ORAL
Qty: 30 TABLET | Refills: 1 | Status: SHIPPED | OUTPATIENT
Start: 2020-01-20 | End: 2020-07-16

## 2020-01-20 RX ORDER — MELOXICAM 15 MG/1
15 TABLET ORAL DAILY
Qty: 30 TABLET | Refills: 6 | Status: SHIPPED | OUTPATIENT
Start: 2020-01-20

## 2020-01-20 RX ORDER — LEVOTHYROXINE SODIUM 0.15 MG/1
150 TABLET ORAL DAILY
Qty: 90 TABLET | Refills: 3 | Status: SHIPPED | OUTPATIENT
Start: 2020-01-20 | End: 2020-07-24 | Stop reason: SDUPTHER

## 2020-01-20 RX ORDER — ALBUTEROL SULFATE 2.5 MG/3ML
2.5 SOLUTION RESPIRATORY (INHALATION) EVERY 6 HOURS PRN
Qty: 75 ML | Refills: 0 | Status: SHIPPED | OUTPATIENT
Start: 2020-01-20 | End: 2021-03-23 | Stop reason: SDUPTHER

## 2020-01-20 RX ORDER — FLUTICASONE FUROATE AND VILANTEROL TRIFENATATE 100; 25 UG/1; UG/1
1 POWDER RESPIRATORY (INHALATION) DAILY
Qty: 1 INHALER | Refills: 3 | Status: SHIPPED | OUTPATIENT
Start: 2020-01-20 | End: 2022-04-28 | Stop reason: SDUPTHER

## 2020-01-20 RX ORDER — CETIRIZINE HYDROCHLORIDE 10 MG/1
10 TABLET ORAL DAILY
Qty: 30 TABLET | Refills: 3 | Status: SHIPPED | OUTPATIENT
Start: 2020-01-20

## 2020-01-20 RX ORDER — LIDOCAINE HYDROCHLORIDE 20 MG/ML
15 SOLUTION OROPHARYNGEAL 4 TIMES DAILY PRN
Qty: 15 ML | Refills: 1 | Status: SHIPPED | OUTPATIENT
Start: 2020-01-20 | End: 2021-03-25

## 2020-01-20 RX ORDER — BETAMETHASONE DIPROPIONATE 0.5 MG/G
CREAM TOPICAL DAILY
Qty: 30 G | Refills: 0 | Status: SHIPPED | OUTPATIENT
Start: 2020-01-20 | End: 2020-06-30 | Stop reason: SDUPTHER

## 2020-01-20 RX ORDER — ACETAMINOPHEN 325 MG/1
TABLET ORAL
Qty: 30 TABLET | Refills: 0
Start: 2020-01-20

## 2020-01-20 RX ORDER — METFORMIN HYDROCHLORIDE 500 MG/1
TABLET, EXTENDED RELEASE ORAL
Qty: 60 TABLET | Refills: 1 | Status: SHIPPED | OUTPATIENT
Start: 2020-01-20 | End: 2020-07-16

## 2020-01-20 RX ORDER — CHOLECALCIFEROL (VITAMIN D3) 125 MCG
5 CAPSULE ORAL
Qty: 30 TABLET | Refills: 0 | Status: SHIPPED | OUTPATIENT
Start: 2020-01-20 | End: 2021-03-25

## 2020-01-20 RX ORDER — ALBUTEROL SULFATE 90 UG/1
2 AEROSOL, METERED RESPIRATORY (INHALATION) EVERY 4 HOURS PRN
Qty: 1 INHALER | Refills: 0 | Status: SHIPPED | OUTPATIENT
Start: 2020-01-20 | End: 2021-03-23 | Stop reason: SDUPTHER

## 2020-01-20 NOTE — PROGRESS NOTES
[place]  [address] [email or web address] [address] [place] [address]    NAME: Leslie Bonds  AGE: 48 y o  SEX: female    DATE OF ENCOUNTER: 1/20/2020    Assessment and Plan     Problem List Items Addressed This Visit        Digestive    Irritable bowel syndrome with constipation     Continue MiraLax as needed  Encouraged about exercise and oral hydration          Relevant Medications    polyethylene glycol (GLYCOLAX) powder       Endocrine    Hypothyroidism - Primary     Check TSH  Refill review thyroxine 150 mcg daily in the morning         Relevant Medications    levothyroxine 150 mcg tablet    Other Relevant Orders    TSH, 3rd generation with Free T4 reflex       Respiratory    Asthma     Albuterol inhaler and nebulizer p r n  Breo Ellipta inhaler refilled         Relevant Medications    betamethasone dipropionate (DIPROSONE) 0 05 % cream    fluticasone (FLONASE) 50 mcg/act nasal spray    albuterol (VENTOLIN HFA) 90 mcg/act inhaler    albuterol (2 5 mg/3 mL) 0 083 % nebulizer solution    BREO ELLIPTA 100-25 MCG/INH inhaler       Cardiovascular and Mediastinum    Headache, migraine     Follow-up with neurology Dr Landry at [facility] on bottle in a toxin a injection is 155 units in November 2019 next injection will be February 20, 2020    Associated with photophobia and phonophobia  Could not take Topamax due to history of nephrolithiasis  Continue Compazine p r n  and Imitrex p r n  or headache         Relevant Medications    meloxicam (MOBIC) 15 mg tablet    acetaminophen (TYLENOL) 325 mg tablet    prochlorperazine (COMPAZINE) 10 mg tablet    SUMAtriptan (IMITREX) 100 mg tablet       Other    Chronic low back pain     Meloxicam 50 mg as needed         Depression     Referral to a psychiatry given  On Wellbutrin and aripiprazole given clinical picture of bipolar disorder         Relevant Medications    ARIPiprazole (ABILIFY) 5 mg tablet prochlorperazine (COMPAZINE) 10 mg tablet    Other Relevant Orders    Ambulatory referral to Psychiatry    Prediabetes     Last A1c 5 4% 2019  Relevant Medications    metFORMIN (GLUCOPHAGE-XR) 500 mg 24 hr tablet      Other Visit Diagnoses     Abnormal weight gain        see plan under class 1 obesity    Relevant Medications    metFORMIN (GLUCOPHAGE-XR) 500 mg 24 hr tablet    Back pain        Relevant Medications    meloxicam (MOBIC) 15 mg tablet    acetaminophen (TYLENOL) 325 mg tablet    Seasonal allergies        Relevant Medications    cetirizine (ZyrTEC) 10 mg tablet    Insomnia, unspecified type        Relevant Medications    Melatonin 5 MG TABS    Bipolar 2 disorder (HCC)        Relevant Medications    ARIPiprazole (ABILIFY) 5 mg tablet    prochlorperazine (COMPAZINE) 10 mg tablet    Other Relevant Orders    Ambulatory referral to Psychiatry    Annual physical exam        Relevant Orders    Vitamin D 25 hydroxy    Lipid panel    Burn of mouth, initial encounter        Relevant Medications    Lidocaine Viscous HCl (XYLOCAINE) 2 % mucosal solution          Orders Placed This Encounter   Procedures    TSH, 3rd generation with Free T4 reflex    Vitamin D 25 hydroxy    Lipid panel    Ambulatory referral to Psychiatry       - Counseling Documentation: patient was counseled regarding: diagnostic results and instructions for management  IF patient is diabetic, Was Metformin prescribed? Metformin reported for loosing weight  Chief Complaint     Chief Complaint   Patient presents with    Follow-up     check cholesterol, thyroid, sugars, gums burn     Blood Pressure Check    Headache    Eye Problem       History of Present Illness     It is my pleasure to see for the 1st time Ms  Roberta Craig with 48years old female presented to the clinic requesting annual physical exam and blood work    She has complex medical history including achalasia status post myotomy and fundoplication in 4150 with recent barium swallow unremarkable October 23, 2019, gastritis-H pylori with GERD on high-dose PPI follow-up with Gastroenterology, pre cancerous polyps on 3 year surveillance colonoscopy next due April 2022, fibromyalgia, depression, obstructive sleep apnea, chronic back pain, moderate persistent asthma, hypothyroidism, bilateral breast cyst with negative core biopsy in 2017 with unremarkable recent ultrasound and mammography July 2019 other than small to cyst measuring 5 mm add to a m  and 7 mm at 9:00 a m  on the left or right side respectively  On today's visit she is accompanied by her little granddaughter  She requested physical exam, TSH, hemoglobin A1c, lipid profile and vitamin-D blood work  She would like to be checked to make sure she is healthy  We also went through her medication and did the appropriate adjustment  She was very poor historian in her visit today with hyperactive mood  She has been having migraine headache associated with photophobia and phonophobia follow-up with Neurology at [facility] on Botox injection  She lives with her daughter, she is retired nurse 8 in 2014, she is a former smoker of 32 pack year quit 2014 denied drinking alcohol or abusing drugs  She is complaining of burning in her mouth and tried hydrogen peroxide which I recommended to avoid at it can lead to further burning  She requested something for burning mouth I recommended lidocaine viscous  She has chronic back pain attributed to injury at work as a nurse 8 when she was helping heavy resident to be transferred        In terms of health maintenance she is on 3 year surveillance colonoscopy as she had multiple precancerous polyps with colonoscopy April 2019 showing tubular adenoma negative for high-grade dysplasia and transverse colon, tubular adenoma with hyperplastic feature negative for high-grade dysplasia in descending colon, sessile serrated adenoma negative for dysplasia in the splenic flexure and hyperplastic adenoma on sigmoid she supposed to get colonoscopy April 2022  She had a Pap smear liquid based on July 2017 with next one in   July 2020  The following portions of the patient's history were reviewed and updated as appropriate: allergies, current medications, past family history, past medical history, past social history, past surgical history and problem list     Review of Systems     Review of Systems   Constitutional: Negative for chills and fever  HENT: Positive for congestion and sinus pressure  Negative for sore throat  Eyes: Positive for pain and visual disturbance  Respiratory: Negative for cough and shortness of breath  Cardiovascular: Negative for chest pain and palpitations  Gastrointestinal: Negative for nausea and vomiting  Genitourinary: Negative for difficulty urinating and hematuria  Musculoskeletal: Positive for arthralgias and back pain  Negative for neck pain  Skin: Negative for color change and rash  Neurological: Positive for headaches  Negative for numbness  Psychiatric/Behavioral: Positive for sleep disturbance  The patient is nervous/anxious          Active Problem List     Patient Active Problem List   Diagnosis    Mixed incontinence    Stress incontinence in female    Obstructive sleep apnea syndrome    Hypothyroidism    Irritable bowel syndrome with constipation    Chronic low back pain    Asthma    Arthritis    Gastritis    Rectocele    Cystocele    Hypermobility of urethra    Achalasia    Bilateral edema of lower extremity    Breast pain, right    Chronic sinusitis with recurrent bronchitis    Depression    Esophageal reflux    Headache, migraine    Intractable chronic migraine without aura and without status migrainosus    Breast mass    Class 1 obesity    Other chronic pain    Spondylosis of lumbar region without myelopathy or radiculopathy    Thyroiditis    Tubular adenoma of colon    Food allergy    POP-Q stage 2 cystocele    Moderate persistent asthma with exacerbation    Prediabetes    Need for shingles vaccine    Discomfort of right ear    Trochanteric bursitis of right hip    Pain in right hip    Numbness of toes    Acute viral bronchitis    Overweight    Hashimoto's thyroiditis    Lower abdominal pain    Left ovarian cyst    History of endometrial ablation    Left nephrolithiasis    Abdominal bloating    Achalasia of digestive tract    Esophageal dysphagia       Objective     /82   Pulse 78   Temp 97 7 °F (36 5 °C)   Ht 5' 7" (1 702 m)   Wt 95 kg (209 lb 7 oz)   SpO2 98%   BMI 32 80 kg/m²     Physical Exam   Constitutional: She is oriented to person, place, and time  She appears well-developed and well-nourished  No distress  HENT:   Head: Normocephalic and atraumatic  Mouth/Throat: No oropharyngeal exudate  Tenderness at the maxillary sinuses and frontal sinuses   Cardiovascular: Normal rate, regular rhythm and normal heart sounds  Exam reveals no friction rub  No murmur heard  Pulmonary/Chest: Effort normal and breath sounds normal  No stridor  No respiratory distress  She has no wheezes  She has no rales  Abdominal: Soft  Bowel sounds are normal  She exhibits no distension  There is no tenderness  There is no guarding  Musculoskeletal: She exhibits no edema or deformity  Neurological: She is alert and oriented to person, place, and time  No sensory deficit  She exhibits normal muscle tone  Skin: She is not diaphoretic  Psychiatric:   Hyperactive mood   Vitals reviewed        Pertinent Laboratory/Diagnostic Studies:  CBC:   Lab Results   Component Value Date/Time    WBC 7 91 10/06/2018 08:53 AM    WBC 15 61 (H) 01/07/2016 05:39 AM    RBC 4 34 10/06/2018 08:53 AM    RBC 4 00 01/07/2016 05:39 AM    HGB 12 4 10/06/2018 08:53 AM    HGB 11 0 (L) 01/07/2016 05:39 AM    HCT 39 4 10/06/2018 08:53 AM    HCT 34 7 (L) 01/07/2016 05:39 AM    MCV 91 10/06/2018 08:53 AM    MCV 87 01/07/2016 05:39 AM    MCH 28 6 10/06/2018 08:53 AM    MCH 27 5 01/07/2016 05:39 AM    MCHC 31 5 10/06/2018 08:53 AM    MCHC 31 7 01/07/2016 05:39 AM    RDW 13 1 10/06/2018 08:53 AM    RDW 14 1 01/07/2016 05:39 AM    MPV 9 1 10/06/2018 08:53 AM    MPV 9 3 01/07/2016 05:39 AM     (H) 10/06/2018 08:53 AM     01/07/2016 05:39 AM    NRBC 0 10/06/2018 08:53 AM    NEUTOPHILPCT 60 10/06/2018 08:53 AM    NEUTOPHILPCT 63 12/03/2015 10:43 AM    LYMPHOPCT 23 10/06/2018 08:53 AM    LYMPHOPCT 24 12/03/2015 10:43 AM    MONOPCT 14 (H) 10/06/2018 08:53 AM    MONOPCT 12 12/03/2015 10:43 AM    EOSPCT 2 10/06/2018 08:53 AM    EOSPCT 1 12/03/2015 10:43 AM    BASOPCT 1 10/06/2018 08:53 AM    BASOPCT 1 05/01/2015 12:43 PM    NEUTROABS 4 72 10/06/2018 08:53 AM    NEUTROABS 5 67 12/03/2015 10:43 AM    LYMPHSABS 1 83 10/06/2018 08:53 AM    LYMPHSABS 2 16 12/03/2015 10:43 AM    MONOSABS 1 14 10/06/2018 08:53 AM    MONOSABS 1 08 12/03/2015 10:43 AM    EOSABS 0 13 10/06/2018 08:53 AM    EOSABS 0 09 12/03/2015 10:43 AM     Chemistry Profile:   Lab Results   Component Value Date/Time     01/07/2016 05:39 AM    K 3 9 03/29/2019 10:08 AM    K 4 3 01/07/2016 05:39 AM     03/29/2019 10:08 AM     01/07/2016 05:39 AM    CO2 27 03/29/2019 10:08 AM    CO2 24 01/07/2016 05:39 AM    ANIONGAP 6 01/07/2016 05:39 AM    BUN 12 03/29/2019 10:08 AM    BUN 12 01/07/2016 05:39 AM    CREATININE 0 68 03/29/2019 10:08 AM    CREATININE 0 62 01/07/2016 05:39 AM    GLUC 86 07/21/2016 11:36 AM    GLUF 84 03/29/2019 10:08 AM    GLUCOSE 104 01/07/2016 05:39 AM    CALCIUM 8 7 03/29/2019 10:08 AM    CALCIUM 8 3 01/07/2016 05:39 AM    AST 15 03/29/2019 10:08 AM    AST 56 (H) 12/03/2015 10:43 AM    ALT 31 03/29/2019 10:08 AM     (H) 12/03/2015 10:43 AM    ALKPHOS 91 03/29/2019 10:08 AM    ALKPHOS 86 12/03/2015 10:43 AM    PROT 6 6 12/03/2015 10:43 AM    BILITOT 0 36 12/03/2015 10:43 AM    EGFR 101 03/29/2019 10:08 AM     Endocrine Studies:   Lab Results   Component Value Date/Time    HGBA1C 5 4 03/29/2019 10:08 AM    KJQ3XHJALVOT 0 466 06/04/2019 10:23 AM    UFU4JQRYIAQP 0 885 10/23/2015 11:40 AM    FREET4 1 29 06/04/2019 10:23 AM    FREET4 1 1 10/09/2014 10:01 AM    THYMICROANTI 324 (H) 05/24/2018 04:05 PM    THYMICROANTI 401 (H) 10/09/2014 10:01 AM    THGAB 349 2 (H) 05/24/2018 04:05 PM    THGAB 573 4 (H) 10/09/2014 10:01 AM    TRIG 114 10/06/2018 08:53 AM    TRIG 63 12/03/2015 10:43 AM    CHOL 207 12/03/2015 10:43 AM    CHOLESTEROL 181 10/06/2018 08:53 AM    HDL 41 10/06/2018 08:53 AM    HDL 51 12/03/2015 10:43 AM    LDLCALC 117 (H) 10/06/2018 08:53 AM    LDLCALC 143 (H) 12/03/2015 10:43 AM    NONHDLC 130 04/06/2018 08:23 AM    [id number] 30 8 04/06/2018 08:23 AM         Current Medications     Current Outpatient Medications:     acetaminophen (TYLENOL) 325 mg tablet, Every 4 hours as needed, Disp: 30 tablet, Rfl: 0    albuterol (2 5 mg/3 mL) 0 083 % nebulizer solution, Take 1 vial (2 5 mg total) by nebulization every 6 (six) hours as needed for wheezing or shortness of breath, Disp: 75 mL, Rfl: 0    albuterol (VENTOLIN HFA) 90 mcg/act inhaler, Inhale 2 puffs every 4 (four) hours as needed for wheezing or shortness of breath, Disp: 1 Inhaler, Rfl: 0    ARIPiprazole (ABILIFY) 5 mg tablet, Take 5 mg by mouth daily at bedtime, Disp: , Rfl: 1    betamethasone dipropionate (DIPROSONE) 0 05 % cream, Apply topically daily, Disp: 30 g, Rfl: 0    bismuth subsalicylate (PEPTO BISMOL) 262 MG chewable tablet, Chew 2 tablets, Disp: , Rfl:     Botulinum Toxin Type A 200 units SOLR, Inject 155 units into face and neck IM every 90 days, Disp: , Rfl:     BREO ELLIPTA, 1 puff daily , Disp: , Rfl:     buPROPion (WELLBUTRIN XL) 150 mg 24 hr tablet, Take 150 mg by mouth every morning, Disp: , Rfl: 0    cetirizine (ZyrTEC) 10 mg tablet, Take 1 tablet (10 mg total) by mouth daily, Disp: 30 tablet, Rfl: 3    clonazePAM (KlonoPIN) 0 5 mg tablet, Take 0 5 mg by mouth daily  , Disp: , Rfl:     diclofenac sodium (VOLTAREN) 1 %, APPLY 4 GRAMS TO AFFECTED AREA(S) THREE TIMES A DAY, Disp: , Rfl: 3    dicyclomine (BENTYL) 20 mg tablet, TAKE 1 TABLET BY MOUTH EVERY 6 HOURS AS NEEDED, Disp: 120 tablet, Rfl: 2    FLUCELVAX QUADRIVALENT, , Disp: , Rfl:     fluticasone (FLONASE) 50 mcg/act nasal spray, 1-2 sprays into each nostril daily, Disp: 16 g, Rfl: 0    furosemide (LASIX) 20 mg tablet, Take 1 tablet (20 mg total) by mouth daily as needed (leg swelling), Disp: 30 tablet, Rfl: 2    hydrOXYzine pamoate (VISTARIL) 50 mg capsule, Take 50 mg by mouth 3 (three) times a day as needed  , Disp: , Rfl:     ibuprofen (MOTRIN) 800 mg tablet, , Disp: , Rfl:     ipratropium (ATROVENT) 0 02 % nebulizer solution, , Disp: , Rfl:     levothyroxine 150 mcg tablet, Take 150 mcg by mouth 1 tab mon-sat and 1/2 on Sunday, Disp: , Rfl:     loratadine (CLARITIN) 10 mg tablet, Take 1 tablet (10 mg total) by mouth daily, Disp: 90 tablet, Rfl: 0    meloxicam (MOBIC) 15 mg tablet, Take 1 tablet (15 mg total) by mouth daily, Disp: 30 tablet, Rfl: 6    metFORMIN (GLUCOPHAGE-XR) 500 mg 24 hr tablet, Take 2 tablets daily in AM, Disp: 60 tablet, Rfl: 1    methocarbamol (ROBAXIN) 750 mg tablet, Take 750 mg by mouth 3 (three) times a day, Disp: , Rfl: 0    montelukast (SINGULAIR) 10 mg tablet, Take 10 mg by mouth daily, Disp: , Rfl: 6    naproxen (NAPROSYN) 500 mg tablet, Take 500 mg by mouth every 12 (twelve) hours, Disp: , Rfl: 2    pantoprazole (PROTONIX) 40 mg tablet, Take 1 tablet (40 mg total) by mouth 2 (two) times a day, Disp: 180 tablet, Rfl: 2    polyethylene glycol (GLYCOLAX) powder, Take 17 g by mouth daily Mix 1 capful in 8 ounces of water, juice or tea , Disp: 578 g, Rfl: 3    prochlorperazine (COMPAZINE) 10 mg tablet, TAKE 1 TABLET TWICE A DAY AS NEEDED FOR HEADACHE OR NAUSEA, LIMIT 3 DAYS PER WEEK, Disp: , Rfl: 0    psyllium (METAMUCIL) 58 6 % powder, Take 1 packet by mouth 2 (two) times a day, Disp: 60 packet, Rfl: 3    sodium chloride (BRONCHO SALINE) inhaler solution, Take 1 spray by nebulization as needed for wheezing, Disp: 90 mL, Rfl: 0    SUMAtriptan (IMITREX) 100 mg tablet, Take 100 mg by mouth, Disp: , Rfl:     triamcinolone (KENALOG) 0 1 % cream, , Disp: , Rfl:     Triamcinolone Acetonide 55 MCG/ACT AERO, 2 sprays into each nostril daily, Disp: , Rfl:     VIIBRYD 40 MG tablet, Take 40 mg by mouth daily With food, Disp: , Rfl: 0    zolpidem (AMBIEN) 10 mg tablet, TAKE 1 TABLETS AS NEEDED FOR SLEEP, Disp: , Rfl: 0    doxepin (SINEquan) 25 mg capsule, TAKE 1-2 CAPSULES BY MOUTH DAILY AT BEDTIME , Disp: , Rfl: 0    levocetirizine (XYZAL) 5 MG tablet, Take 5 mg by mouth, Disp: , Rfl:     Nerve Stimulator (TENS THERAPY PAIN RELIEF) VIKASH, 1 application by Does not apply route 2 (two) times a day as needed (twice daily for hip pain) (Patient not taking: Reported on 1/20/2020), Disp: 1 Device, Rfl: 0    oxyCODONE-acetaminophen (PERCOCET) 5-325 mg per tablet, Take 1 tablet by mouth 2 (two) times a day, Disp: , Rfl: 0    topiramate (TOPAMAX) 25 mg tablet, TAKE 4 PILLS AT BEDTIME , Disp: , Rfl: 1    Health Maintenance     Health Maintenance   Topic Date Due    Medicare Annual Wellness Visit (AWV)  1965    HIV Screening  03/23/1982    BMI: Followup Plan  03/23/1985    Influenza Vaccine  07/01/2019    MAMMOGRAM  07/08/2020    BMI: Adult  12/20/2020    CRC Screening: Colonoscopy  04/25/2022    Cervical Cancer Screening  06/27/2022    DTaP,Tdap,and Td Vaccines (2 - Td) 05/22/2025    Pneumococcal Vaccine: 65+ Years (1 of 2 - PCV13) 03/23/2032    Pneumococcal Vaccine: Pediatrics (0 to 5 Years) and At-Risk Patients (6 to 59 Years)  Completed    HIB Vaccine  Aged Out    Hepatitis B Vaccine  Aged Out    IPV Vaccine  Aged Out    Hepatitis A Vaccine  Aged Out    Meningococcal ACWY Vaccine  Aged Out    HPV Vaccine  Aged [employer] History Administered Date(s) Administered    INFLUENZA 11/07/2006, 11/15/2014, 11/01/2017, 12/10/2018, 01/14/2019    Influenza Injectable, MDCK, Preservative Free, Quadrivalent, 0 5 mL 01/14/2019    Influenza Quadrivalent Preservative Free 3 years and older IM 11/06/2017    Influenza TIV (IM) 10/23/2015    Pneumococcal Polysaccharide PPV23 01/28/2016    Tdap 05/22/2015       Kalyan Mosquera MD  PGY III  1/20/2020 11:19 AM  Portions of the record may have been created with voice recognition software  Occasional wrong word or "sound a like" substitutions may have occurred due to the inherent limitations of voice recognition software  Read the chart carefully and recognize, using context, where substitutions have occurred

## 2020-01-20 NOTE — ASSESSMENT & PLAN NOTE
Referral to a psychiatry given  On Wellbutrin and aripiprazole given clinical picture of bipolar disorder

## 2020-01-20 NOTE — ASSESSMENT & PLAN NOTE
Follow-up with neurology Dr Landry at [facility] on bottle in a toxin a injection is 155 units in November 2019 next injection will be February 20, 2020    Associated with photophobia and phonophobia  Could not take Topamax due to history of nephrolithiasis  Continue Compazine p r n  and Imitrex p r n  or headache

## 2020-02-18 DIAGNOSIS — J45.909 ASTHMA: ICD-10-CM

## 2020-02-18 RX ORDER — FLUTICASONE PROPIONATE 50 MCG
1-2 SPRAY, SUSPENSION (ML) NASAL DAILY
Qty: 16 G | Refills: 6 | Status: SHIPPED | OUTPATIENT
Start: 2020-02-18 | End: 2020-07-15 | Stop reason: SDUPTHER

## 2020-02-18 NOTE — TELEPHONE ENCOUNTER
Name of medication, dose, quantity and frequency:    Requested Prescriptions     Pending Prescriptions Disp Refills    fluticasone (FLONASE) 50 mcg/act nasal spray 16 g 0     Si-2 sprays into each nostril daily       Number of refills left: 0    Amount of medication left: 0    Pharmacy verified and updated: yes    Additional information:    Received fax requesting refills on medication

## 2020-03-11 ENCOUNTER — TELEPHONE (OUTPATIENT)
Dept: GASTROENTEROLOGY | Facility: CLINIC | Age: 55
End: 2020-03-11

## 2020-03-11 DIAGNOSIS — K58.0 IRRITABLE BOWEL SYNDROME WITH DIARRHEA: ICD-10-CM

## 2020-03-11 RX ORDER — DICYCLOMINE HCL 20 MG
20 TABLET ORAL EVERY 6 HOURS PRN
Qty: 120 TABLET | Refills: 5 | Status: SHIPPED | OUTPATIENT
Start: 2020-03-11 | End: 2020-07-24

## 2020-03-11 NOTE — TELEPHONE ENCOUNTER
[facility] pharmacy is requesting a refill for the medication Dicyclomine 20 mg tablet  Please send script to the pharmacy  Thak you

## 2020-03-26 ENCOUNTER — TELEPHONE (OUTPATIENT)
Dept: INTERNAL MEDICINE CLINIC | Facility: CLINIC | Age: 55
End: 2020-03-26

## 2020-03-26 NOTE — TELEPHONE ENCOUNTER
Sav Pike,   Patient has an ASCVD risk of 1 5%  Does not need statin therapy at this time based on current guidelines   Thank you

## 2020-03-26 NOTE — TELEPHONE ENCOUNTER
Received fax from patients insurance suggesting that patient be placed on a statin therapy  Ultimately you can decide whether or not it is appropriate to do so

## 2020-06-03 ENCOUNTER — TELEPHONE (OUTPATIENT)
Dept: SURGERY | Facility: HOSPITAL | Age: 55
End: 2020-06-03

## 2020-06-03 DIAGNOSIS — G43.909 MIGRAINE WITHOUT STATUS MIGRAINOSUS, NOT INTRACTABLE, UNSPECIFIED MIGRAINE TYPE: ICD-10-CM

## 2020-06-03 RX ORDER — SODIUM CHLORIDE 9 MG/ML
50 INJECTION, SOLUTION INTRAVENOUS CONTINUOUS
Status: CANCELLED | OUTPATIENT
Start: 2020-06-03

## 2020-06-03 RX ORDER — SUMATRIPTAN 100 MG/1
100 TABLET, FILM COATED ORAL ONCE AS NEEDED
Qty: 10 TABLET | Refills: 1 | Status: SHIPPED | OUTPATIENT
Start: 2020-06-03

## 2020-06-17 ENCOUNTER — TELEPHONE (OUTPATIENT)
Dept: SURGERY | Facility: HOSPITAL | Age: 55
End: 2020-06-17

## 2020-06-17 RX ORDER — SODIUM CHLORIDE 9 MG/ML
50 INJECTION, SOLUTION INTRAVENOUS CONTINUOUS
Status: CANCELLED | OUTPATIENT
Start: 2020-06-17

## 2020-06-18 ENCOUNTER — HOSPITAL ENCOUNTER (OUTPATIENT)
Dept: INTERVENTIONAL RADIOLOGY/VASCULAR | Facility: HOSPITAL | Age: 55
Discharge: HOME | End: 2020-06-18
Admitting: PHYSICAL MEDICINE & REHABILITATION
Payer: COMMERCIAL

## 2020-06-18 VITALS
HEART RATE: 79 BPM | TEMPERATURE: 97.2 F | WEIGHT: 209 LBS | HEIGHT: 67 IN | SYSTOLIC BLOOD PRESSURE: 124 MMHG | DIASTOLIC BLOOD PRESSURE: 69 MMHG | BODY MASS INDEX: 32.8 KG/M2 | RESPIRATION RATE: 16 BRPM | OXYGEN SATURATION: 100 %

## 2020-06-18 DIAGNOSIS — M51.16 INTERVERTEBRAL DISC DISORDER WITH RADICULOPATHY OF LUMBAR REGION: ICD-10-CM

## 2020-06-18 PROCEDURE — 82948 REAGENT STRIP/BLOOD GLUCOSE: CPT

## 2020-06-18 PROCEDURE — 99152 MOD SED SAME PHYS/QHP 5/>YRS: CPT

## 2020-06-18 RX ORDER — SODIUM CHLORIDE 9 MG/ML
50 INJECTION, SOLUTION INTRAVENOUS CONTINUOUS
Status: DISCONTINUED | OUTPATIENT
Start: 2020-06-18 | End: 2020-06-22 | Stop reason: HOSPADM

## 2020-06-18 RX ORDER — KETOROLAC TROMETHAMINE 30 MG/ML
INJECTION, SOLUTION INTRAMUSCULAR; INTRAVENOUS CODE/TRAUMA/SEDATION MEDICATION
Status: COMPLETED | OUTPATIENT
Start: 2020-06-18 | End: 2020-06-18

## 2020-06-18 RX ORDER — MIDAZOLAM HYDROCHLORIDE 2 MG/2ML
INJECTION, SOLUTION INTRAMUSCULAR; INTRAVENOUS CODE/TRAUMA/SEDATION MEDICATION
Status: COMPLETED | OUTPATIENT
Start: 2020-06-18 | End: 2020-06-18

## 2020-06-18 RX ORDER — LIDOCAINE WITH 8.4% SOD BICARB 0.9%(10ML)
SYRINGE (ML) INJECTION CODE/TRAUMA/SEDATION MEDICATION
Status: COMPLETED | OUTPATIENT
Start: 2020-06-18 | End: 2020-06-18

## 2020-06-18 RX ORDER — FENTANYL CITRATE 50 UG/ML
INJECTION, SOLUTION INTRAMUSCULAR; INTRAVENOUS CODE/TRAUMA/SEDATION MEDICATION
Status: COMPLETED | OUTPATIENT
Start: 2020-06-18 | End: 2020-06-18

## 2020-06-18 RX ORDER — PAPAVERINE HCL 150 MG
CAPSULE, EXTENDED RELEASE ORAL CODE/TRAUMA/SEDATION MEDICATION
Status: COMPLETED | OUTPATIENT
Start: 2020-06-18 | End: 2020-06-18

## 2020-06-18 RX ADMIN — Medication 10 ML: at 11:35

## 2020-06-18 RX ADMIN — DEXAMETHASONE SODIUM PHOSPHATE 10 MG: 10 INJECTION, SOLUTION INTRAMUSCULAR; INTRAVENOUS at 11:35

## 2020-06-18 RX ADMIN — SODIUM CHLORIDE 50 ML/HR: 0.9 INJECTION, SOLUTION INTRAVENOUS at 09:47

## 2020-06-18 RX ADMIN — IOHEXOL 3 ML: 300 INJECTION, SOLUTION INTRAVENOUS at 11:35

## 2020-06-18 RX ADMIN — MIDAZOLAM HYDROCHLORIDE 2 MG: 1 INJECTION, SOLUTION INTRAMUSCULAR; INTRAVENOUS at 11:33

## 2020-06-18 RX ADMIN — KETOROLAC TROMETHAMINE 30 MG: 30 INJECTION, SOLUTION INTRAMUSCULAR; INTRAVENOUS at 11:33

## 2020-06-18 RX ADMIN — FENTANYL CITRATE 50 MCG: 50 INJECTION INTRAMUSCULAR; INTRAVENOUS at 11:33

## 2020-06-19 LAB — GLUCOSE SERPL-MCNC: 100 MG/DL (ref 65–140)

## 2020-06-30 DIAGNOSIS — J45.909 ASTHMA: ICD-10-CM

## 2020-06-30 NOTE — TELEPHONE ENCOUNTER
Requested Prescriptions     Pending Prescriptions Disp Refills    betamethasone dipropionate (DIPROSONE) 0 05 % cream 30 g 0     Sig: Apply topically daily

## 2020-07-01 RX ORDER — BETAMETHASONE DIPROPIONATE 0.5 MG/G
CREAM TOPICAL DAILY
Qty: 30 G | Refills: 0 | Status: SHIPPED | OUTPATIENT
Start: 2020-07-01 | End: 2020-07-24 | Stop reason: SDUPTHER

## 2020-07-09 ENCOUNTER — OFFICE VISIT (OUTPATIENT)
Dept: GASTROENTEROLOGY | Facility: CLINIC | Age: 55
End: 2020-07-09
Payer: COMMERCIAL

## 2020-07-09 VITALS
WEIGHT: 222 LBS | TEMPERATURE: 99.3 F | DIASTOLIC BLOOD PRESSURE: 75 MMHG | HEIGHT: 67 IN | SYSTOLIC BLOOD PRESSURE: 112 MMHG | HEART RATE: 81 BPM | BODY MASS INDEX: 34.84 KG/M2

## 2020-07-09 DIAGNOSIS — Z20.822 ENCOUNTER FOR LABORATORY TESTING FOR COVID-19 VIRUS: ICD-10-CM

## 2020-07-09 DIAGNOSIS — R13.19 ESOPHAGEAL DYSPHAGIA: ICD-10-CM

## 2020-07-09 DIAGNOSIS — K58.1 IRRITABLE BOWEL SYNDROME WITH CONSTIPATION: ICD-10-CM

## 2020-07-09 DIAGNOSIS — D12.6 TUBULAR ADENOMA OF COLON: ICD-10-CM

## 2020-07-09 DIAGNOSIS — K21.9 GASTROESOPHAGEAL REFLUX DISEASE WITHOUT ESOPHAGITIS: ICD-10-CM

## 2020-07-09 DIAGNOSIS — K22.0 ACHALASIA: Primary | ICD-10-CM

## 2020-07-09 PROCEDURE — 1036F TOBACCO NON-USER: CPT | Performed by: INTERNAL MEDICINE

## 2020-07-09 PROCEDURE — 3008F BODY MASS INDEX DOCD: CPT | Performed by: INTERNAL MEDICINE

## 2020-07-09 PROCEDURE — 99214 OFFICE O/P EST MOD 30 MIN: CPT | Performed by: INTERNAL MEDICINE

## 2020-07-09 NOTE — PATIENT INSTRUCTIONS
Informed pt that we will contact insurance prior to scheduling to insure that procedure will be covered

## 2020-07-09 NOTE — PROGRESS NOTES
[facility] - Outpatient Follow-up Note  Benjamin Louise 50 y o  female MRN: [id number]  Encounter: [id number]          ASSESSMENT AND PLAN:      1  Achalasia  2  Esophageal dysphagia  status post laparoscopic Heller myotomy with lorena fundoplication in 5241  Now experiencing dysphagia to solid foods  She has been high experiencing the symptoms for over 1 year  I did EGD with dilation to 20 mm in October 2019  She felt a little bit better for few days but her symptoms are unchanged  She has no nausea, vomiting, melena or weight loss  Repeat esophageal manometry showed ineffective esophageal motility and normal IRP  Because of persistent symptoms I recommend repeat EGD with Botox injection into lower esophageal sphincter and see if she improves  Continue PPI therapy  3  Gastroesophageal reflux disease without esophagitis  Continue reflux precautions  Her symptoms are well controlled with pantoprazole 40 mg twice a day  4  Irritable bowel syndrome with constipation  She is eating high-fiber diet  Continue prune juice and MiraLax as needed for constipation  5  Tubular adenoma of colon  She is due for surveillance colonoscopy in April 2022    ______________________________________________________________________    SUBJECTIVE:    Benjamin Louise is a 48 y o  female here as a follow up pt regarding esophageal dysphagia  She had achalasia and underwent Heller myotomy with fundoplication in 4744  Her dysphagia significantly improved following surgery and she gained some weight after her procedure  about 2 years ago she started having dysphagia  Her dysphagia is mostly to solid but she also reports intermittent dysphagia to liquids  She also reports some reflux symptoms including heartburn  She is currently on Protonix b i d  Koby Lavinia Repeat esophageal manometry showed ineffective esophageal motility with diminutive hiatal hernia    Her IRP was normal at 9 mmHg  EGD done on 2019 showed mild esophagitis at the GE junction  Empiric dilation of GE junction was performed using CRE balloon to 20 mm  Normal stomach and duodenum  She reports some improvement in her symptoms for few days but her symptoms are unchanged  She has no weight loss    REVIEW OF SYSTEMS IS OTHERWISE NEGATIVE  Historical Information   Past Medical History:   Diagnosis Date    Achalasia     Asthma     Constipation     Depression     Diarrhea 2019    Elevated LFTs     last assessed 10/25/17    Fibromyalgia     Gastric ulcer     GERD (gastroesophageal reflux disease)     H  pylori infection 10/9/2017    [facility] [id number]: Treated, f/u stool antigen negative    Hypothyroidism     Kidney stone     Migraines     Migraines     MICHAEL (obstructive sleep apnea)     last assessed 16    Pneumonia     Sleep apnea 2019    Uses CPAP    Thyroid nodule     last assessed 17     Past Surgical History:   Procedure Laterality Date    BREAST BIOPSY       SECTION      COLONOSCOPY      ENDOMETRIAL ABLATION      MYOTOMY HELLER LAPAROSCOPIC  2016    Dr Eddy Carranza, Coincident lorena fundoplication    NASAL SINUS SURGERY      OTHER SURGICAL HISTORY      achalasia repair    OVARIAN CYST REMOVAL      PILONIDAL CYST / SINUS EXCISION      KS COLONOSCOPY FLX DX W/COLLJ SPEC WHEN PFRMD N/A 2019    Procedure: COLONOSCOPY;  Surgeon: Sita Patrick MD;  [facility]; Service: Gastroenterology    KS CYSTOURETHROSCOPY N/A 2017    Procedure: Ender Cortes;  Surgeon: Harley Mueller MD;  [facility];  Service: UroGynecology           KS ESOPHAGOGASTRODUODENOSCOPY TRANSORAL DIAGNOSTIC N/A 2017    Procedure: ESOPHAGOGASTRODUODENOSCOPY (EGD); Surgeon: Sita Patrick MD;  [facility]; Service: Gastroenterology    KS ESOPHAGOGASTRODUODENOSCOPY TRANSORAL DIAGNOSTIC N/A 2019    Procedure: ESOPHAGOGASTRODUODENOSCOPY (EGD);   Surgeon: Sita Patrick MD;  [facility];   Service: Gastroenterology    CA POST COLPORRHAPHY,RECTUM/VAGINA N/A 2017    Procedure: COLPORRHAPHY POSTERIOR;  Surgeon: Vandana Fleming MD;  [facility];  Service: UroGynecology           CA SLING OPER STRES INCONTINENCE N/A 2017    Procedure: INSERTION PUBOVAGINAL SLING /SINGLE INCISION ;  Surgeon: Vandana Fleming MD;  [facility];  Service: UroGynecology           TONSILLECTOMY      TUBAL LIGATION      UPPER GASTROINTESTINAL ENDOSCOPY      US GUIDED BREAST BIOPSY RIGHT COMPLETE Right 10/31/2017     Social History   Social History     Substance and Sexual Activity   Alcohol Use No     Social History     Substance and Sexual Activity   Drug Use No     Social History     Tobacco Use   Smoking Status Former Smoker    Packs/day: 1 50    Years: 30 00    Pack years: 37 1    Last attempt to quit:     Years since quittin 5   Smokeless Tobacco Never Used   Tobacco Comment    pt stated quit about a month ago, last assessed 14 per [employer]     Family History   Problem Relation Age of Onset    Cancer Mother     Breast cancer Mother 62    Hypertension Mother     Dementia Father     Stroke Father     Diabetes Father     Hypertension Father     Cancer Maternal Grandmother         ovarian    Ovarian cancer Maternal Grandmother         unspecified laterality    Cancer Maternal Grandfather     Hypertension Maternal Grandfather     Tongue cancer Maternal Grandfather     Stroke Paternal Grandmother     Thyroid disease unspecified Brother     Thyroid disease unspecified Maternal Aunt     Colon cancer Maternal Aunt     Breast cancer Family     Diabetes Sister     BRCA1 Negative Sister     BRCA2 Negative Sister     No Known Problems Daughter     No Known Problems Son     No Known Problems Daughter     No Known Problems Son        Meds/Allergies       Current Outpatient Medications:     acetaminophen (TYLENOL) 325 mg tablet   albuterol (2 5 mg/3 mL) 0 083 % nebulizer solution    albuterol (VENTOLIN HFA) 90 mcg/act inhaler    ARIPiprazole (ABILIFY) 5 mg tablet    betamethasone dipropionate (DIPROSONE) 0 05 % cream    bismuth subsalicylate (PEPTO BISMOL) 262 MG chewable tablet    Botulinum Toxin Type A 200 units SOLR    buPROPion (WELLBUTRIN XL) 150 mg 24 hr tablet    cetirizine (ZyrTEC) 10 mg tablet    diclofenac sodium (VOLTAREN) 1 %    dicyclomine (BENTYL) 20 mg tablet    doxepin (SINEquan) 25 mg capsule    FLUCELVAX QUADRIVALENT    fluticasone (FLONASE) 50 mcg/act nasal spray    furosemide (LASIX) 20 mg tablet    hydrOXYzine pamoate (VISTARIL) 50 mg capsule    ipratropium (ATROVENT) 0 02 % nebulizer solution    levothyroxine 150 mcg tablet    Lidocaine Viscous HCl (XYLOCAINE) 2 % mucosal solution    Melatonin 5 MG TABS    meloxicam (MOBIC) 15 mg tablet    metFORMIN (GLUCOPHAGE-XR) 500 mg 24 hr tablet    methocarbamol (ROBAXIN) 750 mg tablet    montelukast (SINGULAIR) 10 mg tablet    oxyCODONE-acetaminophen (PERCOCET) 5-325 mg per tablet    pantoprazole (PROTONIX) 40 mg tablet    polyethylene glycol (GLYCOLAX) powder    prochlorperazine (COMPAZINE) 10 mg tablet    SUMAtriptan (IMITREX) 100 mg tablet    VIIBRYD 40 MG tablet    BREO ELLIPTA 100-25 MCG/INH inhaler    Allergies   Allergen Reactions    Gabapentin      Annotation - [id number]: Neuropsych effects per patient   Latex Dermatitis    Penicillins Hives           Objective     Blood pressure 112/75, pulse 81, temperature 99 3 °F (37 4 °C), temperature source Tympanic, height 5' 7" (1 702 m), weight 101 kg (222 lb), not currently breastfeeding  Body mass index is 34 77 kg/m²        PHYSICAL EXAM:      General Appearance:   Alert, cooperative, no distress   HEENT:   Normocephalic, atraumatic, anicteric      Neck:  Supple, symmetrical, trachea midline   Lungs:   Clear to auscultation bilaterally; no rales, rhonchi or wheezing; respirations unlabored  Heart[de-identified]   Regular rate and rhythm; no murmur, rub, or gallop  Abdomen:   Soft, non-tender, non-distended; normal bowel sounds; no masses, no organomegaly    Genitalia:   Deferred    Rectal:   Deferred    Extremities:  No cyanosis, clubbing or edema    Pulses:  2+ and symmetric    Skin:  No jaundice, rashes, or lesions    Lymph nodes:  No palpable cervical lymphadenopathy        Lab Results:   No visits with results within 1 Day(s) from this visit  Latest known visit with results is:   Hospital Outpatient Visit on 06/18/2020   Component Date Value    POC Glucose 06/18/2020 100          Radiology Results:   [facility]    Result Date: 6/18/2020  Narrative: ATTENDING PHYSICIAN:  Daniel Nguyễn MD  PROCEDURE: 1  Right L5 transforaminal epidural steroid injection under fluoroscopic guidance  2  Right S1 transforaminal epidural steroid injection under fluoroscopic guidance  PRE-PROCEDURE DIAGNOSIS:  Right lumbosacral radiculitis, lumbar bulging disc POST-PROCEDURE DIAGNOSIS: Same ANESTHESIA:  Local and Conscious Sedation ESTIMATED BLOOD LOSS:  Minimal  COMPLICATIONS:  None  LOCATION:   CONSENT:  Today's procedure, its potential benefits as well as its risks and potential side effects were reviewed  Discussed risks of the procedure including bleeding, infection, nerve irritation or damage, reactions to the medications, weakness, headache, failure of the pain to improve, and potential worsening of the pain were explained to the patient who verbalized understanding and who wished to proceed  Written informed consent was thereby obtained  DESCRIPTION OF THE PROCEDURE:  After written informed consent was obtained, the patient was taken to the fluoroscopy suite and placed in the prone position  Anatomical landmarks were identified by way of fluoroscopy in multiple views  The skin of the lumbar region was prepped using antiseptic and draped in the usual sterile fashion   Strict aseptic technique was utilized  The skin and subcutaneous tissues at the needle entry site were infiltrated with a total of 5 mL of 1% preservative-free lidocaine using a 30-gauge 1-inch needle  25-gauge needles were then incrementally advanced under fluoroscopic guidance in the oblique view into the neural foramina as mentioned above  Proper placement into each of the neural foramen was confirmed with fluoroscopy in both the lateral and AP views  After negative aspiration for CSF or heme, contrast was injected, which delineated the nerve roots and the epidural space under fluoroscopy in the AP view  There was only a transient pressure paresthesia that resolved immediately upon injection  After negative aspiration, a 2 mL of a 4 mL injectate consisting of 3 mL of preservative-free 1% Lido and 1cc of Dexa 10mg/mL was slowly injected into each of the needles as delineated above  The patient tolerated the procedure well and all needles were removed intact  Hemostasis was maintained  There were no apparent paresthesias or complications  The skin was wiped clean and a Band-Aid was placed as appropriate  The patient was monitored for an appropriate period of time and remained hemodynamically stable following the procedure  The patient was ultimately discharged to home with supervision in good condition and instructed to call the office in approximately 7-10 days with an update or sooner as warranted  Discharge instructions were provided  I was present for and participated in all key and critical portions of this procedure   Jena Mendoza MD 6/18/2020 1:29 PM

## 2020-07-13 DIAGNOSIS — F31.81 BIPOLAR 2 DISORDER (HCC): ICD-10-CM

## 2020-07-14 DIAGNOSIS — R63.5 ABNORMAL WEIGHT GAIN: ICD-10-CM

## 2020-07-14 DIAGNOSIS — J45.909 ASTHMA: ICD-10-CM

## 2020-07-14 DIAGNOSIS — R73.03 PREDIABETES: ICD-10-CM

## 2020-07-15 ENCOUNTER — TELEPHONE (OUTPATIENT)
Dept: GASTROENTEROLOGY | Facility: CLINIC | Age: 55
End: 2020-07-15

## 2020-07-15 NOTE — TELEPHONE ENCOUNTER
Called [facility] and spoke to Rena Sanchez for prior authorization requirements for patient's EGD with Botox  No authorization required if procedure is done outpatient and provider and facility are in network    Call ref # Rena [id number], 7/15 @ 9:33 AM

## 2020-07-15 NOTE — TELEPHONE ENCOUNTER
Name of medication, dose, quantity and frequency    Requested Prescriptions     Pending Prescriptions Disp Refills    metFORMIN (GLUCOPHAGE-XR) 500 mg 24 hr tablet [Pharmacy Med Name: METFORMIN HCL  MG TABLET] 60 tablet 1     Sig: TAKE 2 TABLETS DAILY IN AM    fluticasone (FLONASE) 50 mcg/act nasal spray 16 g 6     Si-2 sprays into each nostril daily         Number of refills left: 0    Amount of medication left:0    Pharmacy verified and updated    Additional information:

## 2020-07-16 RX ORDER — FLUTICASONE PROPIONATE 50 MCG
1-2 SPRAY, SUSPENSION (ML) NASAL DAILY
Qty: 16 G | Refills: 6 | Status: SHIPPED | OUTPATIENT
Start: 2020-07-16 | End: 2020-07-24 | Stop reason: SDUPTHER

## 2020-07-16 RX ORDER — ARIPIPRAZOLE 5 MG/1
TABLET ORAL
Qty: 30 TABLET | Refills: 1 | Status: SHIPPED | OUTPATIENT
Start: 2020-07-16 | End: 2020-09-14 | Stop reason: SDUPTHER

## 2020-07-16 RX ORDER — METFORMIN HYDROCHLORIDE 500 MG/1
TABLET, EXTENDED RELEASE ORAL
Qty: 60 TABLET | Refills: 1 | Status: SHIPPED | OUTPATIENT
Start: 2020-07-16 | End: 2020-09-13 | Stop reason: SDUPTHER

## 2020-07-24 ENCOUNTER — OFFICE VISIT (OUTPATIENT)
Dept: INTERNAL MEDICINE CLINIC | Facility: CLINIC | Age: 55
End: 2020-07-24

## 2020-07-24 VITALS
HEIGHT: 67 IN | TEMPERATURE: 96.6 F | SYSTOLIC BLOOD PRESSURE: 128 MMHG | WEIGHT: 221.78 LBS | DIASTOLIC BLOOD PRESSURE: 72 MMHG | HEART RATE: 90 BPM | BODY MASS INDEX: 34.81 KG/M2

## 2020-07-24 DIAGNOSIS — Z00.00 HEALTH CARE MAINTENANCE: Primary | ICD-10-CM

## 2020-07-24 DIAGNOSIS — G43.909 MIGRAINE WITHOUT STATUS MIGRAINOSUS, NOT INTRACTABLE, UNSPECIFIED MIGRAINE TYPE: ICD-10-CM

## 2020-07-24 DIAGNOSIS — F32.A DEPRESSION, UNSPECIFIED DEPRESSION TYPE: ICD-10-CM

## 2020-07-24 DIAGNOSIS — R22.42 LOCALIZED SWELLING OF LEFT LOWER EXTREMITY: ICD-10-CM

## 2020-07-24 DIAGNOSIS — J45.909 ASTHMA: ICD-10-CM

## 2020-07-24 DIAGNOSIS — K58.0 IRRITABLE BOWEL SYNDROME WITH DIARRHEA: ICD-10-CM

## 2020-07-24 DIAGNOSIS — E03.9 HYPOTHYROIDISM, UNSPECIFIED TYPE: ICD-10-CM

## 2020-07-24 PROCEDURE — 3008F BODY MASS INDEX DOCD: CPT | Performed by: INTERNAL MEDICINE

## 2020-07-24 PROCEDURE — 99203 OFFICE O/P NEW LOW 30 MIN: CPT | Performed by: INTERNAL MEDICINE

## 2020-07-24 PROCEDURE — 1036F TOBACCO NON-USER: CPT | Performed by: INTERNAL MEDICINE

## 2020-07-24 PROCEDURE — 3008F BODY MASS INDEX DOCD: CPT | Performed by: HOSPITALIST

## 2020-07-24 RX ORDER — DICYCLOMINE HCL 20 MG
20 TABLET ORAL EVERY 6 HOURS PRN
Qty: 120 TABLET | Refills: 5 | Status: SHIPPED | OUTPATIENT
Start: 2020-07-24 | End: 2021-03-18

## 2020-07-24 RX ORDER — PROCHLORPERAZINE MALEATE 10 MG
5 TABLET ORAL EVERY 8 HOURS PRN
Qty: 30 TABLET | Refills: 0 | Status: SHIPPED | OUTPATIENT
Start: 2020-07-24 | End: 2020-08-26

## 2020-07-24 RX ORDER — BETAMETHASONE DIPROPIONATE 0.5 MG/G
CREAM TOPICAL DAILY
Qty: 30 G | Refills: 0 | Status: SHIPPED | OUTPATIENT
Start: 2020-07-24 | End: 2021-01-11

## 2020-07-24 RX ORDER — LEVOTHYROXINE SODIUM 0.15 MG/1
150 TABLET ORAL DAILY
Qty: 90 TABLET | Refills: 3 | Status: SHIPPED | OUTPATIENT
Start: 2020-07-24 | End: 2021-06-18 | Stop reason: DRUGHIGH

## 2020-07-24 RX ORDER — FLUTICASONE PROPIONATE 50 MCG
1-2 SPRAY, SUSPENSION (ML) NASAL DAILY
Qty: 16 G | Refills: 6 | Status: SHIPPED | OUTPATIENT
Start: 2020-07-24 | End: 2021-03-23 | Stop reason: SDUPTHER

## 2020-07-25 NOTE — PROGRESS NOTES
INTERNAL MEDICINE FOLLOW-UP OFFICE VISIT  [facility]  [address] [place] [address]    NAME: Jackson Marte  AGE: 50 y o  SEX: female    DATE OF ENCOUNTER: 7/24/2020    Assessment and Plan     IBS-C  -Continue Glycolax, bentyl, pepto bismol, etc      Henery Rana  -continue protonix    Tubular adenoma of colon  -Due for Colonoscopy 4/22    Esophageal dysphagia: s/p heller myotomy with fundoplication and with dysphagia to solid food  Follows with GI who plans for repeat EGD and botox injection  -Continue GI management  Pre DM: improved diet/exercise discussed  -Will continue to follow    Hypothyroidism: secondary to autoimmune thyroiditis  Patient reports symptoms including fatigue, hair loss, 10 lb wt gain, dry skin, constipation  She also reports noticing a small neck lump recently    -Continue levothyroxine  -TSH  -Thyroid US      MICHAEL: with mask compliance, without daytime sleepiness at this time    -continue management  Asthma: well controlled on Breo, Singulair, Atrovent, albuterol    -Continue to follow with Pulm at [facility]  Psychiatric co-morbidities: well controlled at this time, patient reports she has not seen psychiatry lately secondary to insurance issues    -Continue current meds, will refer to psychiatry  Migraines: well controlled currently  Follows with neurology at [address] imitrex, tylenol, botulinum injections with neurology  Chronic pain issues: follows with pain management    -Continued management with robaxin, percocet, meloxicam, voltaren gel per pain management  LE swelling: with PND/Orthopnea  -Will order TTE and BNP      -patient reports she has an appointment for a pap smear/mammogram    -Colorectal screening up to date             Orders Placed This Encounter   Procedures    US thyroid    TSH, 3rd generation with Free T4 reflex    NT-BNP PRO    CBC and Platelet    Comprehensive metabolic panel    Ambulatory referral to Obstetrics / Gynecology    Ambulatory referral to Psychiatry    Echo complete with contrast if indicated     Chief Complaint     Chief Complaint   Patient presents with    Follow-up       History of Present Illness     HPI    50year old female presents to transition care  She has a history of IBS, Achalasia, GERD, Esophageal dysphagia, Pre DM, Hypothyroidism, MICHAEL, Asthma, Depression, Bipolar 2, migraine, chronic pain  Patient follows with Pain management, neurology, Pulm, sleep medicine, GI and reports no acute issues with any medical problems managed by these specialists  She does report her psychiatric issues are well controlled at this time, though requests for referral to a psychiatrists as she has lost her former one due to insurance issues  Patient also reporting fatigue, hair loss, 10 lb weight gain, dry skin, constipation and a recently noted right neck lump  She has a history of hypothyroidism due to autoimmune thyroiditis  Patient also states that she has bilateral LE edema, PND, and Orthopnea  She reports sleeping on multiple pillows at night  Without significant SOB  No other acute complaints at this time  The following portions of the patient's history were reviewed and updated as appropriate: allergies, current medications, past family history, past medical history, past social history, past surgical history and problem list     Review of Systems     Review of Systems   Constitutional: Positive for unexpected weight change  Negative for activity change, appetite change, chills, diaphoresis, fatigue and fever  HENT: Negative for congestion, dental problem, drooling, ear discharge, ear pain, facial swelling, hearing loss, mouth sores and trouble swallowing  Respiratory: Negative for apnea, cough, choking, chest tightness, shortness of breath, wheezing and stridor  Cardiovascular: Positive for leg swelling  Negative for chest pain and palpitations     Gastrointestinal: Negative for abdominal distention, abdominal pain, anal bleeding, blood in stool, constipation, diarrhea, nausea, rectal pain and vomiting  Endocrine: Negative for cold intolerance, heat intolerance, polydipsia, polyphagia and polyuria  Musculoskeletal: Negative for arthralgias, back pain, gait problem, joint swelling, myalgias, neck pain and neck stiffness  Skin: Negative for color change, pallor, rash and wound  Neurological: Negative for dizziness, tremors, seizures, syncope, facial asymmetry, speech difficulty, weakness, light-headedness, numbness and headaches  Hematological: Negative for adenopathy  Does not bruise/bleed easily  Psychiatric/Behavioral: Negative for agitation, behavioral problems, confusion, decreased concentration, dysphoric mood, hallucinations, self-injury, sleep disturbance and suicidal ideas  The patient is not nervous/anxious and is not hyperactive          Active Problem List     Patient Active Problem List   Diagnosis    Mixed incontinence    Stress incontinence in female    Obstructive sleep apnea syndrome    Hypothyroidism    Irritable bowel syndrome with constipation    Chronic low back pain    Asthma    Arthritis    Gastritis    Rectocele    Cystocele    Hypermobility of urethra    Achalasia    Bilateral edema of lower extremity    Breast pain, right    Chronic sinusitis with recurrent bronchitis    Depression    Esophageal reflux    Headache, migraine    Intractable chronic migraine without aura and without status migrainosus    Breast mass    Class 1 obesity    Other chronic pain    Spondylosis of lumbar region without myelopathy or radiculopathy    Thyroiditis    Tubular adenoma of colon    Food allergy    POP-Q stage 2 cystocele    Moderate persistent asthma with exacerbation    Prediabetes    Need for shingles vaccine    Discomfort of right ear    Trochanteric bursitis of right hip    Pain in right hip    Numbness of toes    Acute viral bronchitis    Overweight    Hashimoto's thyroiditis    Lower abdominal pain    Left ovarian cyst    History of endometrial ablation    Left nephrolithiasis    Abdominal bloating    Achalasia of digestive tract    Esophageal dysphagia       Objective     /72 (BP Location: Left arm, Patient Position: Sitting, Cuff Size: Adult)   Pulse 90   Temp (!) 96 6 °F (35 9 °C) (Temporal)   Ht 5' 7" (1 702 m)   Wt 101 kg (221 lb 12 5 oz)   BMI 34 74 kg/m²     Physical Exam   Constitutional: She is oriented to person, place, and time  She appears well-developed and well-nourished  No distress  HENT:   Head: Normocephalic and atraumatic  Right Ear: External ear normal    Left Ear: External ear normal    Nose: Nose normal    Mouth/Throat: Oropharynx is clear and moist  No oropharyngeal exudate  Neck: Normal range of motion  Neck supple  No JVD present  No tracheal deviation present  No thyromegaly present  Cardiovascular: Normal rate, regular rhythm, normal heart sounds and intact distal pulses  Exam reveals no gallop and no friction rub  No murmur heard  Pulmonary/Chest: Effort normal and breath sounds normal  No stridor  No respiratory distress  She has no wheezes  She has no rales  She exhibits no tenderness  Abdominal: Soft  Bowel sounds are normal  She exhibits no distension and no mass  There is no tenderness  There is no rebound and no guarding  No hernia  Musculoskeletal: Normal range of motion  She exhibits edema (2+)  She exhibits no tenderness or deformity  Lymphadenopathy:     She has no cervical adenopathy  Neurological: She is alert and oriented to person, place, and time  Skin: Skin is warm and dry  No rash noted  She is not diaphoretic  No erythema  No pallor  Vitals reviewed         Pertinent Laboratory/Diagnostic Studies:  CBC:   Lab Results   Component Value Date/Time    WBC 7 91 10/06/2018 08:53 AM    WBC 15 61 (H) 01/07/2016 05:39 AM    RBC 4 34 10/06/2018 08:53 AM RBC 4 00 01/07/2016 05:39 AM    HGB 12 4 10/06/2018 08:53 AM    HGB 11 0 (L) 01/07/2016 05:39 AM    HCT 39 4 10/06/2018 08:53 AM    HCT 34 7 (L) 01/07/2016 05:39 AM    MCV 91 10/06/2018 08:53 AM    MCV 87 01/07/2016 05:39 AM    MCH 28 6 10/06/2018 08:53 AM    MCH 27 5 01/07/2016 05:39 AM    MCHC 31 5 10/06/2018 08:53 AM    MCHC 31 7 01/07/2016 05:39 AM    RDW 13 1 10/06/2018 08:53 AM    RDW 14 1 01/07/2016 05:39 AM    MPV 9 1 10/06/2018 08:53 AM    MPV 9 3 01/07/2016 05:39 AM     (H) 10/06/2018 08:53 AM     01/07/2016 05:39 AM    NRBC 0 10/06/2018 08:53 AM    NEUTOPHILPCT 60 10/06/2018 08:53 AM    NEUTOPHILPCT 63 12/03/2015 10:43 AM    LYMPHOPCT 23 10/06/2018 08:53 AM    LYMPHOPCT 24 12/03/2015 10:43 AM    MONOPCT 14 (H) 10/06/2018 08:53 AM    MONOPCT 12 12/03/2015 10:43 AM    EOSPCT 2 10/06/2018 08:53 AM    EOSPCT 1 12/03/2015 10:43 AM    BASOPCT 1 10/06/2018 08:53 AM    BASOPCT 1 05/01/2015 12:43 PM    NEUTROABS 4 72 10/06/2018 08:53 AM    NEUTROABS 5 67 12/03/2015 10:43 AM    LYMPHSABS 1 83 10/06/2018 08:53 AM    LYMPHSABS 2 16 12/03/2015 10:43 AM    MONOSABS 1 14 10/06/2018 08:53 AM    MONOSABS 1 08 12/03/2015 10:43 AM    EOSABS 0 13 10/06/2018 08:53 AM    EOSABS 0 09 12/03/2015 10:43 AM     Chemistry Profile:   Lab Results   Component Value Date/Time     01/07/2016 05:39 AM    K 3 9 03/29/2019 10:08 AM    K 4 3 01/07/2016 05:39 AM     03/29/2019 10:08 AM     01/07/2016 05:39 AM    CO2 27 03/29/2019 10:08 AM    CO2 24 01/07/2016 05:39 AM    ANIONGAP 6 01/07/2016 05:39 AM    BUN 12 03/29/2019 10:08 AM    BUN 12 01/07/2016 05:39 AM    CREATININE 0 68 03/29/2019 10:08 AM    CREATININE 0 62 01/07/2016 05:39 AM    GLUC 86 07/21/2016 11:36 AM    GLUF 84 03/29/2019 10:08 AM    GLUCOSE 104 01/07/2016 05:39 AM    CALCIUM 8 7 03/29/2019 10:08 AM    CALCIUM 8 3 01/07/2016 05:39 AM    AST 15 03/29/2019 10:08 AM    AST 56 (H) 12/03/2015 10:43 AM    ALT 31 03/29/2019 10:08 AM     (H) 12/03/2015 10:43 AM    ALKPHOS 91 03/29/2019 10:08 AM    ALKPHOS 86 12/03/2015 10:43 AM    PROT 6 6 12/03/2015 10:43 AM    BILITOT 0 36 12/03/2015 10:43 AM    EGFR 101 03/29/2019 10:08 AM     CBC: No results for input(s): WBC, RBC, HGB, HCT, MCV, MCH, MCHC, RDW, MPV, PLT, NRBC, NEUTOPHILPCT, LYMPHOPCT, MONOPCT, EOSPCT, BASOPCT, NEUTROABS, LYMPHSABS, MONOSABS, EOSABS, MONOSABS in the last 8784 hours  Chemistry Profile: No results for input(s): NA, K, CL, CO2, ANIONGAP, BUN, CREATININE, GLUF, GLUC, GLUCOSE, CALCIUM, CORRECTEDCA, MG, PHOS, AST, ALT, ALKPHOS, PROT, BILITOT, EGFR, GFRAA, GFRNONAA, EGFRAA, EGFRNAA, EGFRNONAFA in the last 8784 hours      Invalid input(s): EXTSODIUM, EXTPOTASSIUM, EXTCO2, EXTANIONGAP, EXTBUN, EXTCREAT, EXTGLUBLD, GLU, SLAMBGLUCOSE, EXTCALCIUM, CAADJUST, ALBUMIN, SERUMALBUMIN, EXTEGFR    Current Medications     Current Outpatient Medications:     acetaminophen (TYLENOL) 325 mg tablet, Every 4 hours as needed, Disp: 30 tablet, Rfl: 0    albuterol (2 5 mg/3 mL) 0 083 % nebulizer solution, Take 1 vial (2 5 mg total) by nebulization every 6 (six) hours as needed for wheezing or shortness of breath, Disp: 75 mL, Rfl: 0    albuterol (VENTOLIN HFA) 90 mcg/act inhaler, Inhale 2 puffs every 4 (four) hours as needed for wheezing or shortness of breath, Disp: 1 Inhaler, Rfl: 0    ARIPiprazole (ABILIFY) 5 mg tablet, TAKE 1 TABLET BY MOUTH AT BEDTIME, Disp: 30 tablet, Rfl: 1    betamethasone dipropionate (DIPROSONE) 0 05 % cream, Apply topically daily, Disp: 30 g, Rfl: 0    Botulinum Toxin Type A 200 units SOLR, Inject 155 units into face and neck IM every 90 days, Disp: , Rfl:     BREO ELLIPTA 100-25 MCG/INH inhaler, Inhale 1 puff daily 1 puff daily, Disp: 1 Inhaler, Rfl: 3    buPROPion (WELLBUTRIN XL) 150 mg 24 hr tablet, Take 150 mg by mouth every morning, Disp: , Rfl: 0    cetirizine (ZyrTEC) 10 mg tablet, Take 1 tablet (10 mg total) by mouth daily, Disp: 30 tablet, Rfl: 3    diclofenac sodium (VOLTAREN) 1 %, APPLY 4 GRAMS TO AFFECTED AREA(S) THREE TIMES A DAY, Disp: , Rfl: 3    doxepin (SINEquan) 25 mg capsule, TAKE 1-2 CAPSULES BY MOUTH DAILY AT BEDTIME , Disp: , Rfl: 0    FLUCELVAX QUADRIVALENT, , Disp: , Rfl:     fluticasone (FLONASE) 50 mcg/act nasal spray, 1-2 sprays into each nostril daily, Disp: 16 g, Rfl: 6    furosemide (LASIX) 20 mg tablet, Take 1 tablet (20 mg total) by mouth daily as needed (leg swelling), Disp: 30 tablet, Rfl: 2    hydrOXYzine pamoate (VISTARIL) 50 mg capsule, Take 50 mg by mouth 3 (three) times a day as needed  , Disp: , Rfl:     ipratropium (ATROVENT) 0 02 % nebulizer solution, , Disp: , Rfl:     levothyroxine 150 mcg tablet, Take 1 tablet (150 mcg total) by mouth daily 1 tab mon-sat and 1/2 on Sunday, Disp: 90 tablet, Rfl: 3    Lidocaine Viscous HCl (XYLOCAINE) 2 % mucosal solution, Swish and spit 15 mL 4 (four) times a day as needed for mouth pain or discomfort or mucositis, Disp: 15 mL, Rfl: 1    Melatonin 5 MG TABS, Take 1 tablet (5 mg total) by mouth daily at bedtime as needed (Insomnia), Disp: 30 tablet, Rfl: 0    meloxicam (MOBIC) 15 mg tablet, Take 1 tablet (15 mg total) by mouth daily, Disp: 30 tablet, Rfl: 6    metFORMIN (GLUCOPHAGE-XR) 500 mg 24 hr tablet, TAKE 2 TABLETS DAILY IN AM, Disp: 60 tablet, Rfl: 1    methocarbamol (ROBAXIN) 750 mg tablet, Take 750 mg by mouth 3 (three) times a day, Disp: , Rfl: 0    montelukast (SINGULAIR) 10 mg tablet, Take 10 mg by mouth daily, Disp: , Rfl: 6    oxyCODONE-acetaminophen (PERCOCET) 5-325 mg per tablet, Take 1 tablet by mouth 2 (two) times a day, Disp: , Rfl: 0    pantoprazole (PROTONIX) 40 mg tablet, Take 1 tablet (40 mg total) by mouth 2 (two) times a day, Disp: 180 tablet, Rfl: 2    polyethylene glycol (GLYCOLAX) powder, Take 17 g by mouth daily Mix 1 capful in 8 ounces of water, juice or tea , Disp: 578 g, Rfl: 3    SUMAtriptan (IMITREX) 100 mg tablet, Take 1 tablet (100 mg total) by mouth once as needed for migraine, Disp: 10 tablet, Rfl: 1    VIIBRYD 40 MG tablet, Take 40 mg by mouth daily With food, Disp: , Rfl: 0    bismuth subsalicylate (PEPTO BISMOL) 262 MG chewable tablet, Chew 2 tablets, Disp: , Rfl:     dicyclomine (BENTYL) 20 mg tablet, Take 1 tablet (20 mg total) by mouth every 6 (six) hours as needed (pain), Disp: 120 tablet, Rfl: 5    prochlorperazine (COMPAZINE) 10 mg tablet, Take 0 5 tablets (5 mg total) by mouth every 8 (eight) hours as needed for nausea or vomiting (Headache), Disp: 30 tablet, Rfl: 0    Health Maintenance     Health Maintenance   Topic Date Due    Medicare Annual Wellness Visit (AWV)  1965    HIV Screening  03/23/1982    BMI: Followup Plan  03/23/1985    Influenza Vaccine  07/01/2020    MAMMOGRAM  07/08/2020    BMI: Adult  07/24/2021    CRC Screening: Colonoscopy  04/25/2022    Cervical Cancer Screening  06/27/2022    DTaP,Tdap,and Td Vaccines (2 - Td) 05/22/2025    Pneumococcal Vaccine: 65+ Years (1 of 2 - PCV13) 03/23/2032    Pneumococcal Vaccine: Pediatrics (0 to 5 Years) and At-Risk Patients (6 to 59 Years)  Completed    HIB Vaccine  Aged Out    Hepatitis B Vaccine  Aged Out    IPV Vaccine  Aged Out    Hepatitis A Vaccine  Aged Out    Meningococcal ACWY Vaccine  Aged Out    HPV Vaccine  Aged [employer] History   Administered Date(s) Administered    INFLUENZA 11/07/2006, 11/15/2014, 11/01/2017, 12/10/2018, 01/14/2019    Influenza Injectable, MDCK, Preservative Free, Quadrivalent, 0 5 mL 01/14/2019    Influenza Quadrivalent Preservative Free 3 years and older IM 11/06/2017    Influenza TIV (IM) 10/23/2015    Pneumococcal Polysaccharide PPV23 01/28/2016    Tdap 05/22/2015       [facility]  7/24/2020 8:24 PM

## 2020-07-27 ENCOUNTER — HOSPITAL ENCOUNTER (OUTPATIENT)
Dept: MAMMOGRAPHY | Facility: CLINIC | Age: 55
Discharge: HOME | End: 2020-07-27
Payer: COMMERCIAL

## 2020-07-27 VITALS — TEMPERATURE: 97.3 F | HEIGHT: 67 IN | BODY MASS INDEX: 34.69 KG/M2 | WEIGHT: 221 LBS

## 2020-07-27 DIAGNOSIS — Z12.31 ENCOUNTER FOR SCREENING MAMMOGRAM FOR MALIGNANT NEOPLASM OF BREAST: ICD-10-CM

## 2020-07-27 PROCEDURE — 77067 SCR MAMMO BI INCL CAD: CPT

## 2020-07-27 PROCEDURE — 77063 BREAST TOMOSYNTHESIS BI: CPT

## 2020-08-10 ENCOUNTER — NURSE TRIAGE (OUTPATIENT)
Dept: OTHER | Facility: OTHER | Age: 55
End: 2020-08-10

## 2020-08-10 NOTE — TELEPHONE ENCOUNTER
Regarding: headache  ----- Message from Clifford Villeda sent at 8/10/2020  6:07 PM EDT -----  Patient states her body aches, she has a headache for 6 hours that won't go away, states the headache is very bad and she has taken three of her pills and it won't go away  Patient states this headache is different than her normal migraines  States her blood pressure is 194/54 and feels like all of her blood is rushing to her head when she stands up and moves around

## 2020-08-10 NOTE — TELEPHONE ENCOUNTER
Reason for Disposition   [1] SEVERE headache (e g , excruciating) AND [2] not improved after 2 hours of pain medicine    Answer Assessment - Initial Assessment Questions  1  LOCATION: "Where does it hurt?"       Top of the head   2  ONSET: "When did the headache start?" (Minutes, hours or days)       3 days ago   3  PATTERN: "Does the pain come and go, or has it been constant since it started?"     Constant   4  SEVERITY: "How bad is the pain?" and "What does it keep you from doing?"  (e g , Scale 1-10; mild, moderate, or severe)    - MILD (1-3): doesn't interfere with normal activities     - MODERATE (4-7): interferes with normal activities or awakens from sleep     - SEVERE (8-10): excruciating pain, unable to do any normal activities          8 to  10 out of 10   5  RECURRENT SYMPTOM: "Have you ever had headaches before?" If so, ask: "When was the last time?" and "What happened that time?"       Patient has hx of migraines, stated that this pain is different   6  CAUSE: "What do you think is causing the headache?"       Patient doesn't know   7  MIGRAINE: "Have you been diagnosed with migraine headaches?" If so, ask: "Is this headache similar?"       Hx of migraines, pain is different   8   HEAD INJURY: "Has there been any recent injury to the head?"        No   9  OTHER SYMPTOMS: "Do you have any other symptoms?" (fever, stiff neck, eye pain, sore throat, cold symptoms)      Sinus pain    Protocols used: HEADACHE-ADULT-

## 2020-08-11 ENCOUNTER — TELEMEDICINE (OUTPATIENT)
Dept: INTERNAL MEDICINE CLINIC | Facility: CLINIC | Age: 55
End: 2020-08-11

## 2020-08-11 VITALS — SYSTOLIC BLOOD PRESSURE: 150 MMHG | DIASTOLIC BLOOD PRESSURE: 90 MMHG

## 2020-08-11 DIAGNOSIS — I10 HYPERTENSION, UNSPECIFIED TYPE: Primary | ICD-10-CM

## 2020-08-11 DIAGNOSIS — G43.909 MIGRAINE WITHOUT STATUS MIGRAINOSUS, NOT INTRACTABLE, UNSPECIFIED MIGRAINE TYPE: ICD-10-CM

## 2020-08-11 PROCEDURE — 3077F SYST BP >= 140 MM HG: CPT | Performed by: HOSPITALIST

## 2020-08-11 PROCEDURE — 3080F DIAST BP >= 90 MM HG: CPT | Performed by: HOSPITALIST

## 2020-08-11 PROCEDURE — 99213 OFFICE O/P EST LOW 20 MIN: CPT | Performed by: HOSPITALIST

## 2020-08-11 PROCEDURE — 1036F TOBACCO NON-USER: CPT | Performed by: HOSPITALIST

## 2020-08-11 NOTE — PROGRESS NOTES
Virtual Brief Visit    Assessment/Plan:    New Onset Hypertension:  -The patient reports a self measured BP of 194/54 this morning, and 190/50 upon re-measurement 3 hours later  -Exact history was difficult to obtain over telephone interview  -The patient has no history of hypertension and takes no antihypertensive medications  -Patient was instructed to report to the ED immediately for further evaluation    Headache:  -The patient reports 4-days of a new onset headache different from her usual migraine headache  -Headache is behind forehead, constant, and 8/10 pain  -Has been taking Naproxen at home with no relief  -Has been experiencing congestion, body aches and chills over the weekend  No fever    -She undergoes Botulinum injections with neurology at [facility] for migraines  -Patient was instructed to follow up with neurology at [facility] if HA does not improve following admission to the ED for new onset Hypertension    Problem List Items Addressed This Visit        Cardiovascular and Mediastinum    Headache, migraine    Hypertension - Primary          Reason for visit is   Chief Complaint   Patient presents with    Headache     for four days    Generalized Body Aches     for four days    Chills     on Friday and Saturday    Nausea    Virtual Brief Visit        Encounter provider Corina Burch MD    Provider located at [facility] [address] [address] [address] [phone]    Recent Visits  No visits were found meeting these conditions  Showing recent visits within past 7 days and meeting all other requirements     Today's Visits  Date Type Provider Dept   08/11/20 Telemedicine Corina Burch MD  [facility] today's visits and meeting all other requirements     Future Appointments  No visits were found meeting these conditions     Showing future appointments within next 150 days and meeting all other requirements        After connecting through Mikie Telly and patient was informed that this is a secure, HIPAA-compliant platform  She agrees to proceed  , the patient was identified by name and date of birth  Emilia Santos was informed that this is a telemedicine visit and that the visit is being conducted through Mikie Varner and patient was informed that this is a secure, HIPAA-compliant platform  She agrees to proceed     My office door was closed  The following individuals were in the room with me and the patient informed Dr Rose Hodges  She acknowledged consent and understanding of privacy and security of the platform  The patient has agreed to participate and understands she can discontinue the visit at any time  Patient is aware this is a billable service  Subjective    Emilia Santos is a 50 y o  female with a PMH of Migraines, IBS, Achalasia, GERD, pre DM, Hypothyroidism, MICHAEL, depression and chronic pain who was encountered today via telephone call because her Internet was not working for a video meeting  The patient was speaking rapidly and was difficult to understand over telephone  Her chief complaint was that she has been experiencing a new onset headaches over the last 4 days, which are different from her usual migraines  Her usual migraines are pulsating pains behind her eyes  But her current headache is behind her forehead and she describes it as a constant, 8/10 pain  She has been taking naproxen every day with no relief  She also mentioned that she has been experiencing nasal congestion and body aches over the weekend, along with chills  She reports no fever  The patient later mentioned that her BP has been unusually high recently, even though she has never been diagnosed with hypertension and takes not hypertensive medications  She reported that he BP was 194/54 this morning after taking her own measurement  3 hours later she re-measured it and it was 190/50   We instructed the patient to go to the ED for further evaluation  Past Medical History:   Diagnosis Date    Achalasia     Asthma     Constipation     Depression     Diarrhea 2019    Elevated LFTs     last assessed 10/25/17    Fibromyalgia     Gastric ulcer     GERD (gastroesophageal reflux disease)     H  pylori infection 10/9/2017    [facility] [id number]: Treated, f/u stool antigen negative    Hypothyroidism     Kidney stone     Migraines     Migraines     MICHAEL (obstructive sleep apnea)     last assessed 16    Pneumonia     Sleep apnea 2019    Uses CPAP    Thyroid nodule     last assessed 17       Past Surgical History:   Procedure Laterality Date    BREAST BIOPSY Right 10/31/2017    us bx     SECTION      COLONOSCOPY      ENDOMETRIAL ABLATION      MYOTOMY HELLER LAPAROSCOPIC  2016    Dr Shellie Márquez, Coincident lorena fundoplication    NASAL SINUS SURGERY      OTHER SURGICAL HISTORY      achalasia repair    OVARIAN CYST REMOVAL      PILONIDAL CYST / SINUS EXCISION      SD COLONOSCOPY FLX DX W/COLLJ SPEC WHEN PFRMD N/A 2019    Procedure: COLONOSCOPY;  Surgeon: Viktor Harrison MD;  [facility]; Service: Gastroenterology    SD CYSTOURETHROSCOPY N/A 2017    Procedure: Chante Franco;  Surgeon: Asael De La Rosa MD;  [facility];  Service: UroGynecology           SD ESOPHAGOGASTRODUODENOSCOPY TRANSORAL DIAGNOSTIC N/A 2017    Procedure: ESOPHAGOGASTRODUODENOSCOPY (EGD); Surgeon: Viktor Harrison MD;  [facility]; Service: Gastroenterology    SD ESOPHAGOGASTRODUODENOSCOPY TRANSORAL DIAGNOSTIC N/A 2019    Procedure: ESOPHAGOGASTRODUODENOSCOPY (EGD); Surgeon: Viktor Harrison MD;  [facility];   Service: Gastroenterology    SD POST COLPORRHAPHY,RECTUM/VAGINA N/A 2017    Procedure: COLPORRHAPHY POSTERIOR;  Surgeon: Asael De La Rosa MD;  [facility];  Service: UroGynecology           [address] [place] [address] N/A 2017    Procedure: Casey Marroquin PUBOVAGINAL SLING /SINGLE INCISION ;  Surgeon: Leonardo Wise MD;  [facility];  Service: UroGynecology           TONSILLECTOMY      TUBAL LIGATION      UPPER GASTROINTESTINAL ENDOSCOPY      US GUIDED BREAST BIOPSY RIGHT COMPLETE Right 10/31/2017       Current Outpatient Medications   Medication Sig Dispense Refill    acetaminophen (TYLENOL) 325 mg tablet Every 4 hours as needed 30 tablet 0    albuterol (2 5 mg/3 mL) 0 083 % nebulizer solution Take 1 vial (2 5 mg total) by nebulization every 6 (six) hours as needed for wheezing or shortness of breath 75 mL 0    albuterol (VENTOLIN HFA) 90 mcg/act inhaler Inhale 2 puffs every 4 (four) hours as needed for wheezing or shortness of breath 1 Inhaler 0    ARIPiprazole (ABILIFY) 5 mg tablet TAKE 1 TABLET BY MOUTH AT BEDTIME 30 tablet 1    betamethasone dipropionate (DIPROSONE) 0 05 % cream Apply topically daily 30 g 0    bismuth subsalicylate (PEPTO BISMOL) 262 MG chewable tablet Chew 2 tablets      Botulinum Toxin Type A 200 units SOLR Inject 155 units into face and neck IM every 90 days      BREO ELLIPTA 100-25 MCG/INH inhaler Inhale 1 puff daily 1 puff daily 1 Inhaler 3    buPROPion (WELLBUTRIN XL) 150 mg 24 hr tablet Take 150 mg by mouth every morning  0    cetirizine (ZyrTEC) 10 mg tablet Take 1 tablet (10 mg total) by mouth daily 30 tablet 3    diclofenac sodium (VOLTAREN) 1 % APPLY 4 GRAMS TO AFFECTED AREA(S) THREE TIMES A DAY  3    dicyclomine (BENTYL) 20 mg tablet Take 1 tablet (20 mg total) by mouth every 6 (six) hours as needed (pain) 120 tablet 5    doxepin (SINEquan) 25 mg capsule TAKE 1-2 CAPSULES BY MOUTH DAILY AT BEDTIME   0    FLUCELVAX QUADRIVALENT       fluticasone (FLONASE) 50 mcg/act nasal spray 1-2 sprays into each nostril daily 16 g 6    furosemide (LASIX) 20 mg tablet Take 1 tablet (20 mg total) by mouth daily as needed (leg swelling) 30 tablet 2    hydrOXYzine pamoate (VISTARIL) 50 mg capsule Take 50 mg by mouth 3 (three) times a day as needed        ipratropium (ATROVENT) 0 02 % nebulizer solution       levothyroxine 150 mcg tablet Take 1 tablet (150 mcg total) by mouth daily 1 tab mon-sat and 1/2 on Sunday 90 tablet 3    Lidocaine Viscous HCl (XYLOCAINE) 2 % mucosal solution Swish and spit 15 mL 4 (four) times a day as needed for mouth pain or discomfort or mucositis 15 mL 1    Melatonin 5 MG TABS Take 1 tablet (5 mg total) by mouth daily at bedtime as needed (Insomnia) 30 tablet 0    meloxicam (MOBIC) 15 mg tablet Take 1 tablet (15 mg total) by mouth daily 30 tablet 6    metFORMIN (GLUCOPHAGE-XR) 500 mg 24 hr tablet TAKE 2 TABLETS DAILY IN AM 60 tablet 1    methocarbamol (ROBAXIN) 750 mg tablet Take 750 mg by mouth 3 (three) times a day  0    montelukast (SINGULAIR) 10 mg tablet Take 10 mg by mouth daily  6    oxyCODONE-acetaminophen (PERCOCET) 5-325 mg per tablet Take 1 tablet by mouth 2 (two) times a day  0    pantoprazole (PROTONIX) 40 mg tablet Take 1 tablet (40 mg total) by mouth 2 (two) times a day 180 tablet 2    polyethylene glycol (GLYCOLAX) powder Take 17 g by mouth daily Mix 1 capful in 8 ounces of water, juice or tea  578 g 3    prochlorperazine (COMPAZINE) 10 mg tablet Take 0 5 tablets (5 mg total) by mouth every 8 (eight) hours as needed for nausea or vomiting (Headache) 30 tablet 0    SUMAtriptan (IMITREX) 100 mg tablet Take 1 tablet (100 mg total) by mouth once as needed for migraine 10 tablet 1    VIIBRYD 40 MG tablet Take 40 mg by mouth daily With food  0     No current facility-administered medications for this visit  Allergies   Allergen Reactions    Gabapentin      Annotation - [id number]: Neuropsych effects per patient   Latex Dermatitis    Penicillins Hives       Review of Systems   Constitutional: Positive for chills  Negative for fever  HENT: Positive for congestion and sinus pressure  Negative for ear pain, hearing loss, trouble swallowing and voice change      Eyes: Negative for pain and visual disturbance  Gastrointestinal: Positive for nausea  Musculoskeletal: Positive for arthralgias  Neurological: Positive for headaches  Negative for syncope, speech difficulty, weakness and numbness  Vitals:    08/11/20 1040   BP: 150/90         I spent 15 minutes directly with the patient during this visit    VIRTUAL VISIT [facility] acknowledges that she has consented to an online visit or consultation  She understands that the online visit is based solely on information provided by her, and that, in the absence of a face-to-face physical evaluation by the physician, the diagnosis she receives is both limited and provisional in terms of accuracy and completeness  This is not intended to replace a full medical face-to-face evaluation by the physician  Ondina Cuba understands and accepts these terms        Atul BEEBE Resident, PGY-1

## 2020-08-14 ENCOUNTER — TRANSCRIBE ORDERS (OUTPATIENT)
Dept: INTERNAL MEDICINE CLINIC | Facility: CLINIC | Age: 55
End: 2020-08-14

## 2020-08-14 DIAGNOSIS — F32.A DEPRESSION, UNSPECIFIED DEPRESSION TYPE: Primary | ICD-10-CM

## 2020-08-21 ENCOUNTER — PATIENT OUTREACH (OUTPATIENT)
Dept: INTERNAL MEDICINE CLINIC | Facility: CLINIC | Age: 55
End: 2020-08-21

## 2020-08-24 ENCOUNTER — TELEPHONE (OUTPATIENT)
Dept: PSYCHIATRY | Facility: CLINIC | Age: 55
End: 2020-08-24

## 2020-08-24 ENCOUNTER — PATIENT OUTREACH (OUTPATIENT)
Dept: INTERNAL MEDICINE CLINIC | Facility: CLINIC | Age: 55
End: 2020-08-24

## 2020-08-24 DIAGNOSIS — F32.A DEPRESSION, UNSPECIFIED DEPRESSION TYPE: Primary | ICD-10-CM

## 2020-08-24 NOTE — PROGRESS NOTES
HAMLET spoke with pt this date to assist with OP MH resources  Pt relates she would like to resume [address] [place]  She previously  had been at [facility] but was not satisfied  Sh tansitioned to [employer] but ad to stop last Fall when she got Medicare as they do not take Medicare  HAMLET has shared that [facility] hopes to take Tereza Hargrove and [employer] in the near futures  Call made to to them at pt's request but they confirmed they are NOT taking it as yet and do not know when  PT was receptive to a referral to [facility] as well and a 3 way call made to them and a message was left for them to call pt  HAMLET has also sent an electronic referral and an in basket request   Pt to f/u and call as well  Their location is further away but she feels she can get a ride there  HAMLET has also reviews the optons of [employer] and the [employer] but  felt they were too far away  HAMLET has informed pt of the Transportation benefits of her [employer] and has provided their # as well  Pt has agreed to f/u with HAMLET if needed

## 2020-08-25 ENCOUNTER — TELEPHONE (OUTPATIENT)
Dept: PSYCHIATRY | Facility: CLINIC | Age: 55
End: 2020-08-25

## 2020-08-25 DIAGNOSIS — G43.909 MIGRAINE WITHOUT STATUS MIGRAINOSUS, NOT INTRACTABLE, UNSPECIFIED MIGRAINE TYPE: ICD-10-CM

## 2020-08-25 NOTE — TELEPHONE ENCOUNTER
Behavorial Health Outpatient Intake Questions    Referred by: [facility]    Please advised interviewee that they need to answer all questions truthfully to allow for best care and any misrepresentations of information may affect their ability to be seen at this clinic   => Was this discussed? Yes     [facility] -     Presenting Problem (in patient's words): Depression, anxiety  Are there any developmental disabilities? ? If yes, can they speak to you on the phone? If they are too limited to speak to you on phone, refer out NO    Are you taking any psychiatric medications? Yes    => If yes, who prescribes? If yes, are they injectable medications? Abilify 5 mg -PCP    Does the patient have a language barrier or hearing impairment? No    Have you been treated at [facility] by a therapist or a doctor in the past? If yes, who? No    Has the patient been hospitalized for mental health? No   If yes, how long ago was last hospitalization and where was it? Do you actively use alcohol or marijuana or illegal substances? If yes, what and how much - refer out to Drug and alcohol treatment if use is excessive or daily use of illegal substances No concerns of substance abuse are reported  Do you have a community treatment team or ? No    Legal History-     Does the patient have any history of arrests, retirement/long-term time, or DUIs? No  If Yes-  1) What types of charges? 2) When were they last incarcerated? 3) Are they currently on parole or probation? Minor Child-    Who has custody of the child? Is there a custody agreement? If there is a custody agreement remind parent that they must bring a copy to the first appt or they will not be seen       Intake Team, please check with provider before scheduling if flags come up such as:  - complex case  - legal history (other than DUI)  - communication barrier concerns are present  - if, in your judgment, this needs further review    ACCEPTED as a patient Yes  => Appointment Date: 10/13/2020 w/ Gurdeep Keyes    Referred Elsewhere? No    Name of Insurance Co: [employer] James ID# [id number]  Insurance Phone #  If ins is primary or secondary  If patient is a minor, parents information such as Name, D  O B of guarantor

## 2020-08-26 RX ORDER — PROCHLORPERAZINE MALEATE 10 MG
TABLET ORAL
Qty: 30 TABLET | Refills: 0 | Status: SHIPPED | OUTPATIENT
Start: 2020-08-26 | End: 2021-03-25

## 2020-08-28 ENCOUNTER — HOSPITAL ENCOUNTER (OUTPATIENT)
Dept: GASTROENTEROLOGY | Facility: HOSPITAL | Age: 55
Setting detail: OUTPATIENT SURGERY
Discharge: HOME | End: 2020-08-28
Attending: INTERNAL MEDICINE
Payer: COMMERCIAL

## 2020-08-28 ENCOUNTER — ANESTHESIA (OUTPATIENT)
Dept: GASTROENTEROLOGY | Facility: HOSPITAL | Age: 55
End: 2020-08-28

## 2020-08-28 ENCOUNTER — ANESTHESIA EVENT (OUTPATIENT)
Dept: GASTROENTEROLOGY | Facility: HOSPITAL | Age: 55
End: 2020-08-28

## 2020-08-28 VITALS
RESPIRATION RATE: 18 BRPM | WEIGHT: 220 LBS | DIASTOLIC BLOOD PRESSURE: 70 MMHG | TEMPERATURE: 98.1 F | OXYGEN SATURATION: 100 % | HEIGHT: 67 IN | SYSTOLIC BLOOD PRESSURE: 127 MMHG | BODY MASS INDEX: 34.53 KG/M2 | HEART RATE: 69 BPM

## 2020-08-28 DIAGNOSIS — K22.0 ACHALASIA: ICD-10-CM

## 2020-08-28 DIAGNOSIS — R13.19 ESOPHAGEAL DYSPHAGIA: ICD-10-CM

## 2020-08-28 PROCEDURE — 88305 TISSUE EXAM BY PATHOLOGIST: CPT | Performed by: PATHOLOGY

## 2020-08-28 PROCEDURE — 43239 EGD BIOPSY SINGLE/MULTIPLE: CPT | Performed by: INTERNAL MEDICINE

## 2020-08-28 PROCEDURE — 43236 UPPR GI SCOPE W/SUBMUC INJ: CPT | Performed by: INTERNAL MEDICINE

## 2020-08-28 RX ORDER — PROPOFOL 10 MG/ML
INJECTION, EMULSION INTRAVENOUS AS NEEDED
Status: DISCONTINUED | OUTPATIENT
Start: 2020-08-28 | End: 2020-08-28

## 2020-08-28 RX ORDER — SODIUM CHLORIDE 9 MG/ML
INJECTION, SOLUTION INTRAVENOUS CONTINUOUS PRN
Status: DISCONTINUED | OUTPATIENT
Start: 2020-08-28 | End: 2020-08-28

## 2020-08-28 RX ADMIN — PROPOFOL 50 MG: 10 INJECTION, EMULSION INTRAVENOUS at 13:18

## 2020-08-28 RX ADMIN — SODIUM CHLORIDE: 9 INJECTION, SOLUTION INTRAVENOUS at 13:01

## 2020-08-28 RX ADMIN — PROPOFOL 50 MG: 10 INJECTION, EMULSION INTRAVENOUS at 13:10

## 2020-08-28 RX ADMIN — ONABOTULINUMTOXINA 100 UNITS: 100 INJECTION, POWDER, LYOPHILIZED, FOR SOLUTION INTRADERMAL; INTRAMUSCULAR at 13:20

## 2020-08-28 RX ADMIN — PROPOFOL 100 MG: 10 INJECTION, EMULSION INTRAVENOUS at 13:08

## 2020-08-28 RX ADMIN — PROPOFOL 50 MG: 10 INJECTION, EMULSION INTRAVENOUS at 13:09

## 2020-08-28 RX ADMIN — PROPOFOL 50 MG: 10 INJECTION, EMULSION INTRAVENOUS at 13:15

## 2020-08-28 RX ADMIN — PROPOFOL 50 MG: 10 INJECTION, EMULSION INTRAVENOUS at 13:19

## 2020-08-28 RX ADMIN — PROPOFOL 40 MG: 10 INJECTION, EMULSION INTRAVENOUS at 13:16

## 2020-08-28 RX ADMIN — PROPOFOL 50 MG: 10 INJECTION, EMULSION INTRAVENOUS at 13:12

## 2020-08-28 RX ADMIN — PROPOFOL 100 MG: 10 INJECTION, EMULSION INTRAVENOUS at 13:07

## 2020-08-28 NOTE — ANESTHESIA PREPROCEDURE EVALUATION
Procedure:  EGD    Relevant Problems   CARDIO   (+) Breast pain, right   (+) Hypertension   (+) Intractable chronic migraine without aura and without status migrainosus      ENDO   (+) Hypothyroidism      GI/HEPATIC   (+) Esophageal dysphagia   (+) Esophageal reflux      /RENAL   (+) Left nephrolithiasis      MUSCULOSKELETAL   (+) Arthritis   (+) Chronic low back pain   (+) Spondylosis of lumbar region without myelopathy or radiculopathy      NEURO/PSYCH   (+) Depression   (+) Intractable chronic migraine without aura and without status migrainosus   (+) Other chronic pain      PULMONARY   (+) Asthma   (+) Moderate persistent asthma with exacerbation   (+) Obstructive sleep apnea syndrome      GERD- well controlled w/ medication   Physical Exam    Airway    Mallampati score: II  TM Distance: >3 FB  Neck ROM: full     Dental   No notable dental hx     Cardiovascular      Pulmonary      Other Findings        Anesthesia Plan  ASA Score- 3     Anesthesia Type- IV sedation with anesthesia with ASA Monitors  Additional Monitors:   Airway Plan:           Plan Factors-    Chart reviewed  Induction- intravenous  Postoperative Plan-     Informed Consent- Anesthetic plan and risks discussed with patient  I personally reviewed this patient with the CRNA  Discussed and agreed on the Anesthesia Plan with the CRNA  Sanjay Lopez

## 2020-08-28 NOTE — H&P
History and Physical - [facility]  Sharita Eckert 50 y o  female MRN: [id number]                  HPI: Shraita Eckert is a 50y o  year old female who presents for EGD with Botox injection for dysphagia in the setting of history of achalasia status post Heller myotomy with Jaime fundoplication in 7956 with Dr Milana Iqbal  Last EGD 10/2019 with balloon dilation up to 20 mm she felt slight improvement in her symptoms for few days but overall her symptoms are unchanged  She has undergone an esophageal manometry which shows ineffective esophageal motility with a normal IRP  She is on PPI therapy  REVIEW OF SYSTEMS: Per the HPI, and otherwise unremarkable  Historical Information   Past Medical History:   Diagnosis Date    Achalasia     Asthma     Constipation     Depression     Diarrhea 2019    Elevated LFTs     last assessed 10/25/17    Fibromyalgia     Gastric ulcer     GERD (gastroesophageal reflux disease)     H  pylori infection 10/9/2017    [facility] [id number]: Treated, f/u stool antigen negative    Hypothyroidism     Kidney stone     Migraines     Migraines     MICHAEL (obstructive sleep apnea)     last assessed 16    Pneumonia     Sleep apnea 2019    Uses CPAP    Thyroid nodule     last assessed 17     Past Surgical History:   Procedure Laterality Date    BREAST BIOPSY Right 10/31/2017    us bx     SECTION      COLONOSCOPY      ENDOMETRIAL ABLATION      MYOTOMY HELLER LAPAROSCOPIC  2016    Dr Milana Iqbal, Coincident jaime fundoplication    NASAL SINUS SURGERY      OTHER SURGICAL HISTORY      achalasia repair    OVARIAN CYST REMOVAL      PILONIDAL CYST / SINUS EXCISION      SD COLONOSCOPY FLX DX W/COLLJ SPEC WHEN PFRMD N/A 2019    Procedure: COLONOSCOPY;  Surgeon: Jefferson Fernando MD;  [facility];   Service: Gastroenterology    SD CYSTOURETHROSCOPY N/A 2017    Procedure: CYSTOSCOPY;  Surgeon: Ailyn Hernandez MD; [facility];  Service: UroGynecology           MA ESOPHAGOGASTRODUODENOSCOPY TRANSORAL DIAGNOSTIC N/A 2017    Procedure: ESOPHAGOGASTRODUODENOSCOPY (EGD); Surgeon: Indigo Mccoy MD;  [facility]; Service: Gastroenterology    MA ESOPHAGOGASTRODUODENOSCOPY TRANSORAL DIAGNOSTIC N/A 2019    Procedure: ESOPHAGOGASTRODUODENOSCOPY (EGD); Surgeon: Indigo Mccoy MD;  [facility];   Service: Gastroenterology    MA POST COLPORRHAPHY,RECTUM/VAGINA N/A 2017    Procedure: COLPORRHAPHY POSTERIOR;  Surgeon: Rosamaria Archuleta MD;  [facility];  Service: UroGynecology           MA SLING OPER STRES INCONTINENCE N/A 2017    Procedure: INSERTION PUBOVAGINAL SLING /SINGLE INCISION ;  Surgeon: Rosamaria Archuleta MD;  [facility];  Service: UroGynecology           TONSILLECTOMY      TUBAL LIGATION      UPPER GASTROINTESTINAL ENDOSCOPY      US GUIDED BREAST BIOPSY RIGHT COMPLETE Right 10/31/2017     Social History   Social History     Substance and Sexual Activity   Alcohol Use No     Social History     Substance and Sexual Activity   Drug Use No     Social History     Tobacco Use   Smoking Status Former Smoker    Packs/day: 1 50    Years: 30 00    Pack years: 37 1    Last attempt to quit: 2016    Years since quittin 6   Smokeless Tobacco Never Used   Tobacco Comment    pt stated quit about a month ago, last assessed 14 per [employer]     Family History   Problem Relation Age of Onset    Cancer Mother     Breast cancer Mother 62    Hypertension Mother     Dementia Father     Stroke Father     Diabetes Father     Hypertension Father     Cancer Maternal Grandmother         ovarian    Ovarian cancer Maternal Grandmother         unspecified laterality    Cancer Maternal Grandfather     Hypertension Maternal Grandfather     Tongue cancer Maternal Grandfather     Stroke Paternal Grandmother     Thyroid disease unspecified Brother     Thyroid disease unspecified Maternal Aunt     Colon cancer Maternal Aunt     Breast cancer Family     Diabetes Sister     BRCA1 Negative Sister     BRCA2 Negative Sister     No Known Problems Daughter     No Known Problems Son     No Known Problems Daughter     No Known Problems Son     No Known Problems Maternal Aunt     No Known Problems Paternal Aunt     No Known Problems Paternal Aunt     No Known Problems Paternal Aunt     No Known Problems Paternal Aunt     No Known Problems Paternal Aunt     No Known Problems Paternal Aunt     No Known Problems Maternal Aunt        Meds/Allergies     (Not in a hospital admission)      Allergies   Allergen Reactions    Gabapentin      Annotation - [id number]: Neuropsych effects per patient   Latex Dermatitis    Penicillins Hives       Objective     Blood pressure 121/75, pulse 75, temperature 98 1 °F (36 7 °C), temperature source Tympanic, resp  rate 18, height 5' 7" (1 702 m), weight 99 8 kg (220 lb), SpO2 98 %, not currently breastfeeding        PHYSICAL EXAM    Gen: NAD  CV: RRR  CHEST: Clear  ABD: soft, NT/ND  EXT: no edema      ASSESSMENT/PLAN:  This is a 50y o  year old female here for EGD with Botox injection due to persistent dysphagia in the setting of achalasia status post Heller myotomy and Jaime fundoplication    PLAN:   Procedure:  EGD with Botox injection

## 2020-08-28 NOTE — ANESTHESIA POSTPROCEDURE EVALUATION
Post-Op Assessment Note    CV Status:  Stable  Pain Score: 0    Pain management: adequate     Mental Status:  Sleepy and arousable   Hydration Status:  Euvolemic   PONV Controlled:  Controlled   Airway Patency:  Patent      Post Op Vitals Reviewed: Yes      Staff: CRNA         No complications documented      BP 99/53 (08/28/20 1325)    Temp      Pulse 94 (08/28/20 1325)   Resp 18 (08/28/20 1325)    SpO2 98 % (08/28/20 1325)

## 2020-09-09 ENCOUNTER — TELEPHONE (OUTPATIENT)
Dept: GASTROENTEROLOGY | Facility: CLINIC | Age: 55
End: 2020-09-09

## 2020-09-09 DIAGNOSIS — A04.8 H. PYLORI INFECTION: Primary | ICD-10-CM

## 2020-09-09 RX ORDER — CLARITHROMYCIN 500 MG/1
500 TABLET, COATED ORAL EVERY 12 HOURS SCHEDULED
Qty: 28 TABLET | Refills: 0 | Status: SHIPPED | OUTPATIENT
Start: 2020-09-09 | End: 2020-09-23

## 2020-09-09 RX ORDER — OMEPRAZOLE 40 MG/1
CAPSULE, DELAYED RELEASE ORAL
Qty: 28 CAPSULE | Refills: 0 | Status: SHIPPED | OUTPATIENT
Start: 2020-09-09 | End: 2022-05-23 | Stop reason: SDUPTHER

## 2020-09-09 RX ORDER — METRONIDAZOLE 500 MG/1
500 TABLET ORAL 3 TIMES DAILY
Qty: 42 TABLET | Refills: 0 | Status: SHIPPED | OUTPATIENT
Start: 2020-09-09 | End: 2020-09-23

## 2020-09-09 NOTE — TELEPHONE ENCOUNTER
Pt called stating she got a call from the office and no message was left   She was looking to speak to someone about her results

## 2020-09-09 NOTE — TELEPHONE ENCOUNTER
Called patient to notify her her biopsy is positive for H pylori  No answer  H pylori is a bacteria that can be found in the stomach and cause GI upset  Patient often have this for many years prior to diagnosis  If left untreated, ulcers and even cancer can form  I will send triple therapy to her pharmacy (clarythromycin, flagyy, omeprazole) that she will take for 14 days  1 month after completing her medications she will repeat an H pylori test to ensure eradication  She should be off of all PPIs 1 month prior to repeat H pylori test  She should not drink while taking flagyl  Please notify the patient of the above  I will send to attending as well as to ensure no further input

## 2020-09-09 NOTE — TELEPHONE ENCOUNTER
Reviewed the below with the patient, she understands    Please schedule FU apt with Dr Sharron Barclay

## 2020-09-09 NOTE — TELEPHONE ENCOUNTER
Spoke to the patient in regards to her results, verbalized understanding  Patient is aware not to drink while taking flagyl  Patient is also aware to wait 4 weeks after  treatment and off PPI to get H pylori test done for eradication

## 2020-09-09 NOTE — TELEPHONE ENCOUNTER
Patient called requesting her EGD/BOTOX results  Results are finalized, please review the results, so that we could relay the results to the patient  Thank you

## 2020-09-10 ENCOUNTER — TELEPHONE (OUTPATIENT)
Dept: GASTROENTEROLOGY | Facility: CLINIC | Age: 55
End: 2020-09-10

## 2020-09-10 DIAGNOSIS — R73.03 PREDIABETES: ICD-10-CM

## 2020-09-10 DIAGNOSIS — R63.5 ABNORMAL WEIGHT GAIN: ICD-10-CM

## 2020-09-10 DIAGNOSIS — F31.81 BIPOLAR 2 DISORDER (HCC): ICD-10-CM

## 2020-09-13 DIAGNOSIS — A04.8 H. PYLORI INFECTION: ICD-10-CM

## 2020-09-13 DIAGNOSIS — R63.5 ABNORMAL WEIGHT GAIN: ICD-10-CM

## 2020-09-13 DIAGNOSIS — R73.03 PREDIABETES: ICD-10-CM

## 2020-09-13 RX ORDER — METFORMIN HYDROCHLORIDE 500 MG/1
1000 TABLET, EXTENDED RELEASE ORAL EVERY MORNING
Qty: 60 TABLET | Refills: 1 | OUTPATIENT
Start: 2020-09-13

## 2020-09-13 RX ORDER — METFORMIN HYDROCHLORIDE 500 MG/1
1000 TABLET, EXTENDED RELEASE ORAL EVERY MORNING
Qty: 60 TABLET | Refills: 1 | Status: SHIPPED | OUTPATIENT
Start: 2020-09-13 | End: 2020-09-14 | Stop reason: SDUPTHER

## 2020-09-13 RX ORDER — ARIPIPRAZOLE 5 MG/1
5 TABLET ORAL
Qty: 30 TABLET | Refills: 1 | OUTPATIENT
Start: 2020-09-13

## 2020-09-14 RX ORDER — ARIPIPRAZOLE 5 MG/1
5 TABLET ORAL
Qty: 90 TABLET | Refills: 0 | Status: SHIPPED | OUTPATIENT
Start: 2020-09-14 | End: 2020-10-13 | Stop reason: SDUPTHER

## 2020-09-14 RX ORDER — METFORMIN HYDROCHLORIDE 500 MG/1
1000 TABLET, EXTENDED RELEASE ORAL EVERY MORNING
Qty: 180 TABLET | Refills: 0 | Status: SHIPPED | OUTPATIENT
Start: 2020-09-14 | End: 2021-01-11

## 2020-09-14 NOTE — TELEPHONE ENCOUNTER
Patient was very rude  She would like the doctor to send the medications and not bother her anymore  She says the doctor already knows she needs us to fill this medication until she gets an appointment with them  I was able to find out she is on a wait list with [address] [place] [address] psychiatry  She is waiting for them to call her with an appointment  Dr Ashley Lind is not receiving tasks  Can this be forwarded to Dr Jarrod Tobin who is the covering doctor

## 2020-09-14 NOTE — TELEPHONE ENCOUNTER
Notes look as though patient may have reestablished with Psychiatry  If so, they should manage her refills of related meds  If not I would be happy to refill this medication  Please call patient and let me know

## 2020-10-13 ENCOUNTER — OFFICE VISIT (OUTPATIENT)
Dept: PSYCHIATRY | Facility: CLINIC | Age: 55
End: 2020-10-13
Payer: COMMERCIAL

## 2020-10-13 DIAGNOSIS — G47.00 INSOMNIA, UNSPECIFIED TYPE: ICD-10-CM

## 2020-10-13 DIAGNOSIS — F31.81 BIPOLAR 2 DISORDER (HCC): Primary | ICD-10-CM

## 2020-10-13 DIAGNOSIS — F41.9 ANXIETY: ICD-10-CM

## 2020-10-13 PROCEDURE — 90792 PSYCH DIAG EVAL W/MED SRVCS: CPT | Performed by: PHYSICIAN ASSISTANT

## 2020-10-13 RX ORDER — HYDROXYZINE 50 MG/1
TABLET, FILM COATED ORAL
Qty: 270 TABLET | Refills: 0 | Status: SHIPPED | OUTPATIENT
Start: 2020-10-13 | End: 2021-01-03

## 2020-10-13 RX ORDER — ARIPIPRAZOLE 5 MG/1
5 TABLET ORAL
Qty: 90 TABLET | Refills: 0 | Status: SHIPPED | OUTPATIENT
Start: 2020-10-13 | End: 2021-01-19 | Stop reason: SDUPTHER

## 2020-10-13 RX ORDER — VILAZODONE HYDROCHLORIDE 20 MG/1
20 TABLET ORAL
Qty: 90 TABLET | Refills: 0 | Status: SHIPPED | OUTPATIENT
Start: 2020-10-13 | End: 2021-01-19

## 2020-10-19 ENCOUNTER — TELEPHONE (OUTPATIENT)
Dept: PSYCHIATRY | Facility: CLINIC | Age: 55
End: 2020-10-19

## 2020-10-19 DIAGNOSIS — F31.81 BIPOLAR 2 DISORDER (HCC): ICD-10-CM

## 2020-10-19 DIAGNOSIS — F41.9 ANXIETY: Primary | ICD-10-CM

## 2020-10-26 PROBLEM — F31.81 BIPOLAR 2 DISORDER (HCC): Status: ACTIVE | Noted: 2020-10-26

## 2020-10-26 RX ORDER — FLUOXETINE HYDROCHLORIDE 20 MG/1
20 CAPSULE ORAL DAILY
Qty: 90 CAPSULE | Refills: 0 | Status: SHIPPED | OUTPATIENT
Start: 2020-10-26 | End: 2021-01-19 | Stop reason: SDUPTHER

## 2020-11-20 ENCOUNTER — TELEPHONE (OUTPATIENT)
Dept: PSYCHIATRY | Facility: CLINIC | Age: 55
End: 2020-11-20

## 2020-11-30 ENCOUNTER — TELEPHONE (OUTPATIENT)
Dept: PSYCHIATRY | Facility: CLINIC | Age: 55
End: 2020-11-30

## 2021-01-03 DIAGNOSIS — F41.9 ANXIETY: ICD-10-CM

## 2021-01-03 DIAGNOSIS — G47.00 INSOMNIA, UNSPECIFIED TYPE: ICD-10-CM

## 2021-01-03 RX ORDER — HYDROXYZINE 50 MG/1
TABLET, FILM COATED ORAL
Qty: 270 TABLET | Refills: 0 | Status: SHIPPED | OUTPATIENT
Start: 2021-01-03 | End: 2021-03-30 | Stop reason: SDUPTHER

## 2021-01-04 DIAGNOSIS — R63.5 ABNORMAL WEIGHT GAIN: ICD-10-CM

## 2021-01-04 DIAGNOSIS — R73.03 PREDIABETES: ICD-10-CM

## 2021-01-06 DIAGNOSIS — J45.909 ASTHMA: ICD-10-CM

## 2021-01-11 ENCOUNTER — OFFICE VISIT (OUTPATIENT)
Dept: GASTROENTEROLOGY | Facility: CLINIC | Age: 56
End: 2021-01-11
Payer: COMMERCIAL

## 2021-01-11 VITALS
HEIGHT: 67 IN | TEMPERATURE: 98.3 F | BODY MASS INDEX: 35.06 KG/M2 | DIASTOLIC BLOOD PRESSURE: 70 MMHG | HEART RATE: 80 BPM | SYSTOLIC BLOOD PRESSURE: 130 MMHG | WEIGHT: 223.4 LBS

## 2021-01-11 DIAGNOSIS — R13.19 ESOPHAGEAL DYSPHAGIA: ICD-10-CM

## 2021-01-11 DIAGNOSIS — K22.0 ACHALASIA: Primary | ICD-10-CM

## 2021-01-11 DIAGNOSIS — D12.6 TUBULAR ADENOMA OF COLON: ICD-10-CM

## 2021-01-11 DIAGNOSIS — K58.1 IRRITABLE BOWEL SYNDROME WITH CONSTIPATION: ICD-10-CM

## 2021-01-11 PROCEDURE — 99214 OFFICE O/P EST MOD 30 MIN: CPT | Performed by: INTERNAL MEDICINE

## 2021-01-11 PROCEDURE — 3075F SYST BP GE 130 - 139MM HG: CPT | Performed by: INTERNAL MEDICINE

## 2021-01-11 PROCEDURE — 3078F DIAST BP <80 MM HG: CPT | Performed by: INTERNAL MEDICINE

## 2021-01-11 PROCEDURE — 3008F BODY MASS INDEX DOCD: CPT | Performed by: PHYSICIAN ASSISTANT

## 2021-01-11 RX ORDER — BETAMETHASONE DIPROPIONATE 0.5 MG/G
CREAM TOPICAL
Qty: 30 G | Refills: 0 | Status: SHIPPED | OUTPATIENT
Start: 2021-01-11 | End: 2021-06-01

## 2021-01-11 RX ORDER — METFORMIN HYDROCHLORIDE 500 MG/1
1000 TABLET, EXTENDED RELEASE ORAL EVERY MORNING
Qty: 180 TABLET | Refills: 0 | Status: SHIPPED | OUTPATIENT
Start: 2021-01-11 | End: 2021-04-07

## 2021-01-11 NOTE — PROGRESS NOTES
[facility] - Outpatient Follow-up Note  Terressa Span 50 y o  female MRN: [id number]  Encounter: [id number]          ASSESSMENT AND PLAN:      1  Achalasia  2  Esophageal dysphagia  status post laparoscopic Heller myotomy with lorean fundoplication in 7718   Now experiencing dysphagia to solid foods  I did EGD with Botox injection into the GE junction on August 31, 2020  She reports some improvement in his symptoms in the beginning but now experiencing mild dysphagia  Her weight is stable  She has no other alarm symptoms  Encouraged her to keep her food moist, drink water between bites and monitor her weight  If her symptoms get worse will consider referral to surgeon for further evaluation    3  Irritable bowel syndrome with constipation  Her symptoms are well controlled with high-fiber diet and MiraLax    4  Tubular adenoma of colon  Surveillance colonoscopy in April 2022    ______________________________________________________________________    SUBJECTIVE:  51-year-old female here for follow up visit for dysphagia and change in bowel habit  She had achalasia status post Heller myotomy with fundoplication in 8658  She reports progressive dysphagia over the last few years predominantly to solid foods  Fortunately her weight has been stable  No she has no weight loss, nausea or vomiting  She had EGD with Botox injection into GE junction in August 2020  Reports some improvement in her symptoms  Her bowel movement is regular with high-fiber diet and MiraLax        REVIEW OF SYSTEMS IS OTHERWISE NEGATIVE        Historical Information   Past Medical History:   Diagnosis Date    Achalasia     Asthma     Constipation     Depression     Diarrhea 2/28/2019    Elevated LFTs     last assessed 10/25/17    Fibromyalgia     Gastric ulcer     GERD (gastroesophageal reflux disease)     H  pylori infection 10/9/2017    [facility] - [id number]: Treated, f/u stool antigen negative    Hypothyroidism     Kidney stone     Migraines     Migraines     MICHAEL (obstructive sleep apnea)     last assessed 16    Pneumonia     Sleep apnea 2019    Uses CPAP    Thyroid nodule     last assessed 17     Past Surgical History:   Procedure Laterality Date    BREAST BIOPSY Right 10/31/2017    us bx     SECTION      COLONOSCOPY      ENDOMETRIAL ABLATION      MYOTOMY HELLER LAPAROSCOPIC  2016    Dr Gerardo Mathews, Coincident lorena fundoplication    NASAL SINUS SURGERY      OTHER SURGICAL HISTORY      achalasia repair    OVARIAN CYST REMOVAL      PILONIDAL CYST / SINUS EXCISION      [place] COLONOSCOPY FLX DX W/COLLJ SPEC WHEN PFRMD N/A 2019    Procedure: COLONOSCOPY;  Surgeon: Vitor Wise MD;  [facility]; Service: Gastroenterology    [place] CYSTOURETHROSCOPY N/A 2017    Procedure: Roalndo Marcos;  Surgeon: Binta Ventura MD;  [facility];  Service: UroGynecology           [place] ESOPHAGOGASTRODUODENOSCOPY TRANSORAL DIAGNOSTIC N/A 2017    Procedure: ESOPHAGOGASTRODUODENOSCOPY (EGD); Surgeon: Vitor Wise MD;  [facility]; Service: Gastroenterology    [place] ESOPHAGOGASTRODUODENOSCOPY TRANSORAL DIAGNOSTIC N/A 2019    Procedure: ESOPHAGOGASTRODUODENOSCOPY (EGD); Surgeon: Vitor Wise MD;  [facility];   Service: Gastroenterology    [place] POST COLPORRHAPHY,RECTUM/VAGINA N/A 2017    Procedure: COLPORRHAPHY POSTERIOR;  Surgeon: Binta Ventura MD;  [facility];  Service: UroGynecology           [place] SLING OPER STRES INCONTINENCE N/A 2017    Procedure: INSERTION PUBOVAGINAL SLING /SINGLE INCISION ;  Surgeon: Binta Ventura MD;  [facility];  Service: UroGynecology           TONSILLECTOMY      TUBAL LIGATION      UPPER GASTROINTESTINAL ENDOSCOPY      US GUIDED BREAST BIOPSY RIGHT COMPLETE Right 10/31/2017     Social History   Social History     Substance and Sexual Activity   Alcohol Use No     Social History Substance and Sexual Activity   Drug Use Never     Social History     Tobacco Use   Smoking Status Former Smoker    Packs/day: 1 50    Years: 30 00    Pack years: 45 00    Quit date:     Years since quittin 0   Smokeless Tobacco Never Used   Tobacco Comment    pt stated quit about a month ago, last assessed 14 per [employer]     Family History   Problem Relation Age of Onset    Cancer Mother     Breast cancer Mother 62    Hypertension Mother     Stroke Father     Diabetes Father     Hypertension Father     Alzheimer's disease Father     Cancer Maternal Grandmother         ovarian    Ovarian cancer Maternal Grandmother         unspecified laterality    Cancer Maternal Grandfather     Hypertension Maternal Grandfather     Tongue cancer Maternal Grandfather     Stroke Paternal Grandmother     Thyroid disease unspecified Brother     Depression Brother     Anxiety disorder Brother     Drug abuse Brother         (pain pills)    Thyroid disease unspecified Maternal Aunt     Colon cancer Maternal Aunt     Breast cancer Family     Diabetes Sister     BRCA1 Negative Sister     BRCA2 Negative Sister     No Known Problems Daughter     No Known Problems Son     No Known Problems Daughter     No Known Problems Son     No Known Problems Maternal Aunt     No Known Problems Paternal Aunt     No Known Problems Paternal Aunt     No Known Problems Paternal Aunt     No Known Problems Paternal Aunt     No Known Problems Paternal Aunt     No Known Problems Paternal Aunt     No Known Problems Maternal Aunt     Alzheimer's disease Cousin        Meds/Allergies       Current Outpatient Medications:     acetaminophen (TYLENOL) 325 mg tablet    albuterol (2 5 mg/3 mL) 0 083 % nebulizer solution    albuterol (VENTOLIN HFA) 90 mcg/act inhaler    ARIPiprazole (ABILIFY) 5 mg tablet    bismuth subsalicylate (PEPTO BISMOL) 262 MG chewable tablet    Botulinum Toxin Type A 200 units SOLR    cetirizine (ZyrTEC) 10 mg tablet    diclofenac sodium (VOLTAREN) 1 %    dicyclomine (BENTYL) 20 mg tablet    FLUCELVAX QUADRIVALENT    FLUoxetine (PROzac) 20 mg capsule    fluticasone (FLONASE) 50 mcg/act nasal spray    furosemide (LASIX) 20 mg tablet    hydrOXYzine HCL (ATARAX) 50 mg tablet    ipratropium (ATROVENT) 0 02 % nebulizer solution    levothyroxine 150 mcg tablet    Lidocaine Viscous HCl (XYLOCAINE) 2 % mucosal solution    Melatonin 5 MG TABS    meloxicam (MOBIC) 15 mg tablet    methocarbamol (ROBAXIN) 750 mg tablet    montelukast (SINGULAIR) 10 mg tablet    omeprazole (PriLOSEC) 40 MG capsule    oxyCODONE-acetaminophen (PERCOCET) 5-325 mg per tablet    polyethylene glycol (GLYCOLAX) powder    prochlorperazine (COMPAZINE) 10 mg tablet    SUMAtriptan (IMITREX) 100 mg tablet    vilazodone (Viibryd) 20 mg tablet    betamethasone dipropionate (DIPROSONE) 0 05 % cream    BREO ELLIPTA 100-25 MCG/INH inhaler    metFORMIN (GLUCOPHAGE-XR) 500 mg 24 hr tablet    Allergies   Allergen Reactions    Gabapentin      Annotation - [id number]: Neuropsych effects per patient   Latex Dermatitis    Penicillins Hives           Objective     Blood pressure 130/70, pulse 80, temperature 98 3 °F (36 8 °C), temperature source Tympanic, height 5' 7" (1 702 m), weight 101 kg (223 lb 6 4 oz), not currently breastfeeding  Body mass index is 34 99 kg/m²  PHYSICAL EXAM:      General Appearance:   Alert, cooperative, no distress   HEENT:   Normocephalic, atraumatic, anicteric      Neck:  Supple, symmetrical, trachea midline   Lungs:   Clear to auscultation bilaterally; no rales, rhonchi or wheezing; respirations unlabored    Heart[de-identified]   Regular rate and rhythm; no murmur, rub, or gallop     Abdomen:   Soft, non-tender, non-distended; normal bowel sounds; no masses, no organomegaly    Genitalia:   Deferred    Rectal:   Deferred    Extremities:  No cyanosis, clubbing or edema    Pulses:  2+ and symmetric    Skin:  No jaundice, rashes, or lesions    Lymph nodes:  No palpable cervical lymphadenopathy        Lab Results:   No visits with results within 1 Day(s) from this visit  Latest known visit with results is:   Hospital Outpatient Visit on 08/28/2020   Component Date Value    Case Report 08/28/2020                      Value:Surgical Pathology Report                         Case: [id number]                                   Authorizing Provider:  Margie Hickman MD            Collected:           08/28/2020 1312              Ordering Location:     [address] [place] [address]      Received:            08/28/2020 [facility]                                                           Pathologist:           Darron Zuñiga MD                                                          Specimen:    Stomach, R/O Hpylori                                                                       Final Diagnosis 08/28/2020                      Value: This result contains rich text formatting which cannot be displayed here   Additional Information 08/28/2020                      Value: This result contains rich text formatting which cannot be displayed here  Omelia Emmaus Description 08/28/2020                      Value: This result contains rich text formatting which cannot be displayed here  Radiology Results:   No results found

## 2021-01-15 NOTE — PSYCH
MEDICATION MANAGEMENT NOTE        [facility]      Name and Date of Birth:  Evalene Collet 50 y o  1965    Date of Visit: January 19, 2021    HPI:    Evalene Collet is here for medication review  Viibryd was changed to Fluoxetine 20mg qd during a phone conversation on 10/19/2020--due to insurance not covering the former  Pt presently reports a primary c/o "I can't fix nothing right now with the neighbors "  Pt lives in a duplex and a new family moved in 1 month ago  They or their associates have been blasting music in the night, partying and generally loud, and also parking in her handicapped parking space  They are creating much stress for Pt  In regards to OCD, she is checking the stove, water pipe, locks/alarms repeatedly (some of the checking was reasonable but a problem with a broken water pipe exacerbated her OCD tendencies), also cleaning excessively  She feels "Sometimes" feels more relaxed--with lower anxiety and no recent panic attacks, also her depression is a little better with the SSRI--less sad, a little more motivated, but no feelings of hopelessness, helplessness or SI  Pt also presently denies any manic or psychotic Sxs  Pt reports compliance to psychiatric medications without SE       Appetite Changes and Sleep: decreased sleep, due to new neighbors blasting music in the middle of the night, normal appetite, adequate energy level    Review Of Systems:      Constitutional negative   ENT negative   Cardiovascular negative   Respiratory negative   Gastrointestinal negative   Genitourinary negative   Musculoskeletal negative   Integumentary negative   Neurological negative   Endocrine negative   Other Symptoms none, all other systems are negative       Past Psychiatric History:   As copied from my 10/13/2020 note with updates as needed:  "  [ Pt born in [place] and came to the [facility] with her family in approx 1989 and grew up with biological parents, 1 year younger sister and a younger brother  Pt describes her upbringing as "Good, good dad, good mom  We were raised with Taoist "   Everyone in the family got along and Pt has positive memories of growing up with them      Depression started in  at work when she sustained a life changing back injury, for which she became permanently disabled  Mood Sxs: sadness, crying, self-isolating, anhedonia, less motivated, feelings of hopelessness and helplessness  But no SI  Manic Sxs:  decreased need for sleep , rapid speech  and flight of ideas/racing thoughts  Racy thoughts, elevated energy     Anxiety started in  due to the work injury:  excessive worry more days than not for longer than 3 months and The Sxs can occur without concommittent depressive Sxs  No other Sxs of BRIGITTE      Panic attacks started in late 2020 initially triggered by driving through a town she was not familiar with  She has had a few in total with Sxs of: Moderate anxiety, sweaty hands,  palpitations/racing heart, trembling, shortness of breath       Social Anxiety symptoms: no symptoms suggestive of social anxiety Eating Disorder symptoms: no historical or current eating disorder  no binge eating disorder; no anorexia nervosa  no symptoms of bulimia      OCD type Sxs: checking door locks, windows, stove, pipes in the basement (which used to leak)    Cleans often, can be very fastidious and wash her hands excessively        Pt denies any h/o PTSD or psychotic Sxs      Prior psychiatrists:  [facility] from approx  - 2019 --stopped going due to change in insurance     Prior psychotherapists:   First therapist was at [facility] in [place] and he left so she only saw him for a year  Second one was seen from [id number] at [place] [address] denied h/o SI, HI, self-injurious behaviors, violent behaviors, psychiatric hospitalizations, ECT, legal or  Hx     Prior Rx trials: Princess Ellsworth 40mg (helped), Bupropion ER/XL 150mg (helped), Aripiprazole 5mg (helps), Gabapentin 300mg,Topamax (for migraines but stopped due to ineffectiveness and her h/o nephrolithiasis), Hydroxyzine 50mg (sedating), Doxepin up to 50mg (for sleep--oversedating even at 25mg), Clonazepam 0 5mg--??used/helped        Abuse Hx:  Pt denies any h/o sexual, physical, or emotional abuse     Trauma Hx:  When she lost her job after her work injury                   ] "      Past Medical History:    Past Medical History:   Diagnosis Date    Achalasia     Asthma     Constipation     Depression     Diarrhea 2019    Elevated LFTs     last assessed 10/25/17    Fibromyalgia     Gastric ulcer     GERD (gastroesophageal reflux disease)     H  pylori infection 10/9/2017    [facility] [id number]: Treated, f/u stool antigen negative    Hypothyroidism     Kidney stone     Migraines     Migraines     MICHAEL (obstructive sleep apnea)     last assessed 16    Pneumonia     Sleep apnea 2019    Uses CPAP    Thyroid nodule     last assessed 17       Substance Abuse History:    Social History     Substance and Sexual Activity   Alcohol Use No     Social History     Substance and Sexual Activity   Drug Use Never       Social History:    Social History     Socioeconomic History    Marital status: Single     Spouse name: Not on file    Number of children: 3    Years of education: Not on file    Highest education level: Not on file   Occupational History    Occupation: Disability since 2019-- due to a job injury    Social Needs    Financial resource strain: Not on file    Food insecurity     Worry: Not on file     Inability: Not on file   [employer] needs     Medical: Not on file     Non-medical: Not on file   Tobacco Use    Smoking status: Former Smoker     Packs/day: 1 50     Years: 30 00     Pack years: 45 00     Quit date:      Years since quittin 0    Smokeless tobacco: Never Used    Tobacco comment: pt stated quit about a month ago, last assessed 4/30/14 per [employer]   Substance and Sexual Activity    Alcohol use: No    Drug use: Never    Sexual activity: Yes     Partners: Male     Birth control/protection: Female Sterilization     Comment: Boyfriend/father of 4 children   Lifestyle    Physical activity     Days per week: Not on file     Minutes per session: Not on file    Stress: Not on file   Relationships    Social connections     Talks on phone: Not on file     Gets together: Not on file     Attends Jehovah's witness service: Not on file     Active member of club or organization: Not on file     Attends meetings of clubs or organizations: Not on file     Relationship status: Not on file    Intimate partner violence     Fear of current or ex partner: Not on file     Emotionally abused: Not on file     Physically abused: Not on file     Forced sexual activity: Not on file   Other Topics Concern    Not on file   Social History Narrative    Home: Lives with boyfriend/father of her 4 children and one of their daughters        Children: 2 sons born 80 and 18,  2 daughters born 0 and 2003        Education:       Family Psychiatric History:     Family History   Problem Relation Age of Onset    Cancer Mother     Breast cancer Mother 62    Hypertension Mother     Stroke Father     Diabetes Father     Hypertension Father     Alzheimer's disease Father     Cancer Maternal Grandmother         ovarian    Ovarian cancer Maternal Grandmother         unspecified laterality    Cancer Maternal Grandfather     Hypertension Maternal Grandfather     Tongue cancer Maternal Grandfather     Stroke Paternal Grandmother     Thyroid disease unspecified Brother     Depression Brother     Anxiety disorder Brother     Drug abuse Brother         (pain pills)    Thyroid disease unspecified Maternal Aunt     Colon cancer Maternal Aunt     Breast cancer Family     Diabetes Sister     BRCA1 Negative Sister     BRCA2 Negative Sister     No Known Problems Daughter     No Known Problems Son     No Known Problems Daughter     No Known Problems Son     No Known Problems Maternal Aunt     No Known Problems Paternal Aunt     No Known Problems Paternal Aunt     No Known Problems Paternal Aunt     No Known Problems Paternal Aunt     No Known Problems Paternal Aunt     No Known Problems Paternal Aunt     No Known Problems Maternal Aunt     Alzheimer's disease Cousin        History Review:  The following portions of the patient's history were reviewed and updated as appropriate: allergies, current medications, past family history, past medical history, past social history, past surgical history and problem list          OBJECTIVE:     Mental Status Evaluation:    Appearance Casually dressed, good eye contact and hygiene   Behavior Calm, cooperative, pleasant with anxious bearing   Speech Clear, normal volume, rapid at times   Mood slightly less depressed, slightly less anxious   Affect Appears reactive   Thought Processes Organized, goal directed, perseverative on the issue with her neighbors, tendency for obsessive thinking   Associations intact associations   Thought Content No delusions   Perceptual Disturbances: Pt denies any form of hallucinations and does not appear to be responding to internal stimuli   Abnormal Thoughts  Risk Potential Suicidal ideation - None  Homicidal ideation - None  Potential for aggression - No   Orientation oriented to person, place, situation, day of week, date, month of year and year   Memory short term memory grossly intact   Cosciousness alert and awake   Attention Span attention span and concentration are age appropriate   Intellect appears to be of average intelligence   Insight fair   Judgement good   Muscle Strength and  Gait normal gait and normal balance   Language no difficulty naming common objects, no difficulty repeating a phrase   Fund of Knowledge adequate knowledge of current events  adequate fund of knowledge regarding past history  adequate fund of knowledge regarding vocabulary    Pain none   Pain Scale N/A       Laboratory Results: I have personally reviewed all pertinent laboratory/tests results  COVID19: No results found for: SARSCOV2    Assessment/plan:       Diagnoses and all orders for this visit:    Bipolar 2 disorder ([facility] [address] )  -     FLUoxetine (PROzac) 20 mg capsule; Take 1 capsule (20 mg total) by mouth daily  -     ARIPiprazole (ABILIFY) 5 mg tablet; Take 1 tablet (5 mg total) by mouth daily at bedtime  -     FLUoxetine (PROzac) 10 mg capsule; Take 1 capsule (10 mg total) by mouth daily Take with the 20mg cap for a total of 30mg daily    Anxiety  -     FLUoxetine (PROzac) 20 mg capsule; Take 1 capsule (20 mg total) by mouth daily  -     FLUoxetine (PROzac) 10 mg capsule; Take 1 capsule (10 mg total) by mouth daily Take with the 20mg cap for a total of 30mg daily    Insomnia, unspecified type          PLAN:  Pt is having moderate depressive and anxiety Sxs  Tx plan due next visit  Increase Fluoxetine to 30mg per day given as:  20mg + 10mg (1) tab po qd with breakfast # 90  Continue:   Aripiprazole 5mg (1) tab po qhs # 90  Hydroxyzine 50mg (1/2-1) tab po tid prn anxiety or insomnia--renewed 1/3/2020  And Pt not needing more presently  Melatonin 5mg qhs --Pt gets OTC  Pt has a new psychotherapy intake appt with Yumiko alcala for   Return 10 weeks, call sooner prn         Risks/Benefits      Risks, Benefits And Possible Side Effects Of Medications:    Risks, benefits, and possible side effects of medications explained to Ladan Florian and she verbalizes understanding and agreement for treatment      Controlled Medication Discussion:

## 2021-01-17 DIAGNOSIS — F41.9 ANXIETY: ICD-10-CM

## 2021-01-17 DIAGNOSIS — F31.81 BIPOLAR 2 DISORDER (HCC): ICD-10-CM

## 2021-01-17 RX ORDER — FLUOXETINE HYDROCHLORIDE 20 MG/1
CAPSULE ORAL
Qty: 90 CAPSULE | Refills: 0 | OUTPATIENT
Start: 2021-01-17

## 2021-01-19 ENCOUNTER — OFFICE VISIT (OUTPATIENT)
Dept: PSYCHIATRY | Facility: CLINIC | Age: 56
End: 2021-01-19
Payer: COMMERCIAL

## 2021-01-19 DIAGNOSIS — G47.00 INSOMNIA, UNSPECIFIED TYPE: ICD-10-CM

## 2021-01-19 DIAGNOSIS — F41.9 ANXIETY: ICD-10-CM

## 2021-01-19 DIAGNOSIS — F31.81 BIPOLAR 2 DISORDER (HCC): Primary | ICD-10-CM

## 2021-01-19 PROCEDURE — 1036F TOBACCO NON-USER: CPT | Performed by: PHYSICIAN ASSISTANT

## 2021-01-19 PROCEDURE — 99214 OFFICE O/P EST MOD 30 MIN: CPT | Performed by: PHYSICIAN ASSISTANT

## 2021-01-19 RX ORDER — FLUOXETINE HYDROCHLORIDE 20 MG/1
20 CAPSULE ORAL DAILY
Qty: 90 CAPSULE | Refills: 0 | Status: SHIPPED | OUTPATIENT
Start: 2021-01-19 | End: 2021-03-30 | Stop reason: SDUPTHER

## 2021-01-19 RX ORDER — FLUOXETINE 10 MG/1
10 CAPSULE ORAL DAILY
Qty: 90 CAPSULE | Refills: 0 | Status: SHIPPED | OUTPATIENT
Start: 2021-01-19 | End: 2021-03-30 | Stop reason: SDUPTHER

## 2021-01-19 RX ORDER — ARIPIPRAZOLE 5 MG/1
5 TABLET ORAL
Qty: 90 TABLET | Refills: 0 | Status: SHIPPED | OUTPATIENT
Start: 2021-01-19 | End: 2021-03-30 | Stop reason: SDUPTHER

## 2021-03-18 DIAGNOSIS — K58.0 IRRITABLE BOWEL SYNDROME WITH DIARRHEA: ICD-10-CM

## 2021-03-18 RX ORDER — DICYCLOMINE HCL 20 MG
20 TABLET ORAL EVERY 6 HOURS PRN
Qty: 360 TABLET | Refills: 1 | Status: SHIPPED | OUTPATIENT
Start: 2021-03-18 | End: 2021-09-15

## 2021-03-23 ENCOUNTER — TELEMEDICINE (OUTPATIENT)
Dept: INTERNAL MEDICINE CLINIC | Facility: CLINIC | Age: 56
End: 2021-03-23

## 2021-03-23 DIAGNOSIS — J45.909 ASTHMA: ICD-10-CM

## 2021-03-23 DIAGNOSIS — J45.909 ASTHMA, UNSPECIFIED ASTHMA SEVERITY, UNSPECIFIED WHETHER COMPLICATED, UNSPECIFIED WHETHER PERSISTENT: ICD-10-CM

## 2021-03-23 DIAGNOSIS — U07.1 COVID-19: Primary | ICD-10-CM

## 2021-03-23 DIAGNOSIS — J45.41 MODERATE PERSISTENT ASTHMA WITH ACUTE EXACERBATION: ICD-10-CM

## 2021-03-23 PROCEDURE — G2025 DIS SITE TELE SVCS RHC/FQHC: HCPCS | Performed by: INTERNAL MEDICINE

## 2021-03-23 RX ORDER — METHYLPREDNISOLONE 4 MG/1
TABLET ORAL
COMMUNITY
Start: 2021-01-12 | End: 2021-03-25

## 2021-03-23 RX ORDER — KETOTIFEN FUMARATE 0.25 MG/ML
SOLUTION/ DROPS OPHTHALMIC
COMMUNITY
Start: 2021-01-14 | End: 2021-11-17 | Stop reason: SDUPTHER

## 2021-03-23 RX ORDER — ALBUTEROL SULFATE 2.5 MG/3ML
2.5 SOLUTION RESPIRATORY (INHALATION) EVERY 6 HOURS PRN
Qty: 75 ML | Refills: 0 | Status: SHIPPED | OUTPATIENT
Start: 2021-03-23 | End: 2021-06-09 | Stop reason: SDUPTHER

## 2021-03-23 RX ORDER — FLUTICASONE PROPIONATE 50 MCG
1-2 SPRAY, SUSPENSION (ML) NASAL DAILY
Qty: 16 G | Refills: 6 | Status: SHIPPED | OUTPATIENT
Start: 2021-03-23 | End: 2021-11-17 | Stop reason: SDUPTHER

## 2021-03-23 RX ORDER — TRAMADOL HYDROCHLORIDE 50 MG/1
TABLET ORAL
COMMUNITY
Start: 2021-03-10 | End: 2022-04-28

## 2021-03-23 RX ORDER — NAPROXEN 500 MG/1
TABLET ORAL
COMMUNITY
Start: 2021-03-10 | End: 2021-04-14

## 2021-03-23 RX ORDER — IBUPROFEN 800 MG/1
TABLET ORAL
COMMUNITY
Start: 2021-03-14 | End: 2021-04-14 | Stop reason: SDUPTHER

## 2021-03-23 RX ORDER — TIZANIDINE 4 MG/1
4 TABLET ORAL
COMMUNITY
Start: 2021-03-10

## 2021-03-23 RX ORDER — ALBUTEROL SULFATE 90 UG/1
2 AEROSOL, METERED RESPIRATORY (INHALATION) EVERY 4 HOURS PRN
Qty: 1 INHALER | Refills: 0 | Status: SHIPPED | OUTPATIENT
Start: 2021-03-23 | End: 2021-06-09 | Stop reason: SDUPTHER

## 2021-03-23 NOTE — PROGRESS NOTES
Virtual Brief Visit    Assessment/Plan:    Problem List Items Addressed This Visit        Respiratory    Asthma       Other    COVID-19 - Primary        COVID     -tested positive on March 16th   -symptoms are improving however still endorsing cough, chest tightness   -had been using nebulizer and Mucinex without relief   - advise 10- day isolation  - use Tylenol for fevers  - stay hydrated  - will send nebulizer and Flonase to the pharmacy  - please call us or visit ED if worsening respiratory distress or persistent chest pain        Reason for visit is   Chief Complaint   Patient presents with    Headache     symptoms for a week     Nasal Congestion     loss of smell    Cough    Chills    Fatigue        Encounter provider Andree Lino MD    Provider located at [address] [place] [address] Road  [address]  [place] [address] [address] [facility] [address] [phone]    Recent Visits  No visits were found meeting these conditions  Showing recent visits within past 7 days and meeting all other requirements     Today's Visits  Date Type Provider Dept   03/23/21 Telemedicine Andree Lino MD [address] today's visits and meeting all other requirements     Future Appointments  No visits were found meeting these conditions  Showing future appointments within next 150 days and meeting all other requirements        After connecting through telephone, the patient was identified by name and date of birth  Sushma Wood was informed that this is a telemedicine visit and that the visit is being conducted through telephone  My office door was closed  No one else was in the room  She acknowledged consent and understanding of privacy and security of the platform  The patient has agreed to participate and understands she can discontinue the visit at any time  Patient is aware this is a billable service       Subjective    Sushma Wood is a 49 y o  female who Presents for COVID follow-up  She tested positive on  and her symptoms began on March 15  Still endorsing but improving of itchy throat, chest tightness, loss of smell not test, productive cough w/ yellow sputum, headaches, chills, myalgias  She did not check the temperature with a thermometer  She has been using nebulizer for asthma and mucinex with some relief  She denies any shortness of breath or dyspnea on exertion   HPI     Past Medical History:   Diagnosis Date    Achalasia     Asthma     Constipation     Depression     Diarrhea 2019    Elevated LFTs     last assessed 10/25/17    Fibromyalgia     Gastric ulcer     GERD (gastroesophageal reflux disease)     H  pylori infection 10/9/2017    [facility] [id number]: Treated, f/u stool antigen negative    Hypothyroidism     Kidney stone     Migraines     Migraines     MICHAEL (obstructive sleep apnea)     last assessed 16    Pneumonia     Sleep apnea 2019    Uses CPAP    Thyroid nodule     last assessed 17       Past Surgical History:   Procedure Laterality Date    BREAST BIOPSY Right 10/31/2017    us bx     SECTION      COLONOSCOPY      ENDOMETRIAL ABLATION      MYOTOMY HELLER LAPAROSCOPIC  2016    Dr Louie Rosas, Coincident lorena fundoplication    NASAL SINUS SURGERY      OTHER SURGICAL HISTORY      achalasia repair    OVARIAN CYST REMOVAL      PILONIDAL CYST / SINUS EXCISION      FL COLONOSCOPY FLX DX W/COLLJ SPEC WHEN PFRMD N/A 2019    Procedure: COLONOSCOPY;  Surgeon: Paulina Knowles MD;  [facility]; Service: Gastroenterology    FL CYSTOURETHROSCOPY N/A 2017    Procedure: Magda Powell;  Surgeon: Singh Estrella MD;  [facility];  Service: UroGynecology           FL ESOPHAGOGASTRODUODENOSCOPY TRANSORAL DIAGNOSTIC N/A 2017    Procedure: ESOPHAGOGASTRODUODENOSCOPY (EGD); Surgeon: Paulina Knowles MD;  [facility];   Service: Gastroenterology    FL ESOPHAGOGASTRODUODENOSCOPY TRANSORAL DIAGNOSTIC N/A 4/25/2019    Procedure: ESOPHAGOGASTRODUODENOSCOPY (EGD); Surgeon: Mery Davis MD;  [facility];   Service: Gastroenterology    ND POST COLPORRHAPHY,RECTUM/VAGINA N/A 1/19/2017    Procedure: COLPORRHAPHY POSTERIOR;  Surgeon: Kori Sandy MD;  [facility];  Service: UroGynecology           ND SLING OPER STRES INCONTINENCE N/A 1/19/2017    Procedure: INSERTION PUBOVAGINAL SLING /SINGLE INCISION ;  Surgeon: Kori Sandy MD;  [facility];  Service: UroGynecology           TONSILLECTOMY      TUBAL LIGATION      UPPER GASTROINTESTINAL ENDOSCOPY      US GUIDED BREAST BIOPSY RIGHT COMPLETE Right 10/31/2017       Current Outpatient Medications   Medication Sig Dispense Refill    Alaway 0 025 % ophthalmic solution INSTILL 1 DROP PER EYE DAILY AS NEEDED      albuterol (2 5 mg/3 mL) 0 083 % nebulizer solution Take 1 vial (2 5 mg total) by nebulization every 6 (six) hours as needed for wheezing or shortness of breath 75 mL 0    albuterol (VENTOLIN HFA) 90 mcg/act inhaler Inhale 2 puffs every 4 (four) hours as needed for wheezing or shortness of breath 1 Inhaler 0    BREO ELLIPTA 100-25 MCG/INH inhaler Inhale 1 puff daily 1 puff daily 1 Inhaler 3    acetaminophen (TYLENOL) 325 mg tablet Every 4 hours as needed (Patient not taking: Reported on 3/23/2021) 30 tablet 0    ARIPiprazole (ABILIFY) 5 mg tablet Take 1 tablet (5 mg total) by mouth daily at bedtime (Patient not taking: Reported on 3/23/2021) 90 tablet 0    betamethasone dipropionate (DIPROSONE) 0 05 % cream APPLY TO AFFECTED AREA EVERY DAY (Patient not taking: Reported on 3/23/2021) 30 g 0    bismuth subsalicylate (PEPTO BISMOL) 262 MG chewable tablet Chew 2 tablets      Botulinum Toxin Type A 200 units SOLR Inject 155 units into face and neck IM every 90 days      cetirizine (ZyrTEC) 10 mg tablet Take 1 tablet (10 mg total) by mouth daily (Patient not taking: Reported on 3/23/2021) 30 tablet 3    diclofenac sodium (VOLTAREN) 1 % APPLY 4 GRAMS TO AFFECTED AREA(S) THREE TIMES A DAY  3    Diclofenac Sodium (VOLTAREN) 1 %       dicyclomine (BENTYL) 20 mg tablet TAKE 1 TABLET (20 MG TOTAL) BY MOUTH EVERY 6 (SIX) HOURS AS NEEDED (PAIN) (Patient not taking: Reported on 3/23/2021) 360 tablet 1    FLUCELVAX QUADRIVALENT       FLUoxetine (PROzac) 10 mg capsule Take 1 capsule (10 mg total) by mouth daily Take with the 20mg cap for a total of 30mg daily (Patient not taking: Reported on 3/23/2021) 90 capsule 0    FLUoxetine (PROzac) 20 mg capsule Take 1 capsule (20 mg total) by mouth daily (Patient not taking: Reported on 3/23/2021) 90 capsule 0    fluticasone (FLONASE) 50 mcg/act nasal spray 1-2 sprays into each nostril daily (Patient not taking: Reported on 3/23/2021) 16 g 6    furosemide (LASIX) 20 mg tablet Take 1 tablet (20 mg total) by mouth daily as needed (leg swelling) (Patient not taking: Reported on 3/23/2021) 30 tablet 2    hydrOXYzine HCL (ATARAX) 50 mg tablet TAKE 1/2 OR 1 TABLET BY MOUTH THREE TIMES A DAY AS NEEDED FOR ANXIETY OR INSOMNIA (Patient not taking: Reported on 3/23/2021) 270 tablet 0    ibuprofen (MOTRIN) 800 mg tablet TAKE 1 TABLET BY MOUTH THREE TIMES A DAY WITH FOOD OR MILK AS NEEDED      ipratropium (ATROVENT) 0 02 % nebulizer solution       levothyroxine 150 mcg tablet Take 1 tablet (150 mcg total) by mouth daily 1 tab mon-sat and 1/2 on Sunday (Patient not taking: Reported on 3/23/2021) 90 tablet 3    Lidocaine Viscous HCl (XYLOCAINE) 2 % mucosal solution Swish and spit 15 mL 4 (four) times a day as needed for mouth pain or discomfort or mucositis (Patient not taking: Reported on 3/23/2021) 15 mL 1    Melatonin 5 MG TABS Take 1 tablet (5 mg total) by mouth daily at bedtime as needed (Insomnia) (Patient not taking: Reported on 3/23/2021) 30 tablet 0    meloxicam (MOBIC) 15 mg tablet Take 1 tablet (15 mg total) by mouth daily (Patient not taking: Reported on 3/23/2021) 30 tablet 6    metFORMIN (GLUCOPHAGE-XR) 500 mg 24 hr tablet TAKE 2 TABLETS (1,000 MG TOTAL) BY MOUTH EVERY MORNING (Patient not taking: Reported on 3/23/2021) 180 tablet 0    methocarbamol (ROBAXIN) 750 mg tablet Take 750 mg by mouth 3 (three) times a day  0    methylPREDNISolone 4 MG tablet therapy pack TAKE 6 TABLETS ON DAY 1 AS DIRECTED ON PACKAGE AND DECREASE BY 1 TAB EACH DAY FOR A TOTAL OF 6 DAYS      montelukast (SINGULAIR) 10 mg tablet Take 10 mg by mouth daily  6    naproxen (NAPROSYN) 500 mg tablet TAKE 1 TABLET BY MOUTH EVERY 12 HOURS WITH FOOD OR MILK AS NEEDED      omeprazole (PriLOSEC) 40 MG capsule Take 1 tablet by mouth 30 minutes prior to meals (breakfast and dinner) (Patient not taking: Reported on 3/23/2021) 28 capsule 0    oxyCODONE-acetaminophen (PERCOCET) 5-325 mg per tablet Take 1 tablet by mouth 2 (two) times a day  0    polyethylene glycol (GLYCOLAX) powder Take 17 g by mouth daily Mix 1 capful in 8 ounces of water, juice or tea  (Patient not taking: Reported on 3/23/2021) 578 g 3    prochlorperazine (COMPAZINE) 10 mg tablet PLEASE SEE ATTACHED FOR DETAILED DIRECTIONS (Patient not taking: Reported on 3/23/2021) 30 tablet 0    SUMAtriptan (IMITREX) 100 mg tablet Take 1 tablet (100 mg total) by mouth once as needed for migraine (Patient not taking: Reported on 3/23/2021) 10 tablet 1    tiZANidine (ZANAFLEX) 4 mg tablet Take 4 mg by mouth daily at bedtime as needed      traMADol (ULTRAM) 50 mg tablet TAKE 1 TABLET BY MOUTH EVERY DAY AS NEEDED FOR 15 DAYS       No current facility-administered medications for this visit  Allergies   Allergen Reactions    Gabapentin      Annotation - [id number]: Neuropsych effects per patient   Latex Dermatitis    Penicillins Hives       Review of Systems   Constitutional: Positive for chills  Negative for fatigue  HENT: Positive for trouble swallowing  Negative for congestion and drooling      Eyes: Negative for visual disturbance  Respiratory: Positive for shortness of breath  Negative for cough, chest tightness and wheezing  Cardiovascular: Negative for chest pain, palpitations and leg swelling  Gastrointestinal: Negative for abdominal pain, constipation, diarrhea, nausea and vomiting  Endocrine: Negative for polydipsia and polyuria  Genitourinary: Negative for decreased urine volume, dyspareunia and vaginal pain  Musculoskeletal: Positive for myalgias  Negative for back pain  Neurological: Positive for headaches  Negative for syncope, facial asymmetry, speech difficulty and weakness  Psychiatric/Behavioral: Negative for behavioral problems and confusion  Vitals:         I spent 30 minutes directly with the patient during this visit    VIRTUAL VISIT [address] [place] [address] acknowledges that she has consented to an online visit or consultation  She understands that the online visit is based solely on information provided by her, and that, in the absence of a face-to-face physical evaluation by the physician, the diagnosis she receives is both limited and provisional in terms of accuracy and completeness  This is not intended to replace a full medical face-to-face evaluation by the physician  Ambika Mays understands and accepts these terms

## 2021-03-25 ENCOUNTER — TELEMEDICINE (OUTPATIENT)
Dept: INTERNAL MEDICINE CLINIC | Facility: CLINIC | Age: 56
End: 2021-03-25

## 2021-03-25 DIAGNOSIS — U07.1 COVID-19 VIRUS INFECTION: Primary | ICD-10-CM

## 2021-03-25 DIAGNOSIS — J45.41 MODERATE PERSISTENT ASTHMA WITH EXACERBATION: ICD-10-CM

## 2021-03-25 PROCEDURE — G2025 DIS SITE TELE SVCS RHC/FQHC: HCPCS | Performed by: PHYSICIAN ASSISTANT

## 2021-03-25 RX ORDER — METHYLPREDNISOLONE 4 MG/1
TABLET ORAL
Qty: 1 EACH | Refills: 0
Start: 2021-03-25 | End: 2022-05-05 | Stop reason: ALTCHOICE

## 2021-03-25 NOTE — PROGRESS NOTES
COVID-19 Virtual Visit     Assessment/Plan:    Problem List Items Addressed This Visit        Respiratory    Moderate persistent asthma with exacerbation    Relevant Medications    methylPREDNISolone 4 MG tablet therapy pack      Other Visit Diagnoses     COVID-19 virus infection    -  Primary         Disposition:     I recommended continued isolation until at least 24 hours have passed since recovery defined as resolution of fever without the use of fever-reducing medications AND improvement in COVID symptoms AND 10 days have passed since onset of symptoms (or 10 days have passed since date of first positive viral diagnostic test for asymptomatic patients)  Pleasant 57-year-old female with history of diabetes, thyroid and asthma presenting for 2nd COVID follow-up  Patient reports slow improvement in her respiratory symptoms, otherwise feels fine  After further discussion she states she was in contact with her asthma doctor through [facility] who sent in a C/ Roque Asael 19 for her and will be picking it up and starting it today  She was instructed to continue her Breo inhaler daily for maintenance, albuterol pump as rescue p r n  As well as her nebulizer treatments every 4-6 hours as needed  Otherwise she seems to be doing very well  Based on ten-day isolation from positive test on 03/16, I reminded patient tomorrow will be her last day of isolation and can removed from isolation on Saturday, 3/27  I discussed with patient recommendations for continued use of mask, social distancing, hand hygiene and derm precautions per CDC guidelines, however she is aware that she may have some immunity build up naturally in response to her infection up to 90 days after her infection  I did discuss the vaccine briefly with her and recommended to hold off on getting the vaccine until 90 days post infection to avoid interfering with natural immune response      If needed she will continue follow-up with her asthma doctor otherwise she will follow-up with us virtually as needed and I do not see any further need for COVID follow-up  Encourage patient to continue routine management for chronic conditions with her resident PCP  I have spent 15 minutes directly with the patient  Greater than 50% of this time was spent in counseling/coordination of care regarding: prognosis, risks and benefits of treatment options, instructions for management, patient and family education, importance of treatment compliance, risk factor reductions and impressions  Encounter provider Ayesha Isbell PA-C    Provider located at [address] [place] [address] [address]  [id number] [place] [address] 30  [facility] [address] [facility] [address] [phone]    Recent Visits  Date Type Provider Dept   03/23/21 Telemedicine Sai Fine MD  [facility] recent visits within past 7 days and meeting all other requirements     Today's Visits  Date Type Provider Dept   03/25/21 [address] [place] [address], [address] [place] [address] today's visits and meeting all other requirements     Future Appointments  No visits were found meeting these conditions  Showing future appointments within next 150 days and meeting all other requirements        Patient agrees to participate in a virtual check in via telephone or video visit instead of presenting to the office to address urgent/immediate medical needs  Patient is aware this is a billable service  After connecting through Telephone, the patient was identified by name and date of birth  Goldy Campos was informed that this was a telemedicine visit and that the exam was being conducted confidentially over secure lines  My office door was closed  No one else was in the room  Goldy Campos acknowledged consent and understanding of privacy and security of the telemedicine visit   I informed the patient that I have reviewed her record in Epic and presented the opportunity for her to ask any questions regarding the visit today  The patient agreed to participate  It was my intent to perform this visit via video technology but the patient was not able to do a video connection so the visit was completed via audio telephone only  Subjective:   Osvaldo Vasquez is a 49 y o  female who has been screened for COVID-19  Symptom change since last report: resolving  Patient's symptoms include nasal congestion (improved), cough (less than before), shortness of breath (slight, improved), chest tightness (still with sxs, the same, unchanged since last visit) and headache (secondary to cough)  Patient denies fever, chills, fatigue, rhinorrhea, sore throat, anosmia, loss of taste, abdominal pain, nausea, vomiting, diarrhea and myalgias  Edna Castillo has been staying home and has isolated themselves in her home  She is taking care to not share personal items and is cleaning all surfaces that are touched often, like counters, tabletops, and doorknobs using household cleaning sprays or wipes  She is wearing a mask when she leaves her room  Date of positive COVID-19 PCR: 3/16/2021    She is using the nebulizer every 4 hours, flonase  Also using cold medication, "whatever is OTC", aspirin  She reports still taking the breo, follows with [facility] asthma       Lab Results   Component Value Date    SARSCOV2 DETECTED (A) 03/16/2021    [facility] Not Detected 12/02/2020     Past Medical History:   Diagnosis Date    Achalasia     Asthma     Constipation     Depression     Diarrhea 2/28/2019    Elevated LFTs     last assessed 10/25/17    Fibromyalgia     Gastric ulcer     GERD (gastroesophageal reflux disease)     H  pylori infection 10/9/2017    [facility] [id number]: Treated, f/u stool antigen negative    Hypothyroidism     Kidney stone     Migraines     Migraines     MICHAEL (obstructive sleep apnea)     last assessed 7/21/16    Pneumonia     Sleep apnea 2019    Uses CPAP    Thyroid nodule     last assessed 17     Past Surgical History:   Procedure Laterality Date    BREAST BIOPSY Right 10/31/2017    us bx     SECTION      COLONOSCOPY      ENDOMETRIAL ABLATION      MYOTOMY HELLER LAPAROSCOPIC  2016    Dr Mic Esquivel, Coincident lorena fundoplication    NASAL SINUS SURGERY      OTHER SURGICAL HISTORY      achalasia repair    OVARIAN CYST REMOVAL      PILONIDAL CYST / SINUS EXCISION      ME COLONOSCOPY FLX DX W/COLLJ SPEC WHEN PFRMD N/A 2019    Procedure: COLONOSCOPY;  Surgeon: Felicita Edward MD;  [facility]; Service: Gastroenterology    ME CYSTOURETHROSCOPY N/A 2017    Procedure: Cara Landaverde;  Surgeon: Carmina Hannah MD;  [facility];  Service: UroGynecology           ME ESOPHAGOGASTRODUODENOSCOPY TRANSORAL DIAGNOSTIC N/A 2017    Procedure: ESOPHAGOGASTRODUODENOSCOPY (EGD); Surgeon: Felicita Edward MD;  [facility]; Service: Gastroenterology    ME ESOPHAGOGASTRODUODENOSCOPY TRANSORAL DIAGNOSTIC N/A 2019    Procedure: ESOPHAGOGASTRODUODENOSCOPY (EGD); Surgeon: Felicita Edward MD;  [facility];   Service: Gastroenterology    ME POST COLPORRHAPHY,RECTUM/VAGINA N/A 2017    Procedure: COLPORRHAPHY POSTERIOR;  Surgeon: Carmina Hannah MD;  [facility];  Service: UroGynecology           ME SLING OPER STRES INCONTINENCE N/A 2017    Procedure: INSERTION PUBOVAGINAL SLING /SINGLE INCISION ;  Surgeon: Carmina Hannah MD;  [facility];  Service: UroGynecology           TONSILLECTOMY      TUBAL LIGATION      UPPER GASTROINTESTINAL ENDOSCOPY      US GUIDED BREAST BIOPSY RIGHT COMPLETE Right 10/31/2017     Current Outpatient Medications   Medication Sig Dispense Refill    BREO ELLIPTA 100-25 MCG/INH inhaler Inhale 1 puff daily 1 puff daily 1 Inhaler 3    levothyroxine 150 mcg tablet Take 1 tablet (150 mcg total) by mouth daily 1 tab mon-sat and 1/2 on  90 tablet 3    metFORMIN (GLUCOPHAGE-XR) 500 mg 24 hr tablet TAKE 2 TABLETS (1,000 MG TOTAL) BY MOUTH EVERY MORNING 180 tablet 0    methylPREDNISolone 4 MG tablet therapy pack Use as directed on package 1 each 0    acetaminophen (TYLENOL) 325 mg tablet Every 4 hours as needed (Patient not taking: Reported on 3/23/2021) 30 tablet 0    Alaway 0 025 % ophthalmic solution INSTILL 1 DROP PER EYE DAILY AS NEEDED      albuterol (2 5 mg/3 mL) 0 083 % nebulizer solution Take 1 vial (2 5 mg total) by nebulization every 6 (six) hours as needed for wheezing or shortness of breath 75 mL 0    albuterol (Ventolin HFA) 90 mcg/act inhaler Inhale 2 puffs every 4 (four) hours as needed for wheezing or shortness of breath 1 Inhaler 0    ARIPiprazole (ABILIFY) 5 mg tablet Take 1 tablet (5 mg total) by mouth daily at bedtime (Patient not taking: Reported on 3/23/2021) 90 tablet 0    betamethasone dipropionate (DIPROSONE) 0 05 % cream APPLY TO AFFECTED AREA EVERY DAY (Patient not taking: Reported on 3/23/2021) 30 g 0    bismuth subsalicylate (PEPTO BISMOL) 262 MG chewable tablet Chew 2 tablets      Botulinum Toxin Type A 200 units SOLR Inject 155 units into face and neck IM every 90 days      cetirizine (ZyrTEC) 10 mg tablet Take 1 tablet (10 mg total) by mouth daily (Patient not taking: Reported on 3/23/2021) 30 tablet 3    diclofenac sodium (VOLTAREN) 1 % APPLY 4 GRAMS TO AFFECTED AREA(S) THREE TIMES A DAY  3    dicyclomine (BENTYL) 20 mg tablet TAKE 1 TABLET (20 MG TOTAL) BY MOUTH EVERY 6 (SIX) HOURS AS NEEDED (PAIN) (Patient not taking: Reported on 3/23/2021) 360 tablet 1    FLUCELVAX QUADRIVALENT       FLUoxetine (PROzac) 10 mg capsule Take 1 capsule (10 mg total) by mouth daily Take with the 20mg cap for a total of 30mg daily (Patient not taking: Reported on 3/23/2021) 90 capsule 0    FLUoxetine (PROzac) 20 mg capsule Take 1 capsule (20 mg total) by mouth daily (Patient not taking: Reported on 3/23/2021) 90 capsule 0    fluticasone (FLONASE) 50 mcg/act nasal spray 1-2 sprays into each nostril daily 16 g 6    furosemide (LASIX) 20 mg tablet Take 1 tablet (20 mg total) by mouth daily as needed (leg swelling) (Patient not taking: Reported on 3/23/2021) 30 tablet 2    hydrOXYzine HCL (ATARAX) 50 mg tablet TAKE 1/2 OR 1 TABLET BY MOUTH THREE TIMES A DAY AS NEEDED FOR ANXIETY OR INSOMNIA (Patient not taking: Reported on 3/23/2021) 270 tablet 0    ibuprofen (MOTRIN) 800 mg tablet TAKE 1 TABLET BY MOUTH THREE TIMES A DAY WITH FOOD OR MILK AS NEEDED      ipratropium (ATROVENT) 0 02 % nebulizer solution       meloxicam (MOBIC) 15 mg tablet Take 1 tablet (15 mg total) by mouth daily (Patient not taking: Reported on 3/23/2021) 30 tablet 6    methocarbamol (ROBAXIN) 750 mg tablet Take 750 mg by mouth 3 (three) times a day  0    montelukast (SINGULAIR) 10 mg tablet Take 10 mg by mouth daily  6    naproxen (NAPROSYN) 500 mg tablet TAKE 1 TABLET BY MOUTH EVERY 12 HOURS WITH FOOD OR MILK AS NEEDED      omeprazole (PriLOSEC) 40 MG capsule Take 1 tablet by mouth 30 minutes prior to meals (breakfast and dinner) (Patient not taking: Reported on 3/23/2021) 28 capsule 0    polyethylene glycol (GLYCOLAX) powder Take 17 g by mouth daily Mix 1 capful in 8 ounces of water, juice or tea  (Patient not taking: Reported on 3/23/2021) 578 g 3    SUMAtriptan (IMITREX) 100 mg tablet Take 1 tablet (100 mg total) by mouth once as needed for migraine (Patient not taking: Reported on 3/23/2021) 10 tablet 1    tiZANidine (ZANAFLEX) 4 mg tablet Take 4 mg by mouth daily at bedtime as needed      traMADol (ULTRAM) 50 mg tablet TAKE 1 TABLET BY MOUTH EVERY DAY AS NEEDED FOR 15 DAYS       No current facility-administered medications for this visit  Allergies   Allergen Reactions    Gabapentin      Annotation - [id number]: Neuropsych effects per patient      Latex Dermatitis    Penicillins Hives       Review of Systems   Constitutional: Negative for chills, fatigue and fever  HENT: Positive for congestion (improved)  Negative for rhinorrhea and sore throat  Respiratory: Positive for cough (less than before), chest tightness (still with sxs, the same, unchanged since last visit) and shortness of breath (slight, improved)  Gastrointestinal: Negative for abdominal pain, diarrhea, nausea and vomiting  Musculoskeletal: Negative for myalgias  Neurological: Positive for headaches (secondary to cough)  Objective: There were no vitals filed for this visit  Physical Exam     UNABLE TO PERFORM PHYSICAL EXAM AS PATIENT WAS UNABLE TO GET VIDEO WORKING  PATIENT WAS TALKING COMFORTABLY AND DID NOT SEEM IN ANY PAIN, DISTRESS OR RESPIRATORY COMPLICATION  VIRTUAL VISIT DISCLAIMER    Anika Huber acknowledges that she has consented to an online visit or consultation  She understands that the online visit is based solely on information provided by her, and that, in the absence of a face-to-face physical evaluation by the physician, the diagnosis she receives is both limited and provisional in terms of accuracy and completeness  This is not intended to replace a full medical face-to-face evaluation by the physician  Anika Huber understands and accepts these terms

## 2021-03-28 DIAGNOSIS — G47.00 INSOMNIA, UNSPECIFIED TYPE: ICD-10-CM

## 2021-03-28 DIAGNOSIS — F41.9 ANXIETY: ICD-10-CM

## 2021-03-28 NOTE — PSYCH
Pt requested a phone visit due to having COVID 19 and not feeling very well, but wanted to go through with a phone visit  Virtual Brief Visit    Assessment/Plan:    Problem List Items Addressed This Visit        Other    Anxiety    Relevant Medications    FLUoxetine (PROzac) 10 mg capsule    FLUoxetine (PROzac) 20 mg capsule    hydrOXYzine HCL (ATARAX) 50 mg tablet    Insomnia    Relevant Medications    hydrOXYzine HCL (ATARAX) 50 mg tablet    Bipolar 2 disorder (HCC)    Relevant Medications    ARIPiprazole (ABILIFY) 5 mg tablet    FLUoxetine (PROzac) 10 mg capsule    FLUoxetine (PROzac) 20 mg capsule    hydrOXYzine HCL (ATARAX) 50 mg tablet                Reason for visit is   Chief Complaint   Patient presents with    Virtual Brief Visit     Phone visit    Medication Management        Encounter provider Rachel Gastelum PA-C    Provider located at [address] [place] [id number] [phone]    Recent Visits  No visits were found meeting these conditions  Showing recent visits within past 7 days and meeting all other requirements     Today's Visits  Date Type Provider Dept   03/30/21 Telemedicine Rachel Gastelum PA-C [facility] 18 today's visits and meeting all other requirements     Future Appointments  No visits were found meeting these conditions  Showing future appointments within next 150 days and meeting all other requirements        After connecting through telephone, the patient was identified by name and date of birth  Rosalba Medina was informed that this is a telemedicine visit and that the visit is being conducted through telephone  My office door was closed  No one else was in the room  Pt verbally confirmed that she is at home alone for this visit  She acknowledged consent and understanding of privacy and security of the platform   The patient has agreed to participate and understands she can discontinue the visit at any time  Patient is aware this is a billable service  Subjective    Ray Bradshaw is a 49 y o  female with primary statement / Area of need:  "I feel good "  Pt presently denies depression, SI, HI, anxiety, panic attacks, or manic or psychotic Sxs  Pt reports compliance to psychiatric medications without SE  She has a cough related to COVID 19 and asthma but presently denies any fever, chest pain or current problems breathing  She is following directions of her internist to recuperate from the virus  HPI --as above    Past Medical History:   Diagnosis Date    Achalasia     Asthma     Constipation     Depression     Diarrhea 2019    Elevated LFTs     last assessed 10/25/17    Fibromyalgia     Gastric ulcer     GERD (gastroesophageal reflux disease)     H  pylori infection 10/9/2017    [facility] [id number]: Treated, f/u stool antigen negative    Hypothyroidism     Kidney stone     Migraines     Migraines     MICHAEL (obstructive sleep apnea)     last assessed 16    Pneumonia     Sleep apnea 2019    Uses CPAP    Thyroid nodule     last assessed 17       Past Surgical History:   Procedure Laterality Date    BREAST BIOPSY Right 10/31/2017    us bx     SECTION      COLONOSCOPY      ENDOMETRIAL ABLATION      MYOTOMY HELLER LAPAROSCOPIC  2016    Dr Gerardo Mathews, Coincident lorena fundoplication    NASAL SINUS SURGERY      OTHER SURGICAL HISTORY      achalasia repair    OVARIAN CYST REMOVAL      PILONIDAL CYST / SINUS EXCISION      CT COLONOSCOPY FLX DX W/COLLJ SPEC WHEN PFRMD N/A 2019    Procedure: COLONOSCOPY;  Surgeon: Vitor Wise MD;  [facility];   Service: Gastroenterology    CT CYSTOURETHROSCOPY N/A 2017    Procedure: Rolando Marcos;  Surgeon: Binta Ventura MD;  Location: [facility] OR;  Service: UroGynecology           CT ESOPHAGOGASTRODUODENOSCOPY TRANSORAL DIAGNOSTIC N/A 2017 Procedure: ESOPHAGOGASTRODUODENOSCOPY (EGD); Surgeon: Alfonso Paige MD;  Location: [facility]; Service: Gastroenterology    MN ESOPHAGOGASTRODUODENOSCOPY TRANSORAL DIAGNOSTIC N/A 4/25/2019    Procedure: ESOPHAGOGASTRODUODENOSCOPY (EGD); Surgeon: Alfonso Paige MD;  [facility];   Service: Gastroenterology    MN POST COLPORRHAPHY,RECTUM/VAGINA N/A 1/19/2017    Procedure: COLPORRHAPHY POSTERIOR;  Surgeon: Shannan Santoro MD;  [facility];  Service: UroGynecology           MN SLING OPER STRES INCONTINENCE N/A 1/19/2017    Procedure: INSERTION PUBOVAGINAL SLING /SINGLE INCISION ;  Surgeon: Shannan Santoro MD;  [facility];  Service: UroGynecology           TONSILLECTOMY      TUBAL LIGATION      UPPER GASTROINTESTINAL ENDOSCOPY      US GUIDED BREAST BIOPSY RIGHT COMPLETE Right 10/31/2017       Current Outpatient Medications   Medication Sig Dispense Refill    acetaminophen (TYLENOL) 325 mg tablet Every 4 hours as needed (Patient not taking: Reported on 3/23/2021) 30 tablet 0    [place]away 0 025 % ophthalmic solution INSTILL 1 DROP PER EYE DAILY AS NEEDED      albuterol (2 5 mg/3 mL) 0 083 % nebulizer solution Take 1 vial (2 5 mg total) by nebulization every 6 (six) hours as needed for wheezing or shortness of breath 75 mL 0    albuterol (Ventolin HFA) 90 mcg/act inhaler Inhale 2 puffs every 4 (four) hours as needed for wheezing or shortness of breath 1 Inhaler 0    ARIPiprazole (ABILIFY) 5 mg tablet Take 1 tablet (5 mg total) by mouth daily at bedtime 90 tablet 0    betamethasone dipropionate (DIPROSONE) 0 05 % cream APPLY TO AFFECTED AREA EVERY DAY (Patient not taking: Reported on 3/23/2021) 30 g 0    bismuth subsalicylate (PEPTO BISMOL) 262 MG chewable tablet Chew 2 tablets      Botulinum Toxin Type A 200 units SOLR Inject 155 units into face and neck IM every 90 days      BREO ELLIPTA 100-25 MCG/INH inhaler Inhale 1 puff daily 1 puff daily 1 Inhaler 3    cetirizine (ZyrTEC) 10 mg tablet Take 1 tablet (10 mg total) by mouth daily (Patient not taking: Reported on 3/23/2021) 30 tablet 3    diclofenac sodium (VOLTAREN) 1 % APPLY 4 GRAMS TO AFFECTED AREA(S) THREE TIMES A DAY  3    dicyclomine (BENTYL) 20 mg tablet TAKE 1 TABLET (20 MG TOTAL) BY MOUTH EVERY 6 (SIX) HOURS AS NEEDED (PAIN) (Patient not taking: Reported on 3/23/2021) 360 tablet 1    FLUCELVAX QUADRIVALENT       FLUoxetine (PROzac) 10 mg capsule Take 1 capsule (10 mg total) by mouth daily Take with the 20mg cap for a total of 30mg daily 90 capsule 0    FLUoxetine (PROzac) 20 mg capsule Take 1 capsule (20 mg total) by mouth daily 90 capsule 0    fluticasone (FLONASE) 50 mcg/act nasal spray 1-2 sprays into each nostril daily 16 g 6    furosemide (LASIX) 20 mg tablet Take 1 tablet (20 mg total) by mouth daily as needed (leg swelling) (Patient not taking: Reported on 3/23/2021) 30 tablet 2    hydrOXYzine HCL (ATARAX) 50 mg tablet TAKE 1/2 OR 1 TABLET BY MOUTH THREE TIMES A DAY AS NEEDED FOR ANXIETY OR INSOMNIA 270 tablet 0    ibuprofen (MOTRIN) 800 mg tablet TAKE 1 TABLET BY MOUTH THREE TIMES A DAY WITH FOOD OR MILK AS NEEDED      ipratropium (ATROVENT) 0 02 % nebulizer solution       levothyroxine 150 mcg tablet Take 1 tablet (150 mcg total) by mouth daily 1 tab mon-sat and 1/2 on Sunday 90 tablet 3    meloxicam (MOBIC) 15 mg tablet Take 1 tablet (15 mg total) by mouth daily (Patient not taking: Reported on 3/23/2021) 30 tablet 6    metFORMIN (GLUCOPHAGE-XR) 500 mg 24 hr tablet TAKE 2 TABLETS (1,000 MG TOTAL) BY MOUTH EVERY MORNING 180 tablet 0    methocarbamol (ROBAXIN) 750 mg tablet Take 750 mg by mouth 3 (three) times a day  0    methylPREDNISolone 4 MG tablet therapy pack Use as directed on package 1 each 0    montelukast (SINGULAIR) 10 mg tablet Take 10 mg by mouth daily  6    naproxen (NAPROSYN) 500 mg tablet TAKE 1 TABLET BY MOUTH EVERY 12 HOURS WITH FOOD OR MILK AS NEEDED      omeprazole (PriLOSEC) 40 MG capsule Take 1 tablet by mouth 30 minutes prior to meals (breakfast and dinner) (Patient not taking: Reported on 3/23/2021) 28 capsule 0    polyethylene glycol (GLYCOLAX) powder Take 17 g by mouth daily Mix 1 capful in 8 ounces of water, juice or tea  (Patient not taking: Reported on 3/23/2021) 578 g 3    SUMAtriptan (IMITREX) 100 mg tablet Take 1 tablet (100 mg total) by mouth once as needed for migraine (Patient not taking: Reported on 3/23/2021) 10 tablet 1    tiZANidine (ZANAFLEX) 4 mg tablet Take 4 mg by mouth daily at bedtime as needed      traMADol (ULTRAM) 50 mg tablet TAKE 1 TABLET BY MOUTH EVERY DAY AS NEEDED FOR 15 DAYS       No current facility-administered medications for this visit  Allergies   Allergen Reactions    Gabapentin      Annotation - [id number]: Neuropsych effects per patient   Latex - Food Allergy Dermatitis    Penicillins Hives       Review of Systems --as per HPI (all else negative)    There were no vitals filed for this visit  Labwork:   I have personally reviewed all pertinent laboratory/tests results  COVID19:   Lab Results   Component Value Date    SARSCOV2 DETECTED (A) 03/16/2021        PLAN:  Pt sounds clear and coherent, and does not seem out of breath  She seems stable in regards to her mental state and I will continue the present regimen unchanged  Treatment plan done and Pt accepts the plan  Continue:    Continue Fluoxetine 20mg + 10mg (1) cap po qd with breakfast # 90  Aripiprazole 50mg (1) tab po qhs # 90  Hydroxyzine 50mg (1/2-1) tab po tid prn anxiety or insomnia # 270  Melatonin 5mg qhs--Pt gets OTC  Continue 11 weeks, call sooner prn  A copy of Tx plan is being mailed to Pt per her request    I spent 35 minutes directly with the patient during this visit    VIRTUAL VISIT [facility] acknowledges that she has consented to an online visit or consultation   She understands that the online visit is based solely on information provided by her, and that, in the absence of a face-to-face physical evaluation by the physician, the diagnosis she receives is both limited and provisional in terms of accuracy and completeness  This is not intended to replace a full medical face-to-face evaluation by the physician  Lisette Mcguire understands and accepts these terms

## 2021-03-30 ENCOUNTER — TELEMEDICINE (OUTPATIENT)
Dept: PSYCHIATRY | Facility: CLINIC | Age: 56
End: 2021-03-30
Payer: COMMERCIAL

## 2021-03-30 DIAGNOSIS — G47.00 INSOMNIA, UNSPECIFIED TYPE: ICD-10-CM

## 2021-03-30 DIAGNOSIS — F31.81 BIPOLAR 2 DISORDER (HCC): ICD-10-CM

## 2021-03-30 DIAGNOSIS — F41.9 ANXIETY: ICD-10-CM

## 2021-03-30 PROCEDURE — G2012 BRIEF CHECK IN BY MD/QHP: HCPCS | Performed by: PHYSICIAN ASSISTANT

## 2021-03-30 RX ORDER — FLUOXETINE HYDROCHLORIDE 20 MG/1
20 CAPSULE ORAL DAILY
Qty: 90 CAPSULE | Refills: 0 | Status: SHIPPED | OUTPATIENT
Start: 2021-03-30 | End: 2021-06-04 | Stop reason: SDUPTHER

## 2021-03-30 RX ORDER — ARIPIPRAZOLE 5 MG/1
5 TABLET ORAL
Qty: 90 TABLET | Refills: 0 | Status: SHIPPED | OUTPATIENT
Start: 2021-03-30 | End: 2021-06-04 | Stop reason: SDUPTHER

## 2021-03-30 RX ORDER — FLUOXETINE 10 MG/1
10 CAPSULE ORAL DAILY
Qty: 90 CAPSULE | Refills: 0 | Status: SHIPPED | OUTPATIENT
Start: 2021-03-30 | End: 2021-10-06 | Stop reason: DRUGHIGH

## 2021-03-30 RX ORDER — HYDROXYZINE 50 MG/1
TABLET, FILM COATED ORAL
Qty: 270 TABLET | Refills: 0 | OUTPATIENT
Start: 2021-03-30

## 2021-03-30 RX ORDER — HYDROXYZINE 50 MG/1
TABLET, FILM COATED ORAL
Qty: 270 TABLET | Refills: 0 | Status: SHIPPED | OUTPATIENT
Start: 2021-03-30 | End: 2021-06-04 | Stop reason: SDUPTHER

## 2021-03-30 NOTE — BH TREATMENT PLAN
TREATMENT PLAN (Medication Management Only)        [facility]    Name and Date of Birth:  Roz Haq 49 y o  1965  Date of Treatment Plan: March 30, 2021  Diagnosis/Diagnoses:    1  Bipolar 2 disorder ([address] )    2  Anxiety    3  Insomnia, unspecified type      Strengths/Personal Resources for Self-Care: "Sitting under a tree, relaxing; Reading"  Area/Areas of need (in own words): "I feel good"  1  Long Term Goal: Maintain mood stability and control of anxiety  Target Date:3-6 months  Person/Persons responsible for completion of goal: Aniket Carolann   2  Short Term Objective (s) - How will we reach this goal?:   A  Provider new recommended medication/dosage changes and/or continue medication(s): Pt sounds clear and coherent, and does not seem out of breath  She seems stable in regards to her mental state and I will continue the present regimen unchanged  Treatment plan done and Pt accepts the plan  Continue:    Continue Fluoxetine 20mg + 10mg (1) cap po qd with breakfast # 90  Aripiprazole 50mg (1) tab po qhs # 90  Hydroxyzine 50mg (1/2-1) tab po tid prn anxiety or insomnia # 270  Melatonin 5mg qhs--Pt gets OTC  Continue 11 weeks, call sooner prn  A copy of Tx plan is being mailed to Pt per her request  Target Date:3-6 months  Person/Persons Responsible for Completion of Goal: [place] Carolann  Progress Towards Goals: stable, continuing treatment  Treatment Modality: 4-6 months  Review due 180 days from date of this plan: 4-6 months  Expected length of service: ongoing treatment  My Physician/PA/NP and I have developed this plan together and I agree to work on the goals and objectives  I understand the treatment goals that were developed for my treatment

## 2021-03-31 ENCOUNTER — TELEPHONE (OUTPATIENT)
Dept: PSYCHIATRY | Facility: CLINIC | Age: 56
End: 2021-03-31

## 2021-04-06 DIAGNOSIS — R63.5 ABNORMAL WEIGHT GAIN: ICD-10-CM

## 2021-04-06 DIAGNOSIS — R73.03 PREDIABETES: ICD-10-CM

## 2021-04-07 RX ORDER — METFORMIN HYDROCHLORIDE 500 MG/1
1000 TABLET, EXTENDED RELEASE ORAL EVERY MORNING
Qty: 180 TABLET | Refills: 0 | Status: SHIPPED | OUTPATIENT
Start: 2021-04-07

## 2021-04-14 DIAGNOSIS — G43.909 MIGRAINE WITHOUT STATUS MIGRAINOSUS, NOT INTRACTABLE, UNSPECIFIED MIGRAINE TYPE: Primary | ICD-10-CM

## 2021-04-14 RX ORDER — IBUPROFEN 800 MG/1
800 TABLET ORAL EVERY 8 HOURS PRN
Qty: 30 TABLET | Refills: 0 | Status: SHIPPED | OUTPATIENT
Start: 2021-04-14

## 2021-04-14 NOTE — TELEPHONE ENCOUNTER
Name of medication, dose, quantity and frequency   Requested Prescriptions     Pending Prescriptions Disp Refills    ibuprofen (MOTRIN) 800 mg tablet 30 tablet        Number of refills left:0    Amount of medication left:0    Pharmacy verified and updatedyes    Additional information:

## 2021-04-19 ENCOUNTER — TELEPHONE (OUTPATIENT)
Dept: PSYCHIATRY | Facility: CLINIC | Age: 56
End: 2021-04-19

## 2021-05-02 DIAGNOSIS — G43.909 MIGRAINE WITHOUT STATUS MIGRAINOSUS, NOT INTRACTABLE, UNSPECIFIED MIGRAINE TYPE: ICD-10-CM

## 2021-05-04 RX ORDER — IBUPROFEN 800 MG/1
800 TABLET ORAL EVERY 8 HOURS PRN
Qty: 30 TABLET | Refills: 0 | OUTPATIENT
Start: 2021-05-04

## 2021-05-31 DIAGNOSIS — J45.909 ASTHMA: ICD-10-CM

## 2021-06-01 ENCOUNTER — TELEPHONE (OUTPATIENT)
Dept: PSYCHIATRY | Facility: CLINIC | Age: 56
End: 2021-06-01

## 2021-06-01 RX ORDER — BETAMETHASONE DIPROPIONATE 0.5 MG/G
CREAM TOPICAL
Qty: 30 G | Refills: 0 | Status: SHIPPED | OUTPATIENT
Start: 2021-06-01

## 2021-06-01 NOTE — LETTER
21     Sharita Eckert   : 1965   [address] [facility] [place] [address]       It is the policy of [facility] to monitor and manage appointments that have been no-showed or cancelled with less than 48-hour notice  This is necessary to ensure that we are able to provide timely access for all patients to our providers  Undue numbers of unutilized appointments delays necessary medical care for all patients  Dear Sharita Eckert :      We are sorry that you missed your appointment with Reinlado Dias PA-C on 2021  Your health and follow-up care are important to us  We want to make you aware that you do not have another appointment with Reinaldo Dias PA-C scheduled  Please be aware that our office policy states that if you 'no show' or cancel an appointment without providing 48-hours notice, you may be charged a $53 00 fee  Additionally, if you miss two Medication Management  appointments in a row without prior notice of cancellation, or three or more in a calendar year, you may be discharged from Medication Management  treatment  We want to bring this to your attention now to prevent an interruption of your care  If you have any questions about this policy, please call us at the number above  If we do not hear from you within 10 business days to make a follow up appointment, we will assume you are no longer interested in care here  Thank you in advance for your cooperation and assistance         Sincerely,      [facility]

## 2021-06-01 NOTE — TELEPHONE ENCOUNTER
----- Message from Ishan Drake sent at 6/1/2021  6:14 PM EDT -----  Regarding: Patient No Show  Eryn Childers [[id number]]  No Showed 06/01/21  for Eren Moralez PA-C and did not call with proper notice to Cx appt   They are marked as a No Show for today's visit

## 2021-06-02 NOTE — PSYCH
MEDICATION MANAGEMENT NOTE        [address] [facility]      Name and Date of Birth:  Loretta Huizar 49 y o  1965    Date of Visit: June 4, 2021    HPI:    Loretta Huizar is here for medication review with primary c/o "The same, I am OK "  Pt is "Giving thanks to God that we are still alive on this earth "  She reads the bible, visits family, and volunteers at her [employer] to keep busy  She does not have any friends and feels she does not need them because she has her family and she does not want to have anyone other than family in her house  People can "Sometimes" irritate her and she keeps people generally at arm's length in regards to her interactions  She attends [employer] socials but does not have people from her [employer] over to her home  She enjoys her volunteerism and likes to help people, but does not cultivate friendships  Pt presently denies depression, SI, HI, anxiety, panic attacks, or manic or psychotic Sxs  Pt reports compliance to psychiatric medications without SE  However, she does report using the Hydroxyzine prn anxiety and has needed it approx 6 times over the past 3 weeks  Appetite Changes and Sleep: normal sleep, normal appetite, normal energy level    Review Of Systems:      Constitutional negative   ENT negative   Cardiovascular negative   Respiratory negative   Gastrointestinal negative   Genitourinary negative   Musculoskeletal negative   Integumentary negative   Neurological negative   Endocrine negative   Other Symptoms none, all other systems are negative       Past Psychiatric History:   As copied from my note with 1/19/2021 updates as needed:  "  [ Damari Pack in [place] and came to the [facility] with her family in approx 1989 and grew up with biological parents, 1 year younger sister and a younger brother   Pt describes her upbringing as "Good, good dad, good mom  Nargis Arriaga were raised with [employer]  "   Everyone in the family got along and Pt has positive memories of growing up with them      Depression started in 2010 at work when she sustained a life changing back injury, for which she became permanently disabled  Isabelle Constantino: sadness, crying, self-isolating, anhedonia, less motivated, feelings of hopelessness and helplessness   But no SI                    Manic Sxs:  decreased need for sleep , rapid speech  and flight of ideas/racing thoughts  Racy thoughts, elevated energy     Anxiety started in 2010 due to the work injury:  excessive worry more days than not for longer than 3 months and The Sxs can occur without concommittent depressive Sxs   No other Sxs of BRIGITTE      Panic attacks started in late 8/2020 initially triggered by driving through a town she was not familiar with  Dina Beltran has had a few in total with Sxs of:  Moderate anxiety, sweaty hands,  palpitations/racing heart, trembling, shortness of breath       Social Anxiety symptoms: no symptoms suggestive of social anxiety Eating Disorder symptoms: no historical or current eating disorder  no binge eating disorder; no anorexia nervosa  no symptoms of bulimia      OCD type Sxs: checking door locks, windows, stove, pipes in the basement (which used to leak)   Cleans often, can be very fastidious and wash her hands excessively        Pt denies any h/o PTSD or psychotic Sxs      Prior psychiatrists:  [facility] from approx 2014 - 11/2019 --stopped going due to change in insurance     Prior psychotherapists:   First therapist was at [facility] in [place] and he left so she only saw him for a year  Second one was seen from [id number] at [address]  Pt denied h/o SI, HI, self-injurious behaviors, violent behaviors, psychiatric hospitalizations, ECT, legal or  Hx     Prior Rx trials: Viibryd 40mg (helped), Bupropion ER/XL 150mg (helped), Aripiprazole 5mg (helps), Gabapentin 300mg,Topamax (for migraines but stopped due to ineffectiveness and her h/o nephrolithiasis), Hydroxyzine 50mg (sedating), Doxepin up to 50mg (for sleep--oversedating even at 25mg), Clonazepam 0 5mg--??used/helped       Abuse Hx:  Pt denies any h/o sexual, physical, or emotional abuse     Trauma Hx:  When she lost her job after her work injury                   ] "      Past Medical History:    Past Medical History:   Diagnosis Date    Achalasia     Asthma     Constipation     Depression     Diarrhea 2019    Elevated LFTs     last assessed 10/25/17    Fibromyalgia     Gastric ulcer     GERD (gastroesophageal reflux disease)     H  pylori infection 10/9/2017    [facility] [id number]: Treated, f/u stool antigen negative    Hypothyroidism     Kidney stone     Migraines     Migraines     MICHAEL (obstructive sleep apnea)     last assessed 16    Pneumonia     Sleep apnea 2019    Uses CPAP    Thyroid nodule     last assessed 17       Substance Abuse History:    Social History     Substance and Sexual Activity   Alcohol Use No     Social History     Substance and Sexual Activity   Drug Use Never       Social History:    Social History     Socioeconomic History    Marital status: Single     Spouse name: Not on file    Number of children: 3    Years of education: Not on file    Highest education level: Not on file   Occupational History    Occupation: Disability since 2019-- due to a job injury    Social Needs    Financial resource strain: Not on file    Food insecurity     Worry: Not on file     Inability: Not on file   [facility] needs     Medical: Not on file     Non-medical: Not on file   Tobacco Use    Smoking status: Former Smoker     Packs/day: 1 50     Years: 30 00     Pack years: 45 00     Quit date:      Years since quittin 4    Smokeless tobacco: Never Used    Tobacco comment: pt stated quit about a month ago, last assessed 14 per [employer]   Substance and Sexual Activity    Alcohol use: No    Drug use: Never    Sexual activity: Yes     Partners: Male     Birth control/protection: Female Sterilization     Comment: Boyfriend/father of 4 children   Lifestyle    Physical activity     Days per week: Not on file     Minutes per session: Not on file    Stress: Not on file   Relationships    Social connections     Talks on phone: Not on file     Gets together: Not on file     Attends Holiness service: Not on file     Active member of club or organization: Not on file     Attends meetings of clubs or organizations: Not on file     Relationship status: Not on file    Intimate partner violence     Fear of current or ex partner: Not on file     Emotionally abused: Not on file     Physically abused: Not on file     Forced sexual activity: Not on file   Other Topics Concern    Not on file   Social History Narrative    Home: Lives with boyfriend/father of her 4 children and one of their daughters        Children: 2 sons born 80 and 18,  2 daughters born 0 and 2003        Education:       Family Psychiatric History:     Family History   Problem Relation Age of Onset    Cancer Mother     Breast cancer Mother 62    Hypertension Mother     Stroke Father     Diabetes Father     Hypertension Father     Alzheimer's disease Father     Cancer Maternal Grandmother         ovarian    Ovarian cancer Maternal Grandmother         unspecified laterality    Cancer Maternal Grandfather     Hypertension Maternal Grandfather     Tongue cancer Maternal Grandfather     Stroke Paternal Grandmother     Thyroid disease unspecified Brother     Depression Brother     Anxiety disorder Brother     Drug abuse Brother         (pain pills)    Thyroid disease unspecified Maternal Aunt     Colon cancer Maternal Aunt     Breast cancer Family     Diabetes Sister     BRCA1 Negative Sister     BRCA2 Negative Sister     No Known Problems Daughter     No Known Problems Son     No Known Problems Daughter     No Known Problems Son     No Known Problems Maternal Aunt     No Known Problems Paternal Aunt     No Known Problems Paternal Aunt     No Known Problems Paternal Aunt     No Known Problems Paternal Aunt     No Known Problems Paternal Aunt     No Known Problems Paternal Aunt     No Known Problems Maternal Aunt     Alzheimer's disease Cousin        History Review: The following portions of the patient's history were reviewed and updated as appropriate: allergies, current medications, past family history, past medical history, past social history, past surgical history and problem list          OBJECTIVE:     Mental Status Evaluation:    Appearance Casually dressed, good eye contact and hygiene   Behavior Calm, cooperative, pleasant overall with a mild edge and shaking leg up and down at times   Speech Clear, normal rate and volume   Mood Mildly agitated, mildly grandiose--asking me how old I am, and questions me about anxiety after I ask her the same , mildly anxious   Affect Appears reactive   Thought Processes Organized, goal directed, mildly paranoid   Associations intact associations   Thought Content No delusions   Perceptual Disturbances: Pt denies any form of hallucinations and does not appear to be responding to internal stimuli    Seems mildly paranoid   Abnormal Thoughts  Risk Potential Suicidal ideation - None  Homicidal ideation - None  Potential for aggression - No   Orientation oriented to person, place, situation, day of week, date, month of year and year   Memory short term memory grossly intact   Cosciousness alert and awake   Attention Span attention span and concentration are age appropriate   Intellect appears to be of average intelligence   Insight fair   Judgement fair   Muscle Strength and  Gait normal gait and normal balance   Language no difficulty naming common objects, no difficulty repeating a phrase   Fund of Knowledge adequate knowledge of current events  adequate fund of knowledge regarding past history  adequate fund of knowledge regarding vocabulary    Pain none   Pain Scale 0       Laboratory Results: None new since last visit    Assessment/plan:       Diagnoses and all orders for this visit:    Bipolar 2 disorder (HCC)  -     ARIPiprazole (ABILIFY) 5 mg tablet; Take 1 tablet (5 mg total) by mouth daily at bedtime  -     FLUoxetine (PROzac) 20 mg capsule; Take 1 capsule (20 mg total) by mouth daily  -     CBC and differential; Future  -     Comprehensive metabolic panel; Future  -     Lipid panel; Future  -     TSH, 3rd generation; Future  -     ECG 12 lead; Future  -     ziprasidone (GEODON) 40 mg capsule; Take 1 capsule (40 mg total) by mouth daily with dinner Start tomorrow    Anxiety  -     FLUoxetine (PROzac) 20 mg capsule; Take 1 capsule (20 mg total) by mouth daily  -     hydrOXYzine HCL (ATARAX) 50 mg tablet; TAKE 1/2 OR 1 TABLET BY MOUTH THREE TIMES A DAY AS NEEDED FOR ANXIETY OR INSOMNIA  -     CBC and differential; Future  -     Comprehensive metabolic panel; Future  -     Lipid panel; Future  -     TSH, 3rd generation; Future  -     ECG 12 lead; Future    Insomnia, unspecified type  -     hydrOXYzine HCL (ATARAX) 50 mg tablet; TAKE 1/2 OR 1 TABLET BY MOUTH THREE TIMES A DAY AS NEEDED FOR ANXIETY OR INSOMNIA    Encounter for long-term (current) use of other medications  -     CBC and differential; Future  -     Comprehensive metabolic panel; Future  -     Lipid panel; Future  -     TSH, 3rd generation; Future  -     ECG 12 lead; Future        PLAN:  Pt voices no complaints, but seems mildly agitated and  anxious, and I will reduce the Fluoxetine back to 20mg  Pt commented on Wt and she has been gaining  I advised healthy eating, good hydration and exercise as allowable by PCP  Pt does not want dietary referral because she had seen them before and felt it did not help  Tx options discussed and Pt accepts to switch Aripiprazole to Ziprasidone for better metabolic profile of SE  Tx plan due next visit    D/C Aripiprazole  Start Ziprasidone 40mg (1) cap po qd with dinner meal # 90 R1  Reduce Fluoxetine 20mg (1) cap po qd with breakfast # 90 R1  Hydroxyzine 50mg (1/2-1) tab po tid prn anxiety or insomnia # 270 R1  Melatonin 5mg--Pt gets OTC  Get CBC with diff, CMP, Lipids, TSH  Repeat EKG in 2 weeks  Return 14 weeks, (availability is tight and I have vacation in 8/2021  Place on cancellation list for an 8 wk appt), call sooner prn    Risks/Benefits       Risks, Benefits And Possible Side Effects Of Medications:    Risks, benefits, and possible side effects of medications explained to Martha Morales and she verbalizes understanding and agreement for treatment

## 2021-06-04 ENCOUNTER — OFFICE VISIT (OUTPATIENT)
Dept: PSYCHIATRY | Facility: CLINIC | Age: 56
End: 2021-06-04
Payer: COMMERCIAL

## 2021-06-04 VITALS — WEIGHT: 219 LBS | BODY MASS INDEX: 34.37 KG/M2 | HEIGHT: 67 IN

## 2021-06-04 DIAGNOSIS — G47.00 INSOMNIA, UNSPECIFIED TYPE: ICD-10-CM

## 2021-06-04 DIAGNOSIS — F31.81 BIPOLAR 2 DISORDER (HCC): Primary | ICD-10-CM

## 2021-06-04 DIAGNOSIS — F41.9 ANXIETY: ICD-10-CM

## 2021-06-04 DIAGNOSIS — Z79.899 ENCOUNTER FOR LONG-TERM (CURRENT) USE OF OTHER MEDICATIONS: ICD-10-CM

## 2021-06-04 PROCEDURE — 1036F TOBACCO NON-USER: CPT | Performed by: PHYSICIAN ASSISTANT

## 2021-06-04 PROCEDURE — 3008F BODY MASS INDEX DOCD: CPT | Performed by: PHYSICIAN ASSISTANT

## 2021-06-04 PROCEDURE — 99214 OFFICE O/P EST MOD 30 MIN: CPT | Performed by: PHYSICIAN ASSISTANT

## 2021-06-04 PROCEDURE — 3008F BODY MASS INDEX DOCD: CPT | Performed by: INTERNAL MEDICINE

## 2021-06-04 RX ORDER — FLUOXETINE HYDROCHLORIDE 20 MG/1
20 CAPSULE ORAL DAILY
Qty: 90 CAPSULE | Refills: 1 | Status: SHIPPED | OUTPATIENT
Start: 2021-06-04 | End: 2021-10-06 | Stop reason: SDUPTHER

## 2021-06-04 RX ORDER — ZIPRASIDONE HYDROCHLORIDE 40 MG/1
40 CAPSULE ORAL
Qty: 90 CAPSULE | Refills: 1 | Status: SHIPPED | OUTPATIENT
Start: 2021-06-04 | End: 2021-10-06 | Stop reason: SINTOL

## 2021-06-04 RX ORDER — HYDROXYZINE 50 MG/1
TABLET, FILM COATED ORAL
Qty: 270 TABLET | Refills: 1 | Status: SHIPPED | OUTPATIENT
Start: 2021-06-04 | End: 2021-10-06 | Stop reason: SDUPTHER

## 2021-06-04 RX ORDER — ARIPIPRAZOLE 5 MG/1
5 TABLET ORAL
Qty: 90 TABLET | Refills: 1 | Status: SHIPPED | OUTPATIENT
Start: 2021-06-04 | End: 2021-10-06 | Stop reason: SINTOL

## 2021-06-09 ENCOUNTER — OFFICE VISIT (OUTPATIENT)
Dept: INTERNAL MEDICINE CLINIC | Facility: CLINIC | Age: 56
End: 2021-06-09

## 2021-06-09 ENCOUNTER — TELEPHONE (OUTPATIENT)
Dept: PSYCHIATRY | Facility: CLINIC | Age: 56
End: 2021-06-09

## 2021-06-09 ENCOUNTER — LAB (OUTPATIENT)
Dept: LAB | Facility: CLINIC | Age: 56
End: 2021-06-09
Payer: COMMERCIAL

## 2021-06-09 VITALS
SYSTOLIC BLOOD PRESSURE: 109 MMHG | TEMPERATURE: 97.8 F | WEIGHT: 221.2 LBS | BODY MASS INDEX: 34.64 KG/M2 | DIASTOLIC BLOOD PRESSURE: 74 MMHG | HEART RATE: 108 BPM

## 2021-06-09 DIAGNOSIS — Z79.899 ENCOUNTER FOR LONG-TERM (CURRENT) USE OF OTHER MEDICATIONS: ICD-10-CM

## 2021-06-09 DIAGNOSIS — J45.41 MODERATE PERSISTENT ASTHMA WITH ACUTE EXACERBATION: ICD-10-CM

## 2021-06-09 DIAGNOSIS — Z86.69 HISTORY OF BLURRY VISION: ICD-10-CM

## 2021-06-09 DIAGNOSIS — E03.9 HYPOTHYROIDISM, UNSPECIFIED TYPE: ICD-10-CM

## 2021-06-09 DIAGNOSIS — F41.9 ANXIETY: ICD-10-CM

## 2021-06-09 DIAGNOSIS — F31.81 BIPOLAR 2 DISORDER (HCC): ICD-10-CM

## 2021-06-09 DIAGNOSIS — F17.201 TOBACCO DEPENDENCE IN REMISSION: ICD-10-CM

## 2021-06-09 DIAGNOSIS — E03.9 HYPOTHYROIDISM, UNSPECIFIED TYPE: Primary | ICD-10-CM

## 2021-06-09 DIAGNOSIS — G89.29 OTHER CHRONIC PAIN: ICD-10-CM

## 2021-06-09 DIAGNOSIS — R73.03 PREDIABETES: ICD-10-CM

## 2021-06-09 DIAGNOSIS — J45.909 ASTHMA, UNSPECIFIED ASTHMA SEVERITY, UNSPECIFIED WHETHER COMPLICATED, UNSPECIFIED WHETHER PERSISTENT: ICD-10-CM

## 2021-06-09 LAB
25(OH)D3 SERPL-MCNC: 20.5 NG/ML (ref 30–100)
ALBUMIN SERPL BCP-MCNC: 4.1 G/DL (ref 3.5–5)
ALP SERPL-CCNC: 114 U/L (ref 46–116)
ALT SERPL W P-5'-P-CCNC: 27 U/L (ref 12–78)
ANION GAP SERPL CALCULATED.3IONS-SCNC: 4 MMOL/L (ref 4–13)
AST SERPL W P-5'-P-CCNC: 14 U/L (ref 5–45)
BASOPHILS # BLD AUTO: 0.05 THOUSANDS/ΜL (ref 0–0.1)
BASOPHILS NFR BLD AUTO: 1 % (ref 0–1)
BILIRUB SERPL-MCNC: 0.54 MG/DL (ref 0.2–1)
BUN SERPL-MCNC: 12 MG/DL (ref 5–25)
CALCIUM SERPL-MCNC: 9.2 MG/DL (ref 8.3–10.1)
CHLORIDE SERPL-SCNC: 107 MMOL/L (ref 100–108)
CHOLEST SERPL-MCNC: 218 MG/DL (ref 50–200)
CO2 SERPL-SCNC: 25 MMOL/L (ref 21–32)
CREAT SERPL-MCNC: 0.51 MG/DL (ref 0.6–1.3)
EOSINOPHIL # BLD AUTO: 0.02 THOUSAND/ΜL (ref 0–0.61)
EOSINOPHIL NFR BLD AUTO: 0 % (ref 0–6)
ERYTHROCYTE [DISTWIDTH] IN BLOOD BY AUTOMATED COUNT: 13.8 % (ref 11.6–15.1)
EST. AVERAGE GLUCOSE BLD GHB EST-MCNC: 111 MG/DL
GFR SERPL CREATININE-BSD FRML MDRD: 109 ML/MIN/1.73SQ M
GLUCOSE P FAST SERPL-MCNC: 100 MG/DL (ref 65–99)
HBA1C MFR BLD: 5.5 %
HBV CORE AB SER QL: NORMAL
HBV CORE IGM SER QL: NORMAL
HBV SURFACE AB SER-ACNC: 21.31 MIU/ML
HBV SURFACE AG SER QL: NORMAL
HCT VFR BLD AUTO: 40.9 % (ref 34.8–46.1)
HCV AB SER QL: NORMAL
HDLC SERPL-MCNC: 44 MG/DL
HGB BLD-MCNC: 13.4 G/DL (ref 11.5–15.4)
IMM GRANULOCYTES # BLD AUTO: 0.03 THOUSAND/UL (ref 0–0.2)
IMM GRANULOCYTES NFR BLD AUTO: 0 % (ref 0–2)
LDLC SERPL CALC-MCNC: 148 MG/DL (ref 0–100)
LYMPHOCYTES # BLD AUTO: 0.99 THOUSANDS/ΜL (ref 0.6–4.47)
LYMPHOCYTES NFR BLD AUTO: 11 % (ref 14–44)
MCH RBC QN AUTO: 28.2 PG (ref 26.8–34.3)
MCHC RBC AUTO-ENTMCNC: 32.8 G/DL (ref 31.4–37.4)
MCV RBC AUTO: 86 FL (ref 82–98)
MONOCYTES # BLD AUTO: 0.76 THOUSAND/ΜL (ref 0.17–1.22)
MONOCYTES NFR BLD AUTO: 8 % (ref 4–12)
NEUTROPHILS # BLD AUTO: 7.34 THOUSANDS/ΜL (ref 1.85–7.62)
NEUTS SEG NFR BLD AUTO: 80 % (ref 43–75)
NRBC BLD AUTO-RTO: 0 /100 WBCS
PLATELET # BLD AUTO: 381 THOUSANDS/UL (ref 149–390)
PMV BLD AUTO: 9.3 FL (ref 8.9–12.7)
POTASSIUM SERPL-SCNC: 4.1 MMOL/L (ref 3.5–5.3)
PROT SERPL-MCNC: 7.6 G/DL (ref 6.4–8.2)
RBC # BLD AUTO: 4.75 MILLION/UL (ref 3.81–5.12)
SODIUM SERPL-SCNC: 136 MMOL/L (ref 136–145)
T4 FREE SERPL-MCNC: 1.35 NG/DL (ref 0.76–1.46)
TRIGL SERPL-MCNC: 129 MG/DL
TSH SERPL DL<=0.05 MIU/L-ACNC: 0.04 UIU/ML (ref 0.36–3.74)
WBC # BLD AUTO: 9.19 THOUSAND/UL (ref 4.31–10.16)

## 2021-06-09 PROCEDURE — 83036 HEMOGLOBIN GLYCOSYLATED A1C: CPT

## 2021-06-09 PROCEDURE — 86706 HEP B SURFACE ANTIBODY: CPT

## 2021-06-09 PROCEDURE — 80307 DRUG TEST PRSMV CHEM ANLYZR: CPT | Performed by: PHYSICIAN ASSISTANT

## 2021-06-09 PROCEDURE — 86803 HEPATITIS C AB TEST: CPT

## 2021-06-09 PROCEDURE — 36415 COLL VENOUS BLD VENIPUNCTURE: CPT

## 2021-06-09 PROCEDURE — 86708 HEPATITIS A ANTIBODY: CPT

## 2021-06-09 PROCEDURE — 86704 HEP B CORE ANTIBODY TOTAL: CPT

## 2021-06-09 PROCEDURE — 82306 VITAMIN D 25 HYDROXY: CPT

## 2021-06-09 PROCEDURE — 84439 ASSAY OF FREE THYROXINE: CPT

## 2021-06-09 PROCEDURE — 84443 ASSAY THYROID STIM HORMONE: CPT

## 2021-06-09 PROCEDURE — 80061 LIPID PANEL: CPT

## 2021-06-09 PROCEDURE — 86705 HEP B CORE ANTIBODY IGM: CPT

## 2021-06-09 PROCEDURE — 87340 HEPATITIS B SURFACE AG IA: CPT

## 2021-06-09 PROCEDURE — 80053 COMPREHEN METABOLIC PANEL: CPT

## 2021-06-09 PROCEDURE — 99213 OFFICE O/P EST LOW 20 MIN: CPT | Performed by: INTERNAL MEDICINE

## 2021-06-09 PROCEDURE — 85025 COMPLETE CBC W/AUTO DIFF WBC: CPT

## 2021-06-09 PROCEDURE — 1036F TOBACCO NON-USER: CPT | Performed by: INTERNAL MEDICINE

## 2021-06-09 RX ORDER — ALBUTEROL SULFATE 2.5 MG/3ML
2.5 SOLUTION RESPIRATORY (INHALATION) EVERY 6 HOURS PRN
Qty: 75 ML | Refills: 0 | Status: SHIPPED | OUTPATIENT
Start: 2021-06-09 | End: 2021-11-17 | Stop reason: SDUPTHER

## 2021-06-09 RX ORDER — ALBUTEROL SULFATE 90 UG/1
2 AEROSOL, METERED RESPIRATORY (INHALATION) EVERY 4 HOURS PRN
Qty: 18 G | Refills: 1 | Status: SHIPPED | OUTPATIENT
Start: 2021-06-09 | End: 2021-08-23

## 2021-06-09 NOTE — TELEPHONE ENCOUNTER
I reviewed Poly's labwork done today    Her PCP was sent the results by the lab and PCP had already ordered a thyroid US with the bloodwork when she saw the Pt today:    Most Recent Labs:   Lab Results   Component Value Date    WBC 9 19 06/09/2021    RBC 4 75 06/09/2021    HGB 13 4 06/09/2021    HCT 40 9 06/09/2021     06/09/2021    RDW 13 8 06/09/2021    NEUTROABS 7 34 06/09/2021    SODIUM 136 06/09/2021    K 4 1 06/09/2021     06/09/2021    CO2 25 06/09/2021    BUN 12 06/09/2021    CREATININE 0 51 (L) 06/09/2021    GLUC 86 07/21/2016    GLUF 100 (H) 06/09/2021    CALCIUM 9 2 06/09/2021    AST 14 06/09/2021    ALT 27 06/09/2021    ALKPHOS 114 06/09/2021    TP 7 6 06/09/2021    ALB 4 1 06/09/2021    TBILI 0 54 06/09/2021    CHOLESTEROL 218 (H) 06/09/2021    HDL 44 06/09/2021    TRIG 129 06/09/2021    LDLCALC 148 (H) 06/09/2021    NONHDLC 127 01/20/2020    [id number] 0 042 (L) 06/09/2021    FREET4 1 35 06/09/2021    HGBA1C 5 5 06/09/2021     06/09/2021 6/9/2021 Urine Drug Screen: Negative    Hepatitis Panel:   Lab Results   Component Value Date    HEPBIGM Non-reactive 06/09/2021    HEPBCAB Non-reactive 06/09/2021    HEPBSAG Non-reactive 06/09/2021    HEPCAB Non-reactive 06/09/2021     Vitamin D Level   Lab Results   Component Value Date    [id number] 20 5 (L) 06/09/2021

## 2021-06-09 NOTE — PROGRESS NOTES
INTERNAL MEDICINE FOLLOW-UP OFFICE VISIT  [facility]  [address] [place] [address]    NAME: Xu Ron  AGE: 49 y o  SEX: female    DATE OF ENCOUNTER: 6/9/2021    Assessment and Plan     1  Hypothyroidism, unspecified type  -  Chronic and with unclear control  - patient is overdue for TFTs and thyroid ultrasound  - continue levothyroxine present dose until labs are reviewed  - TSH, 3rd generation with Free T4 reflex; Future  - Lipid Panel with Direct LDL reflex; Future  - Comprehensive metabolic panel; Future  - CBC and differential; Future  - HEMOGLOBIN A1C W/ EAG ESTIMATION; Future  - Vitamin D 25 hydroxy; Future  - US thyroid; Future  - Chronic Hepatitis Panel; Future  - Ambulatory referral to Ophthalmology; Future  - Hepatitis B Immunity Panel; Future  - Hepatitis A Ab, Total W/Refl IgM; Future    2  Asthma, unspecified asthma severity, unspecified whether complicated, unspecified whether persistent  - chronic and currently well controlled  - patient requesting refill for rescue inhaler and nebulizer solution  - albuterol (Ventolin HFA) 90 mcg/act inhaler; Inhale 2 puffs every 4 (four) hours as needed for wheezing or shortness of breath  Dispense: 18 g; Refill: 1    3  Moderate persistent asthma with acute exacerbation   plan as in 2  Above  - albuterol (2 5 mg/3 mL) 0 083 % nebulizer solution; Take 1 vial (2 5 mg total) by nebulization every 6 (six) hours as needed for wheezing or shortness of breath  Dispense: 75 mL; Refill: 0    4  Tobacco dependence in remission  - patient with significant smoking history, quit 7 years ago  - given risk factors, patient would benefit from routine low-dose chest CT to screen for pulmonary nodules  - CT lung screening program; Future    5  Prediabetes  - will recheck A1c  - Ambulatory referral to Ophthalmology; Future    6   History of blurry vision  - patient reported history of blurry vision episode a couple of months ago without recurrence  - patient used to follow-up with ophthalmology at [facility], however was lost to follow-up in the last couple of years  - will refer back to Ophthalmology this time  - Ambulatory referral to Ophthalmology; Future    7  Other chronic pain  - unclear etiology at this time but likely secondary to psychiatric diagnoses  - defer serologic workup at this time but will consider it follow-up visit      Orders Placed This Encounter   Procedures    US thyroid    CT lung screening program    TSH, 3rd generation with Free T4 reflex    Lipid Panel with Direct LDL reflex    Comprehensive metabolic panel    CBC and differential    HEMOGLOBIN A1C W/ EAG ESTIMATION    Vitamin D 25 hydroxy    Chronic Hepatitis Panel    Hepatitis B Immunity Panel    Hepatitis A Ab, Total W/Refl IgM    Ambulatory referral to Ophthalmology       - Counseling Documentation: patient was counseled regarding: diagnostic results, instructions for management, risk factor reductions, prognosis, patient and family education, impressions, risks and benefits of treatment options and importance of compliance with treatment  - Counseling Time: counseling time more than 50% of visit: 45 minutes  - Medication Side Effects: Adverse side effects of medications were reviewed with the patient/guardian today  Routine Health Maintenance:   Cancer screening:   Colorectal: colonoscopy 2015 with multiple polyps removed by cold snare, due for repeat surveillance colonoscopy in 2022   Lung: low-dose chest CT ordered   Cervical: will address at next visit   Breast: normal mammogram July 2020, due for repeat in 1 year  Labs:   Hemoglobin A1c: will check today   Lipid panel: will check today   HIV: unclear if checked previously, will address at next visit   Hepatitis-C: non-reactive 2017  Vaccines:    patient has received COVID-19 vaccine  Other:  BMI Counseling: Body mass index is 34 64 kg/m²   The BMI is above normal  Nutrition recommendations include decreasing portion sizes, encouraging healthy choices of fruits and vegetables, decreasing fast food intake, consuming healthier snacks, limiting drinks that contain sugar, moderation in carbohydrate intake, increasing intake of lean protein, reducing intake of saturated and trans fat and reducing intake of cholesterol  Exercise recommendations include moderate physical activity 150 minutes/week, exercising 3-5 times per week and strength training exercises  No pharmacotherapy was ordered  Patient referred to PCP due to patient being overweight  Advised diet and exercise  Advised to refrain from tobacco, alcohol, and illicit drug use  Advised medical compliance      OMT/OPP: During the course of my history and physical, I utilized osteopathic examination modalities to assess the patient  Somatic dysfunctions were not identified during this visit  OMT is not indicated at this time  Chief Complaint     Chief Complaint   Patient presents with    Thyroid Problem       History of Present Illness     Ramirez Pedro is a 49 y o  female with  Past medical history significant for diabetes, asthma, IBS -C, GERD, esophageal dysphagia, hypothyroidism, obstructive sleep apnea, migraines, chronic pain, and prior COVID-19 infection who presents to clinic today for routine follow-up visit  Last presentation to clinic 03/25/2021 via telemedicine with Johnny Rajput PA-C  Patient presents with multiple chronic complaints this morning  Her chief complaint is fatigue  She states that for years her energy level has been down and she is requesting evaluation of her thyroid  She states she experiences both cold and hot intolerance, has noticed some hair loss, and dry skin  She also tells me that she has generalized pain across all of her body at all times a day for which she alternates between taking naproxen and ibuprofen  She is aware that she should not take these medications together  She says she has felt generalized pain for many years  She also tells me that she had an episode of blurry vision bilaterally a couple of months ago  Patient tells me she is a former smoker  She quit smoking 7 years ago but started smoking at the age of 14  She smoked roughly 1/3 of a pack of cigarettes per day  She tells me that she is planning to go at some point later this year to visit family in [place] and in [place] and believes she requires travel vaccines  She also would like her hepatitis a and B levels checked in addition to her hep C immunity  The following portions of the patient's history were reviewed and updated as appropriate: allergies, current medications, past family history, past medical history, past social history, past surgical history and problem list     Review of Systems     Review of Systems   Constitutional: Positive for fatigue  Negative for activity change, appetite change, chills and fever  HENT: Negative for congestion, postnasal drip, rhinorrhea, sinus pressure, sinus pain, sore throat and trouble swallowing  Respiratory: Negative for cough, shortness of breath and wheezing  Cardiovascular: Negative for chest pain and palpitations  Gastrointestinal: Negative for abdominal pain, constipation, diarrhea, nausea and vomiting  Endocrine: Positive for cold intolerance and heat intolerance  Genitourinary: Negative for difficulty urinating, dysuria, frequency, hematuria and urgency  Musculoskeletal: Positive for arthralgias, back pain and neck pain  Negative for gait problem, joint swelling, myalgias and neck stiffness  Skin: Negative for pallor, rash and wound  Neurological: Positive for headaches  Negative for dizziness, weakness, light-headedness and numbness         Active Problem List     Patient Active Problem List   Diagnosis    Mixed incontinence    Stress incontinence in female    Obstructive sleep apnea syndrome    Hypothyroidism    Irritable bowel syndrome with constipation    Chronic low back pain    Asthma    Arthritis    Gastritis    Rectocele    Cystocele    Hypermobility of urethra    Achalasia    Bilateral edema of lower extremity    Breast pain, right    Chronic sinusitis with recurrent bronchitis    Esophageal reflux    Headache, migraine    Intractable chronic migraine without aura and without status migrainosus    Breast mass    Class 1 obesity    Other chronic pain    Spondylosis of lumbar region without myelopathy or radiculopathy    Thyroiditis    Tubular adenoma of colon    Food allergy    POP-Q stage 2 cystocele    Moderate persistent asthma with exacerbation    Prediabetes    Need for shingles vaccine    Discomfort of right ear    Trochanteric bursitis of right hip    Pain in right hip    Numbness of toes    Acute viral bronchitis    Overweight    Hashimoto's thyroiditis    Lower abdominal pain    Left ovarian cyst    History of endometrial ablation    Left nephrolithiasis    Abdominal bloating    Achalasia of digestive tract    Esophageal dysphagia    Hypertension    Anxiety    Insomnia    Bipolar 2 disorder ([facility] [address] )    COVID-19    Encounter for long-term (current) use of other medications       Objective     /74 (BP Location: Left arm, Patient Position: Sitting, Cuff Size: Standard)   Pulse (!) 108   Temp 97 8 °F (36 6 °C) (Temporal)   Wt 100 kg (221 lb 3 2 oz)   BMI 34 64 kg/m²     Physical Exam  Vitals signs and nursing note reviewed  Constitutional:       General: She is not in acute distress  Appearance: Normal appearance  She is normal weight  She is not ill-appearing, toxic-appearing or diaphoretic  HENT:      Head: Normocephalic and atraumatic  Eyes:      General: No scleral icterus  Extraocular Movements: Extraocular movements intact  Conjunctiva/sclera: Conjunctivae normal       Pupils: Pupils are equal, round, and reactive to light     Neck: Musculoskeletal: Full passive range of motion without pain, normal range of motion and neck supple  Thyroid: No thyroid mass, thyromegaly or thyroid tenderness  Vascular: No carotid bruit  Trachea: Trachea normal       Comments: Thyroid gland freely mobile and nontender to palpation  No identified nodules  Appears to be within normal limits for size  Cardiovascular:      Rate and Rhythm: Normal rate and regular rhythm  Pulses: Normal pulses  Heart sounds: Normal heart sounds  No murmur  No friction rub  No gallop  Pulmonary:      Effort: Pulmonary effort is normal  No respiratory distress  Breath sounds: Normal breath sounds  No stridor  No wheezing or rhonchi  Abdominal:      General: Bowel sounds are normal  There is no distension  Palpations: Abdomen is soft  There is no mass  Tenderness: There is no abdominal tenderness  There is no guarding or rebound  Hernia: No hernia is present  Musculoskeletal:         General: No swelling, tenderness or deformity  Lymphadenopathy:      Cervical: No cervical adenopathy  Skin:     General: Skin is warm and dry  Capillary Refill: Capillary refill takes less than 2 seconds  Coloration: Skin is not pale  Findings: No erythema or rash  Neurological:      General: No focal deficit present  Mental Status: She is alert  Psychiatric:         Speech: Speech is rapid and pressured  Behavior: Behavior is hyperactive  Pertinent Laboratory/Diagnostic Studies:  No results found      Images and diagnostics reviewed     Current Medications     Current Outpatient Medications:     acetaminophen (TYLENOL) 325 mg tablet, Every 4 hours as needed, Disp: 30 tablet, Rfl: 0    [place] 0 025 % ophthalmic solution, INSTILL 1 DROP PER EYE DAILY AS NEEDED, Disp: , Rfl:     albuterol (2 5 mg/3 mL) 0 083 % nebulizer solution, Take 1 vial (2 5 mg total) by nebulization every 6 (six) hours as needed for wheezing or shortness of breath, Disp: 75 mL, Rfl: 0    albuterol (Ventolin HFA) 90 mcg/act inhaler, Inhale 2 puffs every 4 (four) hours as needed for wheezing or shortness of breath, Disp: 18 g, Rfl: 1    ARIPiprazole (ABILIFY) 5 mg tablet, Take 1 tablet (5 mg total) by mouth daily at bedtime, Disp: 90 tablet, Rfl: 1    betamethasone dipropionate (DIPROSONE) 0 05 % cream, APPLY TO AFFECTED AREA EVERY DAY, Disp: 30 g, Rfl: 0    bismuth subsalicylate (PEPTO BISMOL) 262 MG chewable tablet, Chew 2 tablets, Disp: , Rfl:     Botulinum Toxin Type A 200 units SOLR, Inject 155 units into face and neck IM every 90 days, Disp: , Rfl:     BREO ELLIPTA 100-25 MCG/INH inhaler, Inhale 1 puff daily 1 puff daily, Disp: 1 Inhaler, Rfl: 3    cetirizine (ZyrTEC) 10 mg tablet, Take 1 tablet (10 mg total) by mouth daily, Disp: 30 tablet, Rfl: 3    diclofenac sodium (VOLTAREN) 1 %, APPLY 4 GRAMS TO AFFECTED AREA(S) THREE TIMES A DAY, Disp: , Rfl: 3    dicyclomine (BENTYL) 20 mg tablet, TAKE 1 TABLET (20 MG TOTAL) BY MOUTH EVERY 6 (SIX) HOURS AS NEEDED (PAIN), Disp: 360 tablet, Rfl: 1    FLUoxetine (PROzac) 10 mg capsule, Take 1 capsule (10 mg total) by mouth daily Take with the 20mg cap for a total of 30mg daily, Disp: 90 capsule, Rfl: 0    fluticasone (FLONASE) 50 mcg/act nasal spray, 1-2 sprays into each nostril daily, Disp: 16 g, Rfl: 6    furosemide (LASIX) 20 mg tablet, Take 1 tablet (20 mg total) by mouth daily as needed (leg swelling), Disp: 30 tablet, Rfl: 2    hydrOXYzine HCL (ATARAX) 50 mg tablet, TAKE 1/2 OR 1 TABLET BY MOUTH THREE TIMES A DAY AS NEEDED FOR ANXIETY OR INSOMNIA, Disp: 270 tablet, Rfl: 1    ibuprofen (MOTRIN) 800 mg tablet, Take 1 tablet (800 mg total) by mouth every 8 (eight) hours as needed for mild pain, Disp: 30 tablet, Rfl: 0    ipratropium (ATROVENT) 0 02 % nebulizer solution, , Disp: , Rfl:     levothyroxine 150 mcg tablet, Take 1 tablet (150 mcg total) by mouth daily 1 tab mon-sat and 1/2 on Sunday, Disp: 90 tablet, Rfl: 3    meloxicam (MOBIC) 15 mg tablet, Take 1 tablet (15 mg total) by mouth daily, Disp: 30 tablet, Rfl: 6    metFORMIN (GLUCOPHAGE-XR) 500 mg 24 hr tablet, TAKE 2 TABLETS (1,000 MG TOTAL) BY MOUTH EVERY MORNING, Disp: 180 tablet, Rfl: 0    methocarbamol (ROBAXIN) 750 mg tablet, Take 750 mg by mouth 3 (three) times a day, Disp: , Rfl: 0    methylPREDNISolone 4 MG tablet therapy pack, Use as directed on package, Disp: 1 each, Rfl: 0    omeprazole (PriLOSEC) 40 MG capsule, Take 1 tablet by mouth 30 minutes prior to meals (breakfast and dinner), Disp: 28 capsule, Rfl: 0    polyethylene glycol (GLYCOLAX) powder, Take 17 g by mouth daily Mix 1 capful in 8 ounces of water, juice or tea , Disp: 578 g, Rfl: 3    SUMAtriptan (IMITREX) 100 mg tablet, Take 1 tablet (100 mg total) by mouth once as needed for migraine, Disp: 10 tablet, Rfl: 1    tiZANidine (ZANAFLEX) 4 mg tablet, Take 4 mg by mouth daily at bedtime as needed, Disp: , Rfl:     traMADol (ULTRAM) 50 mg tablet, TAKE 1 TABLET BY MOUTH EVERY DAY AS NEEDED FOR 15 DAYS, Disp: , Rfl:     ziprasidone (GEODON) 40 mg capsule, Take 1 capsule (40 mg total) by mouth daily with dinner Start tomorrow, Disp: 90 capsule, Rfl: 1    FLUCELVAX QUADRIVALENT, , Disp: , Rfl:     FLUoxetine (PROzac) 20 mg capsule, Take 1 capsule (20 mg total) by mouth daily (Patient not taking: Reported on 6/9/2021), Disp: 90 capsule, Rfl: 1    montelukast (SINGULAIR) 10 mg tablet, Take 10 mg by mouth daily, Disp: , Rfl: 6    Health Maintenance     Health Maintenance   Topic Date Due    Medicare Annual Wellness Visit (AWV)  Never done    HIV Screening  Never done    BMI: Followup Plan  Never done    Lung Cancer Screening  Never done    COVID-19 Vaccine (2 - Pfizer 2-dose series) 06/08/2021    MAMMOGRAM  07/27/2021    Colonoscopy Surveillance  04/25/2022    BMI: Adult  06/09/2022    Cervical Cancer Screening  06/27/2022    DTaP,Tdap,and Td Vaccines (2 - Td) 05/22/2025    Colorectal Cancer Screening  04/25/2029    Pneumococcal Vaccine: Pediatrics (0 to 5 Years) and At-Risk Patients (6 to 59 Years)  Completed    Influenza Vaccine  Completed    HIB Vaccine  Aged Out    Hepatitis B Vaccine  Aged Out    IPV Vaccine  Aged Out    Hepatitis A Vaccine  Aged Out    Meningococcal ACWY Vaccine  Aged Out    HPV Vaccine  Aged Out     Immunization History   Administered Date(s) Administered    INFLUENZA 11/07/2006, 11/15/2014, 11/01/2017, 12/10/2018, 01/14/2019, 10/01/2019, 12/03/2020    Influenza Injectable, MDCK, Preservative Free, Quadrivalent, 0 5 mL 01/14/2019    Influenza Quadrivalent Preservative Free 3 years and older IM 11/06/2017    Influenza, injectable, quadrivalent, preservative free 0 5 mL 12/03/2020    Influenza, seasonal, injectable 10/23/2015    Pneumococcal Polysaccharide PPV23 01/28/2016    SARS-CoV-2 / COVID-19 mRNA IM (Pfizer-BioNTech) 05/18/2021, 06/08/2021    Tdap 05/22/2015    Zoster 12/03/2020    Zoster Vaccine Recombinant 12/03/2020, 03/03/2021       Portions of this note may have been created with voice recognition software  Occasional wrong word or sound a like substitutions may have occurred due to inherent limitations of voice recognition software  Read the chart carefully and recognize, using context, where substitutions have occurred  Global time spent on encounter: 40 minutes    EDMUNDO Akhtar  Internal Medicine PGY-2  [address] [facility] , [address] [address] [facility], [address] [place] [address]  Office: [phone]  Fax: [phone]

## 2021-06-10 LAB
AMPHETAMINES UR QL SCN: NEGATIVE NG/ML
BARBITURATES UR QL SCN: NEGATIVE NG/ML
BENZODIAZ UR QL: NEGATIVE NG/ML
BZE UR QL: NEGATIVE NG/ML
CANNABINOIDS UR QL SCN: NEGATIVE NG/ML
METHADONE UR QL SCN: NEGATIVE NG/ML
OPIATES UR QL: NEGATIVE NG/ML
PCP UR QL: NEGATIVE NG/ML
PROPOXYPH UR QL SCN: NEGATIVE NG/ML

## 2021-06-15 LAB — MISCELLANEOUS LAB TEST RESULT: NORMAL

## 2021-06-18 DIAGNOSIS — E03.9 HYPOTHYROIDISM, UNSPECIFIED TYPE: Primary | ICD-10-CM

## 2021-06-18 DIAGNOSIS — E55.9 VITAMIN D DEFICIENCY: ICD-10-CM

## 2021-06-18 RX ORDER — MELATONIN
1000 DAILY
Qty: 90 TABLET | Refills: 0 | Status: SHIPPED | OUTPATIENT
Start: 2021-06-18 | End: 2021-09-13 | Stop reason: SDUPTHER

## 2021-06-18 RX ORDER — LEVOTHYROXINE SODIUM 0.12 MG/1
125 TABLET ORAL DAILY
Qty: 90 TABLET | Refills: 0 | Status: SHIPPED | OUTPATIENT
Start: 2021-06-18 | End: 2021-10-12 | Stop reason: SDUPTHER

## 2021-06-18 NOTE — RESULT ENCOUNTER NOTE
Please call the patient regarding her abnormal result  First, the results of her thyroid function tests indicate that her dose of levothyroxine is now likely too high  For this, I will have her stop taking levothyroxine 150 mcg daily and start taking levothyroxine 125 mcg daily instead  This prescription has been sent to her preferred pharmacy  We will recheck her thyroid function in 6 weeks (orders have already been placed for lab work to be obtained around 07/23/2021)  Second, regarding her vitamin-D level, it is low and requires supplementation  For this, I have sent a prescription to her pharmacy for vitamin D3/cholecalciferol 1000 units to be taken daily  Third, her lipid panel does show elevated cholesterol levels  The levels are not high enough for her to require any medications, however she should adjust her diet to reduce her intake of saturated fats and processed foods and attempt to increase her level of exercise  Fourth, she was asking about her hepatitis immunity levels  She shows no recent infection with hepatitis a  She is immune to hepatitis B  She also does not have hepatitis C  Fifth, we checked a complete blood count, which was normal   Sixth, we checked a metabolic panel which looked at her electrolytes, kidney function, liver function, all of which were within normal limits  If she has any questions about these results, she can schedule a follow-up appointment  Thank you

## 2021-08-06 ENCOUNTER — TELEPHONE (OUTPATIENT)
Dept: INTERNAL MEDICINE CLINIC | Facility: CLINIC | Age: 56
End: 2021-08-06

## 2021-08-06 NOTE — TELEPHONE ENCOUNTER
Patient called - she needs a script for a mammogram scheduled at [facility] 8/10/21 8am    Please check into this and prepare script     thanks

## 2021-08-09 NOTE — TELEPHONE ENCOUNTER
Please prepare new script for mammo scheduled for 8/10/21 at [facility] [id number] called to obtain script for SLN     Please check into this and have PCP prepare a new script for mammogram

## 2021-08-10 ENCOUNTER — HOSPITAL ENCOUNTER (OUTPATIENT)
Dept: RADIOLOGY | Age: 56
Discharge: HOME | End: 2021-08-10
Payer: COMMERCIAL

## 2021-08-10 VITALS — WEIGHT: 225 LBS | BODY MASS INDEX: 35.31 KG/M2 | HEIGHT: 67 IN

## 2021-08-10 DIAGNOSIS — Z12.31 VISIT FOR SCREENING MAMMOGRAM: ICD-10-CM

## 2021-08-10 DIAGNOSIS — N63.0 BREAST MASS: ICD-10-CM

## 2021-08-10 DIAGNOSIS — Z12.39 SCREENING FOR BREAST CANCER: Primary | ICD-10-CM

## 2021-08-10 DIAGNOSIS — N64.4 BREAST PAIN, RIGHT: ICD-10-CM

## 2021-08-10 DIAGNOSIS — Z12.31 ENCOUNTER FOR SCREENING MAMMOGRAM FOR MALIGNANT NEOPLASM OF BREAST: ICD-10-CM

## 2021-08-10 DIAGNOSIS — E06.3 HASHIMOTO'S THYROIDITIS: ICD-10-CM

## 2021-08-10 PROCEDURE — 77067 SCR MAMMO BI INCL CAD: CPT

## 2021-08-10 PROCEDURE — 77063 BREAST TOMOSYNTHESIS BI: CPT

## 2021-08-19 DIAGNOSIS — J45.909 ASTHMA, UNSPECIFIED ASTHMA SEVERITY, UNSPECIFIED WHETHER COMPLICATED, UNSPECIFIED WHETHER PERSISTENT: ICD-10-CM

## 2021-08-23 RX ORDER — ALBUTEROL SULFATE 90 UG/1
2 AEROSOL, METERED RESPIRATORY (INHALATION) EVERY 4 HOURS PRN
Qty: 18 G | Refills: 1 | Status: SHIPPED | OUTPATIENT
Start: 2021-08-23 | End: 2021-11-17 | Stop reason: SDUPTHER

## 2021-09-13 DIAGNOSIS — E55.9 VITAMIN D DEFICIENCY: ICD-10-CM

## 2021-09-13 RX ORDER — MELATONIN
1000 DAILY
Qty: 90 TABLET | Refills: 0 | Status: SHIPPED | OUTPATIENT
Start: 2021-09-13 | End: 2021-12-16 | Stop reason: SDUPTHER

## 2021-09-13 NOTE — TELEPHONE ENCOUNTER
Received a fax from [address] [place] [address] requesting a refill on Vitamin D3 1,000 unit tablet  Reviewed the patient's chart- patient to continue medication  Patient is scheduled on 11/17/21 for a follow-up

## 2021-10-06 ENCOUNTER — OFFICE VISIT (OUTPATIENT)
Dept: PSYCHIATRY | Facility: CLINIC | Age: 56
End: 2021-10-06
Payer: COMMERCIAL

## 2021-10-06 VITALS
BODY MASS INDEX: 35.24 KG/M2 | SYSTOLIC BLOOD PRESSURE: 123 MMHG | HEIGHT: 67 IN | HEART RATE: 58 BPM | DIASTOLIC BLOOD PRESSURE: 77 MMHG

## 2021-10-06 DIAGNOSIS — G47.00 INSOMNIA, UNSPECIFIED TYPE: ICD-10-CM

## 2021-10-06 DIAGNOSIS — F41.9 ANXIETY: ICD-10-CM

## 2021-10-06 DIAGNOSIS — F31.81 BIPOLAR 2 DISORDER (HCC): Primary | ICD-10-CM

## 2021-10-06 PROCEDURE — 1036F TOBACCO NON-USER: CPT | Performed by: PHYSICIAN ASSISTANT

## 2021-10-06 PROCEDURE — 99214 OFFICE O/P EST MOD 30 MIN: CPT | Performed by: PHYSICIAN ASSISTANT

## 2021-10-06 RX ORDER — HYDROXYZINE 50 MG/1
TABLET, FILM COATED ORAL
Qty: 270 TABLET | Refills: 0 | Status: SHIPPED | OUTPATIENT
Start: 2021-10-06 | End: 2022-01-24 | Stop reason: SDUPTHER

## 2021-10-06 RX ORDER — FLUOXETINE HYDROCHLORIDE 20 MG/1
20 CAPSULE ORAL DAILY
Qty: 90 CAPSULE | Refills: 0 | Status: SHIPPED | OUTPATIENT
Start: 2021-10-06 | End: 2022-01-24 | Stop reason: SDUPTHER

## 2021-10-06 RX ORDER — LAMOTRIGINE 25 MG/1
TABLET ORAL
Qty: 180 TABLET | Refills: 0 | Status: SHIPPED | OUTPATIENT
Start: 2021-10-06 | End: 2022-01-07

## 2021-10-12 ENCOUNTER — TELEPHONE (OUTPATIENT)
Dept: INTERNAL MEDICINE CLINIC | Facility: CLINIC | Age: 56
End: 2021-10-12

## 2021-10-12 DIAGNOSIS — E03.9 HYPOTHYROIDISM, UNSPECIFIED TYPE: ICD-10-CM

## 2021-10-12 RX ORDER — LEVOTHYROXINE SODIUM 0.12 MG/1
125 TABLET ORAL DAILY
Qty: 90 TABLET | Refills: 1 | Status: SHIPPED | OUTPATIENT
Start: 2021-10-12 | End: 2022-04-11 | Stop reason: SDUPTHER

## 2021-10-12 NOTE — TELEPHONE ENCOUNTER
I spoke with Suzanne Virgen [facility]) she contacted patient  She states she had seen optometrist about 2 months ago for her blurry vision and she received eye glasses as well  Patient states she no longer have blurry vision she have pain in her eyes  Suzanne Virgen schedule patient 10/22/21 for evaluation which will go through patient health insurance at this time it will be no out of pocket expenses

## 2021-10-18 ENCOUNTER — TELEPHONE (OUTPATIENT)
Dept: INTERNAL MEDICINE CLINIC | Facility: CLINIC | Age: 56
End: 2021-10-18

## 2021-11-17 ENCOUNTER — OFFICE VISIT (OUTPATIENT)
Dept: INTERNAL MEDICINE CLINIC | Facility: CLINIC | Age: 56
End: 2021-11-17

## 2021-11-17 ENCOUNTER — LAB (OUTPATIENT)
Dept: LAB | Facility: CLINIC | Age: 56
End: 2021-11-17
Payer: COMMERCIAL

## 2021-11-17 VITALS
DIASTOLIC BLOOD PRESSURE: 72 MMHG | TEMPERATURE: 98 F | SYSTOLIC BLOOD PRESSURE: 117 MMHG | WEIGHT: 221.8 LBS | HEART RATE: 75 BPM | BODY MASS INDEX: 34.74 KG/M2

## 2021-11-17 DIAGNOSIS — J45.909 ASTHMA: ICD-10-CM

## 2021-11-17 DIAGNOSIS — M79.89 LEG SWELLING: ICD-10-CM

## 2021-11-17 DIAGNOSIS — Z23 INFLUENZA VACCINE ADMINISTERED: ICD-10-CM

## 2021-11-17 DIAGNOSIS — J45.41 MODERATE PERSISTENT ASTHMA WITH ACUTE EXACERBATION: ICD-10-CM

## 2021-11-17 DIAGNOSIS — E03.9 HYPOTHYROIDISM, UNSPECIFIED TYPE: Primary | ICD-10-CM

## 2021-11-17 DIAGNOSIS — J45.40 MODERATE PERSISTENT ASTHMA WITHOUT COMPLICATION: ICD-10-CM

## 2021-11-17 DIAGNOSIS — E66.9 CLASS 1 OBESITY: ICD-10-CM

## 2021-11-17 DIAGNOSIS — J45.909 ASTHMA, UNSPECIFIED ASTHMA SEVERITY, UNSPECIFIED WHETHER COMPLICATED, UNSPECIFIED WHETHER PERSISTENT: ICD-10-CM

## 2021-11-17 DIAGNOSIS — T78.40XD ALLERGY, SUBSEQUENT ENCOUNTER: ICD-10-CM

## 2021-11-17 DIAGNOSIS — R10.9 ABDOMINAL PAIN, UNSPECIFIED ABDOMINAL LOCATION: ICD-10-CM

## 2021-11-17 DIAGNOSIS — I10 HYPERTENSION, UNSPECIFIED TYPE: ICD-10-CM

## 2021-11-17 DIAGNOSIS — R73.03 PREDIABETES: ICD-10-CM

## 2021-11-17 DIAGNOSIS — R21 RASH: ICD-10-CM

## 2021-11-17 DIAGNOSIS — E03.9 HYPOTHYROIDISM, UNSPECIFIED TYPE: ICD-10-CM

## 2021-11-17 DIAGNOSIS — M54.2 NECK PAIN: ICD-10-CM

## 2021-11-17 LAB
T4 FREE SERPL-MCNC: 1.44 NG/DL (ref 0.76–1.46)
TSH SERPL DL<=0.05 MIU/L-ACNC: 0.29 UIU/ML (ref 0.36–3.74)

## 2021-11-17 PROCEDURE — 99213 OFFICE O/P EST LOW 20 MIN: CPT | Performed by: INTERNAL MEDICINE

## 2021-11-17 PROCEDURE — 84439 ASSAY OF FREE THYROXINE: CPT

## 2021-11-17 PROCEDURE — 84443 ASSAY THYROID STIM HORMONE: CPT

## 2021-11-17 PROCEDURE — 90682 RIV4 VACC RECOMBINANT DNA IM: CPT

## 2021-11-17 PROCEDURE — 1036F TOBACCO NON-USER: CPT | Performed by: INTERNAL MEDICINE

## 2021-11-17 PROCEDURE — G0008 ADMIN INFLUENZA VIRUS VAC: HCPCS

## 2021-11-17 PROCEDURE — 36415 COLL VENOUS BLD VENIPUNCTURE: CPT

## 2021-11-17 RX ORDER — LANCETS 28 GAUGE
EACH MISCELLANEOUS
Qty: 100 EACH | Refills: 3 | Status: SHIPPED | OUTPATIENT
Start: 2021-11-17

## 2021-11-17 RX ORDER — ALBUTEROL SULFATE 2.5 MG/3ML
2.5 SOLUTION RESPIRATORY (INHALATION) EVERY 6 HOURS PRN
Qty: 75 ML | Refills: 0 | Status: SHIPPED | OUTPATIENT
Start: 2021-11-17 | End: 2022-04-28 | Stop reason: SDUPTHER

## 2021-11-17 RX ORDER — PEN NEEDLE, DIABETIC 31 GX5/16"
NEEDLE, DISPOSABLE MISCELLANEOUS 3 TIMES DAILY
Qty: 100 EACH | Refills: 5 | Status: SHIPPED | OUTPATIENT
Start: 2021-11-17

## 2021-11-17 RX ORDER — KETOTIFEN FUMARATE 0.25 MG/ML
1 SOLUTION/ DROPS OPHTHALMIC 2 TIMES DAILY PRN
Qty: 5 ML | Refills: 0 | Status: SHIPPED | OUTPATIENT
Start: 2021-11-17

## 2021-11-17 RX ORDER — UBIQUINOL 100 MG
CAPSULE ORAL DAILY
Qty: 200 EACH | Refills: 2 | Status: SHIPPED | OUTPATIENT
Start: 2021-11-17

## 2021-11-17 RX ORDER — FLUTICASONE PROPIONATE 50 MCG
1-2 SPRAY, SUSPENSION (ML) NASAL DAILY
Qty: 16 G | Refills: 6 | Status: SHIPPED | OUTPATIENT
Start: 2021-11-17

## 2021-11-17 RX ORDER — ALBUTEROL SULFATE 90 UG/1
2 AEROSOL, METERED RESPIRATORY (INHALATION) EVERY 4 HOURS PRN
Qty: 18 G | Refills: 1 | Status: SHIPPED | OUTPATIENT
Start: 2021-11-17 | End: 2022-04-28 | Stop reason: SDUPTHER

## 2021-11-17 RX ORDER — FUROSEMIDE 20 MG/1
20 TABLET ORAL DAILY PRN
Qty: 30 TABLET | Refills: 2 | Status: SHIPPED | OUTPATIENT
Start: 2021-11-17 | End: 2022-02-08 | Stop reason: SDUPTHER

## 2021-11-17 RX ORDER — MOMETASONE FUROATE 1 MG/G
CREAM TOPICAL DAILY
Qty: 45 G | Refills: 0 | Status: SHIPPED | OUTPATIENT
Start: 2021-11-17 | End: 2022-06-08 | Stop reason: SDUPTHER

## 2021-11-17 NOTE — ANESTHESIA PREPROCEDURE EVALUATION
Review of Systems/Medical History  Patient summary reviewed  Chart reviewed  No history of anesthetic complications     Cardiovascular  EKG reviewed, Negative cardio ROS Exercise tolerance (METS): good,  No angina , No JORDAN,    Pulmonary  Smoker ex-smoker  , Asthma , well controlled/ stable , No sleep apnea (patient denies) ,        GI/Hepatic  Dysphagia, Dysphagia type: solids  PUD, GERD ,   Comment: Achalasia     Kidney stones,        Endo/Other  History of thyroid disease , hypothyroidism,   Obesity    GYN  Negative gynecology ROS          Hematology  Negative hematology ROS      Musculoskeletal    Arthritis     Neurology    Headaches, Fibromyalgia   Psychology   Depression ,              Physical Exam    Airway    Mallampati score: I  TM Distance: >3 FB  Neck ROM: full     Dental   No notable dental hx     Cardiovascular  Comment: Negative ROS, Rhythm: regular, Rate: normal, Cardiovascular exam normal    Pulmonary  Pulmonary exam normal Breath sounds clear to auscultation,     Other Findings        Anesthesia Plan  ASA Score- 2     Anesthesia Type- IV sedation with anesthesia with ASA Monitors  Additional Monitors:   Airway Plan:         Plan Factors-    Induction- intravenous  Postoperative Plan-     Informed Consent- Anesthetic plan and risks discussed with patient  I personally reviewed this patient with the CRNA  Discussed and agreed on the Anesthesia Plan with the CRNA  Sloane Dorado Oriented to time, place, person, situation

## 2021-11-22 ENCOUNTER — HOSPITAL ENCOUNTER (OUTPATIENT)
Dept: RADIOLOGY | Facility: HOSPITAL | Age: 56
Discharge: HOME | End: 2021-11-22
Payer: COMMERCIAL

## 2021-11-22 DIAGNOSIS — R10.9 ABDOMINAL PAIN, UNSPECIFIED ABDOMINAL LOCATION: ICD-10-CM

## 2021-11-22 DIAGNOSIS — M54.2 NECK PAIN: ICD-10-CM

## 2021-11-22 PROCEDURE — 72050 X-RAY EXAM NECK SPINE 4/5VWS: CPT

## 2021-11-22 PROCEDURE — 76770 US EXAM ABDO BACK WALL COMP: CPT

## 2021-11-30 DIAGNOSIS — E03.9 HYPOTHYROIDISM, UNSPECIFIED TYPE: Primary | ICD-10-CM

## 2021-12-01 DIAGNOSIS — N20.0 LEFT NEPHROLITHIASIS: Primary | ICD-10-CM

## 2021-12-01 DIAGNOSIS — M54.2 NECK PAIN: ICD-10-CM

## 2021-12-02 ENCOUNTER — HOSPITAL ENCOUNTER (OUTPATIENT)
Dept: RADIOLOGY | Age: 56
Discharge: HOME | End: 2021-12-02
Payer: COMMERCIAL

## 2021-12-02 ENCOUNTER — APPOINTMENT (OUTPATIENT)
Dept: RADIOLOGY | Age: 56
End: 2021-12-02
Payer: COMMERCIAL

## 2021-12-02 DIAGNOSIS — E03.9 HYPOTHYROIDISM, UNSPECIFIED TYPE: ICD-10-CM

## 2021-12-02 DIAGNOSIS — M25.532 LEFT WRIST PAIN: ICD-10-CM

## 2021-12-02 DIAGNOSIS — F17.201 TOBACCO DEPENDENCE IN REMISSION: ICD-10-CM

## 2021-12-02 PROCEDURE — 73130 X-RAY EXAM OF HAND: CPT

## 2021-12-02 PROCEDURE — 76536 US EXAM OF HEAD AND NECK: CPT

## 2021-12-02 PROCEDURE — 73110 X-RAY EXAM OF WRIST: CPT

## 2021-12-02 PROCEDURE — 71271 CT THORAX LUNG CANCER SCR C-: CPT

## 2021-12-08 DIAGNOSIS — Z12.2 SCREENING FOR LUNG CANCER: Primary | ICD-10-CM

## 2021-12-16 DIAGNOSIS — K58.0 IRRITABLE BOWEL SYNDROME WITH DIARRHEA: ICD-10-CM

## 2021-12-16 DIAGNOSIS — E55.9 VITAMIN D DEFICIENCY: ICD-10-CM

## 2021-12-16 RX ORDER — DICYCLOMINE HCL 20 MG
20 TABLET ORAL EVERY 6 HOURS PRN
Qty: 360 TABLET | Refills: 0 | Status: SHIPPED | OUTPATIENT
Start: 2021-12-16 | End: 2022-03-16 | Stop reason: SDUPTHER

## 2021-12-16 RX ORDER — MELATONIN
1000 DAILY
Qty: 90 TABLET | Refills: 0 | Status: SHIPPED | OUTPATIENT
Start: 2021-12-16 | End: 2022-03-14 | Stop reason: SDUPTHER

## 2021-12-23 ENCOUNTER — HOSPITAL ENCOUNTER (OUTPATIENT)
Dept: NON INVASIVE DIAGNOSTICS | Facility: HOSPITAL | Age: 56
Discharge: HOME | End: 2021-12-23
Payer: COMMERCIAL

## 2021-12-23 VITALS
HEART RATE: 65 BPM | DIASTOLIC BLOOD PRESSURE: 80 MMHG | BODY MASS INDEX: 34.69 KG/M2 | SYSTOLIC BLOOD PRESSURE: 120 MMHG | WEIGHT: 221 LBS | HEIGHT: 67 IN

## 2021-12-23 DIAGNOSIS — F31.81 BIPOLAR 2 DISORDER (HCC): ICD-10-CM

## 2021-12-23 DIAGNOSIS — R22.42 LOCALIZED SWELLING OF LEFT LOWER EXTREMITY: ICD-10-CM

## 2021-12-23 DIAGNOSIS — Z79.899 ENCOUNTER FOR LONG-TERM (CURRENT) USE OF OTHER MEDICATIONS: ICD-10-CM

## 2021-12-23 DIAGNOSIS — F41.9 ANXIETY: ICD-10-CM

## 2021-12-23 LAB
AORTIC ROOT: 3.1 CM
APICAL FOUR CHAMBER EJECTION FRACTION: 61 %
ASCENDING AORTA: 2.9 CM
ATRIAL RATE: 79 BPM
E WAVE DECELERATION TIME: 149 MS
FRACTIONAL SHORTENING: 38 % (ref 28–44)
INTERVENTRICULAR SEPTUM IN DIASTOLE (PARASTERNAL SHORT AXIS VIEW): 1.1 CM
IVC: 16 MM
LEFT ATRIUM AREA SYSTOLE SINGLE PLANE A4C: 15.3 CM2
LEFT INTERNAL DIMENSION IN SYSTOLE: 2.8 CM (ref 2.1–4)
LEFT VENTRICULAR INTERNAL DIMENSION IN DIASTOLE: 4.5 CM (ref 6.35–9.47)
LEFT VENTRICULAR POSTERIOR WALL IN END DIASTOLE: 1.1 CM
LEFT VENTRICULAR STROKE VOLUME: 65 ML
MV PEAK A VEL: 0.7 M/S
MV PEAK E VEL: 96 CM/S
MV STENOSIS PRESSURE HALF TIME: 0 MS
P AXIS: 73 DEGREES
PR INTERVAL: 138 MS
QRS AXIS: 18 DEGREES
QRSD INTERVAL: 108 MS
QT INTERVAL: 386 MS
QTC INTERVAL: 442 MS
RA PRESSURE ESTIMATED: 3 MMHG
RIGHT ATRIUM AREA SYSTOLE A4C: 16.3 CM2
RIGHT VENTRICLE ID DIMENSION: 3.9 CM
RV PSP: 30 MMHG
SL CV LV EF: 65
SL CV PED ECHO LEFT VENTRICLE DIASTOLIC VOLUME (MOD BIPLANE) 2D: 94 ML
SL CV PED ECHO LEFT VENTRICLE SYSTOLIC VOLUME (MOD BIPLANE) 2D: 28 ML
T WAVE AXIS: 55 DEGREES
TR MAX PG: 27 MMHG
TRICUSPID VALVE PEAK REGURGITATION VELOCITY: 2.61 M/S
TRICUSPID VALVE S': 1 CM/S
TV PEAK GRADIENT: 27 MMHG
VENTRICULAR RATE: 79 BPM
Z-SCORE OF LEFT VENTRICULAR DIMENSION IN END SYSTOLE: -5.44

## 2021-12-23 PROCEDURE — 93010 ELECTROCARDIOGRAM REPORT: CPT | Performed by: INTERNAL MEDICINE

## 2021-12-23 PROCEDURE — 93306 TTE W/DOPPLER COMPLETE: CPT | Performed by: INTERNAL MEDICINE

## 2021-12-23 PROCEDURE — 93306 TTE W/DOPPLER COMPLETE: CPT

## 2021-12-23 PROCEDURE — 93005 ELECTROCARDIOGRAM TRACING: CPT

## 2022-01-07 DIAGNOSIS — F31.81 BIPOLAR 2 DISORDER (HCC): ICD-10-CM

## 2022-01-07 RX ORDER — LAMOTRIGINE 25 MG/1
50 TABLET ORAL
Qty: 180 TABLET | Refills: 0 | Status: SHIPPED | OUTPATIENT
Start: 2022-01-07 | End: 2022-03-21 | Stop reason: SDUPTHER

## 2022-01-14 ENCOUNTER — TELEPHONE (OUTPATIENT)
Dept: UROLOGY | Facility: CLINIC | Age: 57
End: 2022-01-14

## 2022-01-14 NOTE — TELEPHONE ENCOUNTER
Please Triage - [place]  New Patient- Ref in [facility]    What is the reason for the patients appointment? Ref - Left nephrolithiasis       Imaging/Lab Results:      Do we accept the patient's insurance or is the patient Self-Pay?   Provider & Plan: JOSY Chen 53  Member ID#: [id number]     Has the patient had any previous urologist(s)? no       Have patient records been requested? epic       Has the patient had any outside testing done? epic      Does the patient have a personal history of cancer? no      Patient can be reached at : [phone]

## 2022-01-20 NOTE — PSYCH
MEDICATION MANAGEMENT NOTE        [facility]      Name and Date of Birth:  Yunior Austin 49 y o  1965    Date of Visit: January 24, 2022    HPI:    Yunior Austin is here for medication review with primary c/o / Area of need: "I always have a problem with the sleep, remember, I have a thyroid problem "  However, she does not feel energy is impaired at this time  She denies significant anxiety which she rates 2-6/10, recent panic attacks or any present depression, SI, HI, or manic or psychotic Sxs  She listens to music and Im[employer], and attends [employer]  She enjoyed her holidays spent at home  She had hosted [employer] and cooked for her family  She was sick with bronchitis during Ranjit but still enjoyed it  Pt reports compliance to psychiatric medications without SE  Appetite Changes and Sleep: Sleep is getting better--now sleeping approx 4 hours a night since thyroid medicine was adjusted, normal appetite, adequate energy level    Review Of Systems:      Constitutional negative   ENT negative   Cardiovascular negative   Respiratory negative   Gastrointestinal negative   Genitourinary negative   Musculoskeletal negative   Integumentary negative   Neurological negative   Endocrine negative   Other Symptoms none, all other systems are negative       Past Psychiatric History:   As copied from my 10/6/2021 note with updates as needed:  "  [ Pt born in [place] and came to the [facility] with her family in approx 1989 and grew up with biological parents, 1 year younger sister and a younger brother   Pt describes her upbringing as "Good, good dad, good mom  [place] were raised with Jew  "   Everyone in the family got along and Pt has positive memories of growing up with them      Depression started in 2010 at work when she sustained a life changing back injury, for which she became permanently disabled   Mood Sxs: sadness, crying, self-isolating, anhedonia, less motivated, feelings of hopelessness and helplessness   But no SI                    Manic Sxs:  decreased need for sleep , rapid speech  and flight of ideas/racing thoughts  Racy thoughts, elevated energy     Anxiety started in 2010 due to the work injury:  excessive worry more days than not for longer than 3 months and The Sxs can occur without concommittent depressive Sxs   No other Sxs of BRIGITTE      Panic attacks started in late 8/2020 initially triggered by driving through a town she was not familiar with  Sarah Tao has had a few in total with Sxs of:  Moderate anxiety, sweaty hands,  palpitations/racing heart, trembling, shortness of breath       Social Anxiety symptoms: no symptoms suggestive of social anxiety Eating Disorder symptoms: no historical or current eating disorder  no binge eating disorder; no anorexia nervosa  no symptoms of bulimia      OCD type Sxs: checking door locks, windows, stove, pipes in the basement (which used to leak)   Cleans often, can be very fastidious and wash her hands excessively        Pt denies any h/o PTSD or psychotic Sxs      Prior psychiatrists:  [facility] from approx 2014 - 11/2019 --stopped going due to change in insurance     Prior psychotherapists:   First therapist was at [facility] in [place] and he left so she only saw him for a year  Second one was seen from [id number] at [address]  Pt denied h/o SI, HI, self-injurious behaviors, violent behaviors, psychiatric hospitalizations, ECT, legal or  Hx     Prior Rx trials: Viibryd 40mg (helped), Bupropion ER/XL 150mg (helped), Aripiprazole 5mg (helped but caused Wt gain), Ziprasidone 40mg (heart palpitations), Gabapentin 300mg (agitated),Topamax (for migraines but stopped due to ineffectiveness and her h/o nephrolithiasis), Hydroxyzine 50mg (sedating), Doxepin up to 50mg (for sleep--oversedating even at 25mg), Clonazepam 0 5mg--??used/helped       Abuse Hx:  Pt denies any h/o sexual, physical, or emotional abuse     Trauma Hx:  When she lost her job after her work injury                   ] "       Past Medical History:    Past Medical History:   Diagnosis Date    Achalasia     Asthma     Constipation     Depression     Diarrhea 2019    Elevated LFTs     last assessed 10/25/17    Fibromyalgia     Gastric ulcer     GERD (gastroesophageal reflux disease)     H  pylori infection 10/9/2017    [facility] [id number]: Treated, f/u stool antigen negative    Hypothyroidism     Kidney stone     Migraines     Migraines     MICHAEL (obstructive sleep apnea)     last assessed 16    Pneumonia     Sleep apnea 2019    Uses CPAP    Thyroid nodule     last assessed 17       Substance Abuse History:    Social History     Substance and Sexual Activity   Alcohol Use No     Social History     Substance and Sexual Activity   Drug Use Never       Social History:    Social History     Socioeconomic History    Marital status: Single     Spouse name: Not on file    Number of children: 3    Years of education: Not on file    Highest education level: Not on file   Occupational History    Occupation: Disability since 2019-- due to a job injury    Tobacco Use    Smoking status: Former Smoker     Packs/day: 1 50     Years: 30 00     Pack years: 45 00     Quit date:      Years since quittin 0    Smokeless tobacco: Never Used    Tobacco comment: pt stated quit about a month ago, last assessed 14 per Allscriciro   Vaping Use    Vaping Use: Never used   Substance and Sexual Activity    Alcohol use: No    Drug use: Never    Sexual activity: Yes     Partners: Male     Birth control/protection: Female Sterilization     Comment: Boyfriend/father of 4 children   Other Topics Concern    Not on file   Social History Narrative    Home: Lives with boyfriend/father of her 4 children and one of their daughters        Children: 2 sons born  and 1995,  2 daughters born 1997 and 2003        Education:     Social Determinants of Health     Financial Resource Strain: Low Risk     Difficulty of Paying Living Expenses: Not hard at all   Food Insecurity: No Food Insecurity    Worried About Running Out of Food in the Last Year: Never true    [address] in the Last Year: Never true   Transportation Needs: No Transportation Needs    Lack of Transportation (Medical): No    Lack of Transportation (Non-Medical):  No   Physical Activity: Inactive    Days of Exercise per Week: 0 days    Minutes of Exercise per Session: 0 min   Stress: Not on file   Social Connections: Not on file   Intimate Partner Violence: Not on file   Housing Stability: Not on file       Family Psychiatric History:     Family History   Problem Relation Age of Onset    Cancer Mother     Breast cancer Mother 62    Hypertension Mother     Stroke Father     Diabetes Father     Hypertension Father     Alzheimer's disease Father     Cancer Maternal Grandmother         ovarian    Ovarian cancer Maternal Grandmother         unspecified laterality    Cancer Maternal Grandfather     Hypertension Maternal Grandfather     Tongue cancer Maternal Grandfather     Stroke Paternal Grandmother     Thyroid disease unspecified Brother     Depression Brother     Anxiety disorder Brother     Drug abuse Brother         (pain pills)    Thyroid disease unspecified Maternal Aunt     Colon cancer Maternal Aunt     Breast cancer Family     Diabetes Sister     BRCA1 Negative Sister     BRCA2 Negative Sister     No Known Problems Daughter     No Known Problems Son     No Known Problems Daughter     No Known Problems Son     No Known Problems Maternal Aunt     No Known Problems Paternal Aunt     No Known Problems Paternal Aunt     No Known Problems Paternal Aunt     No Known Problems Paternal Aunt     No Known Problems Paternal Aunt     No Known Problems Paternal Aunt     No Known Problems Maternal Aunt     Alzheimer's disease Cousin     Breast cancer Cousin 42       History Review: The following portions of the patient's history were reviewed and updated as appropriate: allergies, current medications, past family history, past medical history, past social history, past surgical history and problem list          OBJECTIVE:     Mental Status Evaluation:    Appearance Casually dresssed, fair eye contact, good hygiene   Behavior Calm, cooperative, pleasant but with a mild edge   Speech Clear, normal rate and volume   Mood Mildly edgy   Affect Appears reactive   Thought Processes Organized, goal directed   Associations intact associations   Thought Content No delusions   Perceptual Disturbances: Pt denies any form of hallucinations and does not appear to be responding to internal stimuli   Abnormal Thoughts  Risk Potential Suicidal ideation - None  Homicidal ideation - None  Potential for aggression - No   Orientation oriented to person, place, situation, day of week, date, month of year and year   Memory short term memory grossly intact   Cosciousness alert and awake   Attention Span attention span and concentration are age appropriate   Intellect appears to be of average intelligence   Insight fair   Judgement fair   Muscle Strength and  Gait normal gait and normal balance   Language no difficulty naming common objects, no difficulty repeating a phrase   Fund of Knowledge adequate knowledge of current events  adequate fund of knowledge regarding past history  adequate fund of knowledge regarding vocabulary    Pain none   Pain Scale 0       Laboratory Results:   I have personally reviewed all pertinent laboratory/tests results    Most Recent Labs:   Lab Results   Component Value Date    WBC 9 19 06/09/2021    RBC 4 75 06/09/2021    HGB 13 4 06/09/2021    HCT 40 9 06/09/2021     06/09/2021    RDW 13 8 06/09/2021    NEUTROABS 7 34 06/09/2021    SODIUM 136 06/09/2021    K 4 1 06/09/2021     06/09/2021    CO2 25 06/09/2021    BUN 12 06/09/2021    CREATININE 0 51 (L) 06/09/2021    GLUC 86 07/21/2016    GLUF 100 (H) 06/09/2021    CALCIUM 9 2 06/09/2021    AST 14 06/09/2021    ALT 27 06/09/2021    ALKPHOS 114 06/09/2021    TP 7 6 06/09/2021    ALB 4 1 06/09/2021    TBILI 0 54 06/09/2021    CHOLESTEROL 218 (H) 06/09/2021    HDL 44 06/09/2021    TRIG 129 06/09/2021    LDLCALC 148 (H) 06/09/2021    NONHDLC 127 01/20/2020    COI3IEMSAJSR 0 294 (L) 11/17/2021    FREET4 1 44 11/17/2021    HGBA1C 5 5 06/09/2021     06/09/2021     EKG   Lab Results   Component Value Date    VENTRATE 79 12/23/2021    ATRIALRATE 79 12/23/2021    PRINT 138 12/23/2021    QRSDINT 108 12/23/2021    QTINT 386 12/23/2021    QTCINT 442 12/23/2021    PAXIS 73 12/23/2021    QRSAXIS 18 12/23/2021    TWAVEAXIS 55 12/23/2021       Assessment/plan:       Diagnoses and all orders for this visit:    Bipolar 2 disorder ([facility] [address] )  -     FLUoxetine (PROzac) 20 mg capsule; Take 1 capsule (20 mg total) by mouth daily    Anxiety  -     FLUoxetine (PROzac) 20 mg capsule; Take 1 capsule (20 mg total) by mouth daily  -     hydrOXYzine HCL (ATARAX) 50 mg tablet; TAKE 1/2 OR 1 TABLET BY MOUTH THREE TIMES A DAY AS NEEDED FOR ANXIETY OR INSOMNIA    Insomnia, unspecified type  -     hydrOXYzine HCL (ATARAX) 50 mg tablet; TAKE 1/2 OR 1 TABLET BY MOUTH THREE TIMES A DAY AS NEEDED FOR ANXIETY OR INSOMNIA          PLAN:  Pt is having mild to moderate anxiety without panic or mood Sxs  She is a bit edgy on observation, but thyroid is still being managed  She otherwise appears stable at this time and I will continue the present regimen unchanged  Labwork reviewed and TFTs overall stable per PCP notation 11/30/2021  Treatment plan done and Pt accepts the plan   Treatment Plan done but not signed at time of office visit due to:  Plan reviewed by phone or in person  and verbal consent given due to COVID social distancing  Continue:   Lamotrigine 25mg (2) tabs po qhs --Pt was renewed 1/7/2022 for a 90 day supply  Fluoxetine 20mg (1) cap po qd # 90  Hydroxyzine 50mg (1/2-1) tab po tid prn anxiety or insomnia # 270  Melatonin 5mg --Pt gets OTC  Vit D--per PCP  F/U PCP and Endocrinology/DM clinic for thyroid disease  Return 8 weeks, call sooner prn    Risks/Benefits      Risks, Benefits And Possible Side Effects Of Medications:    Risks, benefits, and possible side effects of medications explained to Tiffanie Jackson and she verbalizes understanding and agreement for treatment

## 2022-01-24 ENCOUNTER — OFFICE VISIT (OUTPATIENT)
Dept: PSYCHIATRY | Facility: CLINIC | Age: 57
End: 2022-01-24
Payer: MEDICARE

## 2022-01-24 ENCOUNTER — TELEPHONE (OUTPATIENT)
Dept: INTERNAL MEDICINE CLINIC | Facility: CLINIC | Age: 57
End: 2022-01-24

## 2022-01-24 DIAGNOSIS — F31.81 BIPOLAR 2 DISORDER (HCC): Primary | ICD-10-CM

## 2022-01-24 DIAGNOSIS — G47.00 INSOMNIA, UNSPECIFIED TYPE: ICD-10-CM

## 2022-01-24 DIAGNOSIS — F41.9 ANXIETY: ICD-10-CM

## 2022-01-24 PROCEDURE — 99213 OFFICE O/P EST LOW 20 MIN: CPT | Performed by: PHYSICIAN ASSISTANT

## 2022-01-24 RX ORDER — FLUOXETINE HYDROCHLORIDE 20 MG/1
20 CAPSULE ORAL DAILY
Qty: 90 CAPSULE | Refills: 0 | Status: SHIPPED | OUTPATIENT
Start: 2022-01-24 | End: 2022-03-21 | Stop reason: SDUPTHER

## 2022-01-24 RX ORDER — HYDROXYZINE 50 MG/1
TABLET, FILM COATED ORAL
Qty: 270 TABLET | Refills: 0 | Status: SHIPPED | OUTPATIENT
Start: 2022-01-24 | End: 2022-03-21 | Stop reason: SDUPTHER

## 2022-01-24 NOTE — TELEPHONE ENCOUNTER
I called patient to see what EKG she is referring too as the last one is from December and it is not ordered by our office  She said her psychiatrist ordered it because she needs to start medication and has leg swelling so she wanted it done but she cant give her the results  I advised her I dont have any info about why it was ordered and she should make an appt here with a provider to follow up on her leg swelling and see if they can review results and see what next step is  She cut me off and said "you need to stop talking and listen to me"  She said we just need to look at it and tell her what it says  She then put a gentleman on the phone and he repeated the same thing to me that I went over with her as she was telling him in [address] now  I made him aware that I understood what she was saying but we are not the ordering provider and I dont know why this was done  He then said she just wants the echo results  I advised him that neither the echo or EKG were ordered by us  She was insisting they were and I kept telling him that we havent seen her since Nov 2021  She had these tests done in Dec  2021 and the EKG was ordered by psych and the echo was ordered by cardiology  I advised him to contact cardiology and see if they can review the results with her otherwise she needs to come in for an appt   She got back on the phone and was raising her voice and stating "you need to give me the results" I asked her to please stop speaking to me like this and she said "you know what I will just come to the office, fuck you" she then hung up the phone

## 2022-01-24 NOTE — BH TREATMENT PLAN
TREATMENT PLAN (Medication Management Only)        [facility]    Name and Date of Birth:  Teo Cabrera 49 y o  1965  Date of Treatment Plan: January 24, 2022  Diagnosis/Diagnoses:    1  Bipolar 2 disorder ([address] )    2  Anxiety    3  Insomnia, unspecified type       Strengths/Personal Resources for Self-Care: "Listen to music; I listen to Brina Blaine"  Area/Areas of need (in own words): "I always have a problem with the sleep, remember, I have a thyroid problem "  1  Long Term Goal: Maintain mood stability and control of anxiety  Target Date:3-6 months  Person/Persons responsible for completion of goal: Mikaela Morrissey  2  Short Term Objective (s) - How will we reach this goal?:   A  Provider new recommended medication/dosage changes and/or continue medication(s): continue current medications as prescribed  Treatment Plan done but not signed at time of office visit due to:  Plan reviewed by phone or in person  and verbal consent given due to Matt social distancing  Target Date:3-6 months  Person/Persons Responsible for Completion of Goal: Mikaela Morrissey   Progress Towards Goals: stable  Treatment Modality: Medication mgt  Review due 180 days from date of this plan: 6 months - 7/24/2022  Expected length of service: ongoing treatment  My Physician/PA/NP and I have developed this plan together and I agree to work on the goals and objectives  I understand the treatment goals that were developed for my treatment

## 2022-01-24 NOTE — TELEPHONE ENCOUNTER
Patient called back stating she needs to know her results  She would like to speak to someone that speaks Vietnamese   Patient is asking if Dr Juan Pablo Gao is able to see the Ecko ordered by her pschyciatrist?

## 2022-01-25 ENCOUNTER — TELEPHONE (OUTPATIENT)
Dept: PSYCHIATRY | Facility: CLINIC | Age: 57
End: 2022-01-25

## 2022-01-25 NOTE — TELEPHONE ENCOUNTER
I left msg on Poly's cell phone # of record that anyone part of the [facility]'s network automatically has access to her labwork/EKG results through the Epic system  However, if she wants anything sent to a provider outside of our network she can come in to sign an ALYSSA, and this can then be done

## 2022-01-25 NOTE — TELEPHONE ENCOUNTER
Patient called back again about her EKG I explained to her with the [address]  that we can not touch anything that psych orders nor can we even get into those records bc its Mental Health  Per the patient, their office told her that we can  I instructed the patient to call back her psychiatrist and have the doctor there send a message about this to her pcp directly

## 2022-02-08 DIAGNOSIS — M79.89 LEG SWELLING: ICD-10-CM

## 2022-02-08 RX ORDER — FUROSEMIDE 20 MG/1
20 TABLET ORAL DAILY PRN
Qty: 30 TABLET | Refills: 2 | Status: SHIPPED | OUTPATIENT
Start: 2022-02-08 | End: 2022-04-28 | Stop reason: SDUPTHER

## 2022-03-03 NOTE — PROGRESS NOTES
3/4/2022    Yordykellijose Zamudio  1965  [id number]        Assessment  -Nephrolithiasis     Discussion/Plan  Radha Santos is a 49 y o  female who presents in consultation    1  Nephrolithiasis- urine dip in the office today appears negative for infection or blood  We will send for urinalysis with microscopic and culture  Due to her ongoing left-sided flank pain and increased urinary frequency, I recommend obtaining a CT renal stone study to further evaluate for obstructing stone  Her last renal ultrasound performed in November 2021 identified a 3 mm left upper pole calculus  Discussed dietary recommendations  Reviewed ER precautions  Patient requests that we call with results  Call with results of CT scan and urine testing  Determine follow-up thereafter  She was instructed to call sooner with any issues     -All questions answered, patients agree with plan     History of Present Illness  49 y o  female who presents in consultation today for evaluation of nephrolithiasis  Patient was referred by her PCP  She experienced an episode of left sided flank pain which prompted a renal ultrasound  Image performed on 11/22/2021 identified a 3mm left upper pole shadow  No evidence of hydronephrosis  She continues to report intermittent episodes of left-sided flank pain  Patient also notes increased urinary frequency  She reports prior history of spontaneously passed calculi  She has never required surgical intervention  She occasionally takes over-the-counter AZO for management of symptoms  Patient denies any gross hematuria or dysuria  She states she primarily hydrate with water  Patient denies any strong family history of kidney stones  She states she underwent a procedure a few years ago for placement of a bridge for her bladder at [facility]  No record of this available for review  Review of Systems  Review of Systems   Constitutional: Negative  HENT: Negative  Respiratory: Negative  Cardiovascular: Negative  Gastrointestinal: Negative  Genitourinary: Negative for decreased urine volume, difficulty urinating, dysuria, flank pain, frequency, hematuria and urgency  Musculoskeletal: Negative  Skin: Negative  Neurological: Negative  Psychiatric/Behavioral: Negative  Past Medical History  Past Medical History:   Diagnosis Date    Achalasia     Asthma     Constipation     Depression     Diarrhea 2019    Elevated LFTs     last assessed 10/25/17    Fibromyalgia     Gastric ulcer     GERD (gastroesophageal reflux disease)     H  pylori infection 10/9/2017    [facility] [id number]: Treated, f/u stool antigen negative    Hypothyroidism     Kidney stone     Migraines     Migraines     MICHAEL (obstructive sleep apnea)     last assessed 16    Pneumonia     Sleep apnea 2019    Uses CPAP    Thyroid nodule     last assessed 17       Past Social History  Past Surgical History:   Procedure Laterality Date    BREAST BIOPSY Right 10/31/2017    us bx     SECTION      COLONOSCOPY      ENDOMETRIAL ABLATION      MYOTOMY HELLER LAPAROSCOPIC  2016    Dr Shellie Márquez, Coincident lorena fundoplication    NASAL SINUS SURGERY      OTHER SURGICAL HISTORY      achalasia repair    OVARIAN CYST REMOVAL      PILONIDAL CYST / SINUS EXCISION      NC COLONOSCOPY FLX DX W/COLLJ SPEC WHEN PFRMD N/A 2019    Procedure: COLONOSCOPY;  Surgeon: Viktor Harrison MD;  [facility]; Service: Gastroenterology    NC CYSTOURETHROSCOPY N/A 2017    Procedure: Chante Kelloggo;  Surgeon: Asael De La Rosa MD;  [facility];  Service: UroGynecology           NC ESOPHAGOGASTRODUODENOSCOPY TRANSORAL DIAGNOSTIC N/A 2017    Procedure: ESOPHAGOGASTRODUODENOSCOPY (EGD); Surgeon: Viktor Harrison MD;  [facility];   Service: Gastroenterology    NC ESOPHAGOGASTRODUODENOSCOPY TRANSORAL DIAGNOSTIC N/A 2019    Procedure: ESOPHAGOGASTRODUODENOSCOPY (EGD); Surgeon: Han Milian MD;  [facility];   Service: Gastroenterology    PA POST COLPORRHAPHY,RECTUM/VAGINA N/A 1/19/2017    Procedure: COLPORRHAPHY POSTERIOR;  Surgeon: Chandrika eJtt MD;  [facility];  Service: UroGynecology           PA SLING OPER STRES INCONTINENCE N/A 1/19/2017    Procedure: INSERTION PUBOVAGINAL SLING /SINGLE INCISION ;  Surgeon: Chandrika Jett MD;  [facility];  Service: UroGynecology           TONSILLECTOMY      TUBAL LIGATION      UPPER GASTROINTESTINAL ENDOSCOPY      US GUIDED BREAST BIOPSY RIGHT COMPLETE Right 10/31/2017       Past Family History  Family History   Problem Relation Age of Onset    Cancer Mother     Breast cancer Mother 62    Hypertension Mother     Stroke Father     Diabetes Father     Hypertension Father     Alzheimer's disease Father     Cancer Maternal Grandmother         ovarian    Ovarian cancer Maternal Grandmother         unspecified laterality    Cancer Maternal Grandfather     Hypertension Maternal Grandfather     Tongue cancer Maternal Grandfather     Stroke Paternal Grandmother     Thyroid disease unspecified Brother     Depression Brother     Anxiety disorder Brother     Drug abuse Brother         (pain pills)    Thyroid disease unspecified Maternal Aunt     Colon cancer Maternal Aunt     Breast cancer Family     Diabetes Sister     BRCA1 Negative Sister     BRCA2 Negative Sister     No Known Problems Daughter     No Known Problems Son     No Known Problems Daughter     No Known Problems Son     No Known Problems Maternal Aunt     No Known Problems Paternal Aunt     No Known Problems Paternal Aunt     No Known Problems Paternal Aunt     No Known Problems Paternal Aunt     No Known Problems Paternal Aunt     No Known Problems Paternal Aunt     No Known Problems Maternal Aunt     Alzheimer's disease Cousin     Breast cancer Cousin 43       Past Social history  Social History     Socioeconomic History    Marital status: Single     Spouse name: Not on file    Number of children: 3    Years of education: Not on file    Highest education level: Not on file   Occupational History    Occupation: Disability since 2019-- due to a job injury    Tobacco Use    Smoking status: Former Smoker     Packs/day: 1 50     Years: 30 00     Pack years: 45 00     Quit date:      Years since quittin 1    Smokeless tobacco: Never Used    Tobacco comment: pt stated quit about a month ago, last assessed 14 per [employer]   Vaping Use    Vaping Use: Never used   Substance and Sexual Activity    Alcohol use: No    Drug use: Never    Sexual activity: Yes     Partners: Male     Birth control/protection: Female Sterilization     Comment: Boyfriend/father of 4 children   Other Topics Concern    Not on file   Social History Narrative    Home: Lives with boyfriend/father of her 4 children and one of their daughters        Children: 2 sons born  and ,  2 daughters born  and         Education:     Social Determinants of Health     Financial Resource Strain: Low Risk     Difficulty of Paying Living Expenses: Not hard at all   Food Insecurity: No Food Insecurity    Worried About [address] in the Last Year: Never true    [facility] in the Last Year: Never true   Transportation Needs: No Transportation Needs    Lack of Transportation (Medical): No    Lack of Transportation (Non-Medical):  No   Physical Activity: Inactive    Days of Exercise per Week: 0 days    Minutes of Exercise per Session: 0 min   Stress: Not on file   Social Connections: Not on file   Intimate Partner Violence: Not on file   Housing Stability: Not on file       Current Medications  Current Outpatient Medications   Medication Sig Dispense Refill    acetaminophen (TYLENOL) 325 mg tablet Every 4 hours as needed 30 tablet 0    Alaway 0 025 % ophthalmic solution Administer 1 drop to both eyes 2 (two) times a day as needed (For discomfort) 5 mL 0    albuterol (2 5 mg/3 mL) 0 083 % nebulizer solution Take 3 mL (2 5 mg total) by nebulization every 6 (six) hours as needed for wheezing or shortness of breath 75 mL 0    albuterol (PROVENTIL HFA,VENTOLIN HFA) 90 mcg/act inhaler Inhale 2 puffs every 4 (four) hours as needed for wheezing or shortness of breath 18 g 1    Alcohol Swabs (Alcohol Prep) 70 % PADS Use daily 200 each 2    Alcohol Swabs (Alcohol Prep) PADS Use 3 (three) times a day Use to test blood sugar 3 times a day 100 each 5    betamethasone dipropionate (DIPROSONE) 0 05 % cream APPLY TO AFFECTED AREA EVERY DAY 30 g 0    bismuth subsalicylate (PEPTO BISMOL) 262 MG chewable tablet Chew 2 tablets      Botulinum Toxin Type A 200 units SOLR Inject 155 units into face and neck IM every 90 days      BREO ELLIPTA 100-25 MCG/INH inhaler Inhale 1 puff daily 1 puff daily 1 Inhaler 3    cetirizine (ZyrTEC) 10 mg tablet Take 1 tablet (10 mg total) by mouth daily 30 tablet 3    cholecalciferol (VITAMIN D3) 1,000 units tablet Take 1 tablet (1,000 Units total) by mouth daily 90 tablet 0    diclofenac sodium (VOLTAREN) 1 % APPLY 4 GRAMS TO AFFECTED AREA(S) THREE TIMES A DAY  3    dicyclomine (BENTYL) 20 mg tablet Take 1 tablet (20 mg total) by mouth every 6 (six) hours as needed (pain) 360 tablet 0    FLUoxetine (PROzac) 20 mg capsule Take 1 capsule (20 mg total) by mouth daily 90 capsule 0    fluticasone (FLONASE) 50 mcg/act nasal spray 1-2 sprays into each nostril daily 16 g 6    FREESTYLE LITE test strip USE AS DIRECTED 100 strip 8    furosemide (LASIX) 20 mg tablet Take 1 tablet (20 mg total) by mouth daily as needed (leg swelling) 30 tablet 2    glucose monitoring kit (FREESTYLE) monitoring kit Use 1 each as needed (prediabetes blood sugar checks) 1 each 0    hydrOXYzine HCL (ATARAX) 50 mg tablet TAKE 1/2 OR 1 TABLET BY MOUTH THREE TIMES A DAY AS NEEDED FOR ANXIETY OR INSOMNIA 270 tablet 0    ibuprofen (MOTRIN) 800 mg tablet Take 1 tablet (800 mg total) by mouth every 8 (eight) hours as needed for mild pain 30 tablet 0    ipratropium (ATROVENT) 0 02 % nebulizer solution       lamoTRIgine (LaMICtal) 25 mg tablet Take 2 tablets (50 mg total) by mouth daily at bedtime 180 tablet 0    Lancets (freestyle) lancets Use as instructed 100 each 3    levothyroxine 125 mcg tablet Take 1 tablet (125 mcg total) by mouth daily 90 tablet 1    meloxicam (MOBIC) 15 mg tablet Take 1 tablet (15 mg total) by mouth daily 30 tablet 6    metFORMIN (GLUCOPHAGE-XR) 500 mg 24 hr tablet TAKE 2 TABLETS (1,000 MG TOTAL) BY MOUTH EVERY MORNING 180 tablet 0    methocarbamol (ROBAXIN) 750 mg tablet Take 750 mg by mouth 3 (three) times a day  0    methylPREDNISolone 4 MG tablet therapy pack Use as directed on package 1 each 0    mometasone (ELOCON) 0 1 % cream Apply topically daily 45 g 0    montelukast (SINGULAIR) 10 mg tablet Take 10 mg by mouth daily  6    omeprazole (PriLOSEC) 40 MG capsule Take 1 tablet by mouth 30 minutes prior to meals (breakfast and dinner) 28 capsule 0    polyethylene glycol (GLYCOLAX) powder Take 17 g by mouth daily Mix 1 capful in 8 ounces of water, juice or tea  578 g 3    SUMAtriptan (IMITREX) 100 mg tablet Take 1 tablet (100 mg total) by mouth once as needed for migraine 10 tablet 1    tiZANidine (ZANAFLEX) 4 mg tablet Take 4 mg by mouth daily at bedtime as needed      FLUCELVAX QUADRIVALENT  (Patient not taking: Reported on 11/17/2021 )      traMADol (ULTRAM) 50 mg tablet TAKE 1 TABLET BY MOUTH EVERY DAY AS NEEDED FOR 15 DAYS (Patient not taking: Reported on 3/4/2022)       No current facility-administered medications for this visit  Allergies  Allergies   Allergen Reactions    Gabapentin      [facility] - [id number]: Neuropsych effects per patient      Latex Dermatitis    Penicillins Hives       Past medical history, social history, family history, medications and allergies were reviewed  Vitals  Vitals:    03/04/22 1132   BP: 128/80   BP Location: Left arm   Patient Position: Sitting   Cuff Size: Large   Pulse: 82   Weight: 102 kg (224 lb)   Height: 5' 7" (1 702 m)       Physical Exam  Physical Exam  Constitutional:       Appearance: Normal appearance  She is well-developed  HENT:      Head: Normocephalic  Eyes:      Pupils: Pupils are equal, round, and reactive to light  Pulmonary:      Effort: Pulmonary effort is normal    Abdominal:      Palpations: Abdomen is soft  Tenderness: There is no right CVA tenderness or left CVA tenderness  Musculoskeletal:         General: Normal range of motion  Cervical back: Normal range of motion  Skin:     General: Skin is warm and dry  Neurological:      General: No focal deficit present  Mental Status: She is alert and oriented to person, place, and time  Psychiatric:         Mood and Affect: Mood normal          Behavior: Behavior normal          Thought Content: Thought content normal          Judgment: Judgment normal          Results    I have personally reviewed all pertinent lab results and reviewed with patient  Lab Results   Component Value Date    GLUCOSE 104 01/07/2016    CALCIUM 9 2 06/09/2021     01/07/2016    K 4 1 06/09/2021    CO2 25 06/09/2021     06/09/2021    BUN 12 06/09/2021    CREATININE 0 51 (L) 06/09/2021     Lab Results   Component Value Date    WBC 9 19 06/09/2021    HGB 13 4 06/09/2021    HCT 40 9 06/09/2021    MCV 86 06/09/2021     06/09/2021     No results found for this or any previous visit (from the past 1 hour(s))

## 2022-03-04 ENCOUNTER — OFFICE VISIT (OUTPATIENT)
Dept: UROLOGY | Facility: AMBULATORY SURGERY CENTER | Age: 57
End: 2022-03-04
Payer: MEDICARE

## 2022-03-04 VITALS
BODY MASS INDEX: 35.16 KG/M2 | HEART RATE: 82 BPM | SYSTOLIC BLOOD PRESSURE: 128 MMHG | HEIGHT: 67 IN | DIASTOLIC BLOOD PRESSURE: 80 MMHG | WEIGHT: 224 LBS

## 2022-03-04 DIAGNOSIS — N20.0 NEPHROLITHIASIS: Primary | ICD-10-CM

## 2022-03-04 DIAGNOSIS — N20.0 LEFT NEPHROLITHIASIS: ICD-10-CM

## 2022-03-04 LAB
BACTERIA UR QL AUTO: ABNORMAL /HPF
BILIRUB UR QL STRIP: NEGATIVE
CLARITY UR: CLEAR
COLOR UR: ABNORMAL
GLUCOSE UR STRIP-MCNC: NEGATIVE MG/DL
HGB UR QL STRIP.AUTO: NEGATIVE
KETONES UR STRIP-MCNC: NEGATIVE MG/DL
LEUKOCYTE ESTERASE UR QL STRIP: NEGATIVE
NITRITE UR QL STRIP: NEGATIVE
NON-SQ EPI CELLS URNS QL MICRO: ABNORMAL /HPF
PH UR STRIP.AUTO: 6 [PH]
PROT UR STRIP-MCNC: NEGATIVE MG/DL
RBC #/AREA URNS AUTO: ABNORMAL /HPF
SL AMB  POCT GLUCOSE, UA: NORMAL
SL AMB LEUKOCYTE ESTERASE,UA: NORMAL
SL AMB POCT BILIRUBIN,UA: NORMAL
SL AMB POCT BLOOD,UA: NORMAL
SL AMB POCT CLARITY,UA: CLEAR
SL AMB POCT COLOR,UA: YELLOW
SL AMB POCT KETONES,UA: NORMAL
SL AMB POCT NITRITE,UA: NORMAL
SL AMB POCT PH,UA: 6
SL AMB POCT SPECIFIC GRAVITY,UA: 1.3
SL AMB POCT URINE PROTEIN: NORMAL
SL AMB POCT UROBILINOGEN: 0.2
SP GR UR STRIP.AUTO: 1.03 (ref 1–1.03)
UROBILINOGEN UR STRIP-ACNC: <2 MG/DL
WBC #/AREA URNS AUTO: ABNORMAL /HPF

## 2022-03-04 PROCEDURE — 81001 URINALYSIS AUTO W/SCOPE: CPT | Performed by: NURSE PRACTITIONER

## 2022-03-04 PROCEDURE — 99204 OFFICE O/P NEW MOD 45 MIN: CPT | Performed by: NURSE PRACTITIONER

## 2022-03-04 PROCEDURE — 87086 URINE CULTURE/COLONY COUNT: CPT | Performed by: NURSE PRACTITIONER

## 2022-03-04 PROCEDURE — 81002 URINALYSIS NONAUTO W/O SCOPE: CPT | Performed by: NURSE PRACTITIONER

## 2022-03-06 LAB
BACTERIA UR CULT: ABNORMAL
BACTERIA UR CULT: ABNORMAL

## 2022-03-11 ENCOUNTER — OFFICE VISIT (OUTPATIENT)
Dept: GASTROENTEROLOGY | Facility: CLINIC | Age: 57
End: 2022-03-11
Payer: MEDICARE

## 2022-03-11 VITALS
SYSTOLIC BLOOD PRESSURE: 114 MMHG | HEIGHT: 67 IN | BODY MASS INDEX: 35.16 KG/M2 | TEMPERATURE: 98.2 F | HEART RATE: 77 BPM | WEIGHT: 224 LBS | DIASTOLIC BLOOD PRESSURE: 82 MMHG | OXYGEN SATURATION: 97 %

## 2022-03-11 DIAGNOSIS — R13.19 ESOPHAGEAL DYSPHAGIA: ICD-10-CM

## 2022-03-11 DIAGNOSIS — D12.6 TUBULAR ADENOMA OF COLON: ICD-10-CM

## 2022-03-11 DIAGNOSIS — K22.0 ACHALASIA: Primary | ICD-10-CM

## 2022-03-11 DIAGNOSIS — K21.9 GASTROESOPHAGEAL REFLUX DISEASE, UNSPECIFIED WHETHER ESOPHAGITIS PRESENT: ICD-10-CM

## 2022-03-11 DIAGNOSIS — K58.1 IRRITABLE BOWEL SYNDROME WITH CONSTIPATION: ICD-10-CM

## 2022-03-11 PROCEDURE — 99214 OFFICE O/P EST MOD 30 MIN: CPT | Performed by: PHYSICIAN ASSISTANT

## 2022-03-11 RX ORDER — FEXOFENADINE HCL 180 MG/1
TABLET ORAL
COMMUNITY
Start: 2022-03-09

## 2022-03-11 NOTE — PATIENT INSTRUCTIONS
Scheduled date of EGD/colonoscopy (as of today):  5/5/22  Physician performing EGD/colonoscopy:  Dr Micheal Hall  Location of EGD/colonoscopy:  [facility]  Desired bowel prep reviewed with patient:  Miralax/Mag Citr  Instructions reviewed with patient by:  Anirudh Ferrara  Clearances:  n/a

## 2022-03-11 NOTE — PROGRESS NOTES
[facility] - Outpatient Follow-up Note  Jaswant Hearn 49 y o  female MRN: [id number]  Encounter: [id number]      Assessment and Plan    1  Achalasia  2  Esophageal dysphagia  She is s/p laparoscopic Heller myotomy with lorena fundoplication in 2633  Following this she did well but eventually began to experience solid food dysphagia  She underwent EGD with dilation to 20mm in October 2019 with initial improvement and then return  Esophageal manometry was then obtained and revealed ineffective esophageal motility and normal IRP  Because of persistent symptoms she underwent EGD with Botox injection into lower esophageal sphincter in 2020  She had some benefit but presents today with return of worsening dysphagia to solids  - discussed repeat EGD with possible dilation vs botox, patient agreeable  - continue PPI   - chew well and drink with eating     3  GERD  Well controlled on pantoprazole 40mg twice daily  - continue BID PPI     4  IBS-C  Well controlled on high fiber diet and miralax as needed  - continue high fiber diet and miralax    5  Personal history of colon cancer  Last colonoscopy in 2015 multiple polyps removed, some where hyperplastic and a couple were tubular adenomas  Repeat recommended 7 years after his last   - repeat colonoscopy due now, risks including bleeding, infection, bowel perforation, and missed polyp reviewed   - MiraLax/mag citrate bowel prep instructions reviewed and provided to the patient    Follow up after EGD and colonoscopy     ______________________________________________________________________    History of Present Illness  Jaswant Hearn is a 49 y o  female here for follow up evaluation of achalasia, esophageal dysphagia, acid reflux, and irritable bowel syndrome here for follow-up evaluation   She is status post laparoscopic Heller myotomy with lorena fundoplication in 2118   Following this she did well but eventually began to experience solid food dysphagia  She underwent EGD with dilation to 20mm in October 2019 with initial improvement and then return  Esophageal manometry was then obtained and revealed ineffective esophageal motility and normal IRP  Because of persistent symptoms she underwent EGD with Botox injection into lower esophageal sphincter in 2020  She had some benefit but presents today with return of worsening dysphagia  She states that this is to solid foods only such as pasta does, crackers, breads, etc   She states that the food ventrally does go down but she needs to drink a lot a water with  She continues on twice daily PPI with good control of her acid reflux  Currently her constipation is well controlled with a high-fiber diet MiraLax  Her last colonoscopy was in 2015 with multiple polyps removed 2 of which were tubular adenomas  Repeat was recommended 7 years after the last       Review of Systems   Constitutional: Negative for activity change, appetite change, chills, fatigue, fever and unexpected weight change  HENT: Positive for trouble swallowing  Negative for sore throat  Respiratory: Negative for cough and choking  Gastrointestinal: Positive for constipation  Negative for abdominal distention, abdominal pain, anal bleeding, blood in stool, diarrhea, nausea, rectal pain and vomiting  Musculoskeletal: Negative for back pain and gait problem  Psychiatric/Behavioral: Negative for confusion         Past Medical History  Past Medical History:   Diagnosis Date    Achalasia     Asthma     Constipation     Depression     Diarrhea 2/28/2019    Elevated LFTs     last assessed 10/25/17    Fibromyalgia     Gastric ulcer     GERD (gastroesophageal reflux disease)     H  pylori infection 10/9/2017    [facility] [id number]: Treated, f/u stool antigen negative    Hypothyroidism     Kidney stone     Migraines     Migraines     MICHAEL (obstructive sleep apnea)     last assessed 7/21/16    Pneumonia     Sleep apnea 2019    Uses CPAP    Thyroid nodule     last assessed 17       Past Social history  Past Surgical History:   Procedure Laterality Date    BREAST BIOPSY Right 10/31/2017    us bx     SECTION      COLONOSCOPY      ENDOMETRIAL ABLATION      MYOTOMY HELLER LAPAROSCOPIC  2016    Dr Milana Iqbal, Coincident lorena fundoplication    NASAL SINUS SURGERY      OTHER SURGICAL HISTORY      achalasia repair    OVARIAN CYST REMOVAL      PILONIDAL CYST / SINUS EXCISION      ND COLONOSCOPY FLX DX W/COLLJ SPEC WHEN PFRMD N/A 2019    Procedure: COLONOSCOPY;  Surgeon: Jefferson Fernando MD;  [facility]; Service: Gastroenterology    ND CYSTOURETHROSCOPY N/A 2017    Procedure: Chris Gebc;  Surgeon: Ailyn Hernandez MD;  [facility];  Service: UroGynecology           ND ESOPHAGOGASTRODUODENOSCOPY TRANSORAL DIAGNOSTIC N/A 2017    Procedure: ESOPHAGOGASTRODUODENOSCOPY (EGD); Surgeon: Jefferson Fernando MD;  [facility]; Service: Gastroenterology    ND ESOPHAGOGASTRODUODENOSCOPY TRANSORAL DIAGNOSTIC N/A 2019    Procedure: ESOPHAGOGASTRODUODENOSCOPY (EGD); Surgeon: Jefferson Fernando MD;  [facility];   Service: Gastroenterology    ND POST COLPORRHAPHY,RECTUM/VAGINA N/A 2017    Procedure: COLPORRHAPHY POSTERIOR;  Surgeon: Ailyn Hernandez MD;  [facility];  Service: UroGynecology           ND SLING OPER STRES INCONTINENCE N/A 2017    Procedure: INSERTION PUBOVAGINAL SLING /SINGLE INCISION ;  Surgeon: Ailyn Hernandez MD;  [facility];  Service: UroGynecology           TONSILLECTOMY      TUBAL LIGATION      UPPER GASTROINTESTINAL ENDOSCOPY      US GUIDED BREAST BIOPSY RIGHT COMPLETE Right 10/31/2017     Social History     Socioeconomic History    Marital status: Single     Spouse name: Not on file    Number of children: 4    Years of education: Not on file    Highest education level: Not on file   Occupational History    Occupation: Disability since 2019-- due to a job injury    Tobacco Use    Smoking status: Former Smoker     Packs/day: 1 50     Years: 30 00     Pack years: 45 00     Quit date:      Years since quittin 1    Smokeless tobacco: Never Used    Tobacco comment: pt stated quit about a month ago, last assessed 14 per [employer]   Vaping Use    Vaping Use: Never used   Substance and Sexual Activity    Alcohol use: No    Drug use: Never    Sexual activity: Yes     Partners: Male     Birth control/protection: Female Sterilization     Comment: Boyfriend/father of 4 children   Other Topics Concern    Not on file   Social History Narrative    Home: Lives with boyfriend/father of her 4 children and one of their daughters        Children: 2 sons born  and ,  2 daughters born  and         Education:     Social Determinants of Health     Financial Resource Strain: Low Risk     Difficulty of Paying Living Expenses: Not hard at all   Food Insecurity: No Food Insecurity    Worried About Running Out of Food in the Last Year: Never true    Reynaldo of Food in the Last Year: Never true   Transportation Needs: No Transportation Needs    Lack of Transportation (Medical): No    Lack of Transportation (Non-Medical):  No   Physical Activity: Inactive    Days of Exercise per Week: 0 days    Minutes of Exercise per Session: 0 min   Stress: Not on file   Social Connections: Not on file   Intimate Partner Violence: Not on file   Housing Stability: Not on file     Social History     Substance and Sexual Activity   Alcohol Use No     Social History     Substance and Sexual Activity   Drug Use Never     Social History     Tobacco Use   Smoking Status Former Smoker    Packs/day: 1 50    Years: 30 00    Pack years: 45 00    Quit date:     Years since quittin 1   Smokeless Tobacco Never Used   Tobacco Comment    pt stated quit about a month ago, last assessed 14 per [employer]       Past Family History  Family History   Problem Relation Age of Onset    Cancer Mother     Breast cancer Mother 62    Hypertension Mother     Stroke Father     Diabetes Father     Hypertension Father     Alzheimer's disease Father     Cancer Maternal Grandmother         ovarian    Ovarian cancer Maternal Grandmother         unspecified laterality    Cancer Maternal Grandfather     Hypertension Maternal Grandfather     Tongue cancer Maternal Grandfather     Stroke Paternal Grandmother     Thyroid disease unspecified Brother     Depression Brother     Anxiety disorder Brother     Drug abuse Brother         (pain pills)    Thyroid disease unspecified Maternal Aunt     Colon cancer Maternal Aunt     Breast cancer Family     Diabetes Sister     BRCA1 Negative Sister     BRCA2 Negative Sister     No Known Problems Daughter     No Known Problems Son     No Known Problems Daughter     No Known Problems Son     No Known Problems Maternal Aunt     No Known Problems Paternal Aunt     No Known Problems Paternal Aunt     No Known Problems Paternal Aunt     No Known Problems Paternal Aunt     No Known Problems Paternal Aunt     No Known Problems Paternal Aunt     No Known Problems Maternal Aunt     Alzheimer's disease Cousin     Breast cancer Cousin 42       Current Medications  Current Outpatient Medications   Medication Sig Dispense Refill    acetaminophen (TYLENOL) 325 mg tablet Every 4 hours as needed 30 tablet 0    Alaway 0 025 % ophthalmic solution Administer 1 drop to both eyes 2 (two) times a day as needed (For discomfort) 5 mL 0    albuterol (2 5 mg/3 mL) 0 083 % nebulizer solution Take 3 mL (2 5 mg total) by nebulization every 6 (six) hours as needed for wheezing or shortness of breath 75 mL 0    albuterol (PROVENTIL HFA,VENTOLIN HFA) 90 mcg/act inhaler Inhale 2 puffs every 4 (four) hours as needed for wheezing or shortness of breath 18 g 1    Alcohol Swabs (Alcohol Prep) 70 % PADS Use daily 200 each 2    Alcohol Swabs (Alcohol Prep) PADS Use 3 (three) times a day Use to test blood sugar 3 times a day 100 each 5    betamethasone dipropionate (DIPROSONE) 0 05 % cream APPLY TO AFFECTED AREA EVERY DAY 30 g 0    bismuth subsalicylate (PEPTO BISMOL) 262 MG chewable tablet Chew 2 tablets      Botulinum Toxin Type A 200 units SOLR Inject 155 units into face and neck IM every 90 days      BREO ELLIPTA 100-25 MCG/INH inhaler Inhale 1 puff daily 1 puff daily 1 Inhaler 3    cetirizine (ZyrTEC) 10 mg tablet Take 1 tablet (10 mg total) by mouth daily 30 tablet 3    cholecalciferol (VITAMIN D3) 1,000 units tablet Take 1 tablet (1,000 Units total) by mouth daily 90 tablet 0    diclofenac sodium (VOLTAREN) 1 % APPLY 4 GRAMS TO AFFECTED AREA(S) THREE TIMES A DAY  3    dicyclomine (BENTYL) 20 mg tablet Take 1 tablet (20 mg total) by mouth every 6 (six) hours as needed (pain) 360 tablet 0    FLUCELVAX QUADRIVALENT  (Patient not taking: Reported on 11/17/2021 )      FLUoxetine (PROzac) 20 mg capsule Take 1 capsule (20 mg total) by mouth daily 90 capsule 0    fluticasone (FLONASE) 50 mcg/act nasal spray 1-2 sprays into each nostril daily 16 g 6    FREESTYLE LITE test strip USE AS DIRECTED 100 strip 8    furosemide (LASIX) 20 mg tablet Take 1 tablet (20 mg total) by mouth daily as needed (leg swelling) 30 tablet 2    glucose monitoring kit (FREESTYLE) monitoring kit Use 1 each as needed (prediabetes blood sugar checks) 1 each 0    hydrOXYzine HCL (ATARAX) 50 mg tablet TAKE 1/2 OR 1 TABLET BY MOUTH THREE TIMES A DAY AS NEEDED FOR ANXIETY OR INSOMNIA 270 tablet 0    ibuprofen (MOTRIN) 800 mg tablet Take 1 tablet (800 mg total) by mouth every 8 (eight) hours as needed for mild pain 30 tablet 0    ipratropium (ATROVENT) 0 02 % nebulizer solution       lamoTRIgine (LaMICtal) 25 mg tablet Take 2 tablets (50 mg total) by mouth daily at bedtime 180 tablet 0    Lancets (freestyle) lancets Use as instructed 100 each 3    levothyroxine 125 mcg tablet Take 1 tablet (125 mcg total) by mouth daily 90 tablet 1    meloxicam (MOBIC) 15 mg tablet Take 1 tablet (15 mg total) by mouth daily 30 tablet 6    metFORMIN (GLUCOPHAGE-XR) 500 mg 24 hr tablet TAKE 2 TABLETS (1,000 MG TOTAL) BY MOUTH EVERY MORNING 180 tablet 0    methocarbamol (ROBAXIN) 750 mg tablet Take 750 mg by mouth 3 (three) times a day  0    methylPREDNISolone 4 MG tablet therapy pack Use as directed on package 1 each 0    mometasone (ELOCON) 0 1 % cream Apply topically daily 45 g 0    montelukast (SINGULAIR) 10 mg tablet Take 10 mg by mouth daily  6    omeprazole (PriLOSEC) 40 MG capsule Take 1 tablet by mouth 30 minutes prior to meals (breakfast and dinner) 28 capsule 0    polyethylene glycol (GLYCOLAX) powder Take 17 g by mouth daily Mix 1 capful in 8 ounces of water, juice or tea  578 g 3    SUMAtriptan (IMITREX) 100 mg tablet Take 1 tablet (100 mg total) by mouth once as needed for migraine 10 tablet 1    tiZANidine (ZANAFLEX) 4 mg tablet Take 4 mg by mouth daily at bedtime as needed      traMADol (ULTRAM) 50 mg tablet TAKE 1 TABLET BY MOUTH EVERY DAY AS NEEDED FOR 15 DAYS (Patient not taking: Reported on 3/4/2022)       No current facility-administered medications for this visit  Allergies  Allergies   Allergen Reactions    Gabapentin      Annotation - [id number]: Neuropsych effects per patient   Latex Dermatitis    Penicillins Hives         The following portions of the patient's history were reviewed and updated as appropriate: allergies, current medications, past medical history, past social history, past surgical history and problem list       Vitals  There were no vitals filed for this visit  Physical Exam  Constitutional   General appearance: Patient is seated and in no acute distress, well appearing and well nourished  Head and Face   Head and face: Normal     Eyes   Conjunctiva and lids: No erythema, swelling or discharge  Anicteric  Ears, Nose, Mouth, and Throat   Hearing: Normal     Neck: Supple, trachea midline  Pulmonary   Respiratory effort: No increased work of breathing or signs of respiratory distress  Lungs: Clear to ascultation, no wheezes, rhonchi, or rales  Cardiovascular   Heart: Regular rate and rhythm, no murmurs gallops or rubs   Examination of extremities for edema and/or varicosities: Normal     Abdomen   Abdomen: Soft, non-tender, no masses, no organomegaly  Normal bowel sounds  Musculoskeletal   Gait and station: Normal     Skin   Skin and subcutaneous tissue: Warm, dry, and intact  No visible jaundice, lesions or rashes  Psychiatric   Judgment and insight: Normal  Recent and remote memory:  Normal  Mood and affect: Normal       Results  No visits with results within 1 Day(s) from this visit     Latest known visit with results is:   Office Visit on 03/04/2022   Component Date Value    LEUKOCYTE ESTERASE,UA 03/04/2022 -     NITRITE,UA 03/04/2022 -     SL AMB POCT UROBILINOGEN 03/04/2022 0 2     POCT URINE PROTEIN 03/04/2022 -      PH,UA 03/04/2022 6 0     BLOOD,UA 03/04/2022 -     SPECIFIC GRAVITY,UA 03/04/2022 1 30     KETONES,UA 03/04/2022 -     BILIRUBIN,UA 03/04/2022 -     GLUCOSE, UA 03/04/2022 -      COLOR,UA 03/04/2022 yellow     CLARITY,UA 03/04/2022 clear     Color, UA 03/04/2022 Light Yellow     Clarity, UA 03/04/2022 Clear     Specific Gravity, UA 03/04/2022 1 027     pH, UA 03/04/2022 6 0     Leukocytes, UA 03/04/2022 Negative     Nitrite, UA 03/04/2022 Negative     Protein, UA 03/04/2022 Negative     Glucose, UA 03/04/2022 Negative     Ketones, UA 03/04/2022 Negative     Urobilinogen, UA 03/04/2022 <2 0     Bilirubin, UA 03/04/2022 Negative     Blood, UA 03/04/2022 Negative     RBC, UA 03/04/2022 2-4*    WBC, UA 03/04/2022 1-2     Epithelial Cells 03/04/2022 Occasional     Bacteria, UA 03/04/2022 None Seen     Urine Culture 03/04/2022 >100,000 cfu/ml Lactobacillus species*    Urine Culture 03/04/2022 <10,000 cfu/ml         Radiology Results  No results found  Orders  No orders of the defined types were placed in this encounter

## 2022-03-14 DIAGNOSIS — E55.9 VITAMIN D DEFICIENCY: ICD-10-CM

## 2022-03-14 RX ORDER — MELATONIN
1000 DAILY
Qty: 90 TABLET | Refills: 0 | Status: SHIPPED | OUTPATIENT
Start: 2022-03-14 | End: 2022-06-16 | Stop reason: SDUPTHER

## 2022-03-16 DIAGNOSIS — K58.0 IRRITABLE BOWEL SYNDROME WITH DIARRHEA: ICD-10-CM

## 2022-03-16 RX ORDER — DICYCLOMINE HCL 20 MG
20 TABLET ORAL EVERY 6 HOURS PRN
Qty: 360 TABLET | Refills: 0 | Status: SHIPPED | OUTPATIENT
Start: 2022-03-16 | End: 2022-06-13 | Stop reason: SDUPTHER

## 2022-03-16 NOTE — PSYCH
MEDICATION MANAGEMENT NOTE        [facility]      Name and Date of Birth:  Michael Castaneda 49 y o  1965    Date of Visit: March 21, 2022    HPI:    Michael Castaneda is here for medication review with primary c/o/statement of feeling:  "Very good" and she voices no present complaints  Pt enjoyed her holidays and stayed home with family  She still attends [employer] weekly --talks to [employer]" but keeps her interactions at a level of being a friendly acquaintance  She does not trust people, but also states she does not have time for friends, that she has too much to do at home  She states she "Loves to clean" and spends every day cleaning, doing the cooking for herself and her daughter, and taking care of the dog, and praying  She last had anxiety in approx 10/2021  She is s/p Tx for a UTI and presently denies any urinary Sxs  Pt presently denies depression, SI, HI, anxiety, panic attacks, or manic or psychotic Sxs  Pt reports compliance to psychiatric medications without SE  Appetite Changes and Sleep: Sleeps "Good" 4-5 hrs and that's "All I need", normal appetite, normal energy level    Review Of Systems:      Constitutional negative   ENT negative   Cardiovascular negative   Respiratory negative   Gastrointestinal negative   Genitourinary negative   Musculoskeletal negative   Integumentary negative   Neurological negative   Endocrine negative   Other Symptoms none, all other systems are negative       Past Psychiatric History:   As copied from my 1/24/2022 note with updates as needed:  "  [ Pt born in [place] and came to the [facility] with her family in approx 1989 and grew up with biological parents, 1 year younger sister and a younger brother   Pt describes her upbringing as "Good, good dad, good mom  Malia Naranjo were raised with [employer]  "   Everyone in the family got along and Pt has positive memories of growing up with them      Depression started in 2010 at work when she sustained a life changing back injury, for which she became permanently disabled  Brendon Scales: sadness, crying, self-isolating, anhedonia, less motivated, feelings of hopelessness and helplessness   But no SI                    Manic Sxs:  decreased need for sleep , rapid speech  and flight of ideas/racing thoughts  Racy thoughts, elevated energy     Anxiety started in 2010 due to the work injury:  excessive worry more days than not for longer than 3 months and The Sxs can occur without concommittent depressive Sxs   No other Sxs of BRIGITTE      Panic attacks started in late 8/2020 initially triggered by driving through a town she was not familiar with  Oradrian Constantin has had a few in total with Sxs of:  Moderate anxiety, sweaty hands,  palpitations/racing heart, trembling, shortness of breath       Social Anxiety symptoms: no symptoms suggestive of social anxiety Eating Disorder symptoms: no historical or current eating disorder  no binge eating disorder; no anorexia nervosa  no symptoms of bulimia      OCD type Sxs: checking door locks, windows, stove, pipes in the basement (which used to leak)   Cleans often, can be very fastidious and wash her hands excessively        Pt denies any h/o PTSD or psychotic Sxs      Prior psychiatrists:  [facility] from approx 2014 - 11/2019 --stopped going due to change in insurance     Prior psychotherapists:   First therapist was at [facility] [address] in [facility] and he left so she only saw him for a year  Second one was seen from [phone] at [address]  Pt denied h/o SI, HI, self-injurious behaviors, violent behaviors, psychiatric hospitalizations, ECT, legal or  Hx     Prior Rx trials: Viibryd 40mg (helped), Bupropion ER/XL 150mg (helped), Aripiprazole 5mg (helped but caused Wt gain), Ziprasidone 40mg (heart palpitations), Gabapentin 300mg (agitated),Topamax (for migraines but stopped due to ineffectiveness and her h/o nephrolithiasis), Hydroxyzine 50mg (sedating), Doxepin up to 50mg (for sleep--oversedating even at 25mg), Clonazepam 0 5mg--??used/helped       Abuse Hx:  Pt denies any h/o sexual, physical, or emotional abuse     Trauma Hx:  When she lost her job after her work injury                   ] "      Past Medical History:    Past Medical History:   Diagnosis Date    Achalasia     Asthma     Constipation     Depression     Diarrhea 2019    Elevated LFTs     last assessed 10/25/17    Fibromyalgia     Gastric ulcer     GERD (gastroesophageal reflux disease)     H  pylori infection 10/9/2017    [facility] [id number]: Treated, f/u stool antigen negative    Hypothyroidism     Kidney stone     Migraines     Migraines     MICHAEL (obstructive sleep apnea)     last assessed 16    Pneumonia     Sleep apnea 2019    Uses CPAP    Thyroid nodule     last assessed 17       Substance Abuse History:    Social History     Substance and Sexual Activity   Alcohol Use No     Social History     Substance and Sexual Activity   Drug Use Never       Social History:    Social History     Socioeconomic History    Marital status: Single     Spouse name: Not on file    Number of children: 3    Years of education: Not on file    Highest education level: Not on file   Occupational History    Occupation: Disability since 2019-- due to a job injury    Tobacco Use    Smoking status: Former Smoker     Packs/day: 1 50     Years: 30 00     Pack years: 45 00     Quit date:      Years since quittin 2    Smokeless tobacco: Never Used    Tobacco comment: pt stated quit about a month ago, last assessed 14 per [employer]   Vaping Use    Vaping Use: Never used   Substance and Sexual Activity    Alcohol use: No    Drug use: Never    Sexual activity: Yes     Partners: Male     Birth control/protection: Female Sterilization     Comment: Boyfriend/father of 4 children   Other Topics Concern    Not on file   Social History Narrative Home: Lives with boyfriend/father of her 4 children and one of their daughters        Children: 2 sons born 80 and 18,  2 daughters born 0 and 2003        Education:     Social Determinants of Health     Financial Resource Strain: Low Risk     Difficulty of Paying Living Expenses: Not hard at all   Food Insecurity: No Food Insecurity    Worried About Running Out of Food in the Last Year: Never true    Reynaldo of Food in the Last Year: Never true   Transportation Needs: No Transportation Needs    Lack of Transportation (Medical): No    Lack of Transportation (Non-Medical):  No   Physical Activity: Inactive    Days of Exercise per Week: 0 days    Minutes of Exercise per Session: 0 min   Stress: Not on file   Social Connections: Not on file   Intimate Partner Violence: Not on file   Housing Stability: Not on file       Family Psychiatric History:     Family History   Problem Relation Age of Onset    Cancer Mother     Breast cancer Mother 62    Hypertension Mother     Stroke Father     Diabetes Father     Hypertension Father     Alzheimer's disease Father     Cancer Maternal Grandmother         ovarian    Ovarian cancer Maternal Grandmother         unspecified laterality    Cancer Maternal Grandfather     Hypertension Maternal Grandfather     Tongue cancer Maternal Grandfather     Stroke Paternal Grandmother     Thyroid disease unspecified Brother     Depression Brother     Anxiety disorder Brother     Drug abuse Brother         (pain pills)    Thyroid disease unspecified Maternal Aunt     Colon cancer Maternal Aunt     Breast cancer Family     Diabetes Sister     BRCA1 Negative Sister     BRCA2 Negative Sister     No Known Problems Daughter     No Known Problems Son     No Known Problems Daughter     No Known Problems Son     No Known Problems Maternal Aunt     No Known Problems Paternal Aunt     No Known Problems Paternal Aunt     No Known Problems Paternal Aunt     No Known Problems Paternal Aunt     No Known Problems Paternal Aunt     No Known Problems Paternal Aunt     No Known Problems Maternal Aunt     Alzheimer's disease Cousin     Breast cancer Cousin 43       History Review: The following portions of the patient's history were reviewed and updated as appropriate: allergies, current medications, past family history, past medical history, past social history, past surgical history and problem list          OBJECTIVE:     Mental Status Evaluation:    Appearance Casually dressed, good eye contact and hygiene   Behavior Calm, cooperative, pleasant, with a mild edge   Speech Clear, normal rate and volume   Mood Generally euthymic with a mild edginess   Affect Appears reactive   Thought Processes Organized, goal directed   Associations intact associations   Thought Content No delusions   Perceptual Disturbances: Pt denies any form of hallucinations and does not appear to be responding to internal stimuli   Abnormal Thoughts  Risk Potential Suicidal ideation - None  Homicidal ideation - None  Potential for aggression - No   Orientation oriented to person, place, situation, day of week, date, month of year and year   Memory short term memory grossly intact   Cosciousness alert and awake   Attention Span attention span and concentration are age appropriate   Intellect appears to be of average intelligence   Insight fair   Judgement fair   Muscle Strength and  Gait normal gait and normal balance   Language no difficulty naming common objects, no difficulty repeating a phrase   Fund of Knowledge adequate knowledge of current events  adequate fund of knowledge regarding past history  adequate fund of knowledge regarding vocabulary    Pain none   Pain Scale 0       Laboratory Results:   I have personally reviewed all pertinent laboratory/tests results    Most Recent Labs:   Lab Results   Component Value Date    WBC 9 19 06/09/2021    RBC 4 75 06/09/2021    HGB 13 4 06/09/2021    HCT 40 9 06/09/2021     06/09/2021    RDW 13 8 06/09/2021    NEUTROABS 7 34 06/09/2021    SODIUM 136 06/09/2021    K 4 1 06/09/2021     06/09/2021    CO2 25 06/09/2021    BUN 12 06/09/2021    CREATININE 0 51 (L) 06/09/2021    GLUC 86 07/21/2016    GLUF 100 (H) 06/09/2021    CALCIUM 9 2 06/09/2021    AST 14 06/09/2021    ALT 27 06/09/2021    ALKPHOS 114 06/09/2021    TP 7 6 06/09/2021    ALB 4 1 06/09/2021    TBILI 0 54 06/09/2021    CHOLESTEROL 218 (H) 06/09/2021    HDL 44 06/09/2021    TRIG 129 06/09/2021    LDLCALC 148 (H) 06/09/2021    NONHDLC 127 01/20/2020    URH1HQTMSTTV 0 294 (L) 11/17/2021    FREET4 1 44 11/17/2021    HGBA1C 5 5 06/09/2021     06/09/2021     Urinalysis   Lab Results   Component Value Date    COLORU Light Yellow 03/04/2022    CLARITYU Clear 03/04/2022    SPECGRAV 1 027 03/04/2022    PHUR 6 0 03/04/2022    LEUKOCYTESUR Negative 03/04/2022    NITRITE Negative 03/04/2022    PROTEIN UA Negative 03/04/2022    GLUCOSEU Negative 03/04/2022    KETONESU Negative 03/04/2022    UROBILINOGEN <2 0 03/04/2022    BILIRUBINUR Negative 03/04/2022    BLOODU Negative 03/04/2022    RBCUA 2-4 (A) 03/04/2022    WBCUA 1-2 03/04/2022    EPIS Occasional 03/04/2022    BACTERIA None Seen 03/04/2022     Urine Culture   Lab Results   Component Value Date    URINECX >100,000 cfu/ml Lactobacillus species (A) 03/04/2022    URINECX <10,000 cfu/ml  03/04/2022       Assessment/plan:       Diagnoses and all orders for this visit:    Bipolar 2 disorder ([facility] [address] )  -     FLUoxetine (PROzac) 20 mg capsule; Take 1 capsule (20 mg total) by mouth daily  -     lamoTRIgine (LaMICtal) 100 mg tablet; Take 0 5 tablets (50 mg total) by mouth daily at bedtime    Anxiety  -     FLUoxetine (PROzac) 20 mg capsule;  Take 1 capsule (20 mg total) by mouth daily  -     hydrOXYzine HCL (ATARAX) 50 mg tablet; TAKE 1/2 OR 1 TABLET BY MOUTH THREE TIMES A DAY AS NEEDED FOR ANXIETY OR INSOMNIA    Insomnia, unspecified type  -     hydrOXYzine HCL (ATARAX) 50 mg tablet; TAKE 1/2 OR 1 TABLET BY MOUTH THREE TIMES A DAY AS NEEDED FOR ANXIETY OR INSOMNIA          PLAN:  Pt has no complaints and appears stable  Continue the present regimen unchanged  PCP is managing her thyroid  Reviewed that Hydroxyzine is an antihistamine and she should not take others such as Cetirizine simultaneously  Pt verbalized understanding  Tx plan due within the next few visits  Continue:  Lamotrigine 100mg (1/2) tabs po qhs # 45   Fluoxetine 20mg (1) cap po qd # 90  Hydroxyzine 50mg (1/2-1) tab po tid prn anxiety or insomnia # 270--Pt states she needs a new Rx at this time   Melatonin 5mg --Pt gets OTC  Vit D --per PCP  Return 11 weeks, call sooner prn      Risks/Benefits      Risks, Benefits And Possible Side Effects Of Medications:    Risks, benefits, and possible side effects of medications explained to Xu Leone and she verbalizes understanding and agreement for treatment

## 2022-03-21 ENCOUNTER — OFFICE VISIT (OUTPATIENT)
Dept: PSYCHIATRY | Facility: CLINIC | Age: 57
End: 2022-03-21
Payer: MEDICARE

## 2022-03-21 DIAGNOSIS — F31.81 BIPOLAR 2 DISORDER (HCC): Primary | ICD-10-CM

## 2022-03-21 DIAGNOSIS — F41.9 ANXIETY: ICD-10-CM

## 2022-03-21 DIAGNOSIS — G47.00 INSOMNIA, UNSPECIFIED TYPE: ICD-10-CM

## 2022-03-21 PROCEDURE — 99213 OFFICE O/P EST LOW 20 MIN: CPT | Performed by: PHYSICIAN ASSISTANT

## 2022-03-21 RX ORDER — HYDROXYZINE 50 MG/1
TABLET, FILM COATED ORAL
Qty: 270 TABLET | Refills: 0 | Status: SHIPPED | OUTPATIENT
Start: 2022-03-21 | End: 2022-06-06 | Stop reason: SDUPTHER

## 2022-03-21 RX ORDER — LAMOTRIGINE 100 MG/1
50 TABLET ORAL
Qty: 45 TABLET | Refills: 0 | Status: SHIPPED | OUTPATIENT
Start: 2022-03-21 | End: 2022-06-06 | Stop reason: SDUPTHER

## 2022-03-21 RX ORDER — FLUOXETINE HYDROCHLORIDE 20 MG/1
20 CAPSULE ORAL DAILY
Qty: 90 CAPSULE | Refills: 0 | Status: SHIPPED | OUTPATIENT
Start: 2022-03-21 | End: 2022-06-06 | Stop reason: SDUPTHER

## 2022-03-22 ENCOUNTER — HOSPITAL ENCOUNTER (OUTPATIENT)
Dept: RADIOLOGY | Facility: HOSPITAL | Age: 57
Discharge: HOME | End: 2022-03-22
Payer: MEDICARE

## 2022-03-22 DIAGNOSIS — M54.12 RADICULOPATHY, CERVICAL REGION: ICD-10-CM

## 2022-03-22 PROCEDURE — 72141 MRI NECK SPINE W/O DYE: CPT

## 2022-03-23 ENCOUNTER — HOSPITAL ENCOUNTER (OUTPATIENT)
Dept: RADIOLOGY | Facility: HOSPITAL | Age: 57
Discharge: HOME | End: 2022-03-23
Payer: MEDICARE

## 2022-03-23 DIAGNOSIS — N20.0 LEFT NEPHROLITHIASIS: ICD-10-CM

## 2022-03-23 PROCEDURE — G1004 CDSM NDSC: HCPCS

## 2022-03-23 PROCEDURE — 74176 CT ABD & PELVIS W/O CONTRAST: CPT

## 2022-03-30 ENCOUNTER — TELEPHONE (OUTPATIENT)
Dept: UROLOGY | Facility: HOSPITAL | Age: 57
End: 2022-03-30

## 2022-03-30 DIAGNOSIS — N20.0 NEPHROLITHIASIS: Primary | ICD-10-CM

## 2022-03-30 NOTE — TELEPHONE ENCOUNTER
----- Message from [id number] Dominique Wiley sent at 3/29/2022  3:33 PM EDT -----  Please inform patient results of CT scan identified bilateral nonobstructing renal calculi  Largest stone measuring 6 millimeter in left kidney  No evidence of hydronephrosis  If patient symptomatic, would recommend ESWL versus ureteroscopy  If patient interested in ESWL would recommend KUB

## 2022-03-31 NOTE — TELEPHONE ENCOUNTER
Will await results of KUB to determine if stone is visualized on image, and patient a candidate for ESWL

## 2022-04-04 ENCOUNTER — HOSPITAL ENCOUNTER (OUTPATIENT)
Dept: RADIOLOGY | Facility: HOSPITAL | Age: 57
Discharge: HOME | End: 2022-04-04
Payer: MEDICARE

## 2022-04-04 DIAGNOSIS — N20.0 NEPHROLITHIASIS: ICD-10-CM

## 2022-04-04 PROCEDURE — 74018 RADEX ABDOMEN 1 VIEW: CPT

## 2022-04-08 ENCOUNTER — TELEPHONE (OUTPATIENT)
Dept: GASTROENTEROLOGY | Facility: CLINIC | Age: 57
End: 2022-04-08

## 2022-04-08 NOTE — TELEPHONE ENCOUNTER
Called and spoke to [facility] - she said they have everything they need now- she said the faxes came in at different times and she has sent it off for medical review

## 2022-04-08 NOTE — TELEPHONE ENCOUNTER
Patients GI provider:  Dr Darcie Thornton    Number to return call: [phone] follow prompts for provider then pharmacy    Reason for call: Jen Barlow  from [facility] called stating they received a prior auth for Botox for pt's upcoming procedure but requires more info to process  She is requesting the strength, quantity, duration, diagnosis ICD code and needs to know if pt has used botox with in the year at all  Info is needed by April 10th  Above is her number       Scheduled procedure/appointment date if applicable: procedure 1/4/28

## 2022-04-10 NOTE — TELEPHONE ENCOUNTER
KUB noted 4mm nonobstructing left lower pole calculus  Would recommend observation given small size of stone on KUB

## 2022-04-11 DIAGNOSIS — E03.9 HYPOTHYROIDISM, UNSPECIFIED TYPE: ICD-10-CM

## 2022-04-11 RX ORDER — LEVOTHYROXINE SODIUM 0.12 MG/1
125 TABLET ORAL DAILY
Qty: 90 TABLET | Refills: 1 | Status: SHIPPED | OUTPATIENT
Start: 2022-04-11 | End: 2022-04-28 | Stop reason: SDUPTHER

## 2022-04-11 NOTE — TELEPHONE ENCOUNTER
Called and spoke with patient  Advised on results of KUB   Patient would like to move forward with surgery

## 2022-04-12 NOTE — TELEPHONE ENCOUNTER
Patient called back to discuss surgery  Patient can be reached at [phone]  She stated that a message can be left

## 2022-04-13 ENCOUNTER — PREP FOR PROCEDURE (OUTPATIENT)
Dept: UROLOGY | Facility: HOSPITAL | Age: 57
End: 2022-04-13

## 2022-04-13 DIAGNOSIS — N20.0 NEPHROLITHIASIS: Primary | ICD-10-CM

## 2022-04-13 NOTE — TELEPHONE ENCOUNTER
Called patient to review results of recent imaging and discuss her symptoms  She continues to report constant left-sided flank pain which is only relieved with Tylenol  She also reports increased urinary frequency  Patient describes as a constant ache  She denies any gross hematuria, dysuria, fever, chills, or vomiting  Patient does report nausea, but states she has stomach issues "  Her recent KUB identified a 4 millimeter nonobstructing stone, however prior CT identified 6 millimeter left mid pole calculus  No evidence of obstruction or hydronephrosis  She is adamantly requesting surgical intervention  Based on size of stone, would not recommend ESWL, but ureteroscopy  She is amenable with this plan  She states she wants surgery performed ASAP  Informed consent was provided including risks and benefits  Will place case requested send message to surgery coordinator  Reviewed ER precautions  Patient verbalizes understanding and all questions answered

## 2022-04-18 NOTE — TELEPHONE ENCOUNTER
Winter Cummings, just following up with you, can you please advise which procedure patient is to have?

## 2022-04-19 ENCOUNTER — TELEPHONE (OUTPATIENT)
Dept: UROLOGY | Facility: CLINIC | Age: 57
End: 2022-04-19

## 2022-04-19 NOTE — TELEPHONE ENCOUNTER
Called patient and left a voicemail for patient to call me back to schedule their surgery  Patient to call me at [phone]      Holding 05/06 at [facility] with Kelly Singleton

## 2022-04-19 NOTE — TELEPHONE ENCOUNTER
Apologizes for any confusion, we had initially discussed ureteroscopy, as patient wished to have surgical intervention, however after review from Dr Singh Queen, he recommends ESWL or observation, given small size of stone  Attempted to call patient to discuss ESWL, but voicemail left to call office back

## 2022-04-19 NOTE — TELEPHONE ENCOUNTER
Called patient and left a voicemail for patient to call me back to schedule their surgery  Patient to call me at [phone]       Holding 05/06 at [facility] with Thad Beebe

## 2022-04-20 NOTE — TELEPHONE ENCOUNTER
I returned the patients call and reached her voice mail  I left a message to advise that Rhae Leak will be unexpectedly out of the office until Monday 4/25/2022  I did ask the patient to call back and confirm if the 5/6/2022 date at [facility] with Dr Oscar Luciano works for her

## 2022-04-21 NOTE — TELEPHONE ENCOUNTER
Patient returning call regarding surgery  May 6th is not good for her  Please call [phone] to schuyler

## 2022-04-25 NOTE — TELEPHONE ENCOUNTER
I called pt to inform her that Mikie Oropeza has been out of the office and to see if I could help look for a new surgical date  There was no answer so I did leave a voicemail asking pt to call our office back and either Mikie Oropeza or myself can help her

## 2022-04-26 NOTE — TELEPHONE ENCOUNTER
Patient called back and stated that 05/26/22 would be good       Patient can be reached [phone]

## 2022-04-26 NOTE — TELEPHONE ENCOUNTER
Called patient and left message to call back to schedule sx, I am holding 05/26 at [facility] with/ Dr Gloria Leonard  Patient to call back to confirm this date will work [phone]

## 2022-04-28 ENCOUNTER — PREP FOR PROCEDURE (OUTPATIENT)
Dept: UROLOGY | Facility: CLINIC | Age: 57
End: 2022-04-28

## 2022-04-28 ENCOUNTER — OFFICE VISIT (OUTPATIENT)
Dept: INTERNAL MEDICINE CLINIC | Facility: CLINIC | Age: 57
End: 2022-04-28

## 2022-04-28 ENCOUNTER — TELEPHONE (OUTPATIENT)
Dept: GASTROENTEROLOGY | Facility: CLINIC | Age: 57
End: 2022-04-28

## 2022-04-28 VITALS
DIASTOLIC BLOOD PRESSURE: 84 MMHG | RESPIRATION RATE: 16 BRPM | OXYGEN SATURATION: 98 % | WEIGHT: 221 LBS | SYSTOLIC BLOOD PRESSURE: 131 MMHG | TEMPERATURE: 98 F | BODY MASS INDEX: 34.69 KG/M2 | HEIGHT: 67 IN | HEART RATE: 86 BPM

## 2022-04-28 DIAGNOSIS — N20.0 NEPHROLITHIASIS: Primary | ICD-10-CM

## 2022-04-28 DIAGNOSIS — E03.9 HYPOTHYROIDISM, UNSPECIFIED TYPE: ICD-10-CM

## 2022-04-28 DIAGNOSIS — M79.89 LEG SWELLING: ICD-10-CM

## 2022-04-28 DIAGNOSIS — R10.30 LOWER ABDOMINAL PAIN: Primary | ICD-10-CM

## 2022-04-28 DIAGNOSIS — J45.40 MODERATE PERSISTENT ASTHMA WITHOUT COMPLICATION: ICD-10-CM

## 2022-04-28 PROBLEM — L72.3 SEBACEOUS CYST OF RIGHT AXILLA: Status: ACTIVE | Noted: 2021-02-15

## 2022-04-28 PROCEDURE — 99213 OFFICE O/P EST LOW 20 MIN: CPT | Performed by: INTERNAL MEDICINE

## 2022-04-28 RX ORDER — FLUTICASONE FUROATE AND VILANTEROL TRIFENATATE 100; 25 UG/1; UG/1
1 POWDER RESPIRATORY (INHALATION) DAILY
Qty: 60 BLISTER | Refills: 2 | Status: SHIPPED | OUTPATIENT
Start: 2022-04-28 | End: 2022-07-27

## 2022-04-28 RX ORDER — LIDOCAINE 40 MG/G
CREAM TOPICAL
COMMUNITY
Start: 2022-03-16

## 2022-04-28 RX ORDER — LEVOTHYROXINE SODIUM 0.12 MG/1
125 TABLET ORAL DAILY
Qty: 90 TABLET | Refills: 1 | Status: SHIPPED | OUTPATIENT
Start: 2022-04-28

## 2022-04-28 RX ORDER — ALBUTEROL SULFATE 90 UG/1
2 AEROSOL, METERED RESPIRATORY (INHALATION) EVERY 4 HOURS PRN
Qty: 18 G | Refills: 1 | Status: SHIPPED | OUTPATIENT
Start: 2022-04-28

## 2022-04-28 RX ORDER — ALBUTEROL SULFATE 2.5 MG/3ML
2.5 SOLUTION RESPIRATORY (INHALATION) EVERY 6 HOURS PRN
Qty: 75 ML | Refills: 0 | Status: SHIPPED | OUTPATIENT
Start: 2022-04-28

## 2022-04-28 RX ORDER — FUROSEMIDE 20 MG/1
20 TABLET ORAL DAILY PRN
Qty: 30 TABLET | Refills: 2 | Status: SHIPPED | OUTPATIENT
Start: 2022-04-28 | End: 2022-07-27

## 2022-04-28 NOTE — TELEPHONE ENCOUNTER
Called and spoke with patient to schedule sx  Patient informed of all PAT's needed prior to sx and advised to stop any anticoagulant medication including Multi-vitamins, Fish oil, Krill oil and NSAID, Patient has also been inform that the hospital will call the day before sx with arrival time and procedure time   Patient also informed to have a  bring them to and from hospital     Sx Date: 5/26  Location: [facility]  Physician: Germain Mcneill  H&P Date:  5/13  Clearance Date:   Consent: on admit  Pre-cert Added to  list on : 4/28    PAT's Ordered to be done on: ucx 5/12

## 2022-04-28 NOTE — PATIENT INSTRUCTIONS
Complete ultrasound  Follow-up with gynecology  Abdominal Pain   WHAT YOU NEED TO KNOW:   Abdominal pain can be dull, achy, or sharp  You may have pain in one area of your abdomen, or in your entire abdomen  Your pain may be caused by a condition such as constipation, food sensitivity or poisoning, infection, or a blockage  Abdominal pain can also be from a hernia, appendicitis, or an ulcer  Liver, gallbladder, or kidney conditions can also cause abdominal pain  The cause of your abdominal pain may not be known  DISCHARGE INSTRUCTIONS:   Call your local emergency number (911 in the [address] [facility]) if:   · You have new chest pain or shortness of breath  Return to the emergency department if:   · You have pulsing pain in your upper abdomen or lower back that suddenly becomes constant  · Your pain is in the right lower abdominal area and worsens with movement  · You have a fever over 100 4°F (38°C) or shaking chills  · You are vomiting and cannot keep food or liquids down  · Your pain does not improve or gets worse over the next 8 to 12 hours  · You see blood in your vomit or bowel movements, or they look black and tarry  · Your skin or the whites of your eyes turn yellow  · You are a woman and have a large amount of vaginal bleeding that is not your monthly period  Call your doctor if:   · You have pain in your lower back  · You are a man and have pain in your testicles  · You have pain when you urinate  · You have questions or concerns about your condition or care  Medicines:   · Prescription pain medicine  may be given  Ask your healthcare provider how to take this medicine safely  Some prescription pain medicines contain acetaminophen  Do not take other medicines that contain acetaminophen without talking to your healthcare provider  Too much acetaminophen may cause liver damage  Prescription pain medicine may cause constipation   Ask your healthcare provider how to prevent or treat constipation  · Medicines  may be given to calm your stomach or prevent vomiting  · Take your medicine as directed  Contact your healthcare provider if you think your medicine is not helping or if you have side effects  Tell him of her if you are allergic to any medicine  Keep a list of the medicines, vitamins, and herbs you take  Include the amounts, and when and why you take them  Bring the list or the pill bottles to follow-up visits  Carry your medicine list with you in case of an emergency  Manage your symptoms:   · Apply heat  on your abdomen for 20 to 30 minutes every 2 hours for as many days as directed  Heat helps decrease pain and muscle spasms  · Make changes to the food you eat, if needed  Do not eat foods that cause abdominal pain or other symptoms  Eat small meals more often  The following changes may also help:    ? Eat more high-fiber foods if you are constipated  High-fiber foods include fruits, vegetables, whole-grain foods, and legumes  ? Do not eat foods that cause gas if you have bloating  Examples include broccoli, cabbage, and cauliflower  Do not drink soda or carbonated drinks  These may also cause gas  ? Do not eat foods or drinks that contain sorbitol or fructose if you have diarrhea and bloating  Some examples are fruit juices, candy, jelly, and sugar-free gum  ? Do not eat high-fat foods  Examples include fried foods, cheeseburgers, hot dogs, and desserts  ? Limit or do not have caffeine  Caffeine may make symptoms such as heartburn or nausea worse  ? Drink more liquids to prevent dehydration from diarrhea or vomiting  Ask your healthcare provider how much liquid to drink each day and which liquids are best for you  · Keep a diary of your abdominal pain  A diary may help your healthcare provider learn what is causing your abdominal pain  Include when the pain happens, how long it lasts, and what the pain feels like   Write down any other symptoms you have with abdominal pain  Also write down what you eat, and what symptoms you have after you eat  · Manage your stress  Stress may cause abdominal pain  Your healthcare provider may recommend relaxation techniques and deep breathing exercises to help decrease your stress  Your healthcare provider may recommend you talk to someone about your stress or anxiety, such as a counselor or a trusted friend  Get plenty of sleep and exercise regularly  · Limit or do not drink alcohol  Alcohol can make your abdominal pain worse  Ask your healthcare provider if it is safe for you to drink alcohol  Also ask how much is safe for you to drink  · Do not smoke  Nicotine and other chemicals in cigarettes can damage your esophagus and stomach  Ask your healthcare provider for information if you currently smoke and need help to quit  E-cigarettes or smokeless tobacco still contain nicotine  Talk to your healthcare provider before you use these products  Follow up with your doctor within 24 hours or as directed:  Write down your questions so you remember to ask them during your visits  © [employer] 2022 Information is for End User's use only and may not be sold, redistributed or otherwise used for commercial purposes  All illustrations and images included in CareNotes® are the copyrighted property of A D A M , Inc  or [employer]  The above information is an  only  It is not intended as medical advice for individual conditions or treatments  Talk to your doctor, nurse or pharmacist before following any medical regimen to see if it is safe and effective for you

## 2022-04-28 NOTE — PROGRESS NOTES
INTERNAL MEDICINE FOLLOW-UP OFFICE VISIT  [facility]  [address] [place] [address]    NAME: Osvaldo Vasquez  AGE: 56 y o  SEX: female    DATE OF ENCOUNTER: 4/28/2022    Assessment and Plan     1  Lower abdominal pain  Two week history of bilateral lower abdominal pain  Unclear etiology  Following with Urology with plan for intervention for nephrolithiasis  No vaginal bleeding or other red flag symptoms  Also to obtain EGD and colonoscopy next week  · Ordered ultrasound of the abdomen pelvis with transvaginal few to rule out other causes   · Referral to Gynecology provided   · Follow-up in 3 months for her chronic conditions  - US abdomen and pelvis with transvaginal; Future  - Ambulatory Referral to Obstetrics / Gynecology; Future    2  Hypothyroidism, unspecified type  Stable   · Refill for levothyroxine provided  - levothyroxine 125 mcg tablet; Take 1 tablet (125 mcg total) by mouth daily  Dispense: 90 tablet; Refill: 1    3  Moderate persistent asthma without complication  Stable   · Refill for Breo Ellipta and albuterol provided  - albuterol (PROVENTIL HFA,VENTOLIN HFA) 90 mcg/act inhaler; Inhale 2 puffs every 4 (four) hours as needed for wheezing or shortness of breath  Dispense: 18 g; Refill: 1  - albuterol (2 5 mg/3 mL) 0 083 % nebulizer solution; Take 3 mL (2 5 mg total) by nebulization every 6 (six) hours as needed for wheezing or shortness of breath  Dispense: 75 mL; Refill: 0  - Breo Ellipta 100-25 MCG/INH inhaler; Inhale 1 puff daily 1 puff daily  Dispense: 60 blister; Refill: 2    4  Leg swelling  Stable   · Lasix 20 mg p r n  refill given  - furosemide (LASIX) 20 mg tablet; Take 1 tablet (20 mg total) by mouth daily as needed (leg swelling)  Dispense: 30 tablet; Refill: 2    Orders Placed This Encounter   Procedures    US abdomen and pelvis with transvaginal    Ambulatory Referral to Obstetrics / Gynecology       BMI Counseling:  Body mass index is 34 61 kg/m²  The BMI is above normal  Nutrition recommendations include decreasing portion sizes, encouraging healthy choices of fruits and vegetables and decreasing fast food intake  Exercise recommendations include moderate physical activity 150 minutes/week  No pharmacotherapy was ordered  Rationale for BMI follow-up plan is due to patient being overweight or obese  Healthcare Maintenance      Chief Complaint     Chief Complaint   Patient presents with    Abdominal Pain       History of Present Illness     61-year-old female with past medical history of GERD, IBS, hypothyroidism, obstructive sleep apnea, asthma, migraines, hypertension, bipolar disorder who was attending an office visit for acute complaint of 2 week history of lower abdominal pain  Pain is bilateral and constant, nonradiating  No associated nausea, vomiting, constipation, diarrhea, vaginal bleeding  She is postmenopausal       The following portions of the patient's history were reviewed and updated as appropriate: allergies, current medications, past family history, past medical history, past social history, past surgical history and problem list     Review of Systems       Review of Systems   Constitutional: Negative for chills and fever  HENT: Negative for congestion and sinus pressure  Respiratory: Negative for cough and shortness of breath  Cardiovascular: Negative for chest pain, palpitations and leg swelling  Gastrointestinal: Positive for abdominal pain  Negative for diarrhea, nausea and vomiting  Genitourinary: Negative for dysuria and hematuria  Musculoskeletal: Negative for arthralgias and back pain  Skin: Negative for color change and rash  Neurological: Negative for dizziness and headaches  Psychiatric/Behavioral: Negative for agitation and confusion  All other systems reviewed and are negative  All other systems reviewed and were negative      Medical History     Past Medical History: Diagnosis Date    Achalasia     Asthma     Constipation     Depression     Diarrhea 2019    Elevated LFTs     last assessed 10/25/17    Fibromyalgia     Gastric ulcer     GERD (gastroesophageal reflux disease)     H  pylori infection 10/9/2017    [facility] [id number]: Treated, f/u stool antigen negative    Hypothyroidism     Kidney stone     Migraines     Migraines     MICAHEL (obstructive sleep apnea)     last assessed 16    Pneumonia     Sleep apnea 2019    Uses CPAP    Thyroid nodule     last assessed 17     Past Surgical History:   Procedure Laterality Date    BREAST BIOPSY Right 10/31/2017    us bx     SECTION      COLONOSCOPY      ENDOMETRIAL ABLATION      MYOTOMY HELLER LAPAROSCOPIC  2016    Dr Ela Almanza, Coincident lorena fundoplication    NASAL SINUS SURGERY      OTHER SURGICAL HISTORY      achalasia repair    OVARIAN CYST REMOVAL      PILONIDAL CYST / SINUS EXCISION      SD COLONOSCOPY FLX DX W/COLLJ SPEC WHEN PFRMD N/A 2019    Procedure: COLONOSCOPY;  Surgeon: Mavis Keane MD;  [facility]; Service: Gastroenterology    SD CYSTOURETHROSCOPY N/A 2017    Procedure: Wilhemina Laurel;  Surgeon: Aneudy Leach MD;  [facility];  Service: UroGynecology           SD ESOPHAGOGASTRODUODENOSCOPY TRANSORAL DIAGNOSTIC N/A 2017    Procedure: ESOPHAGOGASTRODUODENOSCOPY (EGD); Surgeon: Mavis Keane MD;  [facility]; Service: Gastroenterology    SD ESOPHAGOGASTRODUODENOSCOPY TRANSORAL DIAGNOSTIC N/A 2019    Procedure: ESOPHAGOGASTRODUODENOSCOPY (EGD); Surgeon: Mavis Keane MD;  [facility];   Service: Gastroenterology    SD POST COLPORRHAPHY,RECTUM/VAGINA N/A 2017    Procedure: COLPORRHAPHY POSTERIOR;  Surgeon: Aneudy Leach MD;  [facility];  Service: UroGynecology           SD SLING OPER STRES INCONTINENCE N/A 2017    Procedure: INSERTION PUBOVAGINAL SLING /SINGLE INCISION ;  Surgeon: Lizette Arevalo MD;  Location: [facility];  Service: UroGynecology           TONSILLECTOMY      TUBAL LIGATION      UPPER GASTROINTESTINAL ENDOSCOPY      US GUIDED BREAST BIOPSY RIGHT COMPLETE Right 10/31/2017       Current Outpatient Medications:     acetaminophen (TYLENOL) 325 mg tablet, Every 4 hours as needed, Disp: 30 tablet, Rfl: 0    Alaway 0 025 % ophthalmic solution, Administer 1 drop to both eyes 2 (two) times a day as needed (For discomfort), Disp: 5 mL, Rfl: 0    albuterol (2 5 mg/3 mL) 0 083 % nebulizer solution, Take 3 mL (2 5 mg total) by nebulization every 6 (six) hours as needed for wheezing or shortness of breath, Disp: 75 mL, Rfl: 0    albuterol (PROVENTIL HFA,VENTOLIN HFA) 90 mcg/act inhaler, Inhale 2 puffs every 4 (four) hours as needed for wheezing or shortness of breath, Disp: 18 g, Rfl: 1    Alcohol Swabs (Alcohol Prep) 70 % PADS, Use daily, Disp: 200 each, Rfl: 2    Alcohol Swabs (Alcohol Prep) PADS, Use 3 (three) times a day Use to test blood sugar 3 times a day, Disp: 100 each, Rfl: 5    betamethasone dipropionate (DIPROSONE) 0 05 % cream, APPLY TO AFFECTED AREA EVERY DAY, Disp: 30 g, Rfl: 0    bismuth subsalicylate (PEPTO BISMOL) 262 MG chewable tablet, Chew 2 tablets, Disp: , Rfl:     Botulinum Toxin Type A 200 units SOLR, Inject 155 units into face and neck IM every 90 days, Disp: , Rfl:     Breo Ellipta 100-25 MCG/INH inhaler, Inhale 1 puff daily 1 puff daily, Disp: 60 blister, Rfl: 2    cetirizine (ZyrTEC) 10 mg tablet, Take 1 tablet (10 mg total) by mouth daily, Disp: 30 tablet, Rfl: 3    cholecalciferol (VITAMIN D3) 1,000 units tablet, Take 1 tablet (1,000 Units total) by mouth daily, Disp: 90 tablet, Rfl: 0    diclofenac sodium (VOLTAREN) 1 %, APPLY 4 GRAMS TO AFFECTED AREA(S) THREE TIMES A DAY, Disp: , Rfl: 3    Diclofenac Sodium (VOLTAREN) 1 %, , Disp: , Rfl:     dicyclomine (BENTYL) 20 mg tablet, Take 1 tablet (20 mg total) by mouth every 6 (six) hours as needed (pain), Disp: 360 tablet, Rfl: 0    fexofenadine (ALLEGRA) 180 MG tablet, , Disp: , Rfl:     FLUCELVAX QUADRIVALENT, , Disp: , Rfl:     FLUoxetine (PROzac) 20 mg capsule, Take 1 capsule (20 mg total) by mouth daily, Disp: 90 capsule, Rfl: 0    fluticasone (FLONASE) 50 mcg/act nasal spray, 1-2 sprays into each nostril daily, Disp: 16 g, Rfl: 6    FREESTYLE LITE test strip, USE AS DIRECTED, Disp: 100 strip, Rfl: 8    furosemide (LASIX) 20 mg tablet, Take 1 tablet (20 mg total) by mouth daily as needed (leg swelling), Disp: 30 tablet, Rfl: 2    glucose monitoring kit (FREESTYLE) monitoring kit, Use 1 each as needed (prediabetes blood sugar checks), Disp: 1 each, Rfl: 0    hydrOXYzine HCL (ATARAX) 50 mg tablet, TAKE 1/2 OR 1 TABLET BY MOUTH THREE TIMES A DAY AS NEEDED FOR ANXIETY OR INSOMNIA, Disp: 270 tablet, Rfl: 0    ibuprofen (MOTRIN) 800 mg tablet, Take 1 tablet (800 mg total) by mouth every 8 (eight) hours as needed for mild pain, Disp: 30 tablet, Rfl: 0    ipratropium (ATROVENT) 0 02 % nebulizer solution, , Disp: , Rfl:     lamoTRIgine (LaMICtal) 100 mg tablet, Take 0 5 tablets (50 mg total) by mouth daily at bedtime, Disp: 45 tablet, Rfl: 0    Lancets (freestyle) lancets, Use as instructed, Disp: 100 each, Rfl: 3    levothyroxine 125 mcg tablet, Take 1 tablet (125 mcg total) by mouth daily, Disp: 90 tablet, Rfl: 1    lidocaine (LMX) 4 % cream, APPLY 1 APPLICATION EXTERNALLY 3 TIMES A DAY AS NEEDED FOR 30 DAYS, Disp: , Rfl:     meloxicam (MOBIC) 15 mg tablet, Take 1 tablet (15 mg total) by mouth daily, Disp: 30 tablet, Rfl: 6    metFORMIN (GLUCOPHAGE-XR) 500 mg 24 hr tablet, TAKE 2 TABLETS (1,000 MG TOTAL) BY MOUTH EVERY MORNING, Disp: 180 tablet, Rfl: 0    methocarbamol (ROBAXIN) 750 mg tablet, Take 750 mg by mouth 3 (three) times a day, Disp: , Rfl: 0    methylPREDNISolone 4 MG tablet therapy pack, Use as directed on package, Disp: 1 each, Rfl: 0    mometasone (ELOCON) 0 1 % cream, Apply topically daily, Disp: 45 g, Rfl: 0    montelukast (SINGULAIR) 10 mg tablet, Take 10 mg by mouth daily, Disp: , Rfl: 6    omeprazole (PriLOSEC) 40 MG capsule, Take 1 tablet by mouth 30 minutes prior to meals (breakfast and dinner), Disp: 28 capsule, Rfl: 0    polyethylene glycol (GLYCOLAX) powder, Take 17 g by mouth daily Mix 1 capful in 8 ounces of water, juice or tea , Disp: 578 g, Rfl: 3    SUMAtriptan (IMITREX) 100 mg tablet, Take 1 tablet (100 mg total) by mouth once as needed for migraine, Disp: 10 tablet, Rfl: 1    tiZANidine (ZANAFLEX) 4 mg tablet, Take 4 mg by mouth daily at bedtime as needed, Disp: , Rfl:    Family History   Problem Relation Age of Onset    Cancer Mother     Breast cancer Mother 62    Hypertension Mother     Stroke Father     Diabetes Father     Hypertension Father     Alzheimer's disease Father     Cancer Maternal Grandmother         ovarian    Ovarian cancer Maternal Grandmother         unspecified laterality    Cancer Maternal Grandfather     Hypertension Maternal Grandfather     Tongue cancer Maternal Grandfather     Stroke Paternal Grandmother     Thyroid disease unspecified Brother     Depression Brother     Anxiety disorder Brother     Drug abuse Brother         (pain pills)    Thyroid disease unspecified Maternal Aunt     Colon cancer Maternal Aunt     Breast cancer Family     Diabetes Sister     BRCA1 Negative Sister     BRCA2 Negative Sister     No Known Problems Daughter     No Known Problems Son     No Known Problems Daughter     No Known Problems Son     No Known Problems Maternal Aunt     No Known Problems Paternal Aunt     No Known Problems Paternal Aunt     No Known Problems Paternal Aunt     No Known Problems Paternal Aunt     No Known Problems Paternal Aunt     No Known Problems Paternal Aunt     No Known Problems Maternal Aunt     Alzheimer's disease Cousin     Breast cancer Cousin 43      Social History     Socioeconomic History  Marital status: Single     Spouse name: None    Number of children: 3    Years of education: None    Highest education level: None   Occupational History    Occupation: Disability since 2019-- due to a job injury    Tobacco Use    Smoking status: Former Smoker     Packs/day: 1 50     Years: 30 00     Pack years: 45 00     Quit date:      Years since quittin 3    Smokeless tobacco: Never Used    Tobacco comment: pt stated quit about a month ago, last assessed 14 per [employer]   Vaping Use    Vaping Use: Never used   Substance and Sexual Activity    Alcohol use: No    Drug use: Never    Sexual activity: Yes     Partners: Male     Birth control/protection: Female Sterilization     Comment: Boyfriend/father of 4 children   Other Topics Concern    None   Social History Narrative    Home: Lives with boyfriend/father of her 4 children and one of their daughters        Children: 2 sons born  and ,  2 daughters born  and         Education:     Social Determinants of Health     Financial Resource Strain: Low Risk     Difficulty of Paying Living Expenses: Not hard at all   Food Insecurity: No Food Insecurity    Worried About [facility] in the Last Year: Never true    Reynaldo of Food in the Last Year: Never true   Transportation Needs: No Transportation Needs    Lack of Transportation (Medical): No    Lack of Transportation (Non-Medical): No   Physical Activity: Inactive    Days of Exercise per Week: 0 days    Minutes of Exercise per Session: 0 min   Stress: Not on file   Social Connections: Not on file   Intimate Partner Violence: Not on file   Housing Stability: Not on file      Allergies   Allergen Reactions    Gabapentin Other (See Comments)     Annotation - [id number]: Neuropsych effects per patient  Annotation - [id number]: Neuropsych effects per patient  Annotation - [id number]: Neuropsych effects per patient      Latex Dermatitis and Rash     Other reaction(s): Dermatitis    Penicillins Hives        Active Problem List     Patient Active Problem List   Diagnosis    Mixed incontinence    Stress incontinence in female    MICHAEL (obstructive sleep apnea)    Hypothyroidism    Irritable bowel syndrome with constipation    Chronic low back pain    Asthma    Arthritis    Gastritis    Rectocele    Cystocele    Hypermobility of urethra    Achalasia    Bilateral edema of lower extremity    Breast pain, right    Chronic sinusitis with recurrent bronchitis    Gastroesophageal reflux disease    Headache, migraine    Intractable chronic migraine without aura and without status migrainosus    Breast mass    Class 1 obesity    Other chronic pain    Spondylosis of lumbar region without myelopathy or radiculopathy    Thyroiditis    Tubular adenoma of colon    Food allergy    POP-Q stage 2 cystocele    Moderate persistent asthma with exacerbation    Prediabetes    Need for shingles vaccine    Discomfort of right ear    Trochanteric bursitis of right hip    Pain in right hip    Numbness of toes    Acute viral bronchitis    Overweight    Hashimoto's thyroiditis    Lower abdominal pain    Left ovarian cyst    History of endometrial ablation    Left nephrolithiasis    Abdominal bloating    Achalasia of digestive tract    Esophageal dysphagia    Hypertension    Anxiety    Insomnia    Bipolar 2 disorder ([address] )    COVID-19    Encounter for long-term (current) use of other medications    Sebaceous cyst of right axilla       Objective     /84 (BP Location: Right arm, Patient Position: Sitting, Cuff Size: Large)   Pulse 86   Temp 98 °F (36 7 °C) (Temporal)   Resp 16   Ht 5' 7" (1 702 m)   Wt 100 kg (221 lb)   LMP 10/11/2020 (Exact Date)   SpO2 98%   BMI 34 61 kg/m²     Physical Exam  Vitals and nursing note reviewed  Constitutional:       General: She is not in acute distress  Appearance: Normal appearance   She is well-developed  She is not ill-appearing  HENT:      Head: Normocephalic and atraumatic  Mouth/Throat:      Mouth: Mucous membranes are moist    Eyes:      Extraocular Movements: Extraocular movements intact  Conjunctiva/sclera: Conjunctivae normal       Pupils: Pupils are equal, round, and reactive to light  Cardiovascular:      Rate and Rhythm: Normal rate and regular rhythm  Pulses: Normal pulses  Heart sounds: Normal heart sounds  No murmur heard  No friction rub  No gallop  Pulmonary:      Effort: Pulmonary effort is normal  No respiratory distress  Breath sounds: Normal breath sounds  No wheezing or rales  Abdominal:      General: Abdomen is flat  Bowel sounds are normal  There is no distension  Palpations: Abdomen is soft  Tenderness: There is abdominal tenderness  There is no guarding  Musculoskeletal:      Cervical back: Neck supple  Right lower leg: No edema  Left lower leg: No edema  Skin:     General: Skin is warm and dry  Neurological:      General: No focal deficit present  Mental Status: She is alert and oriented to person, place, and time     Psychiatric:         Mood and Affect: Mood normal          Behavior: Behavior normal           Pertinent Laboratory/Diagnostic Studies:  CBC:   Lab Results   Component Value Date/Time    WBC 9 19 06/09/2021 11:59 AM    WBC 15 61 (H) 01/07/2016 05:39 AM    RBC 4 75 06/09/2021 11:59 AM    RBC 4 00 01/07/2016 05:39 AM    HGB 13 4 06/09/2021 11:59 AM    HGB 11 0 (L) 01/07/2016 05:39 AM    HCT 40 9 06/09/2021 11:59 AM    HCT 34 7 (L) 01/07/2016 05:39 AM    MCV 86 06/09/2021 11:59 AM    MCV 87 01/07/2016 05:39 AM    MCH 28 2 06/09/2021 11:59 AM    MCH 27 5 01/07/2016 05:39 AM    MCHC 32 8 06/09/2021 11:59 AM    MCHC 31 7 01/07/2016 05:39 AM    RDW 13 8 06/09/2021 11:59 AM    RDW 14 1 01/07/2016 05:39 AM    MPV 9 3 06/09/2021 11:59 AM    MPV 9 3 01/07/2016 05:39 AM     06/09/2021 11:59 AM  01/07/2016 05:39 AM    NRBC 0 06/09/2021 11:59 AM    NEUTOPHILPCT 80 (H) 06/09/2021 11:59 AM    NEUTOPHILPCT 63 12/03/2015 10:43 AM    LYMPHOPCT 11 (L) 06/09/2021 11:59 AM    LYMPHOPCT 24 12/03/2015 10:43 AM    MONOPCT 8 06/09/2021 11:59 AM    MONOPCT 12 12/03/2015 10:43 AM    EOSPCT 0 06/09/2021 11:59 AM    EOSPCT 1 12/03/2015 10:43 AM    BASOPCT 1 06/09/2021 11:59 AM    BASOPCT 1 05/01/2015 12:43 PM    NEUTROABS 7 34 06/09/2021 11:59 AM    NEUTROABS 5 67 12/03/2015 10:43 AM    LYMPHSABS 0 99 06/09/2021 11:59 AM    LYMPHSABS 2 16 12/03/2015 10:43 AM    MONOSABS 0 76 06/09/2021 11:59 AM    MONOSABS 1 08 12/03/2015 10:43 AM    EOSABS 0 02 06/09/2021 11:59 AM    EOSABS 0 09 12/03/2015 10:43 AM     Chemistry Profile:   Lab Results   Component Value Date/Time     01/07/2016 05:39 AM    K 4 1 06/09/2021 11:59 AM    K 4 3 01/07/2016 05:39 AM     06/09/2021 11:59 AM     01/07/2016 05:39 AM    CO2 25 06/09/2021 11:59 AM    CO2 24 01/07/2016 05:39 AM    ANIONGAP 6 01/07/2016 05:39 AM    BUN 12 06/09/2021 11:59 AM    BUN 12 01/07/2016 05:39 AM    CREATININE 0 51 (L) 06/09/2021 11:59 AM    CREATININE 0 62 01/07/2016 05:39 AM    GLUC 86 07/21/2016 11:36 AM    GLUF 100 (H) 06/09/2021 11:59 AM    GLUCOSE 104 01/07/2016 05:39 AM    CALCIUM 9 2 06/09/2021 11:59 AM    CALCIUM 8 3 01/07/2016 05:39 AM    AST 14 06/09/2021 11:59 AM    AST 56 (H) 12/03/2015 10:43 AM    ALT 27 06/09/2021 11:59 AM     (H) 12/03/2015 10:43 AM    ALKPHOS 114 06/09/2021 11:59 AM    ALKPHOS 86 12/03/2015 10:43 AM    PROT 6 6 12/03/2015 10:43 AM    BILITOT 0 36 12/03/2015 10:43 AM    EGFR 109 06/09/2021 11:59 AM     Endocrine Studies:   Lab Results   Component Value Date/Time    HGBA1C 5 5 06/09/2021 11:59 AM    CTB2VAJCKCVU 0 294 (L) 11/17/2021 12:17 PM    [id number] 0 885 10/23/2015 11:40 AM    FREET4 1 44 11/17/2021 12:17 PM    FREET4 1 1 10/09/2014 10:01 AM    THYMICROANTI 324 (H) 05/24/2018 04:05 PM    THYMICROANTI 401 (H) 10/09/2014 10:01 AM    THGAB 349 2 (H) 05/24/2018 04:05 PM    THGAB 573 4 (H) 10/09/2014 10:01 AM    TRIG 129 06/09/2021 11:59 AM    TRIG 63 12/03/2015 10:43 AM    CHOL 207 12/03/2015 10:43 AM    CHOLESTEROL 218 (H) 06/09/2021 11:59 AM    HDL 44 06/09/2021 11:59 AM    HDL 51 12/03/2015 10:43 AM    LDLCALC 148 (H) 06/09/2021 11:59 AM    LDLCALC 143 (H) 12/03/2015 10:43 AM    NONHDLC 127 01/20/2020 12:33 PM    [id number] 20 5 (L) 06/09/2021 11:59 AM       Current Medications     Current Outpatient Medications:     acetaminophen (TYLENOL) 325 mg tablet, Every 4 hours as needed, Disp: 30 tablet, Rfl: 0    Alaway 0 025 % ophthalmic solution, Administer 1 drop to both eyes 2 (two) times a day as needed (For discomfort), Disp: 5 mL, Rfl: 0    albuterol (2 5 mg/3 mL) 0 083 % nebulizer solution, Take 3 mL (2 5 mg total) by nebulization every 6 (six) hours as needed for wheezing or shortness of breath, Disp: 75 mL, Rfl: 0    albuterol (PROVENTIL HFA,VENTOLIN HFA) 90 mcg/act inhaler, Inhale 2 puffs every 4 (four) hours as needed for wheezing or shortness of breath, Disp: 18 g, Rfl: 1    Alcohol Swabs (Alcohol Prep) 70 % PADS, Use daily, Disp: 200 each, Rfl: 2    Alcohol Swabs (Alcohol Prep) PADS, Use 3 (three) times a day Use to test blood sugar 3 times a day, Disp: 100 each, Rfl: 5    betamethasone dipropionate (DIPROSONE) 0 05 % cream, APPLY TO AFFECTED AREA EVERY DAY, Disp: 30 g, Rfl: 0    bismuth subsalicylate (PEPTO BISMOL) 262 MG chewable tablet, Chew 2 tablets, Disp: , Rfl:     Botulinum Toxin Type A 200 units SOLR, Inject 155 units into face and neck IM every 90 days, Disp: , Rfl:     Breo Ellipta 100-25 MCG/INH inhaler, Inhale 1 puff daily 1 puff daily, Disp: 60 blister, Rfl: 2    cetirizine (ZyrTEC) 10 mg tablet, Take 1 tablet (10 mg total) by mouth daily, Disp: 30 tablet, Rfl: 3    cholecalciferol (VITAMIN D3) 1,000 units tablet, Take 1 tablet (1,000 Units total) by mouth daily, Disp: 90 tablet, Rfl: 0    diclofenac sodium (VOLTAREN) 1 %, APPLY 4 GRAMS TO AFFECTED AREA(S) THREE TIMES A DAY, Disp: , Rfl: 3    Diclofenac Sodium (VOLTAREN) 1 %, , Disp: , Rfl:     dicyclomine (BENTYL) 20 mg tablet, Take 1 tablet (20 mg total) by mouth every 6 (six) hours as needed (pain), Disp: 360 tablet, Rfl: 0    fexofenadine (ALLEGRA) 180 MG tablet, , Disp: , Rfl:     FLUCELVAX QUADRIVALENT, , Disp: , Rfl:     FLUoxetine (PROzac) 20 mg capsule, Take 1 capsule (20 mg total) by mouth daily, Disp: 90 capsule, Rfl: 0    fluticasone (FLONASE) 50 mcg/act nasal spray, 1-2 sprays into each nostril daily, Disp: 16 g, Rfl: 6    FREESTYLE LITE test strip, USE AS DIRECTED, Disp: 100 strip, Rfl: 8    furosemide (LASIX) 20 mg tablet, Take 1 tablet (20 mg total) by mouth daily as needed (leg swelling), Disp: 30 tablet, Rfl: 2    glucose monitoring kit (FREESTYLE) monitoring kit, Use 1 each as needed (prediabetes blood sugar checks), Disp: 1 each, Rfl: 0    hydrOXYzine HCL (ATARAX) 50 mg tablet, TAKE 1/2 OR 1 TABLET BY MOUTH THREE TIMES A DAY AS NEEDED FOR ANXIETY OR INSOMNIA, Disp: 270 tablet, Rfl: 0    ibuprofen (MOTRIN) 800 mg tablet, Take 1 tablet (800 mg total) by mouth every 8 (eight) hours as needed for mild pain, Disp: 30 tablet, Rfl: 0    ipratropium (ATROVENT) 0 02 % nebulizer solution, , Disp: , Rfl:     lamoTRIgine (LaMICtal) 100 mg tablet, Take 0 5 tablets (50 mg total) by mouth daily at bedtime, Disp: 45 tablet, Rfl: 0    Lancets (freestyle) lancets, Use as instructed, Disp: 100 each, Rfl: 3    levothyroxine 125 mcg tablet, Take 1 tablet (125 mcg total) by mouth daily, Disp: 90 tablet, Rfl: 1    lidocaine (LMX) 4 % cream, APPLY 1 APPLICATION EXTERNALLY 3 TIMES A DAY AS NEEDED FOR 30 DAYS, Disp: , Rfl:     meloxicam (MOBIC) 15 mg tablet, Take 1 tablet (15 mg total) by mouth daily, Disp: 30 tablet, Rfl: 6    metFORMIN (GLUCOPHAGE-XR) 500 mg 24 hr tablet, TAKE 2 TABLETS (1,000 MG TOTAL) BY MOUTH EVERY MORNING, Disp: 180 tablet, Rfl: 0    methocarbamol (ROBAXIN) 750 mg tablet, Take 750 mg by mouth 3 (three) times a day, Disp: , Rfl: 0    methylPREDNISolone 4 MG tablet therapy pack, Use as directed on package, Disp: 1 each, Rfl: 0    mometasone (ELOCON) 0 1 % cream, Apply topically daily, Disp: 45 g, Rfl: 0    montelukast (SINGULAIR) 10 mg tablet, Take 10 mg by mouth daily, Disp: , Rfl: 6    omeprazole (PriLOSEC) 40 MG capsule, Take 1 tablet by mouth 30 minutes prior to meals (breakfast and dinner), Disp: 28 capsule, Rfl: 0    polyethylene glycol (GLYCOLAX) powder, Take 17 g by mouth daily Mix 1 capful in 8 ounces of water, juice or tea , Disp: 578 g, Rfl: 3    SUMAtriptan (IMITREX) 100 mg tablet, Take 1 tablet (100 mg total) by mouth once as needed for migraine, Disp: 10 tablet, Rfl: 1    tiZANidine (ZANAFLEX) 4 mg tablet, Take 4 mg by mouth daily at bedtime as needed, Disp: , Rfl:     Health Maintenance     Health Maintenance   Topic Date Due    Medicare Annual Wellness Visit (AWV)  Never done    HIV Screening  Never done    COVID-19 Vaccine (3 - Booster for Pfizer series) 11/08/2021    Colorectal Cancer Screening  04/25/2022    BMI: Followup Plan  06/09/2022    Cervical Cancer Screening  06/27/2022    Breast Cancer Screening: Mammogram  08/10/2022    Lung Cancer Screening  12/02/2022    BMI: Adult  04/28/2023    DTaP,Tdap,and Td Vaccines (2 - Td or Tdap) 05/22/2025    Pneumococcal Vaccine: Pediatrics (0 to 5 Years) and At-Risk Patients (6 to 59 Years) (2 of 2 - PPSV23) 03/23/2032    Hepatitis C Screening  Completed    Influenza Vaccine  Completed    HIB Vaccine  Aged Out    Hepatitis B Vaccine  Aged Out    IPV Vaccine  Aged Out    Hepatitis A Vaccine  Aged Out    Meningococcal ACWY Vaccine  Aged Out    HPV Vaccine  Aged Out     Immunization History   Administered Date(s) Administered    COVID-19 PFIZER VACCINE 0 3 ML IM 05/18/2021, 06/08/2021    INFLUENZA 11/07/2006, 11/15/2014, 11/01/2017, 12/10/2018, 01/14/2019, 10/01/2019, 12/03/2020    Influenza Injectable, MDCK, Preservative Free, Quadrivalent, 0 5 mL 01/14/2019    Influenza Quadrivalent Preservative Free 3 years and older IM 11/06/2017    Influenza, injectable, quadrivalent, preservative free 0 5 mL 12/03/2020    Influenza, recombinant, quadrivalent,injectable, preservative free 11/17/2021    Influenza, seasonal, injectable 10/23/2015    Pneumococcal Polysaccharide PPV23 01/28/2016    Tdap 05/22/2015    Zoster 12/03/2020    Zoster Vaccine Recombinant 12/03/2020, 03/03/2021       [address] [facility] [address]    Internal Medicine PGY-3  4/28/2022 4:51 PM

## 2022-04-28 NOTE — TELEPHONE ENCOUNTER
Patients GI provider:  Dr Thompson Media    Number to return call: [phone]    Reason for call: Pt calling stating bowel prep needs to be sent to pharmacy      Scheduled procedure/appointment date if applicable: Procedure 7/8/21

## 2022-04-28 NOTE — TELEPHONE ENCOUNTER
Called pt an informed she received instructions for miralax and magnesium citrate prep  Explained to pt medications are available OTC and does not need scripts  Pt verbalized understanding and had no further questions

## 2022-05-05 ENCOUNTER — PREP FOR PROCEDURE (OUTPATIENT)
Dept: GASTROENTEROLOGY | Facility: CLINIC | Age: 57
End: 2022-05-05

## 2022-05-05 ENCOUNTER — ANESTHESIA EVENT (OUTPATIENT)
Dept: GASTROENTEROLOGY | Facility: HOSPITAL | Age: 57
End: 2022-05-05

## 2022-05-05 ENCOUNTER — ANESTHESIA (OUTPATIENT)
Dept: GASTROENTEROLOGY | Facility: HOSPITAL | Age: 57
End: 2022-05-05

## 2022-05-05 ENCOUNTER — HOSPITAL ENCOUNTER (OUTPATIENT)
Dept: GASTROENTEROLOGY | Facility: HOSPITAL | Age: 57
Setting detail: OUTPATIENT SURGERY
Discharge: HOME | End: 2022-05-05
Attending: INTERNAL MEDICINE | Admitting: INTERNAL MEDICINE
Payer: MEDICARE

## 2022-05-05 VITALS
HEART RATE: 84 BPM | WEIGHT: 220 LBS | OXYGEN SATURATION: 98 % | DIASTOLIC BLOOD PRESSURE: 77 MMHG | HEIGHT: 67 IN | SYSTOLIC BLOOD PRESSURE: 120 MMHG | TEMPERATURE: 97.2 F | BODY MASS INDEX: 34.53 KG/M2 | RESPIRATION RATE: 16 BRPM

## 2022-05-05 DIAGNOSIS — Z86.010 HISTORY OF COLON POLYPS: Primary | ICD-10-CM

## 2022-05-05 DIAGNOSIS — D12.6 TUBULAR ADENOMA OF COLON: ICD-10-CM

## 2022-05-05 DIAGNOSIS — R13.19 ESOPHAGEAL DYSPHAGIA: ICD-10-CM

## 2022-05-05 DIAGNOSIS — K22.0 ACHALASIA: ICD-10-CM

## 2022-05-05 PROCEDURE — 43239 EGD BIOPSY SINGLE/MULTIPLE: CPT | Performed by: INTERNAL MEDICINE

## 2022-05-05 PROCEDURE — 88305 TISSUE EXAM BY PATHOLOGIST: CPT | Performed by: PATHOLOGY

## 2022-05-05 PROCEDURE — 88342 IMHCHEM/IMCYTCHM 1ST ANTB: CPT | Performed by: PATHOLOGY

## 2022-05-05 PROCEDURE — 43236 UPPR GI SCOPE W/SUBMUC INJ: CPT | Performed by: INTERNAL MEDICINE

## 2022-05-05 PROCEDURE — 45385 COLONOSCOPY W/LESION REMOVAL: CPT | Performed by: INTERNAL MEDICINE

## 2022-05-05 RX ORDER — MEPERIDINE HYDROCHLORIDE 25 MG/ML
12.5 INJECTION INTRAMUSCULAR; INTRAVENOUS; SUBCUTANEOUS
Status: CANCELLED | OUTPATIENT
Start: 2022-05-05

## 2022-05-05 RX ORDER — GLYCOPYRROLATE 0.2 MG/ML
INJECTION INTRAMUSCULAR; INTRAVENOUS AS NEEDED
Status: DISCONTINUED | OUTPATIENT
Start: 2022-05-05 | End: 2022-05-05

## 2022-05-05 RX ORDER — ONDANSETRON 2 MG/ML
4 INJECTION INTRAMUSCULAR; INTRAVENOUS ONCE AS NEEDED
Status: CANCELLED | OUTPATIENT
Start: 2022-05-05

## 2022-05-05 RX ORDER — FENTANYL CITRATE/PF 50 MCG/ML
25 SYRINGE (ML) INJECTION
Status: CANCELLED | OUTPATIENT
Start: 2022-05-05

## 2022-05-05 RX ORDER — PROMETHAZINE HYDROCHLORIDE 25 MG/ML
12.5 INJECTION, SOLUTION INTRAMUSCULAR; INTRAVENOUS ONCE AS NEEDED
Status: CANCELLED | OUTPATIENT
Start: 2022-05-05

## 2022-05-05 RX ORDER — ONDANSETRON 2 MG/ML
INJECTION INTRAMUSCULAR; INTRAVENOUS AS NEEDED
Status: DISCONTINUED | OUTPATIENT
Start: 2022-05-05 | End: 2022-05-05

## 2022-05-05 RX ORDER — ALBUTEROL SULFATE 2.5 MG/3ML
2.5 SOLUTION RESPIRATORY (INHALATION) ONCE AS NEEDED
Status: CANCELLED | OUTPATIENT
Start: 2022-05-05

## 2022-05-05 RX ORDER — HYDROMORPHONE HCL/PF 1 MG/ML
0.5 SYRINGE (ML) INJECTION
Status: CANCELLED | OUTPATIENT
Start: 2022-05-05

## 2022-05-05 RX ORDER — PROPOFOL 10 MG/ML
INJECTION, EMULSION INTRAVENOUS CONTINUOUS PRN
Status: DISCONTINUED | OUTPATIENT
Start: 2022-05-05 | End: 2022-05-05

## 2022-05-05 RX ORDER — SODIUM CHLORIDE 9 MG/ML
INJECTION, SOLUTION INTRAVENOUS CONTINUOUS PRN
Status: DISCONTINUED | OUTPATIENT
Start: 2022-05-05 | End: 2022-05-05

## 2022-05-05 RX ADMIN — SODIUM CHLORIDE: 0.9 INJECTION, SOLUTION INTRAVENOUS at 08:38

## 2022-05-05 RX ADMIN — GLYCOPYRROLATE 0.2 MG: 0.2 INJECTION, SOLUTION INTRAMUSCULAR; INTRAVENOUS at 08:38

## 2022-05-05 RX ADMIN — ONDANSETRON 4 MG: 2 INJECTION INTRAMUSCULAR; INTRAVENOUS at 08:38

## 2022-05-05 RX ADMIN — PROPOFOL 140 MCG/KG/MIN: 10 INJECTION, EMULSION INTRAVENOUS at 08:40

## 2022-05-05 RX ADMIN — ONABOTULINUMTOXINA 100 UNITS: 100 INJECTION, POWDER, LYOPHILIZED, FOR SOLUTION INTRADERMAL; INTRAMUSCULAR at 08:58

## 2022-05-05 NOTE — H&P
History and Physical - [facility]  Danyel Peña 56 y o  female MRN: [id number]    HPI: Danyel Peña is a 56y o  year old female who presents with achalasia/ dysphagia/ h/o colon plyps  Review of Systems    Historical Information   Past Medical History:   Diagnosis Date    Achalasia     Asthma     Constipation     Depression     Diarrhea 2019    Elevated LFTs     last assessed 10/25/17    Fibromyalgia     Gastric ulcer     GERD (gastroesophageal reflux disease)     H  pylori infection 10/9/2017    [id number]: Treated, f/u stool antigen negative    Hypothyroidism     Kidney stone     Migraines     Migraines     MICHAEL (obstructive sleep apnea)     last assessed 16    Pneumonia     Sleep apnea 2019    Uses CPAP    Thyroid nodule     last assessed 17     Past Surgical History:   Procedure Laterality Date    BREAST BIOPSY Right 10/31/2017     bx     SECTION      COLONOSCOPY      ENDOMETRIAL ABLATION      MYOTOMY HELLER LAPAROSCOPIC  2016    Dr Kari Torres, Coincident lorena fundoplication    NASAL SINUS SURGERY      OTHER SURGICAL HISTORY      achalasia repair    OVARIAN CYST REMOVAL      PILONIDAL CYST / SINUS EXCISION      NV COLONOSCOPY FLX DX W/COLLJ SPEC WHEN PFRMD N/A 2019    Procedure: COLONOSCOPY;  Surgeon: Jc Henriquez MD;  [facility]; Service: Gastroenterology    NV CYSTOURETHROSCOPY N/A 2017    Procedure: Izzy Rhodes;  Surgeon: Cara Angeles MD;  [facility];  Service: UroGynecology           NV ESOPHAGOGASTRODUODENOSCOPY TRANSORAL DIAGNOSTIC N/A 2017    Procedure: ESOPHAGOGASTRODUODENOSCOPY (EGD); Surgeon: Jc Henriquez MD;  [facility]; Service: Gastroenterology    NV ESOPHAGOGASTRODUODENOSCOPY TRANSORAL DIAGNOSTIC N/A 2019    Procedure: ESOPHAGOGASTRODUODENOSCOPY (EGD); Surgeon: Jc Henriquez MD;  [facility];   Service: Gastroenterology    NV POST COLPORRHAPHY,RECTUM/VAGINA N/A 2017    Procedure: COLPORRHAPHY POSTERIOR;  Surgeon: Andrea Hermosillo MD;  [facility];  Service: UroGynecology           DC SLING OPER STRES INCONTINENCE N/A 2017    Procedure: INSERTION PUBOVAGINAL SLING /SINGLE INCISION ;  Surgeon: Andrea Hermosillo MD;  [facility];  Service: UroGynecology           TONSILLECTOMY      TUBAL LIGATION      UPPER GASTROINTESTINAL ENDOSCOPY      US GUIDED BREAST BIOPSY RIGHT COMPLETE Right 10/31/2017     Social History   Social History     Substance and Sexual Activity   Alcohol Use Yes    Comment: socially     Social History     Substance and Sexual Activity   Drug Use Never     Social History     Tobacco Use   Smoking Status Former Smoker    Packs/day: 1 50    Years: 30 00    Pack years: 45 00    Quit date:     Years since quittin 3   Smokeless Tobacco Never Used   Tobacco Comment    pt stated quit about a month ago, last assessed 14 per [employer]     Family History   Problem Relation Age of Onset    Cancer Mother     Breast cancer Mother 62    Hypertension Mother     Stroke Father     Diabetes Father     Hypertension Father     Alzheimer's disease Father     Cancer Maternal Grandmother         ovarian    Ovarian cancer Maternal Grandmother         unspecified laterality    Cancer Maternal Grandfather     Hypertension Maternal Grandfather     Tongue cancer Maternal Grandfather     Stroke Paternal Grandmother     Thyroid disease unspecified Brother     Depression Brother     Anxiety disorder Brother     Drug abuse Brother         (pain pills)    Thyroid disease unspecified Maternal Aunt     Colon cancer Maternal Aunt     Breast cancer Family     Diabetes Sister     BRCA1 Negative Sister     BRCA2 Negative Sister     No Known Problems Daughter     No Known Problems Son     No Known Problems Daughter     No Known Problems Son     No Known Problems Maternal Aunt     No Known Problems Paternal Aunt     No Known Problems Paternal Aunt     No Known Problems Paternal Aunt     No Known Problems Paternal Aunt     No Known Problems Paternal Aunt     No Known Problems Paternal Aunt     No Known Problems Maternal Aunt     Alzheimer's disease Cousin     Breast cancer Cousin 43       Meds/Allergies     (Not in a hospital admission)      Allergies   Allergen Reactions    Gabapentin Other (See Comments)     Annotation - [id number]: Neuropsych effects per patient  Annotation - [id number]: Neuropsych effects per patient  Annotation - [id number]: Neuropsych effects per patient   Latex Dermatitis and Rash     Other reaction(s): Dermatitis    Penicillins Hives       Objective     /86   Pulse 83   Temp 97 7 °F (36 5 °C) (Tympanic)   Resp 16   Ht 5' 7" (1 702 m)   Wt 99 8 kg (220 lb)   LMP 10/11/2020 (Exact Date)   SpO2 98%   BMI 34 46 kg/m²       PHYSICAL EXAM    Gen: NAD  CV: RRR  CHEST: Clear  ABD: soft, NT/ND  EXT: no edema  Neuro: AAO      ASSESSMENT/PLAN:  This is a 56y o  year old female here for achalasia / botox injection and h/o colon polyps  PLAN:   Procedure: EGD/ Colonoscopy

## 2022-05-05 NOTE — ANESTHESIA PREPROCEDURE EVALUATION
Procedure:  EGD  COLONOSCOPY    Relevant Problems   CARDIO   (+) Breast pain, right   (+) Headache, migraine   (+) Hypertension   (+) Intractable chronic migraine without aura and without status migrainosus      ENDO   (+) Hypothyroidism      GI/HEPATIC   (+) Esophageal dysphagia   (+) Gastroesophageal reflux disease      /RENAL   (+) Left nephrolithiasis      MUSCULOSKELETAL   (+) Arthritis   (+) Chronic low back pain   (+) Spondylosis of lumbar region without myelopathy or radiculopathy      NEURO/PSYCH   (+) Anxiety   (+) Chronic low back pain   (+) Headache, migraine   (+) Intractable chronic migraine without aura and without status migrainosus   (+) Other chronic pain      PULMONARY   (+) Asthma   (+) Moderate persistent asthma with exacerbation   (+) MICHAEL (obstructive sleep apnea)      Other   (+) Achalasia   (+) Bipolar 2 disorder (HCC)        Physical Exam    Airway    Mallampati score: II  TM Distance: >3 FB  Neck ROM: full     Dental       Cardiovascular  Cardiovascular exam normal    Pulmonary  Pulmonary exam normal     Other Findings        Anesthesia Plan  ASA Score- 3     Anesthesia Type- IV sedation with anesthesia with ASA Monitors  Additional Monitors:   Airway Plan:           Plan Factors-Exercise tolerance (METS): >4 METS  Chart reviewed  EKG reviewed  Imaging results reviewed  Existing labs reviewed  Patient summary reviewed  Induction- intravenous  Postoperative Plan- Plan for postoperative opioid use  Planned trial extubation    Informed Consent- Anesthetic plan and risks discussed with patient  I personally reviewed this patient with the CRNA  Discussed and agreed on the Anesthesia Plan with the CRNA  Thuy Monterroso

## 2022-05-06 ENCOUNTER — TELEPHONE (OUTPATIENT)
Dept: GASTROENTEROLOGY | Facility: CLINIC | Age: 57
End: 2022-05-06

## 2022-05-06 NOTE — TELEPHONE ENCOUNTER
Patients GI provider:  Naun Jim    Number to return call: [phone]    Reason for call: Harrison Close from lab in [facility] called wanting verification on the containers submitted for a tissue exam done yesterday  Containers are mixed up  She is faxing form for someone to sign and verify containers to [phone]  Above is her number       Scheduled procedure/appointment date if applicable: Appt  4/56/95

## 2022-05-11 NOTE — TELEPHONE ENCOUNTER
Patient called today and asked to reschedule her appointment for H&P scheduled for 5/13, but we don't have any other openings before her surgery on 5/26  I explained to her that without the H&P her surgery might need to be rescheduled, but she said she has an appointment to get an injection to her back that is more important  I tries to cancel the appointment, but the system wouldn't let me because there was some one else in the account      Patient can be reached at [phone]

## 2022-05-11 NOTE — TELEPHONE ENCOUNTER
Patient calling back  Patient was able to get her injection appointment changed to 5/12  Patient is going to keep her appointment on 5/13

## 2022-05-13 ENCOUNTER — OFFICE VISIT (OUTPATIENT)
Dept: UROLOGY | Facility: AMBULATORY SURGERY CENTER | Age: 57
End: 2022-05-13
Payer: MEDICARE

## 2022-05-13 VITALS
SYSTOLIC BLOOD PRESSURE: 124 MMHG | HEIGHT: 67 IN | HEART RATE: 92 BPM | OXYGEN SATURATION: 98 % | WEIGHT: 220 LBS | DIASTOLIC BLOOD PRESSURE: 76 MMHG | BODY MASS INDEX: 34.53 KG/M2

## 2022-05-13 DIAGNOSIS — N20.0 NEPHROLITHIASIS: Primary | ICD-10-CM

## 2022-05-13 PROCEDURE — 87086 URINE CULTURE/COLONY COUNT: CPT | Performed by: NURSE PRACTITIONER

## 2022-05-13 PROCEDURE — 99213 OFFICE O/P EST LOW 20 MIN: CPT | Performed by: NURSE PRACTITIONER

## 2022-05-13 NOTE — PROGRESS NOTES
5/13/2022  Stewart Millan  1965  [id number]    Assessment  1 Nephrolithiasis    Urine culture sent from office    Educational packet provided   Procedure discussed at length     Discussion  [facility] is a 56 y o  female being managed by Dr Agustin Esteban  She is scheduled 5/26/22 for left ESWL  We reviewed the risks and benefits of this procedure including but not limited to cardiopulmonary complications, bleeding, infection, damage to nearby structures such as bladder/ureter/kidney, need for nephrostomy tube, need for additional surgeries  Patient verbalized understanding  Consent will be obtained on the day of procedure by performing surgeon  All questions were answered  History of Present Illness  56 y o  female presents today to discuss her upcoming surgery  She has had left flank pain since March 2022 which prompted imaging  She continues to have bilateral flank pain at ths time  She has history of spontaneous passage of stones  Her grandmother and mother have stone history as well  She is hydrating adequately with water  Denies fever, chills, nausea, vomiting, gross hematuria  Most recent xray on 4/4/22 showed 4 mm left upper pole stone  Review of Systems  Review of Systems   Constitutional: Negative for activity change, appetite change, chills, diaphoresis, fatigue and fever  HENT: Negative for congestion, ear pain and sore throat  Eyes: Negative for pain and visual disturbance  Respiratory: Negative for cough and shortness of breath  Cardiovascular: Negative for chest pain and palpitations  Gastrointestinal: Negative for abdominal distention, abdominal pain, diarrhea, nausea and vomiting  Endocrine: Negative  Genitourinary: Positive for flank pain  Negative for decreased urine volume, difficulty urinating, dysuria, hematuria, pelvic pain, urgency, vaginal bleeding, vaginal discharge and vaginal pain  Musculoskeletal: Negative for arthralgias, back pain and gait problem     Skin: Negative for color change, pallor and rash  Allergic/Immunologic: Negative  Neurological: Negative for seizures and syncope  Hematological: Negative  Psychiatric/Behavioral: Negative  All other systems reviewed and are negative  Past Medical History  Past Medical History:   Diagnosis Date    Achalasia     Asthma     Constipation     Depression     Diarrhea 2019    Elevated LFTs     last assessed 10/25/17    Fibromyalgia     Gastric ulcer     GERD (gastroesophageal reflux disease)     H  pylori infection 10/9/2017    Annotation - [id number]: Treated, f/u stool antigen negative    Hypothyroidism     Kidney stone     Migraines     Migraines     MICHAEL (obstructive sleep apnea)     last assessed 16    Pneumonia     Sleep apnea 2019    Uses CPAP    Thyroid nodule     last assessed 17       Surgical History  Past Surgical History:   Procedure Laterality Date    BREAST BIOPSY Right 10/31/2017    us bx     SECTION      COLONOSCOPY      ENDOMETRIAL ABLATION      MYOTOMY HELLER LAPAROSCOPIC  2016    Dr Kari Torres, Coincident lorena fundoplication    NASAL SINUS SURGERY      OTHER SURGICAL HISTORY      achalasia repair    OVARIAN CYST REMOVAL      PILONIDAL CYST / SINUS EXCISION      OH COLONOSCOPY FLX DX W/COLLJ SPEC WHEN PFRMD N/A 2019    Procedure: COLONOSCOPY;  Surgeon: Jc Henriquez MD;  [facility]; Service: Gastroenterology    OH CYSTOURETHROSCOPY N/A 2017    Procedure: Izzy Rhodes;  Surgeon: Cara Angeles MD;  [facility];  Service: UroGynecology           OH ESOPHAGOGASTRODUODENOSCOPY TRANSORAL DIAGNOSTIC N/A 2017    Procedure: ESOPHAGOGASTRODUODENOSCOPY (EGD); Surgeon: Jc Henriquez MD;  [facility]; Service: Gastroenterology    OH ESOPHAGOGASTRODUODENOSCOPY TRANSORAL DIAGNOSTIC N/A 2019    Procedure: ESOPHAGOGASTRODUODENOSCOPY (EGD); Surgeon: Jc Henriquez MD;  [facility];   Service: Gastroenterology    DC POST COLPORRHAPHY,RECTUM/VAGINA N/A 1/19/2017    Procedure: COLPORRHAPHY POSTERIOR;  Surgeon: Doris Prieto MD;  [facility];  Service: UroGynecology           DC SLING OPER STRES INCONTINENCE N/A 1/19/2017    Procedure: INSERTION PUBOVAGINAL SLING /SINGLE INCISION ;  Surgeon: Doris Prieto MD;  [facility];  Service: UroGynecology           TONSILLECTOMY      TUBAL LIGATION      UPPER GASTROINTESTINAL ENDOSCOPY      US GUIDED BREAST BIOPSY RIGHT COMPLETE Right 10/31/2017       Family History  Family History   Problem Relation Age of Onset    Cancer Mother     Breast cancer Mother 62    Hypertension Mother     Stroke Father     Diabetes Father     Hypertension Father     Alzheimer's disease Father     Cancer Maternal Grandmother         ovarian    Ovarian cancer Maternal Grandmother         unspecified laterality    Cancer Maternal Grandfather     Hypertension Maternal Grandfather     Tongue cancer Maternal Grandfather     Stroke Paternal Grandmother     Thyroid disease unspecified Brother     Depression Brother     Anxiety disorder Brother     Drug abuse Brother         (pain pills)    Thyroid disease unspecified Maternal Aunt     Colon cancer Maternal Aunt     Breast cancer Family     Diabetes Sister     BRCA1 Negative Sister     BRCA2 Negative Sister     No Known Problems Daughter     No Known Problems Son     No Known Problems Daughter     No Known Problems Son     No Known Problems Maternal Aunt     No Known Problems Paternal Aunt     No Known Problems Paternal Aunt     No Known Problems Paternal Aunt     No Known Problems Paternal Aunt     No Known Problems Paternal Aunt     No Known Problems Paternal Aunt     No Known Problems Maternal Aunt     Alzheimer's disease Cousin     Breast cancer Cousin 43       Social History  Social History     Socioeconomic History    Marital status: Single     Spouse name: Not on file    Number of children: 4    Years of education: Not on file    Highest education level: Not on file   Occupational History    Occupation: Disability since 2019-- due to a job injury    Tobacco Use    Smoking status: Former Smoker     Packs/day: 1 50     Years: 30 00     Pack years: 45 00     Quit date: 2016     Years since quittin 3    Smokeless tobacco: Never Used    Tobacco comment: pt stated quit about a month ago, last assessed 14 per [employer]   Vaping Use    Vaping Use: Never used   Substance and Sexual Activity    Alcohol use: Yes     Comment: socially    Drug use: Never    Sexual activity: Yes     Partners: Male     Birth control/protection: Female Sterilization     Comment: Boyfriend/father of 4 children   Other Topics Concern    Not on file   Social History Narrative    Home: Lives with boyfriend/father of her 4 children and one of their daughters        Children: 2 sons born  and ,  2 daughters born  and         Education:     Social Determinants of Health     Financial Resource Strain: Low Risk     Difficulty of Paying Living Expenses: Not hard at all   Food Insecurity: No Food Insecurity    Worried About [address] in the Last Year: Never true    [facility] in the Last Year: Never true   Transportation Needs: No Transportation Needs    Lack of Transportation (Medical): No    Lack of Transportation (Non-Medical):  No   Physical Activity: Inactive    Days of Exercise per Week: 0 days    Minutes of Exercise per Session: 0 min   Stress: Not on file   Social Connections: Not on file   Intimate Partner Violence: Not on file   Housing Stability: Not on file       Current Medications  Current Outpatient Medications   Medication Sig Dispense Refill    acetaminophen (TYLENOL) 325 mg tablet Every 4 hours as needed 30 tablet 0    Alaway 0 025 % ophthalmic solution Administer 1 drop to both eyes 2 (two) times a day as needed (For discomfort) 5 mL 0    albuterol (2 5 mg/3 mL) 0 083 % nebulizer solution Take 3 mL (2 5 mg total) by nebulization every 6 (six) hours as needed for wheezing or shortness of breath 75 mL 0    albuterol (PROVENTIL HFA,VENTOLIN HFA) 90 mcg/act inhaler Inhale 2 puffs every 4 (four) hours as needed for wheezing or shortness of breath 18 g 1    Alcohol Swabs (Alcohol Prep) 70 % PADS Use daily 200 each 2    Alcohol Swabs (Alcohol Prep) PADS Use 3 (three) times a day Use to test blood sugar 3 times a day 100 each 5    betamethasone dipropionate (DIPROSONE) 0 05 % cream APPLY TO AFFECTED AREA EVERY DAY 30 g 0    bismuth subsalicylate (PEPTO BISMOL) 262 MG chewable tablet Chew 2 tablets      Breo Ellipta 100-25 MCG/INH inhaler Inhale 1 puff daily 1 puff daily 60 blister 2    cetirizine (ZyrTEC) 10 mg tablet Take 1 tablet (10 mg total) by mouth daily 30 tablet 3    cholecalciferol (VITAMIN D3) 1,000 units tablet Take 1 tablet (1,000 Units total) by mouth daily 90 tablet 0    diclofenac sodium (VOLTAREN) 1 % APPLY 4 GRAMS TO AFFECTED AREA(S) THREE TIMES A DAY  3    Diclofenac Sodium (VOLTAREN) 1 %       dicyclomine (BENTYL) 20 mg tablet Take 1 tablet (20 mg total) by mouth every 6 (six) hours as needed (pain) 360 tablet 0    fexofenadine (ALLEGRA) 180 MG tablet       FLUCELVAX QUADRIVALENT  (Patient not taking: Reported on 11/17/2021 )      FLUoxetine (PROzac) 20 mg capsule Take 1 capsule (20 mg total) by mouth daily 90 capsule 0    fluticasone (FLONASE) 50 mcg/act nasal spray 1-2 sprays into each nostril daily 16 g 6    FREESTYLE LITE test strip USE AS DIRECTED 100 strip 8    furosemide (LASIX) 20 mg tablet Take 1 tablet (20 mg total) by mouth daily as needed (leg swelling) 30 tablet 2    glucose monitoring kit (FREESTYLE) monitoring kit Use 1 each as needed (prediabetes blood sugar checks) 1 each 0    hydrOXYzine HCL (ATARAX) 50 mg tablet TAKE 1/2 OR 1 TABLET BY MOUTH THREE TIMES A DAY AS NEEDED FOR ANXIETY OR INSOMNIA 270 tablet 0    ibuprofen (MOTRIN) 800 mg tablet Take 1 tablet (800 mg total) by mouth every 8 (eight) hours as needed for mild pain 30 tablet 0    ipratropium (ATROVENT) 0 02 % nebulizer solution       lamoTRIgine (LaMICtal) 100 mg tablet Take 0 5 tablets (50 mg total) by mouth daily at bedtime 45 tablet 0    Lancets (freestyle) lancets Use as instructed 100 each 3    levothyroxine 125 mcg tablet Take 1 tablet (125 mcg total) by mouth daily 90 tablet 1    lidocaine (LMX) 4 % cream APPLY 1 APPLICATION EXTERNALLY 3 TIMES A DAY AS NEEDED FOR 30 DAYS      meloxicam (MOBIC) 15 mg tablet Take 1 tablet (15 mg total) by mouth daily 30 tablet 6    metFORMIN (GLUCOPHAGE-XR) 500 mg 24 hr tablet TAKE 2 TABLETS (1,000 MG TOTAL) BY MOUTH EVERY MORNING 180 tablet 0    methocarbamol (ROBAXIN) 750 mg tablet Take 750 mg by mouth 3 (three) times a day  0    mometasone (ELOCON) 0 1 % cream Apply topically daily 45 g 0    montelukast (SINGULAIR) 10 mg tablet Take 10 mg by mouth daily  6    omeprazole (PriLOSEC) 40 MG capsule Take 1 tablet by mouth 30 minutes prior to meals (breakfast and dinner) 28 capsule 0    polyethylene glycol (GLYCOLAX) powder Take 17 g by mouth daily Mix 1 capful in 8 ounces of water, juice or tea  578 g 3    SUMAtriptan (IMITREX) 100 mg tablet Take 1 tablet (100 mg total) by mouth once as needed for migraine 10 tablet 1    tiZANidine (ZANAFLEX) 4 mg tablet Take 4 mg by mouth daily at bedtime as needed       No current facility-administered medications for this visit  Allergies  Allergies   Allergen Reactions    Gabapentin Other (See Comments)     Annotation - [id number]: Neuropsych effects per patient  Annotation - [id number]: Neuropsych effects per patient  Annotation - [id number]: Neuropsych effects per patient   Latex Dermatitis and Rash     Other reaction(s): Dermatitis    Penicillins Hives       Vitals  There were no vitals filed for this visit      Physical Exam  Physical Exam  Vitals and nursing note reviewed  Constitutional:       General: She is awake  She is not in acute distress  Appearance: Normal appearance  She is well-developed, well-groomed and overweight  She is not ill-appearing, toxic-appearing or diaphoretic  HENT:      Head: Normocephalic and atraumatic  Cardiovascular:      Rate and Rhythm: Normal rate and regular rhythm  Pulses: Normal pulses  Heart sounds: Normal heart sounds  No murmur heard  Pulmonary:      Effort: Pulmonary effort is normal       Breath sounds: Normal breath sounds  Abdominal:      General: Bowel sounds are normal       Palpations: Abdomen is soft  Tenderness: There is right CVA tenderness and left CVA tenderness  Musculoskeletal:         General: Normal range of motion  Cervical back: Normal range of motion  Skin:     General: Skin is warm and dry  Capillary Refill: Capillary refill takes less than 2 seconds  Neurological:      General: No focal deficit present  Mental Status: She is alert and oriented to person, place, and time  Mental status is at baseline  Psychiatric:         Attention and Perception: Attention and perception normal          Mood and Affect: Affect normal  Mood is anxious  Behavior: Behavior normal  Behavior is cooperative  Thought Content:  Thought content normal          Judgment: Judgment normal

## 2022-05-13 NOTE — H&P (VIEW-ONLY)
5/13/2022  Adele Dean  1965  [id number]    Assessment  1 Nephrolithiasis    Urine culture sent from office    Educational packet provided   Procedure discussed at length     Discussion  Katina May is a 56 y o  female being managed by Dr Bebo Arroyo  She is scheduled 5/26/22 for left ESWL  We reviewed the risks and benefits of this procedure including but not limited to cardiopulmonary complications, bleeding, infection, damage to nearby structures such as bladder/ureter/kidney, need for nephrostomy tube, need for additional surgeries  Patient verbalized understanding  Consent will be obtained on the day of procedure by performing surgeon  All questions were answered  History of Present Illness  56 y o  female presents today to discuss her upcoming surgery  She has had left flank pain since March 2022 which prompted imaging  She continues to have bilateral flank pain at ths time  She has history of spontaneous passage of stones  Her grandmother and mother have stone history as well  She is hydrating adequately with water  Denies fever, chills, nausea, vomiting, gross hematuria  Most recent xray on 4/4/22 showed 4 mm left upper pole stone  Review of Systems  Review of Systems   Constitutional: Negative for activity change, appetite change, chills, diaphoresis, fatigue and fever  HENT: Negative for congestion, ear pain and sore throat  Eyes: Negative for pain and visual disturbance  Respiratory: Negative for cough and shortness of breath  Cardiovascular: Negative for chest pain and palpitations  Gastrointestinal: Negative for abdominal distention, abdominal pain, diarrhea, nausea and vomiting  Endocrine: Negative  Genitourinary: Positive for flank pain  Negative for decreased urine volume, difficulty urinating, dysuria, hematuria, pelvic pain, urgency, vaginal bleeding, vaginal discharge and vaginal pain  Musculoskeletal: Negative for arthralgias, back pain and gait problem     Skin: Negative for color change, pallor and rash  Allergic/Immunologic: Negative  Neurological: Negative for seizures and syncope  Hematological: Negative  Psychiatric/Behavioral: Negative  All other systems reviewed and are negative  Past Medical History  Past Medical History:   Diagnosis Date    Achalasia     Asthma     Constipation     Depression     Diarrhea 2019    Elevated LFTs     last assessed 10/25/17    Fibromyalgia     Gastric ulcer     GERD (gastroesophageal reflux disease)     H  pylori infection 10/9/2017    [facility] [id number]: Treated, f/u stool antigen negative    Hypothyroidism     Kidney stone     Migraines     Migraines     MICHAEL (obstructive sleep apnea)     last assessed 16    Pneumonia     Sleep apnea 2019    Uses CPAP    Thyroid nodule     last assessed 17       Surgical History  Past Surgical History:   Procedure Laterality Date    BREAST BIOPSY Right 10/31/2017    us bx     SECTION      COLONOSCOPY      ENDOMETRIAL ABLATION      MYOTOMY HELLER LAPAROSCOPIC  2016    Dr Ramsey Pride, Coincident lorena fundoplication    NASAL SINUS SURGERY      OTHER SURGICAL HISTORY      achalasia repair    OVARIAN CYST REMOVAL      PILONIDAL CYST / SINUS EXCISION      NC COLONOSCOPY FLX DX W/COLLJ SPEC WHEN PFRMD N/A 2019    Procedure: COLONOSCOPY;  Surgeon: Day Jorge MD;  [facility]; Service: Gastroenterology    NC CYSTOURETHROSCOPY N/A 2017    Procedure: Elisa Rojas;  Surgeon: Fransisca Abdalla MD;  [facility];  Service: UroGynecology           NC ESOPHAGOGASTRODUODENOSCOPY TRANSORAL DIAGNOSTIC N/A 2017    Procedure: ESOPHAGOGASTRODUODENOSCOPY (EGD); Surgeon: Day Jorge MD;  [facility]; Service: Gastroenterology    NC ESOPHAGOGASTRODUODENOSCOPY TRANSORAL DIAGNOSTIC N/A 2019    Procedure: ESOPHAGOGASTRODUODENOSCOPY (EGD); Surgeon: Day Jorge MD;  [facility];   Service: Gastroenterology    SC POST COLPORRHAPHY,RECTUM/VAGINA N/A 1/19/2017    Procedure: COLPORRHAPHY POSTERIOR;  Surgeon: Murtaza Bates MD;  [facility];  Service: UroGynecology           SC SLING OPER STRES INCONTINENCE N/A 1/19/2017    Procedure: INSERTION PUBOVAGINAL SLING /SINGLE INCISION ;  Surgeon: Murtaza Bates MD;  [facility];  Service: UroGynecology           TONSILLECTOMY      TUBAL LIGATION      UPPER GASTROINTESTINAL ENDOSCOPY      US GUIDED BREAST BIOPSY RIGHT COMPLETE Right 10/31/2017       Family History  Family History   Problem Relation Age of Onset    Cancer Mother     Breast cancer Mother 62    Hypertension Mother     Stroke Father     Diabetes Father     Hypertension Father     Alzheimer's disease Father     Cancer Maternal Grandmother         ovarian    Ovarian cancer Maternal Grandmother         unspecified laterality    Cancer Maternal Grandfather     Hypertension Maternal Grandfather     Tongue cancer Maternal Grandfather     Stroke Paternal Grandmother     Thyroid disease unspecified Brother     Depression Brother     Anxiety disorder Brother     Drug abuse Brother         (pain pills)    Thyroid disease unspecified Maternal Aunt     Colon cancer Maternal Aunt     Breast cancer Family     Diabetes Sister     BRCA1 Negative Sister     BRCA2 Negative Sister     No Known Problems Daughter     No Known Problems Son     No Known Problems Daughter     No Known Problems Son     No Known Problems Maternal Aunt     No Known Problems Paternal Aunt     No Known Problems Paternal Aunt     No Known Problems Paternal Aunt     No Known Problems Paternal Aunt     No Known Problems Paternal Aunt     No Known Problems Paternal Aunt     No Known Problems Maternal Aunt     Alzheimer's disease Cousin     Breast cancer Cousin 43       Social History  Social History     Socioeconomic History    Marital status: Single     Spouse name: Not on file    Number of children: 4    Years of education: Not on file    Highest education level: Not on file   Occupational History    Occupation: Disability since 2019-- due to a job injury    Tobacco Use    Smoking status: Former Smoker     Packs/day: 1 50     Years: 30 00     Pack years: 45 00     Quit date: 2016     Years since quittin 3    Smokeless tobacco: Never Used    Tobacco comment: pt stated quit about a month ago, last assessed 14 per [employer]   Vaping Use    Vaping Use: Never used   Substance and Sexual Activity    Alcohol use: Yes     Comment: socially    Drug use: Never    Sexual activity: Yes     Partners: Male     Birth control/protection: Female Sterilization     Comment: Boyfriend/father of 4 children   Other Topics Concern    Not on file   Social History Narrative    Home: Lives with boyfriend/father of her 4 children and one of their daughters        Children: 2 sons born  and ,  2 daughters born  and         Education:     Social Determinants of Health     Financial Resource Strain: Low Risk     Difficulty of Paying Living Expenses: Not hard at all   Food Insecurity: No Food Insecurity    Worried About [address] in the Last Year: Never true    [facility] in the Last Year: Never true   Transportation Needs: No Transportation Needs    Lack of Transportation (Medical): No    Lack of Transportation (Non-Medical):  No   Physical Activity: Inactive    Days of Exercise per Week: 0 days    Minutes of Exercise per Session: 0 min   Stress: Not on file   Social Connections: Not on file   Intimate Partner Violence: Not on file   Housing Stability: Not on file       Current Medications  Current Outpatient Medications   Medication Sig Dispense Refill    acetaminophen (TYLENOL) 325 mg tablet Every 4 hours as needed 30 tablet 0    Alaway 0 025 % ophthalmic solution Administer 1 drop to both eyes 2 (two) times a day as needed (For discomfort) 5 mL 0    albuterol (2 5 mg/3 mL) 0 083 % nebulizer solution Take 3 mL (2 5 mg total) by nebulization every 6 (six) hours as needed for wheezing or shortness of breath 75 mL 0    albuterol (PROVENTIL HFA,VENTOLIN HFA) 90 mcg/act inhaler Inhale 2 puffs every 4 (four) hours as needed for wheezing or shortness of breath 18 g 1    Alcohol Swabs (Alcohol Prep) 70 % PADS Use daily 200 each 2    Alcohol Swabs (Alcohol Prep) PADS Use 3 (three) times a day Use to test blood sugar 3 times a day 100 each 5    betamethasone dipropionate (DIPROSONE) 0 05 % cream APPLY TO AFFECTED AREA EVERY DAY 30 g 0    bismuth subsalicylate (PEPTO BISMOL) 262 MG chewable tablet Chew 2 tablets      Breo Ellipta 100-25 MCG/INH inhaler Inhale 1 puff daily 1 puff daily 60 blister 2    cetirizine (ZyrTEC) 10 mg tablet Take 1 tablet (10 mg total) by mouth daily 30 tablet 3    cholecalciferol (VITAMIN D3) 1,000 units tablet Take 1 tablet (1,000 Units total) by mouth daily 90 tablet 0    diclofenac sodium (VOLTAREN) 1 % APPLY 4 GRAMS TO AFFECTED AREA(S) THREE TIMES A DAY  3    Diclofenac Sodium (VOLTAREN) 1 %       dicyclomine (BENTYL) 20 mg tablet Take 1 tablet (20 mg total) by mouth every 6 (six) hours as needed (pain) 360 tablet 0    fexofenadine (ALLEGRA) 180 MG tablet       FLUCELVAX QUADRIVALENT  (Patient not taking: Reported on 11/17/2021 )      FLUoxetine (PROzac) 20 mg capsule Take 1 capsule (20 mg total) by mouth daily 90 capsule 0    fluticasone (FLONASE) 50 mcg/act nasal spray 1-2 sprays into each nostril daily 16 g 6    FREESTYLE LITE test strip USE AS DIRECTED 100 strip 8    furosemide (LASIX) 20 mg tablet Take 1 tablet (20 mg total) by mouth daily as needed (leg swelling) 30 tablet 2    glucose monitoring kit (FREESTYLE) monitoring kit Use 1 each as needed (prediabetes blood sugar checks) 1 each 0    hydrOXYzine HCL (ATARAX) 50 mg tablet TAKE 1/2 OR 1 TABLET BY MOUTH THREE TIMES A DAY AS NEEDED FOR ANXIETY OR INSOMNIA 270 tablet 0    ibuprofen (MOTRIN) 800 mg tablet Take 1 tablet (800 mg total) by mouth every 8 (eight) hours as needed for mild pain 30 tablet 0    ipratropium (ATROVENT) 0 02 % nebulizer solution       lamoTRIgine (LaMICtal) 100 mg tablet Take 0 5 tablets (50 mg total) by mouth daily at bedtime 45 tablet 0    Lancets (freestyle) lancets Use as instructed 100 each 3    levothyroxine 125 mcg tablet Take 1 tablet (125 mcg total) by mouth daily 90 tablet 1    lidocaine (LMX) 4 % cream APPLY 1 APPLICATION EXTERNALLY 3 TIMES A DAY AS NEEDED FOR 30 DAYS      meloxicam (MOBIC) 15 mg tablet Take 1 tablet (15 mg total) by mouth daily 30 tablet 6    metFORMIN (GLUCOPHAGE-XR) 500 mg 24 hr tablet TAKE 2 TABLETS (1,000 MG TOTAL) BY MOUTH EVERY MORNING 180 tablet 0    methocarbamol (ROBAXIN) 750 mg tablet Take 750 mg by mouth 3 (three) times a day  0    mometasone (ELOCON) 0 1 % cream Apply topically daily 45 g 0    montelukast (SINGULAIR) 10 mg tablet Take 10 mg by mouth daily  6    omeprazole (PriLOSEC) 40 MG capsule Take 1 tablet by mouth 30 minutes prior to meals (breakfast and dinner) 28 capsule 0    polyethylene glycol (GLYCOLAX) powder Take 17 g by mouth daily Mix 1 capful in 8 ounces of water, juice or tea  578 g 3    SUMAtriptan (IMITREX) 100 mg tablet Take 1 tablet (100 mg total) by mouth once as needed for migraine 10 tablet 1    tiZANidine (ZANAFLEX) 4 mg tablet Take 4 mg by mouth daily at bedtime as needed       No current facility-administered medications for this visit  Allergies  Allergies   Allergen Reactions    Gabapentin Other (See Comments)     Annotation - [id number]: Neuropsych effects per patient  Annotation - [id number]: Neuropsych effects per patient  Annotation - [id number]: Neuropsych effects per patient   Latex Dermatitis and Rash     Other reaction(s): Dermatitis    Penicillins Hives       Vitals  There were no vitals filed for this visit      Physical Exam  Physical Exam  Vitals and nursing note reviewed  Constitutional:       General: She is awake  She is not in acute distress  Appearance: Normal appearance  She is well-developed, well-groomed and overweight  She is not ill-appearing, toxic-appearing or diaphoretic  HENT:      Head: Normocephalic and atraumatic  Cardiovascular:      Rate and Rhythm: Normal rate and regular rhythm  Pulses: Normal pulses  Heart sounds: Normal heart sounds  No murmur heard  Pulmonary:      Effort: Pulmonary effort is normal       Breath sounds: Normal breath sounds  Abdominal:      General: Bowel sounds are normal       Palpations: Abdomen is soft  Tenderness: There is right CVA tenderness and left CVA tenderness  Musculoskeletal:         General: Normal range of motion  Cervical back: Normal range of motion  Skin:     General: Skin is warm and dry  Capillary Refill: Capillary refill takes less than 2 seconds  Neurological:      General: No focal deficit present  Mental Status: She is alert and oriented to person, place, and time  Mental status is at baseline  Psychiatric:         Attention and Perception: Attention and perception normal          Mood and Affect: Affect normal  Mood is anxious  Behavior: Behavior normal  Behavior is cooperative  Thought Content:  Thought content normal          Judgment: Judgment normal

## 2022-05-15 LAB — BACTERIA UR CULT: ABNORMAL

## 2022-05-16 ENCOUNTER — TELEPHONE (OUTPATIENT)
Dept: UROLOGY | Facility: MEDICAL CENTER | Age: 57
End: 2022-05-16

## 2022-05-16 NOTE — TELEPHONE ENCOUNTER
DOS 5/26/22 procedure 25285 [employer] no auth required ref#Chasity 5/16/22 at 10:55am and [facility] no auth required [facility]#[id number]

## 2022-05-18 ENCOUNTER — OFFICE VISIT (OUTPATIENT)
Dept: GASTROENTEROLOGY | Facility: CLINIC | Age: 57
End: 2022-05-18
Payer: MEDICARE

## 2022-05-18 ENCOUNTER — PREP FOR PROCEDURE (OUTPATIENT)
Dept: GASTROENTEROLOGY | Facility: CLINIC | Age: 57
End: 2022-05-18

## 2022-05-18 VITALS
WEIGHT: 219 LBS | DIASTOLIC BLOOD PRESSURE: 70 MMHG | TEMPERATURE: 98.1 F | BODY MASS INDEX: 34.37 KG/M2 | HEIGHT: 67 IN | HEART RATE: 77 BPM | SYSTOLIC BLOOD PRESSURE: 121 MMHG

## 2022-05-18 DIAGNOSIS — K22.0 ACHALASIA: Primary | ICD-10-CM

## 2022-05-18 DIAGNOSIS — K21.9 GASTROESOPHAGEAL REFLUX DISEASE WITHOUT ESOPHAGITIS: Primary | ICD-10-CM

## 2022-05-18 DIAGNOSIS — K22.0 ACHALASIA: ICD-10-CM

## 2022-05-18 PROCEDURE — 99214 OFFICE O/P EST MOD 30 MIN: CPT | Performed by: INTERNAL MEDICINE

## 2022-05-18 NOTE — PROGRESS NOTES
[facility] - Outpatient Follow-up  Danyel Peña 56 y o  female MRN: [id number]  Encounter: [id number]        ASSESSMENT AND PLAN   Patient is a 56year old female with a  Past medical history of achalasia, GERD, IBS with constipation and colon polyps who presents for follow-up    1  Gastroesophageal reflux disease without esophagitis  · Continue prilosec daily    2  Achalasia  · S/p laparoscopic heller myotomy with lorena fundoplication in 2458  · Gets repeated botox injections since then  · She would be a good candidate for POEM because of her age and would not need repeated botox injections  · Will plan for EGD with manometry and consider referral to [facility] based on that    3  IBS with constipation  · Continue miralax daily which is helping her symptoms    4  Colon cancer screening  · Repeat colonoscopy in 1 year due to poor prep this time  · She did have 2 polyps that were removed so there could be more and I explained this to her  · Repeat colonoscopy with 2 day prep    No orders of the defined types were placed in this encounter  HISTORY OF PRESENT ILLNESS     Patient is a 56year old female with a past medical history of achalasia, GERD, IBS with constipation and colon polyps who presents for follow-up  She recently had Botox 2 months ago and reports improvement in her dysphagia  She does report that she has to chew food thoroughly to be able to keep it down  She denies any vomiting currently  She does report abdominal discomfort after eating  Her symptoms overall improved after Botox but get worse in a few months again  She is frustrated because this is a recurrent problem for her  She also reports constipation that gets better with miralax  She denies any other symptoms currently  Her heart burn is well controlled            REVIEW OF SYSTEMS     Negative other than HPI      Historical information     Past Medical History:   Diagnosis Date    Achalasia     Asthma  Constipation     Depression     Diarrhea 2019    Elevated LFTs     last assessed 10/25/17    Fibromyalgia     Gastric ulcer     GERD (gastroesophageal reflux disease)     H  pylori infection 10/9/2017    [facility] [id number]: Treated, f/u stool antigen negative    Hypothyroidism     Kidney stone     Migraines     Migraines     MICHAEL (obstructive sleep apnea)     last assessed 16    Pneumonia     Sleep apnea 2019    Uses CPAP    Thyroid nodule     last assessed 17     Past Surgical History:   Procedure Laterality Date    BREAST BIOPSY Right 10/31/2017    us bx     SECTION      COLONOSCOPY      ENDOMETRIAL ABLATION      MYOTOMY HELLER LAPAROSCOPIC  2016    Dr Parker Sites, Coincident lorena fundoplication    NASAL SINUS SURGERY      OTHER SURGICAL HISTORY      achalasia repair    OVARIAN CYST REMOVAL      PILONIDAL CYST / SINUS EXCISION      AR COLONOSCOPY FLX DX W/COLLJ SPEC WHEN PFRMD N/A 2019    Procedure: COLONOSCOPY;  Surgeon: Jerrell Brantley MD;  [facility]; Service: Gastroenterology    AR CYSTOURETHROSCOPY N/A 2017    Procedure: Dusty Fox;  Surgeon: Gopal Timmons MD;  [facility];  Service: UroGynecology           AR ESOPHAGOGASTRODUODENOSCOPY TRANSORAL DIAGNOSTIC N/A 2017    Procedure: ESOPHAGOGASTRODUODENOSCOPY (EGD); Surgeon: Jerrell Brantley MD;  [facility]; Service: Gastroenterology    AR ESOPHAGOGASTRODUODENOSCOPY TRANSORAL DIAGNOSTIC N/A 2019    Procedure: ESOPHAGOGASTRODUODENOSCOPY (EGD); Surgeon: Jerrell Brantley MD;  [facility];   Service: Gastroenterology    AR POST COLPORRHAPHY,RECTUM/VAGINA N/A 2017    Procedure: COLPORRHAPHY POSTERIOR;  Surgeon: Gopal Timmons MD;  [facility];  Service: UroGynecology           AR SLING OPER STRES INCONTINENCE N/A 2017    Procedure: INSERTION PUBOVAGINAL SLING /SINGLE INCISION ;  Surgeon: Gopal Timmons MD;  [facility]; Service: UroGynecology           TONSILLECTOMY      TUBAL LIGATION      UPPER GASTROINTESTINAL ENDOSCOPY      US GUIDED BREAST BIOPSY RIGHT COMPLETE Right 10/31/2017     Social History   Social History     Substance and Sexual Activity   Alcohol Use Yes    Comment: socially     Social History     Substance and Sexual Activity   Drug Use Never     Social History     Tobacco Use   Smoking Status Former Smoker    Packs/day: 1 50    Years: 30 00    Pack years: 45 00    Quit date:     Years since quittin 3   Smokeless Tobacco Never Used   Tobacco Comment    pt stated quit about a month ago, last assessed 14 per [employer]     Family History   Problem Relation Age of Onset    Cancer Mother     Breast cancer Mother 62    Hypertension Mother     Stroke Father     Diabetes Father     Hypertension Father     Alzheimer's disease Father     Cancer Maternal Grandmother         ovarian    Ovarian cancer Maternal Grandmother         unspecified laterality    Cancer Maternal Grandfather     Hypertension Maternal Grandfather     Tongue cancer Maternal Grandfather     Stroke Paternal Grandmother     Thyroid disease unspecified Brother     Depression Brother     Anxiety disorder Brother     Drug abuse Brother         (pain pills)    Thyroid disease unspecified Maternal Aunt     Colon cancer Maternal Aunt     Breast cancer Family     Diabetes Sister     BRCA1 Negative Sister     BRCA2 Negative Sister     No Known Problems Daughter     No Known Problems Son     No Known Problems Daughter     No Known Problems Son     No Known Problems Maternal Aunt     No Known Problems Paternal Aunt     No Known Problems Paternal Aunt     No Known Problems Paternal Aunt     No Known Problems Paternal Aunt     No Known Problems Paternal Aunt     No Known Problems Paternal Aunt     No Known Problems Maternal Aunt     Alzheimer's disease Cousin     Breast cancer Cousin 42       Allergies Current Outpatient Medications:     acetaminophen (TYLENOL) 325 mg tablet    Alaway 0 025 % ophthalmic solution    albuterol (2 5 mg/3 mL) 0 083 % nebulizer solution    albuterol (PROVENTIL HFA,VENTOLIN HFA) 90 mcg/act inhaler    Alcohol Swabs (Alcohol Prep) 70 % PADS    Alcohol Swabs (Alcohol Prep) PADS    betamethasone dipropionate (DIPROSONE) 0 05 % cream    bismuth subsalicylate (PEPTO BISMOL) 262 MG chewable tablet    Breo Ellipta 100-25 MCG/INH inhaler    cetirizine (ZyrTEC) 10 mg tablet    cholecalciferol (VITAMIN D3) 1,000 units tablet    Diclofenac Sodium (VOLTAREN) 1 %    dicyclomine (BENTYL) 20 mg tablet    fexofenadine (ALLEGRA) 180 MG tablet    FLUCELVAX QUADRIVALENT    FLUoxetine (PROzac) 20 mg capsule    fluticasone (FLONASE) 50 mcg/act nasal spray    FREESTYLE LITE test strip    furosemide (LASIX) 20 mg tablet    glucose monitoring kit (FREESTYLE) monitoring kit    hydrOXYzine HCL (ATARAX) 50 mg tablet    ibuprofen (MOTRIN) 800 mg tablet    ipratropium (ATROVENT) 0 02 % nebulizer solution    lamoTRIgine (LaMICtal) 100 mg tablet    Lancets (freestyle) lancets    levothyroxine 125 mcg tablet    lidocaine (LMX) 4 % cream    meloxicam (MOBIC) 15 mg tablet    metFORMIN (GLUCOPHAGE-XR) 500 mg 24 hr tablet    methocarbamol (ROBAXIN) 750 mg tablet    mometasone (ELOCON) 0 1 % cream    montelukast (SINGULAIR) 10 mg tablet    omeprazole (PriLOSEC) 40 MG capsule    polyethylene glycol (GLYCOLAX) powder    SUMAtriptan (IMITREX) 100 mg tablet    tiZANidine (ZANAFLEX) 4 mg tablet    diclofenac sodium (VOLTAREN) 1 %    Allergies   Allergen Reactions    Gabapentin Other (See Comments)     Annotation - [id number]: Neuropsych effects per patient  Annotation - [id number]: Neuropsych effects per patient  Annotation - [id number]: Neuropsych effects per patient      Latex Dermatitis and Rash     Other reaction(s): Dermatitis    Penicillins Hives           Objective assessment Blood pressure 121/70, pulse 77, temperature 98 1 °F (36 7 °C), temperature source Tympanic, height 5' 7" (1 702 m), weight 99 3 kg (219 lb), last menstrual period 10/11/2020, not currently breastfeeding  Body mass index is 34 3 kg/m²  PHYSICAL EXAM:         General Appearance:   Alert, cooperative, no distress   HEENT:   Normocephalic, atraumatic, anicteric  Neck:  Supple, symmetrical, trachea midline   Lungs:   Clear to auscultation bilaterally; no rales, rhonchi or wheezing; respirations unlabored    Heart[de-identified]   Regular rate and rhythm; no murmur, rub, or gallop  Abdomen:   Soft, non-tender, non-distended; normal bowel sounds; no masses, no organomegaly    Genitalia:   Deferred    Rectal:   Deferred    Extremities:  No cyanosis, clubbing or edema    Pulses:  2+ and symmetric    Skin:  No jaundice, rashes, or lesions    Lymph nodes:  No palpable cervical lymphadenopathy        Lab Results:      No visits with results within 1 Day(s) from this visit  Latest known visit with results is:   Office Visit on 05/13/2022   Component Date Value    Urine Culture 05/13/2022 40,000-49,000 cfu/ml Lactobacillus species (A)         Radiology Results:      EGD Botox    Result Date: 5/5/2022  Narrative: [address] [place] [address] [address] DATE OF SERVICE: 5/05/22 PHYSICIAN(S): Attending: Alvina Cano MD Fellow: Neo Jacques DO INDICATION: Esophageal dysphagia, Achalasia POST-OP DIAGNOSIS: See the impression below  PREPROCEDURE: Informed consent was obtained for the procedure, including sedation  Risks of perforation, hemorrhage, adverse drug reaction and aspiration were discussed  The patient was placed in the left lateral decubitus position  Patient was explained about the risks and benefits of the procedure  Risks including but not limited to bleeding, infection, and perforation were explained in detail   Also explained about less than 100% sensitivity with the exam and other alternatives  DETAILS OF PROCEDURE: Patient was taken to the procedure room where a time out was performed to confirm correct patient and correct procedure  The patient underwent monitored anesthesia care, which was administered by an anesthesia professional  The patient's blood pressure, heart rate, level of consciousness, respirations, oxygen and ETCO2 were monitored throughout the procedure  The scope was introduced through the mouth and advanced to the second part of the duodenum  Retroflexion was performed in the cardia, fundus and incisura  Prior to the procedure, the patient's H  Pylori status was unknown  The patient experienced no blood loss  The procedure was not difficult  The patient tolerated the procedure well  There were no apparent complications  ANESTHESIA INFORMATION: ASA: III Anesthesia Type: IV Sedation with Anesthesia MEDICATIONS: onabotulinumtoxin A (BOTOX) injection 100 Units (Totals for administrations occurring from 0839 to 0927 on 05/05/22) FINDINGS: Mild grade B esophagitis with mucosal breaks measuring 5 mm or more not continuous between folds, covering less than 75% of the circumference in the GE junction  Z-line at 39 cm  Injected 100 total units of Botox in the GE junction using a four-quadrant injection method  Injected 1 cm above the Z-line at 38 cm from the incisors   Mild erythematous mucosa with erosion in the antrum and prepyloric region; performed cold forceps biopsy to rule out H  pylori Moderate edematous, erythematous and ulcerated mucosa with erosion in the duodenal bulb with no bleeding The upper third of the esophagus, middle third of the esophagus and lower third of the esophagus appeared normal  The 2nd part of the duodenum appeared normal  SPECIMENS: ID Type Source Tests Collected by Time Destination 1 : r/o H pylori Tissue Stomach TISSUE EXAM Emmanuel Lira DO 5/5/2022  8:45 AM  2 : cecal polyp - cold snare Tissue Polyp, Colorectal TISSUE EXAM Emmanuel Lira DO 5/5/2022  9:19 AM  3 : Ascending polyp - cold snare Tissue Polyp, Colorectal TISSUE EXAM Carisa Bosch DO 5/5/2022  9:23 AM      Impression: LA grade B esophagitis  Z-line at 39 cm  Performed Botox injection in four-quadrant fashion at 38 cm from the incisors  Total of 100 units injected  Mild erosive gastritis  Performed biopsies to rule out H pylori  Moderate duodenitis  RECOMMENDATION: Await pathology results  If dysphagia symptoms persist, would recommend repeating manometry  Continue PPI  Resume outpatient medications  Minimize NSAID use  Stable for discharge once standard parameters are met  Please call the office at [phone] if you experience any fevers, chills, intractable nausea & vomiting, or worsening abdominal pain  If you are unable to get in contact with a provider, please go to the closest ER for evaluation  You may also call the office if you have any questions or concerns regarding your procedure  ATTENDING ATTESTATION:  I was present throughout the entire procedure from insertion to complete withdrawal of the scope  I performed all interventions myself or oversaw the fellow  Mary Jeffries MD     Colonoscopy    Result Date: 5/5/2022  Narrative: [facility] [address] [place] [address] [phone] DATE OF SERVICE: 5/05/22 PHYSICIAN(S): Attending: Mary Jeffries MD Fellow: Carisa Bosch DO INDICATION: Tubular adenoma of colon POST-OP DIAGNOSIS: See the impression below  HISTORY: Prior colonoscopy: 7 years ago  BOWEL PREPARATION: Miralax/Dulcolax PREPROCEDURE: Informed consent was obtained for the procedure, including sedation  Risks including but not limited to bleeding, infection, perforation, adverse drug reaction and aspiration were explained in detail  Also explained about less than 100% sensitivity with the exam and other alternatives  The patient was placed in the left lateral decubitus position   DETAILS OF PROCEDURE: Patient was taken to the procedure room where a time out was performed to confirm correct patient and correct procedure  The patient underwent monitored anesthesia care, which was administered by an anesthesia professional  The patient's blood pressure, heart rate, level of consciousness, oxygen, respirations and ETCO2 were monitored throughout the procedure  A digital rectal exam was performed  A perianal exam was performed  The scope was introduced through the anus and advanced to the cecum  Retroflexion was performed in the rectum  The quality of bowel preparation was evaluated using the [facility] Bowel Preparation Scale with scores of: right colon = 1, transverse colon = 1, left colon = 1  The total BBPS score was 3  Bowel prep was not adequate  The patient experienced no blood loss  The procedure was moderately difficult due to poor preparation  The patient tolerated the procedure well  There were no apparent complications  ANESTHESIA INFORMATION: ASA: III Anesthesia Type: IV Sedation with Anesthesia MEDICATIONS: onabotulinumtoxin A (BOTOX) injection 100 Units (Totals for administrations occurring from 0839 to 0930 on 05/05/22) FINDINGS: One sessile, adenomatous-appearing polyp measuring smaller than 5 mm in the cecum; completely removed en bloc by cold snare and retrieved specimen One 5 mm sessile, adenomatous-appearing polyp in the ascending colon; completely removed en bloc by cold snare and retrieved specimen Inadequate prep  Thick liquid stool throughout the entire colon  Small lesions could have been missed   EVENTS: Procedure Events Event Event Time ENDO SCOPE OUT TIME 5/5/2022  8:59 AM ENDO CECUM REACHED 5/5/2022  9:13 AM ENDO SCOPE OUT TIME 5/5/2022  9:29 AM SPECIMENS: ID Type Source Tests Collected by Time Destination 1 : r/o H pylori Tissue Stomach TISSUE EXAM Gisela Becker DO 5/5/2022  8:45 AM  2 : cecal polyp - cold snare Tissue Polyp, Colorectal TISSUE EXAM Gisela Becker DO 5/5/2022  9:19 AM  3 : Ascending polyp - cold snare Tissue Polyp, Colorectal TISSUE EXAM Mala Day DO 5/5/2022  9:23 AM  EQUIPMENT: Colonoscope - ENDOCUFF VISION LRG GREEN ID [id number]     Impression: Total of 2 sub-centimeter polyps removed  Inadequate prep with thick liquid stool throughout the colon  Small lesions could have been missed  Limited evaluation of the remainder of the visualized colon appeared normal  RECOMMENDATION: Await pathology results Repeat colonoscopy in 1 year due to an inadequate bowel preparation  Resume outpatient medications  Stable for discharge once standard parameters are met  Please call the office at [phone] if you experience any fevers, chills, intractable nausea & vomiting, or worsening abdominal pain  If you are unable to get in contact with a provider, please go to the closest ER for evaluation  You may also call the office if you have any questions or concerns regarding your procedure  ATTENDING ATTESTATION:  I was present throughout the entire procedure from insertion to complete withdrawal of the scope  I performed all interventions myself or oversaw the fellow     MD Philippe Sheffield MD  [facility] Fellow

## 2022-05-19 DIAGNOSIS — A04.8 HELICOBACTER PYLORI (H. PYLORI) INFECTION: Primary | ICD-10-CM

## 2022-05-19 RX ORDER — DOXYCYCLINE 100 MG/1
100 TABLET ORAL 2 TIMES DAILY
Qty: 20 TABLET | Refills: 0 | Status: SHIPPED | OUTPATIENT
Start: 2022-05-19 | End: 2022-05-29

## 2022-05-19 RX ORDER — BISMUTH SUBSALICYLATE 262 MG/1
524 TABLET, CHEWABLE ORAL
Qty: 80 TABLET | Refills: 0 | Status: SHIPPED | OUTPATIENT
Start: 2022-05-19 | End: 2022-05-29

## 2022-05-19 RX ORDER — OMEPRAZOLE 40 MG/1
40 CAPSULE, DELAYED RELEASE ORAL
Qty: 20 CAPSULE | Refills: 0 | Status: SHIPPED | OUTPATIENT
Start: 2022-05-19 | End: 2022-05-29

## 2022-05-19 RX ORDER — METRONIDAZOLE 500 MG/1
500 TABLET ORAL 3 TIMES DAILY
Qty: 30 TABLET | Refills: 0 | Status: SHIPPED | OUTPATIENT
Start: 2022-05-19 | End: 2022-05-29

## 2022-05-20 ENCOUNTER — TELEPHONE (OUTPATIENT)
Dept: GASTROENTEROLOGY | Facility: CLINIC | Age: 57
End: 2022-05-20

## 2022-05-20 NOTE — TELEPHONE ENCOUNTER
Patients GI provider:  Dr Vick Monsivais    Number to return call: [phone]    Reason for call: Pt returning MO's call for Colonoscopy results     Scheduled procedure/appointment date if applicable: Apt/procedure 5/5/22

## 2022-05-20 NOTE — TELEPHONE ENCOUNTER
Reviewed biopsy results, H Pylori medications, stool test instructions, and colonoscopy recall instructions with pt  Pt aware meds sent to [facility] as well as needs to stop omeprazole 2 weeks prior to stool test  Pt is currently taking omeprazole daily, advised to take new script sent to pharmacy while on 10 day regimen and then go back to omeprazole daily once finished  Pt verbalized understanding of all instructions and had no further questions

## 2022-05-23 NOTE — PRE-PROCEDURE INSTRUCTIONS
Pre-Surgery Instructions:   Medication Instructions    acetaminophen (TYLENOL) 325 mg tablet Uses PRN- OK to take day of surgery    Alaway 0 025 % ophthalmic solution Take day of surgery   albuterol (2 5 mg/3 mL) 0 083 % nebulizer solution Uses PRN- OK to take day of surgery    albuterol (PROVENTIL HFA,VENTOLIN HFA) 90 mcg/act inhaler Uses PRN- OK to take day of surgery    betamethasone dipropionate (DIPROSONE) 0 05 % cream Hold day of surgery   bismuth subsalicylate (PEPTO BISMOL) 262 MG chewable tablet Hold day of surgery   Breo Ellipta 100-25 MCG/INH inhaler Take day of surgery   cetirizine (ZyrTEC) 10 mg tablet Take day of surgery   cholecalciferol (VITAMIN D3) 1,000 units tablet Stop taking 3 days prior to surgery   diclofenac sodium (VOLTAREN) 1 % Hold day of surgery   dicyclomine (BENTYL) 20 mg tablet Take day of surgery   doxycycline (ADOXA) 100 MG tablet Hold day of surgery   fexofenadine (ALLEGRA) 180 MG tablet Take day of surgery   FLUoxetine (PROzac) 20 mg capsule Take day of surgery   fluticasone (FLONASE) 50 mcg/act nasal spray Take day of surgery   furosemide (LASIX) 20 mg tablet Hold day of surgery   hydrOXYzine HCL (ATARAX) 50 mg tablet Take day of surgery   ibuprofen (MOTRIN) 800 mg tablet Stop taking 5 days prior to surgery   ipratropium (ATROVENT) 0 02 % nebulizer solution Take day of surgery   lamoTRIgine (LaMICtal) 100 mg tablet Uses PRN- OK to take day of surgery    levothyroxine 125 mcg tablet Take day of surgery   lidocaine (LMX) 4 % cream Hold day of surgery   meloxicam (MOBIC) 15 mg tablet Stop taking 5 days prior to surgery   metFORMIN (GLUCOPHAGE-XR) 500 mg 24 hr tablet Hold day of surgery   methocarbamol (ROBAXIN) 750 mg tablet Uses PRN- OK to take day of surgery    metroNIDAZOLE (FLAGYL) 500 mg tablet Hold day of surgery   mometasone (ELOCON) 0 1 % cream Hold day of surgery      montelukast (SINGULAIR) 10 mg tablet Take day of surgery   omeprazole (PriLOSEC) 40 MG capsule Take day of surgery   polyethylene glycol (GLYCOLAX) powder Hold day of surgery   SUMAtriptan (IMITREX) 100 mg tablet Uses PRN- OK to take day of surgery    tiZANidine (ZANAFLEX) 4 mg tablet Uses PRN- OK to take day of surgery    All pre-op instructions reviewed as per [facility] My Surgery Experience w/ pt verb understanding  Inst NPO post MN night before sx except sips water w/ am meds as above  States she prefers to take no oral meds morning of sx  Instructed to avoid all ASA/NSAIDs and OTC Vit/Supp from now until after surgery per anesthesia guidelines  Tylenol ok prn  Inst to use inhalers as needed & bring w/ her on DOS  States received no specific pre-op showering instructions from surgeon office, inst to use antibacterial soap, reviewed process at time of call  Current hospital visitor & masking policy reviewed  Inst will receive phone call w/ time to report on DOS btwn 2-8 pm afternoon/evening before surgery from hospital nursing staff  Pt  Verbalized an understanding of all instructions reviewed and offers no concerns at this time

## 2022-05-24 ENCOUNTER — HOSPITAL ENCOUNTER (OUTPATIENT)
Dept: RADIOLOGY | Age: 57
Discharge: HOME | End: 2022-05-24
Payer: MEDICARE

## 2022-05-24 ENCOUNTER — TELEPHONE (OUTPATIENT)
Dept: GASTROENTEROLOGY | Facility: CLINIC | Age: 57
End: 2022-05-24

## 2022-05-24 DIAGNOSIS — R10.30 LOWER ABDOMINAL PAIN: ICD-10-CM

## 2022-05-24 PROCEDURE — 76856 US EXAM PELVIC COMPLETE: CPT

## 2022-05-24 PROCEDURE — 76700 US EXAM ABDOM COMPLETE: CPT

## 2022-05-24 PROCEDURE — 76830 TRANSVAGINAL US NON-OB: CPT

## 2022-05-24 NOTE — TELEPHONE ENCOUNTER
Patients GI provider:  Dr Ewa Salomon    Number to return call: [phone]    Reason for call: Pt calling because she was given omeprazole by Dr Ewa Salomon  Pharmacy gave her the paperwork and she read it but also the pharmacist told her it isn't good for her to take because she has kidney issues  Asking for a nurse to call her  Thank you!      Scheduled procedure/appointment date if applicable: Apt/procedure

## 2022-05-24 NOTE — TELEPHONE ENCOUNTER
Please let pt know that she has H pylori, which requires PPI usage for eradication  We recommend she stay on this during the course of her tx as failure to properly tx H pylori can lead to gastric cancer  Please also let pt know that there is only an association between PPI and kidney disease; there is no documented cause-and-effect relationship  There is also no hx of CKD that I could find in this pt's chart so I again recommend she continues this regimen  After her H pylori tx is complete, she can discuss with Dr Nicholas Joens PPI usage at her upcoming appt if she still has concerns  Thanks

## 2022-05-24 NOTE — TELEPHONE ENCOUNTER
Patient returned our call       We went over advisement from Russel  We went over each prescribed medication and dosing instructions   Patient understood she will have to wait 30 days hold PPI meds and re test stool    Patient expressed she does not want to be on PPI long term     Patient understood and will complete treatment

## 2022-05-24 NOTE — TELEPHONE ENCOUNTER
Left message for patient to schedule egd with manometry  GI lab has approved 6/30/22 with Dr Terry Moreland  Gave my direct line to call back on

## 2022-05-24 NOTE — TELEPHONE ENCOUNTER
Scheduled date of EGD with manometry (as of today): 6/30/22  Physician performing EGD: Dr Abraham Back  Location of EGD: [facility]   Instructions reviewed with patient by: Kirby Abts - prep instructions mailed to the patient  Clearances:  n/a

## 2022-05-25 ENCOUNTER — ANESTHESIA EVENT (OUTPATIENT)
Dept: PERIOP | Facility: HOSPITAL | Age: 57
End: 2022-05-25
Payer: MEDICARE

## 2022-05-26 ENCOUNTER — HOSPITAL ENCOUNTER (OUTPATIENT)
Facility: HOSPITAL | Age: 57
Setting detail: OUTPATIENT SURGERY
Discharge: HOME | End: 2022-05-26
Attending: UROLOGY | Admitting: UROLOGY
Payer: MEDICARE

## 2022-05-26 ENCOUNTER — NURSE TRIAGE (OUTPATIENT)
Dept: OTHER | Facility: OTHER | Age: 57
End: 2022-05-26

## 2022-05-26 ENCOUNTER — ANESTHESIA (OUTPATIENT)
Dept: PERIOP | Facility: HOSPITAL | Age: 57
End: 2022-05-26
Payer: MEDICARE

## 2022-05-26 VITALS
HEART RATE: 94 BPM | TEMPERATURE: 97.3 F | RESPIRATION RATE: 18 BRPM | WEIGHT: 220 LBS | BODY MASS INDEX: 34.53 KG/M2 | HEIGHT: 67 IN | SYSTOLIC BLOOD PRESSURE: 119 MMHG | OXYGEN SATURATION: 94 % | DIASTOLIC BLOOD PRESSURE: 75 MMHG

## 2022-05-26 DIAGNOSIS — N20.0 LEFT NEPHROLITHIASIS: Primary | ICD-10-CM

## 2022-05-26 LAB — GLUCOSE SERPL-MCNC: 92 MG/DL (ref 65–140)

## 2022-05-26 PROCEDURE — 82948 REAGENT STRIP/BLOOD GLUCOSE: CPT

## 2022-05-26 PROCEDURE — 50590 FRAGMENTING OF KIDNEY STONE: CPT | Performed by: UROLOGY

## 2022-05-26 RX ORDER — MIDAZOLAM HYDROCHLORIDE 2 MG/2ML
INJECTION, SOLUTION INTRAMUSCULAR; INTRAVENOUS AS NEEDED
Status: DISCONTINUED | OUTPATIENT
Start: 2022-05-26 | End: 2022-05-26

## 2022-05-26 RX ORDER — PROPOFOL 10 MG/ML
INJECTION, EMULSION INTRAVENOUS AS NEEDED
Status: DISCONTINUED | OUTPATIENT
Start: 2022-05-26 | End: 2022-05-26

## 2022-05-26 RX ORDER — SODIUM CHLORIDE, SODIUM LACTATE, POTASSIUM CHLORIDE, CALCIUM CHLORIDE 600; 310; 30; 20 MG/100ML; MG/100ML; MG/100ML; MG/100ML
100 INJECTION, SOLUTION INTRAVENOUS CONTINUOUS
Status: DISCONTINUED | OUTPATIENT
Start: 2022-05-26 | End: 2022-05-26 | Stop reason: HOSPADM

## 2022-05-26 RX ORDER — ONDANSETRON 2 MG/ML
4 INJECTION INTRAMUSCULAR; INTRAVENOUS EVERY 6 HOURS PRN
Status: DISCONTINUED | OUTPATIENT
Start: 2022-05-26 | End: 2022-05-26 | Stop reason: HOSPADM

## 2022-05-26 RX ORDER — SODIUM CHLORIDE, SODIUM LACTATE, POTASSIUM CHLORIDE, CALCIUM CHLORIDE 600; 310; 30; 20 MG/100ML; MG/100ML; MG/100ML; MG/100ML
INJECTION, SOLUTION INTRAVENOUS CONTINUOUS PRN
Status: DISCONTINUED | OUTPATIENT
Start: 2022-05-26 | End: 2022-05-26

## 2022-05-26 RX ORDER — LIDOCAINE HYDROCHLORIDE 10 MG/ML
INJECTION, SOLUTION EPIDURAL; INFILTRATION; INTRACAUDAL; PERINEURAL AS NEEDED
Status: DISCONTINUED | OUTPATIENT
Start: 2022-05-26 | End: 2022-05-26

## 2022-05-26 RX ORDER — DEXAMETHASONE SODIUM PHOSPHATE 10 MG/ML
INJECTION, SOLUTION INTRAMUSCULAR; INTRAVENOUS AS NEEDED
Status: DISCONTINUED | OUTPATIENT
Start: 2022-05-26 | End: 2022-05-26

## 2022-05-26 RX ORDER — ALBUTEROL SULFATE 2.5 MG/3ML
2.5 SOLUTION RESPIRATORY (INHALATION) ONCE AS NEEDED
Status: DISCONTINUED | OUTPATIENT
Start: 2022-05-26 | End: 2022-05-26 | Stop reason: HOSPADM

## 2022-05-26 RX ORDER — KETOROLAC TROMETHAMINE 30 MG/ML
15 INJECTION, SOLUTION INTRAMUSCULAR; INTRAVENOUS EVERY 6 HOURS SCHEDULED
Status: DISCONTINUED | OUTPATIENT
Start: 2022-05-26 | End: 2022-05-26 | Stop reason: HOSPADM

## 2022-05-26 RX ORDER — OXYCODONE HYDROCHLORIDE 5 MG/1
5 TABLET ORAL EVERY 4 HOURS PRN
Status: DISCONTINUED | OUTPATIENT
Start: 2022-05-26 | End: 2022-05-26 | Stop reason: HOSPADM

## 2022-05-26 RX ORDER — HYDROMORPHONE HCL/PF 1 MG/ML
0.5 SYRINGE (ML) INJECTION
Status: DISCONTINUED | OUTPATIENT
Start: 2022-05-26 | End: 2022-05-26 | Stop reason: HOSPADM

## 2022-05-26 RX ORDER — ACETAMINOPHEN 325 MG/1
650 TABLET ORAL EVERY 6 HOURS PRN
Status: DISCONTINUED | OUTPATIENT
Start: 2022-05-26 | End: 2022-05-26 | Stop reason: HOSPADM

## 2022-05-26 RX ORDER — ONDANSETRON 2 MG/ML
INJECTION INTRAMUSCULAR; INTRAVENOUS AS NEEDED
Status: DISCONTINUED | OUTPATIENT
Start: 2022-05-26 | End: 2022-05-26

## 2022-05-26 RX ORDER — KETOROLAC TROMETHAMINE 10 MG/1
10 TABLET, FILM COATED ORAL EVERY 6 HOURS PRN
Qty: 20 TABLET | Refills: 0 | Status: SHIPPED | OUTPATIENT
Start: 2022-05-26 | End: 2022-05-31

## 2022-05-26 RX ORDER — SODIUM CHLORIDE, SODIUM LACTATE, POTASSIUM CHLORIDE, CALCIUM CHLORIDE 600; 310; 30; 20 MG/100ML; MG/100ML; MG/100ML; MG/100ML
125 INJECTION, SOLUTION INTRAVENOUS CONTINUOUS
Status: DISCONTINUED | OUTPATIENT
Start: 2022-05-26 | End: 2022-05-26 | Stop reason: HOSPADM

## 2022-05-26 RX ORDER — FENTANYL CITRATE 50 UG/ML
INJECTION, SOLUTION INTRAMUSCULAR; INTRAVENOUS AS NEEDED
Status: DISCONTINUED | OUTPATIENT
Start: 2022-05-26 | End: 2022-05-26

## 2022-05-26 RX ORDER — FENTANYL CITRATE/PF 50 MCG/ML
50 SYRINGE (ML) INJECTION
Status: DISCONTINUED | OUTPATIENT
Start: 2022-05-26 | End: 2022-05-26 | Stop reason: HOSPADM

## 2022-05-26 RX ORDER — ONDANSETRON 2 MG/ML
4 INJECTION INTRAMUSCULAR; INTRAVENOUS ONCE AS NEEDED
Status: DISCONTINUED | OUTPATIENT
Start: 2022-05-26 | End: 2022-05-26 | Stop reason: HOSPADM

## 2022-05-26 RX ORDER — KETOROLAC TROMETHAMINE 30 MG/ML
INJECTION, SOLUTION INTRAMUSCULAR; INTRAVENOUS AS NEEDED
Status: DISCONTINUED | OUTPATIENT
Start: 2022-05-26 | End: 2022-05-26

## 2022-05-26 RX ORDER — MAGNESIUM HYDROXIDE 1200 MG/15ML
LIQUID ORAL AS NEEDED
Status: DISCONTINUED | OUTPATIENT
Start: 2022-05-26 | End: 2022-05-26 | Stop reason: HOSPADM

## 2022-05-26 RX ORDER — LEVOFLOXACIN 5 MG/ML
500 INJECTION, SOLUTION INTRAVENOUS ONCE
Status: COMPLETED | OUTPATIENT
Start: 2022-05-26 | End: 2022-05-26

## 2022-05-26 RX ADMIN — MIDAZOLAM 2 MG: 1 INJECTION INTRAMUSCULAR; INTRAVENOUS at 11:21

## 2022-05-26 RX ADMIN — DEXAMETHASONE SODIUM PHOSPHATE 5 MG: 10 INJECTION, SOLUTION INTRAMUSCULAR; INTRAVENOUS at 11:26

## 2022-05-26 RX ADMIN — ONDANSETRON 4 MG: 2 INJECTION INTRAMUSCULAR; INTRAVENOUS at 11:26

## 2022-05-26 RX ADMIN — LEVOFLOXACIN: 500 INJECTION, SOLUTION INTRAVENOUS at 11:20

## 2022-05-26 RX ADMIN — FENTANYL CITRATE 50 MCG: 50 INJECTION, SOLUTION INTRAMUSCULAR; INTRAVENOUS at 11:31

## 2022-05-26 RX ADMIN — KETOROLAC TROMETHAMINE 15 MG: 30 INJECTION, SOLUTION INTRAMUSCULAR; INTRAVENOUS at 11:46

## 2022-05-26 RX ADMIN — PROPOFOL 200 MG: 10 INJECTION, EMULSION INTRAVENOUS at 11:26

## 2022-05-26 RX ADMIN — LIDOCAINE HYDROCHLORIDE 50 MG: 10 INJECTION, SOLUTION EPIDURAL; INFILTRATION; INTRACAUDAL; PERINEURAL at 11:26

## 2022-05-26 RX ADMIN — SODIUM CHLORIDE, SODIUM LACTATE, POTASSIUM CHLORIDE, AND CALCIUM CHLORIDE: .6; .31; .03; .02 INJECTION, SOLUTION INTRAVENOUS at 11:12

## 2022-05-26 NOTE — INTERVAL H&P NOTE
H&P reviewed  After examining the patient I find no changes in the patients condition since the H&P had been written  Vitals:    05/26/22 0910   BP: 134/74   Pulse: 72   Resp: 20   Temp: 98 °F (36 7 °C)   SpO2: 96%   Procedure reviewed with the patient in the holding area  Risks were discussed including bleeding, infection, failure to break up the stone, needing additional procedures and pain with the  fragments  Consent form was signed  Laterality marked-left

## 2022-05-26 NOTE — DISCHARGE INSTR - AVS FIRST PAGE
Rest for the next few days and drink plenty of fluids  Some blood in the urine is normal   Use the urine strainer provided  Use Tylenol for pain  For more severe pain you can use the Ketoralac prescribed  Call the office for fever, severe pain not relieved by the medication or heavy urinary bleeding

## 2022-05-26 NOTE — NURSING NOTE
Dr Robison Kid in to see patient  Discharge instructions reviewed with patient and     Ready for discharge

## 2022-05-26 NOTE — ANESTHESIA PREPROCEDURE EVALUATION
Procedure:  ESWL (Left Flank)    Relevant Problems   CARDIO   (+) Breast pain, right   (+) Headache, migraine   (+) Hypertension   (+) Intractable chronic migraine without aura and without status migrainosus      ENDO   (+) Hypothyroidism      GI/HEPATIC   (+) Esophageal dysphagia   (+) Gastroesophageal reflux disease      /RENAL   (+) Left nephrolithiasis      MUSCULOSKELETAL   (+) Arthritis   (+) Chronic low back pain   (+) Spondylosis of lumbar region without myelopathy or radiculopathy      NEURO/PSYCH   (+) Anxiety   (+) Chronic low back pain   (+) Headache, migraine   (+) Intractable chronic migraine without aura and without status migrainosus   (+) Other chronic pain      PULMONARY   (+) Asthma   (+) Moderate persistent asthma with exacerbation   (+) MICHAEL (obstructive sleep apnea)      Respiratory   (+) Chronic sinusitis with recurrent bronchitis      Endocrine   (+) Hashimoto's thyroiditis      Other   (+) Bipolar 2 disorder (HCC)   (+) Class 1 obesity   (+) History of endometrial ablation   (+) Prediabetes        Physical Exam    Airway    Mallampati score: I  TM Distance: >3 FB  Neck ROM: full     Dental       Cardiovascular      Pulmonary      Other Findings        Anesthesia Plan  ASA Score- 2     Anesthesia Type- general with ASA Monitors  Additional Monitors:   Airway Plan: LMA  Plan Factors-Exercise tolerance (METS): >4 METS  Chart reviewed  Patient summary reviewed  Patient is not a current smoker  Patient did not smoke on day of surgery  Induction- intravenous  Postoperative Plan- Plan for postoperative opioid use  Informed Consent- Anesthetic plan and risks discussed with patient  I personally reviewed this patient with the CRNA  Discussed and agreed on the Anesthesia Plan with the CRNA  Suzette Console

## 2022-05-26 NOTE — TELEPHONE ENCOUNTER
Reason for Disposition   [1] Prescription prescribed recently is not at pharmacy AND [2] triager has access to patient's EMR AND [3] prescription is recorded in the EMR    Answer Assessment - Initial Assessment Questions  1  NAME of MEDICATION: "What medicine are you calling about?"      toradol  2  QUESTION: "What is your question?" (e g , medication refill, side effect)      Was the prescription sent to pharmacy  3  PRESCRIBING HCP: "Who prescribed it?" Reason: if prescribed by specialist, call should be referred to that group  Dr Consuelo Barbosa  4  SYMPTOMS: "Do you have any symptoms?"      Unsure  5  SEVERITY: If symptoms are present, ask "Are they mild, moderate or severe?"      Unsure  6   PREGNANCY:  "Is there any chance that you are pregnant?" "When was your last menstrual period?"      No    Protocols used: MEDICATION QUESTION CALL-ADULT-

## 2022-05-26 NOTE — TELEPHONE ENCOUNTER
Regarding: Meds not at pharmacy  ----- Message from Clem Cheng sent at 5/26/2022  6:06 PM EDT -----  "I had surgery and the dr said he was sending my medication to the pharmacy, but they didn't receive it "

## 2022-05-26 NOTE — ANESTHESIA POSTPROCEDURE EVALUATION
Post-Op Assessment Note    CV Status:  Stable  Pain Score: 0    Pain management: adequate     Mental Status:  Alert and awake   Hydration Status:  Euvolemic   PONV Controlled:  Controlled   Airway Patency:  Patent      Post Op Vitals Reviewed: Yes      Staff: Anesthesiologist, CRNA         No complications documented      /74 (05/26/22 1200)    Temp (!) 97 3 °F (36 3 °C) (05/26/22 1200)    Pulse 75 (05/26/22 1200)   Resp 16 (05/26/22 1200)    SpO2 96 % (05/26/22 1200)

## 2022-05-26 NOTE — OP NOTE
OPERATIVE REPORT  PATIENT NAME: Sushma Wood    :  1965  MRN: [id number]  Pt Location:  OR ROOM 10    SURGERY DATE: 2022    Surgeon(s) and Role:     * Philip Breen MD - Primary    Preop Diagnosis:  Nephrolithiasis [N20 0]    Post-Op Diagnosis Codes:     * Left renal stone [N20 0]    Procedure(s) (LRB):  ESWL (Left)    Specimen(s):  * No specimens in log *    Estimated Blood Loss:   Minimal    Drains:  * No LDAs found *    Anesthesia Type:   General    Operative Indications:  Nephrolithiasis [N20 0]  Left kidney stone    Operative Findings: The stones well seen fluoroscopically the beginning of the procedure  After 2000 shocks the stone was no longer visible and the procedure was terminated  Complications:   None    Procedure and Technique:  The patient was brought to the operating room properly identified, and placed on the lithotripsy table  A preliminary fluoroscopic image was obtained and the stone visualized  General anesthesia was administered  Compression boots were then employed  Intravenous antibiotic administered  An appropriate time-out performed  The stone was then visualized utilizing multiplanar fluoroscopy and placed in the focal zone of the Lithotripter  Extracorporeal shockwave lithotripsy then commenced at a power setting of 1  The power was gradually raised to a maximum of 6  Fluoroscopy was performed intermittently to ensure the stone remained in the focal zone of the Lithotripter  The stone was seen to break up nicely  After 2000 shocks were administered the stone was no longer visible and the procedure was terminated  The patient tolerated the procedure well  The patient was awakened from anesthesia and taken to the recovery room in satisfactory condition       I was present for the entire procedure    Patient Disposition:  PACU       SIGNATURE: Philip Breen MD  DATE: May 26, 2022  TIME: 12:05 PM

## 2022-05-27 ENCOUNTER — OFFICE VISIT (OUTPATIENT)
Dept: OBGYN CLINIC | Facility: CLINIC | Age: 57
End: 2022-05-27

## 2022-05-27 VITALS
BODY MASS INDEX: 35.63 KG/M2 | SYSTOLIC BLOOD PRESSURE: 143 MMHG | WEIGHT: 227 LBS | HEIGHT: 67 IN | HEART RATE: 65 BPM | DIASTOLIC BLOOD PRESSURE: 70 MMHG

## 2022-05-27 DIAGNOSIS — R10.30 LOWER ABDOMINAL PAIN: ICD-10-CM

## 2022-05-27 DIAGNOSIS — R10.2 PELVIC PAIN: ICD-10-CM

## 2022-05-27 DIAGNOSIS — K82.4 GALLBLADDER POLYP: Primary | ICD-10-CM

## 2022-05-27 DIAGNOSIS — N81.4 CYSTOCELE WITH PROLAPSE: Primary | ICD-10-CM

## 2022-05-27 PROCEDURE — 99203 OFFICE O/P NEW LOW 30 MIN: CPT | Performed by: OBSTETRICS & GYNECOLOGY

## 2022-05-27 RX ORDER — MEDROXYPROGESTERONE ACETATE 150 MG/ML
150 INJECTION, SUSPENSION INTRAMUSCULAR
Qty: 1 ML | Refills: 2 | Status: SHIPPED | OUTPATIENT
Start: 2022-05-27

## 2022-05-27 NOTE — TELEPHONE ENCOUNTER
Called [facility] and LM that Dr Kenya Siddiqui had placed an order for toradal an patient states not there   Gave medication dn doctor;s information

## 2022-05-27 NOTE — PROGRESS NOTES
Subjective:     Sherly Thurston is a 56 y o   female with complex past medical history who presents with lower abdominal/ pelvic pain bilaterally  She has had multiple GYN surgical procedures including  section (x1), tubal ligation, ovarian cystectomy (x2), endometrial ablation (x2), pubovaginal sling & posterior colporrhaphy (2017)  She reports that she went to a "private doctor" in 2017 who did her ovarian cystectomy and endometrial ablation  Patient then reports that there was accusation of fraud against the provider from the insurance company  She then had a repeat procedure performed by the same provider and reports that he did another ablation and removed more ovarian cysts  She reports persistent bilateral pelvic pain since "when that rosalina did the operation again"  She reports that it is constant but worsens on occasion "like my period is coming"  She reports cramping  She states that when she goes to the bathroom to push, she feels like the pain is "going to my butt"  She reports exacerbation with bending or moving quickly  She is unable to wear tight pants  She is sexually active with her  but states that it is difficult  She also reports occasionally seeing a "pink ball in there" and she has to push it back  She states that it is rough and not soft  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  She states that she has tried a pessary in the past and didn't like it because it was hard to walk  She had 3 vaginal deliveries and 1  section  She denies any episodes of vaginal bleeding since her "second ablation"  She has a history of thyroid disease and has migraines with aura for which she uses botox          Patient Active Problem List   Diagnosis    Mixed incontinence    Stress incontinence in female    MICHAEL (obstructive sleep apnea)    Hypothyroidism    Irritable bowel syndrome with constipation    Chronic low back pain    Asthma    Arthritis    Gastritis    Rectocele    Cystocele    Hypermobility of urethra    Achalasia    Bilateral edema of lower extremity    Breast pain, right    Chronic sinusitis with recurrent bronchitis    Gastroesophageal reflux disease    Headache, migraine    Intractable chronic migraine without aura and without status migrainosus    Breast mass    Class 1 obesity    Other chronic pain    Spondylosis of lumbar region without myelopathy or radiculopathy    Thyroiditis    Tubular adenoma of colon    Food allergy    POP-Q stage 2 cystocele    Moderate persistent asthma with exacerbation    Prediabetes    Need for shingles vaccine    Discomfort of right ear    Trochanteric bursitis of right hip    Pain in right hip    Numbness of toes    Acute viral bronchitis    Overweight    Hashimoto's thyroiditis    Lower abdominal pain    Left ovarian cyst    History of endometrial ablation    Left nephrolithiasis    Abdominal bloating    Achalasia of digestive tract    Esophageal dysphagia    Hypertension    Anxiety    Insomnia    Bipolar 2 disorder ([facility] [address] )    COVID-19    Encounter for long-term (current) use of other medications    Sebaceous cyst of right axilla    Pelvic pain     Past Medical History:   Diagnosis Date    Achalasia     Asthma     Constipation     CPAP (continuous positive airway pressure) dependence     Depression     Diarrhea 02/28/2019    Elevated LFTs     last assessed 10/25/17    Fibromyalgia     Gastric ulcer     GERD (gastroesophageal reflux disease)     H  pylori infection 10/09/2017    [facility] - [id number]: Treated, f/u stool antigen negative    Hypertension     Hypothyroidism     Kidney stone     Migraines     Migraines     MICHAEL (obstructive sleep apnea)     last assessed 7/21/16    Pneumonia     Sleep apnea 01/05/2019    Uses CPAP    Thyroid nodule     last assessed 11/6/17       Objective:    Vitals: Blood pressure 143/70, pulse 65, height 5' 7" (1 702 m), weight 103 kg (227 lb), last menstrual period 10/11/2020, not currently breastfeeding  Body mass index is 35 55 kg/m²  Physical Exam  Vitals reviewed  Constitutional:       General: She is not in acute distress  Appearance: She is well-developed  She is not ill-appearing, toxic-appearing or diaphoretic  Cardiovascular:      Rate and Rhythm: Normal rate  Pulmonary:      Effort: Pulmonary effort is normal  No respiratory distress  Abdominal:      Palpations: Abdomen is soft  Genitourinary:     General: Normal vulva  Exam position: Lithotomy position  Labia:         Right: No rash, tenderness, lesion or injury  Left: No rash, tenderness, lesion or injury  Vagina: No signs of injury and foreign body  Prolapsed vaginal walls present  No vaginal discharge, erythema, tenderness, bleeding or lesions  Cervix: No cervical motion tenderness, discharge, friability, lesion, erythema, cervical bleeding or eversion  Uterus: Tender  Not deviated, not enlarged, not fixed and no uterine prolapse  Adnexa:         Right: No mass, tenderness or fullness  Left: No mass, tenderness or fullness  Comments: No apical defect  Grade 2 cystocele  No rectocele  Well estrogenized vagina despite patient's age   Skin:     General: Skin is warm and dry  Neurological:      Mental Status: She is alert and oriented to person, place, and time  Psychiatric:         Speech: Speech is tangential          Behavior: Behavior normal        Assessment/Plan:    Problem List Items Addressed This Visit        Unprioritized    Pelvic pain     Reviewed differential diagnosis for patient including adhesive disease, endometriosis, prolapse, ovarian cysts, and non-GYN related issues  - Adhesive disease: reviewed that imaging is not specific for identifying scar tissue; surgical management often results in further scarring   Recommend pain management  - Endometriosis: Certain aspects of patient's report and history are consistent with possible endometriosis  Patient agreeable to try Depo and RTO in 8 weeks to assess pain  - Prolapse: Grade 2 cystocele on exam; As patient has previously had surgical management, recommended pelvic floor PT and if persistent, can consider surgical repair  Patient declined pessary    - Ovarian cysts: Reviewed with patient that while official ultrasound report is unavailable at this time  It does not appear that she has any ovarian cysts or other pathology seen on imaging to explain her pain  (Results as follows per my read: US: 8 7x5  4x4 4cm; bulbous, no fibroids, EMS 5mm; Normal adnexa; cyst in EMS at C/S scar)  - Non-GYN related issues: patient follows with GI and urology  (Patient had lithotripsy yesterday)  RTO in 8 weeks to review results from PT and Depo  Relevant Medications    medroxyPROGESTERone (DEPO-PROVERA) 150 mg/mL injection      Other Visit Diagnoses     Cystocele with prolapse    -  Primary    Relevant Orders    Ambulatory Referral to Physical Therapy          Melody Amezcua MD  OB/GYN  5/27/2022  10:42 AM

## 2022-05-27 NOTE — ASSESSMENT & PLAN NOTE
Reviewed differential diagnosis for patient including adhesive disease, endometriosis, prolapse, ovarian cysts, and non-GYN related issues  - Adhesive disease: reviewed that imaging is not specific for identifying scar tissue; surgical management often results in further scarring  Recommend pain management  - Endometriosis: Certain aspects of patient's report and history are consistent with possible endometriosis  Patient agreeable to try Depo and RTO in 8 weeks to assess pain  - Prolapse: Grade 2 cystocele on exam; As patient has previously had surgical management, recommended pelvic floor PT and if persistent, can consider surgical repair  Patient declined pessary    - Ovarian cysts: Reviewed with patient that while official ultrasound report is unavailable at this time  It does not appear that she has any ovarian cysts or other pathology seen on imaging to explain her pain  (Results as follows per my read: US: 8 7x5  4x4 4cm; bulbous, no fibroids, EMS 5mm; Normal adnexa; cyst in EMS at C/S scar)  - Non-GYN related issues: patient follows with GI and urology  (Patient had lithotripsy yesterday)  RTO in 8 weeks to review results from PT and Depo

## 2022-06-01 NOTE — PSYCH
MEDICATION MANAGEMENT NOTE        [place] [address] [facility]      Name and Date of Birth:  Goldy Campos 56 y o  1965    Date of Visit: June 6, 2022    HPI:    Goldy Campos is here for medication review with primary c/o / Area of need: "Really good, hanging in there "   Pt has no complaints and states "Everything's the same" in regards to her mental state  She continues weekly Spiritism activities, prays, mainly interacts with her family, her dogs  Does not make friends and does not want to--but is social to an extent at her Spiritism  She just does not like people who are not family/blood related coming to her house, and she states her whole family is like that  She does not go to the homes of non family/blood related members either but she will meet people somewhere for socialization--ie the Spiritism  Pt presently denies depression, SI, HI, anxiety, panic attacks, or manic or true psychotic Sxs  She states she likes to help people and interact with people as long as they are not inside her house  Pt reports compliance to psychiatric medications without SE       Appetite Changes and Sleep: adequate number of sleep hours, 4-5 hrs a night--her usual and she states this is all she requires, normal appetite, normal energy level    Review Of Systems:      Constitutional negative   ENT negative   Cardiovascular negative   Respiratory negative   Gastrointestinal negative   Genitourinary negative   Musculoskeletal negative   Integumentary negative   Neurological negative   Endocrine negative   Other Symptoms none, all other systems are negative       Past Psychiatric History:   As copied from my 3/21/2022 note with updates as needed:  "  [ Pt born in [facility] and came to the [facility] with her family in approx 1989 and grew up with biological parents, 1 year younger sister and a younger brother   Pt describes her upbringing as "Good, good dad, good mom  Rachel Promise were raised with Yarsani  "   Everyone in the family got along and Pt has positive memories of growing up with them      Depression started in 2010 at work when she sustained a life changing back injury, for which she became permanently disabled  Gabriela Osei: sadness, crying, self-isolating, anhedonia, less motivated, feelings of hopelessness and helplessness   But no SI                    Manic Sxs:  decreased need for sleep , rapid speech  and flight of ideas/racing thoughts  Racy thoughts, elevated energy     Anxiety started in 2010 due to the work injury:  excessive worry more days than not for longer than 3 months and The Sxs can occur without concommittent depressive Sxs   No other Sxs of BRIGITTE      Panic attacks started in late 8/2020 initially triggered by driving through a town she was not familiar with  Mart Parrish has had a few in total with Sxs of:  Moderate anxiety, sweaty hands,  palpitations/racing heart, trembling, shortness of breath       Social Anxiety symptoms: no symptoms suggestive of social anxiety Eating Disorder symptoms: no historical or current eating disorder  no binge eating disorder; no anorexia nervosa  no symptoms of bulimia      OCD type Sxs: checking door locks, windows, stove, pipes in the basement (which used to leak)   Cleans often, can be very fastidious and wash her hands excessively        Pt denies any h/o PTSD or psychotic Sxs      Prior psychiatrists:  [facility] from approx 2014 - 11/2019 --stopped going due to change in insurance     Prior psychotherapists:   First therapist was at [facility] [address] in [facility] and he left so she only saw him for a year  Second one was seen from [phone] at [address]  Pt denied h/o SI, HI, self-injurious behaviors, violent behaviors, psychiatric hospitalizations, ECT, legal or  Hx     Prior Rx trials: Viibryd 40mg (helped), Bupropion ER/XL 150mg (helped), Aripiprazole 5mg (helped but caused Wt gain), Ziprasidone 40mg (heart palpitations), Gabapentin 300mg (agitated),Topamax (for migraines but stopped due to ineffectiveness and her h/o nephrolithiasis), Hydroxyzine 50mg (sedating), Doxepin up to 50mg (for sleep--oversedating even at 25mg), Clonazepam 0 5mg--??used/helped       Abuse Hx:  Pt denies any h/o sexual, physical, or emotional abuse     Trauma Hx:  When she lost her job after her work injury                   ] "       Past Medical History:    Past Medical History:   Diagnosis Date    Achalasia     Asthma     Constipation     CPAP (continuous positive airway pressure) dependence     Depression     Diarrhea 2019    Elevated LFTs     last assessed 10/25/17    Fibromyalgia     Gastric ulcer     GERD (gastroesophageal reflux disease)     H  pylori infection 10/09/2017    Annotation - [id number]: Treated, f/u stool antigen negative    Hypertension     Hypothyroidism     Kidney stone     Migraines     Migraines     MICHAEL (obstructive sleep apnea)     last assessed 16    Pneumonia     Sleep apnea 2019    Uses CPAP    Thyroid nodule     last assessed 17       Substance Abuse History:    Social History     Substance and Sexual Activity   Alcohol Use Yes    Comment: socially - very rare social occasion     Social History     Substance and Sexual Activity   Drug Use Never       Social History:    Social History     Socioeconomic History    Marital status: Single     Spouse name: Not on file    Number of children: 3    Years of education: Not on file    Highest education level: Not on file   Occupational History    Occupation: Disability since 2019-- due to a job injury    Tobacco Use    Smoking status: Former Smoker     Packs/day: 1 50     Years: 30 00     Pack years: 45 00     Quit date: 2016     Years since quittin 4    Smokeless tobacco: Never Used    Tobacco comment: pt stated quit about a month ago, last assessed 14 per [employer]   Vaping Use    Vaping Use: Never used   Substance and Sexual Activity  Alcohol use: Yes     Comment: socially - very rare social occasion    Drug use: Never    Sexual activity: Yes     Partners: Male     Birth control/protection: Female Sterilization     Comment: Boyfriend/father of 4 children   Other Topics Concern    Not on file   Social History Narrative    Home: Lives with boyfriend/father of her 4 children and one of their daughters        Children: 2 sons born 80 and 1995,  2 daughters born 1997 and 2003        Education:     Social Determinants of Health     Financial Resource Strain: Low Risk     Difficulty of Paying Living Expenses: Not hard at all   Food Insecurity: No Food Insecurity    Worried About Running Out of Food in the Last Year: Never true    Reynaldo of Food in the Last Year: Never true   Transportation Needs: No Transportation Needs    Lack of Transportation (Medical): No    Lack of Transportation (Non-Medical):  No   Physical Activity: Inactive    Days of Exercise per Week: 0 days    Minutes of Exercise per Session: 0 min   Stress: Not on file   Social Connections: Not on file   Intimate Partner Violence: Not on file   Housing Stability: Not on file       Family Psychiatric History:     Family History   Problem Relation Age of Onset    Cancer Mother     Breast cancer Mother 62    Hypertension Mother     Stroke Father     Diabetes Father     Hypertension Father     Alzheimer's disease Father     Cancer Maternal Grandmother         ovarian    Ovarian cancer Maternal Grandmother         unspecified laterality    Cancer Maternal Grandfather     Hypertension Maternal Grandfather     Tongue cancer Maternal Grandfather     Stroke Paternal Grandmother     Thyroid disease unspecified Brother     Depression Brother     Anxiety disorder Brother     Drug abuse Brother         (pain pills)    Thyroid disease unspecified Maternal Aunt     Colon cancer Maternal Aunt     Breast cancer Family     Diabetes Sister     BRCA1 Negative Sister    [facility] BRCA2 Negative Sister     No Known Problems Daughter     No Known Problems Son     No Known Problems Daughter     No Known Problems Son     No Known Problems Maternal Aunt     No Known Problems Paternal Aunt     No Known Problems Paternal Aunt     No Known Problems Paternal Aunt     No Known Problems Paternal Aunt     No Known Problems Paternal Aunt     No Known Problems Paternal Aunt     No Known Problems Maternal Aunt     Alzheimer's disease Cousin     Breast cancer Cousin 43       History Review:  The following portions of the patient's history were reviewed and updated as appropriate: allergies, current medications, past family history, past medical history, past social history, past surgical history and problem list          OBJECTIVE:     Mental Status Evaluation:    Appearance Casually dressed, good eye contact and hygiene   Behavior Calm, cooperative, pleasant   Speech Clear, normal rate and volume   Mood Euthymic   Affect Normal range and intensity   Thought Processes Organized, goal directed, can be suspicious and pessimistic about people, overly self-protective when it comes to her home   Associations intact associations   Thought Content No delusions   Perceptual Disturbances: Pt denies any form of hallucinations and does not appear to be responding to internal stimuli   Abnormal Thoughts  Risk Potential Suicidal ideation - None  Homicidal ideation - None  Potential for aggression - No   Orientation oriented to person, place, situation, day of week, date, month of year and year   Memory short term memory grossly intact   Cosciousness alert and awake   Attention Span attention span and concentration are age appropriate   Intellect appears to be of average intelligence   Insight fair   Judgement fair   Muscle Strength and  Gait normal gait and normal balance   Language no difficulty naming common objects, no difficulty repeating a phrase   Fund of Knowledge adequate knowledge of current events  adequate fund of knowledge regarding past history  adequate fund of knowledge regarding vocabulary    Pain none   Pain Scale 0        Laboratory Results:   I have personally reviewed all pertinent laboratory/tests results    Most Recent Labs:   Lab Results   Component Value Date    WBC 9 19 06/09/2021    RBC 4 75 06/09/2021    HGB 13 4 06/09/2021    HCT 40 9 06/09/2021     06/09/2021    RDW 13 8 06/09/2021    NEUTROABS 7 34 06/09/2021    SODIUM 136 06/09/2021    K 4 1 06/09/2021     06/09/2021    CO2 25 06/09/2021    BUN 12 06/09/2021    CREATININE 0 51 (L) 06/09/2021    GLUC 86 07/21/2016    GLUF 100 (H) 06/09/2021    CALCIUM 9 2 06/09/2021    AST 14 06/09/2021    ALT 27 06/09/2021    ALKPHOS 114 06/09/2021    TP 7 6 06/09/2021    ALB 4 1 06/09/2021    TBILI 0 54 06/09/2021    CHOLESTEROL 218 (H) 06/09/2021    HDL 44 06/09/2021    TRIG 129 06/09/2021    LDLCALC 148 (H) 06/09/2021    NONHDLC 127 01/20/2020    [id number] 0 294 (L) 11/17/2021    FREET4 1 44 11/17/2021    HGBA1C 5 5 06/09/2021     06/09/2021     Urinalysis   Lab Results   Component Value Date    COLORU Light Yellow 03/04/2022    CLARITYU Clear 03/04/2022    SPECGRAV 1 027 03/04/2022    PHUR 6 0 03/04/2022    LEUKOCYTESUR Negative 03/04/2022    NITRITE Negative 03/04/2022    PROTEIN UA Negative 03/04/2022    GLUCOSEU Negative 03/04/2022    KETONESU Negative 03/04/2022    UROBILINOGEN <2 0 03/04/2022    BILIRUBINUR Negative 03/04/2022    BLOODU Negative 03/04/2022    RBCUA 2-4 (A) 03/04/2022    WBCUA 1-2 03/04/2022    EPIS Occasional 03/04/2022    BACTERIA None Seen 03/04/2022     Urine Culture   Lab Results   Component Value Date    URINECX 40,000-49,000 cfu/ml Lactobacillus species (A) 05/13/2022     Imaging Studies: EGD Botox    Result Date: 5/5/2022  Narrative: [address] [place] [address] [id number] [place] [id number] [phone] DATE OF SERVICE: 5/05/22 PHYSICIAN(S): Attending: Armin Obrien MD Fellow: Anitha Arreola DO INDICATION: Esophageal dysphagia, Achalasia POST-OP DIAGNOSIS: See the impression below  PREPROCEDURE: Informed consent was obtained for the procedure, including sedation  Risks of perforation, hemorrhage, adverse drug reaction and aspiration were discussed  The patient was placed in the left lateral decubitus position  Patient was explained about the risks and benefits of the procedure  Risks including but not limited to bleeding, infection, and perforation were explained in detail  Also explained about less than 100% sensitivity with the exam and other alternatives  DETAILS OF PROCEDURE: Patient was taken to the procedure room where a time out was performed to confirm correct patient and correct procedure  The patient underwent monitored anesthesia care, which was administered by an anesthesia professional  The patient's blood pressure, heart rate, level of consciousness, respirations, oxygen and ETCO2 were monitored throughout the procedure  The scope was introduced through the mouth and advanced to the second part of the duodenum  Retroflexion was performed in the cardia, fundus and incisura  Prior to the procedure, the patient's H  Pylori status was unknown  The patient experienced no blood loss  The procedure was not difficult  The patient tolerated the procedure well  There were no apparent complications  ANESTHESIA INFORMATION: ASA: III Anesthesia Type: IV Sedation with Anesthesia MEDICATIONS: onabotulinumtoxin A (BOTOX) injection 100 Units (Totals for administrations occurring from 0839 to 0927 on 05/05/22) FINDINGS: Mild grade B esophagitis with mucosal breaks measuring 5 mm or more not continuous between folds, covering less than 75% of the circumference in the GE junction  Z-line at 39 cm  Injected 100 total units of Botox in the GE junction using a four-quadrant injection method  Injected 1 cm above the Z-line at 38 cm from the incisors   Mild erythematous mucosa with erosion in the antrum and prepyloric region; performed cold forceps biopsy to rule out H  pylori Moderate edematous, erythematous and ulcerated mucosa with erosion in the duodenal bulb with no bleeding The upper third of the esophagus, middle third of the esophagus and lower third of the esophagus appeared normal  The 2nd part of the duodenum appeared normal  SPECIMENS: ID Type Source Tests Collected by Time Destination 1 : r/o H pylori Tissue Stomach TISSUE EXAM Austin Ferrer DO 5/5/2022  8:45 AM  2 : cecal polyp - cold snare Tissue Polyp, Colorectal TISSUE EXAM Austin Ferrer DO 5/5/2022  9:19 AM  3 : Ascending polyp - cold snare Tissue Polyp, Colorectal TISSUE EXAM Austin Ferrer DO 5/5/2022  9:23 AM      Impression: LA grade B esophagitis  Z-line at 39 cm  Performed Botox injection in four-quadrant fashion at 38 cm from the incisors  Total of 100 units injected  Mild erosive gastritis  Performed biopsies to rule out H pylori  Moderate duodenitis  RECOMMENDATION: Await pathology results  If dysphagia symptoms persist, would recommend repeating manometry  Continue PPI  Resume outpatient medications  Minimize NSAID use  Stable for discharge once standard parameters are met  Please call the office at [phone] if you experience any fevers, chills, intractable nausea & vomiting, or worsening abdominal pain  If you are unable to get in contact with a provider, please go to the closest ER for evaluation  You may also call the office if you have any questions or concerns regarding your procedure  ATTENDING ATTESTATION:  I was present throughout the entire procedure from insertion to complete withdrawal of the scope  I performed all interventions myself or oversaw the fellow     María Herrera MD     Colonoscopy    Result Date: 5/5/2022  Narrative: [address] [place] [address] Endoscopy [address] [facility] [address] DATE OF SERVICE: 5/05/22 PHYSICIAN(S): Attending: María Herrera MD Fellow: Austin Ferrer DO INDICATION: Tubular adenoma of colon POST-OP DIAGNOSIS: See the impression below  HISTORY: Prior colonoscopy: 7 years ago  BOWEL PREPARATION: Miralax/Dulcolax PREPROCEDURE: Informed consent was obtained for the procedure, including sedation  Risks including but not limited to bleeding, infection, perforation, adverse drug reaction and aspiration were explained in detail  Also explained about less than 100% sensitivity with the exam and other alternatives  The patient was placed in the left lateral decubitus position  DETAILS OF PROCEDURE: Patient was taken to the procedure room where a time out was performed to confirm correct patient and correct procedure  The patient underwent monitored anesthesia care, which was administered by an anesthesia professional  The patient's blood pressure, heart rate, level of consciousness, oxygen, respirations and ETCO2 were monitored throughout the procedure  A digital rectal exam was performed  A perianal exam was performed  The scope was introduced through the anus and advanced to the cecum  Retroflexion was performed in the rectum  The quality of bowel preparation was evaluated using the [facility] Bowel Preparation Scale with scores of: right colon = 1, transverse colon = 1, left colon = 1  The total BBPS score was 3  Bowel prep was not adequate  The patient experienced no blood loss  The procedure was moderately difficult due to poor preparation  The patient tolerated the procedure well  There were no apparent complications   ANESTHESIA INFORMATION: ASA: III Anesthesia Type: IV Sedation with Anesthesia MEDICATIONS: onabotulinumtoxin A (BOTOX) injection 100 Units (Totals for administrations occurring from 0839 to 0930 on 05/05/22) FINDINGS: One sessile, adenomatous-appearing polyp measuring smaller than 5 mm in the cecum; completely removed en bloc by cold snare and retrieved specimen One 5 mm sessile, adenomatous-appearing polyp in the ascending colon; completely removed en bloc by cold snare and retrieved specimen Inadequate prep  Thick liquid stool throughout the entire colon  Small lesions could have been missed  EVENTS: Procedure Events Event Event Time ENDO SCOPE OUT TIME 5/5/2022  8:59 AM ENDO CECUM REACHED 5/5/2022  9:13 AM ENDO SCOPE OUT TIME 5/5/2022  9:29 AM SPECIMENS: ID Type Source Tests Collected by Time Destination 1 : r/o H pylori Tissue Stomach TISSUE EXAM Fanny Alexis, DO 5/5/2022  8:45 AM  2 : cecal polyp - cold snare Tissue Polyp, Colorectal TISSUE EXAM Fanny Alexis, DO 5/5/2022  9:19 AM  3 : Ascending polyp - cold snare Tissue Polyp, Colorectal TISSUE EXAM Fanny Alexis, DO 5/5/2022  9:23 AM  EQUIPMENT: Colonoscope - ENDOCUFF VISION LRG GREEN ID [id number]     Impression: Total of 2 sub-centimeter polyps removed  Inadequate prep with thick liquid stool throughout the colon  Small lesions could have been missed  Limited evaluation of the remainder of the visualized colon appeared normal  RECOMMENDATION: Await pathology results Repeat colonoscopy in 1 year due to an inadequate bowel preparation  Resume outpatient medications  Stable for discharge once standard parameters are met  Please call the office at [phone] if you experience any fevers, chills, intractable nausea & vomiting, or worsening abdominal pain  If you are unable to get in contact with a provider, please go to the closest ER for evaluation  You may also call the office if you have any questions or concerns regarding your procedure  ATTENDING ATTESTATION:  I was present throughout the entire procedure from insertion to complete withdrawal of the scope  I performed all interventions myself or oversaw the fellow  Marie De La Cruz MD     US abdomen complete    Result Date: 5/27/2022  Narrative: ABDOMEN ULTRASOUND, COMPLETE INDICATION:   R10 30: Lower abdominal pain, unspecified   COMPARISON:  CT abdomen pelvis 3/23/2022 TECHNIQUE:   Real-time ultrasound of the abdomen was performed with a curvilinear transducer with both volumetric sweeps and still imaging techniques  FINDINGS: PANCREAS:  Visualized portions of the pancreas are within normal limits  AORTA AND IVC:  Visualized portions are normal for patient age  LIVER: Size:  Enlarged  The liver measures 18 0 cm in the midclavicular line  Contour:  Surface contour is smooth  Parenchyma:  Echogenicity and echotexture are within normal limits  No liver mass identified  Limited imaging of the main portal vein shows it to be patent and hepatopetal  BILIARY: Numerous nonshadowing echogenic foci along the gallbladder wall likely due to polyps  Some sessile-appearing lesions along the wall measuring up to approximately 9 mm  No intrahepatic biliary dilatation  CBD measures 3 0 mm  No choledocholithiasis  KIDNEY: Right kidney measures 11 9 x 4 7 x 5 3  cm  Volume 154 3 mL Shadowing echogenic focus with twinkle artifact in the lower pole measuring 4 mm in keeping with nonobstructing calculus  Left kidney measures 12 2 x 5 7 x 5 8 cm  Volume 212 3 mL Shadowing echogenic focus with twinkle artifact in the upper pole measuring 5 mm in keeping with nonobstructing calculus  SPLEEN: Measures 9 3 x 8 9 x 3 7 cm  Volume 163 5 mL Within normal limits  ASCITES:  None  Impression: 1  Numerous gallbladder polyps measure up to approximately 9 mm  According to current literature guidelines (JACR 2013;10:953-956) polyps of this size are likely benign but should be followed by yearly ultrasound, and surgery consulted if they grow  2   Bilateral nonobstructing renal calculi  The study was marked in EPIC for significant notification  Workstation performed: [id number]     US pelvis complete w transvaginal    Result Date: 5/27/2022  Narrative: PELVIC ULTRASOUND, COMPLETE INDICATION:  The patient is 56years old   R10 30: Lower abdominal pain, unspecified  COMPARISON: CT abdomen pelvis 3/23/2022 TECHNIQUE:   Transabdominal pelvic ultrasound was performed in sagittal and transverse planes with a curvilinear transducer    Additional transvaginal imaging was performed to better evaluate the endometrium and ovaries  Imaging included volumetric sweeps as well as traditional still imaging technique  FINDINGS: UTERUS: The uterus is anteverted in position, measuring 8 7 x 4 4 x 5 4 cm  The myometrium demonstrates a diffusely heterogeneous echotexture  The cervix appears within normal limits  ENDOMETRIUM:  The endometrial echo complex has an AP caliber of 4 mm  This is within normal limits for a postmenopausal female  Simple appearing cyst is seen underlying patient's  section scar  OVARIES/ADNEXA: Right ovary:  2 4 x 0 9 x 1 2 cm  1 3 mL No suspicious right ovarian abnormality  Color-flow within normal limits  Left ovary:  2 6 x 1 8 x 1 7 cm  4 1 mL No suspicious left ovarian abnormality  Color-flow within normal limits  No suspicious adnexal mass or loculated collections  There is no free fluid  Impression: 1  Heterogeneous myometrial echotexture  This is a nonspecific finding which can be seen in the setting of adenomyosis or fibroid changes  No discrete fibroids are visualized  2   Endometrial echo complex measuring 4 mm in AP diameter, within normal limits for a postmenopausal female  Simple appearing cyst is seen underlying patient's  section scar  Workstation performed: [id number]     Assessment/plan:       Diagnoses and all orders for this visit:    Bipolar 2 disorder ([facility] [address] )  -     FLUoxetine (PROzac) 20 mg capsule; Take 1 capsule (20 mg total) by mouth daily  -     lamoTRIgine (LaMICtal) 100 mg tablet; Take 0 5 tablets (50 mg total) by mouth daily at bedtime    Anxiety  -     FLUoxetine (PROzac) 20 mg capsule;  Take 1 capsule (20 mg total) by mouth daily  -     hydrOXYzine HCL (ATARAX) 50 mg tablet; TAKE 1/2 OR 1 TABLET BY MOUTH THREE TIMES A DAY AS NEEDED FOR ANXIETY OR INSOMNIA    Insomnia, unspecified type  -     hydrOXYzine HCL (ATARAX) 50 mg tablet; TAKE 1/2 OR 1 TABLET BY MOUTH THREE TIMES A DAY AS NEEDED FOR ANXIETY OR INSOMNIA          PLAN:  Pt has no complaints and appears stable, though a bit paranoid/anxious regarding her level of privacy and concern to "Not have drama in the house "  However, she does not let this prevent her from having social interactions outside of her home  Pt appears stable and I will continue the present medication regimen  Treatment plan done and Pt accepts the plan  Continue:  Lamotrigine 100mg (1/2) tabs po qhs # 45  Fluoxetine 20mg (1) cap po qd # 90  Hydroxyzine 50mg (1/2-1) tab po tid prn anxiety or insomnia # 270  Melatonin 5mg --Pt gets OTC  Vit D--per PCP  Return 11 weeks, call sooner prn    Risks/Benefits      Risks, Benefits And Possible Side Effects Of Medications:    Risks, benefits, and possible side effects of medications explained to [facility] and she verbalizes understanding and agreement for treatment      Controlled Medication Discussion:     Last controlled substance was prescribed in 7/2021 by an outside provider

## 2022-06-03 ENCOUNTER — CLINICAL SUPPORT (OUTPATIENT)
Dept: OBGYN CLINIC | Facility: CLINIC | Age: 57
End: 2022-06-03

## 2022-06-03 DIAGNOSIS — R10.2 PELVIC PAIN: Primary | ICD-10-CM

## 2022-06-03 LAB — SL AMB POCT URINE HCG: NORMAL

## 2022-06-03 PROCEDURE — 81025 URINE PREGNANCY TEST: CPT

## 2022-06-03 PROCEDURE — 96372 THER/PROPH/DIAG INJ SC/IM: CPT

## 2022-06-03 RX ORDER — MEDROXYPROGESTERONE ACETATE 150 MG/ML
150 INJECTION, SUSPENSION INTRAMUSCULAR ONCE
Status: COMPLETED | OUTPATIENT
Start: 2022-06-03 | End: 2022-06-03

## 2022-06-03 RX ADMIN — MEDROXYPROGESTERONE ACETATE 150 MG: 150 INJECTION, SUSPENSION INTRAMUSCULAR at 08:29

## 2022-06-03 NOTE — PROGRESS NOTES
Depo-Provera      Patient provided box yes  o 1 Refills remain  o Refills submitted no   Last  Annual Date / Birth control check : 5/27/22   Last Depo date: 6/3/22   Side effects: not applicable    HCG: yes  o if applicable: negative   Given by: [employer], LPN   Site: LD     o Calcium supplement daily teaching  o Condoms for 2 weeks following first injection dose

## 2022-06-06 ENCOUNTER — OFFICE VISIT (OUTPATIENT)
Dept: PSYCHIATRY | Facility: CLINIC | Age: 57
End: 2022-06-06
Payer: MEDICARE

## 2022-06-06 DIAGNOSIS — G47.00 INSOMNIA, UNSPECIFIED TYPE: ICD-10-CM

## 2022-06-06 DIAGNOSIS — F41.9 ANXIETY: ICD-10-CM

## 2022-06-06 DIAGNOSIS — F31.81 BIPOLAR 2 DISORDER (HCC): ICD-10-CM

## 2022-06-06 PROCEDURE — 99213 OFFICE O/P EST LOW 20 MIN: CPT | Performed by: PHYSICIAN ASSISTANT

## 2022-06-06 RX ORDER — LAMOTRIGINE 100 MG/1
50 TABLET ORAL
Qty: 45 TABLET | Refills: 0 | Status: SHIPPED | OUTPATIENT
Start: 2022-06-06

## 2022-06-06 RX ORDER — HYDROXYZINE 50 MG/1
TABLET, FILM COATED ORAL
Qty: 270 TABLET | Refills: 0 | Status: SHIPPED | OUTPATIENT
Start: 2022-06-06

## 2022-06-06 RX ORDER — FLUOXETINE HYDROCHLORIDE 20 MG/1
20 CAPSULE ORAL DAILY
Qty: 90 CAPSULE | Refills: 0 | Status: SHIPPED | OUTPATIENT
Start: 2022-06-06

## 2022-06-06 NOTE — BH TREATMENT PLAN
TREATMENT PLAN (Medication Management Only)        [facility]    Name/Date of Birth/MRN:  Sushma Wood 56 y o  1965 MRN: [id number]  Date of Treatment Plan: June 6, 2022  Diagnosis/Diagnoses:   1  Bipolar 2 disorder ([facility] [address] )    2  Anxiety    3  Insomnia, unspecified type      Strengths/Personal Resources for Self-Care: "Listen to people; read a book"  Area/Areas of need (in own words): "Really good  Hanging in there"  1  Long Term Goal:   "Maintain mood stability and control of anxiety"  Target Date: 3-6 months  Person/Persons responsible for completion of goal: [address] and [address]   2  Short Term Objective (s) - How will we reach this goal?:   A  Provider new recommended medication/dosage changes and/or continue medication(s): Pt has no complaints and appears stable, though a bit paranoid/anxious regarding her level of privacy and concern to "Not have drama in the house "  However, she does not let this prevent her from having social interactions outside of her home  Pt appears stable and I will continue the present medication regimen  Treatment plan done and Pt accepts the plan  Continue:  Lamotrigine 100mg (1/2) tabs po qhs # 45  Fluoxetine 20mg (1) cap po qd # 90  Hydroxyzine 50mg (1/2-1) tab po tid prn anxiety or insomnia # 270  Melatonin 5mg --Pt gets OTC  Vit D--per PCP  Return 11 weeks, call sooner prn    Target Date: 3-6 months  Person/Persons Responsible for Completion of Goal: [address] and [address]   Progress Towards Goals: stable, continuing treatment  Treatment Modality: Medication mgt  Review due 180 days from date of this plan: 6 months - 12/6/2022  Expected length of service: ongoing treatment unless revised  My Physician/PA/NP and I have developed this plan together and I agree to work on the goals and objectives  I understand the treatment goals that were developed for my treatment    Electronic Signatures: on file (unless signed below)    Brie Winters PA-C 06/06/22

## 2022-06-08 DIAGNOSIS — R21 RASH: ICD-10-CM

## 2022-06-08 RX ORDER — MOMETASONE FUROATE 1 MG/G
CREAM TOPICAL DAILY
Qty: 45 G | Refills: 0 | Status: SHIPPED | OUTPATIENT
Start: 2022-06-08

## 2022-06-08 NOTE — TELEPHONE ENCOUNTER
Requested Prescriptions     Pending Prescriptions Disp Refills    mometasone (ELOCON) 0 1 % cream 45 g 0     Sig: Apply topically daily

## 2022-06-13 DIAGNOSIS — K58.0 IRRITABLE BOWEL SYNDROME WITH DIARRHEA: ICD-10-CM

## 2022-06-13 RX ORDER — DICYCLOMINE HCL 20 MG
20 TABLET ORAL EVERY 6 HOURS PRN
Qty: 360 TABLET | Refills: 0 | Status: SHIPPED | OUTPATIENT
Start: 2022-06-13

## 2022-06-16 DIAGNOSIS — E55.9 VITAMIN D DEFICIENCY: ICD-10-CM

## 2022-06-16 RX ORDER — MELATONIN
1000 DAILY
Qty: 90 TABLET | Refills: 3 | Status: SHIPPED | OUTPATIENT
Start: 2022-06-16

## 2022-06-20 ENCOUNTER — TELEPHONE (OUTPATIENT)
Dept: GASTROENTEROLOGY | Facility: HOSPITAL | Age: 57
End: 2022-06-20

## 2022-06-21 ENCOUNTER — TELEPHONE (OUTPATIENT)
Dept: GASTROENTEROLOGY | Facility: HOSPITAL | Age: 57
End: 2022-06-21

## 2022-06-29 ENCOUNTER — ANESTHESIA (OUTPATIENT)
Dept: ANESTHESIOLOGY | Facility: HOSPITAL | Age: 57
End: 2022-06-29

## 2022-06-29 ENCOUNTER — ANESTHESIA EVENT (OUTPATIENT)
Dept: ANESTHESIOLOGY | Facility: HOSPITAL | Age: 57
End: 2022-06-29

## 2022-06-29 NOTE — ANESTHESIA PREPROCEDURE EVALUATION
Procedure:  PRE-OP ONLY    Relevant Problems   CARDIO   (+) Breast pain, right   (+) Headache, migraine   (+) Hypertension   (+) Intractable chronic migraine without aura and without status migrainosus      ENDO   (+) Hypothyroidism      GI/HEPATIC   (+) Esophageal dysphagia   (+) Gastroesophageal reflux disease      /RENAL   (+) Left nephrolithiasis      MUSCULOSKELETAL   (+) Arthritis   (+) Chronic low back pain   (+) Spondylosis of lumbar region without myelopathy or radiculopathy      NEURO/PSYCH   (+) Anxiety   (+) Chronic low back pain   (+) Headache, migraine   (+) Intractable chronic migraine without aura and without status migrainosus   (+) Other chronic pain      PULMONARY   (+) Asthma   (+) Moderate persistent asthma with exacerbation   (+) MICHAEL (obstructive sleep apnea)   Mult prev EGD/colonos  TTE 12/2021    Left Ventricle: Left ventricular cavity size is normal  Wall thickness is mildly increased  The left ventricular ejection fraction is 65%  Systolic function is normal  Wall motion is normal  There is concentric remodeling   Diastolic function is normal     The left ventricular wall motion is normal     Right Ventricle: Right ventricular cavity size is normal  Systolic function is normal     Recent labs personally reviewed:  Lab Results   Component Value Date    WBC 9 19 06/09/2021    HGB 13 4 06/09/2021     06/09/2021     Lab Results   Component Value Date     01/07/2016    K 4 1 06/09/2021    BUN 12 06/09/2021    CREATININE 0 51 (L) 06/09/2021    GLUCOSE 104 01/07/2016     Lab Results   Component Value Date    PTT 27 12/24/2015      Lab Results   Component Value Date    INR 0 98 12/24/2015       Lab Results   Component Value Date    HGBA1C 5 5 06/09/2021       Type and Screen:  A

## 2022-06-30 ENCOUNTER — HOSPITAL ENCOUNTER (OUTPATIENT)
Dept: GASTROENTEROLOGY | Facility: HOSPITAL | Age: 57
Setting detail: OUTPATIENT SURGERY
Discharge: HOME | End: 2022-06-30
Attending: INTERNAL MEDICINE | Admitting: INTERNAL MEDICINE
Payer: MEDICARE

## 2022-06-30 ENCOUNTER — ANESTHESIA EVENT (OUTPATIENT)
Dept: GASTROENTEROLOGY | Facility: HOSPITAL | Age: 57
End: 2022-06-30

## 2022-06-30 ENCOUNTER — ANESTHESIA (OUTPATIENT)
Dept: GASTROENTEROLOGY | Facility: HOSPITAL | Age: 57
End: 2022-06-30

## 2022-06-30 ENCOUNTER — HOSPITAL ENCOUNTER (OUTPATIENT)
Dept: GASTROENTEROLOGY | Facility: HOSPITAL | Age: 57
Discharge: HOME | End: 2022-06-30
Payer: MEDICARE

## 2022-06-30 VITALS
RESPIRATION RATE: 16 BRPM | SYSTOLIC BLOOD PRESSURE: 128 MMHG | DIASTOLIC BLOOD PRESSURE: 68 MMHG | HEIGHT: 67 IN | WEIGHT: 220 LBS | TEMPERATURE: 98.5 F | OXYGEN SATURATION: 100 % | HEART RATE: 63 BPM | BODY MASS INDEX: 34.53 KG/M2

## 2022-06-30 DIAGNOSIS — K22.0 ACHALASIA: ICD-10-CM

## 2022-06-30 LAB — GLUCOSE SERPL-MCNC: 98 MG/DL (ref 65–140)

## 2022-06-30 PROCEDURE — 91020 GASTRIC MOTILITY STUDIES: CPT

## 2022-06-30 PROCEDURE — 82948 REAGENT STRIP/BLOOD GLUCOSE: CPT

## 2022-06-30 PROCEDURE — 43235 EGD DIAGNOSTIC BRUSH WASH: CPT | Performed by: INTERNAL MEDICINE

## 2022-06-30 RX ORDER — PROPOFOL 10 MG/ML
INJECTION, EMULSION INTRAVENOUS AS NEEDED
Status: DISCONTINUED | OUTPATIENT
Start: 2022-06-30 | End: 2022-06-30

## 2022-06-30 RX ORDER — LIDOCAINE HYDROCHLORIDE 20 MG/ML
INJECTION, SOLUTION EPIDURAL; INFILTRATION; INTRACAUDAL; PERINEURAL AS NEEDED
Status: DISCONTINUED | OUTPATIENT
Start: 2022-06-30 | End: 2022-06-30

## 2022-06-30 RX ORDER — SODIUM CHLORIDE 9 MG/ML
INJECTION, SOLUTION INTRAVENOUS CONTINUOUS PRN
Status: DISCONTINUED | OUTPATIENT
Start: 2022-06-30 | End: 2022-06-30

## 2022-06-30 RX ADMIN — PROPOFOL 50 MG: 10 INJECTION, EMULSION INTRAVENOUS at 09:17

## 2022-06-30 RX ADMIN — PROPOFOL 50 MG: 10 INJECTION, EMULSION INTRAVENOUS at 09:14

## 2022-06-30 RX ADMIN — LIDOCAINE HYDROCHLORIDE 100 MG: 20 INJECTION, SOLUTION EPIDURAL; INFILTRATION; INTRACAUDAL; PERINEURAL at 09:12

## 2022-06-30 RX ADMIN — PROPOFOL 25 MG: 10 INJECTION, EMULSION INTRAVENOUS at 09:19

## 2022-06-30 RX ADMIN — PROPOFOL 25 MG: 10 INJECTION, EMULSION INTRAVENOUS at 09:16

## 2022-06-30 RX ADMIN — PROPOFOL 50 MG: 10 INJECTION, EMULSION INTRAVENOUS at 09:13

## 2022-06-30 RX ADMIN — PROPOFOL 50 MG: 10 INJECTION, EMULSION INTRAVENOUS at 09:18

## 2022-06-30 RX ADMIN — PROPOFOL 50 MG: 10 INJECTION, EMULSION INTRAVENOUS at 09:12

## 2022-06-30 RX ADMIN — SODIUM CHLORIDE: 0.9 INJECTION, SOLUTION INTRAVENOUS at 08:50

## 2022-06-30 RX ADMIN — PROPOFOL 25 MG: 10 INJECTION, EMULSION INTRAVENOUS at 09:15

## 2022-06-30 NOTE — PERIOPERATIVE NURSING NOTE
Patient brought in the room and educated on procedure  Patient for EGD  guided manometry procedure  Please see the EGD procedure note  Manometry catheter inserted via right nostril and positioned and secured  Once patient fully awaken and ready to swallow 10 liquid swallow, 10 viscous swallow ,1 rapid swallow performed and 1 Rapid drink challenge with 200 cc water  Patient tolerated procedure  Catheter removed intact  Patient transported to the recovery area via stretcher and report given to recovery nurse  Discharge instructions to be given to patient when patient meets the criteria to be discharge  Please see the EGD visit for time of departure from the unit

## 2022-06-30 NOTE — ANESTHESIA POSTPROCEDURE EVALUATION
Post-Op Assessment Note    CV Status:  Stable    Pain management: adequate     Mental Status:  Awake and sleepy   Hydration Status:  Euvolemic   PONV Controlled:  Controlled   Airway Patency:  Patent      Post Op Vitals Reviewed: Yes      Staff: CRNA         No complications documented      /66 (06/30/22 0930)    Temp      Pulse 77 (06/30/22 0930)   Resp 16 (06/30/22 0930)    SpO2 100 % (06/30/22 0930)

## 2022-06-30 NOTE — ANESTHESIA PREPROCEDURE EVALUATION
Procedure:  EGD    Relevant Problems   CARDIO   (+) Breast pain, right   (+) Headache, migraine   (+) Hypertension   (+) Intractable chronic migraine without aura and without status migrainosus      ENDO   (+) Hypothyroidism      GI/HEPATIC   (+) Esophageal dysphagia   (+) Gastroesophageal reflux disease      /RENAL   (+) Left nephrolithiasis      MUSCULOSKELETAL   (+) Arthritis   (+) Chronic low back pain   (+) Spondylosis of lumbar region without myelopathy or radiculopathy      NEURO/PSYCH   (+) Anxiety   (+) Chronic low back pain   (+) Headache, migraine   (+) Intractable chronic migraine without aura and without status migrainosus   (+) Other chronic pain      PULMONARY   (+) Asthma   (+) Moderate persistent asthma with exacerbation   (+) MICHAEL (obstructive sleep apnea)      Mult prev EGD/colonos      TTE 12/2021    Left Ventricle: Left ventricular cavity size is normal  Wall thickness is mildly increased  The left ventricular ejection fraction is 65%  Systolic function is normal  Wall motion is normal  There is concentric remodeling  Diastolic function is normal     The left ventricular wall motion is normal     Right Ventricle: Right ventricular cavity size is normal  Systolic function is normal      Recent labs personally reviewed:        Lab Results   Component Value Date     WBC 9 19 06/09/2021     HGB 13 4 06/09/2021      06/09/2021            Lab Results   Component Value Date      01/07/2016     K 4 1 06/09/2021     BUN 12 06/09/2021     CREATININE 0 51 (L) 06/09/2021     GLUCOSE 104 01/07/2016            Lab Results   Component Value Date     PTT 27 12/24/2015            Lab Results   Component Value Date     INR 0 98 12/24/2015               Lab Results   Component Value Date     HGBA1C 5 5 06/09/2021         Type and Screen:   A       Physical Exam    Airway    Mallampati score: II  TM Distance: >3 FB  Neck ROM: full     Dental   No notable dental hx     Cardiovascular  Cardiovascular exam normal    Pulmonary  Pulmonary exam normal     Other Findings        Anesthesia Plan  ASA Score- 3     Anesthesia Type- IV sedation with anesthesia with ASA Monitors  Additional Monitors:   Airway Plan:     Comment: Supplemental O2, etco2 monitoring    Plan Factors-Exercise tolerance (METS): >4 METS  Chart reviewed  Existing labs reviewed  Patient summary reviewed  Patient is not a current smoker  Patient did not smoke on day of surgery  Induction- intravenous  Postoperative Plan-     Informed Consent- Anesthetic plan and risks discussed with patient  I personally reviewed this patient with the CRNA  Discussed and agreed on the Anesthesia Plan with the CRNA  Jossy Piper

## 2022-06-30 NOTE — H&P
History and Physical - [facility]  Prachi Ledezma 56 y o  female MRN: [id number]    HPI: Prachi Ledezma is a 56y o  year old female who presents for EGD with manometry catheter placement  Review of Systems    Historical Information   Past Medical History:   Diagnosis Date    Achalasia     Asthma     Constipation     CPAP (continuous positive airway pressure) dependence     Depression     Diarrhea 2019    Elevated LFTs     last assessed 10/25/17    Fibromyalgia     Gastric ulcer     GERD (gastroesophageal reflux disease)     H  pylori infection 10/09/2017    [facility] [id number]: Treated, f/u stool antigen negative    Hypertension     Hypothyroidism     Kidney stone     Migraines     Migraines     MICHAEL (obstructive sleep apnea)     last assessed 16    Pneumonia     Sleep apnea 2019    Uses CPAP    Thyroid nodule     last assessed 17     Past Surgical History:   Procedure Laterality Date    BREAST BIOPSY Right 10/31/2017    us bx     SECTION      COLONOSCOPY      ENDOMETRIAL ABLATION      MYOTOMY HELLER LAPAROSCOPIC  2016    Dr Saeed Canada, Coincident lorena fundoplication    NASAL SINUS SURGERY      OTHER SURGICAL HISTORY      achalasia repair    OVARIAN CYST REMOVAL      PILONIDAL CYST / SINUS EXCISION      [place] COLONOSCOPY FLX DX W/COLLJ SPEC WHEN PFRMD N/A 2019    Procedure: COLONOSCOPY;  Surgeon: Marelyn Hammans, MD;  Location: [facility]; Service: Gastroenterology    [place] CYSTOURETHROSCOPY N/A 2017    Procedure: Conrado Garza;  Surgeon: Myriam Bosch MD;  Location: [facility];  Service: UroGynecology           [place] ESOPHAGOGASTRODUODENOSCOPY TRANSORAL DIAGNOSTIC N/A 2017    Procedure: ESOPHAGOGASTRODUODENOSCOPY (EGD); Surgeon: Marelyn Hammans, MD;  Location: [facility]; Service: Gastroenterology    [place] ESOPHAGOGASTRODUODENOSCOPY TRANSORAL DIAGNOSTIC N/A 2019    Procedure: ESOPHAGOGASTRODUODENOSCOPY (EGD);   Surgeon: Marelyn Hammans, MD;  Location: [facility];   Service: Gastroenterology    DE FRAGMENT KIDNEY STONE/ ESWL Left 2022    Procedure: ESWL;  Surgeon: Ishan Looney MD;  [facility];  Service: Urology    DE POST COLPORRHAPHY,RECTUM/VAGINA N/A 2017    Procedure: COLPORRHAPHY POSTERIOR;  Surgeon: Myriam Bosch MD;  [facility];  Service: UroGynecology           DE SLING OPER STRES INCONTINENCE N/A 2017    Procedure: INSERTION PUBOVAGINAL SLING /SINGLE INCISION ;  Surgeon: Myriam Bosch MD;  [facility];  Service: UroGynecology           TONSILLECTOMY      TUBAL LIGATION      UPPER GASTROINTESTINAL ENDOSCOPY      US GUIDED BREAST BIOPSY RIGHT COMPLETE Right 10/31/2017     Social History   Social History     Substance and Sexual Activity   Alcohol Use Yes    Comment: socially - very rare social occasion     Social History     Substance and Sexual Activity   Drug Use Never     Social History     Tobacco Use   Smoking Status Former Smoker    Packs/day: 1 50    Years: 30 00    Pack years: 45 00    Quit date:     Years since quittin 4   Smokeless Tobacco Never Used   Tobacco Comment    pt stated quit about a month ago, last assessed 14 per [employer]     Family History   Problem Relation Age of Onset    Cancer Mother     Breast cancer Mother 62    Hypertension Mother     Stroke Father     Diabetes Father     Hypertension Father     Alzheimer's disease Father     Cancer Maternal Grandmother         ovarian    Ovarian cancer Maternal Grandmother         unspecified laterality    Cancer Maternal Grandfather     Hypertension Maternal Grandfather     Tongue cancer Maternal Grandfather     Stroke Paternal Grandmother     Thyroid disease unspecified Brother     Depression Brother     Anxiety disorder Brother     Drug abuse Brother         (pain pills)    Thyroid disease unspecified Maternal Aunt     Colon cancer Maternal Aunt     Breast cancer Family     Diabetes Sister     BRCA1 Negative Sister     BRCA2 Negative Sister     No Known Problems Daughter     No Known Problems Son     No Known Problems Daughter     No Known Problems Son     No Known Problems Maternal Aunt     No Known Problems Paternal Aunt     No Known Problems Paternal Aunt     No Known Problems Paternal Aunt     No Known Problems Paternal Aunt     No Known Problems Paternal Aunt     No Known Problems Paternal Aunt     No Known Problems Maternal Aunt     Alzheimer's disease Cousin     Breast cancer Cousin 43       Meds/Allergies     (Not in a hospital admission)      Allergies   Allergen Reactions    Gabapentin Other (See Comments)     Annotation - [id number]: Neuropsych effects per patient  Annotation - [id number]: Neuropsych effects per patient  Annotation - [id number]: Neuropsych effects per patient      Latex Dermatitis and Rash     Other reaction(s): Dermatitis    Penicillins Hives       Objective     LMP 10/11/2020 (Exact Date)       PHYSICAL EXAM    Gen: NAD  CV: RRR  CHEST: Clear  ABD: soft, NT/ND  EXT: no edema  Neuro: AAO      ASSESSMENT/PLAN:  This is a 56y o  year old female here for EGD with manometry catheter placement    PLAN:   Procedure:  EGD with manometry catheter placement for dysphagia

## 2022-07-11 PROCEDURE — 91010 ESOPHAGUS MOTILITY STUDY: CPT | Performed by: INTERNAL MEDICINE

## 2022-08-03 ENCOUNTER — OFFICE VISIT (OUTPATIENT)
Dept: GASTROENTEROLOGY | Facility: CLINIC | Age: 57
End: 2022-08-03
Payer: MEDICARE

## 2022-08-03 VITALS
DIASTOLIC BLOOD PRESSURE: 76 MMHG | HEART RATE: 88 BPM | SYSTOLIC BLOOD PRESSURE: 119 MMHG | HEIGHT: 67 IN | WEIGHT: 211 LBS | TEMPERATURE: 98.9 F | BODY MASS INDEX: 33.12 KG/M2

## 2022-08-03 DIAGNOSIS — R13.19 ESOPHAGEAL DYSPHAGIA: Primary | ICD-10-CM

## 2022-08-03 DIAGNOSIS — K58.1 IRRITABLE BOWEL SYNDROME WITH CONSTIPATION: ICD-10-CM

## 2022-08-03 DIAGNOSIS — K21.9 GASTROESOPHAGEAL REFLUX DISEASE WITHOUT ESOPHAGITIS: ICD-10-CM

## 2022-08-03 PROCEDURE — 99214 OFFICE O/P EST MOD 30 MIN: CPT | Performed by: INTERNAL MEDICINE

## 2022-08-04 NOTE — ASSESSMENT & PLAN NOTE
Status post Heller myotomy with Jaime fundoplication in 1301  Unfortunately continues to have symptoms  Recent esophageal manometry showing absent esophageal contractility    LES was normal   · At this point, therapeutic options are limited and uncertain if there are any surgical options that may be considered  · Patient may benefit from discussion at our next forgot meeting as well  · I will refer the patient to Dr Eduard Johns, our motility specialist for further evaluation and for any recommendations regarding potential options for therapy  · In the meantime, I have advised the patient to continue with soft foods and liquids as tolerated as well as drinking plenty of water as tolerated  · Fortunately, weight has remained stable although if weight loss becomes an issue in the future due to limited ability to maintain adequate caloric intake, alternative options for nutrition may need to be considered such as PEG tube placement

## 2022-08-04 NOTE — ASSESSMENT & PLAN NOTE
Stable  Status post Jaime fundoplication in 1328 after myotomy    · Continue PPI   · Anti reflux measures

## 2022-08-04 NOTE — PROGRESS NOTES
[facility] - Outpatient Follow-up  Ade Fountain 56 y o  female MRN: [id number]  Encounter: [id number]      ASSESSMENT & PLAN:      Problem List Items Addressed This Visit        Digestive    Irritable bowel syndrome with constipation     Symptoms are stable at this time  · Continue current management and bowel medications as needed         Gastroesophageal reflux disease     Stable  Status post Jaime fundoplication in 0008 after myotomy  · Continue PPI   · Anti reflux measures         Esophageal dysphagia - Primary     Status post Heller myotomy with Jaime fundoplication in 3961  Unfortunately continues to have symptoms  Recent esophageal manometry showing absent esophageal contractility  LES was normal   · At this point, therapeutic options are limited and uncertain if there are any surgical options that may be considered  · Patient may benefit from discussion at our next forgot meeting as well  · I will refer the patient to Dr Mark Nolasco, our motility specialist for further evaluation and for any recommendations regarding potential options for therapy  · In the meantime, I have advised the patient to continue with soft foods and liquids as tolerated as well as drinking plenty of water as tolerated  · Fortunately, weight has remained stable although if weight loss becomes an issue in the future due to limited ability to maintain adequate caloric intake, alternative options for nutrition may need to be considered such as PEG tube placement             No orders of the defined types were placed in this encounter     ______________________________________________________________________    HPI:  29-year-old female presents to GI clinic for follow-up  Patient with longstanding history of esophageal dysphagia due to achalasia  She underwent laparoscopic Heller myotomy and Jaime fundoplication in 3941  Unfortunately, patient continues to have symptoms of dysphagia    She has previously undergone EGD with Botox injection which have not improved her symptoms unfortunately  Fortunately, her weight has remained stable  She states that she has largely stuck to a soft food or liquid diet  Most recently, she underwent upper endoscopy with placement of manometry catheter  Manometry study revealed absent contractility of the esophagus  LES was within normal limits  Findings are consistent with previous diagnosis of achalasia as well as myotomy and fundoplication  Denies any nausea or vomiting      Last EGD: 6/30/2022  Last colonoscopy: 5/5/2022    REVIEW OF SYSTEMS:    CONSTITUTIONAL: Denies any fever, chills, rigors, and weight loss  HEENT: No earache or tinnitus, denies hearing loss or visual disturbances  CARDIOVASCULAR: No chest pain or palpitations   RESPIRATORY: Denies any cough, hemoptysis, shortness of breath or dyspnea on exertion  GASTROINTESTINAL: As noted in the History of Present Illness   GENITOURINARY: No problems with urination, denies any hematuria or dysuria  NEUROLOGIC: No dizziness or vertigo, denies headaches   MUSCULOSKELETAL: Denies any muscle or joint pain   SKIN: Denies skin rashes or itching   ENDOCRINE: Denies excessive thirst, denies intolerance to heat or cold  PSYCHOSOCIAL: Denies depression or anxiety, denies any recent memory loss     Historical Information   Past Medical History:   Diagnosis Date    Achalasia     Asthma     Constipation     CPAP (continuous positive airway pressure) dependence     Depression     Diarrhea 02/28/2019    Elevated LFTs     last assessed 10/25/17    Fibromyalgia     Gastric ulcer     GERD (gastroesophageal reflux disease)     H  pylori infection 10/09/2017    [facility] [id number]: Treated, f/u stool antigen negative    Hypertension     Hypothyroidism     Kidney stone     Migraines     Migraines     MICHAEL (obstructive sleep apnea)     last assessed 7/21/16    Pneumonia     Sleep apnea 01/05/2019    Uses CPAP    Thyroid nodule     last assessed 17     Past Surgical History:   Procedure Laterality Date    BREAST BIOPSY Right 10/31/2017    us bx     SECTION      COLONOSCOPY      ENDOMETRIAL ABLATION      MYOTOMY HELLER LAPAROSCOPIC  2016    Dr Keshia Esquivel, Coincident lorena fundoplication    NASAL SINUS SURGERY      OTHER SURGICAL HISTORY      achalasia repair    OVARIAN CYST REMOVAL      PILONIDAL CYST / SINUS EXCISION      CO COLONOSCOPY FLX DX W/COLLJ SPEC WHEN PFRMD N/A 2019    Procedure: COLONOSCOPY;  Surgeon: Argenis Jaimes MD;  [facility]; Service: Gastroenterology    CO CYSTOURETHROSCOPY N/A 2017    Procedure: Annamarie Prader;  Surgeon: Jagdeep Helton MD;  [facility];  Service: UroGynecology           CO ESOPHAGOGASTRODUODENOSCOPY TRANSORAL DIAGNOSTIC N/A 2017    Procedure: ESOPHAGOGASTRODUODENOSCOPY (EGD); Surgeon: Argenis Jaimes MD;  [facility]; Service: Gastroenterology    CO ESOPHAGOGASTRODUODENOSCOPY TRANSORAL DIAGNOSTIC N/A 2019    Procedure: ESOPHAGOGASTRODUODENOSCOPY (EGD); Surgeon: Argenis Jaimes MD;  [facility];   Service: Gastroenterology    CO FRAGMENT KIDNEY STONE/ ESWL Left 2022    Procedure: ESWL;  Surgeon: Climmie Lombard, MD;  Location: [facility];  Service: Urology    CO POST COLPORRHAPHY,RECTUM/VAGINA N/A 2017    Procedure: COLPORRHAPHY POSTERIOR;  Surgeon: Jagdeep Helton MD;  [facility];  Service: UroGynecology           CO SLING OPER STRES INCONTINENCE N/A 2017    Procedure: INSERTION PUBOVAGINAL SLING /SINGLE INCISION ;  Surgeon: Jagdeep Helton MD;  [facility];  Service: UroGynecology           TONSILLECTOMY      TUBAL LIGATION      UPPER GASTROINTESTINAL ENDOSCOPY      US GUIDED BREAST BIOPSY RIGHT COMPLETE Right 10/31/2017     Social History   Social History     Substance and Sexual Activity   Alcohol Use Yes    Comment: socially - very rare social occasion     Social History Substance and Sexual Activity   Drug Use Never     Social History     Tobacco Use   Smoking Status Former Smoker    Packs/day: 1 50    Years: 30 00    Pack years: 45 00    Quit date:     Years since quittin 5   Smokeless Tobacco Never Used   Tobacco Comment    pt stated quit about a month ago, last assessed 14 per [employer]     Family History   Problem Relation Age of Onset    Cancer Mother     Breast cancer Mother 62    Hypertension Mother     Stroke Father     Diabetes Father     Hypertension Father     Alzheimer's disease Father     Cancer Maternal Grandmother         ovarian    Ovarian cancer Maternal Grandmother         unspecified laterality    Cancer Maternal Grandfather     Hypertension Maternal Grandfather     Tongue cancer Maternal Grandfather     Stroke Paternal Grandmother     Thyroid disease unspecified Brother     Depression Brother     Anxiety disorder Brother     Drug abuse Brother         (pain pills)    Thyroid disease unspecified Maternal Aunt     Colon cancer Maternal Aunt     Breast cancer Family     Diabetes Sister     BRCA1 Negative Sister     BRCA2 Negative Sister     No Known Problems Daughter     No Known Problems Son     No Known Problems Daughter     No Known Problems Son     No Known Problems Maternal Aunt     No Known Problems Paternal Aunt     No Known Problems Paternal Aunt     No Known Problems Paternal Aunt     No Known Problems Paternal Aunt     No Known Problems Paternal Aunt     No Known Problems Paternal Aunt     No Known Problems Maternal Aunt     Alzheimer's disease Cousin     Breast cancer Cousin 43       MEDICATIONS & ALLERGIES:    Current Outpatient Medications   Medication Instructions    acetaminophen (TYLENOL) 325 mg tablet Every 4 hours as needed    [place] 0 025 % ophthalmic solution 1 drop, Both Eyes, 2 times daily PRN    albuterol (PROVENTIL HFA,VENTOLIN HFA) 90 mcg/act inhaler 2 puffs, Inhalation, Every 4 hours PRN    albuterol 2 5 mg, Nebulization, Every 6 hours PRN    Alcohol Swabs (Alcohol Prep) 70 % PADS Does not apply, Daily    Alcohol Swabs (Alcohol Prep) PADS Does not apply, 3 times daily, Use to test blood sugar 3 times a day    betamethasone dipropionate (DIPROSONE) 0 05 % cream APPLY TO AFFECTED AREA EVERY DAY    Breo Ellipta 100-25 MCG/INH inhaler 1 puff, Inhalation, Daily, 1 puff daily    cetirizine (ZYRTEC) 10 mg, Oral, Daily    cholecalciferol (VITAMIN D3) 1,000 Units, Oral, Daily    diclofenac sodium (VOLTAREN) 1 %     dicyclomine (BENTYL) 20 mg, Oral, Every 6 hours PRN    fexofenadine (ALLEGRA) 180 MG tablet No dose, route, or frequency recorded   FLUoxetine (PROZAC) 20 mg, Oral, Daily    fluticasone (FLONASE) 50 mcg/act nasal spray 1-2 sprays, Nasal, Daily    FREESTYLE LITE test strip USE AS DIRECTED    furosemide (LASIX) 20 mg tablet TAKE 1 TABLET BY MOUTH DAILY AS NEEDED FOR LEG SWELLING    glucose monitoring kit (FREESTYLE) monitoring kit 1 each, Does not apply, As needed    hydrOXYzine HCL (ATARAX) 50 mg tablet TAKE 1/2 OR 1 TABLET BY MOUTH THREE TIMES A DAY AS NEEDED FOR ANXIETY OR INSOMNIA    ibuprofen (MOTRIN) 800 mg, Oral, Every 8 hours PRN    ipratropium (ATROVENT) 0 02 % nebulizer solution No dose, route, or frequency recorded      ketorolac (TORADOL) 10 mg, Oral, Every 6 hours PRN    lamoTRIgine (LAMICTAL) 50 mg, Oral, Daily at bedtime    Lancets (freestyle) lancets Use as instructed    levothyroxine 125 mcg, Oral, Daily    lidocaine (LMX) 4 % cream APPLY 1 APPLICATION EXTERNALLY 3 TIMES A DAY AS NEEDED FOR 30 DAYS    medroxyPROGESTERone (DEPO-PROVERA) 150 mg, Intramuscular, Every 3 months    meloxicam (MOBIC) 15 mg, Oral, Daily    metFORMIN (GLUCOPHAGE-XR) 1,000 mg, Oral, Every morning    methocarbamol (ROBAXIN) 750 mg, Oral, 3 times daily    mometasone (ELOCON) 0 1 % cream Topical, Daily    montelukast (SINGULAIR) 10 mg, Oral, Daily    omeprazole (PRILOSEC) 40 mg, Oral, 2 times daily before meals    polyethylene glycol (GLYCOLAX) 17 g, Oral, Daily, Mix 1 capful in 8 ounces of water, juice or tea   SUMAtriptan (IMITREX) 100 mg, Oral, Once as needed    tiZANidine (ZANAFLEX) 4 mg, Oral, Daily at bedtime PRN     Allergies   Allergen Reactions    Gabapentin Other (See Comments)     Annotation - [id number]: Neuropsych effects per patient  Annotation - [id number]: Neuropsych effects per patient  Annotation - [id number]: Neuropsych effects per patient   Latex Dermatitis and Rash     Other reaction(s): Dermatitis    Penicillins Hives       PHYSICAL EXAM:      Objective   Blood pressure 119/76, pulse 88, temperature 98 9 °F (37 2 °C), temperature source Tympanic, height 5' 7" (1 702 m), weight 95 7 kg (211 lb), last menstrual period 10/11/2020, not currently breastfeeding  Body mass index is 33 05 kg/m²  General Appearance:   Alert, cooperative, no distress   HEENT:   Normocephalic, atraumatic, anicteric     Neck:   Supple, symmetrical, trachea midline   Lungs:   Equal chest rise, respirations unlabored    Heart:   Regular rate and rhythm   Abdomen:   Soft, non-tender, non-distended; normal bowel sounds; no masses, no organomegaly    Rectal:   Deferred    Extremities:   No cyanosis, clubbing or edema    Neuro: Moves all 4 extremities    Skin:   No jaundice, rashes, or lesions      LAB RESULTS:     No visits with results within 1 Day(s) from this visit  Latest known visit with results is:   Hospital Outpatient Visit on 06/30/2022   Component Date Value    POC Glucose 06/30/2022 98        RADIOLOGY RESULTS: I have personally reviewed pertinent imaging studies  EDMUNDO Chandra  Chief Gastroenterology Fellow  [facility]  Available on [email or web address]

## 2022-08-18 DIAGNOSIS — R73.03 PREDIABETES: ICD-10-CM

## 2022-08-19 RX ORDER — LANCETS 28 GAUGE
EACH MISCELLANEOUS
Qty: 100 EACH | Refills: 3 | Status: SHIPPED | OUTPATIENT
Start: 2022-08-19

## 2022-08-21 NOTE — PROGRESS NOTES
Marshall Garcia is a 57 yo  who presents today to follow up for her pelvic pain  She was seen in May 2022 and prescribed Depo for possible endometriosis, as well as pelvic floor PT  Her surgical history is notable for two prior endometrial ablations, one  section, 3 vaginal deliveries, a tubal ligation, ovarian cystectomy, and a pubovaginal sling and posterior colporrhaphy  On exam at her previous visit, a grade 2 cystocele was noted, and there was no sign of vaginal atrophy  Today she reports that she continues to have right sided lower pelvic pain, and that her prolapse symptoms are most bothersome to her  She reports that she feels a bulge, which is very distressing to her and that she sometimes needs to reduce it  She reports no other issues with urination or defecation, and denies any leaking of urine since her sling procedure  OB History        4    Para   4    Term   4            AB        Living           SAB        IAB        Ectopic        Multiple        Live Births               Obstetric Comments   First menarche: 17 yr old  4 previous pregnancies                    Objective    Vitals:    22 1252   BP: 121/79   BP Location: Right arm   Patient Position: Sitting   Cuff Size: Adult   Pulse: 70   Resp: 18   Weight: 96 2 kg (212 lb)   Height: 5' 7" (1 702 m)        Physical Exam  Constitutional:       Appearance: Normal appearance  Genitourinary:      Vulva normal       Genitourinary Comments: No signs of atrophy; cervix appears normal, no apical prolapse      No vaginal discharge  HENT:      Head: Normocephalic and atraumatic  Cardiovascular:      Rate and Rhythm: Normal rate  Pulmonary:      Effort: Pulmonary effort is normal  No respiratory distress  Abdominal:      Palpations: Abdomen is soft  Tenderness: There is no abdominal tenderness  Neurological:      Mental Status: She is alert            Assessment    Patient Active Problem List   Diagnosis    Mixed incontinence    Stress incontinence in female    MICHAEL (obstructive sleep apnea)    Hypothyroidism    Irritable bowel syndrome with constipation    Chronic low back pain    Asthma    Arthritis    Gastritis    Rectocele    Cystocele    Hypermobility of urethra    Achalasia    Bilateral edema of lower extremity    Breast pain, right    Chronic sinusitis with recurrent bronchitis    Gastroesophageal reflux disease    Headache, migraine    Intractable chronic migraine without aura and without status migrainosus    Breast mass    Class 1 obesity    Other chronic pain    Spondylosis of lumbar region without myelopathy or radiculopathy    Thyroiditis    Tubular adenoma of colon    Food allergy    POP-Q stage 2 cystocele    Moderate persistent asthma with exacerbation    Prediabetes    Need for shingles vaccine    Discomfort of right ear    Trochanteric bursitis of right hip    Pain in right hip    Numbness of toes    Acute viral bronchitis    Overweight    Hashimoto's thyroiditis    Lower abdominal pain    Left ovarian cyst    History of endometrial ablation    Left nephrolithiasis    Abdominal bloating    Achalasia of digestive tract    Esophageal dysphagia    Hypertension    Anxiety    Insomnia    Bipolar 2 disorder ([facility] [address] )    COVID-19    Encounter for long-term (current) use of other medications    Sebaceous cyst of right axilla    Pelvic pain        Plan  - Referral to Urogynecology clinic  - Due for Pap smear, collected today  - Referral for pelvic PT previously provided

## 2022-08-22 ENCOUNTER — OFFICE VISIT (OUTPATIENT)
Dept: OBGYN CLINIC | Facility: CLINIC | Age: 57
End: 2022-08-22

## 2022-08-22 VITALS
HEIGHT: 67 IN | BODY MASS INDEX: 33.27 KG/M2 | DIASTOLIC BLOOD PRESSURE: 79 MMHG | WEIGHT: 212 LBS | SYSTOLIC BLOOD PRESSURE: 121 MMHG | HEART RATE: 70 BPM | RESPIRATION RATE: 18 BRPM

## 2022-08-22 DIAGNOSIS — Z12.4 PAP SMEAR FOR CERVICAL CANCER SCREENING: ICD-10-CM

## 2022-08-22 DIAGNOSIS — N81.10 POP-Q STAGE 2 CYSTOCELE: Primary | ICD-10-CM

## 2022-08-22 PROCEDURE — 99213 OFFICE O/P EST LOW 20 MIN: CPT | Performed by: OBSTETRICS & GYNECOLOGY

## 2022-08-22 PROCEDURE — G0476 HPV COMBO ASSAY CA SCREEN: HCPCS | Performed by: OBSTETRICS & GYNECOLOGY

## 2022-08-22 PROCEDURE — G0145 SCR C/V CYTO,THINLAYER,RESCR: HCPCS | Performed by: OBSTETRICS & GYNECOLOGY

## 2022-08-23 LAB
HPV HR 12 DNA CVX QL NAA+PROBE: NEGATIVE
HPV16 DNA CVX QL NAA+PROBE: NEGATIVE
HPV18 DNA CVX QL NAA+PROBE: NEGATIVE

## 2022-08-26 LAB
LAB AP GYN PRIMARY INTERPRETATION: NORMAL
Lab: NORMAL

## 2022-09-05 NOTE — PSYCH
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  Appetite Changes and Sleep: adequate number of sleep hours, Gets 4-5hrs a night--her usual and this is all she requires and feels functional and restored, normal appetite, normal energy level    Review Of Systems:      Constitutional negative   ENT negative   Cardiovascular negative   Respiratory negative   Gastrointestinal negative   Genitourinary negative   Musculoskeletal negative   Integumentary negative   Neurological negative   Endocrine negative   Other Symptoms none, all other systems are negative       Past Psychiatric History:   As copied from my 6/6/2022  note with updates as needed:  "  [ Pt born in [facility] and came to the [facility] with her family in approx 1989 and grew up with biological parents, 1 year younger sister and a younger brother   Pt describes her upbringing as "Good, good dad, good mom  Kath Good were raised with [employer]  "   Everyone in the family got along and Pt has positive memories of growing up with them      Depression started in 2010 at work when she sustained a life changing back injury, for which she became permanently disabled   Mood Sxs: sadness, crying, self-isolating, anhedonia, less motivated, feelings of hopelessness and helplessness   But no SI                    Manic Sxs:  decreased need for sleep , rapid speech  and flight of ideas/racing thoughts  Racy thoughts, elevated energy     Anxiety started in 2010 due to the work injury:  excessive worry more days than not for longer than 3 months and The Sxs can occur without concommittent depressive Sxs   No other Sxs of BRIGITTE      Panic attacks started in late 8/2020 initially triggered by driving through a town she was not familiar with  Maryeloisa Stafford has had a few in total with Sxs of:  Moderate anxiety, sweaty hands,  palpitations/racing heart, trembling, shortness of breath       Social Anxiety symptoms: no symptoms suggestive of social anxiety Eating Disorder symptoms: no historical or current eating disorder  no binge eating disorder; no anorexia nervosa  no symptoms of bulimia      OCD type Sxs: checking door locks, windows, stove, pipes in the basement (which used to leak)   Cleans often, can be very fastidious and wash her hands excessively        Pt denies any h/o PTSD or psychotic Sxs      Prior psychiatrists:  [facility] from approx 2014 - 11/2019 --stopped going due to change in insurance     Prior psychotherapists:   First therapist was at [facility] in [place] and he left so she only saw him for a year  Second one was seen from [phone] at [address]  Pt denied h/o SI, HI, self-injurious behaviors, violent behaviors, psychiatric hospitalizations, ECT, legal or  Hx     Prior Rx trials: Viibryd 40mg (helped), Bupropion ER/XL 150mg (helped), Aripiprazole 5mg (helped but caused Wt gain), Ziprasidone 40mg (heart palpitations), Gabapentin 300mg (agitated),Topamax (for migraines but stopped due to ineffectiveness and her h/o nephrolithiasis), Hydroxyzine 50mg (sedating), Doxepin up to 50mg (for sleep--oversedating even at 25mg), Clonazepam 0 5mg--??used/helped       Abuse Hx:  Pt denies any h/o sexual, physical, or emotional abuse     Trauma Hx:  When she lost her job after her work injury                   ] "      Past Medical History:    Past Medical History:   Diagnosis Date    Achalasia     Asthma     Constipation     CPAP (continuous positive airway pressure) dependence     Depression     Diarrhea 02/28/2019    Elevated LFTs     last assessed 10/25/17    Fibromyalgia     Gastric ulcer     GERD (gastroesophageal reflux disease)     H  pylori infection 10/09/2017    [facility] [id number]: Treated, f/u stool antigen negative    Hypertension     Hypothyroidism     Kidney stone     Migraines     Migraines     MICHAEL (obstructive sleep apnea)     last assessed 16    Pneumonia     Sleep apnea 2019    Uses CPAP    Thyroid nodule     last assessed 17       Substance Abuse History:    Social History     Substance and Sexual Activity   Alcohol Use Yes    Comment: socially - very rare social occasion     Social History     Substance and Sexual Activity   Drug Use Never       Social History:    Social History     Socioeconomic History    Marital status: Single     Spouse name: Not on file    Number of children: 3    Years of education: Not on file    Highest education level: Not on file   Occupational History    Occupation: Disability since 2019-- due to a job injury    Tobacco Use    Smoking status: Former Smoker     Packs/day: 1 50     Years: 30 00     Pack years: 45 00     Quit date:      Years since quittin 7    Smokeless tobacco: Never Used    Tobacco comment: pt stated quit about a month ago, last assessed 14 per [employer]   Vaping Use    Vaping Use: Never used   Substance and Sexual Activity    Alcohol use: Yes     Comment: socially - very rare social occasion    Drug use: Never    Sexual activity: Yes     Partners: Male     Birth control/protection: Female Sterilization     Comment: Boyfriend/father of 4 children   Other Topics Concern    Not on file   Social History Narrative    Home: Lives with boyfriend/father of her 4 children and one of their daughters        Children: 2 sons born  and ,  2 daughters born  and         Education:     Social Determinants of Health     Financial Resource Strain: Low Risk     Difficulty of Paying Living Expenses: Not hard at all   Food Insecurity: No Food Insecurity    Worried About [address] in the Last Year: Never true    Reynaldo of Food in the Last Year: Never true   Transportation Needs: No Transportation Needs    Lack of Transportation (Medical): No    Lack of Transportation (Non-Medical): No   Physical Activity: Not on file   Stress: Not on file   Social Connections: Not on file   Intimate Partner Violence: Not on file   Housing Stability: Low Risk     Unable to Pay for Housing in the Last Year: No    Number of Places Lived in the Last Year: 1    Unstable Housing in the Last Year: No       Family Psychiatric History:     Family History   Problem Relation Age of Onset    Cancer Mother     Breast cancer Mother 62    Hypertension Mother     Stroke Father     Diabetes Father     Hypertension Father     Alzheimer's disease Father     Cancer Maternal Grandmother         ovarian    Ovarian cancer Maternal Grandmother         unspecified laterality    Cancer Maternal Grandfather     Hypertension Maternal Grandfather     Tongue cancer Maternal Grandfather     Stroke Paternal Grandmother     Thyroid disease unspecified Brother     Depression Brother     Anxiety disorder Brother     Drug abuse Brother         (pain pills)    Thyroid disease unspecified Maternal Aunt     Colon cancer Maternal Aunt     Breast cancer Family     Diabetes Sister     BRCA1 Negative Sister     BRCA2 Negative Sister     No Known Problems Daughter     No Known Problems Son     No Known Problems Daughter     No Known Problems Son     No Known Problems Maternal Aunt     No Known Problems Paternal Aunt     No Known Problems Paternal Aunt     No Known Problems Paternal Aunt     No Known Problems Paternal Aunt     No Known Problems Paternal Aunt     No Known Problems Paternal Aunt     No Known Problems Maternal Aunt     Alzheimer's disease Cousin     Breast cancer Cousin 43       History Review:  The following portions of the patient's history were reviewed and updated as appropriate: allergies, current medications, past family history, past medical history, past social history, past surgical history and problem list          OBJECTIVE:     Mental Status Evaluation:    Appearance Casually dressed, good eye contact and hygiene   Behavior Calm, cooperative, pleasant   Speech Clear, normal rate and volume   Mood Euthymic   Affect Normal range and intensity   Thought Processes Organized, goal directed   Associations intact associations   Thought Content No delusions   Perceptual Disturbances: Pt denies any form of hallucinations and does not appear to be responding to internal stimuli   Abnormal Thoughts  Risk Potential Suicidal ideation - None  Homicidal ideation - None  Potential for aggression - No   Orientation oriented to person, place, situation, day of week, date, month of year and year   Memory short term memory grossly intact   Cosciousness alert and awake   Attention Span attention span and concentration are age appropriate   Intellect appears to be of average intelligence   Insight fair   Judgement fair   Muscle Strength and  Gait normal gait and normal balance   Language no difficulty naming common objects, no difficulty repeating a phrase   Fund of Knowledge adequate knowledge of current events  adequate fund of knowledge regarding past history  adequate fund of knowledge regarding vocabulary    Pain none   Pain Scale 0       Laboratory Results:   I have personally reviewed all pertinent laboratory/tests results    Most Recent Labs:   Lab Results   Component Value Date    WBC 9 19 06/09/2021    RBC 4 75 06/09/2021    HGB 13 4 06/09/2021    HCT 40 9 06/09/2021     06/09/2021    RDW 13 8 06/09/2021    NEUTROABS 7 34 06/09/2021    SODIUM 136 06/09/2021    K 4 1 06/09/2021     06/09/2021    CO2 25 06/09/2021    BUN 12 06/09/2021    CREATININE 0 51 (L) 06/09/2021    GLUC 86 07/21/2016    GLUF 100 (H) 06/09/2021    CALCIUM 9 2 06/09/2021    AST 14 06/09/2021    ALT 27 06/09/2021    ALKPHOS 114 06/09/2021    TP 7 6 06/09/2021    ALB 4 1 06/09/2021    TBILI 0 54 06/09/2021    CHOLESTEROL 218 (H) 06/09/2021    HDL 44 06/09/2021    TRIG 129 06/09/2021    LDLCALC 148 (H) 06/09/2021    NONHDLC 127 01/20/2020    CED3ZPQYVQZR 0 294 (L) 11/17/2021    FREET4 1 44 11/17/2021    HGBA1C 5 5 06/09/2021     06/09/2021       Assessment/plan:       Diagnoses and all orders for this visit:    Bipolar 2 disorder (HCC)  -     FLUoxetine (PROzac) 20 mg capsule; Take 1 capsule (20 mg total) by mouth daily  -     lamoTRIgine (LaMICtal) 100 mg tablet; Take 0 5 tablets (50 mg total) by mouth daily at bedtime    Anxiety  -     FLUoxetine (PROzac) 20 mg capsule; Take 1 capsule (20 mg total) by mouth daily  -     hydrOXYzine HCL (ATARAX) 50 mg tablet; TAKE 1/2 OR 1 TABLET BY MOUTH THREE TIMES A DAY AS NEEDED FOR ANXIETY OR INSOMNIA    Insomnia, unspecified type  -     hydrOXYzine HCL (ATARAX) 50 mg tablet; TAKE 1/2 OR 1 TABLET BY MOUTH THREE TIMES A DAY AS NEEDED FOR ANXIETY OR INSOMNIA        PLAN:  Pt is feeling well and appears stable  Continue the present medication regimen  Tx plan due next visit  Continue:  Lamotrigine 100mg (1/2) tab po qhs # 45  Fluoxetine 20mg (1) cap po qd # 90  Hydroxyzine 50mg (1/2-1) tab po tid prn anxiety or insomnia # 270  Melatonin--Pt gets OTC  Vit D--per PCP  Return 10 weeks, call sooner prn      Risks/Benefits      Risks, Benefits And Possible Side Effects Of Medications:    Risks, benefits, and possible side effects of medications explained to Sebastian Liu and she verbalizes understanding and agreement for treatment

## 2022-09-07 ENCOUNTER — DOCUMENTATION (OUTPATIENT)
Dept: GASTROENTEROLOGY | Facility: CLINIC | Age: 57
End: 2022-09-07

## 2022-09-07 ENCOUNTER — OFFICE VISIT (OUTPATIENT)
Dept: GASTROENTEROLOGY | Facility: CLINIC | Age: 57
End: 2022-09-07
Payer: MEDICARE

## 2022-09-07 VITALS
TEMPERATURE: 97.3 F | WEIGHT: 213 LBS | HEIGHT: 67 IN | BODY MASS INDEX: 33.43 KG/M2 | DIASTOLIC BLOOD PRESSURE: 75 MMHG | SYSTOLIC BLOOD PRESSURE: 120 MMHG

## 2022-09-07 DIAGNOSIS — A04.8 HELICOBACTER PYLORI (H. PYLORI) INFECTION: Primary | ICD-10-CM

## 2022-09-07 DIAGNOSIS — R13.19 ESOPHAGEAL DYSPHAGIA: ICD-10-CM

## 2022-09-07 DIAGNOSIS — Z12.11 SCREENING FOR COLON CANCER: ICD-10-CM

## 2022-09-07 DIAGNOSIS — K58.1 IRRITABLE BOWEL SYNDROME WITH CONSTIPATION: ICD-10-CM

## 2022-09-07 DIAGNOSIS — R14.0 ABDOMINAL BLOATING: ICD-10-CM

## 2022-09-07 PROCEDURE — 99214 OFFICE O/P EST MOD 30 MIN: CPT | Performed by: INTERNAL MEDICINE

## 2022-09-07 NOTE — PROGRESS NOTES
[facility]  Progress Note - Vira Arroyo 56 y o  female MRN: [id number]    Unit/Bed#:  Encounter: [id number]    Assessment/Plan:  1  Helicobacter pylori (H  pylori) infection  - H  pylori antigen, stool; Future    2  Irritable bowel syndrome with constipation  - linaCLOtide 290 MCG CAPS; Take 1 capsule by mouth daily  Dispense: 30 capsule; Refill: 7  - polyethylene glycol (GOLYTELY) 4000 mL solution; Take 4,000 mL by mouth once for 1 dose  Dispense: 4000 mL; Refill: 0    3  Abdominal bloating  4  Esophageal dysphagia  5  Screening for colon cancer  - Colonoscopy; Future    I suspect her symptoms of dyspepsia or a combination of irritable bowel syndrome with constipation and previous history of H pylori  H pylori may not have been eradicated as she has had difficulty with this  We will check stool H pylori off PPI therapy for 10-14 days  If H pylori is not eradicated we can consider quadruple therapy or culture testing in the future  She will benefit from trial of Linzess, increasing fiber with a goal of 20 grams daily   and fluids 10 cups daily  We discuss lifestyle and dietary modifications avoiding dry foods as her symptoms are secondary to this  Also discuss sitting upright for at least 30 minutes to an hour after consumption of food  Colonoscopy plan with 2 day preparation due to recent poor preparation    Currently her weight is stable but if her symptoms persist and weight decreases we can consider alter option such as a PEG  Subjective:   She has relapse of symptoms of dyspepsia  She also complains of abdominal discomfort low can epigastric region with diffuse radiation  Denies any nausea, vomiting, fevers, chills  She is also taking meloxicam which may be attributing to some GI discomfort  She does have history of H pylori which has been difficult to eradicate  She also has history of acid reflux disease currently taking omeprazole 40 milligrams b i d      She is requesting to check for H pylori as she has abdominal discomfort similar to when she had H pylori in the past       Status post Heller myotomy with Jaime fundoplication in 5309  Unfortunately continues to have symptoms  Recent esophageal manometry showing absent esophageal contractility  LES was normal   She has had EGD with Botox in the past with minimal benefit            Objective:     Vitals: Blood pressure 120/75, temperature (!) 97 3 °F (36 3 °C), temperature source Tympanic, height 5' 7" (1 702 m), weight 96 6 kg (213 lb), last menstrual period 10/11/2020, not currently breastfeeding  ,Body mass index is 33 36 kg/m²  [unfilled]    Physical Exam:    GEN: wn/wd, NAD  HEENT: MMM, no cervical or supraclavicular LAD, anciteric  CV: RRR, no m/r/g  CHEST: CTA b/l, no w/r/r  ABD: +BS, soft, NT/ND, no hepatosplenomegaly  EXT: no c/c/e  SKIN: no rashes  NEURO: aaox3      Invasive Devices  Report    None                         Lab, Imaging and other studies:     No visits with results within 1 Day(s) from this visit  Latest known visit with results is:   Office Visit on 08/22/2022   Component Date Value    Case Report 08/22/2022                      Value:Gynecologic Cytology Report                       Case: [id number]                                  Authorizing Provider:  Karla Mcdaniel MD       Collected:           08/22/2022 4913              Ordering Location:     [facility]      Received:            08/22/2022 1335                                     [facility]                                                     First Screen:          KHOI Pack                                                    Specimen:    LIQUID-BASED PAP, SCREENING, Cervix                                                        Primary Interpretation 08/22/2022 Negative for intraepithelial lesion or malignancy     Specimen Adequacy 08/22/2022 Satisfactory for evaluation  Endocervical/transformation zone component present   Additional Information 08/22/2022                      Value: This result contains rich text formatting which cannot be displayed here   HPV Other HR 08/22/2022 Negative     HPV16 08/22/2022 Negative     HPV18 08/22/2022 Negative          I have personally reviewed pertinent reports  No current facility-administered medications for this visit

## 2022-09-07 NOTE — PATIENT INSTRUCTIONS
We will check for stool H pylori please stop the omeprazole for 14 days before checking  Please start taking over-the-counter probiotics  Repeat colonoscopy with 2 day preparation    Scheduled date of colonoscopy (as of today):10 12 22  Physician performing colonoscopy:DR VOSS  Location of colonoscopy:[place]  Bowel prep reviewed with patient:EUGENE  Instructions reviewed with patient by:JILLIAN  Clearances:  N/A

## 2022-09-12 ENCOUNTER — OFFICE VISIT (OUTPATIENT)
Dept: PSYCHIATRY | Facility: CLINIC | Age: 57
End: 2022-09-12
Payer: MEDICARE

## 2022-09-12 VITALS — HEIGHT: 67 IN | BODY MASS INDEX: 33.34 KG/M2 | WEIGHT: 212.4 LBS

## 2022-09-12 DIAGNOSIS — F41.9 ANXIETY: ICD-10-CM

## 2022-09-12 DIAGNOSIS — F31.81 BIPOLAR 2 DISORDER (HCC): Primary | ICD-10-CM

## 2022-09-12 DIAGNOSIS — G47.00 INSOMNIA, UNSPECIFIED TYPE: ICD-10-CM

## 2022-09-12 PROCEDURE — 99213 OFFICE O/P EST LOW 20 MIN: CPT | Performed by: PHYSICIAN ASSISTANT

## 2022-09-12 RX ORDER — LAMOTRIGINE 100 MG/1
50 TABLET ORAL
Qty: 45 TABLET | Refills: 0 | Status: SHIPPED | OUTPATIENT
Start: 2022-09-12

## 2022-09-12 RX ORDER — HYDROXYZINE 50 MG/1
TABLET, FILM COATED ORAL
Qty: 270 TABLET | Refills: 0 | Status: SHIPPED | OUTPATIENT
Start: 2022-09-12

## 2022-09-12 RX ORDER — FLUOXETINE HYDROCHLORIDE 20 MG/1
20 CAPSULE ORAL DAILY
Qty: 90 CAPSULE | Refills: 0 | Status: SHIPPED | OUTPATIENT
Start: 2022-09-12

## 2022-09-13 ENCOUNTER — TELEPHONE (OUTPATIENT)
Dept: OBGYN CLINIC | Facility: CLINIC | Age: 57
End: 2022-09-13

## 2022-09-14 ENCOUNTER — OFFICE VISIT (OUTPATIENT)
Dept: INTERNAL MEDICINE CLINIC | Facility: CLINIC | Age: 57
End: 2022-09-14

## 2022-09-14 VITALS
SYSTOLIC BLOOD PRESSURE: 123 MMHG | DIASTOLIC BLOOD PRESSURE: 85 MMHG | HEART RATE: 77 BPM | WEIGHT: 214.6 LBS | HEIGHT: 67 IN | BODY MASS INDEX: 33.68 KG/M2 | OXYGEN SATURATION: 97 %

## 2022-09-14 DIAGNOSIS — J45.40 MODERATE PERSISTENT ASTHMA WITHOUT COMPLICATION: ICD-10-CM

## 2022-09-14 DIAGNOSIS — E03.9 HYPOTHYROIDISM, UNSPECIFIED TYPE: ICD-10-CM

## 2022-09-14 DIAGNOSIS — Z12.31 ENCOUNTER FOR SCREENING MAMMOGRAM FOR MALIGNANT NEOPLASM OF BREAST: ICD-10-CM

## 2022-09-14 DIAGNOSIS — J30.2 SEASONAL ALLERGIES: ICD-10-CM

## 2022-09-14 DIAGNOSIS — A04.8 HELICOBACTER PYLORI (H. PYLORI) INFECTION: ICD-10-CM

## 2022-09-14 DIAGNOSIS — Z23 NEED FOR PNEUMOCOCCAL VACCINATION: Primary | ICD-10-CM

## 2022-09-14 DIAGNOSIS — Z00.00 HEALTHCARE MAINTENANCE: ICD-10-CM

## 2022-09-14 DIAGNOSIS — M79.89 LEG SWELLING: ICD-10-CM

## 2022-09-14 PROCEDURE — 99214 OFFICE O/P EST MOD 30 MIN: CPT | Performed by: INTERNAL MEDICINE

## 2022-09-14 RX ORDER — FUROSEMIDE 20 MG/1
20 TABLET ORAL DAILY
Qty: 90 TABLET | Refills: 3 | Status: SHIPPED | OUTPATIENT
Start: 2022-09-14

## 2022-09-14 RX ORDER — CETIRIZINE HYDROCHLORIDE 10 MG/1
10 TABLET ORAL DAILY
Qty: 30 TABLET | Refills: 3 | Status: SHIPPED | OUTPATIENT
Start: 2022-09-14 | End: 2022-10-14

## 2022-09-14 RX ORDER — ALBUTEROL SULFATE 90 UG/1
2 AEROSOL, METERED RESPIRATORY (INHALATION) EVERY 4 HOURS PRN
Qty: 18 G | Refills: 1 | Status: SHIPPED | OUTPATIENT
Start: 2022-09-14

## 2022-09-14 RX ORDER — OMEPRAZOLE 40 MG/1
40 CAPSULE, DELAYED RELEASE ORAL
Qty: 20 CAPSULE | Refills: 0 | Status: SHIPPED | OUTPATIENT
Start: 2022-09-14 | End: 2022-09-24

## 2022-09-14 NOTE — PROGRESS NOTES
ASSESSMENT/PLAN:    Case and plan discussed with Dr Anitha Dewitt     Diagnoses and all orders for this visit:    Need for pneumococcal vaccination  -     Pneumococcal Conjugate Vaccine 20-valent (Pcv20)    Seasonal allergies  -     cetirizine (ZyrTEC) 10 mg tablet; Take 1 tablet (10 mg total) by mouth daily    Encounter for screening mammogram for malignant neoplasm of breast  -     Mammo screening bilateral w cad; Future    Dyspepsia/H pylori infection  · Following up with GI  Recent EGD with biopsy positive for H  Pylori  · Plan for repeat stool H  Pylori  Patient holding PPI for 10-14 days prior to collect sample  Regarding of result, patient will need treatment given positive biopsy  Will contact GI to discuss  · Plan for repeat colonoscopy given poor prep in prior procedure  -     omeprazole (PriLOSEC) 40 MG capsule; Take 1 capsule (40 mg total) by mouth 2 (two) times a day before meals for 10 days    Healthcare maintenance  -     Lipid Panel with Direct LDL reflex; Future    Hypothyroidism, unspecified type  -     TSH, 3rd generation with Free T4 reflex; Future    Moderate persistent asthma without complication  -     albuterol (PROVENTIL HFA,VENTOLIN HFA) 90 mcg/act inhaler; Inhale 2 puffs every 4 (four) hours as needed for wheezing or shortness of breath  -     Breo Ellipta 100-25 MCG/INH inhaler;  Inhale 1 puff daily 1 puff daily    Bipolar disorder  · Following up with mental health          Immunization History   Administered Date(s) Administered    COVID-19 PFIZER VACCINE 0 3 ML IM 05/18/2021, 06/08/2021    INFLUENZA 11/07/2006, 11/15/2014, 11/01/2017, 12/10/2018, 01/14/2019, 10/01/2019, 12/03/2020    Influenza Injectable, MDCK, Preservative Free, Quadrivalent, 0 5 mL 01/14/2019    Influenza Quadrivalent Preservative Free 3 years and older IM 11/06/2017    Influenza, injectable, quadrivalent, preservative free 0 5 mL 12/03/2020    Influenza, recombinant, quadrivalent,injectable, preservative free 11/17/2021    Influenza, seasonal, injectable 10/23/2015    Pneumococcal Polysaccharide PPV23 01/28/2016    Tdap 05/22/2015    Zoster 12/03/2020    Zoster Vaccine Recombinant 12/03/2020, 03/03/2021         HISTORY OF PRESENT ILLNESS:    53-year-old female with past medical history of GERD, IBS, hypothyroidism, obstructive sleep apnea, asthma, migraines, hypertension, bipolar disorder presenting to the clinic for a follow up  Today patient reports doing well with no complaints  She was recently seen by GI due to recurrent symtoms of dyspepsia  Per GI, symptoms are most likely a combination of IBS-C and previous history of H pylori  She was advised to stop PPI for 10-14 days and repeat H  pylori stool  She was started on Linzess and plan for future colonoscopy given poor prep on recent procedure  Recent EGD showed erosion in the GE junction and LA Class B esophagitis  Biopsiy positive for H  Pylori  Colonoscopy showed 2 sub-centimeter polyps that were removed  Review of Systems   Constitutional: Negative for chills and fever  HENT: Negative for ear pain and sore throat  Eyes: Negative for pain and visual disturbance  Respiratory: Negative for cough and shortness of breath  Cardiovascular: Negative for chest pain and palpitations  Gastrointestinal: Negative for abdominal pain and vomiting  Genitourinary: Negative for dysuria and hematuria  Musculoskeletal: Negative for arthralgias and back pain  Skin: Negative for color change and rash  Neurological: Negative for seizures and syncope  All other systems reviewed and are negative  OBJECTIVE:  Vitals:    09/14/22 1321   BP: 123/85   BP Location: Right arm   Patient Position: Sitting   Cuff Size: Large   Pulse: 77   SpO2: 97%   Weight: 97 3 kg (214 lb 9 6 oz)   Height: 5' 7" (1 702 m)       Physical Exam  Vitals and nursing note reviewed  Constitutional:       General: She is not in acute distress       Appearance: She is well-developed  She is obese  HENT:      Head: Normocephalic and atraumatic  Eyes:      Conjunctiva/sclera: Conjunctivae normal    Cardiovascular:      Rate and Rhythm: Normal rate and regular rhythm  Heart sounds: No murmur heard  Pulmonary:      Effort: Pulmonary effort is normal  No respiratory distress  Breath sounds: Normal breath sounds  Abdominal:      Palpations: Abdomen is soft  Tenderness: There is no abdominal tenderness  Musculoskeletal:      Cervical back: Neck supple  Skin:     General: Skin is warm and dry  Neurological:      Mental Status: She is alert     Psychiatric:         Mood and Affect: Mood normal          Behavior: Behavior normal               Current Outpatient Medications:     acetaminophen (TYLENOL) 325 mg tablet, Every 4 hours as needed, Disp: 30 tablet, Rfl: 0    Alaway 0 025 % ophthalmic solution, Administer 1 drop to both eyes 2 (two) times a day as needed (For discomfort), Disp: 5 mL, Rfl: 0    albuterol (2 5 mg/3 mL) 0 083 % nebulizer solution, Take 3 mL (2 5 mg total) by nebulization every 6 (six) hours as needed for wheezing or shortness of breath, Disp: 75 mL, Rfl: 0    albuterol (PROVENTIL HFA,VENTOLIN HFA) 90 mcg/act inhaler, Inhale 2 puffs every 4 (four) hours as needed for wheezing or shortness of breath, Disp: 18 g, Rfl: 1    Alcohol Swabs (Alcohol Prep) 70 % PADS, Use daily, Disp: 200 each, Rfl: 2    Alcohol Swabs (Alcohol Prep) PADS, Use 3 (three) times a day Use to test blood sugar 3 times a day, Disp: 100 each, Rfl: 5    betamethasone dipropionate (DIPROSONE) 0 05 % cream, APPLY TO AFFECTED AREA EVERY DAY, Disp: 30 g, Rfl: 0    Breo Ellipta 100-25 MCG/INH inhaler, Inhale 1 puff daily 1 puff daily, Disp: 60 blister, Rfl: 2    cetirizine (ZyrTEC) 10 mg tablet, Take 1 tablet (10 mg total) by mouth daily, Disp: 30 tablet, Rfl: 3    cholecalciferol (VITAMIN D3) 1,000 units tablet, Take 1 tablet (1,000 Units total) by mouth daily, Disp: 90 tablet, Rfl: 3    diclofenac sodium (VOLTAREN) 1 %, , Disp: , Rfl: 3    dicyclomine (BENTYL) 20 mg tablet, Take 1 tablet (20 mg total) by mouth every 6 (six) hours as needed (pain), Disp: 360 tablet, Rfl: 0    fexofenadine (ALLEGRA) 180 MG tablet, , Disp: , Rfl:     FLUoxetine (PROzac) 20 mg capsule, Take 1 capsule (20 mg total) by mouth daily, Disp: 90 capsule, Rfl: 0    fluticasone (FLONASE) 50 mcg/act nasal spray, 1-2 sprays into each nostril daily, Disp: 16 g, Rfl: 6    FREESTYLE LITE test strip, USE AS DIRECTED, Disp: 100 strip, Rfl: 8    furosemide (LASIX) 20 mg tablet, Take 1 tablet (20 mg total) by mouth daily, Disp: 90 tablet, Rfl: 3    glucose monitoring kit (FREESTYLE) monitoring kit, Use 1 each as needed (prediabetes blood sugar checks), Disp: 1 each, Rfl: 0    hydrOXYzine HCL (ATARAX) 50 mg tablet, TAKE 1/2 OR 1 TABLET BY MOUTH THREE TIMES A DAY AS NEEDED FOR ANXIETY OR INSOMNIA, Disp: 270 tablet, Rfl: 0    ibuprofen (MOTRIN) 800 mg tablet, Take 1 tablet (800 mg total) by mouth every 8 (eight) hours as needed for mild pain, Disp: 30 tablet, Rfl: 0    ipratropium (ATROVENT) 0 02 % nebulizer solution, , Disp: , Rfl:     lamoTRIgine (LaMICtal) 100 mg tablet, Take 0 5 tablets (50 mg total) by mouth daily at bedtime, Disp: 45 tablet, Rfl: 0    Lancets (freestyle) lancets, USE AS DIRECTED, Disp: 100 each, Rfl: 3    levothyroxine 125 mcg tablet, Take 1 tablet (125 mcg total) by mouth daily, Disp: 90 tablet, Rfl: 1    lidocaine (LMX) 4 % cream, APPLY 1 APPLICATION EXTERNALLY 3 TIMES A DAY AS NEEDED FOR 30 DAYS, Disp: , Rfl:     linaCLOtide 290 MCG CAPS, Take 1 capsule by mouth daily, Disp: 30 capsule, Rfl: 7    medroxyPROGESTERone (DEPO-PROVERA) 150 mg/mL injection, Inject 1 mL (150 mg total) into a muscle every 3 (three) months, Disp: 1 mL, Rfl: 2    meloxicam (MOBIC) 15 mg tablet, Take 1 tablet (15 mg total) by mouth daily, Disp: 30 tablet, Rfl: 6    metFORMIN (GLUCOPHAGE-XR) 500 mg 24 hr tablet, TAKE 2 TABLETS (1,000 MG TOTAL) BY MOUTH EVERY MORNING, Disp: 180 tablet, Rfl: 0    methocarbamol (ROBAXIN) 750 mg tablet, Take 750 mg by mouth 3 (three) times a day, Disp: , Rfl: 0    mometasone (ELOCON) 0 1 % cream, Apply topically daily, Disp: 45 g, Rfl: 0    montelukast (SINGULAIR) 10 mg tablet, Take 10 mg by mouth daily, Disp: , Rfl: 6    omeprazole (PriLOSEC) 40 MG capsule, Take 1 capsule (40 mg total) by mouth 2 (two) times a day before meals for 10 days, Disp: 20 capsule, Rfl: 0    polyethylene glycol (GLYCOLAX) powder, Take 17 g by mouth daily Mix 1 capful in 8 ounces of water, juice or tea , Disp: 578 g, Rfl: 3    polyethylene glycol (GOLYTELY) 4000 mL solution, Take 4,000 mL by mouth once for 1 dose, Disp: 4000 mL, Rfl: 0    SUMAtriptan (IMITREX) 100 mg tablet, Take 1 tablet (100 mg total) by mouth once as needed for migraine, Disp: 10 tablet, Rfl: 1    tiZANidine (ZANAFLEX) 4 mg tablet, Take 4 mg by mouth daily at bedtime as needed, Disp: , Rfl:     ketorolac (TORADOL) 10 mg tablet, Take 1 tablet (10 mg total) by mouth every 6 (six) hours as needed for moderate pain for up to 5 days (Patient not taking: No sig reported), Disp: 20 tablet, Rfl: 0    Past Medical History:   Diagnosis Date    Achalasia     Asthma     Constipation     CPAP (continuous positive airway pressure) dependence     Depression     Diarrhea 02/28/2019    Elevated LFTs     last assessed 10/25/17    Fibromyalgia     Gastric ulcer     GERD (gastroesophageal reflux disease)     H  pylori infection 10/09/2017    [id number]: Treated, f/u stool antigen negative    Hypertension     Hypothyroidism     Kidney stone     Migraines     Migraines     MICHAEL (obstructive sleep apnea)     last assessed 7/21/16    Pneumonia     Sleep apnea 01/05/2019    Uses CPAP    Thyroid nodule     last assessed 11/6/17     Past Surgical History:   Procedure Laterality Date    BREAST BIOPSY Right 10/31/2017     bx   SECTION      COLONOSCOPY      ENDOMETRIAL ABLATION      MYOTOMY HELLER LAPAROSCOPIC  2016    Dr Leighton Varghese, Coincident lorena fundoplication    NASAL SINUS SURGERY      OTHER SURGICAL HISTORY      achalasia repair    OVARIAN CYST REMOVAL      PILONIDAL CYST / SINUS EXCISION      GA COLONOSCOPY FLX DX W/COLLJ SPEC WHEN PFRMD N/A 2019    Procedure: COLONOSCOPY;  Surgeon: Ever Dodson MD;  [facility]; Service: Gastroenterology    GA CYSTOURETHROSCOPY N/A 2017    Procedure: Nabor Jernigan;  Surgeon: Mya Solorzano MD;  [facility];  Service: UroGynecology           GA ESOPHAGOGASTRODUODENOSCOPY TRANSORAL DIAGNOSTIC N/A 2017    Procedure: ESOPHAGOGASTRODUODENOSCOPY (EGD); Surgeon: Ever Dodson MD;  [facility]; Service: Gastroenterology    GA ESOPHAGOGASTRODUODENOSCOPY TRANSORAL DIAGNOSTIC N/A 2019    Procedure: ESOPHAGOGASTRODUODENOSCOPY (EGD); Surgeon: Ever Dodson MD;  [facility];   Service: Gastroenterology    GA FRAGMENT KIDNEY STONE/ ESWL Left 2022    Procedure: ESWL;  Surgeon: Anirudh Almeida MD;  Location: [facility];  Service: Urology    GA POST COLPORRHAPHY,RECTUM/VAGINA N/A 2017    Procedure: COLPORRHAPHY POSTERIOR;  Surgeon: Mya Solorzano MD;  [facility];  Service: UroGynecology           GA SLING OPER STRES INCONTINENCE N/A 2017    Procedure: INSERTION PUBOVAGINAL SLING /SINGLE INCISION ;  Surgeon: Mya Solorzano MD;  [facility];  Service: UroGynecology           TONSILLECTOMY      TUBAL LIGATION      UPPER GASTROINTESTINAL ENDOSCOPY      US GUIDED BREAST BIOPSY RIGHT COMPLETE Right 10/31/2017     Family History   Problem Relation Age of Onset    Cancer Mother     Breast cancer Mother 62    Hypertension Mother     Stroke Father     Diabetes Father     Hypertension Father     Alzheimer's disease Father     Cancer Maternal Grandmother         ovarian    Ovarian cancer Maternal Grandmother         unspecified laterality    Cancer Maternal Grandfather     Hypertension Maternal Grandfather     Tongue cancer Maternal Grandfather     Stroke Paternal Grandmother     Thyroid disease unspecified Brother     Depression Brother     Anxiety disorder Brother     Drug abuse Brother         (pain pills)    Thyroid disease unspecified Maternal Aunt     Colon cancer Maternal Aunt     Breast cancer Family     Diabetes Sister     BRCA1 Negative Sister     BRCA2 Negative Sister     No Known Problems Daughter     No Known Problems Son     No Known Problems Daughter     No Known Problems Son     No Known Problems Maternal Aunt     No Known Problems Paternal Aunt     No Known Problems Paternal Aunt     No Known Problems Paternal Aunt     No Known Problems Paternal Aunt     No Known Problems Paternal Aunt     No Known Problems Paternal Aunt     No Known Problems Maternal Aunt     Alzheimer's disease Cousin     Breast cancer Cousin 43     Social History     Socioeconomic History    Marital status: Single     Spouse name: Not on file    Number of children: 3    Years of education: Not on file    Highest education level: Not on file   Occupational History    Occupation: Disability since 2019-- due to a job injury    Tobacco Use    Smoking status: Former Smoker     Packs/day: 1 50     Years: 30 00     Pack years: 45 00     Quit date:      Years since quittin 7    Smokeless tobacco: Never Used    Tobacco comment: pt stated quit about a month ago, last assessed 14 per David   Vaping Use    Vaping Use: Never used   Substance and Sexual Activity    Alcohol use: Yes     Comment: socially - very rare social occasion    Drug use: Never    Sexual activity: Yes     Partners: Male     Birth control/protection: Female Sterilization     Comment: Boyfriend/father of 4 children   Other Topics Concern    Not on file   Social History Narrative    Home: Lives with boyfriend/father of her 4 children and one of their daughters        Children: 2 sons born 80 and 18,  2 daughters born 0 and 2003        Education:     Social Determinants of Health     Financial Resource Strain: Low Risk     Difficulty of Paying Living Expenses: Not hard at all   Food Insecurity: No Food Insecurity    Worried About Running Out of Food in the Last Year: Never true    Reynaldo of Food in the Last Year: Never true   Transportation Needs: No Transportation Needs    Lack of Transportation (Medical): No    Lack of Transportation (Non-Medical): No   Physical Activity: Not on file   Stress: Not on file   Social Connections: Not on file   Intimate Partner Violence: Not on file   Housing Stability: Low Risk     Unable to Pay for Housing in the Last Year: No    Number of Places Lived in the Last Year: 1    Unstable Housing in the Last Year: No     Social History     Tobacco Use   Smoking Status Former Smoker    Packs/day: 1 50    Years: 30 00    Pack years: 45 00    Quit date:     Years since quittin 7   Smokeless Tobacco Never Used   Tobacco Comment    pt stated quit about a month ago, last assessed 14 per [employer]     Social History     Substance and Sexual Activity   Alcohol Use Yes    Comment: socially - very rare social occasion       Social History     Substance and Sexual Activity   Drug Use Never         Kvng Brown MD  Internal Medicine Residency, PGY-2  [facility]   [address] [facility] [address] [facility]  [address] [facility] [address]  [place], [address] [facility]

## 2022-09-16 PROCEDURE — 90677 PCV20 VACCINE IM: CPT | Performed by: INTERNAL MEDICINE

## 2022-09-16 PROCEDURE — G0009 ADMIN PNEUMOCOCCAL VACCINE: HCPCS | Performed by: INTERNAL MEDICINE

## 2022-09-26 ENCOUNTER — TELEPHONE (OUTPATIENT)
Dept: GASTROENTEROLOGY | Facility: CLINIC | Age: 57
End: 2022-09-26

## 2022-09-26 NOTE — TELEPHONE ENCOUNTER
----- Message from Dina Liu MD sent at 9/26/2022 11:32 AM EDT -----  Hope all is well  She will probably benefit from checking stool studies for H pylori off PPI therapy  If this is positive we can consider quadruple therapy  I know she has been refractory to multiple therapies in the past so my fear is if we give her more antibiotics she may develop more resistance  Unfortunately she may continue dyspepsia to a lesser degree despite eradicating H pylori  Thanks for the heads up  GI team- can we make sure she is off PPI therapy for her stool H pylori  Thanks  ----- Message -----  From: Chelly Calvo MD  Sent: 9/26/2022   6:46 AM EDT  To: MD Dr Shawn Canseco- [facility]  She has a known history of H pylori with GERD  Patient was concerned recently that she had recurrent symptoms of dyspepsia and that she may have recurrent H pylori  EGD done in My of 2022  was positive for H pylori but as best I could tell this was not treated  She was also performing a stool study for H pylori while off her PPI at the time of her clinic appointment      Based on her EGD results do you think she should be re-treated for H pylori?-thank you for your help with this matter    Marisela Fountain

## 2022-09-27 NOTE — TELEPHONE ENCOUNTER
Called pt and advised her to have h pylori stool test and to be off of ppi for 2 weeks   Pt verbalized understanding

## 2022-09-28 DIAGNOSIS — K58.0 IRRITABLE BOWEL SYNDROME WITH DIARRHEA: ICD-10-CM

## 2022-09-29 RX ORDER — DICYCLOMINE HCL 20 MG
20 TABLET ORAL EVERY 6 HOURS PRN
Qty: 360 TABLET | Refills: 0 | Status: SHIPPED | OUTPATIENT
Start: 2022-09-29

## 2022-09-30 ENCOUNTER — APPOINTMENT (OUTPATIENT)
Dept: LAB | Facility: CLINIC | Age: 57
End: 2022-09-30
Payer: MEDICARE

## 2022-09-30 DIAGNOSIS — Z00.00 HEALTHCARE MAINTENANCE: ICD-10-CM

## 2022-09-30 DIAGNOSIS — A04.8 HELICOBACTER PYLORI (H. PYLORI) INFECTION: ICD-10-CM

## 2022-09-30 DIAGNOSIS — E03.9 HYPOTHYROIDISM, UNSPECIFIED TYPE: ICD-10-CM

## 2022-09-30 LAB
BACTERIA UR QL AUTO: ABNORMAL /HPF
BILIRUB UR QL STRIP: NEGATIVE
CHOLEST SERPL-MCNC: 198 MG/DL
CLARITY UR: CLEAR
COLOR UR: ABNORMAL
GLUCOSE UR STRIP-MCNC: NEGATIVE MG/DL
HDLC SERPL-MCNC: 39 MG/DL
HGB UR QL STRIP.AUTO: NEGATIVE
KETONES UR STRIP-MCNC: NEGATIVE MG/DL
LDLC SERPL CALC-MCNC: 138 MG/DL (ref 0–100)
LEUKOCYTE ESTERASE UR QL STRIP: NEGATIVE
MUCOUS THREADS UR QL AUTO: ABNORMAL
NITRITE UR QL STRIP: NEGATIVE
NON-SQ EPI CELLS URNS QL MICRO: ABNORMAL /HPF
PH UR STRIP.AUTO: 5.5 [PH]
PROT UR STRIP-MCNC: NEGATIVE MG/DL
RBC #/AREA URNS AUTO: ABNORMAL /HPF
SP GR UR STRIP.AUTO: 1.02 (ref 1–1.03)
T4 FREE SERPL-MCNC: 1.42 NG/DL (ref 0.76–1.46)
TRIGL SERPL-MCNC: 103 MG/DL
TSH SERPL DL<=0.05 MIU/L-ACNC: 0.24 UIU/ML (ref 0.45–4.5)
UROBILINOGEN UR STRIP-ACNC: <2 MG/DL
WBC #/AREA URNS AUTO: ABNORMAL /HPF

## 2022-09-30 PROCEDURE — 36415 COLL VENOUS BLD VENIPUNCTURE: CPT

## 2022-09-30 PROCEDURE — 84443 ASSAY THYROID STIM HORMONE: CPT

## 2022-09-30 PROCEDURE — 80061 LIPID PANEL: CPT

## 2022-09-30 PROCEDURE — 84439 ASSAY OF FREE THYROXINE: CPT

## 2022-09-30 PROCEDURE — 87338 HPYLORI STOOL AG IA: CPT

## 2022-10-01 LAB
BACTERIA UR CULT: NORMAL
H PYLORI AG STL QL IA: POSITIVE

## 2022-10-02 NOTE — TELEPHONE ENCOUNTER
I spoke with patient and reviewed results  She has ineffective contraction however IRP is within normal limits so this is not achalasia or outlet obstruction  She states she continues to have difficulty especially with dry foods  She does try to drink water in order to help things go down but does get pain  I suggested that she come in and speak with 1 of our motility specialist, Dr Senia Tay or Dr Shayy Weinberg for further recommendations  Please call her and arrange an appointment at earliest convenience    Nanette Barnhart Mr. Strickland is a 89 yo M with a h/o Afib on AC, CHF, hypertension, CKD, and dementia who presents from home with concern with decreased appetite, decreased mobility, falls, and dyspnea over the past week. History obtained from wife. Patient with decreased mobility over the past several months that progressed prompting presentation. Wife states that patient was has gotten too weak to use his walker with occasional falls, unable to eat full meals (now only taking sips of water/juice), and coughing associated with dyspnea. EMS was called and brought patient to ED.    In the ED, patient 89% on RA, /78, HR 62, and afeb. Labs notable for WBC 17, Plt 41, K 5.3, Creatinine 1.4, Troponin 0.121, and TSH 1.316. EKG Afib with HR 89. CXR with patchy ground-glass interstitial opacities in the perihilar region and left lung base. Given CTX in the ED.

## 2022-10-08 ENCOUNTER — TELEPHONE (OUTPATIENT)
Dept: OTHER | Facility: OTHER | Age: 57
End: 2022-10-08

## 2022-10-11 RX ORDER — SODIUM CHLORIDE 9 MG/ML
125 INJECTION, SOLUTION INTRAVENOUS CONTINUOUS
Status: CANCELLED | OUTPATIENT
Start: 2022-10-11

## 2022-10-12 ENCOUNTER — ANESTHESIA EVENT (OUTPATIENT)
Dept: GASTROENTEROLOGY | Facility: MEDICAL CENTER | Age: 57
End: 2022-10-12

## 2022-10-12 ENCOUNTER — HOSPITAL ENCOUNTER (OUTPATIENT)
Dept: GASTROENTEROLOGY | Facility: MEDICAL CENTER | Age: 57
Setting detail: OUTPATIENT SURGERY
Discharge: HOME | End: 2022-10-12
Attending: INTERNAL MEDICINE
Payer: MEDICARE

## 2022-10-12 ENCOUNTER — ANESTHESIA (OUTPATIENT)
Dept: GASTROENTEROLOGY | Facility: MEDICAL CENTER | Age: 57
End: 2022-10-12

## 2022-10-12 VITALS
SYSTOLIC BLOOD PRESSURE: 113 MMHG | WEIGHT: 220 LBS | BODY MASS INDEX: 34.53 KG/M2 | OXYGEN SATURATION: 96 % | HEIGHT: 67 IN | RESPIRATION RATE: 16 BRPM | DIASTOLIC BLOOD PRESSURE: 67 MMHG | HEART RATE: 71 BPM | TEMPERATURE: 97 F

## 2022-10-12 DIAGNOSIS — Z12.11 SCREENING FOR COLON CANCER: ICD-10-CM

## 2022-10-12 PROCEDURE — 88305 TISSUE EXAM BY PATHOLOGIST: CPT | Performed by: PATHOLOGY

## 2022-10-12 PROCEDURE — 45385 COLONOSCOPY W/LESION REMOVAL: CPT | Performed by: INTERNAL MEDICINE

## 2022-10-12 RX ORDER — PROPOFOL 10 MG/ML
INJECTION, EMULSION INTRAVENOUS AS NEEDED
Status: DISCONTINUED | OUTPATIENT
Start: 2022-10-12 | End: 2022-10-12

## 2022-10-12 RX ORDER — PROPOFOL 10 MG/ML
INJECTION, EMULSION INTRAVENOUS CONTINUOUS PRN
Status: DISCONTINUED | OUTPATIENT
Start: 2022-10-12 | End: 2022-10-12

## 2022-10-12 RX ORDER — SODIUM CHLORIDE 9 MG/ML
125 INJECTION, SOLUTION INTRAVENOUS CONTINUOUS
Status: DISCONTINUED | OUTPATIENT
Start: 2022-10-12 | End: 2022-10-16 | Stop reason: HOSPADM

## 2022-10-12 RX ADMIN — PROPOFOL 120 MG: 10 INJECTION, EMULSION INTRAVENOUS at 11:16

## 2022-10-12 RX ADMIN — Medication 40 MG: at 11:26

## 2022-10-12 RX ADMIN — SODIUM CHLORIDE 125 ML/HR: 0.9 INJECTION, SOLUTION INTRAVENOUS at 10:47

## 2022-10-12 RX ADMIN — PROPOFOL 100 MCG/KG/MIN: 10 INJECTION, EMULSION INTRAVENOUS at 11:18

## 2022-10-12 RX ADMIN — PROPOFOL 30 MG: 10 INJECTION, EMULSION INTRAVENOUS at 11:18

## 2022-10-12 NOTE — ANESTHESIA POSTPROCEDURE EVALUATION
Post-Op Assessment Note    CV Status:  Stable    Pain management: adequate     Mental Status:  Alert and awake   Hydration Status:  Euvolemic   PONV Controlled:  Controlled   Airway Patency:  Patent      Post Op Vitals Reviewed: Yes      Staff: Anesthesiologist         No complications documented      BP      Temp     Pulse     Resp      SpO2      /67   Pulse 71   Temp (!) 97 °F (36 1 °C) (Temporal)   Resp 16   Ht 5' 7" (1 702 m)   Wt 99 8 kg (220 lb)   LMP 10/11/2020 (Exact Date)   SpO2 96%   BMI 34 46 kg/m²

## 2022-10-12 NOTE — H&P
History and Physical - [facility]  Reba Hoffmann 56 y o  female MRN: [id number]                  HPI: Reba Hoffmann is a 56y o  year old female who presents for colonoscopy evaluation  REVIEW OF SYSTEMS: Per the HPI, and otherwise unremarkable  Historical Information   Past Medical History:   Diagnosis Date   • Achalasia    • Asthma    • Colon polyp    • Constipation    • CPAP (continuous positive airway pressure) dependence    • Depression    • Diarrhea 2019   • Elevated LFTs     last assessed 10/25/17   • Fibromyalgia    • Gastric ulcer    • GERD (gastroesophageal reflux disease)    • H  pylori infection 10/09/2017    [facility] [id number]: Treated, f/u stool antigen negative   • Hypertension    • Hypothyroidism    • Kidney stone    • Migraines    • Migraines    • MICHAEL (obstructive sleep apnea)     last assessed 16   • Pneumonia    • Sleep apnea 2019    Uses CPAP   • Thyroid nodule     last assessed 17     Past Surgical History:   Procedure Laterality Date   • BREAST BIOPSY Right 10/31/2017    us bx   •  SECTION     • COLONOSCOPY     • ENDOMETRIAL ABLATION     • MYOTOMY HELLER LAPAROSCOPIC  2016    Dr Phoenix Arcos, Coincident lorena fundoplication   • NASAL SINUS SURGERY     • OTHER SURGICAL HISTORY      achalasia repair   • OVARIAN CYST REMOVAL     • PILONIDAL CYST / SINUS EXCISION     • AR COLONOSCOPY FLX DX W/COLLJ SPEC WHEN PFRMD N/A 2019    Procedure: COLONOSCOPY;  Surgeon: Melvi Kaminski MD;  Location: [facility]; Service: Gastroenterology   • AR CYSTOURETHROSCOPY N/A 2017    Procedure: CYSTOSCOPY;  Surgeon: Bart Martinez MD;  Location: [facility];  Service: UroGynecology          • AR ESOPHAGOGASTRODUODENOSCOPY TRANSORAL DIAGNOSTIC N/A 2017    Procedure: ESOPHAGOGASTRODUODENOSCOPY (EGD); Surgeon: Melvi Kaminski MD;  Location: [facility];   Service: Gastroenterology   • AR ESOPHAGOGASTRODUODENOSCOPY TRANSORAL DIAGNOSTIC N/A 2019 Procedure: ESOPHAGOGASTRODUODENOSCOPY (EGD); Surgeon: Memo Goodson MD;  Location: [facility];   Service: Gastroenterology   • RI FRAGMENT KIDNEY STONE/ ESWL Left 2022    Procedure: ESWL;  Surgeon: Deena Holden MD;  Location[facility] MAIN OR;  Service: Urology   • RI POST COLPORRHAPHY,RECTUM/VAGINA N/A 2017    Procedure: COLPORRHAPHY POSTERIOR;  Surgeon: Carolann Gotti MD;  Location: [facility];  Service: UroGynecology          • RI SLING OPER STRES INCONTINENCE N/A 2017    Procedure: INSERTION PUBOVAGINAL SLING /SINGLE INCISION ;  Surgeon: Carolann Gotti MD;  [facility];  Service: UroGynecology          • TONSILLECTOMY     • TUBAL LIGATION     • UPPER GASTROINTESTINAL ENDOSCOPY     • US GUIDED BREAST BIOPSY RIGHT COMPLETE Right 10/31/2017     Social History   Social History     Substance and Sexual Activity   Alcohol Use Yes    Comment: socially - very rare social occasion     Social History     Substance and Sexual Activity   Drug Use Never     Social History     Tobacco Use   Smoking Status Former Smoker   • Packs/day: 1 50   • Years: 30 00   • Pack years: 45 00   • Quit date:    • Years since quittin 7   Smokeless Tobacco Never Used   Tobacco Comment    pt stated quit about a month ago, last assessed 14 per [employer]     Family History   Problem Relation Age of Onset   • Cancer Mother    • Breast cancer Mother 62   • Hypertension Mother    • Stroke Father    • Diabetes Father    • Hypertension Father    • Alzheimer's disease Father    • Cancer Maternal Grandmother         ovarian   • Ovarian cancer Maternal Grandmother         unspecified laterality   • Cancer Maternal Grandfather    • Hypertension Maternal Grandfather    • Tongue cancer Maternal Grandfather    • Stroke Paternal Grandmother    • Thyroid disease unspecified Brother    • Depression Brother    • Anxiety disorder Brother    • Drug abuse Brother         (pain pills)   • Thyroid disease unspecified Maternal Aunt    • Colon cancer Maternal Aunt    • Breast cancer Family    • Diabetes Sister    • BRCA1 Negative Sister    • BRCA2 Negative Sister    • No Known Problems Daughter    • No Known Problems Son    • No Known Problems Daughter    • No Known Problems Son    • No Known Problems Maternal Aunt    • No Known Problems Paternal Aunt    • No Known Problems Paternal Aunt    • No Known Problems Paternal Aunt    • No Known Problems Paternal Aunt    • No Known Problems Paternal Aunt    • No Known Problems Paternal Aunt    • No Known Problems Maternal Aunt    • Alzheimer's disease Cousin    • Breast cancer Cousin 43       Meds/Allergies       Current Outpatient Medications:   •  albuterol (PROVENTIL HFA,VENTOLIN HFA) 90 mcg/act inhaler  •  Breo Ellipta 100-25 MCG/INH inhaler  •  cetirizine (ZyrTEC) 10 mg tablet  •  lamoTRIgine (LaMICtal) 100 mg tablet  •  levothyroxine 125 mcg tablet  •  meloxicam (MOBIC) 15 mg tablet  •  metFORMIN (GLUCOPHAGE-XR) 500 mg 24 hr tablet  •  SUMAtriptan (IMITREX) 100 mg tablet  •  acetaminophen (TYLENOL) 325 mg tablet  •  Alaway 0 025 % ophthalmic solution  •  albuterol (2 5 mg/3 mL) 0 083 % nebulizer solution  •  Alcohol Swabs (Alcohol Prep) 70 % PADS  •  Alcohol Swabs (Alcohol Prep) PADS  •  betamethasone dipropionate (DIPROSONE) 0 05 % cream  •  cholecalciferol (VITAMIN D3) 1,000 units tablet  •  diclofenac sodium (VOLTAREN) 1 %  •  dicyclomine (BENTYL) 20 mg tablet  •  fexofenadine (ALLEGRA) 180 MG tablet  •  FLUoxetine (PROzac) 20 mg capsule  •  fluticasone (FLONASE) 50 mcg/act nasal spray  •  FREESTYLE LITE test strip  •  furosemide (LASIX) 20 mg tablet  •  glucose monitoring kit (FREESTYLE) monitoring kit  •  hydrOXYzine HCL (ATARAX) 50 mg tablet  •  ibuprofen (MOTRIN) 800 mg tablet  •  ipratropium (ATROVENT) 0 02 % nebulizer solution  •  ketorolac (TORADOL) 10 mg tablet  •  Lancets (freestyle) lancets  •  lidocaine (LMX) 4 % cream  •  linaCLOtide 290 MCG CAPS  •  medroxyPROGESTERone (DEPO-PROVERA) 150 mg/mL injection  •  methocarbamol (ROBAXIN) 750 mg tablet  •  mometasone (ELOCON) 0 1 % cream  •  montelukast (SINGULAIR) 10 mg tablet  •  omeprazole (PriLOSEC) 40 MG capsule  •  polyethylene glycol (GLYCOLAX) powder  •  polyethylene glycol (GOLYTELY) 4000 mL solution  •  tiZANidine (ZANAFLEX) 4 mg tablet    Current Facility-Administered Medications:   •  sodium chloride 0 9 % infusion, 125 mL/hr, Intravenous, Continuous, 125 mL/hr at 10/12/22 1047    Allergies   Allergen Reactions   • Gabapentin Other (See Comments)     Annotation - [id number]: Neuropsych effects per patient  Annotation - [id number]: Neuropsych effects per patient  Annotation - [id number]: Neuropsych effects per patient  • Latex Dermatitis and Rash     Other reaction(s): Dermatitis   • Penicillins Hives       Objective     /76   Pulse 68   Temp (!) 97 °F (36 1 °C) (Temporal)   Resp 18   Ht 5' 7" (1 702 m)   Wt 99 8 kg (220 lb)   LMP 10/11/2020 (Exact Date)   SpO2 97%   BMI 34 46 kg/m²       PHYSICAL EXAM    Gen: NAD  Head: NCAT  CV: RRR  CHEST: Clear  ABD: soft, NT/ND  EXT: no edema      ASSESSMENT/PLAN:  This is a 56y o  year old female here for colonoscopy eval, and she is stable and optimized for her procedure

## 2022-10-12 NOTE — ANESTHESIA PREPROCEDURE EVALUATION
Procedure:  COLONOSCOPY    Relevant Problems   CARDIO   (+) Breast pain, right   (+) Headache, migraine   (+) Hypertension   (+) Intractable chronic migraine without aura and without status migrainosus      ENDO   (+) Hypothyroidism      GI/HEPATIC   (+) Esophageal dysphagia   (+) Gastroesophageal reflux disease      /RENAL   (+) Left nephrolithiasis      MUSCULOSKELETAL   (+) Arthritis   (+) Chronic low back pain   (+) Spondylosis of lumbar region without myelopathy or radiculopathy      NEURO/PSYCH   (+) Anxiety   (+) Chronic low back pain   (+) Headache, migraine   (+) Intractable chronic migraine without aura and without status migrainosus   (+) Other chronic pain      PULMONARY   (+) Asthma   (+) Moderate persistent asthma with exacerbation   (+) MICHAEL (obstructive sleep apnea)        Physical Exam    Airway    Mallampati score: II  TM Distance: >3 FB  Neck ROM: full     Dental       Cardiovascular  Rhythm: regular, Rate: normal, Cardiovascular exam normal    Pulmonary  Pulmonary exam normal Breath sounds clear to auscultation,     Other Findings        Anesthesia Plan  ASA Score- 2     Anesthesia Type- IV sedation with anesthesia with ASA Monitors  Additional Monitors:   Airway Plan:           Plan Factors-Exercise tolerance (METS): >4 METS  Chart reviewed  Patient summary reviewed  Patient is not a current smoker  Patient instructed to abstain from smoking on day of procedure  Patient did not smoke on day of surgery  Obstructive sleep apnea risk education given perioperatively  Induction- intravenous  Postoperative Plan-     Informed Consent- Anesthetic plan and risks discussed with patient

## 2022-10-14 DIAGNOSIS — A04.8 HELICOBACTER PYLORI (H. PYLORI) INFECTION: Primary | ICD-10-CM

## 2022-10-14 DIAGNOSIS — J30.2 SEASONAL ALLERGIES: ICD-10-CM

## 2022-10-14 RX ORDER — NITAZOXANIDE 500 MG/1
500 TABLET ORAL 2 TIMES DAILY WITH MEALS
Qty: 28 TABLET | Refills: 0 | Status: SHIPPED | OUTPATIENT
Start: 2022-10-14 | End: 2022-10-28

## 2022-10-14 RX ORDER — CETIRIZINE HYDROCHLORIDE 10 MG/1
TABLET ORAL
Qty: 90 TABLET | Refills: 2 | Status: SHIPPED | OUTPATIENT
Start: 2022-10-14

## 2022-10-14 RX ORDER — LEVOFLOXACIN 250 MG/1
250 TABLET ORAL DAILY
Qty: 14 TABLET | Refills: 0 | Status: SHIPPED | OUTPATIENT
Start: 2022-10-14 | End: 2022-10-28

## 2022-10-14 RX ORDER — DOXYCYCLINE 100 MG/1
100 TABLET ORAL DAILY
Qty: 14 TABLET | Refills: 0 | Status: SHIPPED | OUTPATIENT
Start: 2022-10-14 | End: 2022-10-28

## 2022-10-14 RX ORDER — OMEPRAZOLE 40 MG/1
40 CAPSULE, DELAYED RELEASE ORAL DAILY
Qty: 14 CAPSULE | Refills: 0 | Status: SHIPPED | OUTPATIENT
Start: 2022-10-14 | End: 2022-10-28

## 2022-10-24 PROCEDURE — 88305 TISSUE EXAM BY PATHOLOGIST: CPT | Performed by: PATHOLOGY

## 2022-11-03 NOTE — RESULT NOTES
Message   please call the patient and let her know that her bmp is normal      Verified Results  (1) BASIC METABOLIC PROFILE [id number] 11:36AM Darius Martinez Order Number: [id number]   Order Number: [id number]     Test Name Result Flag Reference   GLUCOSE,RANDM 86 mg/dL     If the patient is fasting, the ADA then defines impaired fasting glucose as > 100 mg/dL and diabetes as > or equal to 123 mg/dL  SODIUM 137 mmol/L  136-145   POTASSIUM 4 2 mmol/L  3 5-5 3   CHLORIDE 104 mmol/L  100-108   CARBON DIOXIDE 26 mmol/L  21-32   ANION GAP (CALC) 7 mmol/L  4-13   BLOOD UREA NITROGEN 17 mg/dL  5-25   CREATININE 0 64 mg/dL  0 60-1 30   Standardized to IDMS reference method   CALCIUM 9 7 mg/dL  8 3-10 1   eGFR Non-African American      >60 0 ml/min/1 73sq m   [facility] [employer] Disease Education Program recommendations are as follows:  GFR calculation is accurate only with a steady state creatinine  Chronic Kidney disease less than 60 ml/min/1 73 sq  meters  Kidney failure less than 15 ml/min/1 73 sq  meters  [id number]

## 2022-11-17 NOTE — PSYCH
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  Pt presently denies depression, SI, HI, anxiety, panic attacks, or manic or psychotic Sxs  Pt reports compliance to psychiatric medications without SE  Appetite Changes and Sleep: adequate number of sleep hours, gets her usual 4-5 hrs which she feels is adequate for her, normal appetite, normal energy level    Review Of Systems:      Constitutional negative   ENT negative   Cardiovascular negative   Respiratory negative   Gastrointestinal negative   Genitourinary negative   Musculoskeletal negative   Integumentary negative   Neurological negative   Endocrine negative   Other Symptoms none, all other systems are negative       Past Psychiatric History:   As copied from my 9/12/2022 note with updates as needed:  "  [ Pt born in [place] and came to the [facility] with her family in approx 1989 and grew up with biological parents, 1 year younger sister and a younger brother   Pt describes her upbringing as "Good, good dad, good mom  Carin Mccarty were raised with Taoist  "   Everyone in the family got along and Pt has positive memories of growing up with them      Depression started in 2010 at work when she sustained a life changing back injury, for which she became permanently disabled   Mood Sxs: sadness, crying, self-isolating, anhedonia, less motivated, feelings of hopelessness and helplessness   But no SI                    Manic Sxs:  decreased need for sleep , rapid speech  and flight of ideas/racing thoughts  Racy thoughts, elevated energy     Anxiety started in 2010 due to the work injury:  excessive worry more days than not for longer than 3 months and The Sxs can occur without concommittent depressive Sxs   No other Sxs of BRIGITTE      Panic attacks started in late 8/2020 initially triggered by driving through a town she was not familiar with  Diane Isabellar has had a few in total with Sxs of:  Moderate anxiety, sweaty hands,  palpitations/racing heart, trembling, shortness of breath       Social Anxiety symptoms: no symptoms suggestive of social anxiety Eating Disorder symptoms: no historical or current eating disorder  no binge eating disorder; no anorexia nervosa  no symptoms of bulimia      OCD type Sxs: checking door locks, windows, stove, pipes in the basement (which used to leak)   Cleans often, can be very fastidious and wash her hands excessively        Pt denies any h/o PTSD or psychotic Sxs      Prior psychiatrists:  [facility] from approx 2014 - 11/2019 --stopped going due to change in insurance     Prior psychotherapists:   First therapist was at [facility] in [place] and he left so she only saw him for a year  Second one was seen from [phone] at [address]  Pt denied h/o SI, HI, self-injurious behaviors, violent behaviors, psychiatric hospitalizations, ECT, legal or  Hx     Prior Rx trials: Viibryd 40mg (helped), Bupropion ER/XL 150mg (helped), Aripiprazole 5mg (helped but caused Wt gain), Ziprasidone 40mg (heart palpitations), Gabapentin 300mg (agitated),Topamax (for migraines but stopped due to ineffectiveness and her h/o nephrolithiasis), Hydroxyzine 50mg (sedating), Doxepin up to 50mg (for sleep--oversedating even at 25mg), Clonazepam 0 5mg--??used/helped       Abuse Hx:  Pt denies any h/o sexual, physical, or emotional abuse     Trauma Hx:  When she lost her job after her work injury                   ] "       Past Medical History:    Past Medical History:   Diagnosis Date   • Achalasia    • Asthma    • Colon polyp    • Constipation    • CPAP (continuous positive airway pressure) dependence    • Depression    • Diarrhea 2019   • Elevated LFTs     last assessed 10/25/17   • Fibromyalgia    • Gastric ulcer    • GERD (gastroesophageal reflux disease)    • H  pylori infection 10/09/2017    [facility] - [id number]: Treated, f/u stool antigen negative   • Hypertension    • Hypothyroidism    • Kidney stone    • Migraines    • Migraines    • MICHAEL (obstructive sleep apnea)     last assessed 16   • Pneumonia    • Sleep apnea 2019    Uses CPAP   • Thyroid nodule     last assessed 17       Substance Abuse History:    Social History     Substance and Sexual Activity   Alcohol Use Yes    Comment: socially - very rare social occasion     Social History     Substance and Sexual Activity   Drug Use Never       Social History:    Social History     Socioeconomic History   • Marital status: Single     Spouse name: Not on file   • Number of children: 4   • Years of education: Not on file   • Highest education level: Not on file   Occupational History   • Occupation: Disability since 2019-- due to a job injury    Tobacco Use   • Smoking status: Former     Packs/day: 1 50     Years: 30 00     Pack years: 45 00     Types: Cigarettes     Quit date:      Years since quittin 8   • Smokeless tobacco: Never   • Tobacco comments:     pt stated quit about a month ago, last assessed 14 per [employer]scripts   Vaping Use   • Vaping Use: Never used   Substance and Sexual Activity   • Alcohol use: Yes     Comment: socially - very rare social occasion   • Drug use: Never   • Sexual activity: Yes     Partners: Male     Birth control/protection: Female Sterilization     Comment: Boyfriend/father of 4 children   Other Topics Concern   • Not on file   Social History Narrative    Home: Lives with boyfriend/father of her 4 children and one of their daughters        Children: 2 sons born  and ,  2 daughters born  and  Education:     Social Determinants of Health     Financial Resource Strain: Low Risk    • Difficulty of Paying Living Expenses: Not hard at all   Food Insecurity: No Food Insecurity   • Worried About [address] in the Last Year: Never true   • Ran Out of Food in the Last Year: Never true   Transportation Needs: No Transportation Needs   • Lack of Transportation (Medical): No   • Lack of Transportation (Non-Medical):  No   Physical Activity: Not on file   Stress: Not on file   Social Connections: Not on file   Intimate Partner Violence: Not on file   Housing Stability: Low Risk    • Unable to Pay for Housing in the Last Year: No   • Number of Places Lived in the Last Year: 1   • Unstable Housing in the Last Year: No       Family Psychiatric History:     Family History   Problem Relation Age of Onset   • Cancer Mother    • Breast cancer Mother 62   • Hypertension Mother    • Stroke Father    • Diabetes Father    • Hypertension Father    • Alzheimer's disease Father    • Cancer Maternal Grandmother         ovarian   • Ovarian cancer Maternal Grandmother         unspecified laterality   • Cancer Maternal Grandfather    • Hypertension Maternal Grandfather    • Tongue cancer Maternal Grandfather    • Stroke Paternal Grandmother    • Thyroid disease unspecified Brother    • Depression Brother    • Anxiety disorder Brother    • Drug abuse Brother         (pain pills)   • Thyroid disease unspecified Maternal Aunt    • Colon cancer Maternal Aunt    • Breast cancer Family    • Diabetes Sister    • BRCA1 Negative Sister    • BRCA2 Negative Sister    • No Known Problems Daughter    • No Known Problems Son    • No Known Problems Daughter    • No Known Problems Son    • No Known Problems Maternal Aunt    • No Known Problems Paternal Aunt    • No Known Problems Paternal Aunt    • No Known Problems Paternal Aunt    • No Known Problems Paternal Aunt    • No Known Problems Paternal Aunt    • No Known Problems Paternal Aunt    • No Known Problems Maternal Aunt    • Alzheimer's disease Cousin    • Breast cancer Cousin 43       History Review: The following portions of the patient's history were reviewed and updated as appropriate: allergies, current medications, past family history, past medical history, past social history, past surgical history and problem list          OBJECTIVE:     Mental Status Evaluation:    Appearance Casually dressed, good eye contact and hygiene   Behavior Calm, cooperative, pleasant   Speech Clear, fluent, somewhat rapid--her usual but not pressured   Mood Euthymic   Affect Appears reactive    Thought Processes Organized, goal directed, Lutheran preoccupation, bible is her strength and pilar  She can finds it difficult at times to tolerate strangers who are offensive--ie make racist or otherwise mean comments  However, no outbursts or altercations  Associations intact associations   Thought Content No delusions   Perceptual Disturbances: Pt denies any form of hallucinations and does not appear to be responding to internal stimuli   Abnormal Thoughts  Risk Potential Suicidal ideation - None  Homicidal ideation - None  Potential for aggression - No   Orientation oriented to person, place, situation, day of week, date, month of year and year   Memory short term memory grossly intact   Cosciousness alert and awake   Attention Span attention span and concentration are age appropriate   Intellect appears to be of average intelligence   Insight fair   Judgement fair   Muscle Strength and  Gait normal gait and normal balance   Language no difficulty naming common objects, no difficulty repeating a phrase   Fund of Knowledge adequate knowledge of current events  adequate fund of knowledge regarding past history  adequate fund of knowledge regarding vocabulary    Pain none   Pain Scale 0       Laboratory Results:   I have personally reviewed all pertinent laboratory/tests results    Most Recent Labs:   Lab Results   Component Value Date    WBC 9 19 06/09/2021    RBC 4 75 06/09/2021    HGB 13 4 06/09/2021    HCT 40 9 06/09/2021     06/09/2021    RDW 13 8 06/09/2021    NEUTROABS 7 34 06/09/2021    SODIUM 136 06/09/2021    K 4 1 06/09/2021     06/09/2021    CO2 25 06/09/2021    BUN 12 06/09/2021    CREATININE 0 51 (L) 06/09/2021    GLUC 86 07/21/2016    GLUF 100 (H) 06/09/2021    CALCIUM 9 2 06/09/2021    AST 14 06/09/2021    ALT 27 06/09/2021    ALKPHOS 114 06/09/2021    TP 7 6 06/09/2021    ALB 4 1 06/09/2021    TBILI 0 54 06/09/2021    CHOLESTEROL 198 09/30/2022    HDL 39 (L) 09/30/2022    TRIG 103 09/30/2022    LDLCALC 138 (H) 09/30/2022    NONHDLC 127 01/20/2020    YKW3FZNALDOR 0 238 (L) 09/30/2022    FREET4 1 42 09/30/2022    HGBA1C 5 5 06/09/2021     06/09/2021     Urinalysis   Lab Results   Component Value Date    COLORU Light Yellow 09/30/2022    CLARITYU Clear 09/30/2022    SPECGRAV 1 021 09/30/2022    PHUR 5 5 09/30/2022    LEUKOCYTESUR Negative 09/30/2022    NITRITE Negative 09/30/2022    PROTEIN UA Negative 09/30/2022    GLUCOSEU Negative 09/30/2022    KETONESU Negative 09/30/2022    UROBILINOGEN <2 0 09/30/2022    BILIRUBINUR Negative 09/30/2022    BLOODU Negative 09/30/2022    RBCUA 1-2 09/30/2022    WBCUA 1-2 09/30/2022    EPIS Occasional 09/30/2022    BACTERIA Occasional 09/30/2022     Urine Culture   Lab Results   Component Value Date    URINECX 70,000-79,000 cfu/ml 09/30/2022       Assessment/plan:       Diagnoses and all orders for this visit:    Bipolar 2 disorder ([address] )  -     lamoTRIgine (LaMICtal) 100 mg tablet; Take 0 5 tablets (50 mg total) by mouth daily at bedtime  -     FLUoxetine (PROzac) 20 mg capsule; Take 1 capsule (20 mg total) by mouth daily    Anxiety  -     hydrOXYzine HCL (ATARAX) 50 mg tablet; TAKE 1/2 OR 1 TABLET BY MOUTH THREE TIMES A DAY AS NEEDED FOR ANXIETY OR INSOMNIA  -     FLUoxetine (PROzac) 20 mg capsule;  Take 1 capsule (20 mg total) by mouth daily    Insomnia, unspecified type  -     hydrOXYzine HCL (ATARAX) 50 mg tablet; TAKE 1/2 OR 1 TABLET BY MOUTH THREE TIMES A DAY AS NEEDED FOR ANXIETY OR INSOMNIA          PLAN:  Pt is feeling well and appears stable  I will continue the present medication regimen  Treatment plan done and Pt accepts the plan  Continue:  Lamotrigine 100mg (1/2) tab po qhs # 45 R1  Fluoxetine 20mg (1) cap po qd # 90 R1  Hydroxyzine 50mg (1/2-1) tab po tid prn anxiety or insomnia # 270 R1  Melatonin --Pt gets OTC  Vit D--per PCP   Return 20 weeks, call sooner prn    Risks/Benefits      Risks, Benefits And Possible Side Effects Of Medications:    Risks, benefits, and possible side effects of medications explained to Conrad Regan and she verbalizes understanding and agreement for treatment      Visit Time    Visit Start Time: 11:05AM  Visit Stop Time: 11:42AM  Total Visit Duration: 38 minutes

## 2022-11-23 ENCOUNTER — OFFICE VISIT (OUTPATIENT)
Dept: PSYCHIATRY | Facility: CLINIC | Age: 57
End: 2022-11-23

## 2022-11-23 VITALS — WEIGHT: 201.8 LBS | BODY MASS INDEX: 31.67 KG/M2 | HEIGHT: 67 IN

## 2022-11-23 DIAGNOSIS — F41.9 ANXIETY: ICD-10-CM

## 2022-11-23 DIAGNOSIS — G47.00 INSOMNIA, UNSPECIFIED TYPE: ICD-10-CM

## 2022-11-23 DIAGNOSIS — F31.81 BIPOLAR 2 DISORDER (HCC): Primary | ICD-10-CM

## 2022-11-23 RX ORDER — HYDROXYZINE 50 MG/1
TABLET, FILM COATED ORAL
Qty: 270 TABLET | Refills: 1 | Status: SHIPPED | OUTPATIENT
Start: 2022-11-23

## 2022-11-23 RX ORDER — FLUOXETINE HYDROCHLORIDE 20 MG/1
20 CAPSULE ORAL DAILY
Qty: 90 CAPSULE | Refills: 1 | Status: SHIPPED | OUTPATIENT
Start: 2022-11-23

## 2022-11-23 RX ORDER — LAMOTRIGINE 100 MG/1
50 TABLET ORAL
Qty: 45 TABLET | Refills: 1 | Status: SHIPPED | OUTPATIENT
Start: 2022-11-23

## 2022-11-23 NOTE — BH TREATMENT PLAN
TREATMENT PLAN (Medication Management Only)        [facility]    Name/Date of Birth/MRN:  Justice Jason 56 y o  1965 MRN: [id number]  Date of Treatment Plan: November 23, 2022  Diagnosis/Diagnoses:   1  Bipolar 2 disorder ([facility] [address] )    2  Anxiety    3  Insomnia, unspecified type      Strengths/Personal Resources for Self-Care: "The bible"  Area/Areas of need (in own words): "I feel OK"  1  Long Term Goal:                         Maintain mood stability and control anxiety  Target Date: 3-6 months  Person/Persons responsible for completion of goal: [facility] and Viky  2  Short Term Objective (s) - How will we reach this goal?:   A  Provider new recommended medication/dosage changes and/or continue medication(s): continue current medications as prescribed: Lamotrigine, Fluoxetine, Melatonin, Vit D  Target Date: 3-6 months  Person/Persons Responsible for Completion of Goal:  [employer] [place] and [address]   Progress Towards Goals: stable, continuing treatment  Treatment Modality: Medication mgt  Review due 180 days from date of this plan: 6 months - 5/23/2023  Expected length of service: ongoing treatment unless revised  My Physician/PA/NP and I have developed this plan together and I agree to work on the goals and objectives  I understand the treatment goals that were developed for my treatment    Electronic Signatures: on file (unless signed below)    So Vargas PA-C 11/23/22

## 2022-12-06 DIAGNOSIS — R21 RASH: ICD-10-CM

## 2022-12-06 RX ORDER — MOMETASONE FUROATE 1 MG/G
CREAM TOPICAL
Qty: 45 G | Refills: 0 | Status: SHIPPED | OUTPATIENT
Start: 2022-12-06 | End: 2022-12-16 | Stop reason: CLARIF

## 2022-12-07 ENCOUNTER — HOSPITAL ENCOUNTER (OUTPATIENT)
Dept: RADIOLOGY | Age: 57
Discharge: HOME | End: 2022-12-07

## 2022-12-07 VITALS — BODY MASS INDEX: 34.69 KG/M2 | HEIGHT: 67 IN | WEIGHT: 221 LBS

## 2022-12-07 DIAGNOSIS — Z12.31 ENCOUNTER FOR SCREENING MAMMOGRAM FOR MALIGNANT NEOPLASM OF BREAST: ICD-10-CM

## 2022-12-08 ENCOUNTER — OFFICE VISIT (OUTPATIENT)
Dept: INTERNAL MEDICINE CLINIC | Facility: CLINIC | Age: 57
End: 2022-12-08

## 2022-12-08 VITALS
SYSTOLIC BLOOD PRESSURE: 122 MMHG | HEIGHT: 67 IN | HEART RATE: 81 BPM | WEIGHT: 210 LBS | DIASTOLIC BLOOD PRESSURE: 78 MMHG | TEMPERATURE: 97.8 F | BODY MASS INDEX: 32.96 KG/M2

## 2022-12-08 DIAGNOSIS — M79.89 LEG SWELLING: ICD-10-CM

## 2022-12-08 DIAGNOSIS — R23.2 HOT FLASHES: ICD-10-CM

## 2022-12-08 DIAGNOSIS — L85.3 DRY SKIN: ICD-10-CM

## 2022-12-08 DIAGNOSIS — R73.03 PREDIABETES: ICD-10-CM

## 2022-12-08 DIAGNOSIS — R23.2 HOT FLASHES: Primary | ICD-10-CM

## 2022-12-08 DIAGNOSIS — J30.2 SEASONAL ALLERGIES: ICD-10-CM

## 2022-12-08 DIAGNOSIS — R63.5 ABNORMAL WEIGHT GAIN: ICD-10-CM

## 2022-12-08 RX ORDER — FUROSEMIDE 20 MG/1
20 TABLET ORAL DAILY
Qty: 90 TABLET | Refills: 3 | Status: SHIPPED | OUTPATIENT
Start: 2022-12-08

## 2022-12-08 RX ORDER — LANCETS 28 GAUGE
EACH MISCELLANEOUS
Qty: 100 EACH | Refills: 3 | Status: SHIPPED | OUTPATIENT
Start: 2022-12-08 | End: 2022-12-16 | Stop reason: CLARIF

## 2022-12-08 RX ORDER — PEN NEEDLE, DIABETIC 31 GX5/16"
NEEDLE, DISPOSABLE MISCELLANEOUS 3 TIMES DAILY
Qty: 100 EACH | Refills: 5 | Status: SHIPPED | OUTPATIENT
Start: 2022-12-08 | End: 2022-12-16 | Stop reason: CLARIF

## 2022-12-08 RX ORDER — ESTRADIOL 1 MG/1
1 TABLET ORAL DAILY
Qty: 30 TABLET | Refills: 3 | Status: SHIPPED | OUTPATIENT
Start: 2022-12-08

## 2022-12-08 RX ORDER — CETIRIZINE HYDROCHLORIDE 10 MG/1
10 TABLET ORAL DAILY
Qty: 90 TABLET | Refills: 2 | Status: SHIPPED | OUTPATIENT
Start: 2022-12-08

## 2022-12-08 RX ORDER — BLOOD-GLUCOSE METER
1 KIT MISCELLANEOUS DAILY
Qty: 100 STRIP | Refills: 8 | Status: SHIPPED | OUTPATIENT
Start: 2022-12-08

## 2022-12-08 RX ORDER — METFORMIN HYDROCHLORIDE 500 MG/1
1000 TABLET, EXTENDED RELEASE ORAL EVERY MORNING
Qty: 180 TABLET | Refills: 0 | Status: SHIPPED | OUTPATIENT
Start: 2022-12-08

## 2022-12-08 RX ORDER — VIT E ACET/GLY/DIMETH/WATER
LOTION (ML) TOPICAL AS NEEDED
Qty: 236 ML | Refills: 0 | Status: SHIPPED | OUTPATIENT
Start: 2022-12-08

## 2022-12-08 NOTE — PROGRESS NOTES
[facility]  INTERNAL MEDICINE OFFICE VISIT     PATIENT INFORMATION     Sudeep Paulino   56 y o  female   MRN: [id number]    ASSESSMENT/PLAN     Diagnoses and all orders for this visit:    Hot flashes  She has episodes of hot flashes 3x per day that start in her head and go to to her chest  The episodes are so terrible that they require a shower  The episodes lasts for 5 minutes at a time  She had 2 endometrial ablations due to heavy menstrual cycles  LNMP: 2020 after the ablation  Not currently smoking  No hx of breast CA; gets mammogram yearly  No hx of CVA/TIA or CAD  -     Drospirenone 4 MG TABS; Take 2 mg by mouth in the morning Take a half tablet of 4mg daily  -     estradiol (ESTRACE) 1 mg tablet; Take 1 tablet (1 mg total) by mouth daily  - F/u with OBGYN regarding hot flashes     Dry skin  · Stop using betamethasone cream; apply cetaphil lotion to hands daily     Schedule a follow-up appointment as needed; see OBGYN regarding hot flashes      HEALTH MAINTENANCE     Immunization History   Administered Date(s) Administered   • COVID-19 PFIZER VACCINE 0 3 ML IM 05/18/2021, 06/08/2021   • INFLUENZA 11/07/2006, 11/15/2014, 11/01/2017, 12/10/2018, 01/14/2019, 10/01/2019, 12/03/2020   • Influenza Injectable, MDCK, Preservative Free, Quadrivalent, 0 5 mL 01/14/2019   • Influenza Quadrivalent Preservative Free 3 years and older IM 11/06/2017   • Influenza, injectable, quadrivalent, preservative free 0 5 mL 12/03/2020   • Influenza, recombinant, quadrivalent,injectable, preservative free 11/17/2021   • Influenza, seasonal, injectable 10/23/2015   • Pneumococcal Conjugate Vaccine 20-valent (Pcv20), Polysace 09/16/2022   • Pneumococcal Polysaccharide PPV23 01/28/2016   • Tdap 05/22/2015   • Zoster 12/03/2020   • Zoster Vaccine Recombinant 12/03/2020, 03/03/2021     CHIEF COMPLAINT     Chief Complaint   Patient presents with   • Follow-up     Hot flashes for 3 weeks, getting worse, bad mood      HISTORY OF PRESENT ILLNESS     Dante Hyman is a 56 y o  female with a history of achalasia, GERD, hypothyroidism, MICHAEL, asthma, pre-diabetes, anxiety, bipolar II, here for hot flashes  Hot flashes began three weeks ago  She has episodes of hot flashes 3x per day that start in her head and go to to her chest  The episodes are so terrible that they require a shower  The episodes lasts for 5 minutes at a time  She had 2 endometrial ablations due to heavy menstrual cycles  LNMP: 2020 after the ablation  She denies any vaginal dryness  She only had 1 depo shot in May 2022 for concerns for endometriosis; but it did not improver her pelvic pains so she stopped taking  Pt has been keeping her house cool at [address] [facility], wearing layers, and using fans  REVIEW OF SYSTEMS     Review of Systems   Constitutional: Negative for chills and fever  HENT: Negative for ear pain and sore throat  Eyes: Negative for pain and visual disturbance  Respiratory: Negative for cough and shortness of breath  Cardiovascular: Negative for chest pain and palpitations  Gastrointestinal: Negative for abdominal pain and vomiting  Genitourinary: Negative for dysuria and hematuria  Musculoskeletal: Negative for arthralgias and back pain  Skin: Negative for color change and rash  Neurological: Negative for seizures and syncope  All other systems reviewed and are negative  OBJECTIVE     Vitals:    12/08/22 1135   BP: 122/78   BP Location: Right arm   Patient Position: Sitting   Cuff Size: Adult   Pulse: 81   Temp: 97 8 °F (36 6 °C)   TempSrc: Temporal   Weight: 95 3 kg (210 lb)   Height: 5' 7" (1 702 m)     Physical Exam  Constitutional:       General: She is not in acute distress  Appearance: Normal appearance  She is not toxic-appearing  HENT:      Head: Normocephalic and atraumatic  Mouth/Throat:      Mouth: Mucous membranes are dry  Eyes:      General: No scleral icterus       Conjunctiva/sclera: Conjunctivae normal       Pupils: Pupils are equal, round, and reactive to light  Cardiovascular:      Rate and Rhythm: Normal rate and regular rhythm  Pulses: Normal pulses  Pulmonary:      Effort: Pulmonary effort is normal  No respiratory distress  Breath sounds: No wheezing or rales  Musculoskeletal:      Cervical back: Neck supple  Right lower leg: No edema  Left lower leg: No edema  Skin:     Findings: Rash present  Comments: Raised dry rash to b/l thumbs   Neurological:      General: No focal deficit present  Mental Status: She is alert and oriented to person, place, and time  Psychiatric:         Mood and Affect: Mood normal          Behavior: Behavior normal          Thought Content:  Thought content normal          Judgment: Judgment normal        CURRENT MEDICATIONS     Current Outpatient Medications:   •  acetaminophen (TYLENOL) 325 mg tablet, Every 4 hours as needed, Disp: 30 tablet, Rfl: 0  •  Alaway 0 025 % ophthalmic solution, Administer 1 drop to both eyes 2 (two) times a day as needed (For discomfort), Disp: 5 mL, Rfl: 0  •  albuterol (2 5 mg/3 mL) 0 083 % nebulizer solution, Take 3 mL (2 5 mg total) by nebulization every 6 (six) hours as needed for wheezing or shortness of breath, Disp: 75 mL, Rfl: 0  •  albuterol (PROVENTIL HFA,VENTOLIN HFA) 90 mcg/act inhaler, Inhale 2 puffs every 4 (four) hours as needed for wheezing or shortness of breath, Disp: 18 g, Rfl: 1  •  Alcohol Swabs (Alcohol Prep) 70 % PADS, Use daily, Disp: 200 each, Rfl: 2  •  Alcohol Swabs (Alcohol Prep) PADS, Use 3 (three) times a day Use to test blood sugar 3 times a day, Disp: 100 each, Rfl: 5  •  betamethasone dipropionate (DIPROSONE) 0 05 % cream, APPLY TO AFFECTED AREA EVERY DAY, Disp: 30 g, Rfl: 0  •  Breo Ellipta 100-25 MCG/INH inhaler, Inhale 1 puff daily 1 puff daily, Disp: 60 blister, Rfl: 2  •  cetirizine (ZyrTEC) 10 mg tablet, TAKE 1 TABLET BY MOUTH EVERY DAY, Disp: 90 tablet, Rfl: 2  •  cholecalciferol (VITAMIN D3) 1,000 units tablet, Take 1 tablet (1,000 Units total) by mouth daily, Disp: 90 tablet, Rfl: 3  •  diclofenac sodium (VOLTAREN) 1 %, , Disp: , Rfl: 3  •  dicyclomine (BENTYL) 20 mg tablet, TAKE 1 TABLET (20 MG TOTAL) BY MOUTH EVERY 6 (SIX) HOURS AS NEEDED (PAIN), Disp: 360 tablet, Rfl: 0  •  fexofenadine (ALLEGRA) 180 MG tablet, , Disp: , Rfl:   •  FLUoxetine (PROzac) 20 mg capsule, Take 1 capsule (20 mg total) by mouth daily, Disp: 90 capsule, Rfl: 1  •  fluticasone (FLONASE) 50 mcg/act nasal spray, 1-2 sprays into each nostril daily, Disp: 16 g, Rfl: 6  •  FREESTYLE LITE test strip, USE AS DIRECTED, Disp: 100 strip, Rfl: 8  •  furosemide (LASIX) 20 mg tablet, Take 1 tablet (20 mg total) by mouth daily, Disp: 90 tablet, Rfl: 3  •  glucose monitoring kit (FREESTYLE) monitoring kit, Use 1 each as needed (prediabetes blood sugar checks), Disp: 1 each, Rfl: 0  •  hydrOXYzine HCL (ATARAX) 50 mg tablet, TAKE 1/2 OR 1 TABLET BY MOUTH THREE TIMES A DAY AS NEEDED FOR ANXIETY OR INSOMNIA, Disp: 270 tablet, Rfl: 1  •  ibuprofen (MOTRIN) 800 mg tablet, Take 1 tablet (800 mg total) by mouth every 8 (eight) hours as needed for mild pain, Disp: 30 tablet, Rfl: 0  •  ipratropium (ATROVENT) 0 02 % nebulizer solution, , Disp: , Rfl:   •  lamoTRIgine (LaMICtal) 100 mg tablet, Take 0 5 tablets (50 mg total) by mouth daily at bedtime, Disp: 45 tablet, Rfl: 1  •  Lancets (freestyle) lancets, USE AS DIRECTED, Disp: 100 each, Rfl: 3  •  levothyroxine 125 mcg tablet, Take 1 tablet (125 mcg total) by mouth daily, Disp: 90 tablet, Rfl: 1  •  lidocaine (LMX) 4 % cream, APPLY 1 APPLICATION EXTERNALLY 3 TIMES A DAY AS NEEDED FOR 30 DAYS, Disp: , Rfl:   •  linaCLOtide 290 MCG CAPS, Take 1 capsule by mouth daily, Disp: 30 capsule, Rfl: 7  •  meloxicam (MOBIC) 15 mg tablet, Take 1 tablet (15 mg total) by mouth daily, Disp: 30 tablet, Rfl: 6  •  metFORMIN (GLUCOPHAGE-XR) 500 mg 24 hr tablet, TAKE 2 TABLETS (1,000 MG TOTAL) BY MOUTH EVERY MORNING, Disp: 180 tablet, Rfl: 0  •  methocarbamol (ROBAXIN) 750 mg tablet, Take 750 mg by mouth 3 (three) times a day, Disp: , Rfl: 0  •  mometasone (ELOCON) 0 1 % cream, APPLY TO AFFECTED AREA TOPICALLY EVERY DAY, Disp: 45 g, Rfl: 0  •  montelukast (SINGULAIR) 10 mg tablet, Take 10 mg by mouth daily, Disp: , Rfl: 6  •  omeprazole (PriLOSEC) 40 MG capsule, Take 1 capsule (40 mg total) by mouth 2 (two) times a day before meals for 10 days, Disp: 20 capsule, Rfl: 0  •  polyethylene glycol (GLYCOLAX) powder, Take 17 g by mouth daily Mix 1 capful in 8 ounces of water, juice or tea , Disp: 578 g, Rfl: 3  •  SUMAtriptan (IMITREX) 100 mg tablet, Take 1 tablet (100 mg total) by mouth once as needed for migraine, Disp: 10 tablet, Rfl: 1  •  tiZANidine (ZANAFLEX) 4 mg tablet, Take 4 mg by mouth daily at bedtime as needed, Disp: , Rfl:   •  ketorolac (TORADOL) 10 mg tablet, Take 1 tablet (10 mg total) by mouth every 6 (six) hours as needed for moderate pain for up to 5 days (Patient not taking: Reported on 5/27/2022), Disp: 20 tablet, Rfl: 0  •  medroxyPROGESTERone (DEPO-PROVERA) 150 mg/mL injection, Inject 1 mL (150 mg total) into a muscle every 3 (three) months (Patient not taking: Reported on 12/8/2022), Disp: 1 mL, Rfl: 2  •  omeprazole (PriLOSEC) 40 MG capsule, Take 1 capsule (40 mg total) by mouth daily for 14 days (Patient not taking: Reported on 12/8/2022), Disp: 14 capsule, Rfl: 0  •  polyethylene glycol (GOLYTELY) 4000 mL solution, Take 4,000 mL by mouth once for 1 dose, Disp: 4000 mL, Rfl: 0    PAST MEDICAL & SURGICAL HISTORY     Past Medical History:   Diagnosis Date   • Achalasia    • Asthma    • Colon polyp    • Constipation    • CPAP (continuous positive airway pressure) dependence    • Depression    • Diarrhea 02/28/2019   • Elevated LFTs     last assessed 10/25/17   • Fibromyalgia    • Gastric ulcer    • GERD (gastroesophageal reflux disease)    • H  pylori infection 10/09/2017    [facility] [id number]: Treated, f/u stool antigen negative   • Hypertension    • Hypothyroidism    • Kidney stone    • Migraines    • Migraines    • MICHAEL (obstructive sleep apnea)     last assessed 16   • Pneumonia    • Sleep apnea 2019    Uses CPAP   • Thyroid nodule     last assessed 17     Past Surgical History:   Procedure Laterality Date   • BREAST BIOPSY Right 10/31/2017    us bx   •  SECTION     • COLONOSCOPY     • ENDOMETRIAL ABLATION     • MYOTOMY HELLER LAPAROSCOPIC  2016    Dr London Young, Coincident lorena fundoplication   • NASAL SINUS SURGERY     • OTHER SURGICAL HISTORY      achalasia repair   • OVARIAN CYST REMOVAL     • PILONIDAL CYST / SINUS EXCISION     • GA COLONOSCOPY FLX DX W/COLLJ SPEC WHEN PFRMD N/A 2019    Procedure: COLONOSCOPY;  Surgeon: Natividad Fontenot MD;  Location: [facility]  GI LAB; Service: Gastroenterology   • GA CYSTOURETHROSCOPY N/A 2017    Procedure: CYSTOSCOPY;  Surgeon: Bebeto Egan MD;  [facility] Main [facility];  Service: UroGynecology          • GA ESOPHAGOGASTRODUODENOSCOPY TRANSORAL DIAGNOSTIC N/A 2017    Procedure: ESOPHAGOGASTRODUODENOSCOPY (EGD); Surgeon: Natividad Fontenot MD;  Location:  GI LAB; Service: Gastroenterology   • GA ESOPHAGOGASTRODUODENOSCOPY TRANSORAL DIAGNOSTIC N/A 2019    Procedure: ESOPHAGOGASTRODUODENOSCOPY (EGD); Surgeon: Natividad Fontenot MD;  Location: [facility]  GI LAB;   Service: Gastroenterology   • GA FRAGMENT KIDNEY STONE/ ESWL Left 2022    Procedure: ESWL;  Surgeon: Nitza Bhatt MD;  Location: [address] [place] [address] MAIN OR;  Service: Urology   • GA POST COLPORRHAPHY,RECTUM/VAGINA N/A 2017    Procedure: COLPORRHAPHY POSTERIOR;  Surgeon: Bebeto Egan MD;  Location[facility] Main [facility];  Service: UroGynecology          • GA SLING OPER STRES INCONTINENCE N/A 2017    Procedure: INSERTION PUBOVAGINAL SLING /SINGLE INCISION ;  Surgeon: Bebeto Egan MD;  [facility];  Service: UroGynecology          • TONSILLECTOMY     • TUBAL LIGATION     • UPPER GASTROINTESTINAL ENDOSCOPY     • US GUIDED BREAST BIOPSY RIGHT COMPLETE Right 10/31/2017     SOCIAL & FAMILY HISTORY     Social History     Socioeconomic History   • Marital status: Single     Spouse name: Not on file   • Number of children: 4   • Years of education: Not on file   • Highest education level: Not on file   Occupational History   • Occupation: Disability since 2019-- due to a job injury    Tobacco Use   • Smoking status: Former     Packs/day: 1 50     Years: 30 00     Pack years: 45 00     Types: Cigarettes     Quit date:      Years since quittin 9   • Smokeless tobacco: Never   • Tobacco comments:     pt stated quit about a month ago, last assessed 14 per [employer]   Vaping Use   • Vaping Use: Never used   Substance and Sexual Activity   • Alcohol use: Yes     Comment: socially - very rare social occasion   • Drug use: Never   • Sexual activity: Yes     Partners: Male     Birth control/protection: Female Sterilization     Comment: Boyfriend/father of 4 children   Other Topics Concern   • Not on file   Social History Narrative    Home: Lives with boyfriend/father of her 4 children and one of their daughters        Children: 2 sons born  and ,  2 daughters born  and         Education:     Social Determinants of Health     Financial Resource Strain: Low Risk    • Difficulty of Paying Living Expenses: Not hard at all   Food Insecurity: No Food Insecurity   • Worried About Running Out of Food in the Last Year: Never true   • Ran Out of Food in the Last Year: Never true   Transportation Needs: No Transportation Needs   • Lack of Transportation (Medical): No   • Lack of Transportation (Non-Medical):  No   Physical Activity: Not on file   Stress: Not on file   Social Connections: Not on file   Intimate Partner Violence: Not on file   Housing Stability: Low Risk    • Unable to Pay for Housing in the Last Year: No   • Number of Places Lived in the Last Year: 1   • Unstable Housing in the Last Year: No     Social History     Substance and Sexual Activity   Alcohol Use Yes    Comment: socially - very rare social occasion     Social History     Substance and Sexual Activity   Drug Use Never     Social History     Tobacco Use   Smoking Status Former   • Packs/day: 1 50   • Years: 30 00   • Pack years: 45 00   • Types: Cigarettes   • Quit date:    • Years since quittin 9   Smokeless Tobacco Never   Tobacco Comments    pt stated quit about a month ago, last assessed 14 per [employer]     Family History   Problem Relation Age of Onset   • Cancer Mother    • Breast cancer Mother 62   • Hypertension Mother    • Stroke Father    • Diabetes Father    • Hypertension Father    • Alzheimer's disease Father    • Diabetes Sister    • BRCA1 Negative Sister    • BRCA2 Negative Sister    • No Known Problems Daughter    • No Known Problems Daughter    • Cancer Maternal Grandmother         ovarian   • Ovarian cancer Maternal Grandmother         unspecified laterality   • Cancer Maternal Grandfather    • Hypertension Maternal Grandfather    • Tongue cancer Maternal Grandfather    • Stroke Paternal Grandmother    • No Known Problems Paternal Grandfather    • Thyroid disease unspecified Brother    • Depression Brother    • Anxiety disorder Brother    • Drug abuse Brother         (pain pills)   • No Known Problems Son    • No Known Problems Son    • Thyroid disease unspecified Maternal Aunt    • Colon cancer Maternal Aunt    • No Known Problems Maternal Aunt    • No Known Problems Maternal Aunt    • No Known Problems Paternal Aunt    • No Known Problems Paternal Aunt    • No Known Problems Paternal Aunt    • No Known Problems Paternal Aunt    • No Known Problems Paternal Aunt    • No Known Problems Paternal Aunt    • Alzheimer's disease Cousin    • Breast cancer Cousin 43   • Breast cancer Family          ==  Eric Henderson  Internal Medicine Resident, PGY-1  [facility] Internal Medicine [facility]   [address] , [address] [address] [facility], [address] [facility]  Office: [phone]  Fax: [phone]

## 2022-12-09 DIAGNOSIS — R23.2 HOT FLASHES: ICD-10-CM

## 2022-12-09 RX ORDER — DROSPIRENONE 4 MG/1
TABLET, FILM COATED ORAL
Qty: 28 TABLET | Refills: 3 | Status: SHIPPED | OUTPATIENT
Start: 2022-12-09 | End: 2022-12-16 | Stop reason: CLARIF

## 2022-12-09 RX ORDER — DROSPIRENONE 4 MG/1
TABLET, FILM COATED ORAL
Qty: 28 TABLET | Refills: 3 | Status: SHIPPED | OUTPATIENT
Start: 2022-12-09 | End: 2022-12-09

## 2022-12-14 ENCOUNTER — APPOINTMENT (OUTPATIENT)
Dept: LAB | Facility: CLINIC | Age: 57
End: 2022-12-14

## 2022-12-14 DIAGNOSIS — A04.8 HELICOBACTER PYLORI (H. PYLORI) INFECTION: ICD-10-CM

## 2022-12-15 LAB — H PYLORI AG STL QL IA: POSITIVE

## 2022-12-16 ENCOUNTER — OFFICE VISIT (OUTPATIENT)
Dept: OBGYN CLINIC | Facility: CLINIC | Age: 57
End: 2022-12-16

## 2022-12-16 VITALS
SYSTOLIC BLOOD PRESSURE: 131 MMHG | WEIGHT: 211 LBS | BODY MASS INDEX: 33.05 KG/M2 | HEART RATE: 91 BPM | DIASTOLIC BLOOD PRESSURE: 86 MMHG

## 2022-12-16 DIAGNOSIS — N32.81 OVERACTIVE BLADDER: Primary | ICD-10-CM

## 2022-12-16 DIAGNOSIS — K58.1 IRRITABLE BOWEL SYNDROME WITH CONSTIPATION: ICD-10-CM

## 2022-12-16 RX ORDER — MIRABEGRON 50 MG/1
50 TABLET, FILM COATED, EXTENDED RELEASE ORAL DAILY
Qty: 90 TABLET | Refills: 3 | Status: SHIPPED | OUTPATIENT
Start: 2022-12-16

## 2022-12-16 RX ORDER — DOCUSATE SODIUM 100 MG/1
100 CAPSULE, LIQUID FILLED ORAL 2 TIMES DAILY
Qty: 60 CAPSULE | Refills: 3 | Status: SHIPPED | OUTPATIENT
Start: 2022-12-16

## 2022-12-16 NOTE — PROGRESS NOTES
Urogynecology - Follow-up clinic note  Sami Amezcua   [id number]    Belarusian-speaking: yes - but ok with English     Chief complaint: bulge     HPI: 56 y o  [id number] presents as a new patient for symptomatic Prolapse  The patient states that she feels a bulge all day long and has to reduce it several times per day  She also feels as though she's "always open", ever since having children  Her surgical history is notable for two prior endometrial ablations, one  section, 3 vaginal deliveries, a tubal ligation, ovarian cystectomy, and a pubovaginal sling and posterior colporrhaphy  Her medical history is significant nephrolithiasis, hypertension, IBS, hypothyroidism, achalasia, bipolar disorder and depression  The patient has minimal JAME since having a sling placed in   Following her surgery, she represented with symptomatic anterior bulge  An anterior colporrhaphy was planned but the patient never re-presented for care  The patient has not had vaginal bleeding since her second endometrial ablation  She had a shot of Depo-Provera in May for suspected endometriosis, which she says did not change her symptoms  She has been having vasomotor symptoms for about 6 months  However, she is not interested in HRT due to a family history of breast cancer       Prolapse symptoms  Bulge: yes - feels all of the time, "out all day"  Pressure: yes    Rubbing on clothing: yes    Stenting for urine: no  Stenting for stool: yes  Pessary use: yes, in the past, but she hated it   Hormonal replacement therapy: yes  Duration: 3 month(s)     Urinary symptoms  Urgency: yes    Frequency: yes 15 / day  Incontinence with stress (exercise, valsalva, laugh, sneeze, cough): yes only rarely when she coughs a lot   Incontinence with urge: yes  Postural incontinence: sometimes  Nocturia:yes 3 / night  Dysuria: no   Hematuria:no   Incomplete emptying: no   Slow stream:no   Hesitancy: yes - when other people are around   Straining with urination: yes  Insensible losses: no   Drinks: water, no coffee, no juices, no soda     Defecatory symptoms  Constipation:yes   Frequency:  BM q3-4days, but now every 2 days   Urgency: no   Fecal Incontinence: no  Gas incontinence:  no   Loose stools:  no   Bowel regimen:  yes drinks prune juice, Miralax     OB History   P4 3 / 1CS   Lacerations yes   OVD no   Largest baby 7lb    Past Medical History:   Diagnosis Date   • Achalasia    • Asthma    • Colon polyp    • Constipation    • CPAP (continuous positive airway pressure) dependence    • Depression    • Diarrhea 2019   • Elevated LFTs     last assessed 10/25/17   • Fibromyalgia    • Gastric ulcer    • GERD (gastroesophageal reflux disease)    • H  pylori infection 10/09/2017    [facility] [id number]: Treated, f/u stool antigen negative   • Hypertension    • Hypothyroidism    • Kidney stone    • Migraines    • Migraines    • MICHAEL (obstructive sleep apnea)     last assessed 16   • Pneumonia    • Sleep apnea 2019    Uses CPAP   • Thyroid nodule     last assessed 17       Past Surgical History:   Procedure Laterality Date   • BREAST BIOPSY Right 10/31/2017    us bx   •  SECTION     • COLONOSCOPY     • ENDOMETRIAL ABLATION     • MYOTOMY HELLER LAPAROSCOPIC  2016    Dr Cindy Lee, Coincident lorena fundoplication   • NASAL SINUS SURGERY     • OTHER SURGICAL HISTORY      achalasia repair   • OVARIAN CYST REMOVAL     • PILONIDAL CYST / SINUS EXCISION     • ND COLONOSCOPY FLX DX W/COLLJ SPEC WHEN PFRMD N/A 2019    Procedure: COLONOSCOPY;  Surgeon: Gisella Delarosa MD;  Location: [facility]  GI LAB; Service: Gastroenterology   • ND CYSTOURETHROSCOPY N/A 2017    Procedure: CYSTOSCOPY;  Surgeon: Vandana Fleming MD;  [facility];  Service: UroGynecology          • ND ESOPHAGOGASTRODUODENOSCOPY TRANSORAL DIAGNOSTIC N/A 2017    Procedure: ESOPHAGOGASTRODUODENOSCOPY (EGD);   Surgeon: Gisella Delarosa MD;  Location:  GI LAB; Service: Gastroenterology   • WY ESOPHAGOGASTRODUODENOSCOPY TRANSORAL DIAGNOSTIC N/A 04/25/2019    Procedure: ESOPHAGOGASTRODUODENOSCOPY (EGD); Surgeon: Volodymyr Catalan MD;  Location: [facility]  GI LAB; Service: Gastroenterology   • WY FRAGMENT KIDNEY STONE/ ESWL Left 05/26/2022    Procedure: ESWL;  Surgeon: Jerrell Aguilar MD;  Location: [address] [place] [address] MAIN OR;  Service: Urology   • WY POST COLPORRHAPHY,RECTUM/VAGINA N/A 01/19/2017    Procedure: COLPORRHAPHY POSTERIOR;  Surgeon: Yony Robbins MD;  [facility];  Service: UroGynecology          • W[place] SLING OPER STRES INCONTINENCE N/A 01/19/2017    Procedure: INSERTION PUBOVAGINAL SLING /SINGLE INCISION ;  Surgeon: Yony Robbins MD;  [facility];  Service: UroGynecology          • TONSILLECTOMY     • TUBAL LIGATION     • UPPER GASTROINTESTINAL ENDOSCOPY     • US GUIDED BREAST BIOPSY RIGHT COMPLETE Right 10/31/2017     Physical Exam  Constitutional:       General: She is not in acute distress  Appearance: Normal appearance  She is obese  She is not ill-appearing, toxic-appearing or diaphoretic  Genitourinary:      Genitourinary Comments: Little atrophy, appears well estrogenized   Decreased urethral resistance  Resting angle -10 with no movement   Kegel strength 2 5/5        Right Adnexa: not tender, not full and no mass present  Left Adnexa: not tender, not full and no mass present  Cervix is parous  No cervical motion tenderness  Uterus is enlarged  Uterus is not tender  POP-Q measurements were: Aa: 0, Ba: 0, C: -7     gH: 4 5, pB: 3, TVL: 10     Ap: +1, Bp: +1, D: -9    HENT:      Head: Normocephalic and atraumatic  Pulmonary:      Effort: No respiratory distress  Abdominal:      General: Abdomen is flat  There is no distension  Palpations: Abdomen is soft  There is no mass  Tenderness: There is no abdominal tenderness  There is no guarding or rebound  Hernia: No hernia is present     Neurological:      General: No focal deficit present  Mental Status: She is alert and oriented to person, place, and time  Psychiatric:         Mood and Affect: Mood normal          Behavior: Behavior normal          Thought Content: Thought content normal          Judgment: Judgment normal      Voided volume: 133cc  PVR: 37cc  - CST    Assessment:  56 y o  P4 with overactive bladder and anterior and posterior prolapse  Plan:  Surgery: yes   Procedure planned: Discussed different options including abdominal versus vaginal routes, including combined anterior and posterior colporrhaphy with possible VEC with Enplace versus laparoscopic assisted total hysterectomy with sacrocolpopexy  Literature was provided on both procedures       Topical estrogen: no   OAB medications: yes, Myrbetric 50mg daily prescribed   OAB counseling (timed voids, avoiding triggers, fluid intake management): yes  Kegel exercise handout: yes  Urodynamics: no    Follow up in 3 month(s)     Juan Denney MD

## 2022-12-19 ENCOUNTER — OFFICE VISIT (OUTPATIENT)
Dept: OBGYN CLINIC | Facility: CLINIC | Age: 57
End: 2022-12-19

## 2022-12-19 ENCOUNTER — APPOINTMENT (OUTPATIENT)
Dept: LAB | Facility: CLINIC | Age: 57
End: 2022-12-19

## 2022-12-19 VITALS
SYSTOLIC BLOOD PRESSURE: 136 MMHG | DIASTOLIC BLOOD PRESSURE: 83 MMHG | HEART RATE: 101 BPM | BODY MASS INDEX: 33.59 KG/M2 | WEIGHT: 214 LBS | HEIGHT: 67 IN

## 2022-12-19 DIAGNOSIS — R23.2 VASOMOTOR FLUSHING: ICD-10-CM

## 2022-12-19 DIAGNOSIS — Z78.0 MENOPAUSE: Primary | ICD-10-CM

## 2022-12-19 DIAGNOSIS — Z78.0 MENOPAUSE: ICD-10-CM

## 2022-12-19 LAB — FSH SERPL-ACNC: 47.7 MIU/ML

## 2022-12-19 RX ORDER — VENLAFAXINE HYDROCHLORIDE 37.5 MG/1
37.5 TABLET, EXTENDED RELEASE ORAL
Qty: 30 TABLET | Refills: 2 | Status: SHIPPED | OUTPATIENT
Start: 2022-12-19

## 2022-12-19 RX ORDER — CONJUGATED ESTROGENS 0.62 MG/G
0.5 CREAM VAGINAL DAILY
Qty: 5 G | Refills: 1 | Status: SHIPPED | OUTPATIENT
Start: 2022-12-19

## 2022-12-19 NOTE — PROGRESS NOTES
[address] [address]  Phone#  [phone]  Fax#  [phone]  Juan Mckay       PROBLEM VISIT      Subjective     Juan Mckay is a 56 y o  female here for vasomotor symptoms in the last 6 months, patient states her symptoms have get worse in the last couple weeks at night and she is pretty uncomfortable   Her surgical history is notable for two prior endometrial ablations (no menses since 2 procedure), one  section, 3 vaginal deliveries, a tubal ligation, ovarian cystectomy, and a pubovaginal sling and posterior colporrhaphy  patient was evaluated last 22 by Wilfredo Bradley due to OAB and anterior and posterior prolapse, surgery has been discussed with patient and will follow up in 3 months  Patient states mother ans maternal aunt with breast ca, maternal aunt colon cancer, and materna grandmother with ovarian cancer  Father with tongue and mandible ca    Personal health questionnaire reviewed: yes  Gynecologic History  Patient's last menstrual period was 10/11/2020 (exact date)  Contraception: none  Last Pap: 22  Results were: normal  Last mammogram: 22 Birads 2    Obstetric History  OB History    Para Term  AB Living   4 4 4         SAB IAB Ectopic Multiple Live Births                  # Outcome Date GA Lbr Kevin/2nd Weight Sex Delivery Anes PTL Lv   4 Term            3 Term            2 Term            1 Term               Obstetric Comments   First menarche: 17 yr old   4 previous pregnancies         The following portions of the patient's history were reviewed and updated as appropriate: allergies, current medications, past social history, past surgical history and problem list     Review of Systems  Pertinent items are noted in HPI        Objective     /83   Pulse 101   Ht 5' 7" (1 702 m)   Wt 97 1 kg (214 lb)   LMP 10/11/2020 (Exact Date)   BMI 33 52 kg/m²     General Appearance: Alert, cooperative, no distress, appears stated age   Head:    Normocephalic, without obvious abnormality, atraumatic   Eyes:    PERRL, conjunctiva/corneas clear, EOM's intact, fundi     benign, both eyes   Ears:    Normal TM's and external ear canals, both ears   Nose:   Nares normal, septum midline, mucosa normal, no drainage    or sinus tenderness   Throat:   Lips, mucosa, and tongue normal; teeth and gums normal   Neck:   Supple, symmetrical, trachea midline, no adenopathy;     thyroid:  no enlargement/tenderness/nodules; no carotid    bruit or JVD   Back:     Symmetric, no curvature, ROM normal, no CVA tenderness   Lungs:     Clear to auscultation bilaterally, respirations unlabored   Chest Wall:    No tenderness or deformity    Heart:    Regular rate and rhythm, S1 and S2 normal, no murmur, rub   or gallop   Breast Exam:    No tenderness, masses, or nipple abnormality   Abdomen:     Soft, non-tender, bowel sounds active all four quadrants,     no masses, no organomegaly           Extremities:   Extremities normal, atraumatic, no cyanosis or edema   Pulses:   2+ and symmetric all extremities   Skin:   Skin color, texture, turgor normal, no rashes or lesions   Lymph nodes:   Cervical, supraclavicular, and axillary nodes normal   Neurologic:   CNII-XII intact, normal strength, sensation and reflexes     throughout         Assessment/Plan    Vasomotor symptoms   Patient is not interested in hormonal therapy due to extensive family history of ca (Patient states mother ans maternal aunt with breast ca, maternal aunt colon cancer, and materna grandmother with ovarian cancer  Father with tongue and mandible ca)  In addition patient is sexually active and states significant vaginal dryness: premarin vaginal cream has been order  D/w Dr Nakia Miller MD

## 2022-12-21 ENCOUNTER — TELEPHONE (OUTPATIENT)
Dept: GASTROENTEROLOGY | Facility: CLINIC | Age: 57
End: 2022-12-21

## 2022-12-21 DIAGNOSIS — A04.8 H. PYLORI INFECTION: Primary | ICD-10-CM

## 2022-12-21 RX ORDER — TETRACYCLINE HYDROCHLORIDE 500 MG/1
500 CAPSULE ORAL 4 TIMES DAILY
Qty: 56 CAPSULE | Refills: 0 | Status: SHIPPED | OUTPATIENT
Start: 2022-12-21 | End: 2023-01-04

## 2022-12-21 RX ORDER — BISMUTH SUBSALICYLATE 262 MG/1
262 TABLET, CHEWABLE ORAL
Qty: 56 TABLET | Refills: 0 | Status: SHIPPED | OUTPATIENT
Start: 2022-12-21 | End: 2023-01-04

## 2022-12-21 RX ORDER — OMEPRAZOLE 40 MG/1
40 CAPSULE, DELAYED RELEASE ORAL 2 TIMES DAILY
Qty: 28 CAPSULE | Refills: 0 | Status: SHIPPED | OUTPATIENT
Start: 2022-12-21 | End: 2023-01-04

## 2022-12-21 RX ORDER — METRONIDAZOLE 250 MG/1
250 TABLET ORAL 4 TIMES DAILY
Qty: 56 TABLET | Refills: 0 | Status: SHIPPED | OUTPATIENT
Start: 2022-12-21 | End: 2023-01-04

## 2022-12-21 NOTE — TELEPHONE ENCOUNTER
Spoke with patient and relayed results  Informed patient of quadruple therapy treatment  Rx and future H  Pylori stool testing ordered

## 2022-12-21 NOTE — TELEPHONE ENCOUNTER
----- Message from Makeda Denise DO sent at 12/20/2022  1:33 PM EST -----  Hello,    Patient is positive for H pylori  Can we arrange for quadruple therapy for her as per Dr Nakia Evangelista like OV note? And repeat stool antigen testing 2-4 weeks after completing treatment (off PPI for 2 weeks)  Thank you  [email or web address] Jc  Chief Gastroenterology Fellow  [address]  Division of Gastroenterology and Hepatology  Available on [email or web address]

## 2023-01-06 NOTE — PROGRESS NOTES
Daily Note     Today's date: 3/18/2019  Patient name: Dante Hyman  : 1965  MRN: [id number]  Referring provider: Geoffrey Cabrales MD  Dx:   Encounter Diagnosis     ICD-10-CM    1  Trochanteric bursitis of both hips M70 61     M70 62    2  Chronic bilateral low back pain, with sciatica presence unspecified M54 5     G89 29        Start Time: 920  Stop Time: 1005  Total time in clinic (min): 45 minutes     9: 1 on 1  Subjective: Patient report that she "always has some pain" and is having a "so so" day  No sx's into LE currently  States she is having surgery at the beginning of April and is not sure if she will want to do PT after that  Objective: See treatment diary below      Assessment: Tolerated treatment well  Able to perform exercises well with only occasional verbal cues  Had pt perform bridges today per pt request  Reported feeling better by end of session with reduced pain  Would benefit from continued PT  Plan: Continue per plan of care  Progress treatment as tolerated  Continue with LA distraction in treatment to address parasthesias  Progress strengthening as able         Precautions: History of Chronic Pain     Daily Treatment Diary     Manual  3/6 3/11 3/12     TrP Release Piriformis  ES      Prone LA Distraction   ES             KT Taping         Laser R hip 15 W 4min           Exercise Diary  3/4 3/6 3/11 3/12 3/18    Bike  5' 7 min 8 min L2 10m L2    HS Stretch 10" x10 10" x10 10" x10      Piriformis Stretch   10" x10 30" x3 30" x3    SLR 3 way with glute press 2x10 2x10 3x10 3x10 3x10    Slump Nerve Glides 5" 2x10 x20 x20 x20 x20    Clams  2x10 PTB 3x10      Mini Squats  NV 2x10 3x10 3x10    LTR  x20 x20 20 20x    PBall Crushers  5" x20 10" x20 2min 10" x20    TrA Bracing   5" x20 10" x20      PBall Walkouts   NV      Supine Hip Flexor Stretch    30" x3 30" x3    Step Ups         Bridges      10x2                                                     Modalities Chinese speaking-  lower abd pains since yesterday, radiating to back. reports burning upon urination  fs 256

## 2023-01-27 DIAGNOSIS — R23.2 VASOMOTOR FLUSHING: ICD-10-CM

## 2023-01-30 ENCOUNTER — APPOINTMENT (OUTPATIENT)
Dept: LAB | Facility: CLINIC | Age: 58
End: 2023-01-30

## 2023-01-30 DIAGNOSIS — A04.8 H. PYLORI INFECTION: ICD-10-CM

## 2023-01-31 LAB — H PYLORI AG STL QL IA: POSITIVE

## 2023-01-31 RX ORDER — VENLAFAXINE HYDROCHLORIDE 37.5 MG/1
TABLET, EXTENDED RELEASE ORAL
Qty: 90 TABLET | Refills: 1 | Status: SHIPPED | OUTPATIENT
Start: 2023-01-31

## 2023-02-07 ENCOUNTER — TELEPHONE (OUTPATIENT)
Dept: GASTROENTEROLOGY | Facility: CLINIC | Age: 58
End: 2023-02-07

## 2023-02-07 NOTE — TELEPHONE ENCOUNTER
Patients GI provider:  Dr Acevedo Postin    Number to return call: [phone]    Reason for call: Pt calling stating she is positive for h pylori again and needs treatment  She says she keeps getting this and she wants a stronger medication      Scheduled procedure/appointment date if applicable: no apts or procedures

## 2023-02-07 NOTE — TELEPHONE ENCOUNTER
Spoke with patient  Patient has Hx of H  Pylori infection  Patient confirmed she completed quadruple therapy as prescribed  Patient's stool testing came back positive  Patient would like to know next steps

## 2023-02-08 ENCOUNTER — PREP FOR PROCEDURE (OUTPATIENT)
Dept: GASTROENTEROLOGY | Facility: CLINIC | Age: 58
End: 2023-02-08

## 2023-02-08 DIAGNOSIS — A04.8 HELICOBACTER PYLORI (H. PYLORI) INFECTION: Primary | ICD-10-CM

## 2023-02-08 NOTE — TELEPHONE ENCOUNTER
Spoke with patient  Informed patient that we may do repeat EGD with biopsies to evaluate antibiotic sensitivity  Patient informed me she is interested and would like to schedule this  Patient states she will try anything as she would like to get rid of H  Pylori  Informed patient staff will be reaching out to schedule procedure

## 2023-02-08 NOTE — TELEPHONE ENCOUNTER
Scheduled date of EGD(as of today):03 10 23  Physician performing EGD:DR VOSS  Location of EGD:[facility]  Instructions reviewed with patient by:MAILED  Clearances: N/A

## 2023-02-18 DIAGNOSIS — J45.40 MODERATE PERSISTENT ASTHMA WITHOUT COMPLICATION: ICD-10-CM

## 2023-02-20 RX ORDER — FLUTICASONE FUROATE AND VILANTEROL TRIFENATATE 100; 25 UG/1; UG/1
POWDER RESPIRATORY (INHALATION)
Qty: 60 EACH | Refills: 2 | Status: SHIPPED | OUTPATIENT
Start: 2023-02-20

## 2023-03-10 ENCOUNTER — OFFICE VISIT (OUTPATIENT)
Dept: INTERNAL MEDICINE CLINIC | Facility: CLINIC | Age: 58
End: 2023-03-10

## 2023-03-10 ENCOUNTER — APPOINTMENT (OUTPATIENT)
Dept: LAB | Facility: CLINIC | Age: 58
End: 2023-03-10

## 2023-03-10 VITALS
HEIGHT: 67 IN | TEMPERATURE: 97.9 F | BODY MASS INDEX: 34.84 KG/M2 | SYSTOLIC BLOOD PRESSURE: 129 MMHG | HEART RATE: 67 BPM | WEIGHT: 222 LBS | DIASTOLIC BLOOD PRESSURE: 79 MMHG

## 2023-03-10 DIAGNOSIS — R73.03 PREDIABETES: ICD-10-CM

## 2023-03-10 DIAGNOSIS — E11.8 CONTROLLED TYPE 2 DIABETES MELLITUS WITH COMPLICATION, WITHOUT LONG-TERM CURRENT USE OF INSULIN (HCC): ICD-10-CM

## 2023-03-10 DIAGNOSIS — R63.5 ABNORMAL WEIGHT GAIN: ICD-10-CM

## 2023-03-10 DIAGNOSIS — Z87.891 HISTORY OF TOBACCO ABUSE: ICD-10-CM

## 2023-03-10 DIAGNOSIS — Z00.00 MEDICARE ANNUAL WELLNESS VISIT, INITIAL: Primary | ICD-10-CM

## 2023-03-10 DIAGNOSIS — R10.13 EPIGASTRIC PAIN: ICD-10-CM

## 2023-03-10 DIAGNOSIS — M79.89 LEG SWELLING: ICD-10-CM

## 2023-03-10 DIAGNOSIS — Z12.2 SCREENING FOR LUNG CANCER: ICD-10-CM

## 2023-03-10 DIAGNOSIS — A04.8 HELICOBACTER PYLORI (H. PYLORI) INFECTION: ICD-10-CM

## 2023-03-10 DIAGNOSIS — E03.9 HYPOTHYROIDISM, UNSPECIFIED TYPE: ICD-10-CM

## 2023-03-10 LAB
T4 FREE SERPL-MCNC: 1.49 NG/DL (ref 0.76–1.46)
TSH SERPL DL<=0.05 MIU/L-ACNC: 0.08 UIU/ML (ref 0.45–4.5)

## 2023-03-10 RX ORDER — FUROSEMIDE 20 MG/1
20 TABLET ORAL DAILY
Qty: 90 TABLET | Refills: 3 | Status: SHIPPED | OUTPATIENT
Start: 2023-03-10

## 2023-03-10 RX ORDER — LANCETS 28 GAUGE
EACH MISCELLANEOUS
Qty: 100 EACH | Refills: 2 | Status: SHIPPED | OUTPATIENT
Start: 2023-03-10

## 2023-03-10 RX ORDER — METFORMIN HYDROCHLORIDE 500 MG/1
1000 TABLET, EXTENDED RELEASE ORAL EVERY MORNING
Qty: 180 TABLET | Refills: 3 | Status: SHIPPED | OUTPATIENT
Start: 2023-03-10

## 2023-03-10 RX ORDER — PANTOPRAZOLE SODIUM 40 MG/1
40 TABLET, DELAYED RELEASE ORAL DAILY
Qty: 30 TABLET | Refills: 0 | Status: SHIPPED | OUTPATIENT
Start: 2023-03-10 | End: 2023-04-09

## 2023-03-10 RX ORDER — BLOOD-GLUCOSE METER
1 KIT MISCELLANEOUS DAILY
Qty: 100 STRIP | Refills: 8 | Status: SHIPPED | OUTPATIENT
Start: 2023-03-10

## 2023-03-10 RX ORDER — LEVOTHYROXINE SODIUM 0.12 MG/1
125 TABLET ORAL DAILY
Qty: 90 TABLET | Refills: 3 | Status: SHIPPED | OUTPATIENT
Start: 2023-03-10 | End: 2023-03-14

## 2023-03-10 NOTE — PROGRESS NOTES
Assessment and Plan:     Problem List Items Addressed This Visit        Endocrine    Hypothyroidism    Relevant Medications    levothyroxine 125 mcg tablet    Other Relevant Orders    TSH, 3rd generation with Free T4 reflex       Other    Prediabetes    Relevant Medications    metFORMIN (GLUCOPHAGE-XR) 500 mg 24 hr tablet   Other Visit Diagnoses     Medicare annual wellness visit, initial    -  Primary      History of tobacco abuse      45-pack-year smoking history  Quit 7 years ago  CT lung screening order given today  Relevant Orders    CT lung screening program    Screening for lung cancer        Relevant Orders    CT lung screening program    Epigastric pain        Relevant Medications    pantoprazole (PROTONIX) 40 mg tablet    Helicobacter pylori (H  pylori) infection      Refractory to 2 courses of antibiotics  Requesting a 3rd course of antibiotics  Explained that next step in management will be an EGD and prescribing an other course will put her at risk for antibiotic resistance  Patient pending EGD in 2 weeks  Relevant Medications    pantoprazole (PROTONIX) 40 mg tablet           Preventive health issues were discussed with patient, and age appropriate screening tests were ordered as noted in patient's After Visit Summary  Personalized health advice and appropriate referrals for health education or preventive services given if needed, as noted in patient's After Visit Summary  History of Present Illness:     Patient presents for a Medicare Wellness Visit    37-year-old with past medical history of GERD, IBS, hyporthyroidism, MICHAEL, asthma, migraines, HTN and bipolar disorder presenting for a Medicare Annual Wellness Visit       Patient Care Team:  Idris White MD as PCP - General (Internal Medicine)  Ibrahima Valentin MD as PCP - Endocrinology (Endocrinology)  MD Farnck Silva MD Syble Nasuti, MD as Endoscopist     Review of Systems:     Review of Systems   Constitutional: Negative for chills and fever  HENT: Negative for ear pain and sore throat  Eyes: Negative for pain and visual disturbance  Respiratory: Negative for cough and shortness of breath  Cardiovascular: Negative for chest pain and palpitations  Gastrointestinal: Positive for abdominal pain  Negative for vomiting  Genitourinary: Negative for dysuria and hematuria  Musculoskeletal: Negative for arthralgias and back pain  Skin: Negative for color change and rash  Neurological: Negative for seizures and syncope  All other systems reviewed and are negative         Problem List:     Patient Active Problem List   Diagnosis   • Mixed incontinence   • Stress incontinence in female   • MICHAEL (obstructive sleep apnea)   • Hypothyroidism   • Irritable bowel syndrome with constipation   • Chronic low back pain   • Asthma   • Arthritis   • Gastritis   • Rectocele   • Cystocele   • Hypermobility of urethra   • Achalasia   • Bilateral edema of lower extremity   • Breast pain, right   • Chronic sinusitis with recurrent bronchitis   • Gastroesophageal reflux disease   • Headache, migraine   • Intractable chronic migraine without aura and without status migrainosus   • Breast mass   • Class 1 obesity   • Other chronic pain   • Spondylosis of lumbar region without myelopathy or radiculopathy   • Thyroiditis   • Tubular adenoma of colon   • Food allergy   • POP-Q stage 2 cystocele   • Moderate persistent asthma with exacerbation   • Prediabetes   • Need for shingles vaccine   • Discomfort of right ear   • Trochanteric bursitis of right hip   • Pain in right hip   • Numbness of toes   • Acute viral bronchitis   • Overweight   • Hashimoto's thyroiditis   • Lower abdominal pain   • Left ovarian cyst   • History of endometrial ablation   • Left nephrolithiasis   • Abdominal bloating   • Achalasia of digestive tract   • Esophageal dysphagia   • Hypertension   • Anxiety   • Insomnia   • [facility]   • COVID-19   • Encounter for long-term (current) use of other medications   • Sebaceous cyst of right axilla   • Pelvic pain      Past Medical and Surgical History:     Past Medical History:   Diagnosis Date   • Achalasia    • Asthma    • Colon polyp    • Constipation    • CPAP (continuous positive airway pressure) dependence    • Depression    • Diarrhea 2019   • Elevated LFTs     last assessed 10/25/17   • Fibromyalgia    • Gastric ulcer    • GERD (gastroesophageal reflux disease)    • H  pylori infection 10/09/2017    [facility] [place] - [id number]: Treated, f/u stool antigen negative   • Hypertension    • Hypothyroidism    • Kidney stone    • Migraines    • Migraines    • MICHAEL (obstructive sleep apnea)     last assessed 16   • Pneumonia    • Sleep apnea 2019    Uses CPAP   • Thyroid nodule     last assessed 17     Past Surgical History:   Procedure Laterality Date   • BREAST BIOPSY Right 10/31/2017    us bx   •  SECTION     • COLONOSCOPY     • ENDOMETRIAL ABLATION     • MYOTOMY HELLER LAPAROSCOPIC  2016    Dr Jamal Cogan, Coincident lorena fundoplication   • NASAL SINUS SURGERY     • OTHER SURGICAL HISTORY      achalasia repair   • OVARIAN CYST REMOVAL     • PILONIDAL CYST / SINUS EXCISION     • UT COLONOSCOPY FLX DX W/COLLJ SPEC WHEN PFRMD N/A 2019    Procedure: COLONOSCOPY;  Surgeon: Wade Rios MD;  [facility]; Service: Gastroenterology   • UT CYSTOURETHROSCOPY N/A 2017    Procedure: CYSTOSCOPY;  Surgeon: Anamaria Payne MD;  [facility];  Service: UroGynecology          • UT ESOPHAGOGASTRODUODENOSCOPY TRANSORAL DIAGNOSTIC N/A 2017    Procedure: ESOPHAGOGASTRODUODENOSCOPY (EGD); Surgeon: Wade Rios MD;  Location:  GI LAB; Service: Gastroenterology   • UT ESOPHAGOGASTRODUODENOSCOPY TRANSORAL DIAGNOSTIC N/A 2019    Procedure: ESOPHAGOGASTRODUODENOSCOPY (EGD); Surgeon: Wade Rios MD;  [facility];   Service: Gastroenterology   • UT LITHOTRIPSY XTRCORP SHOCK WAVE Left 05/26/2022    Procedure: ESWL;  Surgeon: Meghana Jones MD;  Location: [facility] MAIN OR;  Service: Urology   • OR POST COLPORRHAPHY RECTOCELE W/WO PERINEORRHAPHY N/A 01/19/2017    Procedure: COLPORRHAPHY POSTERIOR;  Surgeon: Pablo Burden MD;  [facility];  Service: UroGynecology          • OR SLING OPERATION STRESS INCONTINENCE N/A 01/19/2017    Procedure: INSERTION PUBOVAGINAL SLING /SINGLE INCISION ;  Surgeon: Pablo Burden MD;  [facility];  Service: UroGynecology          • TONSILLECTOMY     • TUBAL LIGATION     • UPPER GASTROINTESTINAL ENDOSCOPY     • US GUIDED BREAST BIOPSY RIGHT COMPLETE Right 10/31/2017      Family History:     Family History   Problem Relation Age of Onset   • Cancer Mother    • Breast cancer Mother 62   • Hypertension Mother    • Stroke Father    • Diabetes Father    • Hypertension Father    • Alzheimer's disease Father    • Diabetes Sister    • BRCA1 Negative Sister    • BRCA2 Negative Sister    • No Known Problems Daughter    • No Known Problems Daughter    • Cancer Maternal Grandmother         ovarian   • Ovarian cancer Maternal Grandmother         unspecified laterality   • Cancer Maternal Grandfather    • Hypertension Maternal Grandfather    • Tongue cancer Maternal Grandfather    • Stroke Paternal Grandmother    • No Known Problems Paternal Grandfather    • Thyroid disease unspecified Brother    • Depression Brother    • Anxiety disorder Brother    • Drug abuse Brother         (pain pills)   • No Known Problems Son    • No Known Problems Son    • Thyroid disease unspecified Maternal Aunt    • Colon cancer Maternal Aunt    • No Known Problems Maternal Aunt    • No Known Problems Maternal Aunt    • No Known Problems Paternal Aunt    • No Known Problems Paternal Aunt    • No Known Problems Paternal Aunt    • No Known Problems Paternal Aunt    • No Known Problems Paternal Aunt    • No Known Problems Paternal Aunt    • Alzheimer's disease Cousin    • Breast cancer Cousin 43   • Breast cancer Family       Social History:     Social History     Socioeconomic History   • Marital status: Single     Spouse name: None   • Number of children: 4   • Years of education: None   • Highest education level: None   Occupational History   • Occupation: Disability since 2019-- due to a job injury    Tobacco Use   • Smoking status: Former     Packs/day: 1 50     Years: 30 00     Pack years: 45 00     Types: Cigarettes     Quit date:      Years since quittin 1   • Smokeless tobacco: Never   • Tobacco comments:     pt stated quit about a month ago, last assessed 14 per [employer]   Vaping Use   • Vaping Use: Never used   Substance and Sexual Activity   • Alcohol use: Yes     Comment: socially - very rare social occasion   • Drug use: Never   • Sexual activity: Not Currently     Partners: Male     Birth control/protection: Female Sterilization     Comment: Boyfriend/father of 4 children   Other Topics Concern   • None   Social History Narrative    Home: Lives with boyfriend/father of her 4 children and one of their daughters        Children: 2 sons born  and ,  2 daughters born  and         Education:     Social Determinants of Health     Financial Resource Strain: Low Risk    • Difficulty of Paying Living Expenses: Not hard at all   Food Insecurity: No Food Insecurity   • Worried About Running Out of Food in the Last Year: Never true   • Ran Out of Food in the Last Year: Never true   Transportation Needs: No Transportation Needs   • Lack of Transportation (Medical): No   • Lack of Transportation (Non-Medical):  No   Physical Activity: Not on file   Stress: Not on file   Social Connections: Not on file   Intimate Partner Violence: Not on file   Housing Stability: Low Risk    • Unable to Pay for Housing in the Last Year: No   • Number of Places Lived in the Last Year: 1   • Unstable Housing in the Last Year: No      Medications and Allergies: Current Outpatient Medications   Medication Sig Dispense Refill   • Alaway 0 025 % ophthalmic solution Administer 1 drop to both eyes 2 (two) times a day as needed (For discomfort) 5 mL 0   • albuterol (2 5 mg/3 mL) 0 083 % nebulizer solution Take 3 mL (2 5 mg total) by nebulization every 6 (six) hours as needed for wheezing or shortness of breath 75 mL 0   • albuterol (PROVENTIL HFA,VENTOLIN HFA) 90 mcg/act inhaler Inhale 2 puffs every 4 (four) hours as needed for wheezing or shortness of breath 18 g 1   • Breo Ellipta 100-25 MCG/ACT inhaler INHALE 1 PUFF DAILY 1 PUFF DAILY 60 each 2   • cetaphil (CETAPHIL) lotion Apply topically as needed for dry skin 236 mL 0   • cetirizine (ZyrTEC) 10 mg tablet Take 1 tablet (10 mg total) by mouth daily 90 tablet 2   • cholecalciferol (VITAMIN D3) 1,000 units tablet Take 1 tablet (1,000 Units total) by mouth daily 90 tablet 3   • estrogens, conjugated (Premarin) vaginal cream Insert 0 5 g into the vagina daily 5 g 1   • fexofenadine (ALLEGRA) 180 MG tablet      • FLUoxetine (PROzac) 20 mg capsule Take 1 capsule (20 mg total) by mouth daily 90 capsule 1   • fluticasone (FLONASE) 50 mcg/act nasal spray 1-2 sprays into each nostril daily 16 g 6   • furosemide (LASIX) 20 mg tablet Take 1 tablet (20 mg total) by mouth daily 90 tablet 3   • glucose blood (FREESTYLE LITE) test strip Use 1 each in the morning Use as directed 100 strip 8   • glucose monitoring kit (FREESTYLE) monitoring kit Use 1 each as needed (prediabetes blood sugar checks) 1 each 0   • ipratropium (ATROVENT) 0 02 % nebulizer solution      • Lancets (freestyle) lancets Use as instructed 100 each 2   • levothyroxine 125 mcg tablet Take 1 tablet (125 mcg total) by mouth daily 90 tablet 3   • metFORMIN (GLUCOPHAGE-XR) 500 mg 24 hr tablet Take 2 tablets (1,000 mg total) by mouth every morning 180 tablet 3   • montelukast (SINGULAIR) 10 mg tablet Take 10 mg by mouth daily  6   • omeprazole (PriLOSEC) 40 MG capsule Take 1 capsule (40 mg total) by mouth 2 (two) times a day for 14 days 28 capsule 0   • pantoprazole (PROTONIX) 40 mg tablet Take 1 tablet (40 mg total) by mouth daily 30 tablet 0   • tiZANidine (ZANAFLEX) 4 mg tablet Take 4 mg by mouth daily at bedtime as needed     • venlafaxine 37 5 mg 24 hr tablet TAKE 1 TABLET BY MOUTH DAILY WITH BREAKFAST 90 tablet 1   • polyethylene glycol (GOLYTELY) 4000 mL solution Take 4,000 mL by mouth once for 1 dose 4000 mL 0     No current facility-administered medications for this visit  Allergies   Allergen Reactions   • Gabapentin Other (See Comments)     Annotation - [id number]: Neuropsych effects per patient  Annotation - [id number]: Neuropsych effects per patient  Annotation - [id number]: Neuropsych effects per patient     • Latex Dermatitis and Rash     Other reaction(s): Dermatitis   • Penicillins Hives      Immunizations:     Immunization History   Administered Date(s) Administered   • COVID-19 PFIZER VACCINE 0 3 ML IM 05/18/2021, 06/08/2021   • INFLUENZA 11/07/2006, 11/15/2014, 11/01/2017, 12/10/2018, 01/14/2019, 10/01/2019, 12/03/2020   • Influenza Injectable, MDCK, Preservative Free, Quadrivalent, 0 5 mL 01/14/2019   • Influenza Quadrivalent Preservative Free 3 years and older IM 11/06/2017   • Influenza, injectable, quadrivalent, preservative free 0 5 mL 12/03/2020   • Influenza, recombinant, quadrivalent,injectable, preservative free 11/17/2021   • Influenza, seasonal, injectable 10/23/2015   • Pneumococcal Conjugate Vaccine 20-valent (Pcv20), Polysace 09/16/2022   • Pneumococcal Polysaccharide PPV23 01/28/2016   • Tdap 05/22/2015   • Zoster 12/03/2020   • Zoster Vaccine Recombinant 12/03/2020, 03/03/2021      Health Maintenance:         Topic Date Due   • HIV Screening  Never done   • Lung Cancer Screening  12/02/2022   • Breast Cancer Screening: Mammogram  12/07/2023   • Cervical Cancer Screening  08/22/2027   • Colorectal Cancer Screening  10/09/2032   • Hepatitis C Screening  Completed         Topic Date Due   • COVID-19 Vaccine (3 - Booster for Pfizer series) 08/03/2021   • Influenza Vaccine (1) 09/01/2022      Medicare Screening Tests and Risk Assessments:         Health Risk Assessment:   Patient rates overall health as good  Patient feels that their physical health rating is same  Patient is very satisfied with their life  Eyesight was rated as much worse  Hearing was rated as slightly worse  Patient feels that their emotional and mental health rating is much better  Patients states they are sometimes angry  Patient states they are sometimes unusually tired/fatigued  Pain experienced in the last 7 days has been some  Patient's pain rating has been 9/10  Patient states that she has experienced weight loss or gain in last 6 months  Fall Risk Screening: In the past year, patient has experienced: no history of falling in past year      Urinary Incontinence Screening:   Patient has not leaked urine accidently in the last six months  Home Safety:  Patient does not have trouble with stairs inside or outside of their home  Patient has working smoke alarms and has working carbon monoxide detector  Home safety hazards include: none  Nutrition:   Current diet is Regular  Medications:   Patient is not currently taking any over-the-counter supplements  Patient is able to manage medications  Activities of Daily Living (ADLs)/Instrumental Activities of Daily Living (IADLs):   Walk and transfer into and out of bed and chair?: Yes  Dress and groom yourself?: Yes    Bathe or shower yourself?: Yes    Feed yourself?  Yes  Do your laundry/housekeeping?: Yes  Manage your money, pay your bills and track your expenses?: Yes  Make your own meals?: Yes    Do your own shopping?: Yes    Previous Hospitalizations:   Any hospitalizations or ED visits within the last 12 months?: No      Advance Care Planning:   Living will: No    Durable POA for healthcare: No      PREVENTIVE SCREENINGS      Cardiovascular Screening:    General: Screening Current      Colorectal Cancer Screening:     General: Screening Current      Breast Cancer Screening:     General: Screening Current      Cervical Cancer Screening:    General: Screening Current      Hepatitis C Screening:    General: Screening Current    Screening, Brief Intervention, and Referral to Treatment (SBIRT)    Screening  Typical number of drinks in a day: 0  Typical number of drinks in a week: 0  Interpretation: Low risk drinking behavior  Single Item Drug Screening:  How often have you used an illegal drug (including marijuana) or a prescription medication for non-medical reasons in the past year? never    Single Item Drug Screen Score: 0  Interpretation: Negative screen for possible drug use disorder    No results found  Physical Exam:     /79 (BP Location: Left arm, Patient Position: Sitting, Cuff Size: Large)   Pulse 67   Temp 97 9 °F (36 6 °C) (Temporal)   Ht 5' 7" (1 702 m)   Wt 101 kg (222 lb)   LMP 10/11/2020 (Exact Date)   BMI 34 77 kg/m²     Physical Exam  Vitals and nursing note reviewed  Constitutional:       General: She is not in acute distress  Appearance: She is well-developed  HENT:      Head: Normocephalic and atraumatic  Eyes:      Conjunctiva/sclera: Conjunctivae normal    Cardiovascular:      Rate and Rhythm: Normal rate and regular rhythm  Heart sounds: No murmur heard  Pulmonary:      Effort: Pulmonary effort is normal  No respiratory distress  Breath sounds: Normal breath sounds  Abdominal:      Palpations: Abdomen is soft  Tenderness: There is no abdominal tenderness  Musculoskeletal:         General: No swelling  Cervical back: Neck supple  Skin:     General: Skin is warm and dry  Capillary Refill: Capillary refill takes less than 2 seconds  Neurological:      Mental Status: She is alert     Psychiatric:         Mood and Affect: Mood normal  Garett Brown MD

## 2023-03-10 NOTE — PATIENT INSTRUCTIONS
Medicare Preventive Visit Patient Instructions  Thank you for completing your Welcome to Medicare Visit or Medicare Annual Wellness Visit today  Your next wellness visit will be due in one year (3/10/2024)  The screening/preventive services that you may require over the next 5-10 years are detailed below  Some tests may not apply to you based off risk factors and/or age  Screening tests ordered at today's visit but not completed yet may show as past due  Also, please note that scanned in results may not display below  Preventive Screenings:  Service Recommendations Previous Testing/Comments   Colorectal Cancer Screening  * Colonoscopy    * Fecal Occult Blood Test (FOBT)/Fecal Immunochemical Test (FIT)  * Fecal DNA/Cologuard Test  * Flexible Sigmoidoscopy Age: 39-70 years old   Colonoscopy: every 10 years (may be performed more frequently if at higher risk)  OR  FOBT/FIT: every 1 year  OR  Cologuard: every 3 years  OR  Sigmoidoscopy: every 5 years  Screening may be recommended earlier than age 39 if at higher risk for colorectal cancer  Also, an individualized decision between you and your healthcare provider will decide whether screening between the ages of 74-80 would be appropriate  Colonoscopy: 10/12/2022  FOBT/FIT: Not on file  Cologuard: Not on file  Sigmoidoscopy: Not on file          Breast Cancer Screening Age: 36 years old  Frequency: every 1-2 years  Not required if history of left and right mastectomy Mammogram: 12/07/2022        Cervical Cancer Screening Between the ages of 21-29, pap smear recommended once every 3 years  Between the ages of 33-67, can perform pap smear with HPV co-testing every 5 years     Recommendations may differ for women with a history of total hysterectomy, cervical cancer, or abnormal pap smears in past  Pap Smear: 08/22/2022        Hepatitis C Screening Once for adults born between 1945 and 1965  More frequently in patients at high risk for Hepatitis C Hep C Antibody: Not on file        Diabetes Screening 1-2 times per year if you're at risk for diabetes or have pre-diabetes Fasting glucose: 100 mg/dL (6/9/2021)  A1C: 5 5 % (6/9/2021)      Cholesterol Screening Once every 5 years if you don't have a lipid disorder  May order more often based on risk factors  Lipid panel: 09/30/2022          Other Preventive Screenings Covered by Medicare:  1  Abdominal Aortic Aneurysm (AAA) Screening: covered once if your at risk  You're considered to be at risk if you have a family history of AAA  2  Lung Cancer Screening: covers low dose CT scan once per year if you meet all of the following conditions: (1) Age 50-69; (2) No signs or symptoms of lung cancer; (3) Current smoker or have quit smoking within the last 15 years; (4) You have a tobacco smoking history of at least 20 pack years (packs per day multiplied by number of years you smoked); (5) You get a written order from a healthcare provider  3  Glaucoma Screening: covered annually if you're considered high risk: (1) You have diabetes OR (2) Family history of glaucoma OR (3)  aged 48 and older OR (3)  American aged 72 and older  3  Osteoporosis Screening: covered every 2 years if you meet one of the following conditions: (1) You're estrogen deficient and at risk for osteoporosis based off medical history and other findings; (2) Have a vertebral abnormality; (3) On glucocorticoid therapy for more than 3 months; (4) Have primary hyperparathyroidism; (5) On osteoporosis medications and need to assess response to drug therapy  · Last bone density test (DXA Scan): Not on file  5  HIV Screening: covered annually if you're between the age of 12-76  Also covered annually if you are younger than 13 and older than 72 with risk factors for HIV infection  For pregnant patients, it is covered up to 3 times per pregnancy      Immunizations:  Immunization Recommendations   Influenza Vaccine Annual influenza vaccination during flu season is recommended for all persons aged >= 6 months who do not have contraindications   Pneumococcal Vaccine   * Pneumococcal conjugate vaccine = PCV13 (Prevnar 13), PCV15 (Vaxneuvance), PCV20 (Prevnar 20)  * Pneumococcal polysaccharide vaccine = PPSV23 (Pneumovax) Adults 25-60 years old: 1-3 doses may be recommended based on certain risk factors  Adults 72 years old: 1-2 doses may be recommended based off what pneumonia vaccine you previously received   Hepatitis B Vaccine 3 dose series if at intermediate or high risk (ex: diabetes, end stage renal disease, liver disease)   Tetanus (Td) Vaccine - COST NOT COVERED BY MEDICARE PART B Following completion of primary series, a booster dose should be given every 10 years to maintain immunity against tetanus  Td may also be given as tetanus wound prophylaxis  Tdap Vaccine - COST NOT COVERED BY MEDICARE PART B Recommended at least once for all adults  For pregnant patients, recommended with each pregnancy  Shingles Vaccine (Shingrix) - COST NOT COVERED BY MEDICARE PART B  2 shot series recommended in those aged 48 and above     Health Maintenance Due:      Topic Date Due   • HIV Screening  Never done   • Lung Cancer Screening  12/02/2022   • Breast Cancer Screening: Mammogram  12/07/2023   • Cervical Cancer Screening  08/22/2027   • Colorectal Cancer Screening  10/09/2032   • Hepatitis C Screening  Completed     Immunizations Due:      Topic Date Due   • COVID-19 Vaccine (3 - Booster for Pfizer series) 08/03/2021   • Influenza Vaccine (1) 09/01/2022     Advance Directives   What are advance directives? Advance directives are legal documents that state your wishes and plans for medical care  These plans are made ahead of time in case you lose your ability to make decisions for yourself  Advance directives can apply to any medical decision, such as the treatments you want, and if you want to donate organs  What are the types of advance directives?   There are many types of advance directives, and each state has rules about how to use them  You may choose a combination of any of the following:  · Living will: This is a written record of the treatment you want  You can also choose which treatments you do not want, which to limit, and which to stop at a certain time  This includes surgery, medicine, IV fluid, and tube feedings  · Durable power of  for healthcare [facility]): This is a written record that states who you want to make healthcare choices for you when you are unable to make them for yourself  This person, called a proxy, is usually a family member or a friend  You may choose more than 1 proxy  · Do not resuscitate (DNR) order:  A DNR order is used in case your heart stops beating or you stop breathing  It is a request not to have certain forms of treatment, such as CPR  A DNR order may be included in other types of advance directives  · Medical directive: This covers the care that you want if you are in a coma, near death, or unable to make decisions for yourself  You can list the treatments you want for each condition  Treatment may include pain medicine, surgery, blood transfusions, dialysis, IV or tube feedings, and a ventilator (breathing machine)  · Values history: This document has questions about your views, beliefs, and how you feel and think about life  This information can help others choose the care that you would choose  Why are advance directives important? An advance directive helps you control your care  Although spoken wishes may be used, it is better to have your wishes written down  Spoken wishes can be misunderstood, or not followed  Treatments may be given even if you do not want them  An advance directive may make it easier for your family to make difficult choices about your care     Weight Management   Why it is important to manage your weight:  Being overweight increases your risk of health conditions such as heart disease, high blood pressure, type 2 diabetes, and certain types of cancer  It can also increase your risk for osteoarthritis, sleep apnea, and other respiratory problems  Aim for a slow, steady weight loss  Even a small amount of weight loss can lower your risk of health problems  How to lose weight safely:  A safe and healthy way to lose weight is to eat fewer calories and get regular exercise  You can lose up about 1 pound a week by decreasing the number of calories you eat by 500 calories each day  Healthy meal plan for weight management:  A healthy meal plan includes a variety of foods, contains fewer calories, and helps you stay healthy  A healthy meal plan includes the following:  · Eat whole-grain foods more often  A healthy meal plan should contain fiber  Fiber is the part of grains, fruits, and vegetables that is not broken down by your body  Whole-grain foods are healthy and provide extra fiber in your diet  Some examples of whole-grain foods are whole-wheat breads and pastas, oatmeal, brown rice, and bulgur  · Eat a variety of vegetables every day  Include dark, leafy greens such as spinach, kale, jose miguel greens, and mustard greens  Eat yellow and orange vegetables such as carrots, sweet potatoes, and winter squash  · Eat a variety of fruits every day  Choose fresh or canned fruit (canned in its own juice or light syrup) instead of juice  Fruit juice has very little or no fiber  · Eat low-fat dairy foods  Drink fat-free (skim) milk or 1% milk  Eat fat-free yogurt and low-fat cottage cheese  Try low-fat cheeses such as mozzarella and other reduced-fat cheeses  · Choose meat and other protein foods that are low in fat  Choose beans or other legumes such as split peas or lentils  Choose fish, skinless poultry (chicken or turkey), or lean cuts of red meat (beef or pork)  Before you cook meat or poultry, cut off any visible fat  · Use less fat and oil  Try baking foods instead of frying them   Add less fat, such as margarine, sour cream, regular salad dressing and mayonnaise to foods  Eat fewer high-fat foods  Some examples of high-fat foods include french fries, doughnuts, ice cream, and cakes  · Eat fewer sweets  Limit foods and drinks that are high in sugar  This includes candy, cookies, regular soda, and sweetened drinks  Exercise:  Exercise at least 30 minutes per day on most days of the week  Some examples of exercise include walking, biking, dancing, and swimming  You can also fit in more physical activity by taking the stairs instead of the elevator or parking farther away from stores  Ask your healthcare provider about the best exercise plan for you  © Copyright [employer] 2018 Information is for End User's use only and may not be sold, redistributed or otherwise used for commercial purposes   All illustrations and images included in CareNotes® are the copyrighted property of A D A M , Inc  or [address] [place] [address]

## 2023-03-14 ENCOUNTER — TELEPHONE (OUTPATIENT)
Dept: INTERNAL MEDICINE CLINIC | Facility: CLINIC | Age: 58
End: 2023-03-14

## 2023-03-14 DIAGNOSIS — E11.8 CONTROLLED TYPE 2 DIABETES MELLITUS WITH COMPLICATION, WITHOUT LONG-TERM CURRENT USE OF INSULIN (HCC): Primary | ICD-10-CM

## 2023-03-14 DIAGNOSIS — E03.9 HYPOTHYROIDISM, UNSPECIFIED TYPE: Primary | ICD-10-CM

## 2023-03-14 RX ORDER — LEVOTHYROXINE SODIUM 0.1 MG/1
100 TABLET ORAL DAILY
Qty: 90 TABLET | Refills: 0 | Status: SHIPPED | OUTPATIENT
Start: 2023-03-14 | End: 2023-06-12

## 2023-03-14 NOTE — TELEPHONE ENCOUNTER
Patient request a script for Alcohol swipes to use when taking blood sugars      I do not see it on patient medication list

## 2023-03-15 RX ORDER — UBIQUINOL 100 MG
CAPSULE ORAL DAILY
Qty: 100 EACH | Refills: 1 | Status: SHIPPED | OUTPATIENT
Start: 2023-03-15

## 2023-03-20 DIAGNOSIS — Z78.0 MENOPAUSE: ICD-10-CM

## 2023-03-20 DIAGNOSIS — J45.40 MODERATE PERSISTENT ASTHMA WITHOUT COMPLICATION: ICD-10-CM

## 2023-03-21 RX ORDER — ALBUTEROL SULFATE 2.5 MG/3ML
SOLUTION RESPIRATORY (INHALATION)
Qty: 75 ML | Refills: 0 | Status: SHIPPED | OUTPATIENT
Start: 2023-03-21

## 2023-03-23 ENCOUNTER — HOSPITAL ENCOUNTER (OUTPATIENT)
Dept: GASTROENTEROLOGY | Facility: HOSPITAL | Age: 58
Setting detail: OUTPATIENT SURGERY
Discharge: HOME | End: 2023-03-23
Attending: INTERNAL MEDICINE | Admitting: INTERNAL MEDICINE

## 2023-03-23 ENCOUNTER — ANESTHESIA (OUTPATIENT)
Dept: GASTROENTEROLOGY | Facility: HOSPITAL | Age: 58
End: 2023-03-23

## 2023-03-23 ENCOUNTER — ANESTHESIA EVENT (OUTPATIENT)
Dept: GASTROENTEROLOGY | Facility: HOSPITAL | Age: 58
End: 2023-03-23

## 2023-03-23 VITALS
OXYGEN SATURATION: 97 % | WEIGHT: 220 LBS | SYSTOLIC BLOOD PRESSURE: 133 MMHG | HEART RATE: 78 BPM | BODY MASS INDEX: 34.53 KG/M2 | DIASTOLIC BLOOD PRESSURE: 72 MMHG | TEMPERATURE: 97.1 F | HEIGHT: 67 IN | RESPIRATION RATE: 18 BRPM

## 2023-03-23 DIAGNOSIS — A04.8 H. PYLORI INFECTION: ICD-10-CM

## 2023-03-23 DIAGNOSIS — A04.8 HELICOBACTER PYLORI (H. PYLORI) INFECTION: ICD-10-CM

## 2023-03-23 RX ORDER — LIDOCAINE HYDROCHLORIDE 10 MG/ML
INJECTION, SOLUTION EPIDURAL; INFILTRATION; INTRACAUDAL; PERINEURAL AS NEEDED
Status: DISCONTINUED | OUTPATIENT
Start: 2023-03-23 | End: 2023-03-23

## 2023-03-23 RX ORDER — OMEPRAZOLE 40 MG/1
40 CAPSULE, DELAYED RELEASE ORAL 2 TIMES DAILY
Qty: 180 CAPSULE | Refills: 0 | Status: SHIPPED | OUTPATIENT
Start: 2023-03-23 | End: 2023-06-21

## 2023-03-23 RX ORDER — SODIUM CHLORIDE 9 MG/ML
INJECTION, SOLUTION INTRAVENOUS CONTINUOUS PRN
Status: DISCONTINUED | OUTPATIENT
Start: 2023-03-23 | End: 2023-03-23

## 2023-03-23 RX ORDER — SODIUM CHLORIDE 9 MG/ML
125 INJECTION, SOLUTION INTRAVENOUS CONTINUOUS
Status: DISCONTINUED | OUTPATIENT
Start: 2023-03-23 | End: 2023-03-27 | Stop reason: HOSPADM

## 2023-03-23 RX ORDER — PROPOFOL 10 MG/ML
INJECTION, EMULSION INTRAVENOUS AS NEEDED
Status: DISCONTINUED | OUTPATIENT
Start: 2023-03-23 | End: 2023-03-23

## 2023-03-23 RX ORDER — PROPOFOL 10 MG/ML
INJECTION, EMULSION INTRAVENOUS CONTINUOUS PRN
Status: DISCONTINUED | OUTPATIENT
Start: 2023-03-23 | End: 2023-03-23

## 2023-03-23 RX ADMIN — PROPOFOL 120 MCG/KG/MIN: 10 INJECTION, EMULSION INTRAVENOUS at 13:22

## 2023-03-23 RX ADMIN — LIDOCAINE HYDROCHLORIDE 5 ML: 10 INJECTION, SOLUTION EPIDURAL; INFILTRATION; INTRACAUDAL; PERINEURAL at 13:22

## 2023-03-23 RX ADMIN — SODIUM CHLORIDE 125 ML/HR: 0.9 INJECTION, SOLUTION INTRAVENOUS at 11:42

## 2023-03-23 RX ADMIN — LIDOCAINE HYDROCHLORIDE 5 ML: 10 INJECTION, SOLUTION EPIDURAL; INFILTRATION; INTRACAUDAL; PERINEURAL at 13:25

## 2023-03-23 RX ADMIN — SODIUM CHLORIDE: 0.9 INJECTION, SOLUTION INTRAVENOUS at 13:19

## 2023-03-23 RX ADMIN — PROPOFOL 150 MG: 10 INJECTION, EMULSION INTRAVENOUS at 13:22

## 2023-03-23 NOTE — ANESTHESIA POSTPROCEDURE EVALUATION
Post-Op Assessment Note    CV Status:  Stable  Pain Score: 0    Pain management: adequate     Mental Status:  Sleepy   Hydration Status:  Euvolemic   PONV Controlled:  Controlled   Airway Patency:  Patent      Post Op Vitals Reviewed: Yes      Staff: Anesthesiologist, CRNA         No notable events documented      BP   126/71   Temp   97 1   Pulse  85   Resp      SpO2   100 face mask

## 2023-03-23 NOTE — H&P
History and Physical - [facility]  Xu Ron 66 y o  female MRN: [id number]                  HPI: Xu Ron is a 66y o  year old female who presents for history of H  pylori, dysphagia  REVIEW OF SYSTEMS: Per the HPI, and otherwise unremarkable  Historical Information   Past Medical History:   Diagnosis Date   • Achalasia    • Asthma    • Colon polyp    • Constipation    • CPAP (continuous positive airway pressure) dependence    • Depression    • Diarrhea 2019   • Elevated LFTs     last assessed 10/25/17   • Fibromyalgia    • Gastric ulcer    • GERD (gastroesophageal reflux disease)    • H  pylori infection 10/09/2017    [facility] [id number]: Treated, f/u stool antigen negative   • Hypertension    • Hypothyroidism    • Kidney stone    • Migraines    • Migraines    • MICHAEL (obstructive sleep apnea)     last assessed 16   • Pneumonia    • Sleep apnea 2019    Uses CPAP   • Thyroid nodule     last assessed 17     Past Surgical History:   Procedure Laterality Date   • BREAST BIOPSY Right 10/31/2017    us bx   •  SECTION     • COLONOSCOPY     • ENDOMETRIAL ABLATION     • MYOTOMY HELLER LAPAROSCOPIC  2016    Dr Camille Birch, Coincident lorena fundoplication   • NASAL SINUS SURGERY     • OTHER SURGICAL HISTORY      achalasia repair   • OVARIAN CYST REMOVAL     • PILONIDAL CYST / SINUS EXCISION     • AK COLONOSCOPY FLX DX W/COLLJ SPEC WHEN PFRMD N/A 2019    Procedure: COLONOSCOPY;  Surgeon: Bharath Mcgee MD;  Location: [facility]; Service: Gastroenterology   • AK CYSTOURETHROSCOPY N/A 2017    Procedure: CYSTOSCOPY;  Surgeon: Kathrin Bentley MD;  [facility];  Service: UroGynecology          • AK ESOPHAGOGASTRODUODENOSCOPY TRANSORAL DIAGNOSTIC N/A 2017    Procedure: ESOPHAGOGASTRODUODENOSCOPY (EGD); Surgeon: Bharath Mcgee MD;  Location: [facility];   Service: Gastroenterology   • AK ESOPHAGOGASTRODUODENOSCOPY TRANSORAL DIAGNOSTIC N/A 2019    Procedure: ESOPHAGOGASTRODUODENOSCOPY (EGD); Surgeon: Chloe Ferrell MD;  Location: [facility];   Service: Gastroenterology   • VT LITHOTRIPSY XTRCORP SHOCK WAVE Left 2022    Procedure: ESWL;  Surgeon: Ran Monsivais MD;  Location: [address] [place] [address] MAIN OR;  Service: Urology   • VT POST COLPORRHAPHY RECTOCELE W/WO PERINEORRHAPHY N/A 2017    Procedure: COLPORRHAPHY POSTERIOR;  Surgeon: Anika Roberts MD;  [facility];  Service: UroGynecology          • VT SLING OPERATION STRESS INCONTINENCE N/A 2017    Procedure: INSERTION PUBOVAGINAL SLING /SINGLE INCISION ;  Surgeon: Anika Roberts MD;  [facility];  Service: UroGynecology          • TONSILLECTOMY     • TUBAL LIGATION     • UPPER GASTROINTESTINAL ENDOSCOPY     • US GUIDED BREAST BIOPSY RIGHT COMPLETE Right 10/31/2017     Social History   Social History     Substance and Sexual Activity   Alcohol Use Yes    Comment: socially - very rare social occasion     Social History     Substance and Sexual Activity   Drug Use Never     Social History     Tobacco Use   Smoking Status Former   • Packs/day: 1 50   • Years: 30 00   • Pack years: 45 00   • Types: Cigarettes   • Quit date:    • Years since quittin 2   Smokeless Tobacco Never   Tobacco Comments    pt stated quit about a month ago, last assessed 14 per [employer]     Family History   Problem Relation Age of Onset   • Cancer Mother    • Breast cancer Mother 62   • Hypertension Mother    • Stroke Father    • Diabetes Father    • Hypertension Father    • Alzheimer's disease Father    • Diabetes Sister    • BRCA1 Negative Sister    • BRCA2 Negative Sister    • No Known Problems Daughter    • No Known Problems Daughter    • Cancer Maternal Grandmother         ovarian   • Ovarian cancer Maternal Grandmother         unspecified laterality   • Cancer Maternal Grandfather    • Hypertension Maternal Grandfather    • Tongue cancer Maternal Grandfather    • Stroke Paternal Grandmother    • No Known Problems Paternal Grandfather    • Thyroid disease unspecified Brother    • Depression Brother    • Anxiety disorder Brother    • Drug abuse Brother         (pain pills)   • No Known Problems Son    • No Known Problems Son    • Thyroid disease unspecified Maternal Aunt    • Colon cancer Maternal Aunt    • No Known Problems Maternal Aunt    • No Known Problems Maternal Aunt    • No Known Problems Paternal Aunt    • No Known Problems Paternal Aunt    • No Known Problems Paternal Aunt    • No Known Problems Paternal Aunt    • No Known Problems Paternal Aunt    • No Known Problems Paternal Aunt    • Alzheimer's disease Cousin    • Breast cancer Cousin 43   • Breast cancer Family        Meds/Allergies       Current Outpatient Medications:   •  Alaway 0 025 % ophthalmic solution  •  albuterol (2 5 mg/3 mL) 0 083 % nebulizer solution  •  albuterol (PROVENTIL HFA,VENTOLIN HFA) 90 mcg/act inhaler  •  Alcohol Swabs (Alcohol Prep) 70 % PADS  •  Breo Ellipta 100-25 MCG/ACT inhaler  •  cetaphil (CETAPHIL) lotion  •  cetirizine (ZyrTEC) 10 mg tablet  •  cholecalciferol (VITAMIN D3) 1,000 units tablet  •  estrogens, conjugated (Premarin) vaginal cream  •  fexofenadine (ALLEGRA) 180 MG tablet  •  FLUoxetine (PROzac) 20 mg capsule  •  fluticasone (FLONASE) 50 mcg/act nasal spray  •  furosemide (LASIX) 20 mg tablet  •  glucose blood (FREESTYLE LITE) test strip  •  glucose monitoring kit (FREESTYLE) monitoring kit  •  ipratropium (ATROVENT) 0 02 % nebulizer solution  •  Lancets (freestyle) lancets  •  levothyroxine (Levo-T) 100 mcg tablet  •  metFORMIN (GLUCOPHAGE-XR) 500 mg 24 hr tablet  •  montelukast (SINGULAIR) 10 mg tablet  •  omeprazole (PriLOSEC) 40 MG capsule  •  pantoprazole (PROTONIX) 40 mg tablet  •  polyethylene glycol (GOLYTELY) 4000 mL solution  •  tiZANidine (ZANAFLEX) 4 mg tablet  •  venlafaxine 37 5 mg 24 hr tablet    Allergies   Allergen Reactions   • Gabapentin Other (See Comments) Annotation - [id number]: Neuropsych effects per patient  Annotation - [id number]: Neuropsych effects per patient  Annotation - [id number]: Neuropsych effects per patient  • Latex Dermatitis and Rash     Other reaction(s): Dermatitis   • Penicillins Hives       Objective     LMP 10/11/2020 (Exact Date)       PHYSICAL EXAM    Gen: NAD  Head: NCAT  CV: RRR  CHEST: Clear  ABD: soft, NT/ND  EXT: no edema      ASSESSMENT/PLAN:  This is a 66y o  year old female here for EGD/colonoscopy, and she is stable and optimized for her procedure

## 2023-03-23 NOTE — ANESTHESIA PREPROCEDURE EVALUATION
Procedure:  EGD    Relevant Problems   ANESTHESIA (within normal limits)      CARDIO   (+) Breast pain, right   (+) Headache, migraine   (+) Hypertension   (+) Intractable chronic migraine without aura and without status migrainosus      ENDO   (+) Hypothyroidism   (-) Diabetes mellitus, type 2 (HCC)      GI/HEPATIC   (+) Esophageal dysphagia   (+) Gastroesophageal reflux disease      /RENAL   (+) Left nephrolithiasis      GYN (within normal limits)      HEMATOLOGY (within normal limits)      MUSCULOSKELETAL   (+) Arthritis   (+) Chronic low back pain   (+) Spondylosis of lumbar region without myelopathy or radiculopathy      NEURO/PSYCH   (+) Anxiety   (+) Chronic low back pain   (+) Headache, migraine   (+) Intractable chronic migraine without aura and without status migrainosus   (+) Other chronic pain      PULMONARY   (+) Asthma   (+) Moderate persistent asthma with exacerbation   (+) MICHAEL (obstructive sleep apnea)      Other   (+) Bipolar 2 disorder ([facility] [address] )       TTE 12/23/2021  •  Left Ventricle: Left ventricular cavity size is normal  Wall thickness is mildly increased  The left ventricular ejection fraction is 65%  Systolic function is normal  Wall motion is normal  There is concentric remodeling  Diastolic function is normal   •  The left ventricular wall motion is normal   •  Right Ventricle: Right ventricular cavity size is normal  Systolic function is normal       Physical Exam    Airway    Mallampati score: II  TM Distance: >3 FB  Neck ROM: limited     Dental       Cardiovascular      Pulmonary      Other Findings        Anesthesia Plan  ASA Score- 3     Anesthesia Type- IV sedation with anesthesia with ASA Monitors  Additional Monitors:   Airway Plan:           Plan Factors-Exercise tolerance (METS): >4 METS  Chart reviewed  EKG reviewed  Existing labs reviewed  Patient summary reviewed  Patient is not a current smoker  Induction- intravenous      Postoperative Plan-     Informed Consent- Anesthetic plan and risks discussed with patient  I personally reviewed this patient with the CRNA  Discussed and agreed on the Anesthesia Plan with the CRNA  Bernarda Nunes

## 2023-03-27 DIAGNOSIS — K21.9 GASTROESOPHAGEAL REFLUX DISEASE WITHOUT ESOPHAGITIS: ICD-10-CM

## 2023-03-27 DIAGNOSIS — R13.19 ESOPHAGEAL DYSPHAGIA: Primary | ICD-10-CM

## 2023-03-27 NOTE — TELEPHONE ENCOUNTER
Patients GI provider:  Dr Anneliese Kurtz    Number to return call: [phone]    Reason for call: Pt called in stating that after the biopsy her stomach is burning and would like to know if something can be sent in to her ph to help  Above is her number       Scheduled procedure/appointment date if applicable: Apt/procedure NA

## 2023-03-28 RX ORDER — SUCRALFATE ORAL 1 G/10ML
1 SUSPENSION ORAL 2 TIMES DAILY PRN
Qty: 280 ML | Refills: 0 | Status: SHIPPED | OUTPATIENT
Start: 2023-03-28 | End: 2023-04-11

## 2023-03-28 NOTE — TELEPHONE ENCOUNTER
Pt feels burning sensation in upper abdomen  This pain only occurred after EGD  On 3/23  Symptoms mild  Pt takes ppi BID   Advised I would reach out to provider for carafate or other medication

## 2023-03-31 ENCOUNTER — TELEPHONE (OUTPATIENT)
Dept: INTERNAL MEDICINE CLINIC | Facility: CLINIC | Age: 58
End: 2023-03-31

## 2023-03-31 NOTE — TELEPHONE ENCOUNTER
Folder Color- Purple    Name of Form-[facility]  Pre-Op Clearance    Form to be filled out by- Dr Jesika Belcher    Form to be Faxed [phone]

## 2023-04-05 ENCOUNTER — TELEPHONE (OUTPATIENT)
Dept: GASTROENTEROLOGY | Facility: CLINIC | Age: 58
End: 2023-04-05

## 2023-04-05 NOTE — TELEPHONE ENCOUNTER
Patients GI provider:  Dr Gonzalez Doctor    Number to return call: [phone]    Reason for call: Pt calling because she is still having pain  She would like to know the results from her EGD and would also like to know if there is something she can take for the pain      Scheduled procedure/appointment date if applicable: N/A

## 2023-04-10 NOTE — TELEPHONE ENCOUNTER
Pt returned call, she would like medication sent to pharmacy for her stomach  She states it's too long to wait on the phone      [phone]

## 2023-04-11 NOTE — TELEPHONE ENCOUNTER
Spoke with [facility]- culture is still in process  Three test were sent to be processed and one is still pending  The current results are being faxed to us, however the third test (TEODORA) is still pending and should be done in a few days  Pietro Gillis from [address] [place] [address]     There was an error with pts , they are faxing us forms to edit the  and fax back to them  Spoke with pt and informed her   She is understanding and requested carafate liquid instead of pill

## 2023-04-11 NOTE — TELEPHONE ENCOUNTER
Pt called again requesting to speak with Elena regarding her EGD results  She expressed her frustration of how she has been waiting a long time on the phone for two days to get in contact but she keeps getting told she will get a call back and she hasn't yet

## 2023-04-12 PROBLEM — M54.2 NECK PAIN: Status: ACTIVE | Noted: 2023-04-12

## 2023-04-25 ENCOUNTER — CONSULT (OUTPATIENT)
Dept: INTERNAL MEDICINE CLINIC | Facility: CLINIC | Age: 58
End: 2023-04-25

## 2023-04-25 ENCOUNTER — APPOINTMENT (OUTPATIENT)
Dept: LAB | Facility: CLINIC | Age: 58
End: 2023-04-25

## 2023-04-25 VITALS
WEIGHT: 224 LBS | HEIGHT: 67 IN | SYSTOLIC BLOOD PRESSURE: 135 MMHG | BODY MASS INDEX: 35.16 KG/M2 | DIASTOLIC BLOOD PRESSURE: 81 MMHG | TEMPERATURE: 97.5 F | HEART RATE: 85 BPM

## 2023-04-25 DIAGNOSIS — E03.9 HYPOTHYROIDISM, UNSPECIFIED TYPE: ICD-10-CM

## 2023-04-25 DIAGNOSIS — Z01.818 PREOP EXAMINATION: Primary | ICD-10-CM

## 2023-04-25 DIAGNOSIS — A04.8 H. PYLORI INFECTION: Primary | ICD-10-CM

## 2023-04-25 DIAGNOSIS — H61.21 IMPACTED CERUMEN OF RIGHT EAR: ICD-10-CM

## 2023-04-25 LAB — TSH SERPL DL<=0.05 MIU/L-ACNC: 3.95 UIU/ML (ref 0.45–4.5)

## 2023-04-25 NOTE — PROGRESS NOTES
Presurgical Evaluation    Subjective:      Patient ID: Fanta Vieira is a 66 y o  female  Chief Complaint   Patient presents with   • Pre-op Clearance     Nose surgery 5/5/23       HPI  66 female patient with past medical history of HTN, hypothyroidism, smoking use disorder (quit 7 years ago) and asthma who is coming for preoperative clearance  Patient has no current complains except for right side decrease hearing and she is following with ent  Patient takes her medication regularly and her asthma is undercount  The following portions of the patient's history were reviewed and updated as appropriate: allergies, current medications, past family history, past medical history, past social history, past surgical history and problem list     Procedure date: 05/05/2023    Surgeon:  Nancy Bashir MD  Planned procedure:  possible septoplasty, bilateral inferior turbinate reduction, bilateral endoscopic ethmoidectomies, maxillary antrostomies, frontal sinusotomies, possible bilateral sphenoidotomies, brain lab and balloon  Diagnosis for procedure:  deviated septum    Prior anesthesia: Yes   General; Complications:  None / Tolerated well    CAD History: None   NOTE: Patient should see Cardiology if time available before surgery, and if appropriate  Pulmonary History: Asthma    Renal history: None    Diabetes History:  None     Neurological History: None     On Immunosuppressant meds/biologics: No      Review of Systems   Constitutional: Negative for activity change, appetite change, chills, diaphoresis, fatigue, fever and unexpected weight change  HENT: Positive for hearing loss (right side )  Negative for congestion, dental problem, drooling, ear pain, facial swelling, mouth sores, nosebleeds, rhinorrhea, sinus pain, sneezing, sore throat, tinnitus and voice change  Eyes: Negative for photophobia, pain, discharge, redness, itching and visual disturbance     Respiratory: Negative for apnea, cough, choking, chest tightness, shortness of breath, wheezing and stridor  Cardiovascular: Positive for leg swelling  Negative for chest pain and palpitations  Gastrointestinal: Negative for abdominal distention, abdominal pain, anal bleeding, blood in stool, constipation, diarrhea, rectal pain and vomiting  Endocrine: Negative for heat intolerance, polydipsia, polyphagia and polyuria  Genitourinary: Negative for difficulty urinating, dyspareunia, dysuria, enuresis, flank pain, frequency, genital sores, menstrual problem, pelvic pain, urgency, vaginal bleeding, vaginal discharge and vaginal pain  Musculoskeletal: Negative for arthralgias, back pain, gait problem, joint swelling, neck pain and neck stiffness  Skin: Negative for color change, pallor, rash and wound  Neurological: Negative for dizziness, tremors, syncope, facial asymmetry, speech difficulty, weakness, light-headedness and numbness  Hematological: Negative for adenopathy  Does not bruise/bleed easily  Psychiatric/Behavioral: Negative for agitation, behavioral problems, confusion, decreased concentration, dysphoric mood, self-injury and sleep disturbance  The patient is not nervous/anxious and is not hyperactive            Current Outpatient Medications   Medication Sig Dispense Refill   • Alaway 0 025 % ophthalmic solution Administer 1 drop to both eyes 2 (two) times a day as needed (For discomfort) 5 mL 0   • albuterol (2 5 mg/3 mL) 0 083 % nebulizer solution TAKE 3 ML (2 5 MG) BY NEBULIZATION EVERY 6 (SIX) HOURS AS NEEDED FOR WHEEZING OR SHORTNESS OF BREATH 75 mL 0   • albuterol (PROVENTIL HFA,VENTOLIN HFA) 90 mcg/act inhaler Inhale 2 puffs every 4 (four) hours as needed for wheezing or shortness of breath 18 g 1   • Alcohol Swabs (Alcohol Prep) 70 % PADS Use in the morning 100 each 1   • Breo Ellipta 100-25 MCG/ACT inhaler INHALE 1 PUFF DAILY 1 PUFF DAILY 60 each 2   • cetaphil (CETAPHIL) lotion Apply topically as needed for dry skin 236 mL 0   • cetirizine (ZyrTEC) 10 mg tablet Take 1 tablet (10 mg total) by mouth daily 90 tablet 2   • cholecalciferol (VITAMIN D3) 1,000 units tablet Take 1 tablet (1,000 Units total) by mouth daily 90 tablet 3   • estrogens, conjugated (Premarin) vaginal cream Insert 0 5 g into the vagina daily 5 g 1   • fexofenadine (ALLEGRA) 180 MG tablet      • FLUoxetine (PROzac) 20 mg capsule Take 1 capsule (20 mg total) by mouth daily 90 capsule 1   • fluticasone (FLONASE) 50 mcg/act nasal spray 1-2 sprays into each nostril daily 16 g 6   • furosemide (LASIX) 20 mg tablet Take 1 tablet (20 mg total) by mouth daily 90 tablet 3   • glucose blood (FREESTYLE LITE) test strip Use 1 each in the morning Use as directed 100 strip 8   • glucose monitoring kit (FREESTYLE) monitoring kit Use 1 each as needed (prediabetes blood sugar checks) 1 each 0   • ipratropium (ATROVENT) 0 02 % nebulizer solution      • lamoTRIgine (LaMICtal) 100 mg tablet Take 1 tablet (100 mg total) by mouth daily 45 tablet 1   • Lancets (freestyle) lancets Use as instructed 100 each 2   • levothyroxine (Levo-T) 100 mcg tablet Take 1 tablet (100 mcg total) by mouth daily 90 tablet 0   • metFORMIN (GLUCOPHAGE-XR) 500 mg 24 hr tablet Take 2 tablets (1,000 mg total) by mouth every morning 180 tablet 3   • montelukast (SINGULAIR) 10 mg tablet Take 10 mg by mouth daily  6   • omeprazole (PriLOSEC) 40 MG capsule Take 1 capsule (40 mg total) by mouth 2 (two) times a day 180 capsule 0   • sucralfate (CARAFATE) 1 g/10 mL suspension Take 10 mL (1 g total) by mouth 4 (four) times a day 1200 mL 0   • tiZANidine (ZANAFLEX) 4 mg tablet Take 4 mg by mouth daily at bedtime as needed     • venlafaxine 37 5 mg 24 hr tablet TAKE 1 TABLET BY MOUTH DAILY WITH BREAKFAST 90 tablet 1     No current facility-administered medications for this visit         Allergies on file:   Gabapentin, Latex, and Penicillins    Patient Active Problem List   Diagnosis   • Mixed incontinence   • Stress incontinence in female   • MICHAEL (obstructive sleep apnea)   • Hypothyroidism   • Irritable bowel syndrome with constipation   • Chronic low back pain   • Asthma   • Arthritis   • Gastritis   • Rectocele   • Cystocele   • Hypermobility of urethra   • Achalasia   • Bilateral edema of lower extremity   • Breast pain, right   • Chronic sinusitis with recurrent bronchitis   • Gastroesophageal reflux disease   • Headache, migraine   • Intractable chronic migraine without aura and without status migrainosus   • Breast mass   • Class 1 obesity   • Other chronic pain   • Spondylosis of lumbar region without myelopathy or radiculopathy   • Thyroiditis   • Tubular adenoma of colon   • Food allergy   • POP-Q stage 2 cystocele   • Moderate persistent asthma with exacerbation   • Prediabetes   • Need for shingles vaccine   • Discomfort of right ear   • Trochanteric bursitis of right hip   • Pain in right hip   • Numbness of toes   • Acute viral bronchitis   • Overweight   • Hashimoto's thyroiditis   • Lower abdominal pain   • Left ovarian cyst   • History of endometrial ablation   • Left nephrolithiasis   • Abdominal bloating   • Achalasia of digestive tract   • Esophageal dysphagia   • Hypertension   • Anxiety   • Insomnia   • Bipolar 2 disorder ([facility] )   • COVID-19   • Encounter for long-term (current) use of other medications   • Sebaceous cyst of right axilla   • Pelvic pain   • Neck pain        Past Medical History:   Diagnosis Date   • Achalasia    • Asthma    • Colon polyp    • Constipation    • CPAP (continuous positive airway pressure) dependence    • Depression    • Diarrhea 02/28/2019   • Elevated LFTs     last assessed 10/25/17   • Fibromyalgia    • Gastric ulcer    • GERD (gastroesophageal reflux disease)    • H  pylori infection 10/09/2017    [facility] [id number]: Treated, f/u stool antigen negative   • Hypertension    • Hypothyroidism    • Kidney stone    • Migraines    • Migraines    • MICHAEL (obstructive sleep apnea)     last assessed 16   • Pneumonia    • Sleep apnea 2019    Uses CPAP   • Thyroid nodule     last assessed 17       Past Surgical History:   Procedure Laterality Date   • BREAST BIOPSY Right 10/31/2017    us bx   •  SECTION     • COLONOSCOPY     • ENDOMETRIAL ABLATION     • MYOTOMY HELLER LAPAROSCOPIC  2016    Dr Vinny Choe, Coincident lorena fundoplication   • NASAL SINUS SURGERY     • OTHER SURGICAL HISTORY      achalasia repair   • OVARIAN CYST REMOVAL     • PILONIDAL CYST / SINUS EXCISION     • CA COLONOSCOPY FLX DX W/COLLJ SPEC WHEN PFRMD N/A 2019    Procedure: COLONOSCOPY;  Surgeon: Aaron Mohamud MD;  [facility]; Service: Gastroenterology   • CA CYSTOURETHROSCOPY N/A 2017    Procedure: CYSTOSCOPY;  Surgeon: Diony Mercado MD;  [facility];  Service: UroGynecology          • CA ESOPHAGOGASTRODUODENOSCOPY TRANSORAL DIAGNOSTIC N/A 2017    Procedure: ESOPHAGOGASTRODUODENOSCOPY (EGD); Surgeon: Aaron Mohamud MD;  [facility]: [facility]; Service: Gastroenterology   • CA ESOPHAGOGASTRODUODENOSCOPY TRANSORAL DIAGNOSTIC N/A 2019    Procedure: ESOPHAGOGASTRODUODENOSCOPY (EGD); Surgeon: Aaron Mohamud MD;  [facility];   Service: Gastroenterology   • CA LITHOTRIPSY XTRCORP SHOCK WAVE Left 2022    Procedure: ESWL;  Surgeon: Jas Diaz MD;  Location: [address] [place] [address] MAIN OR;  Service: Urology   • CA POST COLPORRHAPHY RECTOCELE W/WO PERINEORRHAPHY N/A 2017    Procedure: COLPORRHAPHY POSTERIOR;  Surgeon: Diony Mercado MD;  [facility];  Service: UroGynecology          • CA SLING OPERATION STRESS INCONTINENCE N/A 2017    Procedure: INSERTION PUBOVAGINAL SLING /SINGLE INCISION ;  Surgeon: Diony Mercado MD;  [facility];  Service: UroGynecology          • TONSILLECTOMY     • TUBAL LIGATION     • UPPER GASTROINTESTINAL ENDOSCOPY     • US GUIDED BREAST BIOPSY RIGHT COMPLETE Right 10/31/2017       Family History   Problem Relation Age of "Onset   • Cancer Mother    • Breast cancer Mother 62   • Hypertension Mother    • Stroke Father    • Diabetes Father    • Hypertension Father    • Alzheimer's disease Father    • Diabetes Sister    • BRCA1 Negative Sister    • BRCA2 Negative Sister    • No Known Problems Daughter    • No Known Problems Daughter    • Cancer Maternal Grandmother         ovarian   • Ovarian cancer Maternal Grandmother         unspecified laterality   • Cancer Maternal Grandfather    • Hypertension Maternal Grandfather    • Tongue cancer Maternal Grandfather    • Stroke Paternal Grandmother    • No Known Problems Paternal Grandfather    • Thyroid disease unspecified Brother    • Depression Brother    • Anxiety disorder Brother    • Drug abuse Brother         (pain pills)   • No Known Problems Son    • No Known Problems Son    • Thyroid disease unspecified Maternal Aunt    • Colon cancer Maternal Aunt    • No Known Problems Maternal Aunt    • No Known Problems Maternal Aunt    • No Known Problems Paternal Aunt    • No Known Problems Paternal Aunt    • No Known Problems Paternal Aunt    • No Known Problems Paternal Aunt    • No Known Problems Paternal Aunt    • No Known Problems Paternal Aunt    • Alzheimer's disease Cousin    • Breast cancer Cousin 43   • Breast cancer Family        Social History     Tobacco Use   • Smoking status: Former     Packs/day: 1 50     Years: 30 00     Pack years: 45 00     Types: Cigarettes     Quit date:      Years since quittin 3   • Smokeless tobacco: Never   • Tobacco comments:     pt stated quit about a month ago, last assessed 14 per [employer]   Vaping Use   • Vaping Use: Never used   Substance Use Topics   • Alcohol use: Not Currently   • Drug use: Never       Objective:    Vitals:    23 1006   BP: 135/81   BP Location: Left arm   Patient Position: Sitting   Cuff Size: Large   Pulse: 85   Temp: 97 5 °F (36 4 °C)   TempSrc: Temporal   Weight: 102 kg (224 lb)   Height: 5' 7\" (1 702 m) " Physical Exam  Constitutional:       General: She is not in acute distress  Appearance: Normal appearance  She is obese  She is not ill-appearing, toxic-appearing or diaphoretic  HENT:      Head: Normocephalic and atraumatic  Right Ear: Tympanic membrane, ear canal and external ear normal  There is no impacted cerumen  Left Ear: Tympanic membrane, ear canal and external ear normal  There is no impacted cerumen  Nose: Nose normal  No congestion or rhinorrhea  Mouth/Throat:      Mouth: Mucous membranes are moist       Pharynx: Oropharynx is clear  No oropharyngeal exudate or posterior oropharyngeal erythema  Eyes:      General: No scleral icterus  Right eye: No discharge  Left eye: No discharge  Conjunctiva/sclera: Conjunctivae normal    Neck:      Vascular: No carotid bruit  Cardiovascular:      Rate and Rhythm: Normal rate and regular rhythm  Pulses: Normal pulses  Heart sounds: Normal heart sounds  No murmur heard  No friction rub  No gallop  Pulmonary:      Effort: Pulmonary effort is normal  No respiratory distress  Breath sounds: Normal breath sounds  No stridor  No wheezing, rhonchi or rales  Chest:      Chest wall: No tenderness  Abdominal:      General: Abdomen is flat  Bowel sounds are normal  There is no distension  Palpations: Abdomen is soft  There is no mass  Tenderness: There is no abdominal tenderness  There is no right CVA tenderness, left CVA tenderness, guarding or rebound  Hernia: No hernia is present  Musculoskeletal:         General: No swelling, tenderness, deformity or signs of injury  Normal range of motion  Cervical back: Normal range of motion and neck supple  No rigidity or tenderness  Right lower leg: Edema present  Left lower leg: Edema present  Lymphadenopathy:      Cervical: No cervical adenopathy  Neurological:      General: No focal deficit present        Mental Status: She is alert and oriented to person, place, and time  Mental status is at baseline  Cranial Nerves: No cranial nerve deficit  Sensory: No sensory deficit  Motor: No weakness  Coordination: Coordination normal       Gait: Gait normal       Deep Tendon Reflexes: Reflexes normal    Psychiatric:         Mood and Affect: Mood normal          Behavior: Behavior normal          Thought Content: Thought content normal          Judgment: Judgment normal            Preop labs/testing available and reviewed: yes               EKG no    Echo no    Stress test/cath no    PFT/Amari no    Functional capacity: Shovel snow                          6 Mets   Pick the highest level patient can comfortably perform   4 mets or greater for surgery    RCRI  High Risk surgery? 1 Point  CAD History:         1 Point   MI; Positive Stress Test; CP due to Mi;  Nitrate Usage to control Angina; Pathologic Q wave on EKG  CHF Active:         1 Point   Pulm Edema; Paroxysmal Nocturnal Dyspnea;  Bibasilar Rales (crackles);S3; CHF on CXR  Cerebrovascular Disease (TIA or CVA):     1 Point  DM on Insulin:        1 Point  Serum Creat >2 0 mg/dl:       1 Point          Total Points: 0     Scorin: Class I, Very Low Risk (0 4%)     1: Class II, Low risk (0 9%)     2: Class III Moderate (6 6%)     3: Class IV High (>11%)      CURT Risk:  GFR:        For PCP:  If GFR>60, Hold ACE/ARB/Diuretic on the day of surgery, and NSAIDS 10 days before  If GFR<45, Consider PRE and POST op Nephrology Consult  If 46 <GFR> 59 : Has Patient had CURT in last 6 Months? no   If YES: Preop Nephrology consult   If No:  Lamaximef 26 Nephrology consult  Assessment/Plan:    Patient is medically optimized (cleared) for the planned procedure  Further testing/evaluation is not required      Postop concerns: no    Problem List Items Addressed This Visit    None         Problem List Items Addressed This Visit    None        Pre-Surgery "Instructions:   Medication Instructions   • Alaway 0 025 % ophthalmic solution per anesthesia guidelines    • albuterol (2 5 mg/3 mL) 0 083 % nebulizer solution per anesthesia guidelines    • albuterol (PROVENTIL HFA,VENTOLIN HFA) 90 mcg/act inhaler per anesthesia guidelines    • Alcohol Swabs (Alcohol Prep) 70 % PADS per anesthesia guidelines    • Breo Ellipta 100-25 MCG/ACT inhaler per anesthesia guidelines    • cetaphil (CETAPHIL) lotion per anesthesia guidelines    • cetirizine (ZyrTEC) 10 mg tablet per anesthesia guidelines    • cholecalciferol (VITAMIN D3) 1,000 units tablet per anesthesia guidelines    • estrogens, conjugated (Premarin) vaginal cream per anesthesia guidelines    • fexofenadine (ALLEGRA) 180 MG tablet per anesthesia guidelines    • FLUoxetine (PROzac) 20 mg capsule per anesthesia guidelines    • fluticasone (FLONASE) 50 mcg/act nasal spray per anesthesia guidelines    • furosemide (LASIX) 20 mg tablet Stop taking 1 days prior to surgery   • glucose blood (FREESTYLE LITE) test strip per anesthesia guidelines    • glucose monitoring kit (FREESTYLE) monitoring kit per anesthesia guidelines    • ipratropium (ATROVENT) 0 02 % nebulizer solution per anesthesia guidelines    • lamoTRIgine (LaMICtal) 100 mg tablet per anesthesia guidelines    • Lancets (freestyle) lancets per anesthesia guidelines    • levothyroxine (Levo-T) 100 mcg tablet per anesthesia guidelines    • metFORMIN (GLUCOPHAGE-XR) 500 mg 24 hr tablet per anesthesia guidelines    • montelukast (SINGULAIR) 10 mg tablet per anesthesia guidelines    • omeprazole (PriLOSEC) 40 MG capsule per anesthesia guidelines    • sucralfate (CARAFATE) 1 g/10 mL suspension per anesthesia guidelines    • tiZANidine (ZANAFLEX) 4 mg tablet per anesthesia guidelines    • venlafaxine 37 5 mg 24 hr tablet per anesthesia guidelines         NOTE: Please use the above to review important meds for your specialty, the remainder \"per anesthesia Guidelines  \"    NOTE: " "Please place an Inbasket message for \"SOC\" pool for complicated patients      "

## 2023-04-26 ENCOUNTER — TELEPHONE (OUTPATIENT)
Dept: GASTROENTEROLOGY | Facility: CLINIC | Age: 58
End: 2023-04-26

## 2023-04-26 DIAGNOSIS — A04.8 H. PYLORI INFECTION: Primary | ICD-10-CM

## 2023-04-26 NOTE — TELEPHONE ENCOUNTER
----- Message from Gabi Barksdale DO sent at 4/25/2023  3:38 PM EDT -----  Regarding: RE: H Pylori  Staff, please call patient and order 21 days of quadruple therapy for patient  I have ordered a repeat stool antigen  I have also placed a referral to infectious disease  If her stool ag is still positive after treatment she can see them  Thanks   ----- Message -----  From: Patel Akins MD  Sent: 4/25/2023   8:36 AM EDT  To: Gabi Barksdale DO  Subject: RE: H Pylori                                     May 21 days of quad   ----- Message -----  From: Gabi Barksdale DO  Sent: 4/19/2023  11:35 AM EDT  To: Patel Akins MD  Subject: RE: H Pylori                                     So I see the susceptibilities  Amoxicillin, flagyl and tetracycline should work but she is allergic to penicillins and already had quadruple therapy  So just monitor?  ----- Message -----  From: Sonia Cates RN  Sent: 4/19/2023  11:28 AM EDT  To: Patel Akins MD, Gabi Barksdale,   Subject: FW: H Pylori                                     Please look at labs under miscellaneous test and click the result there or look at media     ----- Message -----  From: Gabi Barksdale DO  Sent: 4/19/2023  11:16 AM EDT  To: Patel Akins MD, #  Subject: FW: H Pylori                                     Staff contact lab to see if sensitivities will be uploaded  Do not see it currently   ----- Message -----  From: Patel Akins MD  Sent: 4/19/2023  10:28 AM EDT  To: Gabi Barksdale DO  Subject: RE: Crist Kanner can you check with the lab to see sometimes these things can take weeks to come back  She has tried multiple therapies in the past, not sure if treating her again would be a great idea  ----- Message -----  From: Gabi Barksdale DO  Sent: 4/19/2023   9:58 AM EDT  To: Patel Akins MD  Subject: H Pylori                                         Hey what do you want to do with this lady?  The culture seems like there was scant growth so I do not think they were able to run sensitivities    ----- Message -----  From: Lab, Background User  Sent: 4/19/2023   9:28 AM EDT  To: Hermelindo Tamayo, DO

## 2023-04-27 RX ORDER — TETRACYCLINE HYDROCHLORIDE 500 MG/1
500 CAPSULE ORAL 4 TIMES DAILY
Qty: 84 CAPSULE | Refills: 0 | Status: SHIPPED | OUTPATIENT
Start: 2023-04-27 | End: 2023-05-18

## 2023-04-27 RX ORDER — CONJUGATED ESTROGENS 0.62 MG/G
CREAM VAGINAL
Qty: 30 G | Refills: 1 | Status: SHIPPED | OUTPATIENT
Start: 2023-04-27

## 2023-04-27 RX ORDER — METRONIDAZOLE 250 MG/1
250 TABLET ORAL EVERY 6 HOURS
Qty: 84 TABLET | Refills: 0 | Status: SHIPPED | OUTPATIENT
Start: 2023-04-27 | End: 2023-05-18

## 2023-04-27 RX ORDER — BISMUTH SUBSALICYLATE 262 MG/1
262 TABLET, CHEWABLE ORAL
Qty: 84 TABLET | Refills: 0 | Status: SHIPPED | OUTPATIENT
Start: 2023-04-27 | End: 2023-05-18

## 2023-04-28 ENCOUNTER — TELEPHONE (OUTPATIENT)
Dept: INTERNAL MEDICINE CLINIC | Facility: CLINIC | Age: 58
End: 2023-04-28

## 2023-04-28 DIAGNOSIS — J45.40 MODERATE PERSISTENT ASTHMA WITHOUT COMPLICATION: ICD-10-CM

## 2023-04-28 DIAGNOSIS — H61.21 IMPACTED CERUMEN OF RIGHT EAR: ICD-10-CM

## 2023-04-28 RX ORDER — ALBUTEROL SULFATE 2.5 MG/3ML
SOLUTION RESPIRATORY (INHALATION)
Qty: 75 ML | Refills: 0 | Status: SHIPPED | OUTPATIENT
Start: 2023-04-28

## 2023-04-28 RX ORDER — ALBUTEROL SULFATE 90 UG/1
2 AEROSOL, METERED RESPIRATORY (INHALATION) EVERY 4 HOURS PRN
Qty: 18 G | Refills: 1 | Status: SHIPPED | OUTPATIENT
Start: 2023-04-28

## 2023-04-28 NOTE — TELEPHONE ENCOUNTER
Form completed by Dr Dhaval Friedman and placed in [address] clerical folder to be faxed

## 2023-04-28 NOTE — TELEPHONE ENCOUNTER
Patient was prescribed carbamide peroxide (DEBROX) 6 5 % otic solution   on 4/25  Patient said that she spoke with the pharmacy and they did not receive the script  Please advise

## 2023-05-02 ENCOUNTER — TELEPHONE (OUTPATIENT)
Dept: OBGYN CLINIC | Facility: CLINIC | Age: 58
End: 2023-05-02

## 2023-05-02 LAB
LEFT EYE DIABETIC RETINOPATHY: NORMAL
RIGHT EYE DIABETIC RETINOPATHY: NORMAL

## 2023-05-06 ENCOUNTER — NURSE TRIAGE (OUTPATIENT)
Dept: OTHER | Facility: OTHER | Age: 58
End: 2023-05-06

## 2023-05-06 NOTE — TELEPHONE ENCOUNTER
"  Reason for Disposition   Health Information question, no triage required and triager able to answer question    Answer Assessment - Initial Assessment Questions  1  REASON FOR CALL or QUESTION: \"What is your reason for calling today? \" or \"How can I best help you? \" or \"What question do you have that I can help answer? \"      Had surgery yesterday with [facility], asking when okay to restart meds prescribed by GI      Protocols used: INFORMATION ONLY CALL - NO TRIAGE-ADULT-    "

## 2023-05-06 NOTE — TELEPHONE ENCOUNTER
"Regarding: medication question  ----- Message from Pradeep Erickson sent at 5/6/2023  8:39 AM EDT -----  \" I had surgery yesterday for my sinuses and I wanted to know if it's okay for me to start back taking my regular medications or do I still need to wait  \"    "

## 2023-05-06 NOTE — TELEPHONE ENCOUNTER
Recommended pt contact surgeon's office regarding medication questions  Pt verbalized understanding

## 2023-05-21 DIAGNOSIS — F31.81 BIPOLAR 2 DISORDER (HCC): ICD-10-CM

## 2023-05-22 RX ORDER — LAMOTRIGINE 100 MG/1
50 TABLET ORAL
Qty: 45 TABLET | Refills: 1 | Status: SHIPPED | OUTPATIENT
Start: 2023-05-22

## 2023-06-14 DIAGNOSIS — E55.9 VITAMIN D DEFICIENCY: ICD-10-CM

## 2023-06-14 DIAGNOSIS — A04.8 H. PYLORI INFECTION: ICD-10-CM

## 2023-06-14 DIAGNOSIS — J45.40 MODERATE PERSISTENT ASTHMA WITHOUT COMPLICATION: ICD-10-CM

## 2023-06-14 RX ORDER — MELATONIN
Qty: 90 TABLET | Refills: 3 | Status: SHIPPED | OUTPATIENT
Start: 2023-06-14

## 2023-06-14 RX ORDER — OMEPRAZOLE 40 MG/1
CAPSULE, DELAYED RELEASE ORAL
Qty: 180 CAPSULE | Refills: 0 | Status: SHIPPED | OUTPATIENT
Start: 2023-06-14

## 2023-06-14 RX ORDER — ALBUTEROL SULFATE 90 UG/1
AEROSOL, METERED RESPIRATORY (INHALATION)
Qty: 18 G | Refills: 1 | Status: SHIPPED | OUTPATIENT
Start: 2023-06-14

## 2023-06-29 ENCOUNTER — OFFICE VISIT (OUTPATIENT)
Dept: INTERNAL MEDICINE CLINIC | Facility: CLINIC | Age: 58
End: 2023-06-29

## 2023-06-29 VITALS
DIASTOLIC BLOOD PRESSURE: 81 MMHG | HEART RATE: 87 BPM | WEIGHT: 222 LBS | SYSTOLIC BLOOD PRESSURE: 124 MMHG | HEIGHT: 67 IN | BODY MASS INDEX: 34.84 KG/M2 | TEMPERATURE: 97.8 F

## 2023-06-29 DIAGNOSIS — M79.89 LEG SWELLING: ICD-10-CM

## 2023-06-29 DIAGNOSIS — R13.19 ESOPHAGEAL DYSPHAGIA: ICD-10-CM

## 2023-06-29 DIAGNOSIS — E03.9 HYPOTHYROIDISM, UNSPECIFIED TYPE: ICD-10-CM

## 2023-06-29 DIAGNOSIS — I83.813 VARICOSE VEINS OF BOTH LOWER EXTREMITIES WITH PAIN: Primary | ICD-10-CM

## 2023-06-29 DIAGNOSIS — J45.40 MODERATE PERSISTENT ASTHMA, UNSPECIFIED WHETHER COMPLICATED: ICD-10-CM

## 2023-06-29 DIAGNOSIS — R73.03 PREDIABETES: ICD-10-CM

## 2023-06-29 DIAGNOSIS — L81.9 HYPOPIGMENTED SKIN LESION: ICD-10-CM

## 2023-06-29 DIAGNOSIS — K21.9 GASTROESOPHAGEAL REFLUX DISEASE WITHOUT ESOPHAGITIS: ICD-10-CM

## 2023-06-29 DIAGNOSIS — K26.9 DUODENAL ULCER: ICD-10-CM

## 2023-06-29 DIAGNOSIS — J45.40 MODERATE PERSISTENT ASTHMA WITHOUT COMPLICATION: ICD-10-CM

## 2023-06-29 RX ORDER — FLUTICASONE FUROATE AND VILANTEROL TRIFENATATE 100; 25 UG/1; UG/1
1 POWDER RESPIRATORY (INHALATION) DAILY
Qty: 60 EACH | Refills: 2 | Status: SHIPPED | OUTPATIENT
Start: 2023-06-29

## 2023-06-29 RX ORDER — LEVOTHYROXINE SODIUM 0.1 MG/1
100 TABLET ORAL DAILY
Qty: 90 TABLET | Refills: 0 | Status: SHIPPED | OUTPATIENT
Start: 2023-06-29 | End: 2023-09-27

## 2023-06-29 RX ORDER — FUROSEMIDE 20 MG/1
20 TABLET ORAL DAILY
Qty: 90 TABLET | Refills: 3 | Status: SHIPPED | OUTPATIENT
Start: 2023-06-29

## 2023-06-29 RX ORDER — ALBUTEROL SULFATE 2.5 MG/3ML
SOLUTION RESPIRATORY (INHALATION)
Qty: 75 ML | Refills: 0 | Status: SHIPPED | OUTPATIENT
Start: 2023-06-29

## 2023-06-29 RX ORDER — ALBUTEROL SULFATE 90 UG/1
1 AEROSOL, METERED RESPIRATORY (INHALATION) EVERY 4 HOURS PRN
Qty: 18 G | Refills: 1 | Status: SHIPPED | OUTPATIENT
Start: 2023-06-29

## 2023-06-29 RX ORDER — KETOCONAZOLE 20 MG/G
CREAM TOPICAL DAILY
Qty: 15 G | Refills: 0 | Status: SHIPPED | OUTPATIENT
Start: 2023-06-29

## 2023-06-29 RX ORDER — SUCRALFATE ORAL 1 G/10ML
1 SUSPENSION ORAL 4 TIMES DAILY
Qty: 1200 ML | Refills: 0 | Status: SHIPPED | OUTPATIENT
Start: 2023-06-29 | End: 2023-07-29

## 2023-06-29 NOTE — PROGRESS NOTES
Name: Oscar Le      : 1965      MRN: [id number]  Encounter Provider: Hailey Schuster DO  Encounter Date: 2023   Encounter department: [address] [place] [address]    Assessment & Plan     1  Varicose veins of both lower extremities with pain  · Patient is complaining of persistent lower limb edema  · Patient reported that edema started years ago and she was taking frusemide as needed however she feels that the edema is getting worse  · Echo in  was normal    · Patient also complaining of bilateral varicose veins that they are painful  -     Ambulatory Referral to Vascular Surgery; Future    2  Prediabetes  -     CBC and differential; Future  -     Comprehensive metabolic panel; Future  -     HEMOGLOBIN A1C W/ EAG ESTIMATION; Future  -     Vitamin D 25 hydroxy; Future  -     Lipid Panel with Direct LDL reflex; Future    3  Hypothyroidism, unspecified type  -     TSH, 3rd generation with Free T4 reflex; Future  -     levothyroxine (Levo-T) 100 mcg tablet; Take 1 tablet (100 mcg total) by mouth daily    4  Hypopigmented skin lesion  · Patient is also complaining of hypopigmented area over the right wrist that started small and got bigger but denied itching, burning or pain  · possible fungal infection   -     Ambulatory Referral to Dermatology; Future  -     ketoconazole (NIZORAL) 2 % cream; Apply topically daily    5  Duodenal ulcer  -     H  pylori antigen, stool; Future    6  Moderate persistent asthma without complication  -     albuterol (2 5 mg/3 mL) 0 083 % nebulizer solution; TAKE 3 ML (2 5 MG) BY NEBULIZER EVERY 6 HOURS AS NEEDED FOR WHEEZING OR SHORTNESS IF BREATH  -     albuterol (PROVENTIL HFA,VENTOLIN HFA) 90 mcg/act inhaler; Inhale 1 puff every 4 (four) hours as needed for wheezing  -     Breo Ellipta 100-25 MCG/ACT inhaler; Inhale 1 puff daily Rinse mouth after use  -     ipratropium (ATROVENT) 0 02 % nebulizer solution;  Take 2 5 mL (0 5 mg total) by nebulization 2 (two) times a day    7  Esophageal dysphagia  -     sucralfate (CARAFATE) 1 g/10 mL suspension; Take 10 mL (1 g total) by mouth 4 (four) times a day    8  Gastroesophageal reflux disease without esophagitis  -     sucralfate (CARAFATE) 1 g/10 mL suspension; Take 10 mL (1 g total) by mouth 4 (four) times a day    9  Leg swelling  · complaining of persistent lower limb edema  · Patient reported that edema started years ago and she was taking frusemide as needed however she feels that the edema is getting worse  · Echo in 2021 was normal    · Patient also complaining of bilateral varicose veins that they are painful    -     furosemide (LASIX) 20 mg tablet; Take 1 tablet (20 mg total) by mouth daily             Subjective   65 yo female patient with past medical history of asthma, fibromyalgia, duodenal ulcers, GERD, hypothyroidism and HTN who came in to the clinic complaining of persistent lower limb edema  Patient reported that edema started years ago and she was taking frusemide as needed however she feels that the edema is getting worse  Echo in 2021 was normal  Patient also complaining of bilateral varicose veins that they are painful  Patient is also complaining of hypopigmented area over the right wrist that started small and got bigger but denied itching, burning or pain  HPI  Review of Systems   Constitutional: Positive for fatigue  Negative for activity change, appetite change, chills, diaphoresis, fever and unexpected weight change  HENT: Negative for congestion, dental problem, ear discharge, ear pain, hearing loss, nosebleeds, postnasal drip, rhinorrhea, sinus pressure, sinus pain, sneezing, sore throat and trouble swallowing  Eyes: Negative for photophobia, pain, discharge, redness, itching and visual disturbance  Respiratory: Positive for shortness of breath and wheezing  Negative for apnea, cough, choking, chest tightness and stridor      Cardiovascular: Positive for leg swelling  Negative for chest pain and palpitations  Gastrointestinal: Negative for abdominal distention, abdominal pain, anal bleeding, blood in stool, constipation, diarrhea, nausea, rectal pain and vomiting  Endocrine: Negative for cold intolerance, heat intolerance, polydipsia, polyphagia and polyuria  Genitourinary: Negative for difficulty urinating, dyspareunia, dysuria, enuresis, flank pain, frequency, genital sores, pelvic pain and urgency  Musculoskeletal: Negative for arthralgias, back pain, gait problem, joint swelling and neck stiffness  Skin: Positive for color change  Negative for pallor, rash and wound  Neurological: Negative for tremors, seizures, facial asymmetry, speech difficulty, weakness, light-headedness and numbness  Psychiatric/Behavioral: Positive for sleep disturbance  Negative for agitation, behavioral problems, confusion, decreased concentration, hallucinations, self-injury and suicidal ideas  The patient is not nervous/anxious and is not hyperactive          Past Medical History:   Diagnosis Date   • Achalasia    • Asthma    • Colon polyp    • Constipation    • CPAP (continuous positive airway pressure) dependence    • Depression    • Diarrhea 2019   • Elevated LFTs     last assessed 10/25/17   • Fibromyalgia    • Gastric ulcer    • GERD (gastroesophageal reflux disease)    • H  pylori infection 10/09/2017    [facility] - [id number]: Treated, f/u stool antigen negative   • Hypertension    • Hypothyroidism    • Kidney stone    • Migraines    • Migraines    • MICHAEL (obstructive sleep apnea)     last assessed 16   • Pneumonia    • Sleep apnea 2019    Uses CPAP   • Thyroid nodule     last assessed 17     Past Surgical History:   Procedure Laterality Date   • BREAST BIOPSY Right 10/31/2017    us bx   •  SECTION     • COLONOSCOPY     • ENDOMETRIAL ABLATION     • MYOTOMY HELLER LAPAROSCOPIC  2016    Dr Kyleigh Corbin, Coincident lorena fundoplication   • NASAL SINUS SURGERY     • OTHER SURGICAL HISTORY      achalasia repair   • OVARIAN CYST REMOVAL     • PILONIDAL CYST / SINUS EXCISION     • OH COLONOSCOPY FLX DX W/COLLJ SPEC WHEN PFRMD N/A 04/25/2019    Procedure: COLONOSCOPY;  Surgeon: Candi Braga MD;  Location: [facility]; Service: Gastroenterology   • OH CYSTOURETHROSCOPY N/A 01/19/2017    Procedure: CYSTOSCOPY;  Surgeon: Chan Bledsoe MD;  Location[facility];  Service: UroGynecology          • OH ESOPHAGOGASTRODUODENOSCOPY TRANSORAL DIAGNOSTIC N/A 09/07/2017    Procedure: ESOPHAGOGASTRODUODENOSCOPY (EGD); Surgeon: Candi Braga MD;  Location:  GI LAB; Service: Gastroenterology   • OH ESOPHAGOGASTRODUODENOSCOPY TRANSORAL DIAGNOSTIC N/A 04/25/2019    Procedure: ESOPHAGOGASTRODUODENOSCOPY (EGD); Surgeon: Candi Braga MD;  Location: [facility];   Service: Gastroenterology   • OH LITHOTRIPSY XTRCORP SHOCK WAVE Left 05/26/2022    Procedure: ESWL;  Surgeon: Diana Atwood MD;  Location: [address] [place] [address] MAIN OR;  Service: Urology   • OH POST COLPORRHAPHY RECTOCELE W/WO PERINEORRHAPHY N/A 01/19/2017    Procedure: COLPORRHAPHY POSTERIOR;  Surgeon: Chan Bledsoe MD;  [facility];  Service: UroGynecology          • OH SLING OPERATION STRESS INCONTINENCE N/A 01/19/2017    Procedure: INSERTION PUBOVAGINAL SLING /SINGLE INCISION ;  Surgeon: Chan Bledsoe MD;  [facility];  Service: UroGynecology          • TONSILLECTOMY     • TUBAL LIGATION     • UPPER GASTROINTESTINAL ENDOSCOPY     • US GUIDED BREAST BIOPSY RIGHT COMPLETE Right 10/31/2017     Family History   Problem Relation Age of Onset   • Cancer Mother    • Breast cancer Mother 62   • Hypertension Mother    • Stroke Father    • Diabetes Father    • Hypertension Father    • Alzheimer's disease Father    • Diabetes Sister    • BRCA1 Negative Sister    • BRCA2 Negative Sister    • No Known Problems Daughter    • No Known Problems Daughter    • Cancer Maternal Grandmother         ovarian   • Ovarian cancer Maternal Grandmother         unspecified laterality   • Cancer Maternal Grandfather    • Hypertension Maternal Grandfather    • Tongue cancer Maternal Grandfather    • Stroke Paternal Grandmother    • No Known Problems Paternal Grandfather    • Thyroid disease unspecified Brother    • Depression Brother    • Anxiety disorder Brother    • Drug abuse Brother         (pain pills)   • No Known Problems Son    • No Known Problems Son    • Thyroid disease unspecified Maternal Aunt    • Colon cancer Maternal Aunt    • No Known Problems Maternal Aunt    • No Known Problems Maternal Aunt    • No Known Problems Paternal Aunt    • No Known Problems Paternal Aunt    • No Known Problems Paternal Aunt    • No Known Problems Paternal Aunt    • No Known Problems Paternal Aunt    • No Known Problems Paternal Aunt    • Alzheimer's disease Cousin    • Breast cancer Cousin 43   • Breast cancer Family      Social History     Socioeconomic History   • Marital status: Single     Spouse name: None   • Number of children: 4   • Years of education: None   • Highest education level: None   Occupational History   • Occupation: Disability since 2019-- due to a job injury    Tobacco Use   • Smoking status: Former     Packs/day: 1 50     Years: 30 00     Total pack years: 45 00     Types: Cigarettes     Quit date:      Years since quittin 4   • Smokeless tobacco: Never   • Tobacco comments:     pt stated quit about a month ago, last assessed 14 per [employer]   Vaping Use   • Vaping Use: Never used   Substance and Sexual Activity   • Alcohol use: Not Currently   • Drug use: Never   • Sexual activity: Not Currently     Partners: Male     Birth control/protection: Female Sterilization     Comment: Boyfriend/father of 4 children   Other Topics Concern   • None   Social History Narrative    Home: Lives with boyfriend/father of her 4 children and one of their daughters        Children: 2 sons born 80 and 18,  2 daughters born 0 and 2003        Education:     Social Determinants of Health     Financial Resource Strain: Low Risk  (3/10/2023)    Overall Financial Resource Strain (CARDIA)    • Difficulty of Paying Living Expenses: Not hard at all   Food Insecurity: No Food Insecurity (3/10/2023)    Hunger Vital Sign    • Worried About Running Out of Food in the Last Year: Never true    • Ran Out of Food in the Last Year: Never true   Transportation Needs: No Transportation Needs (3/10/2023)    PRAPARE - Transportation    • Lack of Transportation (Medical): No    • Lack of Transportation (Non-Medical):  No   Physical Activity: Inactive (6/9/2021)    Exercise Vital Sign    • Days of Exercise per Week: 0 days    • Minutes of Exercise per Session: 0 min   Stress: Not on file   Social Connections: Not on file   Intimate Partner Violence: Not on file   Housing Stability: Low Risk  (8/22/2022)    Housing Stability Vital Sign    • Unable to Pay for Housing in the Last Year: No    • Number of [place] in the Last Year: 1    • Unstable Housing in the Last Year: No     Current Outpatient Medications on File Prior to Visit   Medication Sig   • [place] 0 025 % ophthalmic solution Administer 1 drop to both eyes 2 (two) times a day as needed (For discomfort)   • Alcohol Swabs (Alcohol Prep) 70 % PADS Use in the morning   • carbamide peroxide (DEBROX) 6 5 % otic solution Administer 5 drops into both ears 2 (two) times a day   • cetaphil (CETAPHIL) lotion Apply topically as needed for dry skin   • cholecalciferol (VITAMIN D3) 1,000 units tablet TAKE 1 TABLET BY MOUTH EVERY DAY   • fexofenadine (ALLEGRA) 180 MG tablet    • FLUoxetine (PROzac) 20 mg capsule Take 1 capsule (20 mg total) by mouth daily   • fluticasone (FLONASE) 50 mcg/act nasal spray 1-2 sprays into each nostril daily   • glucose monitoring kit (FREESTYLE) monitoring kit Use 1 each as needed (prediabetes blood sugar checks)   • Lancets (freestyle) lancets Use as instructed   • metFORMIN (GLUCOPHAGE-XR) 500 mg 24 hr tablet Take 2 tablets (1,000 mg total) by mouth every morning   • omeprazole (PriLOSEC) 40 MG capsule TOME BOO CAPSULA DOS VECES AL LARRY   • Premarin vaginal cream INSERT 0 5 GRAMS INTO THE VAGINA A DIARIO   • [DISCONTINUED] albuterol (2 5 mg/3 mL) 0 083 % nebulizer solution TAKE 3 ML (2 5 MG) BY NEBULIZER EVERY 6 HOURS AS NEEDED FOR WHEEZING OR SHORTNESS IF BREATH   • [DISCONTINUED] albuterol (PROVENTIL HFA,VENTOLIN HFA) 90 mcg/act inhaler INHALE DANDO DOS SOPLIDOS CADA CUATRO HORAS CUANDO SEA NECESARIO FOR WHEEZING OR SHORTNESS OF BREATH   • [DISCONTINUED] Breo Ellipta 100-25 MCG/ACT inhaler INHALE 1 PUFF DAILY 1 PUFF DAILY   • [DISCONTINUED] cetirizine (ZyrTEC) 10 mg tablet Take 1 tablet (10 mg total) by mouth daily   • [DISCONTINUED] furosemide (LASIX) 20 mg tablet Take 1 tablet (20 mg total) by mouth daily   • [DISCONTINUED] glucose blood (FREESTYLE LITE) test strip Use 1 each in the morning Use as directed   • [DISCONTINUED] ipratropium (ATROVENT) 0 02 % nebulizer solution    • [DISCONTINUED] levothyroxine (Levo-T) 100 mcg tablet Take 1 tablet (100 mcg total) by mouth daily   • [DISCONTINUED] sucralfate (CARAFATE) 1 g/10 mL suspension Take 10 mL (1 g total) by mouth 4 (four) times a day   • [DISCONTINUED] tiZANidine (ZANAFLEX) 4 mg tablet Take 4 mg by mouth daily at bedtime as needed   • [DISCONTINUED] lamoTRIgine (LaMICtal) 100 mg tablet Take 0 5 tablets (50 mg total) by mouth daily at bedtime (Patient not taking: Reported on 6/29/2023)   • [DISCONTINUED] montelukast (SINGULAIR) 10 mg tablet Take 10 mg by mouth daily (Patient not taking: Reported on 6/29/2023)   • [DISCONTINUED] venlafaxine 37 5 mg 24 hr tablet TAKE 1 TABLET BY MOUTH DAILY WITH BREAKFAST (Patient not taking: Reported on 6/29/2023)     Allergies   Allergen Reactions   • Gabapentin Other (See Comments)     Annotation - [id number]: Neuropsych effects per patient    Annotation - [id number]: Neuropsych effects per "patient  Annotation - [id number]: Neuropsych effects per patient  • Latex Dermatitis and Rash     Other reaction(s): Dermatitis   • Penicillins Hives     Immunization History   Administered Date(s) Administered   • COVID-19 PFIZER VACCINE 0 3 ML IM 05/18/2021, 06/08/2021   • INFLUENZA 11/07/2006, 11/15/2014, 11/01/2017, 12/10/2018, 01/14/2019, 10/01/2019, 12/03/2020   • Influenza Injectable, MDCK, Preservative Free, Quadrivalent, 0 5 mL 01/14/2019   • Influenza Quadrivalent Preservative Free 3 years and older IM 11/06/2017   • Influenza, injectable, quadrivalent, preservative free 0 5 mL 12/03/2020   • Influenza, recombinant, quadrivalent,injectable, preservative free 11/17/2021   • Influenza, seasonal, injectable 10/23/2015   • Pneumococcal Conjugate Vaccine 20-valent (Pcv20), Polysace 09/16/2022   • Pneumococcal Polysaccharide PPV23 01/28/2016   • Tdap 05/22/2015   • Zoster 12/03/2020   • Zoster Vaccine Recombinant 12/03/2020, 03/03/2021       Objective     /81 (BP Location: Right arm, Patient Position: Sitting, Cuff Size: Large)   Pulse 87   Temp 97 8 °F (36 6 °C) (Temporal)   Ht 5' 7\" (1 702 m)   Wt 101 kg (222 lb)   LMP 10/11/2019   BMI 34 77 kg/m²     Physical Exam  Constitutional:       General: She is not in acute distress  Appearance: She is obese  She is not ill-appearing, toxic-appearing or diaphoretic  HENT:      Head: Normocephalic and atraumatic  Nose: Nose normal  No congestion or rhinorrhea  Mouth/Throat:      Mouth: Mucous membranes are moist       Pharynx: No oropharyngeal exudate or posterior oropharyngeal erythema  Eyes:      General: No scleral icterus  Right eye: No discharge  Left eye: No discharge  Conjunctiva/sclera: Conjunctivae normal       Pupils: Pupils are equal, round, and reactive to light  Neck:      Vascular: No carotid bruit  Cardiovascular:      Rate and Rhythm: Normal rate and regular rhythm  Pulses: Normal pulses        " Heart sounds: Normal heart sounds  No murmur heard  No friction rub  No gallop  Pulmonary:      Effort: Pulmonary effort is normal  No respiratory distress  Breath sounds: Normal breath sounds  No stridor  No wheezing, rhonchi or rales  Chest:      Chest wall: No tenderness  Abdominal:      General: Abdomen is flat  Bowel sounds are normal  There is no distension  Palpations: Abdomen is soft  There is no mass  Tenderness: There is no abdominal tenderness  There is no right CVA tenderness, left CVA tenderness, guarding or rebound  Hernia: No hernia is present  Musculoskeletal:         General: No swelling, tenderness, deformity or signs of injury  Normal range of motion  Cervical back: Normal range of motion  No rigidity or tenderness  Right lower leg: Edema present  Left lower leg: Edema present  Lymphadenopathy:      Cervical: No cervical adenopathy  Skin:     General: Skin is warm  Coloration: Skin is not jaundiced or pale  Findings: Lesion (hypopigmented lesion of the wrist ) present  No bruising or erythema  Neurological:      General: No focal deficit present  Mental Status: She is alert and oriented to person, place, and time  Cranial Nerves: No cranial nerve deficit  Sensory: No sensory deficit  Motor: No weakness  Coordination: Coordination normal    Psychiatric:         Mood and Affect: Mood normal          Behavior: Behavior normal          Thought Content:  Thought content normal          Judgment: Judgment normal        Marble More, DO

## 2023-07-20 ENCOUNTER — LAB (OUTPATIENT)
Dept: LAB | Facility: CLINIC | Age: 58
End: 2023-07-20
Payer: MEDICARE

## 2023-07-20 DIAGNOSIS — E03.9 HYPOTHYROIDISM, UNSPECIFIED TYPE: ICD-10-CM

## 2023-07-20 DIAGNOSIS — R73.03 PREDIABETES: ICD-10-CM

## 2023-07-20 DIAGNOSIS — K26.9 DUODENAL ULCER: ICD-10-CM

## 2023-07-20 LAB
25(OH)D3 SERPL-MCNC: 25.4 NG/ML (ref 30–100)
ALBUMIN SERPL BCP-MCNC: 3.8 G/DL (ref 3.5–5)
ALP SERPL-CCNC: 154 U/L (ref 46–116)
ALT SERPL W P-5'-P-CCNC: 36 U/L (ref 12–78)
ANION GAP SERPL CALCULATED.3IONS-SCNC: 5 MMOL/L
AST SERPL W P-5'-P-CCNC: 18 U/L (ref 5–45)
BASOPHILS # BLD AUTO: 0.08 THOUSANDS/ÂΜL (ref 0–0.1)
BASOPHILS NFR BLD AUTO: 1 % (ref 0–1)
BILIRUB SERPL-MCNC: 0.49 MG/DL (ref 0.2–1)
BUN SERPL-MCNC: 14 MG/DL (ref 5–25)
CALCIUM SERPL-MCNC: 9.2 MG/DL (ref 8.3–10.1)
CHLORIDE SERPL-SCNC: 108 MMOL/L (ref 96–108)
CHOLEST SERPL-MCNC: 216 MG/DL
CO2 SERPL-SCNC: 25 MMOL/L (ref 21–32)
CREAT SERPL-MCNC: 0.79 MG/DL (ref 0.6–1.3)
EOSINOPHIL # BLD AUTO: 0.18 THOUSAND/ÂΜL (ref 0–0.61)
EOSINOPHIL NFR BLD AUTO: 2 % (ref 0–6)
ERYTHROCYTE [DISTWIDTH] IN BLOOD BY AUTOMATED COUNT: 13.1 % (ref 11.6–15.1)
EST. AVERAGE GLUCOSE BLD GHB EST-MCNC: 120 MG/DL
GFR SERPL CREATININE-BSD FRML MDRD: 83 ML/MIN/1.73SQ M
GLUCOSE P FAST SERPL-MCNC: 99 MG/DL (ref 65–99)
HBA1C MFR BLD: 5.8 %
HCT VFR BLD AUTO: 45.2 % (ref 34.8–46.1)
HDLC SERPL-MCNC: 42 MG/DL
HGB BLD-MCNC: 14.2 G/DL (ref 11.5–15.4)
IMM GRANULOCYTES # BLD AUTO: 0.02 THOUSAND/UL (ref 0–0.2)
IMM GRANULOCYTES NFR BLD AUTO: 0 % (ref 0–2)
LDLC SERPL CALC-MCNC: 157 MG/DL (ref 0–100)
LYMPHOCYTES # BLD AUTO: 2.45 THOUSANDS/ÂΜL (ref 0.6–4.47)
LYMPHOCYTES NFR BLD AUTO: 33 % (ref 14–44)
MCH RBC QN AUTO: 27.7 PG (ref 26.8–34.3)
MCHC RBC AUTO-ENTMCNC: 31.4 G/DL (ref 31.4–37.4)
MCV RBC AUTO: 88 FL (ref 82–98)
MONOCYTES # BLD AUTO: 0.88 THOUSAND/ÂΜL (ref 0.17–1.22)
MONOCYTES NFR BLD AUTO: 12 % (ref 4–12)
NEUTROPHILS # BLD AUTO: 3.75 THOUSANDS/ÂΜL (ref 1.85–7.62)
NEUTS SEG NFR BLD AUTO: 52 % (ref 43–75)
NRBC BLD AUTO-RTO: 0 /100 WBCS
PLATELET # BLD AUTO: 403 THOUSANDS/UL (ref 149–390)
PMV BLD AUTO: 9.7 FL (ref 8.9–12.7)
POTASSIUM SERPL-SCNC: 4.1 MMOL/L (ref 3.5–5.3)
PROT SERPL-MCNC: 7.8 G/DL (ref 6.4–8.4)
RBC # BLD AUTO: 5.13 MILLION/UL (ref 3.81–5.12)
SODIUM SERPL-SCNC: 138 MMOL/L (ref 135–147)
TRIGL SERPL-MCNC: 87 MG/DL
TSH SERPL DL<=0.05 MIU/L-ACNC: 0.79 UIU/ML (ref 0.45–4.5)
WBC # BLD AUTO: 7.36 THOUSAND/UL (ref 4.31–10.16)

## 2023-07-20 PROCEDURE — 80053 COMPREHEN METABOLIC PANEL: CPT

## 2023-07-20 PROCEDURE — 84443 ASSAY THYROID STIM HORMONE: CPT

## 2023-07-20 PROCEDURE — 85025 COMPLETE CBC W/AUTO DIFF WBC: CPT

## 2023-07-20 PROCEDURE — 36415 COLL VENOUS BLD VENIPUNCTURE: CPT

## 2023-07-20 PROCEDURE — 80061 LIPID PANEL: CPT

## 2023-07-20 PROCEDURE — 82306 VITAMIN D 25 HYDROXY: CPT

## 2023-07-20 PROCEDURE — 83036 HEMOGLOBIN GLYCOSYLATED A1C: CPT

## 2023-07-21 ENCOUNTER — APPOINTMENT (OUTPATIENT)
Dept: LAB | Facility: CLINIC | Age: 58
End: 2023-07-21
Payer: MEDICARE

## 2023-07-21 PROCEDURE — 87338 HPYLORI STOOL AG IA: CPT

## 2023-07-22 LAB — H PYLORI AG STL QL IA: NEGATIVE

## 2023-07-25 ENCOUNTER — TELEPHONE (OUTPATIENT)
Dept: INTERNAL MEDICINE CLINIC | Facility: CLINIC | Age: 58
End: 2023-07-25

## 2023-07-25 ENCOUNTER — OFFICE VISIT (OUTPATIENT)
Dept: INTERNAL MEDICINE CLINIC | Facility: CLINIC | Age: 58
End: 2023-07-25

## 2023-07-25 VITALS
SYSTOLIC BLOOD PRESSURE: 132 MMHG | DIASTOLIC BLOOD PRESSURE: 81 MMHG | BODY MASS INDEX: 33.9 KG/M2 | WEIGHT: 216 LBS | HEART RATE: 88 BPM | TEMPERATURE: 98 F | HEIGHT: 67 IN

## 2023-07-25 DIAGNOSIS — H61.21 IMPACTED CERUMEN OF RIGHT EAR: ICD-10-CM

## 2023-07-25 DIAGNOSIS — M54.41 CHRONIC RIGHT-SIDED LOW BACK PAIN WITH RIGHT-SIDED SCIATICA: ICD-10-CM

## 2023-07-25 DIAGNOSIS — I99.8 VASCULAR INSUFFICIENCY: Primary | ICD-10-CM

## 2023-07-25 DIAGNOSIS — E78.5 HYPERLIPIDEMIA, UNSPECIFIED HYPERLIPIDEMIA TYPE: ICD-10-CM

## 2023-07-25 DIAGNOSIS — G89.29 CHRONIC RIGHT-SIDED LOW BACK PAIN WITH RIGHT-SIDED SCIATICA: ICD-10-CM

## 2023-07-25 RX ORDER — METHOCARBAMOL 500 MG/1
500 TABLET, FILM COATED ORAL 4 TIMES DAILY PRN
Qty: 28 TABLET | Refills: 1 | Status: SHIPPED | OUTPATIENT
Start: 2023-07-25 | End: 2023-08-08

## 2023-07-25 RX ORDER — ATORVASTATIN CALCIUM 20 MG/1
20 TABLET, FILM COATED ORAL DAILY
Qty: 90 TABLET | Refills: 0 | Status: SHIPPED | OUTPATIENT
Start: 2023-07-25 | End: 2023-10-23

## 2023-07-25 NOTE — TELEPHONE ENCOUNTER
Patient called to get lab results done 7/20/23    Please have PCP read the labs and call the patient with the results.     thanks

## 2023-07-26 NOTE — PROGRESS NOTES
Name: Celsa Navarrete      : 1965      MRN: [id number]  Encounter Provider: Hazel Holbrook MD  Encounter Date: 2023   Encounter department: [address]     1. Vascular insufficiency  Patient with history of bilateral lower limb venous insufficiency  Mild varicose veins without any skin changes / ulcerations  Patient using compression stockings regularly  Patient was advised vascular surgery referral last visit; patient unable to obtain appointment and asking for renewed referral    -     Ambulatory Referral to Vascular Surgery; Future    2. Hyperlipidemia, unspecified hyperlipidemia type  The 10-year ASCVD risk score (Laury BARKLEY, et al., 2019) is: 8.2%    Values used to calculate the score:      Age: 66 years      Sex: Female      Is Non- : No      Diabetic: Yes      Tobacco smoker: No      Systolic Blood Pressure: 104 mmHg      Is BP treated: Yes      HDL Cholesterol: 42 mg/dL      Total Cholesterol: 216 mg/dL    -     atorvastatin (LIPITOR) 20 mg tablet; Take 1 tablet (20 mg total) by mouth daily    3. Chronic right-sided low back pain with right-sided sciatica  Patient with history of chronic lower back pain secondary to disc prolapse  Using Lidocaine patches and heating pads  Done physical therapy in the past but doesn't think it helped  Today presents for same day visit for acute on chronic right sided back pain with radiation to right leg  On exam tenderness over the right lower back with muscle spasm  No spine tenderness, limb weakness, saddle anesthesia, bowel/bladder incontinence    Will provide muscle relaxants to get through acute phase  No chronic NSAID use    -     methocarbamol (ROBAXIN) 500 mg tablet; Take 1 tablet (500 mg total) by mouth 4 (four) times a day as needed for muscle spasms for up to 14 days    4.  Impacted cerumen of right ear  Patient with recurrent impacted cerumen  Requesting debrox refill    - carbamide peroxide (DEBROX) 6.5 % otic solution; Administer 5 drops into both ears 2 (two) times a day         Marshall Herrera is a 58/F with history of chronic lower back pain secondary to disc prolapse. Using Lidocaine patches and heating pads. Done physical therapy in the past but doesn't think it helped. Today presents for same day visit for acute on chronic right sided back pain with radiation to right leg. No spine tenderness, limb weakness, saddle anesthesia, bowel/bladder incontinence. Review of Systems   Constitutional: Positive for activity change. Negative for appetite change, chills, fatigue, fever and unexpected weight change. HENT: Negative for congestion and dental problem. Eyes: Negative for photophobia, redness and visual disturbance. Respiratory: Negative for cough, chest tightness, shortness of breath and wheezing. Cardiovascular: Positive for leg swelling. Negative for chest pain and palpitations. Endocrine: Negative. Genitourinary: Negative. Musculoskeletal: Positive for back pain. Negative for gait problem. Neurological: Negative. Psychiatric/Behavioral: Negative.         Past Medical History:   Diagnosis Date   • Achalasia    • Asthma    • Colon polyp    • Constipation    • CPAP (continuous positive airway pressure) dependence    • Depression    • Diarrhea 2019   • Elevated LFTs     last assessed 10/25/17   • Fibromyalgia    • Gastric ulcer    • GERD (gastroesophageal reflux disease)    • H. pylori infection 10/09/2017    [facility] [id number]: Treated, f/u stool antigen negative   • Hypertension    • Hypothyroidism    • Kidney stone    • Migraines    • Migraines    • MICHAEL (obstructive sleep apnea)     last assessed 16   • Pneumonia    • Sleep apnea 2019    Uses CPAP   • Thyroid nodule     last assessed 17     Past Surgical History:   Procedure Laterality Date   • BREAST BIOPSY Right 10/31/2017    us bx   •  SECTION     • COLONOSCOPY     • ENDOMETRIAL ABLATION     • MYOTOMY HELLER LAPAROSCOPIC  01/06/2016    Dr. Sumi Prado, Coincident lorena fundoplication   • NASAL SINUS SURGERY     • OTHER SURGICAL HISTORY      achalasia repair   • OVARIAN CYST REMOVAL     • PILONIDAL CYST / SINUS EXCISION     • MT COLONOSCOPY FLX DX W/COLLJ SPEC WHEN PFRMD N/A 04/25/2019    Procedure: COLONOSCOPY;  Surgeon: Avani Monroe MD;  [facility]; Service: Gastroenterology   • MT CYSTOURETHROSCOPY N/A 01/19/2017    Procedure: CYSTOSCOPY;  Surgeon: Juan Carlos West MD;  [facility];  Service: UroGynecology          • MT ESOPHAGOGASTRODUODENOSCOPY TRANSORAL DIAGNOSTIC N/A 09/07/2017    Procedure: ESOPHAGOGASTRODUODENOSCOPY (EGD); Surgeon: Avani Monroe MD;  [facility]: [facility]; Service: Gastroenterology   • MT ESOPHAGOGASTRODUODENOSCOPY TRANSORAL DIAGNOSTIC N/A 04/25/2019    Procedure: ESOPHAGOGASTRODUODENOSCOPY (EGD); Surgeon: Avnai Monroe MD;  Location: [facility];   Service: Gastroenterology   • MT LITHOTRIPSY XTRCORP SHOCK WAVE Left 05/26/2022    Procedure: ESWL;  Surgeon: Steve Bhakta MD;  Location: [address] [place] [address] MAIN OR;  Service: Urology   • MT POST COLPORRHAPHY RECTOCELE W/WO PERINEORRHAPHY N/A 01/19/2017    Procedure: COLPORRHAPHY POSTERIOR;  Surgeon: Juan Carlos West MD;  [facility];  Service: UroGynecology          • MT SLING OPERATION STRESS INCONTINENCE N/A 01/19/2017    Procedure: INSERTION PUBOVAGINAL SLING /SINGLE INCISION ;  Surgeon: Juan Carlos West MD;  [facility];  Service: UroGynecology          • TONSILLECTOMY     • TUBAL LIGATION     • UPPER GASTROINTESTINAL ENDOSCOPY     • US GUIDED BREAST BIOPSY RIGHT COMPLETE Right 10/31/2017     Family History   Problem Relation Age of Onset   • Cancer Mother    • Breast cancer Mother 62   • Hypertension Mother    • Stroke Father    • Diabetes Father    • Hypertension Father    • Alzheimer's disease Father    • Diabetes Sister    • BRCA1 Negative Sister    • BRCA2 Negative Sister    • No Known Problems Daughter    • No Known Problems Daughter    • Cancer Maternal Grandmother         ovarian   • Ovarian cancer Maternal Grandmother         unspecified laterality   • Cancer Maternal Grandfather    • Hypertension Maternal Grandfather    • Tongue cancer Maternal Grandfather    • Stroke Paternal Grandmother    • No Known Problems Paternal Grandfather    • Thyroid disease unspecified Brother    • Depression Brother    • Anxiety disorder Brother    • Drug abuse Brother         (pain pills)   • No Known Problems Son    • No Known Problems Son    • Thyroid disease unspecified Maternal Aunt    • Colon cancer Maternal Aunt    • No Known Problems Maternal Aunt    • No Known Problems Maternal Aunt    • No Known Problems Paternal Aunt    • No Known Problems Paternal Aunt    • No Known Problems Paternal Aunt    • No Known Problems Paternal Aunt    • No Known Problems Paternal Aunt    • No Known Problems Paternal Aunt    • Alzheimer's disease Cousin    • Breast cancer Cousin 43   • Breast cancer Family      Social History     Socioeconomic History   • Marital status: Single     Spouse name: None   • Number of children: 4   • Years of education: None   • Highest education level: None   Occupational History   • Occupation: Disability since 2019-- due to a job injury    Tobacco Use   • Smoking status: Former     Packs/day: 1.50     Years: 30.00     Total pack years: 45.00     Types: Cigarettes     Quit date: 2016     Years since quittin.5   • Smokeless tobacco: Never   • Tobacco comments:     pt stated quit about a month ago, last assessed 14 per [employer]   Vaping Use   • Vaping Use: Never used   Substance and Sexual Activity   • Alcohol use: Not Currently   • Drug use: Never   • Sexual activity: Not Currently     Partners: Male     Birth control/protection: Female Sterilization     Comment: Boyfriend/father of 4 children   Other Topics Concern   • None   Social History Narrative    Home: Lives with boyfriend/father of her 4 children and one of their daughters        Children: 2 sons born 80 and 18,  2 daughters born 0 and 2003        Education:     Social Determinants of Health     Financial Resource Strain: Low Risk  (3/10/2023)    Overall Financial Resource Strain (CARDIA)    • Difficulty of Paying Living Expenses: Not hard at all   Food Insecurity: No Food Insecurity (3/10/2023)    Hunger Vital Sign    • Worried About Running Out of Food in the Last Year: Never true    • Ran Out of Food in the Last Year: Never true   Transportation Needs: No Transportation Needs (3/10/2023)    PRAPARE - Transportation    • Lack of Transportation (Medical): No    • Lack of Transportation (Non-Medical): No   Physical Activity: Inactive (6/9/2021)    Exercise Vital Sign    • Days of Exercise per Week: 0 days    • Minutes of Exercise per Session: 0 min   Stress: Not on file   Social Connections: Not on file   Intimate Partner Violence: Not on file   Housing Stability: Low Risk  (8/22/2022)    Housing Stability Vital Sign    • Unable to Pay for Housing in the Last Year: No    • Number of Places Lived in the Last Year: 1    • Unstable Housing in the Last Year: No     Current Outpatient Medications on File Prior to Visit   Medication Sig   • Alaway 0.025 % ophthalmic solution Administer 1 drop to both eyes 2 (two) times a day as needed (For discomfort)   • albuterol (2.5 mg/3 mL) 0.083 % nebulizer solution TAKE 3 ML (2.5 MG) BY NEBULIZER EVERY 6 HOURS AS NEEDED FOR WHEEZING OR SHORTNESS IF BREATH   • albuterol (PROVENTIL HFA,VENTOLIN HFA) 90 mcg/act inhaler Inhale 1 puff every 4 (four) hours as needed for wheezing   • Alcohol Swabs (Alcohol Prep) 70 % PADS Use in the morning   • Breo Ellipta 100-25 MCG/ACT inhaler Inhale 1 puff daily Rinse mouth after use.    • cetaphil (CETAPHIL) lotion Apply topically as needed for dry skin   • cholecalciferol (VITAMIN D3) 1,000 units tablet TAKE 1 TABLET BY MOUTH EVERY DAY   • fexofenadine (ALLEGRA) 180 MG tablet    • FLUoxetine (PROzac) 20 mg capsule Take 1 capsule (20 mg total) by mouth daily   • fluticasone (FLONASE) 50 mcg/act nasal spray 1-2 sprays into each nostril daily   • furosemide (LASIX) 20 mg tablet Take 1 tablet (20 mg total) by mouth daily   • glucose monitoring kit (FREESTYLE) monitoring kit Use 1 each as needed (prediabetes blood sugar checks)   • ipratropium (ATROVENT) 0.02 % nebulizer solution Take 2.5 mL (0.5 mg total) by nebulization 2 (two) times a day   • ketoconazole (NIZORAL) 2 % cream Apply topically daily   • Lancets (freestyle) lancets Use as instructed   • levothyroxine (Levo-T) 100 mcg tablet Take 1 tablet (100 mcg total) by mouth daily   • metFORMIN (GLUCOPHAGE-XR) 500 mg 24 hr tablet Take 2 tablets (1,000 mg total) by mouth every morning   • omeprazole (PriLOSEC) 40 MG capsule TOME BOO CAPSULA DOS VECES AL LARRY   • Premarin vaginal cream INSERT 0.5 GRAMS INTO THE VAGINA A DIARIO   • sucralfate (CARAFATE) 1 g/10 mL suspension Take 10 mL (1 g total) by mouth 4 (four) times a day     Allergies   Allergen Reactions   • Gabapentin Other (See Comments)     Annotation - [id number]: Neuropsych effects per patient. Annotation - [id number]: Neuropsych effects per patient. Annotation - [id number]: Neuropsych effects per patient.    • Latex Dermatitis and Rash     Other reaction(s): Dermatitis   • Penicillins Hives     Immunization History   Administered Date(s) Administered   • COVID-19 PFIZER VACCINE 0.3 ML IM 05/18/2021, 06/08/2021   • INFLUENZA 11/07/2006, 11/15/2014, 11/01/2017, 12/10/2018, 01/14/2019, 10/01/2019, 12/03/2020   • Influenza Injectable, MDCK, Preservative Free, Quadrivalent, 0.5 mL 01/14/2019   • Influenza Quadrivalent Preservative Free 3 years and older IM 11/06/2017   • Influenza, injectable, quadrivalent, preservative free 0.5 mL 12/03/2020   • Influenza, recombinant, quadrivalent,injectable, preservative free 11/17/2021   • Influenza, seasonal, injectable 10/23/2015   • Pneumococcal Conjugate Vaccine 20-valent (Pcv20), Polysace 09/16/2022   • Pneumococcal Polysaccharide PPV23 01/28/2016   • Tdap 05/22/2015   • Zoster 12/03/2020   • Zoster Vaccine Recombinant 12/03/2020, 03/03/2021       Objective     /81 (BP Location: Right arm, Patient Position: Sitting, Cuff Size: Adult)   Pulse 88   Temp 98 °F (36.7 °C) (Temporal)   Ht 5' 7" (1.702 m)   Wt 98 kg (216 lb)   LMP 10/11/2019   BMI 33.83 kg/m²     Physical Exam  Vitals reviewed. Constitutional:       General: She is not in acute distress. Appearance: Normal appearance. HENT:      Head: Normocephalic and atraumatic. Mouth/Throat:      Mouth: Mucous membranes are moist.   Eyes:      Pupils: Pupils are equal, round, and reactive to light. Cardiovascular:      Rate and Rhythm: Normal rate. Pulses: Normal pulses. Heart sounds: Normal heart sounds. Pulmonary:      Effort: Pulmonary effort is normal.      Breath sounds: Normal breath sounds. Abdominal:      General: Abdomen is flat. Bowel sounds are normal.      Palpations: Abdomen is soft. Musculoskeletal:         General: Tenderness (right lower back with muscle spasm) present. Right lower leg: Edema present. Left lower leg: Edema present. Skin:     General: Skin is warm. Capillary Refill: Capillary refill takes less than 2 seconds. Coloration: Skin is not jaundiced. Findings: No rash. Neurological:      General: No focal deficit present. Mental Status: She is alert and oriented to person, place, and time.    Psychiatric:         Mood and Affect: Mood normal.         Behavior: Behavior normal.       Zachariah Rawls MD   PGY-2, Internal Medicine  [address] [place] [address]

## 2023-07-28 ENCOUNTER — TELEPHONE (OUTPATIENT)
Dept: HEMATOLOGY ONCOLOGY | Facility: CLINIC | Age: 58
End: 2023-07-28

## 2023-07-28 ENCOUNTER — OFFICE VISIT (OUTPATIENT)
Dept: OBGYN CLINIC | Facility: CLINIC | Age: 58
End: 2023-07-28

## 2023-07-28 VITALS
WEIGHT: 212.8 LBS | BODY MASS INDEX: 33.33 KG/M2 | SYSTOLIC BLOOD PRESSURE: 121 MMHG | HEART RATE: 91 BPM | DIASTOLIC BLOOD PRESSURE: 85 MMHG

## 2023-07-28 DIAGNOSIS — Z80.9 FAMILY HISTORY OF CANCER: Primary | ICD-10-CM

## 2023-07-28 PROCEDURE — 99213 OFFICE O/P EST LOW 20 MIN: CPT | Performed by: OBSTETRICS & GYNECOLOGY

## 2023-07-28 NOTE — TELEPHONE ENCOUNTER
Discussed wit Dr. Enmanuel Muhammad who saw her in the office. He reviewed these results with her in the office at her recent visit.

## 2023-07-28 NOTE — TELEPHONE ENCOUNTER
I called Jose Gill to schedule a new patient appointment with the Cancer Risk and Genetics Program.      Outcome:  Genetics appointment scheduled for 1/16 10:30 am     Personal/Family History Related to Appointment:   Fhx Colon ca (M. Aunt)   FHx Ovarian ca (m. granmother)  FHx Breast ca (mother) (Nora Villarreal)  No personal h/o ca   Non Hinduism    History of Genetic Testing:  Patient reports no personal or family history of genetic testing    Genetics Family History Questionnaire:  I confirmed the patient's e-mail on file as the best e-mail to send an invite link for our genetics family history intake

## 2023-07-28 NOTE — PROGRESS NOTES
Urogynecology - Follow-up clinic note  Ruth Dwyer   [id number]      HPI: Ruth Dwyer is a 66 y.o. Isabelle File presents as follow-up for symptoms of prolapse. She was initially seen by me in 2022. At that time, we had discussed surgical options. She continues to be symptomatic, stating that she feels a bulge all day long and has to reduce it several times per day. She also feels as though she's "always open", ever since having children. Her surgical history is notable for two prior endometrial ablations, one  section, 3 vaginal deliveries, a tubal ligation, ovarian cystectomy, and a pubovaginal sling and posterior colporrhaphy. Her medical history is significant nephrolithiasis, hypertension, IBS, hypothyroidism, achalasia, bipolar disorder and depression.      The patient has minimal JAME since having a sling placed in . Following her surgery, she represented with symptomatic anterior bulge. An anterior colporrhaphy was planned but the patient never re-presented for care.      The patient has not had vaginal bleeding since her second endometrial ablation. She has been having vasomotor symptoms for about 6 months. However, she is not interested in HRT due to a family history of breast cancer.       Prolapse symptoms  Bulge: yes - feels all of the time, "out all day"  Pressure: yes    Rubbing on clothing: yes    Stenting for urine: no  Stenting for stool: yes  Pessary use: yes, in the past, but she hated it   Hormonal replacement therapy: yes  Duration: 3 month(s)      Urinary symptoms  Urgency: yes    Frequency: yes 15 / day  Incontinence with stress (exercise, valsalva, laugh, sneeze, cough): yes only rarely when she coughs a lot   Incontinence with urge: yes  Postural incontinence: sometimes  Nocturia:yes 3 / night  Dysuria: no   Hematuria:no   Incomplete emptying: no   Slow stream:no   Hesitancy: yes - when other people are around   Straining with urination: yes  Insensible losses: no Drinks: water, no coffee, no juices, no soda      Defecatory symptoms  Constipation:yes   Frequency:  BM q3-4days, but now every 2 days   Urgency: no   Fecal Incontinence: no  Gas incontinence:  no   Loose stools:  no   Bowel regimen:  yes drinks prune juice, Miralax      OB History   P4 3 / 1CS   Lacerations yes   OVD no   Largest baby 7lb      Past Medical History:   Diagnosis Date   • Achalasia    • Asthma    • Colon polyp    • Constipation    • CPAP (continuous positive airway pressure) dependence    • Depression    • Diarrhea 2019   • Elevated LFTs     last assessed 10/25/17   • Fibromyalgia    • Gastric ulcer    • GERD (gastroesophageal reflux disease)    • H. pylori infection 10/09/2017    [facility] [id number]: Treated, f/u stool antigen negative   • Hypertension    • Hypothyroidism    • Kidney stone    • Migraines    • Migraines    • MICHAEL (obstructive sleep apnea)     last assessed 16   • Pneumonia    • Sleep apnea 2019    Uses CPAP   • Thyroid nodule     last assessed 17       Past Surgical History:   Procedure Laterality Date   • BREAST BIOPSY Right 10/31/2017    us bx   •  SECTION     • COLONOSCOPY     • ENDOMETRIAL ABLATION     • MYOTOMY HELLER LAPAROSCOPIC  2016    Dr. Ludmila Barron, Coincident lorena fundoplication   • NASAL SINUS SURGERY     • OTHER SURGICAL HISTORY      achalasia repair   • OVARIAN CYST REMOVAL     • PILONIDAL CYST / SINUS EXCISION     • [place] COLONOSCOPY FLX DX W/COLLJ SPEC WHEN PFRMD N/A 2019    Procedure: COLONOSCOPY;  Surgeon: Cr Westbrook MD;  [facility]; Service: Gastroenterology   • KY CYSTOURETHROSCOPY N/A 2017    Procedure: CYSTOSCOPY;  Surgeon: Naomy Tejeda MD;  [facility];  Service: UroGynecology          • [place] ESOPHAGOGASTRODUODENOSCOPY TRANSORAL DIAGNOSTIC N/A 2017    Procedure: ESOPHAGOGASTRODUODENOSCOPY (EGD); Surgeon: Cr Westbrook MD;  [facility];   Service: Gastroenterology   • [place] ESOPHAGOGASTRODUODENOSCOPY TRANSORAL DIAGNOSTIC N/A 04/25/2019    Procedure: ESOPHAGOGASTRODUODENOSCOPY (EGD); Surgeon: Yris Weaver MD;  [facility]; Service: Gastroenterology   • UT LITHOTRIPSY XTRCORP SHOCK WAVE Left 05/26/2022    Procedure: ESWL;  Surgeon: Harry Morris MD;  Location: [address] [place] [address] [place] MAIN OR;  Service: Urology   • UT POST COLPORRHAPHY RECTOCELE W/WO PERINEORRHAPHY N/A 01/19/2017    Procedure: COLPORRHAPHY POSTERIOR;  Surgeon: Merry Warner MD;  [facility];  Service: UroGynecology          • [place] SLING OPERATION STRESS INCONTINENCE N/A 01/19/2017    Procedure: INSERTION PUBOVAGINAL SLING /SINGLE INCISION ;  Surgeon: Merry Warner MD;  [facility];  Service: UroGynecology          • TONSILLECTOMY     • TUBAL LIGATION     • UPPER GASTROINTESTINAL ENDOSCOPY     • US GUIDED BREAST BIOPSY RIGHT COMPLETE Right 10/31/2017         Physical exam:    Physical Exam  Constitutional:       General: She is not in acute distress. Appearance: She is obese. She is not ill-appearing, toxic-appearing or diaphoretic. Genitourinary:      Anterior and posterior vaginal prolapse present. Moderate vaginal atrophy present. Pulmonary:      Effort: No respiratory distress. Neurological:      General: No focal deficit present. Mental Status: She is alert and oriented to person, place, and time. Psychiatric:         Mood and Affect: Mood normal.         Behavior: Behavior normal.         Thought Content: Thought content normal.         Judgment: Judgment normal.        Vulvovaginal atrophy:  yes moderate  Urethral caruncle:  no        Aa: 0, Ba: 0, C: -7     gH: 4.5, pB: 3, TVL: 10     Ap: +1, Bp: +1, D: -9    The patient states that her prolapse is much worse than what is visualized on exam. She states that it often comes beyond the introitus. Assessment:  66 y.o. P4 with overactive bladder, Grade 2 cystocele and Grade 2 Rectocele. Plan:      We reviewed management options for her prolapse. The patient shared that she has a family history of breast and ovarian cancer. She was referred for genetic counseling given her family history of breast and ovarian cancer. Pending results of her testing, she will either have hysterectomy with sacrocolpoexy or sacrospinous ligament fixation. Follow up in 3-4 months pending genetic testing  Patient will require medical clearance and labs.      Susan Hurley MD

## 2023-08-14 ENCOUNTER — TELEPHONE (OUTPATIENT)
Dept: CCU | Facility: HOSPITAL | Age: 58
End: 2023-08-14

## 2023-08-14 DIAGNOSIS — E78.5 HYPERLIPEMIA: Primary | ICD-10-CM

## 2023-08-14 NOTE — TELEPHONE ENCOUNTER
I called the patient and updated her regarding her high lipid results. She mentioned that she ran off her atorvastatin.  Patient needs to repeat her lipid profile and A1c in 3 months

## 2023-08-15 ENCOUNTER — TELEPHONE (OUTPATIENT)
Dept: OTHER | Facility: HOSPITAL | Age: 58
End: 2023-08-15

## 2023-08-15 DIAGNOSIS — Q44.1 GALLBLADDER ANOMALY: Primary | ICD-10-CM

## 2023-08-15 NOTE — TELEPHONE ENCOUNTER
Ordered Repeat US for yearly follow-up of gallbladder polyps. Did try and attempt to call phone number but voicemail appears to be different person.   We will send clinical message to clinic staff to attempt to reach out to patient about follow-up ultrasound

## 2023-08-15 NOTE — TELEPHONE ENCOUNTER
Called patient to explain that the provider ordered repeat US for yearly follow-up of gallbladder polyps.  Patient understood and did not have any questions

## 2023-08-16 ENCOUNTER — CONSULT (OUTPATIENT)
Dept: MULTI SPECIALTY CLINIC | Facility: CLINIC | Age: 58
End: 2023-08-16

## 2023-08-16 VITALS
WEIGHT: 212 LBS | DIASTOLIC BLOOD PRESSURE: 84 MMHG | BODY MASS INDEX: 33.2 KG/M2 | HEART RATE: 88 BPM | SYSTOLIC BLOOD PRESSURE: 124 MMHG | TEMPERATURE: 98.2 F | OXYGEN SATURATION: 98 %

## 2023-08-16 DIAGNOSIS — R60.0 BILATERAL EDEMA OF LOWER EXTREMITY: ICD-10-CM

## 2023-08-16 DIAGNOSIS — I99.8 VASCULAR INSUFFICIENCY: Primary | ICD-10-CM

## 2023-08-16 NOTE — PROGRESS NOTES
Office Visit - General Surgery  Ravindra Garrido MRN: [id number]  Encounter: [id number]    Assessment and Plan  Problem List Items Addressed This Visit        Cardiovascular and Mediastinum    Vascular insufficiency - Primary    Relevant Orders    Compression Stocking    VAS reflux lower limb venous duplex study with reflux assessment, complete bilateral       Other    Bilateral edema of lower extremity     - Discussed pathophysiology as well as signs and symptoms of venous insufficiency   - Discussed multifactorial nature of her lower extremity swelling and pain   - recommend compression stockings, leg elevation, as well as obtaining a LE venous reflux study  - Can follow-up in office after ultrasound is completed             Chief Complaint:  Ravindra Garrido is a 66 y.o. female who presents for Consult    Subjective     Patient states has lower extremity heaviness, swelling in both legs. It is worse in the afternoon and after she has been standing. Denies wounds or bleeding. Does have compression stockings but does not wear everyday. Denies prior DVT/PE, MI, CVA, or cancer. Has a history of smoking but stopped 8yrs prior.      Past Medical History:   Diagnosis Date   • Achalasia    • Asthma    • Colon polyp    • Constipation    • CPAP (continuous positive airway pressure) dependence    • Depression    • Diarrhea 2019   • Elevated LFTs     last assessed 10/25/17   • Fibromyalgia    • Gastric ulcer    • GERD (gastroesophageal reflux disease)    • H. pylori infection 10/09/2017    [facility] - [id number]: Treated, f/u stool antigen negative   • Hypertension    • Hypothyroidism    • Kidney stone    • Migraines    • Migraines    • MICHAEL (obstructive sleep apnea)     last assessed 16   • Pneumonia    • Sleep apnea 2019    Uses CPAP   • Thyroid nodule     last assessed 17       Past Surgical History:   Procedure Laterality Date   • BREAST BIOPSY Right 10/31/2017    us bx   •  SECTION     • COLONOSCOPY     • ENDOMETRIAL ABLATION     • MYOTOMY HELLER LAPAROSCOPIC  01/06/2016    Dr. Emery Car, Coincident lorena fundoplication   • NASAL SINUS SURGERY     • OTHER SURGICAL HISTORY      achalasia repair   • OVARIAN CYST REMOVAL     • PILONIDAL CYST / SINUS EXCISION     • ME COLONOSCOPY FLX DX W/COLLJ SPEC WHEN PFRMD N/A 04/25/2019    Procedure: COLONOSCOPY;  Surgeon: Shaheen Maldonado MD;  Location: [facility]; Service: Gastroenterology   • ME CYSTOURETHROSCOPY N/A 01/19/2017    Procedure: CYSTOSCOPY;  Surgeon: Heike Freitas MD;  Location: [facility];  Service: UroGynecology          • ME ESOPHAGOGASTRODUODENOSCOPY TRANSORAL DIAGNOSTIC N/A 09/07/2017    Procedure: ESOPHAGOGASTRODUODENOSCOPY (EGD); Surgeon: Shaheen Maldonado MD;  Location:  GI LAB; Service: Gastroenterology   • ME ESOPHAGOGASTRODUODENOSCOPY TRANSORAL DIAGNOSTIC N/A 04/25/2019    Procedure: ESOPHAGOGASTRODUODENOSCOPY (EGD); Surgeon: Shaheen Maldonado MD;  Location: [facility];   Service: Gastroenterology   • ME LITHOTRIPSY XTRCORP SHOCK WAVE Left 05/26/2022    Procedure: ESWL;  Surgeon: Erendira Valdovinos MD;  Location: [facility] MAIN OR;  Service: Urology   • ME POST COLPORRHAPHY RECTOCELE W/WO PERINEORRHAPHY N/A 01/19/2017    Procedure: COLPORRHAPHY POSTERIOR;  Surgeon: Heike Freitas MD;  [facility];  Service: UroGynecology          • ME SLING OPERATION STRESS INCONTINENCE N/A 01/19/2017    Procedure: INSERTION PUBOVAGINAL SLING /SINGLE INCISION ;  Surgeon: Heike Freitas MD;  [facility];  Service: UroGynecology          • TONSILLECTOMY     • TUBAL LIGATION     • UPPER GASTROINTESTINAL ENDOSCOPY     • US GUIDED BREAST BIOPSY RIGHT COMPLETE Right 10/31/2017       Family History   Problem Relation Age of Onset   • Cancer Mother    • Breast cancer Mother 62   • Hypertension Mother    • Stroke Father    • Diabetes Father    • Hypertension Father    • Alzheimer's disease Father    • Diabetes Sister    • BRCA1 Negative Sister    • BRCA2 Negative Sister    • No Known Problems Daughter    • No Known Problems Daughter    • Cancer Maternal Grandmother         ovarian   • Ovarian cancer Maternal Grandmother         unspecified laterality   • Cancer Maternal Grandfather    • Hypertension Maternal Grandfather    • Tongue cancer Maternal Grandfather    • Stroke Paternal Grandmother    • No Known Problems Paternal Grandfather    • Thyroid disease unspecified Brother    • Depression Brother    • Anxiety disorder Brother    • Drug abuse Brother         (pain pills)   • No Known Problems Son    • No Known Problems Son    • Thyroid disease unspecified Maternal Aunt    • Colon cancer Maternal Aunt    • No Known Problems Maternal Aunt    • No Known Problems Maternal Aunt    • No Known Problems Paternal Aunt    • No Known Problems Paternal Aunt    • No Known Problems Paternal Aunt    • No Known Problems Paternal Aunt    • No Known Problems Paternal Aunt    • No Known Problems Paternal Aunt    • Alzheimer's disease Cousin    • Breast cancer Cousin 43   • Breast cancer Family        Social History     Tobacco Use   • Smoking status: Former     Packs/day: 1.50     Years: 30.00     Total pack years: 45.00     Types: Cigarettes     Quit date:      Years since quittin.6   • Smokeless tobacco: Never   • Tobacco comments:     pt stated quit about a month ago, last assessed 14 per [employer]   Vaping Use   • Vaping Use: Never used   Substance Use Topics   • Alcohol use: Not Currently   • Drug use: Never        Medications  Current Outpatient Medications on File Prior to Visit   Medication Sig Dispense Refill   • Alaway 0.025 % ophthalmic solution Administer 1 drop to both eyes 2 (two) times a day as needed (For discomfort) 5 mL 0   • albuterol (2.5 mg/3 mL) 0.083 % nebulizer solution TAKE 3 ML (2.5 MG) BY NEBULIZER EVERY 6 HOURS AS NEEDED FOR WHEEZING OR SHORTNESS IF BREATH 75 mL 0   • albuterol (PROVENTIL HFA,VENTOLIN HFA) 90 mcg/act inhaler Inhale 1 puff every 4 (four) hours as needed for wheezing 18 g 1   • Alcohol Swabs (Alcohol Prep) 70 % PADS Use in the morning 100 each 1   • atorvastatin (LIPITOR) 20 mg tablet Take 1 tablet (20 mg total) by mouth daily 90 tablet 0   • Breo Ellipta 100-25 MCG/ACT inhaler Inhale 1 puff daily Rinse mouth after use.  60 each 2   • carbamide peroxide (DEBROX) 6.5 % otic solution Administer 5 drops into both ears 2 (two) times a day 15 mL 0   • cetaphil (CETAPHIL) lotion Apply topically as needed for dry skin 236 mL 0   • cholecalciferol (VITAMIN D3) 1,000 units tablet TAKE 1 TABLET BY MOUTH EVERY DAY 90 tablet 3   • fexofenadine (ALLEGRA) 180 MG tablet      • FLUoxetine (PROzac) 20 mg capsule Take 1 capsule (20 mg total) by mouth daily 90 capsule 1   • fluticasone (FLONASE) 50 mcg/act nasal spray 1-2 sprays into each nostril daily 16 g 6   • furosemide (LASIX) 20 mg tablet Take 1 tablet (20 mg total) by mouth daily 90 tablet 3   • glucose monitoring kit (FREESTYLE) monitoring kit Use 1 each as needed (prediabetes blood sugar checks) 1 each 0   • ipratropium (ATROVENT) 0.02 % nebulizer solution Take 2.5 mL (0.5 mg total) by nebulization 2 (two) times a day 5 mL 2   • ketoconazole (NIZORAL) 2 % cream Apply topically daily 15 g 0   • Lancets (freestyle) lancets Use as instructed 100 each 2   • levothyroxine (Levo-T) 100 mcg tablet Take 1 tablet (100 mcg total) by mouth daily 90 tablet 0   • metFORMIN (GLUCOPHAGE-XR) 500 mg 24 hr tablet Take 2 tablets (1,000 mg total) by mouth every morning 180 tablet 3   • methocarbamol (ROBAXIN) 500 mg tablet Take 1 tablet (500 mg total) by mouth 4 (four) times a day as needed for muscle spasms for up to 14 days 28 tablet 1   • omeprazole (PriLOSEC) 40 MG capsule TOME BOO CAPSULA DOS VECES AL LARRY 180 capsule 0   • Premarin vaginal cream INSERT 0.5 GRAMS INTO THE VAGINA A DIARIO 30 g 1   • sucralfate (CARAFATE) 1 g/10 mL suspension Take 10 mL (1 g total) by mouth 4 (four) times a day 1200 mL 0     No current facility-administered medications on file prior to visit. Allergies  Allergies   Allergen Reactions   • Gabapentin Other (See Comments)     Annotation - [id number]: Neuropsych effects per patient. Annotation - [id number]: Neuropsych effects per patient. Annotation - [id number]: Neuropsych effects per patient. • Latex Dermatitis and Rash     Other reaction(s): Dermatitis   • Penicillins Hives       Review of Systems   All other systems reviewed and are negative. Objective  Vitals:    08/16/23 1323   BP: 124/84   Pulse: 88   Temp: 98.2 °F (36.8 °C)   SpO2: 98%       Physical Exam  Vitals reviewed. Constitutional:       Appearance: She is obese. She is not ill-appearing or toxic-appearing. HENT:      Head: Normocephalic. Cardiovascular:      Rate and Rhythm: Normal rate and regular rhythm. Comments: B/L DP/PT palpable   Pulmonary:      Effort: Pulmonary effort is normal. No respiratory distress. Abdominal:      General: There is no distension. Palpations: Abdomen is soft. Tenderness: There is no abdominal tenderness. Musculoskeletal:         General: No swelling, tenderness or signs of injury. Right lower leg: Edema present. Left lower leg: Edema present. Skin:     Capillary Refill: Capillary refill takes less than 2 seconds. Neurological:      General: No focal deficit present. Mental Status: She is alert and oriented to person, place, and time. Mental status is at baseline.

## 2023-08-16 NOTE — ASSESSMENT & PLAN NOTE
- Discussed pathophysiology as well as signs and symptoms of venous insufficiency   - Discussed multifactorial nature of her lower extremity swelling and pain   - recommend compression stockings, leg elevation, as well as obtaining a LE venous reflux study  - Can follow-up in office after ultrasound is completed

## 2023-08-26 DIAGNOSIS — E03.9 HYPOTHYROIDISM, UNSPECIFIED TYPE: ICD-10-CM

## 2023-08-28 RX ORDER — LEVOTHYROXINE SODIUM 0.1 MG/1
TABLET ORAL
Qty: 90 TABLET | Refills: 3 | Status: SHIPPED | OUTPATIENT
Start: 2023-08-28 | End: 2023-09-21 | Stop reason: SDUPTHER

## 2023-08-28 NOTE — PSYCH
MEDICATION MANAGEMENT NOTE        [facility] [facility]      Name and Date of Birth:  Ellen Barrett 66 y.o. 1965    Date of Visit: August 30, 2023    HPI:    Ellen Barrett is here for medication review with primary c/o / Area of need:  "I'm doing the same thing I'm doing before, no problem, no nothing."  Pt continues her Christianity participation, bible school, and prayer at home. Pt has no specific vacation plans and enjoys time at home with family. Pt presently denies depression, SI, HI, anxiety, panic attacks or manic or psychotic Sxs. Pt reports compliance to psychiatric medications without SE. Appetite Changes and Sleep: normal sleep, normal appetite, normal energy level    Review Of Systems:      Constitutional negative   ENT negative   Cardiovascular negative   Respiratory negative   Gastrointestinal negative   Genitourinary negative   Musculoskeletal negative   Integumentary negative   Neurological negative   Endocrine negative   Other Symptoms none, all other systems are negative       Past Psychiatric History:   As copied from my 4/12/2023 note with updates as needed:  "  [ Pt born in [place] and came to the [facility] with her family in approx 1989 and grew up with biological parents, 1 year younger sister and a younger brother.  Pt describes her upbringing as "Good, good dad, good mom. Bandar Schroeder were raised with Sabianism. "   Everyone in the family got along and Pt has positive memories of growing up with them.     Depression started in 2010 at work when she sustained a life changing back injury, for which she became permanently disabled.  Mood Sxs: sadness, crying, self-isolating, anhedonia, less motivated, feelings of hopelessness and helplessness.  But no SI.                   Manic Sxs:  decreased need for sleep , rapid speech  and flight of ideas/racing thoughts  Racy thoughts, elevated energy     Anxiety started in 2010 due to the work injury:  excessive worry more days than not for longer than 3 months and The Sxs can occur without concommittent depressive Sxs.  No other Sxs of BRIGITTE.     Panic attacks started in late 8/2020 initially triggered by driving through a town she was not familiar with. Teresa Shelton has had a few in total with Sxs of:  Moderate anxiety, sweaty hands,  palpitations/racing heart, trembling, shortness of breath       Social Anxiety symptoms: no symptoms suggestive of social anxiety Eating Disorder symptoms: no historical or current eating disorder. no binge eating disorder; no anorexia nervosa. no symptoms of bulimia      OCD type Sxs: checking door locks, windows, stove, pipes in the basement (which used to leak).  Cleans often, can be very fastidious and wash her hands excessively.       Pt denies any h/o PTSD or psychotic Sxs.     Prior psychiatrists:  [facility] from approx 2014 - 11/2019 --stopped going due to change in insurance     Prior psychotherapists:   First therapist was at [facility] in [facility] and he left so she only saw him for a year  Second one was seen from [id number] at [address] [place] [address]  Pt denied h/o SI, HI, self-injurious behaviors, violent behaviors, psychiatric hospitalizations, ECT, legal or  Hx     Prior Rx trials: Viibryd 40mg (helped), Bupropion ER/XL 150mg (helped), Aripiprazole 5mg (helped but caused Wt gain), Ziprasidone 40mg (heart palpitations), Gabapentin 300mg (agitated),Topamax (for migraines but stopped due to ineffectiveness and her h/o nephrolithiasis), Hydroxyzine 50mg (sedating), Doxepin up to 50mg (for sleep--oversedating even at 25mg), Clonazepam 0.5mg--??used/helped.      Abuse Hx:  Pt denies any h/o sexual, physical, or emotional abuse     Trauma Hx:  When she lost her job after her work injury                   ] "       Past Medical History:    Past Medical History:   Diagnosis Date   • Achalasia    • Asthma    • Colon polyp    • Constipation    • CPAP (continuous positive airway pressure) dependence    • Depression    • Diarrhea 2019   • Elevated LFTs     last assessed 10/25/17   • Fibromyalgia    • Gastric ulcer    • GERD (gastroesophageal reflux disease)    • H. pylori infection 10/09/2017    [facility] [id number]: Treated, f/u stool antigen negative   • Hypertension    • Hypothyroidism    • Kidney stone    • Migraines    • Migraines    • MICHAEL (obstructive sleep apnea)     last assessed 16   • Pneumonia    • Sleep apnea 2019    Uses CPAP   • Thyroid nodule     last assessed 17       Substance Abuse History:    Social History     Substance and Sexual Activity   Alcohol Use Not Currently     Social History     Substance and Sexual Activity   Drug Use Never       Social History:    Social History     Socioeconomic History   • Marital status: Single     Spouse name: Not on file   • Number of children: 4   • Years of education: Not on file   • Highest education level: Not on file   Occupational History   • Occupation: Disability since 2019-- due to a job injury    Tobacco Use   • Smoking status: Former     Packs/day: 1.50     Years: 30.00     Total pack years: 45.00     Types: Cigarettes     Quit date:      Years since quittin.6   • Smokeless tobacco: Never   • Tobacco comments:     pt stated quit about a month ago, last assessed 14 per [employer]   Vaping Use   • Vaping Use: Never used   Substance and Sexual Activity   • Alcohol use: Not Currently   • Drug use: Never   • Sexual activity: Not Currently     Partners: Male     Birth control/protection: Female Sterilization     Comment: Boyfriend/father of 4 children   Other Topics Concern   • Not on file   Social History Narrative    Home: Lives with boyfriend/father of her 4 children and one of their daughters        Children: 2 sons born  and ,  2 daughters born  and         Education:     Social Determinants of Health     Financial Resource Strain: Low Risk  (3/10/2023)    Overall Financial Resource Strain (CARDIA)    • Difficulty of Paying Living Expenses: Not hard at all   Food Insecurity: No Food Insecurity (3/10/2023)    Hunger Vital Sign    • Worried About Running Out of Food in the Last Year: Never true    • Ran Out of Food in the Last Year: Never true   Transportation Needs: No Transportation Needs (3/10/2023)    PRAPARE - Transportation    • Lack of Transportation (Medical): No    • Lack of Transportation (Non-Medical):  No   Physical Activity: Inactive (6/9/2021)    Exercise Vital Sign    • Days of Exercise per Week: 0 days    • Minutes of Exercise per Session: 0 min   Stress: Not on file   Social Connections: Not on file   Intimate Partner Violence: Not on file   Housing Stability: Low Risk  (8/22/2022)    Housing Stability Vital Sign    • Unable to Pay for Housing in the Last Year: No    • Number of Places Lived in the Last Year: 1    • Unstable Housing in the Last Year: No       Family Psychiatric History:     Family History   Problem Relation Age of Onset   • Cancer Mother    • Breast cancer Mother 62   • Hypertension Mother    • Stroke Father    • Diabetes Father    • Hypertension Father    • Alzheimer's disease Father    • Diabetes Sister    • BRCA1 Negative Sister    • BRCA2 Negative Sister    • No Known Problems Daughter    • No Known Problems Daughter    • Cancer Maternal Grandmother         ovarian   • Ovarian cancer Maternal Grandmother         unspecified laterality   • Cancer Maternal Grandfather    • Hypertension Maternal Grandfather    • Tongue cancer Maternal Grandfather    • Stroke Paternal Grandmother    • No Known Problems Paternal Grandfather    • Thyroid disease unspecified Brother    • Depression Brother    • Anxiety disorder Brother    • Drug abuse Brother         (pain pills)   • No Known Problems Son    • No Known Problems Son    • Thyroid disease unspecified Maternal Aunt    • Colon cancer Maternal Aunt    • No Known Problems Maternal Aunt    • No Known Problems Maternal Aunt • No Known Problems Paternal Aunt    • No Known Problems Paternal Aunt    • No Known Problems Paternal Aunt    • No Known Problems Paternal Aunt    • No Known Problems Paternal Aunt    • No Known Problems Paternal Aunt    • Alzheimer's disease Cousin    • Breast cancer Cousin 43   • Breast cancer Family        History Review: The following portions of the patient's history were reviewed and updated as appropriate: allergies, current medications, past family history, past medical history, past social history, past surgical history and problem list.         OBJECTIVE:     Mental Status Evaluation:    Appearance Casually dressed, good eye contact and hygiene   Behavior Calm, cooperative, pleasant   Speech Clear, normal rate and volume   Mood Euthymic   Affect Normal range and intensity   Thought Processes Organized, goal directed   Associations intact associations, Intact   Thought Content No delusions   Perceptual Disturbances: Pt denies any form of hallucinations and does not appear to be responding to internal stimuli   Abnormal Thoughts  Risk Potential Suicidal ideation - None  Homicidal ideation - None  Potential for aggression - No   Orientation oriented to person, place, situation, day of week, date, month of year and year   Memory short term memory grossly intact   Cosciousness alert and awake   Attention Span attention span and concentration are age appropriate   Intellect appears to be of average intelligence   Insight fair   Judgement fair   Muscle Strength and  Gait normal gait and normal balance   Language no difficulty naming common objects, no difficulty repeating a phrase   Fund of Knowledge adequate knowledge of current events  adequate fund of knowledge regarding past history  adequate fund of knowledge regarding vocabulary    Pain none   Pain Scale 0       Laboratory Results:   I have personally reviewed all pertinent laboratory/tests results.   Most Recent Labs:   Lab Results   Component Value Date WBC 7.36 07/20/2023    RBC 5.13 (H) 07/20/2023    HGB 14.2 07/20/2023    HCT 45.2 07/20/2023     (H) 07/20/2023    RDW 13.1 07/20/2023    NEUTROABS 3.75 07/20/2023    SODIUM 138 07/20/2023    K 4.1 07/20/2023     07/20/2023    CO2 25 07/20/2023    BUN 14 07/20/2023    CREATININE 0.79 07/20/2023    GLUC 86 07/21/2016    GLUF 99 07/20/2023    CALCIUM 9.2 07/20/2023    AST 18 07/20/2023    ALT 36 07/20/2023    ALKPHOS 154 (H) 07/20/2023    TP 7.8 07/20/2023    ALB 3.8 07/20/2023    TBILI 0.49 07/20/2023    CHOLESTEROL 216 (H) 07/20/2023    HDL 42 (L) 07/20/2023    TRIG 87 07/20/2023    LDLCALC 157 (H) 07/20/2023    NONHDLC 127 01/20/2020    [id number] 0.787 07/20/2023    FREET4 1.49 (H) 03/10/2023    HGBA1C 5.8 (H) 07/20/2023     07/20/2023       Assessment/plan:       There are no diagnoses linked to this encounter. PLAN:  Pt is feeling well, and appears stable. She feels her medicines are working and wants them continued. I have no objection and will continue the regimen. Treatment plan done and Pt accepts the plan. Lamotrigine 100mg (1/2) tab po qhs # 45   Fluoxetine 20mg (1) cap po qd # 90   Hydroxyzine 50mg (1/2 - 1) tab po tid prn anxiety or insomnia #   Melatonin --Pt get OTC  Vit D --per PCP  F/U PCP for abnormal TFTs--Pt to get TSH per PCP  Return 19 weeks, call sooner prn           Risks/Benefits      Risks, Benefits And Possible Side Effects Of Medications:    Risks, benefits, and possible side effects of medications explained to Kari Alexander and she verbalizes understanding and agreement for treatment.     Visit Time    Visit Start Time: 11:23AM  Visit Stop Time: 11:37AM  Total Visit Duration: 14 minutes

## 2023-08-30 ENCOUNTER — OFFICE VISIT (OUTPATIENT)
Dept: PSYCHIATRY | Facility: CLINIC | Age: 58
End: 2023-08-30
Payer: MEDICARE

## 2023-08-30 PROCEDURE — 99213 OFFICE O/P EST LOW 20 MIN: CPT | Performed by: PHYSICIAN ASSISTANT

## 2023-08-30 NOTE — BH TREATMENT PLAN
TREATMENT PLAN (Medication Management Only)        [address] [facility]    Name/Date of Birth/MRN:  Yesenia Masters 66 y.o. 1965 MRN: [id number]  Date of Treatment Plan: August 30, 2023  Diagnosis/Diagnoses:   No diagnosis found. Strengths/Personal Resources for Self-Care: "The bible; Go to the prayer, going to the meetings, the Scientologist; Going to the bible school". Area/Areas of need (in own words): ""I'm doing the same thing I'm doing before, no problem, no nothing. "". 1. Long Term Goal:   Maintain mood stability and control of anxiety  Target Date: Pt is at a baseline  Person/Persons responsible for completion of goal: Roque Del Valle  2. Short Term Objective (s) - How will we reach this goal?:   A. Provider new recommended medication/dosage changes and/or continue medication(s): Pt is feeling well, and appears stable. She feels her medicines are working and wants them continued. I have no objection and will continue the regimen. Treatment plan done and Pt accepts the plan. Lamotrigine 100mg (1/2) tab po qhs # 45   Fluoxetine 20mg (1) cap po qd # 90   Hydroxyzine 50mg (1/2 - 1) tab po tid prn anxiety or insomnia #   Melatonin --Pt get OTC  Vit D --per PCP  F/U PCP for abnormal TFTs--Pt to get TSH per PCP  Return 19 weeks, call sooner prn       . Target Date: Pt is doing well, at a baseline  Person/Persons Responsible for Completion of Goal: Roque Camacho   Progress Towards Goals: stable, continuing treatment  Treatment Modality: Medication mgt  Review due 180 days from date of this plan: 6 months - 2/29/2024  Expected length of service: ongoing treatment unless revised  My Physician/PA/NP and I have developed this plan together and I agree to work on the goals and objectives. I understand the treatment goals that were developed for my treatment.   Electronic Signatures: on file (unless signed below)    Evgeny Lee PA-C 08/30/23

## 2023-09-19 ENCOUNTER — TELEPHONE (OUTPATIENT)
Dept: PSYCHIATRY | Facility: CLINIC | Age: 58
End: 2023-09-19

## 2023-09-19 DIAGNOSIS — F41.9 ANXIETY: ICD-10-CM

## 2023-09-19 DIAGNOSIS — F31.81 BIPOLAR 2 DISORDER (HCC): ICD-10-CM

## 2023-09-19 NOTE — TELEPHONE ENCOUNTER
Patient contacted the office requesting a script for her Prozac 20 mg capsule get sent to the [facility] pharmacy on [address]. Writer informed the patient that the medication is not due for a refill until November but the new pharmacy has been noted.

## 2023-09-21 ENCOUNTER — TELEPHONE (OUTPATIENT)
Dept: INTERNAL MEDICINE CLINIC | Facility: CLINIC | Age: 58
End: 2023-09-21

## 2023-09-21 DIAGNOSIS — E03.9 HYPOTHYROIDISM, UNSPECIFIED TYPE: ICD-10-CM

## 2023-09-21 RX ORDER — LEVOTHYROXINE SODIUM 0.1 MG/1
TABLET ORAL
Qty: 90 TABLET | Refills: 3 | Status: SHIPPED | OUTPATIENT
Start: 2023-09-21

## 2023-09-21 RX ORDER — LAMOTRIGINE 100 MG/1
TABLET ORAL
Qty: 45 TABLET | Refills: 1 | Status: SHIPPED | OUTPATIENT
Start: 2023-09-21

## 2023-09-21 RX ORDER — FLUOXETINE HYDROCHLORIDE 20 MG/1
20 CAPSULE ORAL DAILY
Qty: 90 CAPSULE | Refills: 1 | Status: SHIPPED | OUTPATIENT
Start: 2023-09-21

## 2023-09-21 NOTE — TELEPHONE ENCOUNTER
EMR also reviewed and saw that My lamotrigine Rx for 100mg to take 1/2 tab qhs was discontinued by internist's office on 6/29/2023.   I e-scribed the following to [facility] designated [facility]:   Fluoxetine 20mg (1) cap po qd # 90 R1  Lamotrigine 100mg (1/2) tab po qhs # 45 R1

## 2023-09-21 NOTE — TELEPHONE ENCOUNTER
Patient called in to check out the status of her compression stockings. Patient said that it has been a few weeks since she had any updates. I see that a signed order was scanned into media on 9/8. Please advise.      (assisted by  [id number])

## 2023-09-21 NOTE — TELEPHONE ENCOUNTER
Patient called in because she went to pharmacy to get medication and she was told that medication is at another [facility]. I explained to patient that  levothyroxine 100 mcg tablet was sent to [facility] on [address]. Patient was not pleased because she said that she updated her preferred pharmacy prior to requesting a refill so the script should have never be sent to [facility] on [address].  Patient would like for all of her medications to be sent [facility] on [address]     (assisted by  [id number])

## 2023-10-20 DIAGNOSIS — E78.5 HYPERLIPIDEMIA, UNSPECIFIED HYPERLIPIDEMIA TYPE: ICD-10-CM

## 2023-10-23 RX ORDER — ATORVASTATIN CALCIUM 20 MG/1
TABLET, FILM COATED ORAL
Qty: 90 TABLET | Refills: 1 | Status: SHIPPED | OUTPATIENT
Start: 2023-10-23

## 2023-11-15 ENCOUNTER — TELEPHONE (OUTPATIENT)
Dept: INTERNAL MEDICINE CLINIC | Facility: CLINIC | Age: 58
End: 2023-11-15

## 2023-12-11 ENCOUNTER — TELEPHONE (OUTPATIENT)
Dept: PSYCHIATRY | Facility: CLINIC | Age: 58
End: 2023-12-11

## 2023-12-11 ENCOUNTER — HOSPITAL ENCOUNTER (OUTPATIENT)
Dept: RADIOLOGY | Age: 58
Discharge: HOME | End: 2023-12-11
Payer: MEDICARE

## 2023-12-11 VITALS — WEIGHT: 212 LBS | HEIGHT: 67 IN | BODY MASS INDEX: 33.27 KG/M2

## 2023-12-11 DIAGNOSIS — Z12.31 VISIT FOR SCREENING MAMMOGRAM: ICD-10-CM

## 2023-12-11 PROCEDURE — 77063 BREAST TOMOSYNTHESIS BI: CPT

## 2023-12-11 PROCEDURE — 77067 SCR MAMMO BI INCL CAD: CPT

## 2024-01-04 ENCOUNTER — DOCUMENTATION (OUTPATIENT)
Dept: GENETICS | Facility: CLINIC | Age: 59
End: 2024-01-04

## 2024-01-05 ENCOUNTER — HOSPITAL ENCOUNTER (OUTPATIENT)
Dept: ULTRASOUND IMAGING | Facility: CLINIC | Age: 59
Discharge: HOME | End: 2024-01-05
Payer: MEDICARE

## 2024-01-05 ENCOUNTER — HOSPITAL ENCOUNTER (OUTPATIENT)
Dept: MAMMOGRAPHY | Facility: CLINIC | Age: 59
Discharge: HOME | End: 2024-01-05
Payer: MEDICARE

## 2024-01-05 DIAGNOSIS — R92.8 ABNORMAL MAMMOGRAM: ICD-10-CM

## 2024-01-05 PROCEDURE — A4648 IMPLANTABLE TISSUE MARKER: HCPCS

## 2024-01-05 PROCEDURE — 19081 BX BREAST 1ST LESION STRTCTC: CPT

## 2024-01-05 PROCEDURE — 77066 DX MAMMO INCL CAD BI: CPT

## 2024-01-05 PROCEDURE — 88341 IMHCHEM/IMCYTCHM EA ADD ANTB: CPT | Performed by: PATHOLOGY

## 2024-01-05 PROCEDURE — 88342 IMHCHEM/IMCYTCHM 1ST ANTB: CPT | Performed by: PATHOLOGY

## 2024-01-05 PROCEDURE — 88305 TISSUE EXAM BY PATHOLOGIST: CPT | Performed by: PATHOLOGY

## 2024-01-05 PROCEDURE — 76642 ULTRASOUND BREAST LIMITED: CPT

## 2024-01-05 RX ORDER — LIDOCAINE HYDROCHLORIDE AND EPINEPHRINE BITARTRATE 20; .01 MG/ML; MG/ML
10 INJECTION, SOLUTION SUBCUTANEOUS ONCE
Status: COMPLETED | OUTPATIENT
Start: 2024-01-05 | End: 2024-01-05

## 2024-01-05 RX ORDER — LIDOCAINE HYDROCHLORIDE 10 MG/ML
5 INJECTION, SOLUTION EPIDURAL; INFILTRATION; INTRACAUDAL; PERINEURAL ONCE
Status: COMPLETED | OUTPATIENT
Start: 2024-01-05 | End: 2024-01-05

## 2024-01-05 RX ADMIN — LIDOCAINE HYDROCHLORIDE AND EPINEPHRINE 10 ML: 20; 10 INJECTION, SOLUTION INFILTRATION; PERINEURAL at 14:10

## 2024-01-05 RX ADMIN — LIDOCAINE HYDROCHLORIDE 5 ML: 10 INJECTION, SOLUTION EPIDURAL; INFILTRATION; INTRACAUDAL; PERINEURAL at 14:10

## 2024-01-05 NOTE — PROGRESS NOTES
Colon and Rectal Surgery   Poly Joshua 58 y.o. female MRN [id number]  Encounter: [id number]  01/08/24 2:17 PM            Assessment: Poly Joshua is a 58 y.o. female who has proctalgia fugax.      Plan:   Proctalgia fugax  The patient presents with a 1 year history of fleeting anal pain.  This is a sudden onset spastic pain that feels lancinating like someone is shoving a sword into the rectum.  It resolves in a matter of seconds.  She never had a before 1 year ago but the symptoms are persistent rather than worsening.    Examination is unremarkable.  She has had colonoscopy within the last year.  She is otherwise well.    My only recommendation is to maximize her fiber intake, exercise and stretch is much as possible, and make sure she gets good sleep.  Simple and basic wellness care is my best recommendation, including avoidance of alcohol and a good diet.    If she worsens symptomatically, consideration could be given to evaluation by a pelvic floor physical therapist.  If symptoms persist after that, consideration could be given to pelvic imaging with MRI or CT scan.  She is to return should her symptoms worsen unless she is able to see the physical therapist first.  In that case, I would see her 3 months after treatment begins with the physical therapist.      Subjective     HPI    Poly Joshua is a 58 y.o. female who is here today of evaluation of rectal pain.     Last colonoscopy performed on 10/12/2022 by Dr. Zhu showed some polyps, with a 10 year recall.     Family history of colon cancer in maternal aunt.    Historical Information   Past Medical History:   Diagnosis Date    Achalasia     Asthma     Colon polyp     Constipation     CPAP (continuous positive airway pressure) dependence     Depression     Diarrhea 02/28/2019    Elevated LFTs     last assessed 10/25/17    Fibromyalgia     Gastric ulcer     GERD (gastroesophageal reflux disease)     H. pylori infection 10/09/2017    [facility] -  [id number]: Treated, f/u stool antigen negative    Hypertension     Hypothyroidism     Kidney stone     Migraines     Migraines     MICHAEL (obstructive sleep apnea)     last assessed 16    Pneumonia     Sleep apnea 2019    Uses CPAP    Thyroid nodule     last assessed 17     Past Surgical History:   Procedure Laterality Date    BREAST BIOPSY Right 10/31/2017    us bx     SECTION      COLONOSCOPY      ENDOMETRIAL ABLATION      MAMMO STEREOTACTIC BREAST BIOPSY LEFT (ALL INC) Left 2024    MYOTOMY HELLER LAPAROSCOPIC  2016    Dr. Magallanes, Coincident lorena fundoplication    NASAL SINUS SURGERY      OTHER SURGICAL HISTORY      achalasia repair    OVARIAN CYST REMOVAL      PILONIDAL CYST / SINUS EXCISION      MO COLONOSCOPY FLX DX W/COLLJ SPEC WHEN PFRMD N/A 2019    Procedure: COLONOSCOPY;  Surgeon: Kalpesh Perez MD;  [facility];  Service: Gastroenterology    MO CYSTOURETHROSCOPY N/A 2017    Procedure: CYSTOSCOPY;  Surgeon: Reymundo Dill MD;  [facility];  Service: UroGynecology           MO ESOPHAGOGASTRODUODENOSCOPY TRANSORAL DIAGNOSTIC N/A 2017    Procedure: ESOPHAGOGASTRODUODENOSCOPY (EGD);  Surgeon: Kalpesh Perez MD;  [facility]: [facility];  Service: Gastroenterology    MO ESOPHAGOGASTRODUODENOSCOPY TRANSORAL DIAGNOSTIC N/A 2019    Procedure: ESOPHAGOGASTRODUODENOSCOPY (EGD);  Surgeon: Kalpesh Perez MD;  [facility];  Service: Gastroenterology    MO LITHOTRIPSY XTRCORP SHOCK WAVE Left 2022    Procedure: ESWL;  Surgeon: Markie Osullivan MD;  Location: [facility];  Service: Urology    MO POST COLPORRHAPHY RECTOCELE W/WO PERINEORRHAPHY N/A 2017    Procedure: COLPORRHAPHY POSTERIOR;  Surgeon: Reymundo Dill MD;  [facility];  Service: UroGynecology           MO SLING OPERATION STRESS INCONTINENCE N/A 2017    Procedure: INSERTION PUBOVAGINAL SLING /SINGLE INCISION ;  Surgeon: Reymundo Dill MD;  [facility];   Service: UroGynecology           TONSILLECTOMY      TUBAL LIGATION      UPPER GASTROINTESTINAL ENDOSCOPY      US GUIDED BREAST BIOPSY RIGHT COMPLETE Right 10/31/2017       Meds/Allergies       Current Outpatient Medications:     Alaway 0.025 % ophthalmic solution, Administer 1 drop to both eyes 2 (two) times a day as needed (For discomfort), Disp: 5 mL, Rfl: 0    albuterol (2.5 mg/3 mL) 0.083 % nebulizer solution, TAKE 3 ML (2.5 MG) BY NEBULIZER EVERY 6 HOURS AS NEEDED FOR WHEEZING OR SHORTNESS IF BREATH, Disp: 75 mL, Rfl: 0    albuterol (PROVENTIL HFA,VENTOLIN HFA) 90 mcg/act inhaler, Inhale 1 puff every 4 (four) hours as needed for wheezing, Disp: 18 g, Rfl: 1    Alcohol Swabs (Alcohol Prep) 70 % PADS, Use in the morning, Disp: 100 each, Rfl: 1    atorvastatin (LIPITOR) 20 mg tablet, TOME BOO TABLETA TODOS LOS VIZCARRA, Disp: 90 tablet, Rfl: 1    Breo Ellipta 100-25 MCG/ACT inhaler, Inhale 1 puff daily Rinse mouth after use., Disp: 60 each, Rfl: 2    carbamide peroxide (DEBROX) 6.5 % otic solution, Administer 5 drops into both ears 2 (two) times a day, Disp: 15 mL, Rfl: 0    cetaphil (CETAPHIL) lotion, Apply topically as needed for dry skin, Disp: 236 mL, Rfl: 0    cholecalciferol (VITAMIN D3) 1,000 units tablet, TAKE 1 TABLET BY MOUTH EVERY DAY, Disp: 90 tablet, Rfl: 3    fexofenadine (ALLEGRA) 180 MG tablet, , Disp: , Rfl:     FLUoxetine (PROzac) 20 mg capsule, Take 1 capsule (20 mg total) by mouth daily, Disp: 90 capsule, Rfl: 1    fluticasone (FLONASE) 50 mcg/act nasal spray, 1-2 sprays into each nostril daily, Disp: 16 g, Rfl: 6    furosemide (LASIX) 20 mg tablet, Take 1 tablet (20 mg total) by mouth daily, Disp: 90 tablet, Rfl: 3    glucose monitoring kit (FREESTYLE) monitoring kit, Use 1 each as needed (prediabetes blood sugar checks), Disp: 1 each, Rfl: 0    ipratropium (ATROVENT) 0.02 % nebulizer solution, Take 2.5 mL (0.5 mg total) by nebulization 2 (two) times a day, Disp: 5 mL, Rfl: 2    ketoconazole  (NIZORAL) 2 % cream, Apply topically daily, Disp: 15 g, Rfl: 0    lamoTRIgine (LaMICtal) 100 mg tablet, Take 1/2 tab po qhs, Disp: 45 tablet, Rfl: 1    Lancets (freestyle) lancets, Use as instructed, Disp: 100 each, Rfl: 2    levothyroxine 100 mcg tablet, TOME BOO TABLETA TODOS LOS VIZCARRA, Disp: 90 tablet, Rfl: 3    metFORMIN (GLUCOPHAGE-XR) 500 mg 24 hr tablet, Take 2 tablets (1,000 mg total) by mouth every morning, Disp: 180 tablet, Rfl: 3    methocarbamol (ROBAXIN) 500 mg tablet, Take 1 tablet (500 mg total) by mouth 4 (four) times a day as needed for muscle spasms for up to 14 days, Disp: 28 tablet, Rfl: 1    omeprazole (PriLOSEC) 40 MG capsule, TOME OBO CAPSULA DOS VECES AL LARRY, Disp: 180 capsule, Rfl: 0    Premarin vaginal cream, INSERT 0.5 GRAMS INTO THE VAGINA A DIARIO, Disp: 30 g, Rfl: 1    sucralfate (CARAFATE) 1 g/10 mL suspension, Take 10 mL (1 g total) by mouth 4 (four) times a day, Disp: 1200 mL, Rfl: 0  Allergies   Allergen Reactions    Gabapentin Other (See Comments)     Annotation - [id number]: Neuropsych effects per patient.  Annotation - [id number]: Neuropsych effects per patient.  Annotation - [id number]: Neuropsych effects per patient.    Latex Dermatitis and Rash     Other reaction(s): Dermatitis    Penicillins Hives       Social History   Social History     Substance and Sexual Activity   Drug Use Never     Social History     Tobacco Use   Smoking Status Former    Current packs/day: 0.00    Average packs/day: 1.5 packs/day for 30.0 years (45.0 ttl pk-yrs)    Types: Cigarettes    Start date:     Quit date: 2016    Years since quittin.0   Smokeless Tobacco Never   Tobacco Comments    pt stated quit about a month ago, last assessed 14 per [employer]         Family History   Problem Relation Age of Onset    Cancer Mother     Breast cancer Mother 57    Hypertension Mother     Stroke Father     Diabetes Father     Hypertension Father     Alzheimer's disease Father     Diabetes Sister      "BRCA1 Negative Sister     BRCA2 Negative Sister     No Known Problems Daughter     No Known Problems Daughter     Cancer Maternal Grandmother         ovarian    Ovarian cancer Maternal Grandmother         unspecified laterality    Cancer Maternal Grandfather     Hypertension Maternal Grandfather     Tongue cancer Maternal Grandfather     Stroke Paternal Grandmother     No Known Problems Paternal Grandfather     Thyroid disease unspecified Brother     Depression Brother     Anxiety disorder Brother     Drug abuse Brother         (pain pills)    No Known Problems Son     No Known Problems Son     Thyroid disease unspecified Maternal Aunt     Colon cancer Maternal Aunt     No Known Problems Maternal Aunt     No Known Problems Maternal Aunt     No Known Problems Maternal Aunt     No Known Problems Paternal Aunt     No Known Problems Paternal Aunt     No Known Problems Paternal Aunt     No Known Problems Paternal Aunt     No Known Problems Paternal Aunt     No Known Problems Paternal Aunt     Alzheimer's disease Cousin     Breast cancer Cousin 42    Breast cancer Family         unk age         Review of Systems   Constitutional: Negative.    Gastrointestinal:         Fleeting anal pain on basis of every few days to every day.  This has been present for 1 year.       Objective   Current Vitals:  Vitals:    01/08/24 1401   Weight: 98 kg (216 lb)   Height: 5' 7\" (1.702 m)         Physical Exam  Constitutional:       Appearance: Normal appearance.   Genitourinary:     Rectum: Normal.      Comments: The stool is thick and pasty.  No lesions are present.  There is no substantial levator ani spasm or tenderness.  Neurological:      General: No focal deficit present.      Mental Status: She is alert and oriented to person, place, and time.                 "

## 2024-01-05 NOTE — PROGRESS NOTES
Met with patient and Dr. Moore  regarding recommendation for;    _____ RIGHT ____x__LEFT      _____Ultrasound guided  ___x___Stereotactic breast biopsy.      __X___Verbalized understanding.      Blood thinners:  No: __x___ Yes: ______ What:                 Biopsy teaching sheet given:  Yes: ___X___ No: ________    Pt given contact information and adv to call with any questions/needs    Patient advised to arrive at ... for a ... appointment

## 2024-01-05 NOTE — PSYCH
"MEDICATION MANAGEMENT NOTE        [facility]       Name and Date of Birth:  Poly Joshua 58 y.o. 1965    Date of Visit: January 10, 2024    HPI:     Poly Joshua is here for medication review with primary c/o / Area of need: \"I'm OK, helping him\"-- refers to ex-boyfriend.  Pt is upbeat, states there was some family discord with her children's PGM/her ex-boyfriend's mother.  The woman being elder, wanted to move back to [place] and the strain it placed on the son was too much.  Last , Pt was talking to him on Facetime and he had a stroke and collapsed on video.  Pt called his sister and daughter and he himself called 911.  He  twice of a MI but was resuscitated and is fine right now.  Pt is animated and hyper-talkative describing the whole ordeal.  She reports she had much anxiety at that time due to that situation as well as a recent breast CA scare but was cleared of CA, though they are watching her very closely due to high risk status (mother and cousin  young of breast CA also MGM had ovarian CA).   She is not worried because she has \"Sirena in God that everything is gonna be OK.\"  Pt presently denies depression, SI, HI, anxiety, panic attacks, manic or psychotic Sxs.  Pt reports compliance to psychiatric medications without SE.     Appetite Changes and Sleep: normal sleep, normal appetite, normal energy level    Review Of Systems:      Constitutional negative   ENT negative   Cardiovascular negative   Respiratory negative   Gastrointestinal negative   Genitourinary negative   Musculoskeletal negative   Integumentary negative   Neurological negative   Endocrine negative   Other Symptoms none, all other systems are negative       Past Psychiatric History:   As copied from my 2023 note with updates as needed:  \"  [ Pt born in [place] and came to the [place] with her family in approx  and grew up with biological parents, 1 year younger " "sister and a younger brother.  Pt describes her upbringing as \"Good, good dad, good mom.  We were raised with [employer].\"   Everyone in the family got along and Pt has positive memories of growing up with them.     Depression started in 2010 at work when she sustained a life changing back injury, for which she became permanently disabled.  Mood Sxs: sadness, crying, self-isolating, anhedonia, less motivated, feelings of hopelessness and helplessness.  But no SI.                   Manic Sxs:  decreased need for sleep , rapid speech  and flight of ideas/racing thoughts  Racy thoughts, elevated energy     Anxiety started in 2010 due to the work injury:  excessive worry more days than not for longer than 3 months and The Sxs can occur without concommittent depressive Sxs.  No other Sxs of BRIGITTE.     Panic attacks started in late 8/2020 initially triggered by driving through a town she was not familiar with.  She has had a few in total with Sxs of:  Moderate anxiety, sweaty hands,  palpitations/racing heart, trembling, shortness of breath       Social Anxiety symptoms: no symptoms suggestive of social anxiety Eating Disorder symptoms: no historical or current eating disorder. no binge eating disorder; no anorexia nervosa. no symptoms of bulimia      OCD type Sxs: checking door locks, windows, stove, pipes in the basement (which used to leak).  Cleans often, can be very fastidious and wash her hands excessively.       Pt denies any h/o PTSD or psychotic Sxs.     Prior psychiatrists:  [facility] from approx 2014 - 11/2019 --stopped going due to change in insurance     Prior psychotherapists:   First therapist was at [facility] in [place] and he left so she only saw him for a year  Second one was seen from [phone] at [facility]     Pt denied h/o SI, HI, self-injurious behaviors, violent behaviors, psychiatric hospitalizations, ECT, legal or  Hx     Prior Rx trials: Viibryd 40mg (helped), Bupropion ER/XL " "150mg (helped), Aripiprazole 5mg (helped but caused Wt gain), Ziprasidone 40mg (heart palpitations), Gabapentin 300mg (agitated),Topamax (for migraines but stopped due to ineffectiveness and her h/o nephrolithiasis), Hydroxyzine 50mg (sedating), Doxepin up to 50mg (for sleep--oversedating even at 25mg), Clonazepam 0.5mg--??used/helped.      Abuse Hx:  Pt denies any h/o sexual, physical, or emotional abuse     Trauma Hx:  When she lost her job after her work injury                   ] \"         Past Medical History:    Past Medical History:   Diagnosis Date    Achalasia     Asthma     Colon polyp     Constipation     CPAP (continuous positive airway pressure) dependence     Depression     Diarrhea 2019    Elevated LFTs     last assessed 10/25/17    Fibromyalgia     Gastric ulcer     GERD (gastroesophageal reflux disease)     H. pylori infection 10/09/2017    [facility] - [id number]: Treated, f/u stool antigen negative    Hypertension     Hypothyroidism     Kidney stone     Migraines     Migraines     MICHAEL (obstructive sleep apnea)     last assessed 16    Pneumonia     Sleep apnea 2019    Uses CPAP    Thyroid nodule     last assessed 17       Substance Abuse History:    Social History     Substance and Sexual Activity   Alcohol Use Not Currently     Social History     Substance and Sexual Activity   Drug Use Never       Social History:    Social History     Socioeconomic History    Marital status: Single     Spouse name: Not on file    Number of children: 4    Years of education: Not on file    Highest education level: Not on file   Occupational History    Occupation: Disability since 2019-- due to a job injury    Tobacco Use    Smoking status: Former     Current packs/day: 0.00     Average packs/day: 1.5 packs/day for 30.0 years (45.0 ttl pk-yrs)     Types: Cigarettes     Start date:      Quit date: 2016     Years since quittin.0    Smokeless tobacco: Never    Tobacco comments:     pt " stated quit about a month ago, last assessed 4/30/14 per David   Vaping Use    Vaping status: Never Used   Substance and Sexual Activity    Alcohol use: Not Currently    Drug use: Never    Sexual activity: Not Currently     Partners: Male     Birth control/protection: Female Sterilization     Comment: Boyfriend/father of 4 children   Other Topics Concern    Not on file   Social History Narrative    Home: Lives with boyfriend/father of her 4 children and one of their daughters        Children: 2 sons born 1988 and 1995,  2 daughters born 1997 and 2003        Education:     Social Determinants of Health     Financial Resource Strain: Low Risk  (3/10/2023)    Overall Financial Resource Strain (CARDIA)     Difficulty of Paying Living Expenses: Not hard at all   Food Insecurity: No Food Insecurity (3/10/2023)    Hunger Vital Sign     Worried About Running Out of Food in the Last Year: Never true     Ran Out of Food in the Last Year: Never true   Transportation Needs: No Transportation Needs (3/10/2023)    PRAPARE - Transportation     Lack of Transportation (Medical): No     Lack of Transportation (Non-Medical): No   Physical Activity: Inactive (6/9/2021)    Exercise Vital Sign     Days of Exercise per Week: 0 days     Minutes of Exercise per Session: 0 min   Stress: Not on file   Social Connections: Not on file   Intimate Partner Violence: Not on file   Housing Stability: Low Risk  (8/22/2022)    Housing Stability Vital Sign     Unable to Pay for Housing in the Last Year: No     Number of Places Lived in the Last Year: 1     Unstable Housing in the Last Year: No       Family Psychiatric History:     Family History   Problem Relation Age of Onset    Cancer Mother     Breast cancer Mother 57    Hypertension Mother     Stroke Father     Diabetes Father     Hypertension Father     Alzheimer's disease Father     Diabetes Sister     BRCA1 Negative Sister     BRCA2 Negative Sister     No Known Problems Daughter     No  Known Problems Daughter     Cancer Maternal Grandmother         ovarian    Ovarian cancer Maternal Grandmother         unspecified laterality    Cancer Maternal Grandfather     Hypertension Maternal Grandfather     Tongue cancer Maternal Grandfather     Stroke Paternal Grandmother     No Known Problems Paternal Grandfather     Thyroid disease unspecified Brother     Depression Brother     Anxiety disorder Brother     Drug abuse Brother         (pain pills)    No Known Problems Son     No Known Problems Son     Thyroid disease unspecified Maternal Aunt     Colon cancer Maternal Aunt     No Known Problems Maternal Aunt     No Known Problems Maternal Aunt     No Known Problems Maternal Aunt     No Known Problems Paternal Aunt     No Known Problems Paternal Aunt     No Known Problems Paternal Aunt     No Known Problems Paternal Aunt     No Known Problems Paternal Aunt     No Known Problems Paternal Aunt     Alzheimer's disease Cousin     Breast cancer Cousin 42    Breast cancer Family         unk age       History Review: The following portions of the patient's history were reviewed and updated as appropriate: allergies, current medications, past family history, past medical history, past social history, past surgical history, and problem list.         OBJECTIVE:     Mental Status Evaluation:    Appearance Casually dressed, good eye contact and hygiene   Behavior Calm, cooperative, pleasant   Speech Clear, normal volume, fluent, somewhat rapid at times, hyper-talkative and sometimes pressured   Mood Anxious   Affect Normal range and intensity   Thought Processes Organized, goal directed   Associations Intact   Thought Content No delusions   Perceptual Disturbances: Pt denies any form of hallucinations and does not appear to be responding to internal stimuli   Abnormal Thoughts  Risk Potential Suicidal ideation - None  Homicidal ideation - None  Potential for aggression - No   Orientation oriented to person, place,  situation, day of week, date, month of year, and year   Memory short term memory grossly intact   Cosciousness alert and awake   Attention Span attention span and concentration are age appropriate   Intellect appears to be of average intelligence   Insight fair   Judgement fair   Muscle Strength and  Gait normal gait and normal balance   Language no difficulty naming common objects, no difficulty repeating a phrase   Fund of Knowledge adequate knowledge of current events  adequate fund of knowledge regarding past history  adequate fund of knowledge regarding vocabulary    Pain none   Pain Scale 0       Laboratory Results: I have personally reviewed all pertinent laboratory/tests results.  Most Recent Labs:   Lab Results   Component Value Date    WBC 7.36 07/20/2023    RBC 5.13 (H) 07/20/2023    HGB 14.2 07/20/2023    HCT 45.2 07/20/2023     (H) 07/20/2023    RDW 13.1 07/20/2023    NEUTROABS 3.75 07/20/2023    SODIUM 138 07/20/2023    K 4.1 07/20/2023     07/20/2023    CO2 25 07/20/2023    BUN 14 07/20/2023    CREATININE 0.79 07/20/2023    GLUC 86 07/21/2016    GLUF 99 07/20/2023    CALCIUM 9.2 07/20/2023    AST 18 07/20/2023    ALT 36 07/20/2023    ALKPHOS 154 (H) 07/20/2023    TP 7.8 07/20/2023    ALB 3.8 07/20/2023    TBILI 0.49 07/20/2023    CHOLESTEROL 216 (H) 07/20/2023    HDL 42 (L) 07/20/2023    TRIG 87 07/20/2023    LDLCALC 157 (H) 07/20/2023    NONHDLC 127 01/20/2020    VXM0IUIIKJSR 0.787 07/20/2023    FREET4 1.49 (H) 03/10/2023    HGBA1C 5.8 (H) 07/20/2023     07/20/2023     Imaging Studies: XR chest pa & lateral    Result Date: 12/28/2023  Narrative: History: Upper respiratory tract infection. Technique: PA and lateral chest. Comparison 4/6/2021. Findings: The bones and soft tissues are unremarkable. The cardiac and mediastinal silhouettes are within normal limits. Left lower lobe linear opacity is seen.    Impression: Impression: Left lower lobe subsegmental atelectasis or scarring.  Workstation:[id number]    Mammo screening bilateral w 3d & cad    Result Date: 12/12/2023  Narrative: DIAGNOSIS: Visit for screening mammogram TECHNIQUE: Digital screening mammography was performed. Computer Aided Detection (CAD) analyzed all applicable images. COMPARISONS: Prior breast imaging dated: 12/07/2022, 08/10/2021, 07/27/2020, 07/08/2019, 07/08/2019, 08/02/2018, 02/19/2018, 10/31/2017, 10/31/2017, 10/31/2017, 10/31/2017, 10/31/2017, 08/17/2017, 06/22/2017, 06/16/2016, 06/03/2015, and 05/01/2014 RELEVANT HISTORY: Family Breast Cancer History: History of breast cancer in Mother, Cousin, Family. Family Medical History: Family medical history includes BRCA1 Negative in sister, BRCA2 Negative in sister, breast cancer in 3 relatives (cousin, family, mother), colon cancer in maternal aunt, and ovarian cancer in maternal grandmother. Personal History: No known relevant hormone history. Surgical history includes breast biopsy. No known relevant medical history. The patient is scheduled in a reminder system for screening mammography. 8-10% of cancers will be missed on mammography. Management of a palpable abnormality must be based on clinical grounds.  Patients will be notified of their results via letter from our facility. Accredited by American College of Radiology and FDA. RISK ASSESSMENT: 5 Year Tyrer-Cuzick: 3.15 % 10 Year Tyrer-Cuzick: 6.86 % Lifetime Tyrer-Cuzick: 20.04 % TISSUE DENSITY: The breasts are heterogeneously dense, which may obscure small masses. INDICATION: Poly Joshua is a 58 y.o. female presenting for screening mammography. FINDINGS: LEFT 1) ASYMMETRY [A]: There is an asymmetry seen in the inner region of the left breast on the CC view. Right There are no suspicious masses, grouped microcalcifications or areas of unexplained architectural distortion. The skin and nipple areolar complex are unremarkable.      Impression: Additional imaging required. A breast health care nurse from our facility  will be contacting the patient regarding the need for additional imaging. This patient has been identified as being at elevated risk for development of breast cancer based on the Tyrer-Cuzick model. She may benefit from supplemental screening. ASSESSMENT/BI-RADS CATEGORY: Left: 0 - Incomplete: Needs Additional Imaging Evaluation Right: 1 - Negative Overall: 0 - Incomplete: Needs Additional Imaging Evaluation RECOMMENDATION:      - Diagnostic mammogram at the current time for the left breast.      - Ultrasound at the current time for the left breast.      - Routine screening mammogram in 1 year for the right breast. Workstation ID: [id number]      2023 EKG -- resulted on 2023 NSR/Normal    Outside labwork/testin2023 Flu A/B and SARS COV 2: Negative         Assessment/plan:       Diagnoses and all orders for this visit:    Bipolar 2 disorder (HCC)  -     FLUoxetine (PROzac) 20 mg capsule; Take 1 capsule (20 mg total) by mouth daily  -     lamoTRIgine (LaMICtal) 100 mg tablet; Take 1/2 tab po qhs    Anxiety  -     hydrOXYzine HCL (ATARAX) 50 mg tablet; Take 1/2 - 1 tab po tid prn anxiety  -     FLUoxetine (PROzac) 20 mg capsule; Take 1 capsule (20 mg total) by mouth daily    Insomnia, unspecified type            PLAN:  Pt had recent anxiety, appears a bit anxious now, though denies any and all psychiatric Sxs.  She appears generally stable and feels that her medications are working adequately.  I will continue the present regimen.  Treatment plan done and Pt accepts the plan.        Continue:   Lamotrigine 100mg (1/2) tab po qhs # 45   Fluoxetine 20mg (1) cap po qd # 90   Hydroxyzine 50mg (1/2 - 1) tab po tid prn anxiety # 270  Melatonin --Pt get OTC  Vit D --per PCP  Pt to get Lipids and HgbA1C --per PCP  Return 18 weeks, call sooner prn         Risks/Benefits      Risks, Benefits And Possible Side Effects Of Medications:    Risks, benefits, and possible side effects of medications explained  to Poly and she verbalizes understanding and agreement for treatment.    Controlled Medication Discussion:     No recent records found for controlled prescriptions according to [place] Prescription Drug Monitoring Program    Visit Time    Visit Start Time: 11:07AM  Visit Stop Time: 11:39AM  Total Visit Duration:  32 minutes

## 2024-01-05 NOTE — PROGRESS NOTES
Ice pack given:    ___X__yes _____no    Discharge instructions reviewed & given to patient:    _____yes ____X_no    Discharged via:    __X___ambulatory    _____wheelchair    _____stretcher    Stable on discharge:    ___X__yes ____no    Patient was discharged from facility at 2:42 pm on January 5th, 2024.

## 2024-01-06 VITALS — DIASTOLIC BLOOD PRESSURE: 99 MMHG | HEART RATE: 66 BPM | SYSTOLIC BLOOD PRESSURE: 152 MMHG

## 2024-01-06 NOTE — PROGRESS NOTES
Procedure type:    ___X_ultrasound guided _____stereotactic    Breast:    ___X__Left _____Right    Location: Upper Inner Quadrant     Needle: 8 gauge Mammotome    # of passes: 10 cores all submitted     Clip:Steven    Performed by: Dr. Moore     Pressure held for 5 minutes by: Samra MARRERO    Sterradha Strips:    __X___yes _____no    Band aid:    __X___yes_____no    Tape and guaze:    _____yes __X___no    Tolerated procedure:    ___X__yes _____no    Time-out for the procedure was taken on 1/5/2024 at 2:08 pm.

## 2024-01-08 ENCOUNTER — OFFICE VISIT (OUTPATIENT)
Age: 59
End: 2024-01-08
Payer: MEDICARE

## 2024-01-08 VITALS — SYSTOLIC BLOOD PRESSURE: 152 MMHG | HEART RATE: 66 BPM | DIASTOLIC BLOOD PRESSURE: 99 MMHG

## 2024-01-08 VITALS — WEIGHT: 216 LBS | HEIGHT: 67 IN | BODY MASS INDEX: 33.9 KG/M2

## 2024-01-08 DIAGNOSIS — K59.4 PROCTALGIA FUGAX: Primary | ICD-10-CM

## 2024-01-08 PROCEDURE — 99203 OFFICE O/P NEW LOW 30 MIN: CPT | Performed by: COLON & RECTAL SURGERY

## 2024-01-08 NOTE — ASSESSMENT & PLAN NOTE
The patient presents with a 1 year history of fleeting anal pain.  This is a sudden onset spastic pain that feels lancinating like someone is shoving a sword into the rectum.  It resolves in a matter of seconds.  She never had a before 1 year ago but the symptoms are persistent rather than worsening.    Examination is unremarkable.  She has had colonoscopy within the last year.  She is otherwise well.    My only recommendation is to maximize her fiber intake, exercise and stretch is much as possible, and make sure she gets good sleep.  Simple and basic wellness care is my best recommendation, including avoidance of alcohol and a good diet.    If she worsens symptomatically, consideration could be given to evaluation by a pelvic floor physical therapist.  If symptoms persist after that, consideration could be given to pelvic imaging with MRI or CT scan.  She is to return should her symptoms worsen unless she is able to see the physical therapist first.  In that case, I would see her 3 months after treatment begins with the physical therapist.

## 2024-01-08 NOTE — PROGRESS NOTES
Post procedure call completed 1/8/2024    Bleeding: _____yes __X___no    Pain: _____yes ___X___no    Redness/Swelling: ______yes ___X___no    Band aid removed: __x___yes ____no     Steri-Strips intact: ___X___yes _____no (discussed with patient to remove steri strips on  1/10/2024... if they have not come off on their own)    Pt with no questions at this time, adv will call when results available, adv to call with any questions or concerns, has name/# for contact

## 2024-01-09 ENCOUNTER — TELEPHONE (OUTPATIENT)
Dept: MAMMOGRAPHY | Facility: CLINIC | Age: 59
End: 2024-01-09

## 2024-01-09 PROCEDURE — 88305 TISSUE EXAM BY PATHOLOGIST: CPT | Performed by: PATHOLOGY

## 2024-01-09 PROCEDURE — 88342 IMHCHEM/IMCYTCHM 1ST ANTB: CPT | Performed by: PATHOLOGY

## 2024-01-09 PROCEDURE — 88341 IMHCHEM/IMCYTCHM EA ADD ANTB: CPT | Performed by: PATHOLOGY

## 2024-01-10 ENCOUNTER — OFFICE VISIT (OUTPATIENT)
Dept: PSYCHIATRY | Facility: CLINIC | Age: 59
End: 2024-01-10
Payer: MEDICARE

## 2024-01-10 ENCOUNTER — TELEPHONE (OUTPATIENT)
Dept: GENETICS | Facility: CLINIC | Age: 59
End: 2024-01-10

## 2024-01-10 DIAGNOSIS — G47.00 INSOMNIA, UNSPECIFIED TYPE: ICD-10-CM

## 2024-01-10 DIAGNOSIS — F41.9 ANXIETY: ICD-10-CM

## 2024-01-10 DIAGNOSIS — F31.81 BIPOLAR 2 DISORDER (HCC): Primary | ICD-10-CM

## 2024-01-10 PROCEDURE — 99213 OFFICE O/P EST LOW 20 MIN: CPT | Performed by: PHYSICIAN ASSISTANT

## 2024-01-10 RX ORDER — FLUOXETINE HYDROCHLORIDE 20 MG/1
20 CAPSULE ORAL DAILY
Qty: 90 CAPSULE | Refills: 0 | Status: SHIPPED | OUTPATIENT
Start: 2024-01-10

## 2024-01-10 RX ORDER — HYDROXYZINE 50 MG/1
TABLET, FILM COATED ORAL
Qty: 270 TABLET | Refills: 0 | Status: SHIPPED | OUTPATIENT
Start: 2024-01-10

## 2024-01-10 RX ORDER — LAMOTRIGINE 100 MG/1
TABLET ORAL
Qty: 45 TABLET | Refills: 0 | Status: SHIPPED | OUTPATIENT
Start: 2024-01-10

## 2024-01-10 NOTE — BH TREATMENT PLAN
"TREATMENT PLAN (Medication Management Only)        [facility]    Name/Date of Birth/MRN:  Poly Joshua 58 y.o. 1965 MRN: [id number]  Date of Treatment Plan: January 10, 2024  Diagnosis/Diagnoses:   1. Bipolar 2 disorder (HCC)    2. Anxiety    3. Insomnia, unspecified type      Strengths/Personal Resources for Self-Care: \"Keep going with my pilar, that's it. I pray that's all I care about\".  Area/Areas of need (in own words): \"I'm OK, helping him\".  1. Long Term Goal:   Maintain mood stability and control of anxiety  Target Date:  3-6 months  Person/Persons responsible for completion of goal: Krishan  2.  Short Term Objective (s) - How will we reach this goal?:   A.  Provider new recommended medication/dosage changes and/or continue medication(s):  Pt had recent anxiety, appears a bit anxious now, though denies any and all psychiatric Sxs.  She appears generally stable and feels that her medications are working adequately.  I will continue the present regimen.  Treatment plan done and Pt accepts the plan.        Continue:   Lamotrigine 100mg (1/2) tab po qhs # 45   Fluoxetine 20mg (1) cap po qd # 90   Hydroxyzine 50mg (1/2 - 1) tab po tid prn anxiety # 270  Melatonin --Pt get OTC  Vit D --per PCP  Pt to get Lipids and HgbA1C --per PCP  Return 18 weeks, call sooner prn        Target Date:  3-6 months  Person/Persons Responsible for Completion of Goal: Krishan   Progress Towards Goals: stable, continuing treatment  Treatment Modality:  Medication mgt  Review due 180 days from date of this plan: 6 months - 7/10/2024  Expected length of service: ongoing treatment unless revised  My Physician/PA/NP and I have developed this plan together and I agree to work on the goals and objectives. I understand the treatment goals that were developed for my treatment.  Electronic Signatures: on file (unless signed below)    Viky Lyn PA-C " 01/10/24

## 2024-01-12 ENCOUNTER — TELEPHONE (OUTPATIENT)
Dept: MAMMOGRAPHY | Facility: CLINIC | Age: 59
End: 2024-01-12

## 2024-01-12 NOTE — TELEPHONE ENCOUNTER
I just wanted to let you know that the above patient was given her negative breast biopsy results. The patient had no questions and was advised to have a diagnostic mammogram  in 6 months. An appointment was made for July 11, 2024 at 11:00.  If you have any questions or need further assistance please let me know.  Thank you,  ADAM VásquezN RN  Breast Nurse Navigator

## 2024-01-15 ENCOUNTER — TELEPHONE (OUTPATIENT)
Dept: GENETICS | Facility: CLINIC | Age: 59
End: 2024-01-15

## 2024-01-16 ENCOUNTER — DOCUMENTATION (OUTPATIENT)
Dept: GENETICS | Facility: CLINIC | Age: 59
End: 2024-01-16

## 2024-01-16 ENCOUNTER — CLINICAL SUPPORT (OUTPATIENT)
Dept: GENETICS | Facility: CLINIC | Age: 59
End: 2024-01-16

## 2024-01-16 DIAGNOSIS — Z80.0 FAMILY HISTORY OF COLON CANCER: ICD-10-CM

## 2024-01-16 DIAGNOSIS — Z80.41 FAMILY HISTORY OF OVARIAN CANCER: Primary | ICD-10-CM

## 2024-01-16 DIAGNOSIS — Z80.3 FAMILY HISTORY OF BREAST CANCER: ICD-10-CM

## 2024-01-16 DIAGNOSIS — Z80.9 FAMILY HISTORY OF CANCER: ICD-10-CM

## 2024-01-16 NOTE — PROGRESS NOTES
Pre-Test Genetic Counseling Consult Note    Patient Name: Poly Joshua   /Age: 1965/58 y.o.  Referring Provider:  Aranza Ladd MD    Date of Service: 2024  Genetic Counselor: Sofi Rodríguez MS, [facility]  Interpretation Services: None  Location: Telephone consult   Length of Visit: 30 Minutes    Poly was referred to the [facility] Cancer Risk and Genetic Assessment Program due to her family history of breast, ovarian, and colon cancer . she presents today to discuss the possibility of a hereditary cancer syndrome, options for genetic testing, and implications for her and her family.         Cancer History and Treatment:   Personal History:   Oncology History    No history exists.       Screening Hx:   Breast:  Breast Imagin24  Breast Biopsy: 24  Final Diagnosis   A. Breast, Left, Left stereo bx Upper Inner Quad. 10 cores ALL submitted:  - Fibrocystic changes with stromal fibrosis, cysts, and  apocrine metaplasia in benign ducts and lobules. See note   Breast Density: Heterogeneously dense    Colon:  Colonoscopy: 10/12/22  FINDINGS:  Three polyps measuring smaller than 5 mm in the ascending colon and descending colon; completely removed en bloc by cold snare and retrieved specimen  One polyp measuring smaller than 5 mm in the descending colon; performed complete en bloc removal by cold forceps biopsy  All observed locations appeared normal, including the ileocecal valve, cecum, ascending colon, hepatic flexure, transverse colon, splenic flexure, descending colon, sigmoid colon, rectosigmoid and rectum.    Final Diagnosis   A. Ascending colon polyps:  - Benign polypoid colonic mucosa with lymphoid aggregates or suggestive of thickened mucosal fold.  - No epithelial dysplasia and no evidence of malignancy.     B. Descending colon polyp:  - Benign polypoid colonic mucosa with lymphoid aggregates or suggestive of thickened mucosal fold.  - No epithelial dysplasia and no evidence of  malignancy.     8 polyps in 2018, 2 polyps on colonoscopy earlier in     Gynecologic:  Ovaries and Uterus intact     Other screening: nonw    Reproductive History  Age at menarche: 16  Age at first live birth:  23  Menopause: Perimenopausal  Hormone replacement: none    Medical and Surgical History  Pertinent surgical history:   Past Surgical History:   Procedure Laterality Date    BREAST BIOPSY Right 10/31/2017    us bx     SECTION      COLONOSCOPY      ENDOMETRIAL ABLATION      MAMMO STEREOTACTIC BREAST BIOPSY LEFT (ALL INC) Left 2024    MYOTOMY HELLER LAPAROSCOPIC  2016    Dr. Magallanes, Coincident lorena fundoplication    NASAL SINUS SURGERY      OTHER SURGICAL HISTORY      achalasia repair    OVARIAN CYST REMOVAL      PILONIDAL CYST / SINUS EXCISION      ND COLONOSCOPY FLX DX W/COLLJ SPEC WHEN PFRMD N/A 2019    Procedure: COLONOSCOPY;  Surgeon: Kalpesh Perez MD;  [facility];  Service: Gastroenterology    ND CYSTOURETHROSCOPY N/A 2017    Procedure: CYSTOSCOPY;  Surgeon: Reymundo Dill MD;  [facility];  Service: UroGynecology           ND ESOPHAGOGASTRODUODENOSCOPY TRANSORAL DIAGNOSTIC N/A 2017    Procedure: ESOPHAGOGASTRODUODENOSCOPY (EGD);  Surgeon: Kalpesh Perez MD;  Location:  GI LAB;  Service: Gastroenterology    ND ESOPHAGOGASTRODUODENOSCOPY TRANSORAL DIAGNOSTIC N/A 2019    Procedure: ESOPHAGOGASTRODUODENOSCOPY (EGD);  Surgeon: Kalpesh Perez MD;  [facility];  Service: Gastroenterology    ND LITHOTRIPSY XTRCORP SHOCK WAVE Left 2022    Procedure: ESWL;  Surgeon: Markie Osullivan MD;  Location:  MAIN OR;  Service: Urology    ND POST COLPORRHAPHY RECTOCELE W/WO PERINEORRHAPHY N/A 2017    Procedure: COLPORRHAPHY POSTERIOR;  Surgeon: Reymundo Dill MD;  [facility];  Service: UroGynecology           ND SLING OPERATION STRESS INCONTINENCE N/A 2017    Procedure: INSERTION PUBOVAGINAL SLING /SINGLE INCISION ;   "Surgeon: Reymundo Dill MD;  Location: [facility] OR;  Service: UroGynecology           TONSILLECTOMY      TUBAL LIGATION      UPPER GASTROINTESTINAL ENDOSCOPY      US GUIDED BREAST BIOPSY RIGHT COMPLETE Right 10/31/2017      Pertinent medical history:  Past Medical History:   Diagnosis Date    Achalasia     Asthma     Colon polyp     Constipation     CPAP (continuous positive airway pressure) dependence     Depression     Diarrhea 02/28/2019    Elevated LFTs     last assessed 10/25/17    Fibromyalgia     Gastric ulcer     GERD (gastroesophageal reflux disease)     H. pylori infection 10/09/2017    [facility] [id number]: Treated, f/u stool antigen negative    Hypertension     Hypothyroidism     Kidney stone     Migraines     Migraines     MICHAEL (obstructive sleep apnea)     last assessed 7/21/16    Pneumonia     Sleep apnea 01/05/2019    Uses CPAP    Thyroid nodule     last assessed 11/6/17         Other History:  Height:   Ht Readings from Last 1 Encounters:   01/08/24 5' 7\" (1.702 m)     Weight:   Wt Readings from Last 1 Encounters:   01/08/24 98 kg (216 lb)       Relevant Family History   Patient reports no Ashkenazi Muslim ancestry.     Sister underwent genetic testing and was negative    Maternal Family History:  Mother (78) history of breast cancer (dx 56)  Aunt (69) history of colon cancer (dx 55)  Grandmother with history of ovarian cancer (Dx 47)   Grandmother's half sister (d.37) history of breast cancer (Dx 30s)  Grandfather with history of tongue cancer   Sibling with history of oral cancer (Dx 10)    Paternal Family History:   No reported history of cancer    Please refer to the scanned pedigree in the Media Tab for a complete family history     *All history is reported as provided by the patient; records are not available for review, except where indicated.     Assessment:  We discussed sporadic, familial and hereditary cancer.  We also discussed the many factors that influence our risk for cancer such as " age, environmental exposures, lifestyle choices and family history.      We reviewed the indications suggestive of a hereditary predisposition to cancer.    Poly is unaffected, but is considering genetic testing due to a family history of ovarian cancer. Although Poly's maternal grandmother is the most informative person to undergo genetic testing, Poly can consider genetic testing due to the following:   Patient does not have cancer but is the most informative person to test because their grandmother is .    Genetic testing is indicated for Poly based on the following criteria: Meets NCCN V2.2024 Testing Criteria for Ovarian Cancer Susceptibility Genes: family history of a second-degree relative diagnosed with ovarian cancer    The risks, benefits, and limitations of genetic testing were reviewed with the patient, as well as genetic discrimination laws, and possible test results (positive, negative, variants of uncertain significance) and their clinical implications. If positive for a mutation, options for managing cancer risk including increased surveillance, chemoprevention, and in some cases prophylactic surgery were discussed. Poly was informed that if a hereditary cancer syndrome was identified in her, first degree relatives (parents, siblings, and children) have a chance of also inheriting the condition. Genetic testing would allow for predictive genetic testing in other relatives, who may also be at risk depending on their degree of relation.     Billing:  Based on Poly's primary insurance being Medicaid and [employer]'s billing policy, she should expect an out of pocket cost of $0. Poly verbalized understanding.    Plan: Patient decided to proceed with testing and provided consent.    Summary:     Sample Collection:  A blood kit was mailed home to the patient    Genetic Testing Preformed: CustomNext: Cancer® +RNAinsight® (61 genes): APC, CHRISSY, AXIN2, BAP1, BARD1, BMPR1A, BRCA1, BRCA2,  BRIP1, CDH1, CDK4, CDKN1B, CDKN2A, CHEK2, CTNNA1, DICER1, EGLN1, EPCAM, FH, FLCN, GREM1, HOXB13, KIF1B, KIT, MAX, MEN1, MET, MITF, MLH1, MSH2, MSH3, MSH6, MUTYH, NBN, NF1, NTHL1, PALB2, PMS2, POLD1, POLE, POT1, PTEN, RAD50, RAD51C, RAD51D, RB1, RECQL, RET, SDHA, SDHAF2, SDHB, SDHC, SDHD, SMAD4, SMARCA4, STK11, HIOM842, TP53, TSC1, TSC2, VHL     Result Call Information:  In the event that we need to reach Poly via telephone:  I confirmed the patient's mobile number on file as the best number to call with results  I confirmed with the patient that we can leave a voicemail on the provided numbers    Results take approximately 2-3 weeks to complete once test is started.    Poly will be notified via [employer] once results are available.      Additional recommendations for surveillance/medical management will be made pending genetic test results.

## 2024-01-16 NOTE — LETTER
2024     Aranza Ladd MD  [address] [place] [id number]    Patient: Poly Joshua  YOB: 1965  Date of Visit: 2024      Dear Dr. Ladd:    Thank you for referring Poly Joshua to me for evaluation. Below are my notes for this consultation.    If you have questions, please do not hesitate to call me. I look forward to following your patient along with you.         Sincerely,        Sofi Rodríguez        CC: Navneet Valera MD        Pre-Test Genetic Counseling Consult Note    Patient Name: Poly Joshua   /Age: 1965/58 y.o.  Referring Provider:  Aranza Ladd MD    Date of Service: 2024  Genetic Counselor: Sofi Rodríguez MS, [facility] [place]  Interpretation Services: None  Location: Telephone consult   Length of Visit: 30 Minutes    Poly was referred to the [facility] Cancer Risk and Genetic Assessment Program due to her family history of breast, ovarian, and colon cancer . she presents today to discuss the possibility of a hereditary cancer syndrome, options for genetic testing, and implications for her and her family.         Cancer History and Treatment:   Personal History:   Oncology History    No history exists.       Screening Hx:   Breast:  Breast Imagin24  Breast Biopsy: 24  Final Diagnosis   A. Breast, Left, Left stereo bx Upper Inner Quad. 10 cores ALL submitted:  - Fibrocystic changes with stromal fibrosis, cysts, and  apocrine metaplasia in benign ducts and lobules. See note   Breast Density: Heterogeneously dense    Colon:  Colonoscopy: 10/12/22  FINDINGS:  Three polyps measuring smaller than 5 mm in the ascending colon and descending colon; completely removed en bloc by cold snare and retrieved specimen  One polyp measuring smaller than 5 mm in the descending colon; performed complete en bloc removal by cold forceps biopsy  All observed locations appeared normal, including the ileocecal valve, cecum, ascending colon, hepatic  flexure, transverse colon, splenic flexure, descending colon, sigmoid colon, rectosigmoid and rectum.    Final Diagnosis   A. Ascending colon polyps:  - Benign polypoid colonic mucosa with lymphoid aggregates or suggestive of thickened mucosal fold.  - No epithelial dysplasia and no evidence of malignancy.     B. Descending colon polyp:  - Benign polypoid colonic mucosa with lymphoid aggregates or suggestive of thickened mucosal fold.  - No epithelial dysplasia and no evidence of malignancy.     8 polyps in 2018, 2 polyps on colonoscopy earlier in     Gynecologic:  Ovaries and Uterus intact     Other screening: nonw    Reproductive History  Age at menarche: 16  Age at first live birth:  23  Menopause: Perimenopausal  Hormone replacement: none    Medical and Surgical History  Pertinent surgical history:   Past Surgical History:   Procedure Laterality Date   • BREAST BIOPSY Right 10/31/2017    us bx   •  SECTION     • COLONOSCOPY     • ENDOMETRIAL ABLATION     • MAMMO STEREOTACTIC BREAST BIOPSY LEFT (ALL INC) Left 2024   • MYOTOMY HELLER LAPAROSCOPIC  2016    Dr. Magallanes, Coincident lorena fundoplication   • NASAL SINUS SURGERY     • OTHER SURGICAL HISTORY      achalasia repair   • OVARIAN CYST REMOVAL     • PILONIDAL CYST / SINUS EXCISION     • NM COLONOSCOPY FLX DX W/COLLJ SPEC WHEN PFRMD N/A 2019    Procedure: COLONOSCOPY;  Surgeon: Kalpesh Perez MD;  [facility];  Service: Gastroenterology   • NM CYSTOURETHROSCOPY N/A 2017    Procedure: CYSTOSCOPY;  Surgeon: Reymundo Dill MD;  Location: [facility] OR;  Service: UroGynecology          • NM ESOPHAGOGASTRODUODENOSCOPY TRANSORAL DIAGNOSTIC N/A 2017    Procedure: ESOPHAGOGASTRODUODENOSCOPY (EGD);  Surgeon: Kalpesh Perez MD;  Location:  GI LAB;  Service: Gastroenterology   • NM ESOPHAGOGASTRODUODENOSCOPY TRANSORAL DIAGNOSTIC N/A 2019    Procedure: ESOPHAGOGASTRODUODENOSCOPY (EGD);  Surgeon: Kalpesh Perez MD;  [facility];  Service: Gastroenterology   • SC LITHOTRIPSY XTRCORP SHOCK WAVE Left 05/26/2022    Procedure: ESWL;  Surgeon: Markie Osullivan MD;  [facility];  Service: Urology   • SC POST COLPORRHAPHY RECTOCELE W/WO PERINEORRHAPHY N/A 01/19/2017    Procedure: COLPORRHAPHY POSTERIOR;  Surgeon: Reymundo Dill MD;  [facility];  Service: UroGynecology          • SC SLING OPERATION STRESS INCONTINENCE N/A 01/19/2017    Procedure: INSERTION PUBOVAGINAL SLING /SINGLE INCISION ;  Surgeon: Reymundo Dill MD;  [facility];  Service: UroGynecology          • TONSILLECTOMY     • TUBAL LIGATION     • UPPER GASTROINTESTINAL ENDOSCOPY     • US GUIDED BREAST BIOPSY RIGHT COMPLETE Right 10/31/2017      Pertinent medical history:  Past Medical History:   Diagnosis Date   • Achalasia    • Asthma    • Colon polyp    • Constipation    • CPAP (continuous positive airway pressure) dependence    • Depression    • Diarrhea 02/28/2019   • Elevated LFTs     last assessed 10/25/17   • Fibromyalgia    • Gastric ulcer    • GERD (gastroesophageal reflux disease)    • H. pylori infection 10/09/2017    [facility] [id number]: Treated, f/u stool antigen negative   • Hypertension    • Hypothyroidism    • Kidney stone    • Migraines    • Migraines    • MICHAEL (obstructive sleep apnea)     last assessed 7/21/16   • Pneumonia    • Sleep apnea 01/05/2019    Uses CPAP   • Thyroid nodule     last assessed 11/6/17         Other History:  Height:   Ht Readings from Last 1 Encounters:   01/08/24 5' 7\" (1.702 m)     Weight:   Wt Readings from Last 1 Encounters:   01/08/24 98 kg (216 lb)       Relevant Family History   Patient reports no Ashkenazi Adventism ancestry.     Sister underwent genetic testing and was negative    Maternal Family History:  Mother (78) history of breast cancer (dx 56)  Aunt (69) history of colon cancer (dx 55)  Grandmother with history of ovarian cancer (Dx 47)   Grandmother's half sister (d.37) history of breast " cancer (Dx 30s)  Grandfather with history of tongue cancer   Sibling with history of oral cancer (Dx 10)    Paternal Family History:   No reported history of cancer    Please refer to the scanned pedigree in the Media Tab for a complete family history     *All history is reported as provided by the patient; records are not available for review, except where indicated.     Assessment:  We discussed sporadic, familial and hereditary cancer.  We also discussed the many factors that influence our risk for cancer such as age, environmental exposures, lifestyle choices and family history.      We reviewed the indications suggestive of a hereditary predisposition to cancer.    Poly is unaffected, but is considering genetic testing due to a family history of ovarian cancer. Although Poly's maternal grandmother is the most informative person to undergo genetic testing, Poly can consider genetic testing due to the following:   Patient does not have cancer but is the most informative person to test because their grandmother is .    Genetic testing is indicated for Poly based on the following criteria: Meets NCCN V2.2024 Testing Criteria for Ovarian Cancer Susceptibility Genes: family history of a second-degree relative diagnosed with ovarian cancer    The risks, benefits, and limitations of genetic testing were reviewed with the patient, as well as genetic discrimination laws, and possible test results (positive, negative, variants of uncertain significance) and their clinical implications. If positive for a mutation, options for managing cancer risk including increased surveillance, chemoprevention, and in some cases prophylactic surgery were discussed. Poly was informed that if a hereditary cancer syndrome was identified in her, first degree relatives (parents, siblings, and children) have a chance of also inheriting the condition. Genetic testing would allow for predictive genetic testing in other relatives,  who may also be at risk depending on their degree of relation.     Billing:  Based on Poly's primary insurance being Medicaid and [employer]'s billing policy, she should expect an out of pocket cost of $0. Poly verbalized understanding.    Plan: Patient decided to proceed with testing and provided consent.    Summary:     Sample Collection:  A blood kit was mailed home to the patient    Genetic Testing Preformed: CustomNext: Cancer® +RNAinsight® (61 genes): APC, CHRISSY, AXIN2, BAP1, BARD1, BMPR1A, BRCA1, BRCA2, BRIP1, CDH1, CDK4, CDKN1B, CDKN2A, CHEK2, CTNNA1, DICER1, EGLN1, EPCAM, FH, FLCN, GREM1, HOXB13, KIF1B, KIT, MAX, MEN1, MET, MITF, MLH1, MSH2, MSH3, MSH6, MUTYH, NBN, NF1, NTHL1, PALB2, PMS2, POLD1, POLE, POT1, PTEN, RAD50, RAD51C, RAD51D, RB1, RECQL, RET, SDHA, SDHAF2, SDHB, SDHC, SDHD, SMAD4, SMARCA4, STK11, [id number], TP53, TSC1, TSC2, VHL     Result Call Information:  In the event that we need to reach Poly via telephone:  I confirmed the patient's mobile number on file as the best number to call with results  I confirmed with the patient that we can leave a voicemail on the provided numbers    Results take approximately 2-3 weeks to complete once test is started.    Poly will be notified via [employer] once results are available.      Additional recommendations for surveillance/medical management will be made pending genetic test results.

## 2024-02-01 ENCOUNTER — OFFICE VISIT (OUTPATIENT)
Dept: CARDIOLOGY CLINIC | Facility: CLINIC | Age: 59
End: 2024-02-01
Payer: MEDICARE

## 2024-02-01 ENCOUNTER — TELEPHONE (OUTPATIENT)
Dept: CARDIOLOGY CLINIC | Facility: CLINIC | Age: 59
End: 2024-02-01

## 2024-02-01 VITALS
HEART RATE: 76 BPM | DIASTOLIC BLOOD PRESSURE: 76 MMHG | HEIGHT: 67 IN | BODY MASS INDEX: 35 KG/M2 | WEIGHT: 223 LBS | SYSTOLIC BLOOD PRESSURE: 124 MMHG

## 2024-02-01 DIAGNOSIS — R60.0 LOWER EXTREMITY EDEMA: ICD-10-CM

## 2024-02-01 DIAGNOSIS — G47.33 OSA (OBSTRUCTIVE SLEEP APNEA): Primary | ICD-10-CM

## 2024-02-01 DIAGNOSIS — E11.8 CONTROLLED TYPE 2 DIABETES MELLITUS WITH COMPLICATION, WITHOUT LONG-TERM CURRENT USE OF INSULIN (HCC): ICD-10-CM

## 2024-02-01 DIAGNOSIS — R00.2 PALPITATIONS: Primary | ICD-10-CM

## 2024-02-01 DIAGNOSIS — E78.2 MIXED HYPERLIPIDEMIA: ICD-10-CM

## 2024-02-01 DIAGNOSIS — G47.30 SLEEP APNEA IN ADULT: ICD-10-CM

## 2024-02-01 DIAGNOSIS — R06.01 ORTHOPNEA: ICD-10-CM

## 2024-02-01 PROCEDURE — 99204 OFFICE O/P NEW MOD 45 MIN: CPT | Performed by: INTERNAL MEDICINE

## 2024-02-01 PROCEDURE — 93000 ELECTROCARDIOGRAM COMPLETE: CPT | Performed by: INTERNAL MEDICINE

## 2024-02-01 NOTE — PROGRESS NOTES
Cardiology     Clinic Visit Note  Poly Joshua 58 y.o. female   MRN: [id number]    Assessment and Plan      Palpitations   - Palpitations associated with exertion  - Likely due to deconditioning versus anxiety as she is currently caring for her  who had a massive heart attack recently  - Patient does have history of hypothyroidism and is on levothyroxine however TSH was within normal limits on 7/2023  - Patient does not consume caffeine.  Stays well-hydrated throughout the day  - No previous rhythm monitoring to review  - EKG on his office visit demonstrated normal sinus rhythm with no ectopy  - Echo from 2021 demonstrated EF of 65%, normal diastolic function, no valvular abnormalities  - will arrange for ZIO monitor for 2 weeks and echo    Bilateral lower extremity edema and orthopnea   - Ongoing for several years however recently worsening  - Now associated with 3 pillow orthopnea  - Follow-up with vascular surgery in 8/2023 and was recommended to use compression stockings however these did not help  - Was ordered lower extremity venous duplex however this has not been done yet  - Likely venous insufficiency however given new orthopnea we will have to rule out heart failure  - Continues Lasix 20 mg daily without improvement  - Echo from 2021 demonstrated EF of 65% with normal diastolic function and no significant valvular regurgitation or stenosis  - Given development of orthopnea will check updated echo  - Lower extremity venous duplex ordered encourage patient to complete  - BNP ordered  - Continue Lasix 20 mg daily.  CMP ordered to evaluate electrolytes with Lasix use    Hyperlipidemia   -Suboptimally controlled  Lipid panel from June 2023 demonstrated total cholesterol of 216, triglycerides 87, HDL 42 and   When lipids were last checked patient was not taking atorvastatin as she had run out.  She has been compliant with her atorvastatin in the interim   - ASCVD 10 year risk is 5.4%   -  Continue atorvastatin 20 mg daily  - Updated lipid panel, CMP and lipoprotein a ordered  - Discussed with patient that lipoprotein a will help us to further risk stratify however would not necessarily change our current management.  There are newer medications over her eyes and could potentially be used in patients with elevated lipoprotein a level    MICHAEL on CPAP  - Patient has a history of previously diagnosed obstructive sleep apnea and she was using CPAP up until a few years ago when her machine was recalled  - Provided referral to sleep medicine for new CPAP as untreated sleep apnea places patient at risk for cardiovascular events including arrhythmias    DM  - well controlled   - A1c 5.8% from 7/2023   - Continues on metformin        Schedule a follow-up appointment in 3 months.     Chief Complaint: Palpitations  Subjective     History of Present Illness:  88-year-old male with past medical history of asthma, MICHAEL on CPAP, bipolar disorder, fibromyalgia, GERD, achalasia, DM, Hypothyroidism, migraines who presents to establish care for palpitations and bilateral lower extremity edema.     Palpitations: feels like her heart is going to fast. This only happens when she is exerting herself. She feels short of breath with this as well. No lightheadedness. She is not able to lie flat and uses 3 pillows. She has sleep apnea, but CPAP was recalled.  No caffeine. Drinks sufficient water. Does not exercise.     Followed with vascular on 8/16/2023 for bilateral lower extremity edema thought to be venous insufficiency. Compression stockings and LE venous reflux study ordered at that time. Taking lasix 20 mg daily. BMP reviewed from 7/2023 demonstrated renal function and electrolytes within normal limits. This has been going on for years, but worsening recently. The swelling gets better with putting her feet up. Most recent echo from 12/2021 demonstrated EF 65%, normal diastolic function.     HLD on atorvastatin 20 mg daily.  Lipid panel from 7/2023 demonstrated total cholesterol 216, TAGs 87, HDL 42 and . Was not taking the atorvastatin at this time. ASCVD 10 year risk is 5.4%.     Family history: paternal grandmother, father with stroke, paternal aunt with stroke and MI   Sister with minor heart attack     Quit smoking 8 years ago. No alcohol, no recreational drugs.     Patient states that at the end of 2023 her  had a massive MI.  He is currently doing well and is in cardiac rehab but this was placed in a normal smoking stress on her as she has has primary caretaker.  She make sure that he takes his medication, get to appointments etc.  She says she has been murmurs nondistressed.  She is having trouble coping with the fact that she watched him have NUPUR via [employer] as he was in [place] taking care of his mother at the time.  She saw him passed out and go unconscious on the floor while they were on [employer].      Previous Cardiology Workup:  TREADMILL STRESS  No results found for this or any previous visit.     ----------------------------------------------------------------------------------------------  NUCLEAR STRESS TEST: No results found for this or any previous visit.    No results found for this or any previous visit.      --------------------------------------------------------------------------------  CATH:  No results found for this or any previous visit.    --------------------------------------------------------------------------------  ECHO:   Complete transthoracic echo 12/23/2021      Interpretation Summary         Left Ventricle: Left ventricular cavity size is normal. Wall thickness is mildly increased. The left ventricular ejection fraction is 65%. Systolic function is normal. Wall motion is normal. There is concentric remodeling. Diastolic function is normal.    The left ventricular wall motion is normal.    Right Ventricle: Right ventricular cavity size is normal. Systolic function is normal.      Findings    Left Ventricle Left ventricular cavity size is normal. Wall thickness is mildly increased. The left ventricular ejection fraction is 65%. Systolic function is normal.  Wall motion is normal. There is concentric remodeling. Diastolic function is normal.   Wall Scoring Baseline     The left ventricular wall motion is normal.            Right Ventricle Right ventricular cavity size is normal. Systolic function is normal. Wall thickness is normal.   Left Atrium The atrium is normal in size.   Right Atrium The atrium is normal in size.   Aortic Valve The aortic valve is trileaflet. The leaflets are not thickened. The leaflets are not calcified. The leaflets exhibit normal mobility. There is no evidence of regurgitation. There is no evidence of stenosis.   Mitral Valve The mitral valve has normal structure. There is no evidence of regurgitation. There is no evidence of stenosis.   Tricuspid Valve Tricuspid valve structure is normal. There is trace regurgitation. There is no evidence of stenosis. The right ventricular systolic pressure is normal. The estimated right ventricular systolic pressure is 30.0 mmHg.   Pulmonic Valve Pulmonic valve structure is normal. There is trace regurgitation. There is no evidence of stenosis.   Ascending Aorta The aortic root is normal in size. The ascending aorta is normal in size.   IVC/SVC The inferior vena cava size is 16.0 mm. The right atrial pressure is estimated at 3.0 mmHg. The inferior vena cava is normal in size. Respirophasic changes were normal.   Pericardium There is no pericardial effusion.     Left Ventricle Measurements    Function/Volumes   A4C EF 61 %         Dimensions   LVIDd 4.5 cm         LVIDS 2.8 cm         IVSd 1.1 cm         LVPWd 1.1 cm         FS 38 %         Diastolic Filling   E wave deceleration time 149 ms         MV Peak E Cedrick 96 cm/s         MV Peak A Cedrick 0.7 m/s               Right Ventricle Measurements    Dimensions   RVID d 3.9 cm          TV S' 1 cm/s               Left Atrium Measurements    Dimensions   KATERINE A4C 15.3 cm2               Right Atrium Measurements    Dimensions   RAA A4C 16.3 cm2               Mitral Valve Measurements    Stenosis   MV stenosis pressure 1/2 time 0 ms               Tricuspid Valve Measurements    RVSP Parameters   Est. RA pres 3 mmHg         Triscuspid Valve Regurgitation Peak Gradient 27 mmHg         Right Ventricular Peak Systolic Pressure 30 mmHg         Stenosis   TV peak gradient 27 mmHg               Aorta Measurements    Aortic Dimensions   Ao root 3.1 cm         Asc Ao 2.9 cm               IVC/SVC Measurements    IVC/SVC   IVC 16 mm         Est. RA pres 3 mmHg           --------------------------------------------------------------------------------  HOLTER  No results found for this or any previous visit.    --------------------------------------------------------------------------------  CAROTIDS  No results found for this or any previous visit.       ---------------------------------------------------------------------------------  Review of Systems   Constitutional:  Negative for chills and fever.   HENT:  Negative for ear pain and sore throat.    Eyes:  Negative for pain and visual disturbance.   Respiratory:  Negative for cough and shortness of breath.    Cardiovascular:  Positive for palpitations and leg swelling. Negative for chest pain.        Orthopnea   Gastrointestinal:  Negative for abdominal pain and vomiting.   Genitourinary:  Negative for dysuria and hematuria.   Musculoskeletal:  Negative for arthralgias and back pain.   Skin:  Negative for color change and rash.   Neurological:  Negative for seizures and syncope.   Psychiatric/Behavioral:  The patient is nervous/anxious.    All other systems reviewed and are negative.        Current Outpatient Medications:     Alaway 0.025 % ophthalmic solution, Administer 1 drop to both eyes 2 (two) times a day as needed (For discomfort), Disp: 5 mL, Rfl: 0     albuterol (2.5 mg/3 mL) 0.083 % nebulizer solution, TAKE 3 ML (2.5 MG) BY NEBULIZER EVERY 6 HOURS AS NEEDED FOR WHEEZING OR SHORTNESS IF BREATH, Disp: 75 mL, Rfl: 0    albuterol (PROVENTIL HFA,VENTOLIN HFA) 90 mcg/act inhaler, Inhale 1 puff every 4 (four) hours as needed for wheezing, Disp: 18 g, Rfl: 1    Alcohol Swabs (Alcohol Prep) 70 % PADS, Use in the morning, Disp: 100 each, Rfl: 1    atorvastatin (LIPITOR) 20 mg tablet, TOME BOO TABLETA TODOS LOS VIZCARRA, Disp: 90 tablet, Rfl: 1    Breo Ellipta 100-25 MCG/ACT inhaler, Inhale 1 puff daily Rinse mouth after use., Disp: 60 each, Rfl: 2    cetaphil (CETAPHIL) lotion, Apply topically as needed for dry skin, Disp: 236 mL, Rfl: 0    cholecalciferol (VITAMIN D3) 1,000 units tablet, TAKE 1 TABLET BY MOUTH EVERY DAY, Disp: 90 tablet, Rfl: 3    fexofenadine (ALLEGRA) 180 MG tablet, , Disp: , Rfl:     FLUoxetine (PROzac) 20 mg capsule, Take 1 capsule (20 mg total) by mouth daily, Disp: 90 capsule, Rfl: 0    fluticasone (FLONASE) 50 mcg/act nasal spray, 1-2 sprays into each nostril daily, Disp: 16 g, Rfl: 6    furosemide (LASIX) 20 mg tablet, Take 1 tablet (20 mg total) by mouth daily, Disp: 90 tablet, Rfl: 3    glucose monitoring kit (FREESTYLE) monitoring kit, Use 1 each as needed (prediabetes blood sugar checks), Disp: 1 each, Rfl: 0    hydrOXYzine HCL (ATARAX) 50 mg tablet, Take 1/2 - 1 tab po tid prn anxiety, Disp: 270 tablet, Rfl: 0    ipratropium (ATROVENT) 0.02 % nebulizer solution, Take 2.5 mL (0.5 mg total) by nebulization 2 (two) times a day, Disp: 5 mL, Rfl: 2    ketoconazole (NIZORAL) 2 % cream, Apply topically daily, Disp: 15 g, Rfl: 0    lamoTRIgine (LaMICtal) 100 mg tablet, Take 1/2 tab po qhs, Disp: 45 tablet, Rfl: 0    Lancets (freestyle) lancets, Use as instructed, Disp: 100 each, Rfl: 2    levothyroxine 100 mcg tablet, TOME BOO TABLETA TONISREEN PAINTING VIZCARRA, Disp: 90 tablet, Rfl: 3    metFORMIN (GLUCOPHAGE-XR) 500 mg 24 hr tablet, Take 2 tablets (1,000 mg  total) by mouth every morning, Disp: 180 tablet, Rfl: 3    omeprazole (PriLOSEC) 40 MG capsule, TOME BOO CAPSULA DOS VECES AL LARRY, Disp: 180 capsule, Rfl: 0    Premarin vaginal cream, INSERT 0.5 GRAMS INTO THE VAGINA A DIARIO, Disp: 30 g, Rfl: 1    sucralfate (CARAFATE) 1 g/10 mL suspension, Take 10 mL (1 g total) by mouth 4 (four) times a day, Disp: 1200 mL, Rfl: 0    carbamide peroxide (DEBROX) 6.5 % otic solution, Administer 5 drops into both ears 2 (two) times a day (Patient not taking: Reported on 2024), Disp: 15 mL, Rfl: 0    methocarbamol (ROBAXIN) 500 mg tablet, Take 1 tablet (500 mg total) by mouth 4 (four) times a day as needed for muscle spasms for up to 14 days, Disp: 28 tablet, Rfl: 1  Past Medical History:   Diagnosis Date    Achalasia     Asthma     Colon polyp     Constipation     CPAP (continuous positive airway pressure) dependence     Depression     Diarrhea 2019    Elevated LFTs     last assessed 10/25/17    Fibromyalgia     Gastric ulcer     GERD (gastroesophageal reflux disease)     H. pylori infection 10/09/2017    [facility] - [id number]: Treated, f/u stool antigen negative    Hypertension     Hypothyroidism     Kidney stone     Migraines     Migraines     MICHAEL (obstructive sleep apnea)     last assessed 16    Pneumonia     Sleep apnea 2019    Uses CPAP    Thyroid nodule     last assessed 17     Past Surgical History:   Procedure Laterality Date    BREAST BIOPSY Right 10/31/2017    us bx     SECTION      COLONOSCOPY      ENDOMETRIAL ABLATION      MAMMO STEREOTACTIC BREAST BIOPSY LEFT (ALL INC) Left 2024    MYOTOMY HELLER LAPAROSCOPIC  2016    Dr. Magallanes, Coincident lorena fundoplication    NASAL SINUS SURGERY      OTHER SURGICAL HISTORY      achalasia repair    OVARIAN CYST REMOVAL      PILONIDAL CYST / SINUS EXCISION      MT COLONOSCOPY FLX DX W/COLLJ SPEC WHEN PFRMD N/A 2019    Procedure: COLONOSCOPY;  Surgeon: Kalpesh Perez MD;  [facility];  Service: Gastroenterology    SC CYSTOURETHROSCOPY N/A 2017    Procedure: CYSTOSCOPY;  Surgeon: Reymundo Dill MD;  [facility];  Service: UroGynecology           SC ESOPHAGOGASTRODUODENOSCOPY TRANSORAL DIAGNOSTIC N/A 2017    Procedure: ESOPHAGOGASTRODUODENOSCOPY (EGD);  Surgeon: Kalpesh Perez MD;  Location: [facility];  Service: Gastroenterology    SC ESOPHAGOGASTRODUODENOSCOPY TRANSORAL DIAGNOSTIC N/A 2019    Procedure: ESOPHAGOGASTRODUODENOSCOPY (EGD);  Surgeon: Kalpesh Perez MD;  [facility];  Service: Gastroenterology    SC LITHOTRIPSY XTRCORP SHOCK WAVE Left 2022    Procedure: ESWL;  Surgeon: Markie Osullivan MD;  Location[facility];  Service: Urology    SC POST COLPORRHAPHY RECTOCELE W/WO PERINEORRHAPHY N/A 2017    Procedure: COLPORRHAPHY POSTERIOR;  Surgeon: Reymundo Dill MD;  [facility];  Service: UroGynecology           SC SLING OPERATION STRESS INCONTINENCE N/A 2017    Procedure: INSERTION PUBOVAGINAL SLING /SINGLE INCISION ;  Surgeon: Reymundo Dill MD;  [facility];  Service: UroGynecology           TONSILLECTOMY      TUBAL LIGATION      UPPER GASTROINTESTINAL ENDOSCOPY      US GUIDED BREAST BIOPSY RIGHT COMPLETE Right 10/31/2017     Social History     Socioeconomic History    Marital status: Single     Spouse name: Not on file    Number of children: 4    Years of education: Not on file    Highest education level: Not on file   Occupational History    Occupation: Disability since 2019-- due to a job injury    Tobacco Use    Smoking status: Former     Current packs/day: 0.00     Average packs/day: 1.5 packs/day for 30.0 years (45.0 ttl pk-yrs)     Types: Cigarettes     Start date:      Quit date: 2016     Years since quittin.0    Smokeless tobacco: Never    Tobacco comments:     pt stated quit about a month ago, last assessed 14 per [employer]   Vaping Use    Vaping status: Never Used   Substance and Sexual  Activity    Alcohol use: Not Currently    Drug use: Never    Sexual activity: Not Currently     Partners: Male     Birth control/protection: Female Sterilization     Comment: Boyfriend/father of 4 children   Other Topics Concern    Not on file   Social History Narrative    Home: Lives with boyfriend/father of her 4 children and one of their daughters        Children: 2 sons born 1988 and 1995,  2 daughters born 1997 and 2003        Education:     Social Determinants of Health     Financial Resource Strain: Low Risk  (3/10/2023)    Overall Financial Resource Strain (CARDIA)     Difficulty of Paying Living Expenses: Not hard at all   Food Insecurity: No Food Insecurity (3/10/2023)    Hunger Vital Sign     Worried About Running Out of Food in the Last Year: Never true     Ran Out of Food in the Last Year: Never true   Transportation Needs: No Transportation Needs (3/10/2023)    PRAPARE - Transportation     Lack of Transportation (Medical): No     Lack of Transportation (Non-Medical): No   Physical Activity: Inactive (6/9/2021)    Exercise Vital Sign     Days of Exercise per Week: 0 days     Minutes of Exercise per Session: 0 min   Stress: Not on file   Social Connections: Not on file   Intimate Partner Violence: Not on file   Housing Stability: Low Risk  (8/22/2022)    Housing Stability Vital Sign     Unable to Pay for Housing in the Last Year: No     Number of Places Lived in the Last Year: 1     Unstable Housing in the Last Year: No     Family History   Problem Relation Age of Onset    Cancer Mother     Breast cancer Mother 57    Hypertension Mother     Stroke Father     Diabetes Father     Hypertension Father     Alzheimer's disease Father     Diabetes Sister     BRCA1 Negative Sister     BRCA2 Negative Sister     No Known Problems Daughter     No Known Problems Daughter     Cancer Maternal Grandmother         ovarian    Ovarian cancer Maternal Grandmother         unspecified laterality    Cancer Maternal  "Grandfather     Hypertension Maternal Grandfather     Tongue cancer Maternal Grandfather     Stroke Paternal Grandmother     No Known Problems Paternal Grandfather     Thyroid disease unspecified Brother     Depression Brother     Anxiety disorder Brother     Drug abuse Brother         (pain pills)    No Known Problems Son     No Known Problems Son     Thyroid disease unspecified Maternal Aunt     Colon cancer Maternal Aunt     No Known Problems Maternal Aunt     No Known Problems Maternal Aunt     No Known Problems Maternal Aunt     No Known Problems Paternal Aunt     No Known Problems Paternal Aunt     No Known Problems Paternal Aunt     No Known Problems Paternal Aunt     No Known Problems Paternal Aunt     No Known Problems Paternal Aunt     Alzheimer's disease Cousin     Breast cancer Cousin 42    Breast cancer Family         unk age     Allergies   Allergen Reactions    Gabapentin Other (See Comments)     Annotation - [id number]: Neuropsych effects per patient.  Annotation - [id number]: Neuropsych effects per patient.  Annotation - [id number]: Neuropsych effects per patient.    Latex Dermatitis and Rash     Other reaction(s): Dermatitis    Penicillins Hives       Objective     Visit Vitals  /76   Pulse 76   Ht 5' 7\" (1.702 m)   Wt 101 kg (223 lb)   LMP 10/11/2019   BMI 34.93 kg/m²   OB Status Postmenopausal   Smoking Status Former   BSA 2.12 m²        Physical exam:     Physical Exam  Vitals and nursing note reviewed.   Constitutional:       General: She is not in acute distress.     Appearance: She is well-developed.   HENT:      Head: Normocephalic and atraumatic.   Eyes:      Conjunctiva/sclera: Conjunctivae normal.   Neck:      Vascular: No carotid bruit.   Cardiovascular:      Rate and Rhythm: Normal rate and regular rhythm.      Heart sounds: No murmur heard.     No friction rub. No gallop.   Pulmonary:      Effort: Pulmonary effort is normal. No respiratory distress.      Breath sounds: Normal breath " sounds. No wheezing, rhonchi or rales.   Abdominal:      Palpations: Abdomen is soft.      Tenderness: There is no abdominal tenderness.   Musculoskeletal:         General: Swelling present.      Cervical back: Neck supple.      Comments: 1+ bilateral lower extremity edema   Skin:     General: Skin is warm and dry.      Capillary Refill: Capillary refill takes less than 2 seconds.   Neurological:      Mental Status: She is alert and oriented to person, place, and time.   Psychiatric:         Mood and Affect: Mood normal.           ==  PLEASE NOTE:  This encounter was completed utilizing the [employer] Direct Speech Voice Recognition Software. Grammatical errors, random word insertions, pronoun errors and incomplete sentences are occasional consequences of the system due to software limitations, ambient noise and hardware issues.These may be missed by proof reading prior to affixing electronic signature. Any questions or concerns about the content, text or information contained within the body of this dictation should be directly addressed to the physician for clarification. Please do not hesitate to call me directly if you have any any questions or concerns.

## 2024-02-01 NOTE — TELEPHONE ENCOUNTER
Per [facility] online precert resource list via [employer], 17460/2W [employer]IO XT or 23681/1W [employer] XT, do not require authorization.       SORRY!

## 2024-02-05 ENCOUNTER — OFFICE VISIT (OUTPATIENT)
Dept: SLEEP CENTER | Facility: CLINIC | Age: 59
End: 2024-02-05
Payer: MEDICARE

## 2024-02-05 VITALS
BODY MASS INDEX: 35.16 KG/M2 | HEART RATE: 89 BPM | OXYGEN SATURATION: 97 % | SYSTOLIC BLOOD PRESSURE: 130 MMHG | DIASTOLIC BLOOD PRESSURE: 86 MMHG | WEIGHT: 224 LBS | HEIGHT: 67 IN

## 2024-02-05 DIAGNOSIS — R06.83 SNORING: ICD-10-CM

## 2024-02-05 DIAGNOSIS — J45.40 MODERATE PERSISTENT ASTHMA WITHOUT COMPLICATION: ICD-10-CM

## 2024-02-05 DIAGNOSIS — G47.33 OSA (OBSTRUCTIVE SLEEP APNEA): Primary | ICD-10-CM

## 2024-02-05 DIAGNOSIS — G47.30 SLEEP APNEA IN ADULT: ICD-10-CM

## 2024-02-05 DIAGNOSIS — G47.19 EXCESSIVE DAYTIME SLEEPINESS: ICD-10-CM

## 2024-02-05 DIAGNOSIS — E66.9 CLASS 1 OBESITY: ICD-10-CM

## 2024-02-05 PROCEDURE — 99204 OFFICE O/P NEW MOD 45 MIN: CPT | Performed by: INTERNAL MEDICINE

## 2024-02-05 NOTE — PROGRESS NOTES
Sleep Consultation   Poly Joshua 58 y.o. female MRN: [id number]      Reason for consultation: MICHAEL not currently treated    Requesting physician: Dr. Valera and Dr. Boston    Assessment/Plan    1. Sleep apnea in adult  She was diagnosed in 2018 at [facility], she lost her machine during the recall process never got a replacement machine she will need to reestablish her diagnosis and get the new CPAP machine.  I ordered a home sleep study for her  She is symptomatic, feels sleepy during the day and unrefreshed upon awakening if she does not use a CPAP machine  I explained to the patient in details the pathophysiology of obstructive sleep apnea.  I also explained the importance of treatment, and the consequences of untreated obstructive sleep apnea on underlying cardiovascular and cerebrovascular risk, morbidity, and mortality.      - Ambulatory Referral to Sleep Medicine  - Home Study; Future    2. MICHAEL (obstructive sleep apnea)  - Ambulatory Referral to Sleep Medicine  - Home Study; Future    3. Snoring  R/O MICHAEL  - Home Study; Future    4. Excessive daytime sleepiness  MICHAEL untreated  - Home Study; Future    5. Class 1 obesity  BMI 34.93 noted   - Home Study; Future          History of Present Illness   HPI:  Poly Joshua is a 58 y.o. female with PMHx as below who comes in for evaluation of obstructive sleep apnea she was originally seen in [facility] in 2017 had an in lab diagnostic sleep study that was a borderline, got reevaluated in [facility] in 2018 underwent a home sleep study that was found to be positive for MICHAEL, she started using CPAP at that time with improvement of his sleep quality, sleep interruptions, disturbances and he had excessive daytime sleepiness.  She got to know that her machine was recalled in the recall process of the [place] unfortunately she lost her CPAP machine without getting the replaced machine.  She tells me that her symptoms have recurred, she snores,  snores witnessed, and feels unrefreshed upon awakening.  She has a delayed sleep circadian rhythm she goes to bed around 1 AM wakes up 7 to 8 AM she is a former smoker quit 8 years ago she has underlying bronchial asthma she is on Breo and albuterol which seems to be helping her reportedly.  Her [place] scale is as detailed below.    Sitting and reading: Moderate chance of dozing  Watching TV: Moderate chance of dozing  Sitting, inactive in a public place (e.g. a theatre or a meeting): High chance of dozing  As a passenger in a car for an hour without a break: High chance of dozing  Lying down to rest in the afternoon when circumstances permit: High chance of dozing  Sitting and talking to someone: Slight chance of dozing  Sitting quietly after a lunch without alcohol: High chance of dozing  In a car, while stopped for a few minutes in traffic: Would never doze  Total score: 17          Historical Information   Past Medical History:   Diagnosis Date    Achalasia     Asthma     Colon polyp     Constipation     CPAP (continuous positive airway pressure) dependence     Depression     Diarrhea 2019    Elevated LFTs     last assessed 10/25/17    Fibromyalgia     Gastric ulcer     GERD (gastroesophageal reflux disease)     H. pylori infection 10/09/2017    [facility] - [id number]: Treated, f/u stool antigen negative    Hypertension     Hypothyroidism     Kidney stone     Migraines     Migraines     MICHAEL (obstructive sleep apnea)     last assessed 16    Pneumonia     Sleep apnea 2019    Uses CPAP    Thyroid nodule     last assessed 17     Past Surgical History:   Procedure Laterality Date    BREAST BIOPSY Right 10/31/2017    us bx     SECTION      COLONOSCOPY      ENDOMETRIAL ABLATION      MAMMO STEREOTACTIC BREAST BIOPSY LEFT (ALL INC) Left 2024    MYOTOMY HELLER LAPAROSCOPIC  2016    Dr. Magallanes, Coincident lorena fundoplication    NASAL SINUS SURGERY      OTHER SURGICAL HISTORY       achalasia repair    OVARIAN CYST REMOVAL      PILONIDAL CYST / SINUS EXCISION      IL COLONOSCOPY FLX DX W/COLLJ SPEC WHEN PFRMD N/A 04/25/2019    Procedure: COLONOSCOPY;  Surgeon: Kalpesh Perez MD;  Location: [facility];  Service: Gastroenterology    IL CYSTOURETHROSCOPY N/A 01/19/2017    Procedure: CYSTOSCOPY;  Surgeon: Reymundo Dill MD;  [facility];  Service: UroGynecology           IL ESOPHAGOGASTRODUODENOSCOPY TRANSORAL DIAGNOSTIC N/A 09/07/2017    Procedure: ESOPHAGOGASTRODUODENOSCOPY (EGD);  Surgeon: Kalpesh Perez MD;  Location:  GI LAB;  Service: Gastroenterology    IL ESOPHAGOGASTRODUODENOSCOPY TRANSORAL DIAGNOSTIC N/A 04/25/2019    Procedure: ESOPHAGOGASTRODUODENOSCOPY (EGD);  Surgeon: Kalpesh Perez MD;  Location: [facility];  Service: Gastroenterology    IL LITHOTRIPSY XTRCORP SHOCK WAVE Left 05/26/2022    Procedure: ESWL;  Surgeon: Markie Osullivan MD;  Location: [facility];  Service: Urology    IL POST COLPORRHAPHY RECTOCELE W/WO PERINEORRHAPHY N/A 01/19/2017    Procedure: COLPORRHAPHY POSTERIOR;  Surgeon: Reymundo Dill MD;  [facility];  Service: UroGynecology           IL SLING OPERATION STRESS INCONTINENCE N/A 01/19/2017    Procedure: INSERTION PUBOVAGINAL SLING /SINGLE INCISION ;  Surgeon: Reymundo Dill MD;  [facility];  Service: UroGynecology           TONSILLECTOMY      TUBAL LIGATION      UPPER GASTROINTESTINAL ENDOSCOPY      US GUIDED BREAST BIOPSY RIGHT COMPLETE Right 10/31/2017     Family History   Problem Relation Age of Onset    Cancer Mother     Breast cancer Mother 57    Hypertension Mother     Stroke Father     Diabetes Father     Hypertension Father     Alzheimer's disease Father     Diabetes Sister     BRCA1 Negative Sister     BRCA2 Negative Sister     No Known Problems Daughter     No Known Problems Daughter     Cancer Maternal Grandmother         ovarian    Ovarian cancer Maternal Grandmother         unspecified laterality    Cancer Maternal  Grandfather     Hypertension Maternal Grandfather     Tongue cancer Maternal Grandfather     Stroke Paternal Grandmother     No Known Problems Paternal Grandfather     Thyroid disease unspecified Brother     Depression Brother     Anxiety disorder Brother     Drug abuse Brother         (pain pills)    No Known Problems Son     No Known Problems Son     Thyroid disease unspecified Maternal Aunt     Colon cancer Maternal Aunt     No Known Problems Maternal Aunt     No Known Problems Maternal Aunt     No Known Problems Maternal Aunt     No Known Problems Paternal Aunt     No Known Problems Paternal Aunt     No Known Problems Paternal Aunt     No Known Problems Paternal Aunt     No Known Problems Paternal Aunt     No Known Problems Paternal Aunt     Alzheimer's disease Cousin     Breast cancer Cousin 42    Breast cancer Family         unk age     Social History     Socioeconomic History    Marital status: Single     Spouse name: Not on file    Number of children: 4    Years of education: Not on file    Highest education level: Not on file   Occupational History    Occupation: Disability since 2019-- due to a job injury    Tobacco Use    Smoking status: Former     Current packs/day: 0.00     Average packs/day: 1.5 packs/day for 30.0 years (45.0 ttl pk-yrs)     Types: Cigarettes     Start date:      Quit date: 2016     Years since quittin.1    Smokeless tobacco: Never    Tobacco comments:     pt stated quit about a month ago, last assessed 14 per [facility]   Vaping Use    Vaping status: Never Used   Substance and Sexual Activity    Alcohol use: Not Currently    Drug use: Never    Sexual activity: Not Currently     Partners: Male     Birth control/protection: Female Sterilization     Comment: Boyfriend/father of 4 children   Other Topics Concern    Not on file   Social History Narrative    Home: Lives with boyfriend/father of her 4 children and one of their daughters        Children: 2 sons born  and  1995,  2 daughters born 1997 and 2003        Education:     Social Determinants of Health     Financial Resource Strain: Low Risk  (3/10/2023)    Overall Financial Resource Strain (CARDIA)     Difficulty of Paying Living Expenses: Not hard at all   Food Insecurity: No Food Insecurity (3/10/2023)    Hunger Vital Sign     Worried About Running Out of Food in the Last Year: Never true     Ran Out of Food in the Last Year: Never true   Transportation Needs: No Transportation Needs (3/10/2023)    PRAPARE - Transportation     Lack of Transportation (Medical): No     Lack of Transportation (Non-Medical): No   Physical Activity: Inactive (6/9/2021)    Exercise Vital Sign     Days of Exercise per Week: 0 days     Minutes of Exercise per Session: 0 min   Stress: Not on file   Social Connections: Not on file   Intimate Partner Violence: Not on file   Housing Stability: Low Risk  (8/22/2022)    Housing Stability Vital Sign     Unable to Pay for Housing in the Last Year: No     Number of Places Lived in the Last Year: 1     Unstable Housing in the Last Year: No       Occupational History: disabled     Meds/Allergies   Allergies   Allergen Reactions    Gabapentin Other (See Comments)     Annotation - [id number]: Neuropsych effects per patient.  Annotation - [id number]: Neuropsych effects per patient.  Annotation - [id number]: Neuropsych effects per patient.    Latex Dermatitis and Rash     Other reaction(s): Dermatitis    Penicillins Hives       Home medications:  Prior to Admission medications    Medication Sig Start Date End Date Taking? Authorizing Provider   [place] 0.025 % ophthalmic solution Administer 1 drop to both eyes 2 (two) times a day as needed (For discomfort) 11/17/21  Yes Drew Marshall, DO   albuterol (2.5 mg/3 mL) 0.083 % nebulizer solution TAKE 3 ML (2.5 MG) BY NEBULIZER EVERY 6 HOURS AS NEEDED FOR WHEEZING OR SHORTNESS IF BREATH 6/29/23  Yes Reymundo Trotter, DO   albuterol (PROVENTIL HFA,VENTOLIN HFA) 90 mcg/act  inhaler Inhale 1 puff every 4 (four) hours as needed for wheezing 6/29/23  Yes Reymundo Trotter, DO   Alcohol Swabs (Alcohol Prep) 70 % PADS Use in the morning 3/15/23  Yes Navneet Valera MD   atorvastatin (LIPITOR) 20 mg tablet TOME BOO TABLETA TONISREEN PAINTING VIZCARRA 10/23/23  Yes Be Wood MD   Breo Ellipta 100-25 MCG/ACT inhaler Inhale 1 puff daily Rinse mouth after use. 6/29/23  Yes Reymundo Trotter DO   cetaphil (CETAPHIL) lotion Apply topically as needed for dry skin 12/8/22  Yes Charo Jimenez, DO   cholecalciferol (VITAMIN D3) 1,000 units tablet TAKE 1 TABLET BY MOUTH EVERY DAY 6/14/23  Yes Navneet Valera MD   fexofenadine (ALLEGRA) 180 MG tablet  3/9/22  Yes Hillary Garcia MD   FLUoxetine (PROzac) 20 mg capsule Take 1 capsule (20 mg total) by mouth daily 1/10/24  Yes Viky Lyn PA-C   fluticasone (FLONASE) 50 mcg/act nasal spray 1-2 sprays into each nostril daily 11/17/21  Yes Drew Marshall, DO   furosemide (LASIX) 20 mg tablet Take 1 tablet (20 mg total) by mouth daily 6/29/23  Yes Reymundo Trotter DO   glucose monitoring kit (FREESTYLE) monitoring kit Use 1 each as needed (prediabetes blood sugar checks) 10/18/21  Yes Lissette Castellanos, DO   hydrOXYzine HCL (ATARAX) 50 mg tablet Take 1/2 - 1 tab po tid prn anxiety 1/10/24  Yes Viky Lyn PA-C   ipratropium (ATROVENT) 0.02 % nebulizer solution Take 2.5 mL (0.5 mg total) by nebulization 2 (two) times a day 6/29/23  Yes Reymundo Trotter, DO   ketoconazole (NIZORAL) 2 % cream Apply topically daily 6/29/23  Yes Reymundo Trotter,    lamoTRIgine (LaMICtal) 100 mg tablet Take 1/2 tab po qhs 1/10/24  Yes Viky Lyn PA-C   Lancets (freestyle) lancets Use as instructed 3/10/23  Yes Carolyn Brown MD   levothyroxine 100 mcg tablet TOME BOO TABLETA TODOS LOS VIZCARRA 9/21/23  Yes Lissette Castellanos,    metFORMIN (GLUCOPHAGE-XR) 500 mg 24 hr tablet Take 2 tablets (1,000 mg total) by mouth every morning 3/10/23  Yes Carolyn Tai  "MD Kevin   omeprazole (PriLOSEC) 40 MG capsule TOME BOO CAPSULA DOS VECES AL LARRY 6/14/23  Yes Navneet Valera MD   Premarin vaginal cream INSERT 0.5 GRAMS INTO THE VAGINA A DIARIO 4/27/23  Yes Lopez Atkinson MD   carbamide peroxide (DEBROX) 6.5 % otic solution Administer 5 drops into both ears 2 (two) times a day  Patient not taking: Reported on 2/1/2024 7/25/23   Be Wood MD   methocarbamol (ROBAXIN) 500 mg tablet Take 1 tablet (500 mg total) by mouth 4 (four) times a day as needed for muscle spasms for up to 14 days 7/25/23 8/8/23  Be Wood MD   sucralfate (CARAFATE) 1 g/10 mL suspension Take 10 mL (1 g total) by mouth 4 (four) times a day 6/29/23 2/1/24  Reymundo Trotter DO       Vitals:   Blood pressure 130/86, pulse 89, height 5' 7\" (1.702 m), weight 102 kg (224 lb), last menstrual period 10/11/2019, SpO2 97%, not currently breastfeeding.,  Body mass index is 35.08 kg/m².  Neck Circumference: 16    Physical Exam  General:  Awake alert and oriented x 3, conversant without conversational dyspnea, NAD, normal affect  HEENT:   Sclera noninjected, nonicteric OU, Nares patent    NECK:  Trachea midline, no accessory muscle use, no stridor,  JVP not elevated  CARDIAC: Reg, single s1/S2, no m/r/g  PULM: CTA bilaterally no wheezing, rhonchi or rales  EXT: No cyanosis, no clubbing, no edema  NEURO: no focal neurologic deficits, AAOx3, moving all extremities appropriately    Labs: I have personally reviewed pertinent lab results., ABG: No results found for: \"PHART\", \"MUG5AKM\", \"PO2ART\", \"XKH9HYL\", \"O6QGDMAN\", \"BEART\", \"SOURCE\", BNP: No results found for: \"BNP\", CBC: No results found for: \"WBC\", \"HGB\", \"HCT\", \"MCV\", \"PLT\", \"ADJUSTEDWBC\", \"RBC\", \"MCH\", \"MCHC\", \"RDW\", \"MPV\", \"NRBC\", CMP: No results found for: \"SODIUM\", \"K\", \"CL\", \"CO2\", \"ANIONGAP\", \"BUN\", \"CREATININE\", \"GLUCOSE\", \"CALCIUM\", \"AST\", \"ALT\", \"ALKPHOS\", \"PROT\", \"BILITOT\", \"EGFR\", PT/INR: No results found for: \"PT\", \"INR\", Troponin: No " "results found for: \"TROPONINI\"  Lab Results   Component Value Date    WBC 7.36 07/20/2023    HGB 14.2 07/20/2023    HCT 45.2 07/20/2023    MCV 88 07/20/2023     (H) 07/20/2023      Lab Results   Component Value Date    GLUCOSE 104 01/07/2016    CALCIUM 9.2 07/20/2023     01/07/2016    K 4.1 07/20/2023    CO2 25 07/20/2023     07/20/2023    BUN 14 07/20/2023    CREATININE 0.79 07/20/2023     No results found for: \"IRON\", \"TIBC\", \"FERRITIN\"  No results found for: \"YRFFLMBG27\"  No results found for: \"FOLATE\"      Sleep studies:  2017 Diagnostic  AHI 3.4 events/hr worse in REM sleep       Frantz Escobar MD  [facility]       Portions of the record may have been created with voice recognition software. Occasional wrong word or \"sound a like\" substitutions may have occurred due to the inherent limitations of voice recognition software. Read the chart carefully and recognize, using context, where substitutions have occurred.  "

## 2024-02-05 NOTE — PATIENT INSTRUCTIONS
Sleep Apnea   AMBULATORY CARE:   Sleep apnea  is a condition that causes you to stop breathing often during sleep.  Types of sleep apnea:   Obstructive sleep apnea (MICHAEL)  is the most common kind. The muscles and tissues around your throat relax and block air from passing through. Obesity, use of alcohol or cigarettes, or a family history are common causes. MICHAEL may increase your risk for complications after surgery.         Central sleep apnea (CSA)  means your brain does not send signals to the muscles that control breathing. You do not take a breath even though your airway is open. Common causes include medical conditions such as heart failure, being older than 40, or use of opioids.    Complex (or mixed) sleep apnea  means you have both obstructive and central sleep apnea.    Common signs and symptoms:   Loud snoring or long pauses in breathing    Feeling sleepy, slow, and tired during the day    Snorting, gasping, or choking while you sleep, and waking up suddenly because of these    Feeling irritable during the day    Dry mouth or a headache in the mornings    Heavy night sweating    A hard time thinking, remembering things, or focusing on your tasks the following day    Call your local emergency number (911 in the ) if:   You have chest pain or trouble breathing.      Call your doctor if:   You have new or worsening signs or symptoms.     You have questions or concerns about your condition or care.    Treatment  depends on the kind of apnea you have.  A mouth device  may be needed if you have mild sleep apnea. These are designed to keep your throat open. Ask your dentist or healthcare provider about the best mouth device for you.    A machine  may be used to help you get more air during sleep. A mask may be placed over your nose and mouth, or just your nose. The mask is hooked to the machine. You will get air through the mask.    A continuous positive airway pressure (CPAP) machine  is used to keep your  airway open during sleep. The machine blows a gentle stream of air into the mask when you breathe. This helps keep your airway open so you can breathe more regularly. Extra oxygen may be given through the machine.         A bilevel positive airway pressure (BiPAP) machine  gives air but lowers the pressure when you breathe out.    An adaptive servo-ventilator (ASV)  is a machine that learns your usual breathing pattern. Then, it uses pressure to give you air and prevent stops in your breathing.    Surgery  to expand your airway or remove extra tissues may be needed. Surgery is usually only considered if other treatments do not work.    Manage or prevent sleep apnea:   Reach and maintain a healthy weight.  Ask your healthcare provider what a healthy weight is for you. Ask your provider to help you create a safe weight loss plan if you are overweight. Even a small goal of a 10% weight loss can improve your symptoms.    Do not smoke.  Nicotine and other chemicals in cigarettes and cigars can cause lung damage. Ask your healthcare provider for information if you currently smoke and need help to quit. E-cigarettes or smokeless tobacco still contain nicotine. Talk to your healthcare provider before you use these products.    Do not drink alcohol or take sedative medicine before you go to sleep.  Alcohol and sedatives can relax the muscles and tissues around your throat. This can block the airflow to your lungs.    Sleep on your side or use pillows designed to prevent sleep apnea.  This prevents your tongue or other tissues from blocking your throat. You can also raise the head of your bed.    Follow up with your doctor or specialist as directed:  You may need to have blood tests during your follow-up visits. Work with your provider to find the right breathing support equipment and settings for you. Write down your questions so you remember to ask them during your visits.  © Copyright [employer] 2023 Information is for End  User's use only and may not be sold, redistributed or otherwise used for commercial purposes.  The above information is an  only. It is not intended as medical advice for individual conditions or treatments. Talk to your doctor, nurse or pharmacist before following any medical regimen to see if it is safe and effective for you.

## 2024-02-06 ENCOUNTER — APPOINTMENT (OUTPATIENT)
Dept: LAB | Facility: CLINIC | Age: 59
End: 2024-02-06
Payer: MEDICARE

## 2024-02-06 DIAGNOSIS — E78.2 MIXED HYPERLIPIDEMIA: ICD-10-CM

## 2024-02-06 DIAGNOSIS — R60.0 LOWER EXTREMITY EDEMA: ICD-10-CM

## 2024-02-06 DIAGNOSIS — R06.01 ORTHOPNEA: ICD-10-CM

## 2024-02-06 LAB
ALBUMIN SERPL BCP-MCNC: 4 G/DL (ref 3.5–5)
ALP SERPL-CCNC: 146 U/L (ref 34–104)
ALT SERPL W P-5'-P-CCNC: 29 U/L (ref 7–52)
ANION GAP SERPL CALCULATED.3IONS-SCNC: 7 MMOL/L
AST SERPL W P-5'-P-CCNC: 21 U/L (ref 13–39)
BILIRUB SERPL-MCNC: 0.35 MG/DL (ref 0.2–1)
BNP SERPL-MCNC: 12 PG/ML (ref 0–100)
BUN SERPL-MCNC: 17 MG/DL (ref 5–25)
CALCIUM SERPL-MCNC: 9.5 MG/DL (ref 8.4–10.2)
CHLORIDE SERPL-SCNC: 104 MMOL/L (ref 96–108)
CO2 SERPL-SCNC: 28 MMOL/L (ref 21–32)
CREAT SERPL-MCNC: 0.62 MG/DL (ref 0.6–1.3)
GFR SERPL CREATININE-BSD FRML MDRD: 101 ML/MIN/1.73SQ M
GLUCOSE SERPL-MCNC: 93 MG/DL (ref 65–140)
POTASSIUM SERPL-SCNC: 4 MMOL/L (ref 3.5–5.3)
PROT SERPL-MCNC: 6.7 G/DL (ref 6.4–8.4)
SODIUM SERPL-SCNC: 139 MMOL/L (ref 135–147)

## 2024-02-06 PROCEDURE — 83695 ASSAY OF LIPOPROTEIN(A): CPT

## 2024-02-06 PROCEDURE — 80053 COMPREHEN METABOLIC PANEL: CPT

## 2024-02-06 PROCEDURE — 36415 COLL VENOUS BLD VENIPUNCTURE: CPT

## 2024-02-06 PROCEDURE — 83880 ASSAY OF NATRIURETIC PEPTIDE: CPT

## 2024-02-06 RX ORDER — ALBUTEROL SULFATE 90 UG/1
AEROSOL, METERED RESPIRATORY (INHALATION)
Qty: 90 G | Refills: 3 | Status: SHIPPED | OUTPATIENT
Start: 2024-02-06

## 2024-02-07 LAB — LPA SERPL-SCNC: 31.7 NMOL/L

## 2024-02-09 ENCOUNTER — APPOINTMENT (OUTPATIENT)
Dept: LAB | Age: 59
End: 2024-02-09
Payer: MEDICARE

## 2024-02-09 LAB
CHOLEST SERPL-MCNC: 157 MG/DL
HDLC SERPL-MCNC: 48 MG/DL
LDLC SERPL CALC-MCNC: 95 MG/DL (ref 0–100)
NONHDLC SERPL-MCNC: 109 MG/DL
TRIGL SERPL-MCNC: 72 MG/DL

## 2024-02-09 PROCEDURE — 80061 LIPID PANEL: CPT | Performed by: INTERNAL MEDICINE

## 2024-02-09 PROCEDURE — 36415 COLL VENOUS BLD VENIPUNCTURE: CPT | Performed by: INTERNAL MEDICINE

## 2024-02-14 ENCOUNTER — HOSPITAL ENCOUNTER (OUTPATIENT)
Dept: NON INVASIVE DIAGNOSTICS | Facility: CLINIC | Age: 59
Discharge: HOME | End: 2024-02-14
Payer: MEDICARE

## 2024-02-14 VITALS
DIASTOLIC BLOOD PRESSURE: 86 MMHG | HEART RATE: 89 BPM | HEIGHT: 67 IN | SYSTOLIC BLOOD PRESSURE: 130 MMHG | BODY MASS INDEX: 35.16 KG/M2 | WEIGHT: 224 LBS

## 2024-02-14 DIAGNOSIS — R06.01 ORTHOPNEA: ICD-10-CM

## 2024-02-14 DIAGNOSIS — R60.0 LOWER EXTREMITY EDEMA: ICD-10-CM

## 2024-02-14 LAB
AORTIC ROOT: 3.7 CM
APICAL FOUR CHAMBER EJECTION FRACTION: 65 %
ASCENDING AORTA: 3.3 CM
BSA FOR ECHO PROCEDURE: 2.12 M2
E WAVE DECELERATION TIME: 191 MS
E/A RATIO: 1.38
FRACTIONAL SHORTENING: 31 (ref 28–44)
INTERVENTRICULAR SEPTUM IN DIASTOLE (PARASTERNAL SHORT AXIS VIEW): 1 CM
INTERVENTRICULAR SEPTUM: 1 CM (ref 0.6–1.1)
LAAS-AP2: 20.4 CM2
LAAS-AP4: 21 CM2
LEFT ATRIUM SIZE: 3.9 CM
LEFT ATRIUM VOLUME (MOD BIPLANE): 66 ML
LEFT ATRIUM VOLUME INDEX (MOD BIPLANE): 31.1 ML/M2
LEFT INTERNAL DIMENSION IN SYSTOLE: 3.4 CM (ref 2.1–4)
LEFT VENTRICULAR INTERNAL DIMENSION IN DIASTOLE: 4.9 CM (ref 3.5–6)
LEFT VENTRICULAR POSTERIOR WALL IN END DIASTOLE: 0.9 CM
LEFT VENTRICULAR STROKE VOLUME: 68 ML
LVSV (TEICH): 68 ML
MV E'TISSUE VEL-LAT: 12 CM/S
MV E'TISSUE VEL-SEP: 11 CM/S
MV PEAK A VEL: 0.65 M/S
MV PEAK E VEL: 90 CM/S
MV STENOSIS PRESSURE HALF TIME: 55 MS
MV VALVE AREA P 1/2 METHOD: 4
RIGHT ATRIUM AREA SYSTOLE A4C: 15.9 CM2
RIGHT VENTRICLE ID DIMENSION: 3.9 CM
SL CV LEFT ATRIUM LENGTH A2C: 5.2 CM
SL CV LV EF: 60
SL CV PED ECHO LEFT VENTRICLE DIASTOLIC VOLUME (MOD BIPLANE) 2D: 114 ML
SL CV PED ECHO LEFT VENTRICLE SYSTOLIC VOLUME (MOD BIPLANE) 2D: 46 ML
TR MAX PG: 20 MMHG
TR PEAK VELOCITY: 2.3 M/S
TRICUSPID ANNULAR PLANE SYSTOLIC EXCURSION: 2 CM
TRICUSPID VALVE PEAK REGURGITATION VELOCITY: 2.25 M/S

## 2024-02-14 PROCEDURE — 93306 TTE W/DOPPLER COMPLETE: CPT

## 2024-02-14 PROCEDURE — 93306 TTE W/DOPPLER COMPLETE: CPT | Performed by: INTERNAL MEDICINE

## 2024-02-14 PROCEDURE — 93970 EXTREMITY STUDY: CPT | Performed by: SURGERY

## 2024-02-14 PROCEDURE — 93970 EXTREMITY STUDY: CPT

## 2024-02-21 DIAGNOSIS — F31.81 BIPOLAR 2 DISORDER (HCC): ICD-10-CM

## 2024-02-21 DIAGNOSIS — F41.9 ANXIETY: ICD-10-CM

## 2024-02-21 PROBLEM — J20.8 ACUTE VIRAL BRONCHITIS: Status: RESOLVED | Noted: 2018-12-03 | Resolved: 2024-02-21

## 2024-02-28 DIAGNOSIS — E11.8 CONTROLLED TYPE 2 DIABETES MELLITUS WITH COMPLICATION, WITHOUT LONG-TERM CURRENT USE OF INSULIN (HCC): Primary | ICD-10-CM

## 2024-02-29 RX ORDER — BLOOD-GLUCOSE METER
KIT MISCELLANEOUS
Qty: 1 KIT | Refills: 3 | Status: SHIPPED | OUTPATIENT
Start: 2024-02-29

## 2024-03-01 DIAGNOSIS — R13.19 ESOPHAGEAL DYSPHAGIA: ICD-10-CM

## 2024-03-01 DIAGNOSIS — A04.8 H. PYLORI INFECTION: ICD-10-CM

## 2024-03-01 DIAGNOSIS — K21.9 GASTROESOPHAGEAL REFLUX DISEASE WITHOUT ESOPHAGITIS: ICD-10-CM

## 2024-03-01 RX ORDER — SUCRALFATE ORAL 1 G/10ML
SUSPENSION ORAL
Qty: 420 ML | Refills: 0 | Status: SHIPPED | OUTPATIENT
Start: 2024-03-01

## 2024-03-01 RX ORDER — OMEPRAZOLE 40 MG/1
40 CAPSULE, DELAYED RELEASE ORAL DAILY
Qty: 90 CAPSULE | Refills: 0 | Status: SHIPPED | OUTPATIENT
Start: 2024-03-01

## 2024-03-04 ENCOUNTER — CLINICAL SUPPORT (OUTPATIENT)
Dept: CARDIOLOGY CLINIC | Facility: CLINIC | Age: 59
End: 2024-03-04
Payer: MEDICARE

## 2024-03-04 DIAGNOSIS — R00.2 PALPITATIONS: ICD-10-CM

## 2024-03-04 PROCEDURE — 93248 EXT ECG>7D<15D REV&INTERPJ: CPT | Performed by: INTERNAL MEDICINE

## 2024-03-07 DIAGNOSIS — R13.19 ESOPHAGEAL DYSPHAGIA: ICD-10-CM

## 2024-03-07 DIAGNOSIS — K21.9 GASTROESOPHAGEAL REFLUX DISEASE WITHOUT ESOPHAGITIS: ICD-10-CM

## 2024-03-07 RX ORDER — SUCRALFATE ORAL 1 G/10ML
SUSPENSION ORAL
Qty: 420 ML | Refills: 0 | Status: SHIPPED | OUTPATIENT
Start: 2024-03-07

## 2024-03-07 RX ORDER — FLUOXETINE HYDROCHLORIDE 20 MG/1
CAPSULE ORAL
Qty: 90 CAPSULE | Refills: 0 | Status: SHIPPED | OUTPATIENT
Start: 2024-03-07

## 2024-03-15 DIAGNOSIS — R13.19 ESOPHAGEAL DYSPHAGIA: ICD-10-CM

## 2024-03-15 DIAGNOSIS — K21.9 GASTROESOPHAGEAL REFLUX DISEASE WITHOUT ESOPHAGITIS: ICD-10-CM

## 2024-03-15 RX ORDER — SUCRALFATE ORAL 1 G/10ML
SUSPENSION ORAL
Qty: 420 ML | Refills: 0 | OUTPATIENT
Start: 2024-03-15

## 2024-03-15 NOTE — TELEPHONE ENCOUNTER
Signed 1 week ago (3/7/2024): Patient needs an appointment. Please contact the patient to schedule an appointment. Courtesy refill provided.

## 2024-03-21 DIAGNOSIS — J40 CHRONIC SINUSITIS WITH RECURRENT BRONCHITIS: Primary | ICD-10-CM

## 2024-03-21 DIAGNOSIS — J32.9 CHRONIC SINUSITIS WITH RECURRENT BRONCHITIS: Primary | ICD-10-CM

## 2024-03-31 RX ORDER — FEXOFENADINE HCL 180 MG/1
180 TABLET ORAL DAILY PRN
Qty: 150 TABLET | Refills: 0 | Status: SHIPPED | OUTPATIENT
Start: 2024-03-31

## 2024-04-04 DIAGNOSIS — F41.9 ANXIETY: ICD-10-CM

## 2024-04-04 RX ORDER — HYDROXYZINE 50 MG/1
TABLET, FILM COATED ORAL
Qty: 270 TABLET | Refills: 0 | Status: SHIPPED | OUTPATIENT
Start: 2024-04-04

## 2024-04-04 NOTE — TELEPHONE ENCOUNTER
I e-scribed the renewal for Hydroxyzine to Poly's designated [facility]

## 2024-04-12 ENCOUNTER — TELEPHONE (OUTPATIENT)
Dept: PULMONOLOGY | Facility: CLINIC | Age: 59
End: 2024-04-12

## 2024-04-25 RX ORDER — DEXTROMETHORPHAN HYDROBROMIDE AND PROMETHAZINE HYDROCHLORIDE 15; 6.25 MG/5ML; MG/5ML
SYRUP ORAL
COMMUNITY
Start: 2023-12-20

## 2024-04-25 RX ORDER — TRIAMCINOLONE ACETONIDE 1 MG/G
CREAM TOPICAL
COMMUNITY
Start: 2024-01-24

## 2024-04-25 RX ORDER — DIAZEPAM 5 MG/1
TABLET ORAL
COMMUNITY
Start: 2024-02-09

## 2024-04-30 ENCOUNTER — OFFICE VISIT (OUTPATIENT)
Dept: PULMONOLOGY | Facility: CLINIC | Age: 59
End: 2024-04-30
Payer: MEDICARE

## 2024-04-30 VITALS
BODY MASS INDEX: 35.31 KG/M2 | WEIGHT: 225 LBS | TEMPERATURE: 97.7 F | OXYGEN SATURATION: 97 % | SYSTOLIC BLOOD PRESSURE: 122 MMHG | RESPIRATION RATE: 16 BRPM | HEIGHT: 67 IN | DIASTOLIC BLOOD PRESSURE: 84 MMHG | HEART RATE: 90 BPM

## 2024-04-30 DIAGNOSIS — R13.19 ESOPHAGEAL DYSPHAGIA: ICD-10-CM

## 2024-04-30 DIAGNOSIS — J45.41 MODERATE PERSISTENT ASTHMA WITH EXACERBATION: Primary | ICD-10-CM

## 2024-04-30 DIAGNOSIS — E66.01 OBESITY, MORBID (HCC): ICD-10-CM

## 2024-04-30 DIAGNOSIS — J45.40 MODERATE PERSISTENT ASTHMA WITHOUT COMPLICATION: ICD-10-CM

## 2024-04-30 DIAGNOSIS — G47.33 OSA (OBSTRUCTIVE SLEEP APNEA): ICD-10-CM

## 2024-04-30 DIAGNOSIS — K21.9 GASTROESOPHAGEAL REFLUX DISEASE WITHOUT ESOPHAGITIS: ICD-10-CM

## 2024-04-30 DIAGNOSIS — E66.09 CLASS 2 OBESITY DUE TO EXCESS CALORIES WITH BODY MASS INDEX (BMI) OF 35.0 TO 35.9 IN ADULT, UNSPECIFIED WHETHER SERIOUS COMORBIDITY PRESENT: ICD-10-CM

## 2024-04-30 DIAGNOSIS — J32.9 CHRONIC SINUSITIS WITH RECURRENT BRONCHITIS: ICD-10-CM

## 2024-04-30 DIAGNOSIS — Z87.891 ENCOUNTER FOR SCREENING FOR MALIGNANT NEOPLASM OF LUNG IN FORMER SMOKER WHO QUIT IN PAST 15 YEARS WITH 30 PACK YEAR HISTORY OR GREATER: ICD-10-CM

## 2024-04-30 DIAGNOSIS — R60.0 EDEMA OF BOTH LEGS: ICD-10-CM

## 2024-04-30 DIAGNOSIS — Z12.2 ENCOUNTER FOR SCREENING FOR MALIGNANT NEOPLASM OF LUNG IN FORMER SMOKER WHO QUIT IN PAST 15 YEARS WITH 30 PACK YEAR HISTORY OR GREATER: ICD-10-CM

## 2024-04-30 DIAGNOSIS — J40 CHRONIC SINUSITIS WITH RECURRENT BRONCHITIS: ICD-10-CM

## 2024-04-30 PROBLEM — J45.909 ASTHMA: Status: RESOLVED | Noted: 2017-01-19 | Resolved: 2024-04-30

## 2024-04-30 PROCEDURE — 99214 OFFICE O/P EST MOD 30 MIN: CPT | Performed by: INTERNAL MEDICINE

## 2024-04-30 PROCEDURE — G2211 COMPLEX E/M VISIT ADD ON: HCPCS | Performed by: INTERNAL MEDICINE

## 2024-04-30 RX ORDER — ONABOTULINUMTOXINA 200 [USP'U]/1
INJECTION, POWDER, LYOPHILIZED, FOR SOLUTION INTRADERMAL; INTRAMUSCULAR
COMMUNITY
Start: 2024-04-16

## 2024-04-30 RX ORDER — ALBUTEROL SULFATE 90 UG/1
2 AEROSOL, METERED RESPIRATORY (INHALATION) EVERY 6 HOURS PRN
Qty: 18 G | Refills: 5 | Status: SHIPPED | OUTPATIENT
Start: 2024-04-30

## 2024-04-30 RX ORDER — FLUTICASONE FUROATE AND VILANTEROL 200; 25 UG/1; UG/1
1 POWDER RESPIRATORY (INHALATION) DAILY
Qty: 180 BLISTER | Refills: 1 | Status: SHIPPED | OUTPATIENT
Start: 2024-04-30 | End: 2024-10-27

## 2024-04-30 RX ORDER — ALBUTEROL SULFATE 2.5 MG/3ML
SOLUTION RESPIRATORY (INHALATION)
Qty: 360 ML | Refills: 3 | Status: SHIPPED | OUTPATIENT
Start: 2024-04-30

## 2024-04-30 RX ORDER — LIDOCAINE 40 MG/G
CREAM TOPICAL
COMMUNITY
Start: 2024-04-29

## 2024-04-30 NOTE — ASSESSMENT & PLAN NOTE
Associated with chronic orthopnea.  No improvement with Lasix.  Echocardiogram is fine and no evidence of diastolic dysfunction.  Also her BNP is very low.  Continue to follow with cardiology as needed.  Probably not cardiac.  I counseled patient to decrease salt intake and elevate legs when sitting.

## 2024-04-30 NOTE — PROGRESS NOTES
Consultation - Pulmonary Medicine   Poly Joshua 59 y.o. female MRN: [id number]    Physician Requesting Consult: self referral  Reason for Consult: Asthma and history of smoking    Moderate persistent asthma with exacerbation  I got mixed history from the patient, on 1 hand she feels fine but on the other hand she wanted to increase her Breo because she continues to have symptoms.  She uses her pair albuterol once weekly which I believe it is decent but at the same time she reports frequent respiratory symptoms.  I decided to increase Breo to 200/25 and we will take it from there and in the future we can wean down if she feels better.  Fortunately her PFTs was normal with no obstruction and no COPD and no evidence of emphysema on CT scan due to her history of smoking    MICHAEL (obstructive sleep apnea)  Follow-up with sleep study    Chronic sinusitis with recurrent bronchitis  Continue Flonase and antihistamine as needed    Gastroesophageal reflux disease  Continue omeprazole and follow-up with GI    Esophageal dysphagia  Follow-up with GI, I counseled her about aspiration precautions specially with her achalasia    Encounter for screening for malignant neoplasm of lung in former smoker who quit in past 15 years with 30 pack year history or greater  I discussed with her that she is a candidate for lung cancer CT screening.     The following Shared Decision-Making points were covered:  Benefits of screening were discussed, including the rates of reduction in death from lung cancer and other causes.  Harms of screening were reviewed, including false positive tests, radiation exposure levels, risks of invasive procedures, risks of complications of screening, and risk of overdiagnosis.  I counseled on the importance of adherence to annual lung cancer LDCT screening, impact of co-morbidities, and ability or willingness to undergo diagnosis and treatment.  I counseled on the importance of maintaining abstinence as a  former smoker or was counseled on the importance of smoking cessation if a current smoker    Review of Eligibility Criteria: She meets all of the criteria for Lung Cancer Screening.   She is 59 y.o.   She has 20 pack year tobacco history and is a current smoker or has quit within the past 15 years  She presents no signs or symptoms of lung cancer    After discussion, the patient decided to elect lung cancer screening.    Obesity, morbid (HCC)  Couraged patient to decrease calorie intake and to start exercise to lose weight    Edema of both legs  Associated with chronic orthopnea.  No improvement with Lasix.  Echocardiogram is fine and no evidence of diastolic dysfunction.  Also her BNP is very low.  Continue to follow with cardiology as needed.  Probably not cardiac.  I counseled patient to decrease salt intake and elevate legs when sitting.      Return in about 6 months (around 10/30/2024).    Diagnoses and all orders for this visit:    Moderate persistent asthma with exacerbation  -     fluticasone-vilanterol (Breo Ellipta) 200-25 mcg/actuation inhaler; Inhale 1 puff daily Rinse mouth after use.    Chronic sinusitis with recurrent bronchitis    MICHAEL (obstructive sleep apnea)    Gastroesophageal reflux disease without esophagitis    Esophageal dysphagia    Class 2 obesity due to excess calories with body mass index (BMI) of 35.0 to 35.9 in adult, unspecified whether serious comorbidity present    Encounter for screening for malignant neoplasm of lung in former smoker who quit in past 15 years with 30 pack year history or greater  -     CT lung screening program; Future    Moderate persistent asthma without complication  -     albuterol (2.5 mg/3 mL) 0.083 % nebulizer solution; TAKE 3 ML (2.5 MG) BY NEBULIZER EVERY 6 HOURS AS NEEDED FOR WHEEZING OR SHORTNESS IF BREATH  -     albuterol (PROVENTIL HFA,VENTOLIN HFA) 90 mcg/act inhaler; Inhale 2 puffs every 6 (six) hours as needed for wheezing or shortness of  breath    Obesity, morbid (HCC)    Edema of both legs    Other orders  -     diazepam (VALIUM) 5 mg tablet; TAKE 1 TO 2 TABLET BY MOUTH AS NEEDED 30 MINS PRIOR  -     promethazine-dextromethorphan (PHENERGAN-DM) 6.25-15 mg/5 mL oral syrup; 5 ml at bedtime  -     triamcinolone (KENALOG) 0.1 % cream; APPLY TO AFFECTED AREA TOPICALLY TWICE DAILY FOR 14 DAYS.  -     Diclofenac Sodium (VOLTAREN) 1 %; AS DIRECTED EXTERNALLY FOUR TIMES DAILY 30 DAYS  -     lidocaine (LMX) 4 % cream; 1 APPLICATION AS NEEDED EXTERNALLY THREE TIMES A DAY 30 DAY(S)  -     Botox 200 units SOLR      ______________________________________________________________________    HPI:    Poly Joshua is a 59 y.o. female who is a former smoke with asthma presents today for evaluation.  She used to get annual lung cancer screening CTs up to last year and wanted follow-up with pulmonary to continue.  She has asthma diagnosed several years ago but not in her childhood, she is currently managed with Breo 100/25 and as needed albuterol.  She feels fine in general and she needs her parent albuterol once weekly probably.  She has intermittent shortness of breath and cough but not significant.  Last year she had 2 episodes of bronchitis.  She denies significant sputum production.    Patient has chronic legs edema and orthopnea currently managed with Lasix 20 mg without changes, she follows with cardiology.    Patient has GERD and history of peptic ulcer disease currently on PPI.  She also has achalasia with esophageal dysphagia especially with dry food, she had surgical intervention for her achalasia and she follows currently with GI and last EGD was in 2023.    Patient had sleep study 10 years ago that was negative but she is scheduled to have another home sleep study because she still suspects she has obstructive sleep apnea.  She denies excessive daytime sleepiness and not sure if she has snoring.  She reports that she gained weight since last sleep  study.    Patient lives at home with her son, she has a dog, no exposure to birds.  She smoked 2 packs/day for 30 years and quit in 2014.  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    HENT:  Positive for trouble swallowing.    Eyes: Negative.    Respiratory:  Positive for shortness of breath and wheezing.    Cardiovascular: Negative.    Gastrointestinal: Negative.    Endocrine: Negative.    Genitourinary: Negative.    Musculoskeletal: Negative.    Skin: Negative.    Allergic/Immunologic: Negative.    Neurological: Negative.    Hematological: Negative.    Psychiatric/Behavioral: Negative.       Aside from what is mentioned in the HPI, the review of systems otherwise negative.    Current Medications:    Current Outpatient Medications:     Alaway 0.025 % ophthalmic solution, Administer 1 drop to both eyes 2 (two) times a day as needed (For discomfort), Disp: 5 mL, Rfl: 0    albuterol (2.5 mg/3 mL) 0.083 % nebulizer solution, TAKE 3 ML (2.5 MG) BY NEBULIZER EVERY 6 HOURS AS NEEDED FOR WHEEZING OR SHORTNESS IF BREATH, Disp: 75 mL, Rfl: 0    albuterol (PROVENTIL HFA,VENTOLIN HFA) 90 mcg/act inhaler, INHALE 2 PUFFS BY MOUTH EVERY 4 HRS AS NEEDED FOR WHEEZING OR SHORTNESS OF BREATH, Disp: 90 g, Rfl: 3    Alcohol Swabs (Alcohol Prep) 70 % PADS, Use in the morning, Disp: 100 each, Rfl: 1    atorvastatin (LIPITOR) 20 mg tablet, TOME BOO TABLETA TODOS LOS VIZCARRA, Disp: 90 tablet, Rfl: 1    Blood Glucose Monitoring Suppl (FreeStyle Lite) w/Device KIT, USE AS DIRECTED, Disp: 1 kit, Rfl: 3    Botox 200 units SOLR, , Disp: , Rfl:     Breo Ellipta 100-25 MCG/ACT inhaler, Inhale 1 puff daily Rinse mouth after use., Disp: 60 each, Rfl: 2    cetaphil (CETAPHIL) lotion, Apply topically as needed for dry skin, Disp: 236 mL, Rfl: 0    cholecalciferol (VITAMIN D3) 1,000 units tablet, TAKE 1 TABLET BY MOUTH EVERY DAY, Disp: 90 tablet, Rfl: 3    diazepam (VALIUM) 5  mg tablet, TAKE 1 TO 2 TABLET BY MOUTH AS NEEDED 30 MINS PRIOR, Disp: , Rfl:     Diclofenac Sodium (VOLTAREN) 1 %, AS DIRECTED EXTERNALLY FOUR TIMES DAILY 30 DAYS, Disp: , Rfl:     fexofenadine (ALLEGRA) 180 MG tablet, TAKE 1 TABLET BY MOUTH DAILY AS NEEDED, Disp: 150 tablet, Rfl: 0    FLUoxetine (PROzac) 20 mg capsule, TOME BOO CAPSULA TODOS LOS DIAS, Disp: 90 capsule, Rfl: 0    fluticasone (FLONASE) 50 mcg/act nasal spray, 1-2 sprays into each nostril daily, Disp: 16 g, Rfl: 6    furosemide (LASIX) 20 mg tablet, Take 1 tablet (20 mg total) by mouth daily, Disp: 90 tablet, Rfl: 3    glucose monitoring kit (FREESTYLE) monitoring kit, Use 1 each as needed (prediabetes blood sugar checks), Disp: 1 each, Rfl: 0    hydrOXYzine HCL (ATARAX) 50 mg tablet, TAKE 1/2 TO 1 TABLET BY MOUTH 3 TIMES DAILY AS NEEDED FOR ANXIETY, Disp: 270 tablet, Rfl: 0    ipratropium (ATROVENT) 0.02 % nebulizer solution, Take 2.5 mL (0.5 mg total) by nebulization 2 (two) times a day, Disp: 5 mL, Rfl: 2    ketoconazole (NIZORAL) 2 % cream, Apply topically daily, Disp: 15 g, Rfl: 0    lamoTRIgine (LaMICtal) 100 mg tablet, Take 1/2 tab po qhs, Disp: 45 tablet, Rfl: 0    Lancets (freestyle) lancets, Use as instructed, Disp: 100 each, Rfl: 2    levothyroxine 100 mcg tablet, TOME BOO TABLETA TODOS LOS DIAS, Disp: 90 tablet, Rfl: 3    lidocaine (LMX) 4 % cream, 1 APPLICATION AS NEEDED EXTERNALLY THREE TIMES A DAY 30 DAY(S), Disp: , Rfl:     metFORMIN (GLUCOPHAGE-XR) 500 mg 24 hr tablet, Take 2 tablets (1,000 mg total) by mouth every morning, Disp: 180 tablet, Rfl: 3    omeprazole (PriLOSEC) 40 MG capsule, TAKE 1 CAPSULE BY MOUTH ONCE DAILY, Disp: 90 capsule, Rfl: 0    Premarin vaginal cream, INSERT 0.5 GRAMS INTO THE VAGINA A DIARIO, Disp: 30 g, Rfl: 1    promethazine-dextromethorphan (PHENERGAN-DM) 6.25-15 mg/5 mL oral syrup, 5 ml at bedtime, Disp: , Rfl:     sucralfate (CARAFATE) 1 g/10 mL suspension, TAKE 10 ML BY MOUTH 4 TIMES DAILY, Disp: 420 mL,  Rfl: 0    triamcinolone (KENALOG) 0.1 % cream, APPLY TO AFFECTED AREA TOPICALLY TWICE DAILY FOR 14 DAYS., Disp: , Rfl:     carbamide peroxide (DEBROX) 6.5 % otic solution, Administer 5 drops into both ears 2 (two) times a day (Patient not taking: Reported on 2024), Disp: 15 mL, Rfl: 0    methocarbamol (ROBAXIN) 500 mg tablet, Take 1 tablet (500 mg total) by mouth 4 (four) times a day as needed for muscle spasms for up to 14 days, Disp: 28 tablet, Rfl: 1    Historical Information   Past Medical History:   Diagnosis Date    Achalasia     Asthma     Colon polyp     Constipation     CPAP (continuous positive airway pressure) dependence     Depression     Diarrhea 2019    Elevated LFTs     last assessed 10/25/17    Fibromyalgia     Gastric ulcer     GERD (gastroesophageal reflux disease)     H. pylori infection 10/09/2017    [facility] [id number]: Treated, f/u stool antigen negative    Hypertension     Hypothyroidism     Kidney stone     Migraines     Migraines     MICHAEL (obstructive sleep apnea)     last assessed 16    Pneumonia     Sleep apnea 2019    Uses CPAP    Thyroid nodule     last assessed 17     Past Surgical History:   Procedure Laterality Date    BREAST BIOPSY Right 10/31/2017    us bx     SECTION      COLONOSCOPY      ENDOMETRIAL ABLATION      MAMMO STEREOTACTIC BREAST BIOPSY LEFT (ALL INC) Left 2024    MYOTOMY HELLER LAPAROSCOPIC  2016    Dr. Magallanes, Coincident lorena fundoplication    NASAL SINUS SURGERY      OTHER SURGICAL HISTORY      achalasia repair    OVARIAN CYST REMOVAL      PILONIDAL CYST / SINUS EXCISION      RI COLONOSCOPY FLX DX W/COLLJ SPEC WHEN PFRMD N/A 2019    Procedure: COLONOSCOPY;  Surgeon: Kalpesh Perez MD;  [facility];  Service: Gastroenterology    RI CYSTOURETHROSCOPY N/A 2017    Procedure: CYSTOSCOPY;  Surgeon: Reymundo Dill MD;  [facility];  Service: UroGynecology           RI ESOPHAGOGASTRODUODENOSCOPY TRANSORAL  DIAGNOSTIC N/A 2017    Procedure: ESOPHAGOGASTRODUODENOSCOPY (EGD);  Surgeon: Kalpesh Perez MD;  [facility]: [facility];  Service: Gastroenterology    WY ESOPHAGOGASTRODUODENOSCOPY TRANSORAL DIAGNOSTIC N/A 2019    Procedure: ESOPHAGOGASTRODUODENOSCOPY (EGD);  Surgeon: Kalpesh Perez MD;  [facility];  Service: Gastroenterology    WY LITHOTRIPSY XTRCORP SHOCK WAVE Left 2022    Procedure: ESWL;  Surgeon: Markie Osullivan MD;  Location[facility];  Service: Urology    WY POST COLPORRHAPHY RECTOCELE W/WO PERINEORRHAPHY N/A 2017    Procedure: COLPORRHAPHY POSTERIOR;  Surgeon: Reymundo Dill MD;  [facility];  Service: UroGynecology           WY SLING OPERATION STRESS INCONTINENCE N/A 2017    Procedure: INSERTION PUBOVAGINAL SLING /SINGLE INCISION ;  Surgeon: Reymundo Dill MD;  [facility];  Service: UroGynecology           TONSILLECTOMY      TUBAL LIGATION      UPPER GASTROINTESTINAL ENDOSCOPY      US GUIDED BREAST BIOPSY RIGHT COMPLETE Right 10/31/2017     Social History   Social History     Tobacco Use   Smoking Status Former    Current packs/day: 0.00    Average packs/day: 1.5 packs/day for 30.0 years (45.0 ttl pk-yrs)    Types: Cigarettes    Start date:     Quit date: 2016    Years since quittin.3   Smokeless Tobacco Never   Tobacco Comments    pt stated quit about a month ago, last assessed 14 per [employer]       Occupational history:  No occupational exposure    Family History:   Family History   Problem Relation Age of Onset    Cancer Mother     Breast cancer Mother 57    Hypertension Mother     Stroke Father     Diabetes Father     Hypertension Father     Alzheimer's disease Father     Diabetes Sister     BRCA1 Negative Sister     BRCA2 Negative Sister     No Known Problems Daughter     No Known Problems Daughter     Cancer Maternal Grandmother         ovarian    Ovarian cancer Maternal Grandmother         unspecified laterality    Cancer  "Maternal Grandfather     Hypertension Maternal Grandfather     Tongue cancer Maternal Grandfather     Stroke Paternal Grandmother     No Known Problems Paternal Grandfather     Thyroid disease unspecified Brother     Depression Brother     Anxiety disorder Brother     Drug abuse Brother         (pain pills)    No Known Problems Son     No Known Problems Son     Thyroid disease unspecified Maternal Aunt     Colon cancer Maternal Aunt     No Known Problems Maternal Aunt     No Known Problems Maternal Aunt     No Known Problems Maternal Aunt     No Known Problems Paternal Aunt     No Known Problems Paternal Aunt     No Known Problems Paternal Aunt     No Known Problems Paternal Aunt     No Known Problems Paternal Aunt     No Known Problems Paternal Aunt     Alzheimer's disease Cousin     Breast cancer Cousin 42    Breast cancer Family         unk age         PhysicalExamination:  Vitals:   /84 (BP Location: Left arm, Patient Position: Sitting, Cuff Size: Adult)   Pulse 90   Temp 97.7 °F (36.5 °C) (Tympanic)   Resp 16   Ht 5' 7\" (1.702 m)   Wt 102 kg (225 lb)   LMP 10/11/2019   SpO2 97%   BMI 35.24 kg/m²     Gen:  Comfortable on room air.  No conversational dyspnea  HEENT:  Conjugate gaze.  sclerae anicteric.  Oropharynx moist  Neck: Trachea is midline. No JVD. No adenopathy  Chest: Pulm breath sounds and clear to auscultation bilaterally, no wheezing or crackles  Cardiac: S1-S2 regular. no murmur  Abdomen:  benign  Extremities: + pitting edema  Neuro:  Normal speech and mentation      Diagnostic Data:  Labs:  I personally reviewed the most recent laboratory data pertinent to today's visit    Lab Results   Component Value Date    WBC 7.36 07/20/2023    HGB 14.2 07/20/2023    HCT 45.2 07/20/2023    MCV 88 07/20/2023     (H) 07/20/2023     Lab Results   Component Value Date    GLUCOSE 104 01/07/2016    CALCIUM 9.5 02/06/2024     01/07/2016    K 4.0 02/06/2024    CO2 28 02/06/2024     " "02/06/2024    BUN 17 02/06/2024    CREATININE 0.62 02/06/2024     No results found for: \"IGE\"  Lab Results   Component Value Date    ALT 29 02/06/2024    AST 21 02/06/2024    ALKPHOS 146 (H) 02/06/2024    BILITOT 0.36 12/03/2015       PFT results:  The most recent pulmonary function tests were reviewed.  2016:  Normal spirometry with no obstruction  No significant bronchodilator response  Normal total lung capacity but increased residual volume indicating air trapping  Normal diffusion capacity  Normal flow-volume loop  Negative methacholine challenge test with no evidence of bronchial hyperresponsiveness    Imaging:  I personally reviewed the images on the PAC system pertinent to today's visit  Chest CT scan reviewed on PACS: Few atelectatic changes but otherwise clear lung parenchyma with no suspicious lesions or nodules    Other studies:  Echocardiogram 2024: LVEF 60%, normal RV    EGD 2023:IMPRESSION:  LA class B esophagitis.  Mild antral gastritis.  Biopsies taken for H. pylori culture.  Multiple duodenal clean-based ulcers.    Sleep study 2016:  IMPRESSION: Mild sleep-disordered breathing - the criteria for diagnosis of obstructive sleep apnea are not met    Dank Lei MD  "

## 2024-04-30 NOTE — ASSESSMENT & PLAN NOTE
I discussed with her that she is a candidate for lung cancer CT screening.     The following Shared Decision-Making points were covered:  Benefits of screening were discussed, including the rates of reduction in death from lung cancer and other causes.  Harms of screening were reviewed, including false positive tests, radiation exposure levels, risks of invasive procedures, risks of complications of screening, and risk of overdiagnosis.  I counseled on the importance of adherence to annual lung cancer LDCT screening, impact of co-morbidities, and ability or willingness to undergo diagnosis and treatment.  I counseled on the importance of maintaining abstinence as a former smoker or was counseled on the importance of smoking cessation if a current smoker    Review of Eligibility Criteria: She meets all of the criteria for Lung Cancer Screening.   She is 59 y.o.   She has 20 pack year tobacco history and is a current smoker or has quit within the past 15 years  She presents no signs or symptoms of lung cancer    After discussion, the patient decided to elect lung cancer screening.

## 2024-04-30 NOTE — ASSESSMENT & PLAN NOTE
I got mixed history from the patient, on 1 hand she feels fine but on the other hand she wanted to increase her Breo because she continues to have symptoms.  She uses her pair albuterol once weekly which I believe it is decent but at the same time she reports frequent respiratory symptoms.  I decided to increase Breo to 200/25 and we will take it from there and in the future we can wean down if she feels better.  Fortunately her PFTs was normal with no obstruction and no COPD and no evidence of emphysema on CT scan due to her history of smoking

## 2024-05-06 ENCOUNTER — HOSPITAL ENCOUNTER (OUTPATIENT)
Dept: RADIOLOGY | Facility: HOSPITAL | Age: 59
Discharge: HOME | End: 2024-05-06
Attending: INTERNAL MEDICINE
Payer: MEDICARE

## 2024-05-06 DIAGNOSIS — Z87.891 ENCOUNTER FOR SCREENING FOR MALIGNANT NEOPLASM OF LUNG IN FORMER SMOKER WHO QUIT IN PAST 15 YEARS WITH 30 PACK YEAR HISTORY OR GREATER: ICD-10-CM

## 2024-05-06 DIAGNOSIS — Z12.2 ENCOUNTER FOR SCREENING FOR MALIGNANT NEOPLASM OF LUNG IN FORMER SMOKER WHO QUIT IN PAST 15 YEARS WITH 30 PACK YEAR HISTORY OR GREATER: ICD-10-CM

## 2024-05-06 PROCEDURE — 71271 CT THORAX LUNG CANCER SCR C-: CPT

## 2024-05-07 LAB
LEFT EYE DIABETIC RETINOPATHY: NORMAL
RIGHT EYE DIABETIC RETINOPATHY: NORMAL

## 2024-05-14 ENCOUNTER — CONSULT (OUTPATIENT)
Dept: VASCULAR SURGERY | Facility: CLINIC | Age: 59
End: 2024-05-14
Payer: MEDICARE

## 2024-05-14 VITALS
OXYGEN SATURATION: 97 % | BODY MASS INDEX: 34.94 KG/M2 | HEART RATE: 78 BPM | HEIGHT: 67 IN | WEIGHT: 222.6 LBS | SYSTOLIC BLOOD PRESSURE: 122 MMHG | DIASTOLIC BLOOD PRESSURE: 82 MMHG

## 2024-05-14 DIAGNOSIS — I83.893 SYMPTOMATIC VARICOSE VEINS OF BOTH LOWER EXTREMITIES: Primary | ICD-10-CM

## 2024-05-14 PROCEDURE — 99203 OFFICE O/P NEW LOW 30 MIN: CPT | Performed by: NURSE PRACTITIONER

## 2024-05-14 NOTE — PROGRESS NOTES
"Assessment/Plan:    Symptomatic varicose veins of both lower extremities  59-year-old female with HTN, migraines, MICHAEL, DM, GERD, bilateral lower extremity heaviness, swelling, mild stasis changes.  Patient returns to the office to recheck lower extremities    -Complaining of bilateral lower extremity heaviness, swelling, mild stasis changes and reports easy bruising at spider/reticular veins  -No significantly enlarged truncal varicosities.  Scattered spider/reticular veins  -Previously evaluated by vascular Aug '23 and started 20 to 30 mmHg thigh-high compression and elevating her legs with no relief of symptoms   -Rx 20-30 mmHg compression stockings given for ease of use  -Additionally exercise, walk daily for 30 minutes   -Will evaluate with venous reflux study and return to the office with vascular surgeon to review and discuss treatment options       Diagnoses and all orders for this visit:    Symptomatic varicose veins of both lower extremities  -     VAS Lower extremity venous insufficiency duplex, bilateral w/ measurements; Future  -     Compression Stocking      Subjective:      Patient ID: Poly Joshua is a 59 y.o. female.    Patient presents for evaluation of BLE. She reports swelling and pain in both her legs. She states  her \"veins pop\" during activity causing bruising. She had a LEV done 2/14/24. She states she tried compression for three months, but did not help. She takes Atorvastatin.      HPI  59-year-old female with HTN, migraines, MICHAEL, DM, GERD, bilateral lower extremity heaviness, swelling, mild stasis changes.  Patient returns to the office to recheck lower extremities. Several years of BLE pain, heaviness and swelling that is worsening. She is complaining of bilateral lower extremity heaviness, swelling, mild stasis changes and reports easy bruising at spider/reticular veins. She has no significantly enlarged truncal varicosities.  Scattered spider/reticular veins bilateral lower " "extremities. She previously evaluated by vascular Dr Wayne Ramirez Aug '23 and started 20 to 30 mmHg thigh high compression which provide no relief and are painful to wear. She denies history of DVT/SVT. She also notes with prolonged kneeling she has bruising at spider veins of the medial knee and lateral calf.   The following portions of the patient's history were reviewed and updated as appropriate: allergies, current medications, past family history, past medical history, past social history, past surgical history, and problem list.  ROS reviewed     Review of Systems   Constitutional: Negative.    HENT: Negative.     Eyes: Negative.    Respiratory: Negative.     Cardiovascular:  Positive for leg swelling (BLE).   Gastrointestinal: Negative.    Endocrine: Negative.    Genitourinary: Negative.    Musculoskeletal: Negative.    Skin: Negative.    Allergic/Immunologic: Negative.    Neurological: Negative.    Hematological: Negative.    Psychiatric/Behavioral: Negative.           Objective:    I have reviewed and made appropriate changes to the review of systems input by the medical assistant.    Vitals:    05/14/24 1016   BP: 122/82   BP Location: Left arm   Patient Position: Sitting   Cuff Size: Large   Pulse: 78   SpO2: 97%   Weight: 101 kg (222 lb 9.6 oz)   Height: 5' 7\" (1.702 m)       Patient Active Problem List   Diagnosis    Mixed incontinence    Stress incontinence in female    MICHAEL (obstructive sleep apnea)    Hypothyroidism    Irritable bowel syndrome with constipation    Chronic low back pain    Arthritis    Gastritis    Rectocele    Cystocele    Hypermobility of urethra    Achalasia    Edema of both legs    Breast pain, right    Chronic sinusitis with recurrent bronchitis    Gastroesophageal reflux disease    Headache, migraine    Intractable chronic migraine without aura and without status migrainosus    Breast mass    Class 1 obesity    Other chronic pain    Spondylosis of lumbar region without myelopathy " or radiculopathy    Thyroiditis    Tubular adenoma of colon    Food allergy    POP-Q stage 2 cystocele    Moderate persistent asthma with exacerbation    Prediabetes    Need for shingles vaccine    Discomfort of right ear    Trochanteric bursitis of right hip    Pain in right hip    Numbness of toes    Hashimoto's thyroiditis    Lower abdominal pain    Left ovarian cyst    History of endometrial ablation    Left nephrolithiasis    Abdominal bloating    Achalasia of digestive tract    Esophageal dysphagia    Hypertension    Anxiety    Insomnia    Bipolar 2 disorder (HCC)    COVID-19    Encounter for long-term (current) use of other medications    Sebaceous cyst of right axilla    Pelvic pain    Neck pain    Vascular insufficiency    Proctalgia fugax    Controlled type 2 diabetes mellitus with complication, without long-term current use of insulin (HCC)    Encounter for screening for malignant neoplasm of lung in former smoker who quit in past 15 years with 30 pack year history or greater    Obesity, morbid (HCC)    Symptomatic varicose veins of both lower extremities       Past Surgical History:   Procedure Laterality Date    BREAST BIOPSY Right 10/31/2017    us bx     SECTION      COLONOSCOPY      ENDOMETRIAL ABLATION      MAMMO STEREOTACTIC BREAST BIOPSY LEFT (ALL INC) Left 2024    MYOTOMY HELLER LAPAROSCOPIC  2016    Dr. Magallanes, Coincident lorena fundoplication    NASAL SINUS SURGERY      OTHER SURGICAL HISTORY      achalasia repair    OVARIAN CYST REMOVAL      PILONIDAL CYST / SINUS EXCISION      OK COLONOSCOPY FLX DX W/COLLJ SPEC WHEN PFRMD N/A 2019    Procedure: COLONOSCOPY;  Surgeon: Kalpesh Perez MD;  [facility]: [facility];  Service: Gastroenterology    OK CYSTOURETHROSCOPY N/A 2017    Procedure: CYSTOSCOPY;  Surgeon: Reymundo Dill MD;  Location: [facility];  Service: UroGynecology           OK ESOPHAGOGASTRODUODENOSCOPY TRANSORAL DIAGNOSTIC N/A 2017    Procedure:  ESOPHAGOGASTRODUODENOSCOPY (EGD);  Surgeon: Kalpesh Perez MD;  Location: [facility];  Service: Gastroenterology    ID ESOPHAGOGASTRODUODENOSCOPY TRANSORAL DIAGNOSTIC N/A 04/25/2019    Procedure: ESOPHAGOGASTRODUODENOSCOPY (EGD);  Surgeon: Kalpesh Perez MD;  [facility]: [facility];  Service: Gastroenterology    ID LITHOTRIPSY XTRCORP SHOCK WAVE Left 05/26/2022    Procedure: ESWL;  Surgeon: Markie Osullivan MD;  Location: [facility];  Service: Urology    ID POST COLPORRHAPHY RECTOCELE W/WO PERINEORRHAPHY N/A 01/19/2017    Procedure: COLPORRHAPHY POSTERIOR;  Surgeon: Reymundo Dill MD;  [facility];  Service: UroGynecology           ID SLING OPERATION STRESS INCONTINENCE N/A 01/19/2017    Procedure: INSERTION PUBOVAGINAL SLING /SINGLE INCISION ;  Surgeon: Reymundo Dill MD;  [facility];  Service: UroGynecology           TONSILLECTOMY      TUBAL LIGATION      UPPER GASTROINTESTINAL ENDOSCOPY      US GUIDED BREAST BIOPSY RIGHT COMPLETE Right 10/31/2017       Family History   Problem Relation Age of Onset    Cancer Mother     Breast cancer Mother 57    Hypertension Mother     Stroke Father     Diabetes Father     Hypertension Father     Alzheimer's disease Father     Diabetes Sister     BRCA1 Negative Sister     BRCA2 Negative Sister     No Known Problems Daughter     No Known Problems Daughter     Cancer Maternal Grandmother         ovarian    Ovarian cancer Maternal Grandmother         unspecified laterality    Cancer Maternal Grandfather     Hypertension Maternal Grandfather     Tongue cancer Maternal Grandfather     Stroke Paternal Grandmother     No Known Problems Paternal Grandfather     Thyroid disease unspecified Brother     Depression Brother     Anxiety disorder Brother     Drug abuse Brother         (pain pills)    No Known Problems Son     No Known Problems Son     Thyroid disease unspecified Maternal Aunt     Colon cancer Maternal Aunt     No Known Problems Maternal Aunt     No Known Problems  Maternal Aunt     No Known Problems Maternal Aunt     No Known Problems Paternal Aunt     No Known Problems Paternal Aunt     No Known Problems Paternal Aunt     No Known Problems Paternal Aunt     No Known Problems Paternal Aunt     No Known Problems Paternal Aunt     Alzheimer's disease Cousin     Breast cancer Cousin 42    Breast cancer Family         unk age       Social History     Socioeconomic History    Marital status: Single     Spouse name: Not on file    Number of children: 4    Years of education: Not on file    Highest education level: Not on file   Occupational History    Occupation: Disability since 2019-- due to a job injury    Tobacco Use    Smoking status: Former     Current packs/day: 0.00     Average packs/day: 1.5 packs/day for 30.0 years (45.0 ttl pk-yrs)     Types: Cigarettes     Start date:      Quit date: 2016     Years since quittin.3    Smokeless tobacco: Never    Tobacco comments:     pt stated quit about a month ago, last assessed 14 per [facility]   Vaping Use    Vaping status: Never Used   Substance and Sexual Activity    Alcohol use: Not Currently    Drug use: Never    Sexual activity: Not Currently     Partners: Male     Birth control/protection: Female Sterilization     Comment: Boyfriend/father of 4 children   Other Topics Concern    Not on file   Social History Narrative    Home: Lives with boyfriend/father of her 4 children and one of their daughters        Children: 2 sons born  and ,  2 daughters born  and         Education:     Social Determinants of Health     Financial Resource Strain: Low Risk  (3/10/2023)    Overall Financial Resource Strain (CARDIA)     Difficulty of Paying Living Expenses: Not hard at all   Food Insecurity: No Food Insecurity (3/10/2023)    Hunger Vital Sign     Worried About Running Out of Food in the Last Year: Never true     Ran Out of Food in the Last Year: Never true   Transportation Needs: No Transportation Needs  (3/10/2023)    PRAPARE - Transportation     Lack of Transportation (Medical): No     Lack of Transportation (Non-Medical): No   Physical Activity: Inactive (6/9/2021)    Exercise Vital Sign     Days of Exercise per Week: 0 days     Minutes of Exercise per Session: 0 min   Stress: Not on file   Social Connections: Not on file   Intimate Partner Violence: Not on file   Housing Stability: Low Risk  (8/22/2022)    Housing Stability Vital Sign     Unable to Pay for Housing in the Last Year: No     Number of Places Lived in the Last Year: 1     Unstable Housing in the Last Year: No       Allergies   Allergen Reactions    Gabapentin Other (See Comments)     Annotation - [id number]: Neuropsych effects per patient.  Annotation - [id number]: Neuropsych effects per patient.  Annotation - [id number]: Neuropsych effects per patient.    Latex Dermatitis and Rash     Other reaction(s): Dermatitis    Penicillins Hives         Current Outpatient Medications:     Alaway 0.025 % ophthalmic solution, Administer 1 drop to both eyes 2 (two) times a day as needed (For discomfort), Disp: 5 mL, Rfl: 0    albuterol (2.5 mg/3 mL) 0.083 % nebulizer solution, TAKE 3 ML (2.5 MG) BY NEBULIZER EVERY 6 HOURS AS NEEDED FOR WHEEZING OR SHORTNESS IF BREATH, Disp: 360 mL, Rfl: 3    albuterol (PROVENTIL HFA,VENTOLIN HFA) 90 mcg/act inhaler, Inhale 2 puffs every 6 (six) hours as needed for wheezing or shortness of breath, Disp: 18 g, Rfl: 5    Alcohol Swabs (Alcohol Prep) 70 % PADS, Use in the morning, Disp: 100 each, Rfl: 1    atorvastatin (LIPITOR) 20 mg tablet, TOME BOO TABLETA TODOS LOS VIZCARRA, Disp: 90 tablet, Rfl: 1    Blood Glucose Monitoring Suppl (FreeStyle Lite) w/Device KIT, USE AS DIRECTED, Disp: 1 kit, Rfl: 3    Botox 200 units SOLR, , Disp: , Rfl:     cetaphil (CETAPHIL) lotion, Apply topically as needed for dry skin, Disp: 236 mL, Rfl: 0    cholecalciferol (VITAMIN D3) 1,000 units tablet, TAKE 1 TABLET BY MOUTH EVERY DAY, Disp: 90 tablet, Rfl:  3    diazepam (VALIUM) 5 mg tablet, TAKE 1 TO 2 TABLET BY MOUTH AS NEEDED 30 MINS PRIOR, Disp: , Rfl:     Diclofenac Sodium (VOLTAREN) 1 %, AS DIRECTED EXTERNALLY FOUR TIMES DAILY 30 DAYS, Disp: , Rfl:     fexofenadine (ALLEGRA) 180 MG tablet, TAKE 1 TABLET BY MOUTH DAILY AS NEEDED, Disp: 150 tablet, Rfl: 0    FLUoxetine (PROzac) 20 mg capsule, TOME BOO CAPSULA TODOS LOS DIAS, Disp: 90 capsule, Rfl: 0    fluticasone (FLONASE) 50 mcg/act nasal spray, 1-2 sprays into each nostril daily, Disp: 16 g, Rfl: 6    fluticasone-vilanterol (Breo Ellipta) 200-25 mcg/actuation inhaler, Inhale 1 puff daily Rinse mouth after use., Disp: 180 blister, Rfl: 1    furosemide (LASIX) 20 mg tablet, Take 1 tablet (20 mg total) by mouth daily, Disp: 90 tablet, Rfl: 3    glucose monitoring kit (FREESTYLE) monitoring kit, Use 1 each as needed (prediabetes blood sugar checks), Disp: 1 each, Rfl: 0    hydrOXYzine HCL (ATARAX) 50 mg tablet, TAKE 1/2 TO 1 TABLET BY MOUTH 3 TIMES DAILY AS NEEDED FOR ANXIETY, Disp: 270 tablet, Rfl: 0    ipratropium (ATROVENT) 0.02 % nebulizer solution, Take 2.5 mL (0.5 mg total) by nebulization 2 (two) times a day, Disp: 5 mL, Rfl: 2    ketoconazole (NIZORAL) 2 % cream, Apply topically daily, Disp: 15 g, Rfl: 0    lamoTRIgine (LaMICtal) 100 mg tablet, Take 1/2 tab po qhs, Disp: 45 tablet, Rfl: 0    Lancets (freestyle) lancets, Use as instructed, Disp: 100 each, Rfl: 2    levothyroxine 100 mcg tablet, TOME BOO TABLETA TODOS LOS VIZCARRA, Disp: 90 tablet, Rfl: 3    lidocaine (LMX) 4 % cream, 1 APPLICATION AS NEEDED EXTERNALLY THREE TIMES A DAY 30 DAY(S), Disp: , Rfl:     metFORMIN (GLUCOPHAGE-XR) 500 mg 24 hr tablet, Take 2 tablets (1,000 mg total) by mouth every morning, Disp: 180 tablet, Rfl: 3    omeprazole (PriLOSEC) 40 MG capsule, TAKE 1 CAPSULE BY MOUTH ONCE DAILY, Disp: 90 capsule, Rfl: 0    Premarin vaginal cream, INSERT 0.5 GRAMS INTO THE VAGINA A DIARIO, Disp: 30 g, Rfl: 1    promethazine-dextromethorphan  "(PHENERGAN-DM) 6.25-15 mg/5 mL oral syrup, 5 ml at bedtime, Disp: , Rfl:     sucralfate (CARAFATE) 1 g/10 mL suspension, TAKE 10 ML BY MOUTH 4 TIMES DAILY, Disp: 420 mL, Rfl: 0    triamcinolone (KENALOG) 0.1 % cream, APPLY TO AFFECTED AREA TOPICALLY TWICE DAILY FOR 14 DAYS., Disp: , Rfl:     carbamide peroxide (DEBROX) 6.5 % otic solution, Administer 5 drops into both ears 2 (two) times a day (Patient not taking: Reported on 2/1/2024), Disp: 15 mL, Rfl: 0    methocarbamol (ROBAXIN) 500 mg tablet, Take 1 tablet (500 mg total) by mouth 4 (four) times a day as needed for muscle spasms for up to 14 days, Disp: 28 tablet, Rfl: 1   /82 (BP Location: Left arm, Patient Position: Sitting, Cuff Size: Large)   Pulse 78   Ht 5' 7\" (1.702 m)   Wt 101 kg (222 lb 9.6 oz)   LMP 10/11/2019   SpO2 97%   BMI 34.86 kg/m²          Physical Exam  Vitals and nursing note reviewed.   Constitutional:       Appearance: Normal appearance.   HENT:      Head: Normocephalic and atraumatic.   Eyes:      Extraocular Movements: Extraocular movements intact.   Cardiovascular:      Pulses: Normal pulses.      Heart sounds: Normal heart sounds.   Pulmonary:      Effort: Pulmonary effort is normal.      Breath sounds: Normal breath sounds.   Musculoskeletal:         General: Normal range of motion.      Comments: Trace swelling bilaterally. Scattered BLE spider/reticular veins. No significantly enlarged truncal varicosities. Distal lower leg mild stasis changes    Skin:     General: Skin is warm.   Neurological:      General: No focal deficit present.      Mental Status: She is alert and oriented to person, place, and time.   Psychiatric:         Mood and Affect: Mood normal.                             "

## 2024-05-14 NOTE — ASSESSMENT & PLAN NOTE
59-year-old female with HTN, migraines, MICHAEL, DM, GERD, bilateral lower extremity heaviness, swelling, mild stasis changes.  Patient returns to the office to recheck lower extremities    -Complaining of bilateral lower extremity heaviness, swelling, mild stasis changes and reports easy bruising at spider/reticular veins  -No significantly enlarged truncal varicosities.  Scattered spider/reticular veins  -Previously evaluated by vascular Aug '23 and started 20 to 30 mmHg thigh-high compression and elevating her legs with no relief of symptoms   -Rx 20-30 mmHg compression stockings given for ease of use  -Additionally exercise, walk daily for 30 minutes   -Will evaluate with venous reflux study and return to the office with vascular surgeon to review and discuss treatment options

## 2024-05-16 ENCOUNTER — TELEPHONE (OUTPATIENT)
Dept: PSYCHIATRY | Facility: CLINIC | Age: 59
End: 2024-05-16

## 2024-05-22 DIAGNOSIS — A04.8 H. PYLORI INFECTION: ICD-10-CM

## 2024-05-22 RX ORDER — OMEPRAZOLE 40 MG/1
40 CAPSULE, DELAYED RELEASE ORAL DAILY
Qty: 90 CAPSULE | Refills: 0 | OUTPATIENT
Start: 2024-05-22

## 2024-05-28 DIAGNOSIS — A04.8 H. PYLORI INFECTION: ICD-10-CM

## 2024-05-29 RX ORDER — OMEPRAZOLE 40 MG/1
40 CAPSULE, DELAYED RELEASE ORAL DAILY
Qty: 90 CAPSULE | Refills: 0 | Status: SHIPPED | OUTPATIENT
Start: 2024-05-29 | End: 2024-06-07 | Stop reason: DRUGHIGH

## 2024-06-07 ENCOUNTER — TELEPHONE (OUTPATIENT)
Age: 59
End: 2024-06-07

## 2024-06-07 ENCOUNTER — NURSE TRIAGE (OUTPATIENT)
Age: 59
End: 2024-06-07

## 2024-06-07 DIAGNOSIS — K21.9 GASTROESOPHAGEAL REFLUX DISEASE WITHOUT ESOPHAGITIS: Primary | ICD-10-CM

## 2024-06-07 RX ORDER — OMEPRAZOLE 40 MG/1
40 CAPSULE, DELAYED RELEASE ORAL
Qty: 60 CAPSULE | Refills: 2 | Status: SHIPPED | OUTPATIENT
Start: 2024-06-07

## 2024-06-07 NOTE — TELEPHONE ENCOUNTER
SPOKE WITH PT, DENIES SOB, DIZZINESS, LIGHTHEADEDNESS. URGENT OFFICE VISIT SCHEDULED. PT REQUESTING ANY RECOMMENDATIONS OR RX SENT TO PHARMACY.

## 2024-06-07 NOTE — TELEPHONE ENCOUNTER
SPOKE WITH PT, HX H.PYLORI INFECTION, IBS-C, BLOATING, DYSPHAGIA. LAST OV 9/7/22, EGD 3/23/23. PT REPORTS PAST 4 DAYS, CONSTANT BURNING IN UPPER STOMACH AND ESOPHAGUS 8/10, WITH INTERMITTENT NAUSEA AND VOMITING. SYMPTOMS SLIGHTLY RELIEVED WITH DRINKING ICE WATER, HAS TRIED MYLANTA AND PEPTO BISMOL, CARAFATE IN THE PAST WITH MINIMAL RELIEF. PT IS CURRENTLY ON OMEPRAZOLE 40MG. DENIES FEVER, CHILLS, BLACK OR BLOODY STOOL. BM PATTERN IS UNCHANGED. PT SCHEDULED FOR F/U 7/10/24. WHAT DO YOU RECOMMEND?            Answer Questions - Initial Assessment - Gerd New  When did your symptoms start: 4 DAYS AGO    How often is this occurring: CONSTANT    What medication are you currently taking: OMEPRAZOLE 40 MG     Have you tried any OTC medications: MYLANTA, PEPTO BISMOL    What was the outcome of taking the medication for this symptom: MINIMAL RELIEF    Any recent changes in your bowel habits: DENIES    Any new life stressors or diet changes: DENIES    Anything that makes this better: ICE WATER    Protocols used: GERD

## 2024-06-12 ENCOUNTER — OFFICE VISIT (OUTPATIENT)
Dept: GASTROENTEROLOGY | Facility: CLINIC | Age: 59
End: 2024-06-12
Payer: MEDICARE

## 2024-06-12 VITALS
TEMPERATURE: 99 F | DIASTOLIC BLOOD PRESSURE: 80 MMHG | HEIGHT: 67 IN | BODY MASS INDEX: 34.53 KG/M2 | SYSTOLIC BLOOD PRESSURE: 120 MMHG | WEIGHT: 220 LBS

## 2024-06-12 DIAGNOSIS — K26.9 DUODENAL ULCER: Primary | ICD-10-CM

## 2024-06-12 DIAGNOSIS — K82.4 GALLBLADDER POLYP: ICD-10-CM

## 2024-06-12 DIAGNOSIS — R10.13 EPIGASTRIC PAIN: ICD-10-CM

## 2024-06-12 DIAGNOSIS — K58.1 IRRITABLE BOWEL SYNDROME WITH CONSTIPATION: ICD-10-CM

## 2024-06-12 DIAGNOSIS — J45.41 MODERATE PERSISTENT ASTHMA WITH EXACERBATION: Primary | ICD-10-CM

## 2024-06-12 DIAGNOSIS — Z79.1 NSAID LONG-TERM USE: ICD-10-CM

## 2024-06-12 PROCEDURE — 99214 OFFICE O/P EST MOD 30 MIN: CPT | Performed by: PHYSICIAN ASSISTANT

## 2024-06-12 RX ORDER — MONTELUKAST SODIUM 10 MG/1
TABLET ORAL
Qty: 90 TABLET | Refills: 1 | Status: SHIPPED | OUTPATIENT
Start: 2024-06-12

## 2024-06-12 RX ORDER — ESOMEPRAZOLE MAGNESIUM 40 MG/1
40 CAPSULE, DELAYED RELEASE ORAL
Qty: 60 CAPSULE | Refills: 2 | Status: SHIPPED | OUTPATIENT
Start: 2024-06-12

## 2024-06-12 RX ORDER — POLYETHYLENE GLYCOL 3350 17 G/17G
17 POWDER, FOR SOLUTION ORAL DAILY
Qty: 100 EACH | Refills: 2 | Status: SHIPPED | OUTPATIENT
Start: 2024-06-12

## 2024-06-12 RX ORDER — PANTOPRAZOLE SODIUM 40 MG/1
40 TABLET, DELAYED RELEASE ORAL 2 TIMES DAILY
COMMUNITY
Start: 2024-05-22 | End: 2024-06-12

## 2024-06-12 NOTE — TELEPHONE ENCOUNTER
Requested medication(s) are due for refill today: No  Patient has already received a courtesy refill: No  Other reason request has been forwarded to provider: please advise

## 2024-06-12 NOTE — PROGRESS NOTES
"[facility] - Outpatient Follow-up Note  Poly Joshua 59 y.o. female MRN: [id number]  Encounter: [id number]          ASSESSMENT AND PLAN:      Poly is a 58 y/o female with GERD s/p Heller myotomy with Jaime fundoplication in 2015, IBS-C, HLD, HTN, hypothyroid, DM2 who presents for follow-up.     Epigastric Pain  S/P Heller Myotomy w Jaylyn fundoplication 2015   NSAID use  Duodenal ulcers   Pt says about 2 weeks ago, she abruptly started with 9/10 pain in her epigastric region that is coming and going several times/day. She says it is triggered both by PO intake and movement. She does admit to taking \"a lot\" of Excedrin prior to this for her migraines on a daily basis. Last EGD 3/2023 noted multiple duodenal ulcers with mild gastritis and class B esophagitis. She was found to have H.pylori and she underwent cultures for her repeat H.pylori, and was treated with 21-day course of quadruple therapy with stool testing confirming eradication 7/2023.   Esophageal manometry in 2022 noted absent esophageal contractility. Of note: she says both omeprazole 40 mg BID and protonix 40 mg BID have not helped.   -EGD ordered to evaluate due to her hx of PUD + her ongoing NSAID use and new onset abdominal pain  -stop omeprazole and protonix   -start nexium 40 mg BID  -NSAID cessation     5. IBS-C  She says she has only taken miralax for her colonoscopies. She says that she moves her bowels 2-3 times/week.   -increase daily water intake to at least 64 ounces of water/day  -start miralax daily as needed for constipation: can take this up to twice/day if needed     6. GB polyps  Pt has hx of GB polyps measuring up to 9 mm on imaging with last u/s 5/2022. She was to undergo repeat imaging last year but did not.   -pt says she would like to hold off on u/s at this time as she has 'many other appts' to go to but would be willing to discuss this at her next " "OV  ______________________________________________________________________    SUBJECTIVE:  Poly is a 60 y/o female with GERD s/p Heller myotomy with Jaime fundoplication in , IBS-C, HLD, HTN, hypothyroid, DM2 who presents for follow-up. Pt says about 2 weeks ago, she abruptly started with 9/10 pain in her epigastric region that is coming and going several times/day. She says it is triggered both by PO intake and movement. She is having some intermittent nausea but she denies vomiting. She denies trouble swallowing at this time. She does admit to taking \"a lot\" of Excedrin prior to this for her migraines on a daily basis.She says she has only taken miralax for her colonoscopies. She says that she moves her bowels 2-3 times/week. She denies new meds or supplements prior to this starting. She denies diarrhea, unintentional weight loss, fevers, chills, night sweats, bloody or black BM. She says she has \"never\" felt pain similar to this prior. She denies tobacco and alcohol use. She is not on blood thinners.       REVIEW OF SYSTEMS IS OTHERWISE NEGATIVE.      Historical Information   Past Medical History:   Diagnosis Date    Achalasia     Asthma     Colon polyp     Constipation     CPAP (continuous positive airway pressure) dependence     Depression     Diarrhea 2019    Elevated LFTs     last assessed 10/25/17    Fibromyalgia     Gastric ulcer     GERD (gastroesophageal reflux disease)     H. pylori infection 10/09/2017    Annotation - [id number]: Treated, f/u stool antigen negative    Hypertension     Hypothyroidism     Kidney stone     Migraines     Migraines     MICHAEL (obstructive sleep apnea)     last assessed 16    Pneumonia     Sleep apnea 2019    Uses CPAP    Thyroid nodule     last assessed 17     Past Surgical History:   Procedure Laterality Date    BREAST BIOPSY Right 10/31/2017    us bx     SECTION      COLONOSCOPY      ENDOMETRIAL ABLATION      MAMMO STEREOTACTIC BREAST BIOPSY " LEFT (ALL INC) Left 1/5/2024    MYOTOMY HELLER LAPAROSCOPIC  01/06/2016    Dr. Magallanes, Coincident lorena fundoplication    NASAL SINUS SURGERY      OTHER SURGICAL HISTORY      achalasia repair    OVARIAN CYST REMOVAL      PILONIDAL CYST / SINUS EXCISION      CA COLONOSCOPY FLX DX W/COLLJ SPEC WHEN PFRMD N/A 04/25/2019    Procedure: COLONOSCOPY;  Surgeon: Kalpesh Perez MD;  [facility];  Service: Gastroenterology    CA CYSTOURETHROSCOPY N/A 01/19/2017    Procedure: CYSTOSCOPY;  Surgeon: Reymundo Dill MD;  [facility];  Service: UroGynecology           CA ESOPHAGOGASTRODUODENOSCOPY TRANSORAL DIAGNOSTIC N/A 09/07/2017    Procedure: ESOPHAGOGASTRODUODENOSCOPY (EGD);  Surgeon: Kalpesh Perez MD;  Location: [facility];  Service: Gastroenterology    CA ESOPHAGOGASTRODUODENOSCOPY TRANSORAL DIAGNOSTIC N/A 04/25/2019    Procedure: ESOPHAGOGASTRODUODENOSCOPY (EGD);  Surgeon: Kalpesh Perze MD;  [facility];  Service: Gastroenterology    CA LITHOTRIPSY XTRCORP SHOCK WAVE Left 05/26/2022    Procedure: ESWL;  Surgeon: Markie Osullivan MD;  Location:  [facility];  Service: Urology    CA POST COLPORRHAPHY RECTOCELE W/WO PERINEORRHAPHY N/A 01/19/2017    Procedure: COLPORRHAPHY POSTERIOR;  Surgeon: Reymundo Dill MD;  [facility];  Service: UroGynecology           CA SLING OPERATION STRESS INCONTINENCE N/A 01/19/2017    Procedure: INSERTION PUBOVAGINAL SLING /SINGLE INCISION ;  Surgeon: Reymundo Dill MD;  [facility];  Service: UroGynecology           TONSILLECTOMY      TUBAL LIGATION      UPPER GASTROINTESTINAL ENDOSCOPY      US GUIDED BREAST BIOPSY RIGHT COMPLETE Right 10/31/2017     Social History   Social History     Substance and Sexual Activity   Alcohol Use Not Currently     Social History     Substance and Sexual Activity   Drug Use Never     Social History     Tobacco Use   Smoking Status Former    Current packs/day: 0.00    Average packs/day: 1.5 packs/day for 30.0 years (45.0 ttl  pk-yrs)    Types: Cigarettes    Start date:     Quit date: 2016    Years since quittin.4   Smokeless Tobacco Never   Tobacco Comments    pt stated quit about a month ago, last assessed 14 per [employer]     Family History   Problem Relation Age of Onset    Cancer Mother     Breast cancer Mother 57    Hypertension Mother     Stroke Father     Diabetes Father     Hypertension Father     Alzheimer's disease Father     Diabetes Sister     BRCA1 Negative Sister     BRCA2 Negative Sister     No Known Problems Daughter     No Known Problems Daughter     Cancer Maternal Grandmother         ovarian    Ovarian cancer Maternal Grandmother         unspecified laterality    Cancer Maternal Grandfather     Hypertension Maternal Grandfather     Tongue cancer Maternal Grandfather     Stroke Paternal Grandmother     No Known Problems Paternal Grandfather     Thyroid disease unspecified Brother     Depression Brother     Anxiety disorder Brother     Drug abuse Brother         (pain pills)    No Known Problems Son     No Known Problems Son     Thyroid disease unspecified Maternal Aunt     Colon cancer Maternal Aunt     No Known Problems Maternal Aunt     No Known Problems Maternal Aunt     No Known Problems Maternal Aunt     No Known Problems Paternal Aunt     No Known Problems Paternal Aunt     No Known Problems Paternal Aunt     No Known Problems Paternal Aunt     No Known Problems Paternal Aunt     No Known Problems Paternal Aunt     Alzheimer's disease Cousin     Breast cancer Cousin 42    Breast cancer Family         unk age       Meds/Allergies       Current Outpatient Medications:     Alaway 0.025 % ophthalmic solution    albuterol (2.5 mg/3 mL) 0.083 % nebulizer solution    albuterol (PROVENTIL HFA,VENTOLIN HFA) 90 mcg/act inhaler    Alcohol Swabs (Alcohol Prep) 70 % PADS    atorvastatin (LIPITOR) 20 mg tablet    Blood Glucose Monitoring Suppl (FreeStyle Lite) w/Device KIT    Botox 200 units SOLR    carbamide  peroxide (DEBROX) 6.5 % otic solution    cetaphil (CETAPHIL) lotion    cholecalciferol (VITAMIN D3) 1,000 units tablet    diazepam (VALIUM) 5 mg tablet    Diclofenac Sodium (VOLTAREN) 1 %    fexofenadine (ALLEGRA) 180 MG tablet    FLUoxetine (PROzac) 20 mg capsule    fluticasone (FLONASE) 50 mcg/act nasal spray    fluticasone-vilanterol (Breo Ellipta) 200-25 mcg/actuation inhaler    furosemide (LASIX) 20 mg tablet    glucose monitoring kit (FREESTYLE) monitoring kit    hydrOXYzine HCL (ATARAX) 50 mg tablet    ipratropium (ATROVENT) 0.02 % nebulizer solution    ketoconazole (NIZORAL) 2 % cream    lamoTRIgine (LaMICtal) 100 mg tablet    Lancets (freestyle) lancets    levothyroxine 100 mcg tablet    lidocaine (LMX) 4 % cream    metFORMIN (GLUCOPHAGE-XR) 500 mg 24 hr tablet    methocarbamol (ROBAXIN) 500 mg tablet    omeprazole (PriLOSEC) 40 MG capsule    Premarin vaginal cream    promethazine-dextromethorphan (PHENERGAN-DM) 6.25-15 mg/5 mL oral syrup    sucralfate (CARAFATE) 1 g/10 mL suspension    triamcinolone (KENALOG) 0.1 % cream    Allergies   Allergen Reactions    Gabapentin Other (See Comments)     Annotation - [id number]: Neuropsych effects per patient.  Annotation - [id number]: Neuropsych effects per patient.  Annotation - [id number]: Neuropsych effects per patient.    Latex Dermatitis and Rash     Other reaction(s): Dermatitis    Penicillins Hives           Objective     Last menstrual period 10/11/2019, not currently breastfeeding. There is no height or weight on file to calculate BMI.      PHYSICAL EXAM:      General Appearance:   Alert, cooperative, no distress   HEENT:   Normocephalic, atraumatic, anicteric.     Neck:  Supple, symmetrical, trachea midline   Lungs:   Clear to auscultation bilaterally; no rales, rhonchi or wheezing; respirations unlabored    Heart::   Regular rate and rhythm; no murmur, rub, or gallop.   Abdomen:   Soft, non-tender, non-distended; normal bowel sounds; no masses, no  organomegaly    Genitalia:   Deferred    Rectal:   Deferred    Extremities:  No cyanosis, clubbing or edema    Pulses:  2+ and symmetric    Skin:  No jaundice, rashes, or lesions    Lymph nodes:  No palpable cervical lymphadenopathy        Lab Results:   No visits with results within 1 Day(s) from this visit.   Latest known visit with results is:   Orders Only on 05/07/2024   Component Date Value    Right Eye Diabetic Retin* 05/07/2024 None     Left Eye Diabetic Retino* 05/07/2024 None          Radiology Results:   XR foot 3+ vw left    Result Date: 5/28/2024  Narrative: HISTORY: . fall MRN:  [id number]     DATE OF SERVICE:  5/27/2024 11:05 PM                       EXAM DESCRIPTION:  XR FOOT 3+ VW LEFT   COMPARISON:  No relevant recent priors. FINDINGS: There is no acute fracture or dislocation. No other acute abnormality identified.    Impression: IMPRESSION: No acute osseous abnormality. Workstation:[id number]    XR knee 4+ vw left injury    Result Date: 5/28/2024  Narrative: HISTORY: . fall MRN:  [id number]     DATE OF SERVICE:  5/27/2024 11:00 PM                       EXAM DESCRIPTION:  XR KNEE 4+ VW LEFT   COMPARISON:  No relevant recent priors. FINDINGS: There is no acute fracture or dislocation. No other acute abnormality identified.    Impression: IMPRESSION: No acute osseous abnormality. Workstation:[id number]    XR elbow 3+ vw left    Result Date: 5/28/2024  Narrative: HISTORY: . fall MRN:  [id number]     DATE OF SERVICE:  5/27/2024 10:55 PM                       EXAM DESCRIPTION:  XR ELBOW 3+ VW LEFT   COMPARISON:  No relevant recent priors. FINDINGS: There is no acute fracture or dislocation. No other acute abnormality identified.    Impression: IMPRESSION: No acute osseous abnormality. Workstation:[id number]    CT head wo contrast    Result Date: 5/28/2024  Narrative: HEAD CT / CERVICAL SPINE CT HISTORY: fall TECHNIQUE: Unenhanced CT scan of the head was performed according to departmental technique. Images were  reviewed in soft tissue and bone algorithms with axial sections, sagittal and coronal reformations. Axial sections were obtained from the skullbase through the cervical spine according to departmental protocol. Coronal and sagittal reformations were performed. COMPARISON: None. FINDINGS: CT HEAD DLP (mGy-cm): 829  Images: Reviewed. Initial Findings: None. Soft Tissues: No significant swelling/hematoma. Evaluation for Intracranial Hemorrhage: None. Evaluation for Infarction: No acute territorial infarct within the confines of the low dose CT technique. Brain Parenchyma: Normal. Evaluation for Intracranial Mass: No intracranial mass. Ventricular System: Normal. Basal Cisterns: Normal. Midline Shift: None. Extra-axial spaces: Normal. Posterior fossa: Normal. Midline Structures/Sella: Normal. Unenhanced major intracranial arteries: Intracranial atherosclerotic calcifications. Unenhanced major dural venous sinuses: Grossly normal. Imaged aspect of orbits: Normal. Imaged aspect of paranasal sinuses: Normal. Mastoid air cells/Middle ear cavities: Normal. Skull Base: Normal. Calvarium: Normal. Imaged aspect of craniocervical junction: Not completely imaged in the field of view. CT CERVICAL SPINE Multilevel spondylotic, uncovertebral, and facet degenerative changes are noted.  There is no visible fracture. There are mild multilevel cervical listheses. There is no prevertebral soft tissue swelling. The current study does not evaluate for ligamentous laxity or injury. Evaluation of contents of the spinal canal is suboptimal. Questions or Further discussion? Please contact me: TigerConnect/Text: Ramon Cochran [facility] Hospital[facility]ANJAN ASCOM #: [id number] ([phone])    Impression: IMPRESSION: 1.  No acute intracranial hemorrhage or significant intracranial mass effect is identified. 2.  Spondylosis. No evidence of acute traumatic osseous injury to the cervical spine. Continue to follow the cervical spine trauma imaging protocol.  Workstation:[id number]    CT spine cervical wo contrast    Result Date: 5/28/2024  Narrative: HEAD CT / CERVICAL SPINE CT HISTORY: fall TECHNIQUE: Unenhanced CT scan of the head was performed according to departmental technique. Images were reviewed in soft tissue and bone algorithms with axial sections, sagittal and coronal reformations. Axial sections were obtained from the skullbase through the cervical spine according to departmental protocol. Coronal and sagittal reformations were performed. COMPARISON: None. FINDINGS: CT HEAD DLP (mGy-cm): 829  Images: Reviewed. Initial Findings: None. Soft Tissues: No significant swelling/hematoma. Evaluation for Intracranial Hemorrhage: None. Evaluation for Infarction: No acute territorial infarct within the confines of the low dose CT technique. Brain Parenchyma: Normal. Evaluation for Intracranial Mass: No intracranial mass. Ventricular System: Normal. Basal Cisterns: Normal. Midline Shift: None. Extra-axial spaces: Normal. Posterior fossa: Normal. Midline Structures/Sella: Normal. Unenhanced major intracranial arteries: Intracranial atherosclerotic calcifications. Unenhanced major dural venous sinuses: Grossly normal. Imaged aspect of orbits: Normal. Imaged aspect of paranasal sinuses: Normal. Mastoid air cells/Middle ear cavities: Normal. Skull Base: Normal. Calvarium: Normal. Imaged aspect of craniocervical junction: Not completely imaged in the field of view. CT CERVICAL SPINE Multilevel spondylotic, uncovertebral, and facet degenerative changes are noted.  There is no visible fracture. There are mild multilevel cervical listheses. There is no prevertebral soft tissue swelling. The current study does not evaluate for ligamentous laxity or injury. Evaluation of contents of the spinal canal is suboptimal. Questions or Further discussion? Please contact me: [employer]/Text: Ramon Cochran [facility] ASCOM #: [id number] ([phone])    Impression: IMPRESSION: 1.  No acute  intracranial hemorrhage or significant intracranial mass effect is identified. 2.  Spondylosis. No evidence of acute traumatic osseous injury to the cervical spine. Continue to follow the cervical spine trauma imaging protocol. Workstation:[id number]

## 2024-06-12 NOTE — PATIENT INSTRUCTIONS
-increase daily water intake to at least 64 ounces of water/day  -start miralax daily as needed for constipation: can take this up to twice/day if needed    Scheduled date of EGD(as of today): 06/25/2024  Physician performing EGD: Dr. Perze  Location of EGD: [facility]  Instructions reviewed with patient by: Fanta GRANDA   Clearances:  N/A     Diabetic Metformin

## 2024-06-12 NOTE — H&P (VIEW-ONLY)
"[facility] - Outpatient Follow-up Note  Poly Joshua 59 y.o. female MRN: [id number]  Encounter: [id number]          ASSESSMENT AND PLAN:      Poly is a 58 y/o female with GERD s/p Heller myotomy with Jaime fundoplication in 2015, IBS-C, HLD, HTN, hypothyroid, DM2 who presents for follow-up.     Epigastric Pain  S/P Heller Myotomy w Jaylyn fundoplication 2015   NSAID use  Duodenal ulcers   Pt says about 2 weeks ago, she abruptly started with 9/10 pain in her epigastric region that is coming and going several times/day. She says it is triggered both by PO intake and movement. She does admit to taking \"a lot\" of Excedrin prior to this for her migraines on a daily basis. Last EGD 3/2023 noted multiple duodenal ulcers with mild gastritis and class B esophagitis. She was found to have H.pylori and she underwent cultures for her repeat H.pylori, and was treated with 21-day course of quadruple therapy with stool testing confirming eradication 7/2023.   Esophageal manometry in 2022 noted absent esophageal contractility. Of note: she says both omeprazole 40 mg BID and protonix 40 mg BID have not helped.   -EGD ordered to evaluate due to her hx of PUD + her ongoing NSAID use and new onset abdominal pain  -stop omeprazole and protonix   -start nexium 40 mg BID  -NSAID cessation     5. IBS-C  She says she has only taken miralax for her colonoscopies. She says that she moves her bowels 2-3 times/week.   -increase daily water intake to at least 64 ounces of water/day  -start miralax daily as needed for constipation: can take this up to twice/day if needed     6. GB polyps  Pt has hx of GB polyps measuring up to 9 mm on imaging with last u/s 5/2022. She was to undergo repeat imaging last year but did not.   -pt says she would like to hold off on u/s at this time as she has 'many other appts' to go to but would be willing to discuss this at her next " "OV  ______________________________________________________________________    SUBJECTIVE:  Poly is a 60 y/o female with GERD s/p Heller myotomy with Jaime fundoplication in , IBS-C, HLD, HTN, hypothyroid, DM2 who presents for follow-up. Pt says about 2 weeks ago, she abruptly started with 9/10 pain in her epigastric region that is coming and going several times/day. She says it is triggered both by PO intake and movement. She is having some intermittent nausea but she denies vomiting. She denies trouble swallowing at this time. She does admit to taking \"a lot\" of Excedrin prior to this for her migraines on a daily basis.She says she has only taken miralax for her colonoscopies. She says that she moves her bowels 2-3 times/week. She denies new meds or supplements prior to this starting. She denies diarrhea, unintentional weight loss, fevers, chills, night sweats, bloody or black BM. She says she has \"never\" felt pain similar to this prior. She denies tobacco and alcohol use. She is not on blood thinners.       REVIEW OF SYSTEMS IS OTHERWISE NEGATIVE.      Historical Information   Past Medical History:   Diagnosis Date    Achalasia     Asthma     Colon polyp     Constipation     CPAP (continuous positive airway pressure) dependence     Depression     Diarrhea 2019    Elevated LFTs     last assessed 10/25/17    Fibromyalgia     Gastric ulcer     GERD (gastroesophageal reflux disease)     H. pylori infection 10/09/2017    Annotation - [id number]: Treated, f/u stool antigen negative    Hypertension     Hypothyroidism     Kidney stone     Migraines     Migraines     MICHAEL (obstructive sleep apnea)     last assessed 16    Pneumonia     Sleep apnea 2019    Uses CPAP    Thyroid nodule     last assessed 17     Past Surgical History:   Procedure Laterality Date    BREAST BIOPSY Right 10/31/2017    us bx     SECTION      COLONOSCOPY      ENDOMETRIAL ABLATION      MAMMO STEREOTACTIC BREAST BIOPSY " LEFT (ALL INC) Left 1/5/2024    MYOTOMY HELLER LAPAROSCOPIC  01/06/2016    Dr. Magallanes, Coincident lorena fundoplication    NASAL SINUS SURGERY      OTHER SURGICAL HISTORY      achalasia repair    OVARIAN CYST REMOVAL      PILONIDAL CYST / SINUS EXCISION      AZ COLONOSCOPY FLX DX W/COLLJ SPEC WHEN PFRMD N/A 04/25/2019    Procedure: COLONOSCOPY;  Surgeon: Kalpesh Perez MD;  [facility];  Service: Gastroenterology    AZ CYSTOURETHROSCOPY N/A 01/19/2017    Procedure: CYSTOSCOPY;  Surgeon: Reymundo Dill MD;  [facility];  Service: UroGynecology           AZ ESOPHAGOGASTRODUODENOSCOPY TRANSORAL DIAGNOSTIC N/A 09/07/2017    Procedure: ESOPHAGOGASTRODUODENOSCOPY (EGD);  Surgeon: Kalpesh Perez MD;  Location: [facility];  Service: Gastroenterology    AZ ESOPHAGOGASTRODUODENOSCOPY TRANSORAL DIAGNOSTIC N/A 04/25/2019    Procedure: ESOPHAGOGASTRODUODENOSCOPY (EGD);  Surgeon: Kalpesh Perez MD;  [facility];  Service: Gastroenterology    AZ LITHOTRIPSY XTRCORP SHOCK WAVE Left 05/26/2022    Procedure: ESWL;  Surgeon: Markie Osullivan MD;  Location:  [facility];  Service: Urology    AZ POST COLPORRHAPHY RECTOCELE W/WO PERINEORRHAPHY N/A 01/19/2017    Procedure: COLPORRHAPHY POSTERIOR;  Surgeon: Reymundo Dill MD;  [facility];  Service: UroGynecology           AZ SLING OPERATION STRESS INCONTINENCE N/A 01/19/2017    Procedure: INSERTION PUBOVAGINAL SLING /SINGLE INCISION ;  Surgeon: Reymundo Dill MD;  [facility];  Service: UroGynecology           TONSILLECTOMY      TUBAL LIGATION      UPPER GASTROINTESTINAL ENDOSCOPY      US GUIDED BREAST BIOPSY RIGHT COMPLETE Right 10/31/2017     Social History   Social History     Substance and Sexual Activity   Alcohol Use Not Currently     Social History     Substance and Sexual Activity   Drug Use Never     Social History     Tobacco Use   Smoking Status Former    Current packs/day: 0.00    Average packs/day: 1.5 packs/day for 30.0 years (45.0 ttl  pk-yrs)    Types: Cigarettes    Start date:     Quit date: 2016    Years since quittin.4   Smokeless Tobacco Never   Tobacco Comments    pt stated quit about a month ago, last assessed 14 per [employer]     Family History   Problem Relation Age of Onset    Cancer Mother     Breast cancer Mother 57    Hypertension Mother     Stroke Father     Diabetes Father     Hypertension Father     Alzheimer's disease Father     Diabetes Sister     BRCA1 Negative Sister     BRCA2 Negative Sister     No Known Problems Daughter     No Known Problems Daughter     Cancer Maternal Grandmother         ovarian    Ovarian cancer Maternal Grandmother         unspecified laterality    Cancer Maternal Grandfather     Hypertension Maternal Grandfather     Tongue cancer Maternal Grandfather     Stroke Paternal Grandmother     No Known Problems Paternal Grandfather     Thyroid disease unspecified Brother     Depression Brother     Anxiety disorder Brother     Drug abuse Brother         (pain pills)    No Known Problems Son     No Known Problems Son     Thyroid disease unspecified Maternal Aunt     Colon cancer Maternal Aunt     No Known Problems Maternal Aunt     No Known Problems Maternal Aunt     No Known Problems Maternal Aunt     No Known Problems Paternal Aunt     No Known Problems Paternal Aunt     No Known Problems Paternal Aunt     No Known Problems Paternal Aunt     No Known Problems Paternal Aunt     No Known Problems Paternal Aunt     Alzheimer's disease Cousin     Breast cancer Cousin 42    Breast cancer Family         unk age       Meds/Allergies       Current Outpatient Medications:     Alaway 0.025 % ophthalmic solution    albuterol (2.5 mg/3 mL) 0.083 % nebulizer solution    albuterol (PROVENTIL HFA,VENTOLIN HFA) 90 mcg/act inhaler    Alcohol Swabs (Alcohol Prep) 70 % PADS    atorvastatin (LIPITOR) 20 mg tablet    Blood Glucose Monitoring Suppl (FreeStyle Lite) w/Device KIT    Botox 200 units SOLR    carbamide  peroxide (DEBROX) 6.5 % otic solution    cetaphil (CETAPHIL) lotion    cholecalciferol (VITAMIN D3) 1,000 units tablet    diazepam (VALIUM) 5 mg tablet    Diclofenac Sodium (VOLTAREN) 1 %    fexofenadine (ALLEGRA) 180 MG tablet    FLUoxetine (PROzac) 20 mg capsule    fluticasone (FLONASE) 50 mcg/act nasal spray    fluticasone-vilanterol (Breo Ellipta) 200-25 mcg/actuation inhaler    furosemide (LASIX) 20 mg tablet    glucose monitoring kit (FREESTYLE) monitoring kit    hydrOXYzine HCL (ATARAX) 50 mg tablet    ipratropium (ATROVENT) 0.02 % nebulizer solution    ketoconazole (NIZORAL) 2 % cream    lamoTRIgine (LaMICtal) 100 mg tablet    Lancets (freestyle) lancets    levothyroxine 100 mcg tablet    lidocaine (LMX) 4 % cream    metFORMIN (GLUCOPHAGE-XR) 500 mg 24 hr tablet    methocarbamol (ROBAXIN) 500 mg tablet    omeprazole (PriLOSEC) 40 MG capsule    Premarin vaginal cream    promethazine-dextromethorphan (PHENERGAN-DM) 6.25-15 mg/5 mL oral syrup    sucralfate (CARAFATE) 1 g/10 mL suspension    triamcinolone (KENALOG) 0.1 % cream    Allergies   Allergen Reactions    Gabapentin Other (See Comments)     Annotation - [id number]: Neuropsych effects per patient.  Annotation - [id number]: Neuropsych effects per patient.  Annotation - [id number]: Neuropsych effects per patient.    Latex Dermatitis and Rash     Other reaction(s): Dermatitis    Penicillins Hives           Objective     Last menstrual period 10/11/2019, not currently breastfeeding. There is no height or weight on file to calculate BMI.      PHYSICAL EXAM:      General Appearance:   Alert, cooperative, no distress   HEENT:   Normocephalic, atraumatic, anicteric.     Neck:  Supple, symmetrical, trachea midline   Lungs:   Clear to auscultation bilaterally; no rales, rhonchi or wheezing; respirations unlabored    Heart::   Regular rate and rhythm; no murmur, rub, or gallop.   Abdomen:   Soft, non-tender, non-distended; normal bowel sounds; no masses, no  organomegaly    Genitalia:   Deferred    Rectal:   Deferred    Extremities:  No cyanosis, clubbing or edema    Pulses:  2+ and symmetric    Skin:  No jaundice, rashes, or lesions    Lymph nodes:  No palpable cervical lymphadenopathy        Lab Results:   No visits with results within 1 Day(s) from this visit.   Latest known visit with results is:   Orders Only on 05/07/2024   Component Date Value    Right Eye Diabetic Retin* 05/07/2024 None     Left Eye Diabetic Retino* 05/07/2024 None          Radiology Results:   XR foot 3+ vw left    Result Date: 5/28/2024  Narrative: HISTORY: . fall MRN:  [id number]     DATE OF SERVICE:  5/27/2024 11:05 PM                       EXAM DESCRIPTION:  XR FOOT 3+ VW LEFT   COMPARISON:  No relevant recent priors. FINDINGS: There is no acute fracture or dislocation. No other acute abnormality identified.    Impression: IMPRESSION: No acute osseous abnormality. Workstation:[id number]    XR knee 4+ vw left injury    Result Date: 5/28/2024  Narrative: HISTORY: . fall MRN:  [id number]     DATE OF SERVICE:  5/27/2024 11:00 PM                       EXAM DESCRIPTION:  XR KNEE 4+ VW LEFT   COMPARISON:  No relevant recent priors. FINDINGS: There is no acute fracture or dislocation. No other acute abnormality identified.    Impression: IMPRESSION: No acute osseous abnormality. Workstation:[id number]    XR elbow 3+ vw left    Result Date: 5/28/2024  Narrative: HISTORY: . fall MRN:  [id number]     DATE OF SERVICE:  5/27/2024 10:55 PM                       EXAM DESCRIPTION:  XR ELBOW 3+ VW LEFT   COMPARISON:  No relevant recent priors. FINDINGS: There is no acute fracture or dislocation. No other acute abnormality identified.    Impression: IMPRESSION: No acute osseous abnormality. Workstation:[id number]    CT head wo contrast    Result Date: 5/28/2024  Narrative: HEAD CT / CERVICAL SPINE CT HISTORY: fall TECHNIQUE: Unenhanced CT scan of the head was performed according to departmental technique. Images were  reviewed in soft tissue and bone algorithms with axial sections, sagittal and coronal reformations. Axial sections were obtained from the skullbase through the cervical spine according to departmental protocol. Coronal and sagittal reformations were performed. COMPARISON: None. FINDINGS: CT HEAD DLP (mGy-cm): 829  Images: Reviewed. Initial Findings: None. Soft Tissues: No significant swelling/hematoma. Evaluation for Intracranial Hemorrhage: None. Evaluation for Infarction: No acute territorial infarct within the confines of the low dose CT technique. Brain Parenchyma: Normal. Evaluation for Intracranial Mass: No intracranial mass. Ventricular System: Normal. Basal Cisterns: Normal. Midline Shift: None. Extra-axial spaces: Normal. Posterior fossa: Normal. Midline Structures/Sella: Normal. Unenhanced major intracranial arteries: Intracranial atherosclerotic calcifications. Unenhanced major dural venous sinuses: Grossly normal. Imaged aspect of orbits: Normal. Imaged aspect of paranasal sinuses: Normal. Mastoid air cells/Middle ear cavities: Normal. Skull Base: Normal. Calvarium: Normal. Imaged aspect of craniocervical junction: Not completely imaged in the field of view. CT CERVICAL SPINE Multilevel spondylotic, uncovertebral, and facet degenerative changes are noted.  There is no visible fracture. There are mild multilevel cervical listheses. There is no prevertebral soft tissue swelling. The current study does not evaluate for ligamentous laxity or injury. Evaluation of contents of the spinal canal is suboptimal. Questions or Further discussion? Please contact me: [employer]/Text: Ramon Cochran [facility] HospitalRANJAN ASCOM #: [id number] ([phone])    Impression: IMPRESSION: 1.  No acute intracranial hemorrhage or significant intracranial mass effect is identified. 2.  Spondylosis. No evidence of acute traumatic osseous injury to the cervical spine. Continue to follow the cervical spine trauma imaging protocol.  Workstation:[id number]    CT spine cervical wo contrast    Result Date: 5/28/2024  Narrative: HEAD CT / CERVICAL SPINE CT HISTORY: fall TECHNIQUE: Unenhanced CT scan of the head was performed according to departmental technique. Images were reviewed in soft tissue and bone algorithms with axial sections, sagittal and coronal reformations. Axial sections were obtained from the skullbase through the cervical spine according to departmental protocol. Coronal and sagittal reformations were performed. COMPARISON: None. FINDINGS: CT HEAD DLP (mGy-cm): 829  Images: Reviewed. Initial Findings: None. Soft Tissues: No significant swelling/hematoma. Evaluation for Intracranial Hemorrhage: None. Evaluation for Infarction: No acute territorial infarct within the confines of the low dose CT technique. Brain Parenchyma: Normal. Evaluation for Intracranial Mass: No intracranial mass. Ventricular System: Normal. Basal Cisterns: Normal. Midline Shift: None. Extra-axial spaces: Normal. Posterior fossa: Normal. Midline Structures/Sella: Normal. Unenhanced major intracranial arteries: Intracranial atherosclerotic calcifications. Unenhanced major dural venous sinuses: Grossly normal. Imaged aspect of orbits: Normal. Imaged aspect of paranasal sinuses: Normal. Mastoid air cells/Middle ear cavities: Normal. Skull Base: Normal. Calvarium: Normal. Imaged aspect of craniocervical junction: Not completely imaged in the field of view. CT CERVICAL SPINE Multilevel spondylotic, uncovertebral, and facet degenerative changes are noted.  There is no visible fracture. There are mild multilevel cervical listheses. There is no prevertebral soft tissue swelling. The current study does not evaluate for ligamentous laxity or injury. Evaluation of contents of the spinal canal is suboptimal. Questions or Further discussion? Please contact me: [employer]/Text: Ramon Cochran [facility] HospitalRANJAN ASCOM #: [id number] ([phone])    Impression: IMPRESSION: 1.  No acute  intracranial hemorrhage or significant intracranial mass effect is identified. 2.  Spondylosis. No evidence of acute traumatic osseous injury to the cervical spine. Continue to follow the cervical spine trauma imaging protocol. Workstation:[id number]

## 2024-06-16 NOTE — PSYCH
"MEDICATION MANAGEMENT NOTE        [facility] - PSYCHIATRIC [facility]OCIATES      Name and Date of Birth:  Poly Joshua 59 y.o. 1965    Date of Visit: June 24, 2024    HPI:    Poly Joshua is here for medication review with primary c/o / Area of need/Statement: \"I feel good.\"  Pt engages in the same activities-- taking care of her dog, caring about her home.  Pt enjoys cleaning inside her home and taking care of the outside property.  She cleans daily and \"I've always been like that.\"  Pt attends [employer] weekly and helps the [employer]ian[employer] Jew perform charitable activities twice monthly.  Pt seen by cardiology for palpitations on exertion, had NSR in office, and ZIO monitor and Echocardiogram were ordered.  Echo was found to be WNL.  She had a sleep medicine consultation 2/5/2024 due to MICHAEL and CPAP machine was recalled/never got a new one.  The specialist referred her to sleep medicine and ordered a home sleep study.  Pt is s/p ER visit 5/27/2024 due to fall with neck strike and Lt knee injury.  NO head injury.  Pt presently denies depression, self-injurious thoughts/behaviors, SI, HI, anxiety, or psychotic Sxs.  Pt reports compliance to psychiatric medications without SE.     Appetite Changes and Sleep: normal sleep, Pt goes to bed at 2:00AM and awakens at about 6:00AM - 7:00AM, 4-5 hours has always been sufficient for her, normal appetite, normal energy level    Review Of Systems:      Constitutional negative   ENT negative   Cardiovascular negative   Respiratory negative   Gastrointestinal negative   Genitourinary negative   Musculoskeletal negative   Integumentary negative   Neurological negative   Endocrine negative   Other Symptoms none, all other systems are negative       Past Psychiatric History:   As copied from my 1/10/2024 note with updates as needed:  \"  [ Pt born in [place] and came to the [place] with her family in approx 1989 and grew up with biological parents, 1 " "year younger sister and a younger brother.  Pt describes her upbringing as \"Good, good dad, good mom.  We were raised with Confucianist.\"   Everyone in the family got along and Pt has positive memories of growing up with them.     Depression started in 2010 at work when she sustained a life changing back injury, for which she became permanently disabled.  Mood Sxs: sadness, crying, self-isolating, anhedonia, less motivated, feelings of hopelessness and helplessness.  But no SI.                   Manic Sxs:  decreased need for sleep , rapid speech  and flight of ideas/racing thoughts  Racy thoughts, elevated energy     Anxiety started in 2010 due to the work injury:  excessive worry more days than not for longer than 3 months and The Sxs can occur without concommittent depressive Sxs.  No other Sxs of BRIGITTE.     Panic attacks started in late 8/2020 initially triggered by driving through a town she was not familiar with.  She has had a few in total with Sxs of:  Moderate anxiety, sweaty hands,  palpitations/racing heart, trembling, shortness of breath       Social Anxiety symptoms: no symptoms suggestive of social anxiety Eating Disorder symptoms: no historical or current eating disorder. no binge eating disorder; no anorexia nervosa. no symptoms of bulimia      OCD type Sxs: checking door locks, windows, stove, pipes in the basement (which used to leak).  Cleans often, can be very fastidious and wash her hands excessively.       Pt denies any h/o PTSD or psychotic Sxs.     Prior psychiatrists:  [facility] from approx 2014 - 11/2019 --stopped going due to change in insurance     Prior psychotherapists:   First therapist was at [facility] in [place] and he left so she only saw him for a year  Second one was seen from [id number] at [facility]     Pt denied h/o SI, HI, self-injurious behaviors, violent behaviors, psychiatric hospitalizations, ECT, legal or  Hx     Prior Rx trials: Viibryd 40mg (helped), " "Bupropion ER/XL 150mg (helped), Aripiprazole 5mg (helped but caused Wt gain), Ziprasidone 40mg (heart palpitations), Gabapentin 300mg (agitated),Topamax (for migraines but stopped due to ineffectiveness and her h/o nephrolithiasis), Hydroxyzine 50mg (sedating), Doxepin up to 50mg (for sleep--oversedating even at 25mg), Clonazepam 0.5mg--??used/helped.      Abuse Hx:  Pt denies any h/o sexual, physical, or emotional abuse     Trauma Hx:  When she lost her job after her work injury                   ] \"         Past Medical History:    Past Medical History:   Diagnosis Date    Achalasia     Asthma     Colon polyp     Constipation     CPAP (continuous positive airway pressure) dependence     Depression     Diarrhea 2019    Elevated LFTs     last assessed 10/25/17    Fibromyalgia     Gastric ulcer     GERD (gastroesophageal reflux disease)     H. pylori infection 10/09/2017    [facility] [id number]: Treated, f/u stool antigen negative    Hypertension     Hypothyroidism     Kidney stone     Migraines     Migraines     MICHAEL (obstructive sleep apnea)     last assessed 16    Pneumonia     Sleep apnea 2019    Uses CPAP    Thyroid nodule     last assessed 17       Substance Abuse History:    Social History     Substance and Sexual Activity   Alcohol Use Not Currently     Social History     Substance and Sexual Activity   Drug Use Never       Social History:    Social History     Socioeconomic History    Marital status: Single     Spouse name: Not on file    Number of children: 4    Years of education: Not on file    Highest education level: Not on file   Occupational History    Occupation: Disability since 2019-- due to a job injury    Tobacco Use    Smoking status: Former     Current packs/day: 0.00     Average packs/day: 1.5 packs/day for 30.0 years (45.0 ttl pk-yrs)     Types: Cigarettes     Start date:      Quit date: 2016     Years since quittin.4    Smokeless tobacco: Never    Tobacco " comments:     pt stated quit about a month ago, last assessed 4/30/14 per David   Vaping Use    Vaping status: Never Used   Substance and Sexual Activity    Alcohol use: Not Currently    Drug use: Never    Sexual activity: Not Currently     Partners: Male     Birth control/protection: Female Sterilization     Comment: Boyfriend/father of 4 children   Other Topics Concern    Not on file   Social History Narrative    Home: Lives with boyfriend/father of her 4 children and one of their daughters        Children: 2 sons born 1988 and 1995,  2 daughters born 1997 and 2003        Education:     Social Determinants of Health     Financial Resource Strain: Low Risk  (3/10/2023)    Overall Financial Resource Strain (CARDIA)     Difficulty of Paying Living Expenses: Not hard at all   Food Insecurity: No Food Insecurity (3/10/2023)    Hunger Vital Sign     Worried About Running Out of Food in the Last Year: Never true     Ran Out of Food in the Last Year: Never true   Transportation Needs: No Transportation Needs (3/10/2023)    PRAPARE - Transportation     Lack of Transportation (Medical): No     Lack of Transportation (Non-Medical): No   Physical Activity: Inactive (6/9/2021)    Exercise Vital Sign     Days of Exercise per Week: 0 days     Minutes of Exercise per Session: 0 min   Stress: Not on file   Social Connections: Not on file   Intimate Partner Violence: Not on file   Housing Stability: Low Risk  (8/22/2022)    Housing Stability Vital Sign     Unable to Pay for Housing in the Last Year: No     Number of Places Lived in the Last Year: 1     Unstable Housing in the Last Year: No       Family Psychiatric History:     Family History   Problem Relation Age of Onset    Cancer Mother     Breast cancer Mother 57    Hypertension Mother     Stroke Father     Diabetes Father     Hypertension Father     Alzheimer's disease Father     Diabetes Sister     BRCA1 Negative Sister     BRCA2 Negative Sister     No Known Problems  Daughter     No Known Problems Daughter     Cancer Maternal Grandmother         ovarian    Ovarian cancer Maternal Grandmother         unspecified laterality    Cancer Maternal Grandfather     Hypertension Maternal Grandfather     Tongue cancer Maternal Grandfather     Stroke Paternal Grandmother     No Known Problems Paternal Grandfather     Thyroid disease unspecified Brother     Depression Brother     Anxiety disorder Brother     Drug abuse Brother         (pain pills)    No Known Problems Son     No Known Problems Son     Thyroid disease unspecified Maternal Aunt     Colon cancer Maternal Aunt     No Known Problems Maternal Aunt     No Known Problems Maternal Aunt     No Known Problems Maternal Aunt     No Known Problems Paternal Aunt     No Known Problems Paternal Aunt     No Known Problems Paternal Aunt     No Known Problems Paternal Aunt     No Known Problems Paternal Aunt     No Known Problems Paternal Aunt     Alzheimer's disease Cousin     Breast cancer Cousin 42    Breast cancer Family         unk age       History Review: The following portions of the patient's history were reviewed and updated as appropriate: allergies, current medications, past family history, past medical history, past social history, past surgical history, and problem list.         OBJECTIVE:     Mental Status Evaluation:    Appearance Casually dressed, good eye contact and hygiene   Behavior Calm, cooperative, pleasant   Speech Clear, normal rate and volume   Mood Euthymic   Affect Normal range and intensity   Thought Processes Organized, goal directed   Associations Intact   Thought Content No delusions   Perceptual Disturbances: Pt denies any form of hallucinations and does not appear to be responding to internal stimuli   Abnormal Thoughts  Risk Potential Suicidal ideation - None  Homicidal ideation - None  Potential for aggression - No   Orientation oriented to person, place, situation, day of week, date, month of year, and year    Memory short term memory grossly intact   Cosciousness alert and awake   Attention Span attention span and concentration are age appropriate   Intellect appears to be of average intelligence   Insight fair   Judgement fair   Muscle Strength and  Gait normal gait and normal balance   Language no difficulty naming common objects, no difficulty repeating a phrase   Fund of Knowledge adequate knowledge of current events  adequate fund of knowledge regarding past history  adequate fund of knowledge regarding vocabulary    Pain none   Pain Scale 0       Laboratory Results: I have personally reviewed all pertinent laboratory/tests results.     Outside labwork 5/29/2024: CBC with diff, Vit D, CMP, IGG, IGM, IGA, IGE      Lab Results   Component Value Date    WBC 7.36 07/20/2023    RBC 5.13 (H) 07/20/2023    HGB 14.2 07/20/2023    HCT 45.2 07/20/2023     (H) 07/20/2023    RDW 13.1 07/20/2023    NEUTROABS 3.75 07/20/2023    SODIUM 141 05/29/2024    K 4.2 05/29/2024     05/29/2024    CO2 27 05/29/2024    BUN 12 05/29/2024    CREATININE 0.70 05/29/2024    GLUC 89 05/29/2024    GLUF 99 07/20/2023    CALCIUM 9.6 05/29/2024    AST 13 05/29/2024    ALT 15 05/29/2024    ALKPHOS 119 05/29/2024    TP 6.8 05/29/2024    ALB 4.2 05/29/2024    TBILI 0.5 05/29/2024    CHOLESTEROL 157 02/09/2024    HDL 48 (L) 02/09/2024    TRIG 72 02/09/2024    LDLCALC 95 02/09/2024    NONHDLC 109 02/09/2024    HWP3XAKHEZGR 0.787 07/20/2023    FREET4 1.49 (H) 03/10/2023    HGBA1C 5.8 (H) 07/20/2023     07/20/2023     EKG   Lab Results   Component Value Date    VENTRATE 79 12/23/2021    ATRIALRATE 79 12/23/2021    PRINT 138 12/23/2021    QRSDINT 108 12/23/2021    QTINT 386 12/23/2021    QTCINT 442 12/23/2021    PAXIS 73 12/23/2021    QRSAXIS 18 12/23/2021    TWAVEAXIS 55 12/23/2021     Imaging Studies: XR foot 3+ vw left    Result Date: 5/28/2024  Narrative: HISTORY: . fall MRN:  [id number]     DATE OF SERVICE:  5/27/2024 11:05 PM                        EXAM DESCRIPTION:  XR FOOT 3+ VW LEFT   COMPARISON:  No relevant recent priors. FINDINGS: There is no acute fracture or dislocation. No other acute abnormality identified.    Impression: IMPRESSION: No acute osseous abnormality. Workstation:[id number]    XR knee 4+ vw left injury    Result Date: 5/28/2024  Narrative: HISTORY: . fall MRN:  [id number]     DATE OF SERVICE:  5/27/2024 11:00 PM                       EXAM DESCRIPTION:  XR KNEE 4+ VW LEFT   COMPARISON:  No relevant recent priors. FINDINGS: There is no acute fracture or dislocation. No other acute abnormality identified.    Impression: IMPRESSION: No acute osseous abnormality. Workstation:[id number]    XR elbow 3+ vw left    Result Date: 5/28/2024  Narrative: HISTORY: . fall MRN:  [id number]     DATE OF SERVICE:  5/27/2024 10:55 PM                       EXAM DESCRIPTION:  XR ELBOW 3+ VW LEFT   COMPARISON:  No relevant recent priors. FINDINGS: There is no acute fracture or dislocation. No other acute abnormality identified.    Impression: IMPRESSION: No acute osseous abnormality. Workstation:[id number]    CT head wo contrast    Result Date: 5/28/2024  Narrative: HEAD CT / CERVICAL SPINE CT HISTORY: fall TECHNIQUE: Unenhanced CT scan of the head was performed according to departmental technique. Images were reviewed in soft tissue and bone algorithms with axial sections, sagittal and coronal reformations. Axial sections were obtained from the skullbase through the cervical spine according to departmental protocol. Coronal and sagittal reformations were performed. COMPARISON: None. FINDINGS: CT HEAD DLP (mGy-cm): 829  Images: Reviewed. Initial Findings: None. Soft Tissues: No significant swelling/hematoma. Evaluation for Intracranial Hemorrhage: None. Evaluation for Infarction: No acute territorial infarct within the confines of the low dose CT technique. Brain Parenchyma: Normal. Evaluation for Intracranial Mass: No intracranial mass. Ventricular  System: Normal. Basal Cisterns: Normal. Midline Shift: None. Extra-axial spaces: Normal. Posterior fossa: Normal. Midline Structures/Sella: Normal. Unenhanced major intracranial arteries: Intracranial atherosclerotic calcifications. Unenhanced major dural venous sinuses: Grossly normal. Imaged aspect of orbits: Normal. Imaged aspect of paranasal sinuses: Normal. Mastoid air cells/Middle ear cavities: Normal. Skull Base: Normal. Calvarium: Normal. Imaged aspect of craniocervical junction: Not completely imaged in the field of view. CT CERVICAL SPINE Multilevel spondylotic, uncovertebral, and facet degenerative changes are noted.  There is no visible fracture. There are mild multilevel cervical listheses. There is no prevertebral soft tissue swelling. The current study does not evaluate for ligamentous laxity or injury. Evaluation of contents of the spinal canal is suboptimal. Questions or Further discussion? Please contact me: TigerConnect/Text: [facility] Cochran [facility] ASCOM #: [id number] ([phone])    Impression: IMPRESSION: 1.  No acute intracranial hemorrhage or significant intracranial mass effect is identified. 2.  Spondylosis. No evidence of acute traumatic osseous injury to the cervical spine. Continue to follow the cervical spine trauma imaging protocol. Workstation:[id number]    CT spine cervical wo contrast    Result Date: 5/28/2024  Narrative: HEAD CT / CERVICAL SPINE CT HISTORY: fall TECHNIQUE: Unenhanced CT scan of the head was performed according to departmental technique. Images were reviewed in soft tissue and bone algorithms with axial sections, sagittal and coronal reformations. Axial sections were obtained from the skullbase through the cervical spine according to departmental protocol. Coronal and sagittal reformations were performed. COMPARISON: None. FINDINGS: CT HEAD DLP (mGy-cm): 829  Images: Reviewed. Initial Findings: None. Soft Tissues: No significant swelling/hematoma. Evaluation for  Intracranial Hemorrhage: None. Evaluation for Infarction: No acute territorial infarct within the confines of the low dose CT technique. Brain Parenchyma: Normal. Evaluation for Intracranial Mass: No intracranial mass. Ventricular System: Normal. Basal Cisterns: Normal. Midline Shift: None. Extra-axial spaces: Normal. Posterior fossa: Normal. Midline Structures/Sella: Normal. Unenhanced major intracranial arteries: Intracranial atherosclerotic calcifications. Unenhanced major dural venous sinuses: Grossly normal. Imaged aspect of orbits: Normal. Imaged aspect of paranasal sinuses: Normal. Mastoid air cells/Middle ear cavities: Normal. Skull Base: Normal. Calvarium: Normal. Imaged aspect of craniocervical junction: Not completely imaged in the field of view. CT CERVICAL SPINE Multilevel spondylotic, uncovertebral, and facet degenerative changes are noted.  There is no visible fracture. There are mild multilevel cervical listheses. There is no prevertebral soft tissue swelling. The current study does not evaluate for ligamentous laxity or injury. Evaluation of contents of the spinal canal is suboptimal. Questions or Further discussion? Please contact me: TigerConnect/Text: [facility]ran [facility] ASCOM #: [id number] ([phone])    Impression: IMPRESSION: 1.  No acute intracranial hemorrhage or significant intracranial mass effect is identified. 2.  Spondylosis. No evidence of acute traumatic osseous injury to the cervical spine. Continue to follow the cervical spine trauma imaging protocol. Workstation:[id number]      2/14/2024 echocardiogram reviewed      Assessment/plan:       Diagnoses and all orders for this visit:    Bipolar 2 disorder (HCC)  -     FLUoxetine (PROzac) 20 mg capsule; Take 1 capsule (20 mg total) by mouth daily  -     lamoTRIgine (LaMICtal) 100 mg tablet; Take 1/2 tab po qhs    Anxiety  -     FLUoxetine (PROzac) 20 mg capsule; Take 1 capsule (20 mg total) by mouth daily  -     hydrOXYzine HCL (ATARAX) 50 mg  tablet; TAKE 1/2 TO 1 TABLET BY MOUTH 3 TIMES DAILY AS NEEDED FOR ANXIETY            PLAN:  Pt is feeling well, mood appears stable and anxiety is under control with the present regimen. Treatment plan done and Pt accepts the plan.  Paper safety Plan done-- took a bit of time to derive answers.  Continue:   Fluoxetine 20mg (1) cap po qd # 90 R1  Lamotrigine 100mg (1/2) tab po qhs # 45 R1  Hydroxyzine 50mg (1/2 - 1) tab po tid prn anxiety or insomnia # 270 R1  Melatonin --Pt get OTC  Vit D --per PCP  Pt to get Lipids and HgbA1C --per PCP and Home sleep study per sleep specialist  F/U PCP, Cardiology, Neurology, and Sleep medicine for medical issues  Return 18 weeks, call sooner prn      Risks/Benefits      Risks, Benefits And Possible Side Effects Of Medications:    Risks, benefits, and possible side effects of medications explained to Poly and she verbalizes understanding and agreement for treatment.    Controlled Medication Discussion:     No recent records found for controlled prescriptions according to [place] Prescription Drug Monitoring Program  last controlled substance prescribed in 2/2024 (Temporary supply of Diazepam from another provider).    Visit Time    Visit Start Time: 11:14AM  Visit Stop Time: 11:58am  Total Visit Duration:  44 minutes

## 2024-06-17 ENCOUNTER — HOSPITAL ENCOUNTER (OUTPATIENT)
Dept: NON INVASIVE DIAGNOSTICS | Facility: CLINIC | Age: 59
Discharge: HOME | End: 2024-06-17
Payer: MEDICARE

## 2024-06-17 ENCOUNTER — PATIENT MESSAGE (OUTPATIENT)
Dept: GASTROENTEROLOGY | Facility: CLINIC | Age: 59
End: 2024-06-17

## 2024-06-17 DIAGNOSIS — I83.893 SYMPTOMATIC VARICOSE VEINS OF BOTH LOWER EXTREMITIES: ICD-10-CM

## 2024-06-17 PROCEDURE — 93970 EXTREMITY STUDY: CPT

## 2024-06-18 PROCEDURE — 93970 EXTREMITY STUDY: CPT | Performed by: SURGERY

## 2024-06-24 ENCOUNTER — OFFICE VISIT (OUTPATIENT)
Dept: PSYCHIATRY | Facility: CLINIC | Age: 59
End: 2024-06-24
Payer: MEDICARE

## 2024-06-24 VITALS — HEIGHT: 67 IN | WEIGHT: 219.7 LBS | BODY MASS INDEX: 34.48 KG/M2

## 2024-06-24 DIAGNOSIS — F41.9 ANXIETY: ICD-10-CM

## 2024-06-24 DIAGNOSIS — F31.81 BIPOLAR 2 DISORDER (HCC): ICD-10-CM

## 2024-06-24 PROCEDURE — 99214 OFFICE O/P EST MOD 30 MIN: CPT | Performed by: PHYSICIAN ASSISTANT

## 2024-06-24 RX ORDER — FLUOXETINE HYDROCHLORIDE 20 MG/1
20 CAPSULE ORAL DAILY
Qty: 90 CAPSULE | Refills: 1 | Status: SHIPPED | OUTPATIENT
Start: 2024-06-24

## 2024-06-24 RX ORDER — LAMOTRIGINE 100 MG/1
TABLET ORAL
Qty: 45 TABLET | Refills: 1 | Status: SHIPPED | OUTPATIENT
Start: 2024-06-24

## 2024-06-24 RX ORDER — HYDROXYZINE 50 MG/1
TABLET, FILM COATED ORAL
Qty: 270 TABLET | Refills: 1 | Status: SHIPPED | OUTPATIENT
Start: 2024-06-24

## 2024-06-24 NOTE — BH TREATMENT PLAN
"TREATMENT PLAN (Medication Management Only)        [facility] - PSYCHIATRIC [facility]    Name/Date of Birth/MRN:  Poly Joshua 59 y.o. 1965 MRN: [id number]  Date of Treatment Plan: June 24, 2024  Diagnosis/Diagnoses:   1. Bipolar 2 disorder (HCC)    2. Anxiety      Strengths/Personal Resources for Self-Care: \"Walk outside-relax; Take the car and drive around. \"  Area/Areas of need (in own words): \"I feel good\".  1. Long Term Goal:   Maintain mood stability and control of anxiety  Target Date:  Pt is at a baseline, doing well  Person/Persons responsible for completion of goal: Krishan  2.  Short Term Objective (s) - How will we reach this goal?:   A.  Provider new recommended medication/dosage changes and/or continue medication(s): Pt is feeling well, mood appears stable and anxiety is under control with the present regimen. Treatment plan done and Pt accepts the plan.  Paper safety Plan given for Pt to fill out and return  Continue:   Fluoxetine 20mg (1) cap po qd # 90 R1  Lamotrigine 100mg (1/2) tab po qhs # 45 R1  Hydroxyzine 50mg (1/2 - 1) tab po tid prn anxiety or insomnia # 270 R1  Melatonin --Pt get OTC  Vit D --per PCP  Pt to get Lipids and HgbA1C --per PCP and Home sleep study per sleep specialist  F/U PCP, Cardiology, Neurology, and Sleep medicine for medical issues  Return 18 weeks, call sooner prn    Target Date:  Pt is at a baseline  Person/Persons Responsible for Completion of Goal: Krishan   Progress Towards Goals: stable, continuing treatment  Treatment Modality:  Medication mgt  Review due 180 days from date of this plan: 6 months - 12/24/2024  Expected length of service: ongoing treatment unless revised  My Physician/PA/NP and I have developed this plan together and I agree to work on the goals and objectives. I understand the treatment goals that were developed for my treatment.  Electronic Signatures: on file (unless signed " below)    Viky Lyn PA-C 06/24/24

## 2024-06-24 NOTE — BH CRISIS PLAN
"Client Name: Poly Joshua       Client YOB: 1965    John[place]illy Safety Plan      Creation Date: 6/24/24    Created By: Viky Lyn PA-C       Step 1: Warning Signs:   Warning Signs   \"When I get out of control-- that's it\"   \"I get angry, I get aggressive\"   \"I want to be left alone-- I'm gonna snap like this\"            Step 2: Internal Coping Strategies:   Internal Coping Strategies   \"Read a book\"   \"Go outside-- planting plants\"   \"Listen good music\"   \"I like to drive around\"            Step 3: People and social settings that provide distraction:   Name Contact Information   Daughter Megan Telehpone   Daughter Josselyn Telephone   Son Дмитрий Telephone   Son Joseph Telephone   Significant other/father of children Russel Crook Telephone    [facility] Telephone    Places   \"[facility]\"           Step 4: People whom I can ask for help during a crisis:      Name Contact Information    Pt personally does not like to contact anyone and specifies that in crisis she would only call 911       Step 5: Professionals or agencies I can contact during a crisis:      Clinican/Agency Name Phone Emergency Contact    [facility] [phone]          [facility] [phone]       Local Emergency Department Emergency Department Phone Emergency Department Address    [facility]          Crisis Phone Numbers:   Suicide Prevention Lifeline: Call or Text  932 Crisis Text Line: Text HOME to [phone]   Please note: Some [facility] do not have a separate number for Child/Adolescent specific crisis. If your county is not listed under Child/Adolescent, please call the adult number for your county      Adult Crisis Numbers: Child/Adolescent Crisis Numbers   [facility]: [phone] [facility]: [phone]   [facility]: [phone] [facility]: [phone]   [facility]: [phone] [place]: [phone]   [facility]: " "[phone] [place] [facility]: [phone]   [facility]: [phone]   [facility]: [phone]   [facility]: [phone]   [facility]: [phone] (daytime) [phone] (after hours, weekends, holidays)      Step 6: Making the environment safer (plan for lethal means safety):   Patient did not identify any lethal methods: Yes     Optional: What is most important to me and worth living for?   \"For my kids, I live for myself\"     [facility] Safety Plan. Diane Lopez and Jermaine Cervantes. Used with permission of the authors.           "

## 2024-06-25 ENCOUNTER — ANESTHESIA EVENT (OUTPATIENT)
Dept: GASTROENTEROLOGY | Facility: AMBULARY SURGERY CENTER | Age: 59
End: 2024-06-25

## 2024-06-25 ENCOUNTER — ANESTHESIA (OUTPATIENT)
Dept: GASTROENTEROLOGY | Facility: AMBULARY SURGERY CENTER | Age: 59
End: 2024-06-25

## 2024-06-25 ENCOUNTER — HOSPITAL ENCOUNTER (OUTPATIENT)
Dept: GASTROENTEROLOGY | Facility: AMBULARY SURGERY CENTER | Age: 59
Setting detail: OUTPATIENT SURGERY
Discharge: HOME | End: 2024-06-25
Payer: MEDICARE

## 2024-06-25 VITALS
BODY MASS INDEX: 34.69 KG/M2 | RESPIRATION RATE: 18 BRPM | HEART RATE: 76 BPM | SYSTOLIC BLOOD PRESSURE: 132 MMHG | TEMPERATURE: 97.2 F | DIASTOLIC BLOOD PRESSURE: 77 MMHG | HEIGHT: 67 IN | WEIGHT: 221 LBS | OXYGEN SATURATION: 95 %

## 2024-06-25 DIAGNOSIS — K26.9 DUODENAL ULCER: ICD-10-CM

## 2024-06-25 DIAGNOSIS — R10.13 EPIGASTRIC PAIN: ICD-10-CM

## 2024-06-25 DIAGNOSIS — R13.19 ESOPHAGEAL DYSPHAGIA: Primary | ICD-10-CM

## 2024-06-25 PROCEDURE — 43239 EGD BIOPSY SINGLE/MULTIPLE: CPT | Performed by: INTERNAL MEDICINE

## 2024-06-25 PROCEDURE — 43450 DILATE ESOPHAGUS 1/MULT PASS: CPT | Performed by: INTERNAL MEDICINE

## 2024-06-25 PROCEDURE — 88305 TISSUE EXAM BY PATHOLOGIST: CPT | Performed by: STUDENT IN AN ORGANIZED HEALTH CARE EDUCATION/TRAINING PROGRAM

## 2024-06-25 RX ORDER — FENTANYL CITRATE 50 UG/ML
INJECTION, SOLUTION INTRAMUSCULAR; INTRAVENOUS AS NEEDED
Status: DISCONTINUED | OUTPATIENT
Start: 2024-06-25 | End: 2024-06-25

## 2024-06-25 RX ORDER — PROPOFOL 10 MG/ML
INJECTION, EMULSION INTRAVENOUS AS NEEDED
Status: DISCONTINUED | OUTPATIENT
Start: 2024-06-25 | End: 2024-06-25

## 2024-06-25 RX ORDER — SODIUM CHLORIDE, SODIUM LACTATE, POTASSIUM CHLORIDE, CALCIUM CHLORIDE 600; 310; 30; 20 MG/100ML; MG/100ML; MG/100ML; MG/100ML
INJECTION, SOLUTION INTRAVENOUS CONTINUOUS PRN
Status: DISCONTINUED | OUTPATIENT
Start: 2024-06-25 | End: 2024-06-25

## 2024-06-25 RX ORDER — LIDOCAINE HYDROCHLORIDE 10 MG/ML
INJECTION, SOLUTION EPIDURAL; INFILTRATION; INTRACAUDAL; PERINEURAL AS NEEDED
Status: DISCONTINUED | OUTPATIENT
Start: 2024-06-25 | End: 2024-06-25

## 2024-06-25 RX ORDER — OMEPRAZOLE 40 MG/1
40 CAPSULE, DELAYED RELEASE ORAL DAILY
Qty: 30 CAPSULE | Refills: 3 | Status: SHIPPED | OUTPATIENT
Start: 2024-06-25

## 2024-06-25 RX ADMIN — PROPOFOL 25 MG: 10 INJECTION, EMULSION INTRAVENOUS at 10:20

## 2024-06-25 RX ADMIN — SODIUM CHLORIDE, SODIUM LACTATE, POTASSIUM CHLORIDE, AND CALCIUM CHLORIDE: .6; .31; .03; .02 INJECTION, SOLUTION INTRAVENOUS at 10:09

## 2024-06-25 RX ADMIN — PROPOFOL 120 MG: 10 INJECTION, EMULSION INTRAVENOUS at 10:14

## 2024-06-25 RX ADMIN — PROPOFOL 25 MG: 10 INJECTION, EMULSION INTRAVENOUS at 10:16

## 2024-06-25 RX ADMIN — PROPOFOL 25 MG: 10 INJECTION, EMULSION INTRAVENOUS at 10:22

## 2024-06-25 RX ADMIN — PROPOFOL 25 MG: 10 INJECTION, EMULSION INTRAVENOUS at 10:18

## 2024-06-25 RX ADMIN — FENTANYL CITRATE 100 MCG: 50 INJECTION INTRAMUSCULAR; INTRAVENOUS at 10:10

## 2024-06-25 RX ADMIN — LIDOCAINE HYDROCHLORIDE 50 MG: 10 INJECTION, SOLUTION EPIDURAL; INFILTRATION; INTRACAUDAL at 10:10

## 2024-06-25 NOTE — ANESTHESIA PREPROCEDURE EVALUATION
Procedure:  EGD  NPO past 2230  Relevant Problems   CARDIO   (+) Breast pain, right   (+) Headache, migraine   (+) Hypertension   (+) Intractable chronic migraine without aura and without status migrainosus      ENDO   (+) Controlled type 2 diabetes mellitus with complication, without long-term current use of insulin (HCC)   (+) Hypothyroidism      GI/HEPATIC   (+) Esophageal dysphagia   (+) Gastroesophageal reflux disease      /RENAL   (+) Left nephrolithiasis      MUSCULOSKELETAL   (+) Arthritis   (+) Chronic low back pain   (+) Proctalgia fugax   (+) Spondylosis of lumbar region without myelopathy or radiculopathy      NEURO/PSYCH   (+) Anxiety   (+) Chronic low back pain   (+) Headache, migraine   (+) Intractable chronic migraine without aura and without status migrainosus   (+) Other chronic pain      PULMONARY   (+) Moderate persistent asthma with exacerbation   (+) MICHAEL (obstructive sleep apnea)   (-) Smoking   (-) URI (upper respiratory infection)        Physical Exam    Airway    Mallampati score: II  TM Distance: >3 FB  Neck ROM: full     Dental   No notable dental hx     Cardiovascular      Pulmonary   Breath sounds clear to auscultation    Other Findings  post-pubertal.      Anesthesia Plan  ASA Score- 3     Anesthesia Type- IV sedation with anesthesia with ASA Monitors.         Additional Monitors:     Airway Plan:            Plan Factors-Exercise tolerance (METS): >4 METS.    Chart reviewed.    Patient summary reviewed.    Patient is not a current smoker.              Induction- intravenous.    Postoperative Plan-         Informed Consent- Anesthetic plan and risks discussed with patient.  I personally reviewed this patient with the CRNA. Discussed and agreed on the Anesthesia Plan with the CRNA..

## 2024-06-25 NOTE — INTERVAL H&P NOTE
H&P reviewed. After examining the patient I find no changes in the patients condition since the H&P had been written.    Vitals:    06/25/24 0955   BP: 136/84   Pulse: 69   Resp: 16   Temp: (!) 96.7 °F (35.9 °C)   SpO2: 98%

## 2024-06-27 PROCEDURE — 88305 TISSUE EXAM BY PATHOLOGIST: CPT | Performed by: STUDENT IN AN ORGANIZED HEALTH CARE EDUCATION/TRAINING PROGRAM

## 2024-07-10 ENCOUNTER — OFFICE VISIT (OUTPATIENT)
Dept: GASTROENTEROLOGY | Facility: CLINIC | Age: 59
End: 2024-07-10
Payer: MEDICARE

## 2024-07-10 VITALS
WEIGHT: 219 LBS | HEIGHT: 67 IN | TEMPERATURE: 98.7 F | BODY MASS INDEX: 34.37 KG/M2 | SYSTOLIC BLOOD PRESSURE: 106 MMHG | DIASTOLIC BLOOD PRESSURE: 68 MMHG

## 2024-07-10 DIAGNOSIS — K58.1 IRRITABLE BOWEL SYNDROME WITH CONSTIPATION: ICD-10-CM

## 2024-07-10 DIAGNOSIS — K29.70 GASTRITIS WITHOUT BLEEDING, UNSPECIFIED CHRONICITY, UNSPECIFIED GASTRITIS TYPE: ICD-10-CM

## 2024-07-10 DIAGNOSIS — K82.4 GALL BLADDER POLYP: ICD-10-CM

## 2024-07-10 DIAGNOSIS — R10.9 ABDOMINAL WALL PAIN: ICD-10-CM

## 2024-07-10 DIAGNOSIS — K21.9 GASTROESOPHAGEAL REFLUX DISEASE WITHOUT ESOPHAGITIS: Primary | ICD-10-CM

## 2024-07-10 PROCEDURE — 99214 OFFICE O/P EST MOD 30 MIN: CPT | Performed by: INTERNAL MEDICINE

## 2024-07-10 PROCEDURE — G2211 COMPLEX E/M VISIT ADD ON: HCPCS | Performed by: INTERNAL MEDICINE

## 2024-07-10 NOTE — PROGRESS NOTES
[facility] - Outpatient Follow-up Note  Poly Joshua 59 y.o. female MRN: [id number]  Encounter: [id number]          ASSESSMENT AND PLAN:    1. Gastroesophageal reflux disease without esophagitis  2. Gastritis without bleeding, unspecified chronicity, unspecified gastritis type  Discussed lifestyle modifications to help with symptoms. Reviewed EGD and pathology results with patient at visit. Consider decreasing Nexium at future visits, remains on 40mg BID.     3. Irritable bowel syndrome with constipation  No stool changes     4. Abdominal wall pain- epigastric region   5. Gallbladder polyp  No recent abdominal imaging. Will obtain US abdominal wall to r/o hernia, scar tissue, or other pathology leading to abdominal wall pain. Also due for surveillance of GB polyp, last measuring 9mm in 2022. Pending results, may need referral to surgery.     RTC 6 months  ______________________________________________________________________    SUBJECTIVE:    59F with GERD s/p heller myotomy with lorena fundo 2015, IBS-C, DM2, duodenal ulcers, h/o H pylori (eradicated per 07/2023 stool Ag), HH who presents for follow up. Last EGD 06/2024 dilated 60Fr Zhao, normal stomach and duodenum, negative path from esophageal biopsy.    GERD- burning epigastric pain. Taking nexium 40mg twice per daily. Drinking milk to help with the pain. Appetite good, eats 'everything'. No bowel habit changes or other red flag symptoms.     REVIEW OF SYSTEMS IS OTHERWISE NEGATIVE.      Historical Information   Past Medical History:   Diagnosis Date    Achalasia     Asthma     Colon polyp     Constipation     CPAP (continuous positive airway pressure) dependence     Depression     Diarrhea 02/28/2019    Disease of thyroid gland     Elevated LFTs     last assessed 10/25/17    Fibromyalgia     Gastric ulcer     GERD (gastroesophageal reflux disease)     H. pylori infection 10/09/2017    [facility] - [id number]: Treated, f/u stool  antigen negative    Hypertension     Hypothyroidism     Kidney stone     Migraines     Migraines     MICHAEL (obstructive sleep apnea)     last assessed 16    Pneumonia     Sleep apnea 2019    Uses CPAP    Thyroid nodule     last assessed 17     Past Surgical History:   Procedure Laterality Date    BREAST BIOPSY Right 10/31/2017    us bx     SECTION      COLONOSCOPY      ENDOMETRIAL ABLATION      MAMMO STEREOTACTIC BREAST BIOPSY LEFT (ALL INC) Left 2024    MYOTOMY HELLER LAPAROSCOPIC  2016    Dr. Magallanes, Coincident lorena fundoplication    NASAL SINUS SURGERY      OTHER SURGICAL HISTORY      achalasia repair    OVARIAN CYST REMOVAL      PILONIDAL CYST / SINUS EXCISION      MS COLONOSCOPY FLX DX W/COLLJ SPEC WHEN PFRMD N/A 2019    Procedure: COLONOSCOPY;  Surgeon: Kalpesh Perez MD;  [facility];  Service: Gastroenterology    MS CYSTOURETHROSCOPY N/A 2017    Procedure: CYSTOSCOPY;  Surgeon: Reymundo Dill MD;  [facility];  Service: UroGynecology           MS ESOPHAGOGASTRODUODENOSCOPY TRANSORAL DIAGNOSTIC N/A 2017    Procedure: ESOPHAGOGASTRODUODENOSCOPY (EGD);  Surgeon: Kalpesh Perez MD;  Location:  GI LAB;  Service: Gastroenterology    MS ESOPHAGOGASTRODUODENOSCOPY TRANSORAL DIAGNOSTIC N/A 2019    Procedure: ESOPHAGOGASTRODUODENOSCOPY (EGD);  Surgeon: Kalpesh Perez MD;  [facility];  Service: Gastroenterology    MS LITHOTRIPSY XTRCORP SHOCK WAVE Left 2022    Procedure: ESWL;  Surgeon: Markie Osullivan MD;  Location:  MAIN OR;  Service: Urology    MS POST COLPORRHAPHY RECTOCELE W/WO PERINEORRHAPHY N/A 2017    Procedure: COLPORRHAPHY POSTERIOR;  Surgeon: Reymundo Dill MD;  [facility];  Service: UroGynecology           MS SLING OPERATION STRESS INCONTINENCE N/A 2017    Procedure: INSERTION PUBOVAGINAL SLING /SINGLE INCISION ;  Surgeon: Reymundo Dill MD;  [facility];  Service: UroGynecology            TONSILLECTOMY      TUBAL LIGATION      UPPER GASTROINTESTINAL ENDOSCOPY      US GUIDED BREAST BIOPSY RIGHT COMPLETE Right 10/31/2017     Social History   Social History     Substance and Sexual Activity   Alcohol Use Not Currently     Social History     Substance and Sexual Activity   Drug Use Never     Social History     Tobacco Use   Smoking Status Former    Current packs/day: 0.00    Average packs/day: 1.5 packs/day for 30.0 years (45.0 ttl pk-yrs)    Types: Cigarettes    Start date:     Quit date:     Years since quittin.5   Smokeless Tobacco Never   Tobacco Comments    pt stated quit about a month ago, last assessed 14 per [employer]     Family History   Problem Relation Age of Onset    Cancer Mother     Breast cancer Mother 57    Hypertension Mother     Stroke Father     Diabetes Father     Hypertension Father     Alzheimer's disease Father     Diabetes Sister     BRCA1 Negative Sister     BRCA2 Negative Sister     No Known Problems Daughter     No Known Problems Daughter     Cancer Maternal Grandmother         ovarian    Ovarian cancer Maternal Grandmother         unspecified laterality    Cancer Maternal Grandfather     Hypertension Maternal Grandfather     Tongue cancer Maternal Grandfather     Stroke Paternal Grandmother     No Known Problems Paternal Grandfather     Thyroid disease unspecified Brother     Depression Brother     Anxiety disorder Brother     Drug abuse Brother         (pain pills)    No Known Problems Son     No Known Problems Son     Thyroid disease unspecified Maternal Aunt     Colon cancer Maternal Aunt     No Known Problems Maternal Aunt     No Known Problems Maternal Aunt     No Known Problems Maternal Aunt     No Known Problems Paternal Aunt     No Known Problems Paternal Aunt     No Known Problems Paternal Aunt     No Known Problems Paternal Aunt     No Known Problems Paternal Aunt     No Known Problems Paternal Aunt     Alzheimer's disease Cousin     Breast cancer  "Cousin 42    Breast cancer Family         unk age       Meds/Allergies       Current Outpatient Medications:     Alaway 0.025 % ophthalmic solution    albuterol (2.5 mg/3 mL) 0.083 % nebulizer solution    albuterol (PROVENTIL HFA,VENTOLIN HFA) 90 mcg/act inhaler    Alcohol Swabs (Alcohol Prep) 70 % PADS    atorvastatin (LIPITOR) 20 mg tablet    Blood Glucose Monitoring Suppl (FreeStyle Lite) w/Device KIT    Botox 200 units SOLR    carbamide peroxide (DEBROX) 6.5 % otic solution    cetaphil (CETAPHIL) lotion    cholecalciferol (VITAMIN D3) 1,000 units tablet    diazepam (VALIUM) 5 mg tablet    Diclofenac Sodium (VOLTAREN) 1 %    esomeprazole (NexIUM) 40 MG capsule    fexofenadine (ALLEGRA) 180 MG tablet    FLUoxetine (PROzac) 20 mg capsule    fluticasone (FLONASE) 50 mcg/act nasal spray    fluticasone-vilanterol (Breo Ellipta) 200-25 mcg/actuation inhaler    furosemide (LASIX) 20 mg tablet    glucose monitoring kit (FREESTYLE) monitoring kit    hydrOXYzine HCL (ATARAX) 50 mg tablet    ipratropium (ATROVENT) 0.02 % nebulizer solution    ketoconazole (NIZORAL) 2 % cream    lamoTRIgine (LaMICtal) 100 mg tablet    Lancets (freestyle) lancets    levothyroxine 100 mcg tablet    lidocaine (LMX) 4 % cream    metFORMIN (GLUCOPHAGE-XR) 500 mg 24 hr tablet    montelukast (SINGULAIR) 10 mg tablet    omeprazole (PriLOSEC) 40 MG capsule    polyethylene glycol (MIRALAX) 17 g packet    Premarin vaginal cream    sucralfate (CARAFATE) 1 g/10 mL suspension    triamcinolone (KENALOG) 0.1 % cream    Allergies   Allergen Reactions    Gabapentin Other (See Comments)     Annotation - [id number]: Neuropsych effects per patient.  Annotation - [id number]: Neuropsych effects per patient.  Annotation - [id number]: Neuropsych effects per patient.    Latex Dermatitis and Rash     Other reaction(s): Dermatitis    Penicillins Hives           Objective     Blood pressure 106/68, temperature 98.7 °F (37.1 °C), temperature source Tympanic, height 5' 7\" " (1.702 m), weight 99.3 kg (219 lb), last menstrual period 10/11/2019, not currently breastfeeding. Body mass index is 34.3 kg/m².      PHYSICAL EXAM:      General Appearance:   Alert, cooperative, no distress   HEENT:   Normocephalic, atraumatic, anicteric.     Neck:  Supple, symmetrical, trachea midline   Lungs:   Clear to auscultation bilaterally; no rales, rhonchi or wheezing; respirations unlabored    Heart::   Regular rate and rhythm; no murmur, rub, or gallop.   Abdomen:   Soft, +TTP abdominal wall epigastric region, non-distended; normal bowel sounds; no masses, no organomegaly    Genitalia:   Deferred    Rectal:   Deferred    Extremities:  No cyanosis, clubbing or edema    Pulses:  2+ and symmetric    Skin:  No jaundice, rashes, or lesions    Lymph nodes:  No palpable cervical lymphadenopathy        Lab Results:   No visits with results within 1 Day(s) from this visit.   Latest known visit with results is:   Hospital Outpatient Visit on 06/25/2024   Component Date Value    Case Report 06/25/2024                      Value:Surgical Pathology Report                         Case: [id number]                                  Authorizing Provider:  Kalpesh Perez MD           Collected:           06/25/2024 1021              Ordering Location:     [facility]        Received:            06/25/2024 [facility]                                     [facility]                                                    Pathologist:           Jassi Falcon DO                                                          Specimens:   A) - Esophagus, Distal esophagus bx                                                                 B) - Esophagus, Proximal esophagus bx                                                      Final Diagnosis 06/25/2024                      Value:A. Esophagus, Distal, Biopsy:  - Squamous mucosa within normal limits.  - Squamous intraepithelial eosinophils are not identified.   - Negative  "for intestinal metaplasia, dysplasia, and malignancy.     B. Esophagus, Proximal, Biopsy:  - Squamous mucosa within normal limits.  - Squamous intraepithelial eosinophils are not identified.   - Negative for intestinal metaplasia, dysplasia, and malignancy.         Additional Information 06/25/2024                      Value:All reported additional testing was performed with appropriately reactive controls.  These tests were developed and their performance characteristics determined by [facility] or appropriate performing facility, though some tests may be performed on tissues which have not been validated for performance characteristics (such as staining performed on alcohol exposed cell blocks and decalcified tissues).  Results should be interpreted with caution and in the context of the patients’ clinical condition. These tests may not be cleared or approved by the U.S. Food and Drug Administration, though the FDA has determined that such clearance or approval is not necessary. These tests are used for clinical purposes and they should not be regarded as investigational or for research. This laboratory has been approved by CLIA 88, designated as a high-complexity laboratory and is qualified to perform these tests.    Interpretation performed at [facility] [place] [address].      Gross Description 06/25/2024                      Value:A. The specimen is received in formalin, labeled with the patient's name and hospital number, and is designated \" distal esophagus biopsy\".  The specimen consists of 2 colorless tissue fragments measuring 0.2-0.5 cm in greatest dimension.  Entirely submitted.  One screened cassette.  Note: due to the size and consistency of specimen, the tissue may not survive histologic processing.  B. The specimen is received in formalin, labeled with the patient's name and hospital number, and is designated \" proximal " "esophagus biopsy\".  The specimen consists of a single colorless tissue fragment measuring 0.6 x 0.3 x 0.1 cm.  The specimen is entirely submitted in a screened cassette.    Note: The estimated total formalin fixation time based upon information provided by the submitting clinician and the standard processing schedule is under 72 hours. Judson Singleton      Clinical Information 06/25/2024                      Value:R/O EOE         Radiology Results:   EGD    Result Date: 6/25/2024  Narrative: Table formatting from the original result was not included. [facility] [place] [address] [facility] [address] [phone] DATE OF SERVICE: 6/25/24 PHYSICIAN(S): Attending: Kalpesh Perez MD Fellow: No Staff Documented INDICATION: Epigastric pain, Duodenal ulcer POST-OP DIAGNOSIS: See the impression below. PREPROCEDURE: Informed consent was obtained for the procedure, including sedation.  Risks of perforation, hemorrhage, adverse drug reaction and aspiration were discussed. The patient was placed in the left lateral decubitus position. Patient was explained about the risks and benefits of the procedure. Risks including but not limited to bleeding, infection, and perforation were explained in detail. Also explained about less than 100% sensitivity with the exam and other alternatives. PROCEDURE: EGD DETAILS OF PROCEDURE: Patient was taken to the procedure room where a time out was performed to confirm correct patient and correct procedure. The patient underwent monitored anesthesia care, which was administered by an anesthesia professional. The patient's blood pressure, heart rate, level of consciousness, respirations, oxygen, ECG and ETCO2 were monitored throughout the procedure. The scope was introduced through the mouth and advanced to the second part of the duodenum. Retroflexion was performed in the fundus. The patient experienced no blood loss. The procedure was not difficult. The " patient tolerated the procedure well. There were no apparent adverse events. ANESTHESIA INFORMATION: ASA: III Anesthesia Type: IV Sedation with Anesthesia MEDICATIONS: No administrations occurring from 0958 to 1025 on 06/25/24 FINDINGS: The esophagus appeared normal. Z-line is 38 cm from the incisors. Dilated in the esophagus with Zhao dilator from 56 Fr starting size to 60 Fr ending size. Dilation caused mucosal tears and improved lumen appearance; post-dilation mucosal tears were superficial The stomach appeared normal. The duodenal bulb and 2nd part of the duodenum appeared normal. Hiatal hernia - GE junction 38 cm from the incisors:  Hill classification: Grade II SPECIMENS: ID Type Source Tests Collected by Time Destination 1 : Distal esophagus bx Tissue Esophagus TISSUE EXAM Kalpesh Perez MD 6/25/2024 10:21 AM  2 : Proximal esophagus bx Tissue Esophagus TISSUE EXAM Kalpesh Perez MD 6/25/2024 10:23 AM      Impression: The esophagus appeared normal. Dilated in the esophagus with Zhao dilator to 60 Fr ending size. Dilation caused mucosal tears and improved lumen appearance; post-dilation mucosal tears were superficial The stomach appeared normal. The duodenal bulb and 2nd part of the duodenum appeared normal. Hiatal hernia RECOMMENDATION: Await pathology results   I reviewed diet and lifestyle precautions. This includes limiting coffee, soda, tomatoes, citrus, fatty and spicy foods.  I recommend waiting 3 hours after dinner to lie down. I recommend eating small frequent meals as well as sleeping with head of bed elevated at night. Continue PPI  Kalpesh Perez MD     VAS Lower extremity venous insufficiency duplex, bilateral w/ measurements    Result Date: 6/18/2024  Narrative:  [facility] REPORT CLINICAL: Indications: Patient presents with history of bilateral spider and reticular veins.  Patient denies truncal varicosities.  Patient has been wearing therapeutic compression stockings x a few months.  Operative History: No cardiovascular surgeries noted  FINDINGS:  Right           Impression    AP (mm)  Reflux  Valve Closure Time  GSV Inguinal                           Reflux                3.58  GSV Prox Thigh                    5.3                              GSV Mid Thigh   Varicosity        4.9                              GSV Dist Thigh  Varicosity        4.6                              GSV Knee        Varicosities      3.9                              GSV Prox Calf                     3.1                              GSV Mid Calf    Varicosity        2.1                              GSV Dist Calf                     1.7                              Popliteal                              Reflux                0.56  SSV Knee                          2.7                              SSV Mid Calf                      1.6                               Left            Impression  AP (mm)  Reflux  Valve Closure Time  CFV                                  Reflux                1.80  GSV Mid Thigh   Varicosity      3.7                              GSV Dist Thigh                  3.1                              FV Prox                              Reflux                2.91  GSV Knee                        3.2                              GSV Prox Calf                   2.8                              GSV Mid Calf                    1.7  Reflux                0.64  GSV Dist Calf                   2.3                              Popliteal                            Reflux                0.54  SSV Knee                        2.5                              SSV Mid Calf                    1.9                                 CONCLUSION:  Impression: RIGHT LIMB: Deep venous incompetence is noted in the popliteal vein. The great saphenous vein is incompetent only at the saphenofemoral junction. The great saphenous vein remains within the saphenous compartment in the thigh. The small saphenous vein is competent and  does not communicate with the popliteal vein. There is evidence of an incompetent  calf. There is no evidence of deep vein thrombosis in the CFV, the proximal PFV, the femoral vein and the popliteal vein.  LEFT LIMB: Deep venous incompetence is noted in the popliteal, proximal femoral, and common femoral veins. The great saphenous vein is incompetent only in the mid calf. The great saphenous vein remains within the saphenous compartment in the thigh. The small saphenous vein is competent and does not communicate with the popliteal vein. There is no evidence of incompetent perforators in the thigh or calf. There is no evidence of deep vein thrombosis in the CFV, the proximal PFV, the femoral vein and the popliteal vein.  Study performed with patient in steep Reverse Trendelenburg.  SIGNATURE: Electronically Signed by: JUAN JOSE ORTEGA MD on 2024-06-18 06:39:34 PM    XR PATELLA LEFT 3 VIEWS    Result Date: 6/17/2024  Narrative: This result has an attachment that is not available. AP LAT and Chase views of Left knee: Impression: Severe degenerative changes with severe loss of the medial joint space. There is osteophyte formation and subchondral sclerosis. No fractures or dislocation. This report is limited to orthopaedic interpretation.

## 2024-07-11 ENCOUNTER — HOSPITAL ENCOUNTER (OUTPATIENT)
Dept: MAMMOGRAPHY | Facility: CLINIC | Age: 59
Discharge: HOME | End: 2024-07-11
Payer: MEDICARE

## 2024-07-11 DIAGNOSIS — R92.8 ABNORMAL MAMMOGRAM: ICD-10-CM

## 2024-07-11 PROCEDURE — 77065 DX MAMMO INCL CAD UNI: CPT

## 2024-07-11 PROCEDURE — G0279 TOMOSYNTHESIS, MAMMO: HCPCS

## 2024-07-16 DIAGNOSIS — J45.40 MODERATE PERSISTENT ASTHMA WITHOUT COMPLICATION: ICD-10-CM

## 2024-07-16 RX ORDER — ALBUTEROL SULFATE 2.5 MG/3ML
SOLUTION RESPIRATORY (INHALATION)
Qty: 360 ML | Refills: 2 | Status: SHIPPED | OUTPATIENT
Start: 2024-07-16

## 2024-07-17 ENCOUNTER — HOSPITAL ENCOUNTER (OUTPATIENT)
Dept: RADIOLOGY | Age: 59
Discharge: HOME | End: 2024-07-17
Payer: MEDICARE

## 2024-07-17 DIAGNOSIS — R10.9 ABDOMINAL WALL PAIN: ICD-10-CM

## 2024-07-17 DIAGNOSIS — K82.4 GALL BLADDER POLYP: ICD-10-CM

## 2024-07-17 PROCEDURE — 76705 ECHO EXAM OF ABDOMEN: CPT

## 2024-07-24 DIAGNOSIS — R10.13 EPIGASTRIC PAIN: ICD-10-CM

## 2024-07-24 DIAGNOSIS — K26.9 DUODENAL ULCER: ICD-10-CM

## 2024-07-24 RX ORDER — ESOMEPRAZOLE MAGNESIUM 40 MG/1
40 CAPSULE, DELAYED RELEASE ORAL
Qty: 200 CAPSULE | Refills: 1 | Status: SHIPPED | OUTPATIENT
Start: 2024-07-24

## 2024-08-19 DIAGNOSIS — J40 CHRONIC SINUSITIS WITH RECURRENT BRONCHITIS: ICD-10-CM

## 2024-08-19 DIAGNOSIS — J32.9 CHRONIC SINUSITIS WITH RECURRENT BRONCHITIS: ICD-10-CM

## 2024-08-20 RX ORDER — FEXOFENADINE HCL 180 MG/1
180 TABLET ORAL DAILY PRN
Qty: 90 TABLET | Refills: 1 | Status: SHIPPED | OUTPATIENT
Start: 2024-08-20

## 2024-08-26 ENCOUNTER — OFFICE VISIT (OUTPATIENT)
Dept: VASCULAR SURGERY | Facility: CLINIC | Age: 59
End: 2024-08-26
Payer: MEDICARE

## 2024-08-26 VITALS
HEIGHT: 67 IN | BODY MASS INDEX: 34.53 KG/M2 | HEART RATE: 84 BPM | WEIGHT: 220 LBS | RESPIRATION RATE: 16 BRPM | SYSTOLIC BLOOD PRESSURE: 108 MMHG | OXYGEN SATURATION: 97 % | DIASTOLIC BLOOD PRESSURE: 70 MMHG

## 2024-08-26 DIAGNOSIS — I99.8 VASCULAR INSUFFICIENCY: Primary | ICD-10-CM

## 2024-08-26 DIAGNOSIS — I89.0 LYMPHEDEMA OF BOTH LOWER EXTREMITIES: ICD-10-CM

## 2024-08-26 DIAGNOSIS — I83.93 SPIDER VEINS OF BOTH LOWER EXTREMITIES: ICD-10-CM

## 2024-08-26 PROCEDURE — 99213 OFFICE O/P EST LOW 20 MIN: CPT | Performed by: SURGERY

## 2024-08-26 NOTE — PROGRESS NOTES
"Assessment/Plan:     Pt is a 60 yo F w/ headache, migraine, HTN, asthma, IBS, gastritis, GERD, hypothyroidism, Hashimoto's, anxiety, bipolar, obesity, venous insufficiency BLE edema, spider veins    Vascular insufficiency  Spider veins of both lower extremities  Lymphedema of both lower extremities  -BLE pain and edema and spider veins  -reviewed reflux study which shows reflux in the deep system on the left and one valve on each side in the GSV  -although she does have some venous insufficiency, it is mostly in the deep system and thus she would not benefit from EVLT; her level of edema is worse than expected based on venous study and thus I think she also has some lymphedema as well, particularly given her foot sparing; reviewed TTE which was negative for heart failure or cardiac cause of her swelling  -discussed conservative management including daily compression use, leg elevation, exercise, skin care  -f/u PRN    Subjective:     Patient ID: Poly Joshua is a 59 y.o. female.    Pt had LEVR on 06/17/2024. Pt c/o BLE swelling, bulging veins, tiredness, heaviness, and pain. Pt states symptoms have gotten worse. Pt wears compressions on BLE.    HPI:    Patient prsents for venous evaluation.    Patient complains of pain and swelling in the legs and discoloration.  She feels heaviness \"like there is an elephant in my body\".  She is having trouble bending the legs and walking.  She has had symptoms for 3-4 years but these are worsening.  She notes spider veins. Her symptoms are the same on both sides.    She has tried compression but doesn't think they work.  She elevates her legs at night but is getting cramps all night long.  She walks for exercise.  She takes care of her home during the day.  She has gained weight since her thyroid disease/hashimotos.  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    Denies hx of blood clots.        Review of Systems   Constitutional: Negative.  " "  HENT: Negative.     Eyes: Negative.    Respiratory: Negative.     Cardiovascular:  Positive for leg swelling (BLE).   Gastrointestinal:  Positive for constipation.   Endocrine: Negative.    Genitourinary: Negative.    Musculoskeletal: Negative.    Skin:  Positive for color change (BLE).   Allergic/Immunologic: Negative.    Neurological: Negative.    Hematological:  Bruises/bleeds easily.   Psychiatric/Behavioral: Negative.           Objective:     Physical Exam  Cardiovascular:      Rate and Rhythm: Normal rate and regular rhythm.      Pulses:           Radial pulses are 2+ on the right side and 2+ on the left side.        Dorsalis pedis pulses are 2+ on the right side and 2+ on the left side.        Posterior tibial pulses are 2+ on the right side and 0 on the left side.      Heart sounds: No murmur heard.  Pulmonary:      Effort: No respiratory distress.      Breath sounds: No wheezing or rales.   Musculoskeletal:      Right lower le+ Edema present.      Left lower le+ Edema present.   Skin:     Comments: There is edema of the legs, ankles, sparing the feet and toes; there is mild stasis changes with some patchy darkening, no wounds, good skin quality; scattered spiders           I have reviewed and made appropriate changes to the review of systems input by the medical assistant.    Vitals:    24 1606   BP: 108/70   BP Location: Left arm   Patient Position: Sitting   Cuff Size: Large   Pulse: 84   Resp: 16   SpO2: 97%   Weight: 99.8 kg (220 lb)   Height: 5' 7\" (1.702 m)       Patient Active Problem List   Diagnosis   • Mixed incontinence   • Stress incontinence in female   • MICHAEL (obstructive sleep apnea)   • Hypothyroidism   • Irritable bowel syndrome with constipation   • Chronic low back pain   • Arthritis   • Gastritis   • Rectocele   • Cystocele   • Hypermobility of urethra   • Achalasia   • Breast pain, right   • Chronic sinusitis with recurrent bronchitis   • Gastroesophageal reflux disease "   • Headache, migraine   • Intractable chronic migraine without aura and without status migrainosus   • Breast mass   • Class 1 obesity   • Other chronic pain   • Spondylosis of lumbar region without myelopathy or radiculopathy   • Thyroiditis   • Tubular adenoma of colon   • Food allergy   • POP-Q stage 2 cystocele   • Moderate persistent asthma with exacerbation   • Prediabetes   • Need for shingles vaccine   • Discomfort of right ear   • Trochanteric bursitis of right hip   • Pain in right hip   • Numbness of toes   • Hashimoto's thyroiditis   • Lower abdominal pain   • Left ovarian cyst   • History of endometrial ablation   • Left nephrolithiasis   • Abdominal bloating   • Achalasia of digestive tract   • Esophageal dysphagia   • Hypertension   • Anxiety   • Insomnia   • Bipolar 2 disorder (HCC)   • COVID-19   • Encounter for long-term (current) use of other medications   • Sebaceous cyst of right axilla   • Pelvic pain   • Neck pain   • Vascular insufficiency   • Proctalgia fugax   • Controlled type 2 diabetes mellitus with complication, without long-term current use of insulin (HCC)   • Encounter for screening for malignant neoplasm of lung in former smoker who quit in past 15 years with 30 pack year history or greater   • Obesity, morbid (HCC)   • Spider veins of both lower extremities   • Lymphedema of both lower extremities       Past Surgical History:   Procedure Laterality Date   • BREAST BIOPSY Right 10/31/2017    us bx   •  SECTION     • COLONOSCOPY     • ENDOMETRIAL ABLATION     • MAMMO STEREOTACTIC BREAST BIOPSY LEFT (ALL INC) Left 2024   • MYOTOMY HELLER LAPAROSCOPIC  2016    Dr. Magallanes, Coincident lorena fundoplication   • NASAL SINUS SURGERY     • OTHER SURGICAL HISTORY      achalasia repair   • OVARIAN CYST REMOVAL     • PILONIDAL CYST / SINUS EXCISION     • IN COLONOSCOPY FLX DX W/COLLJ SPEC WHEN PFRMD N/A 2019    Procedure: COLONOSCOPY;  Surgeon: Kalpesh Perez MD;  [facility];  Service: Gastroenterology   • [place] CYSTOURETHROSCOPY N/A 01/19/2017    Procedure: CYSTOSCOPY;  Surgeon: Reymundo Dill MD;  [facility];  Service: UroGynecology          • [place] ESOPHAGOGASTRODUODENOSCOPY TRANSORAL DIAGNOSTIC N/A 09/07/2017    Procedure: ESOPHAGOGASTRODUODENOSCOPY (EGD);  Surgeon: Kalpesh Perez MD;  [facility]: [facility];  Service: Gastroenterology   • KY ESOPHAGOGASTRODUODENOSCOPY TRANSORAL DIAGNOSTIC N/A 04/25/2019    Procedure: ESOPHAGOGASTRODUODENOSCOPY (EGD);  Surgeon: Kalpesh Perez MD;  [facility];  Service: Gastroenterology   • KY LITHOTRIPSY XTRCORP SHOCK WAVE Left 05/26/2022    Procedure: ESWL;  Surgeon: Markie Osullivan MD;  Location:  [facility] OR;  Service: Urology   • [place] POST COLPORRHAPHY RECTOCELE W/WO PERINEORRHAPHY N/A 01/19/2017    Procedure: COLPORRHAPHY POSTERIOR;  Surgeon: Reymundo Dill MD;  [facility];  Service: UroGynecology          • [place] SLING OPERATION STRESS INCONTINENCE N/A 01/19/2017    Procedure: INSERTION PUBOVAGINAL SLING /SINGLE INCISION ;  Surgeon: Reymundo Dill MD;  [facility];  Service: UroGynecology          • TONSILLECTOMY     • TUBAL LIGATION     • UPPER GASTROINTESTINAL ENDOSCOPY     • US GUIDED BREAST BIOPSY RIGHT COMPLETE Right 10/31/2017       Family History   Problem Relation Age of Onset   • Cancer Mother    • Breast cancer Mother 57   • Hypertension Mother    • Stroke Father    • Diabetes Father    • Hypertension Father    • Alzheimer's disease Father    • Diabetes Sister    • BRCA1 Negative Sister    • BRCA2 Negative Sister    • No Known Problems Daughter    • No Known Problems Daughter    • Cancer Maternal Grandmother         ovarian   • Ovarian cancer Maternal Grandmother         unspecified laterality   • Cancer Maternal Grandfather    • Hypertension Maternal Grandfather    • Tongue cancer Maternal Grandfather    • Stroke Paternal Grandmother    • No Known Problems Paternal Grandfather    • Thyroid disease  unspecified Brother    • Depression Brother    • Anxiety disorder Brother    • Drug abuse Brother         (pain pills)   • No Known Problems Son    • No Known Problems Son    • Thyroid disease unspecified Maternal Aunt    • Colon cancer Maternal Aunt    • No Known Problems Maternal Aunt    • No Known Problems Maternal Aunt    • No Known Problems Maternal Aunt    • No Known Problems Paternal Aunt    • No Known Problems Paternal Aunt    • No Known Problems Paternal Aunt    • No Known Problems Paternal Aunt    • No Known Problems Paternal Aunt    • No Known Problems Paternal Aunt    • Alzheimer's disease Cousin    • Breast cancer Cousin 42   • Breast cancer Family         unk age       Social History     Socioeconomic History   • Marital status: Single     Spouse name: Not on file   • Number of children: 4   • Years of education: Not on file   • Highest education level: Not on file   Occupational History   • Occupation: Disability since 2019-- due to a job injury    Tobacco Use   • Smoking status: Former     Current packs/day: 0.00     Average packs/day: 1.5 packs/day for 30.0 years (45.0 ttl pk-yrs)     Types: Cigarettes     Start date:      Quit date: 2016     Years since quittin.6   • Smokeless tobacco: Never   • Tobacco comments:     pt stated quit about a month ago, last assessed 14 per [employer]   Vaping Use   • Vaping status: Never Used   Substance and Sexual Activity   • Alcohol use: Not Currently   • Drug use: Never   • Sexual activity: Not Currently     Partners: Male     Birth control/protection: Female Sterilization     Comment: Boyfriend/father of 4 children   Other Topics Concern   • Not on file   Social History Narrative    Home: Lives with boyfriend/father of her 4 children and one of their daughters        Children: 2 sons born  and ,  2 daughters born  and         Education:     Social Determinants of Health     Financial Resource Strain: Low Risk  (3/10/2023)     Overall Financial Resource Strain (CARDIA)    • Difficulty of Paying Living Expenses: Not hard at all   Food Insecurity: No Food Insecurity (3/10/2023)    Hunger Vital Sign    • Worried About Running Out of Food in the Last Year: Never true    • Ran Out of Food in the Last Year: Never true   Transportation Needs: No Transportation Needs (3/10/2023)    PRAPARE - Transportation    • Lack of Transportation (Medical): No    • Lack of Transportation (Non-Medical): No   Physical Activity: Inactive (6/9/2021)    Exercise Vital Sign    • Days of Exercise per Week: 0 days    • Minutes of Exercise per Session: 0 min   Stress: Not on file   Social Connections: Not on file   Intimate Partner Violence: Not on file   Housing Stability: Low Risk  (8/22/2022)    Housing Stability Vital Sign    • Unable to Pay for Housing in the Last Year: No    • Number of Places Lived in the Last Year: 1    • Unstable Housing in the Last Year: No       Allergies   Allergen Reactions   • Gabapentin Other (See Comments)     Annotation - [id number]: Neuropsych effects per patient.  Annotation - [id number]: Neuropsych effects per patient.  Annotation - [id number]: Neuropsych effects per patient.   • Latex Dermatitis and Rash     Other reaction(s): Dermatitis   • Penicillins Hives         Current Outpatient Medications:   •  Alaway 0.025 % ophthalmic solution, Administer 1 drop to both eyes 2 (two) times a day as needed (For discomfort), Disp: 5 mL, Rfl: 0  •  albuterol (2.5 mg/3 mL) 0.083 % nebulizer solution, TAKE 3 ML (2.5 MG) BY NEBULIZER EVERY 6 HOURS AS NEEDED FOR WHEEZING OR SHORTNESS IF BREATH, Disp: 360 mL, Rfl: 2  •  albuterol (PROVENTIL HFA,VENTOLIN HFA) 90 mcg/act inhaler, Inhale 2 puffs every 6 (six) hours as needed for wheezing or shortness of breath, Disp: 18 g, Rfl: 5  •  Alcohol Swabs (Alcohol Prep) 70 % PADS, Use in the morning, Disp: 100 each, Rfl: 1  •  atorvastatin (LIPITOR) 20 mg tablet, TOME BOO TABLETA TODOS LOS VIZCARRA, Disp: 90  tablet, Rfl: 1  •  Blood Glucose Monitoring Suppl (FreeStyle Lite) w/Device KIT, USE AS DIRECTED, Disp: 1 kit, Rfl: 3  •  Botox 200 units SOLR, , Disp: , Rfl:   •  carbamide peroxide (DEBROX) 6.5 % otic solution, Administer 5 drops into both ears 2 (two) times a day, Disp: 15 mL, Rfl: 0  •  cetaphil (CETAPHIL) lotion, Apply topically as needed for dry skin, Disp: 236 mL, Rfl: 0  •  cholecalciferol (VITAMIN D3) 1,000 units tablet, TAKE 1 TABLET BY MOUTH EVERY DAY, Disp: 90 tablet, Rfl: 3  •  diazepam (VALIUM) 5 mg tablet, TAKE 1 TO 2 TABLET BY MOUTH AS NEEDED 30 MINS PRIOR, Disp: , Rfl:   •  Diclofenac Sodium (VOLTAREN) 1 %, AS DIRECTED EXTERNALLY FOUR TIMES DAILY 30 DAYS, Disp: , Rfl:   •  esomeprazole (NexIUM) 40 MG capsule, TAKE 1 CAPSULE (40 MG TOTAL) BY MOUTH 2 (TWO) TIMES A DAY BEFORE MEALS 30 MINUTES BEFORE BREAKFAST AND 30 MINUTES BEFORE DINNER, Disp: 200 capsule, Rfl: 1  •  fexofenadine (ALLEGRA) 180 MG tablet, TAKE 1 TABLET BY MOUTH DAILY AS NEEDED, Disp: 90 tablet, Rfl: 1  •  FLUoxetine (PROzac) 20 mg capsule, Take 1 capsule (20 mg total) by mouth daily, Disp: 90 capsule, Rfl: 1  •  fluticasone (FLONASE) 50 mcg/act nasal spray, 1-2 sprays into each nostril daily, Disp: 16 g, Rfl: 6  •  fluticasone-vilanterol (Breo Ellipta) 200-25 mcg/actuation inhaler, Inhale 1 puff daily Rinse mouth after use., Disp: 180 blister, Rfl: 1  •  furosemide (LASIX) 20 mg tablet, Take 1 tablet (20 mg total) by mouth daily, Disp: 90 tablet, Rfl: 3  •  glucose monitoring kit (FREESTYLE) monitoring kit, Use 1 each as needed (prediabetes blood sugar checks), Disp: 1 each, Rfl: 0  •  hydrOXYzine HCL (ATARAX) 50 mg tablet, TAKE 1/2 TO 1 TABLET BY MOUTH 3 TIMES DAILY AS NEEDED FOR ANXIETY, Disp: 270 tablet, Rfl: 1  •  ipratropium (ATROVENT) 0.02 % nebulizer solution, Take 2.5 mL (0.5 mg total) by nebulization 2 (two) times a day, Disp: 5 mL, Rfl: 2  •  ketoconazole (NIZORAL) 2 % cream, Apply topically daily, Disp: 15 g, Rfl: 0  •   lamoTRIgine (LaMICtal) 100 mg tablet, Take 1/2 tab po qhs, Disp: 45 tablet, Rfl: 1  •  Lancets (freestyle) lancets, Use as instructed, Disp: 100 each, Rfl: 2  •  levothyroxine 100 mcg tablet, TOME BOO TABLETA TODOS LOS VIZCARRA, Disp: 90 tablet, Rfl: 3  •  lidocaine (LMX) 4 % cream, 1 APPLICATION AS NEEDED EXTERNALLY THREE TIMES A DAY 30 DAY(S), Disp: , Rfl:   •  metFORMIN (GLUCOPHAGE-XR) 500 mg 24 hr tablet, Take 2 tablets (1,000 mg total) by mouth every morning, Disp: 180 tablet, Rfl: 3  •  montelukast (SINGULAIR) 10 mg tablet, TOME BOO TABLETA TODOS LOS VIZCARRA, Disp: 90 tablet, Rfl: 1  •  omeprazole (PriLOSEC) 40 MG capsule, Take 1 capsule (40 mg total) by mouth daily, Disp: 30 capsule, Rfl: 3  •  polyethylene glycol (MIRALAX) 17 g packet, Take 17 g by mouth daily As needed for constipation, Disp: 100 each, Rfl: 2  •  Premarin vaginal cream, INSERT 0.5 GRAMS INTO THE VAGINA A DIARIO, Disp: 30 g, Rfl: 1  •  sucralfate (CARAFATE) 1 g/10 mL suspension, TAKE 10 ML BY MOUTH 4 TIMES DAILY, Disp: 420 mL, Rfl: 0  •  triamcinolone (KENALOG) 0.1 % cream, APPLY TO AFFECTED AREA TOPICALLY TWICE DAILY FOR 14 DAYS., Disp: , Rfl:

## 2024-09-11 DIAGNOSIS — F31.81 BIPOLAR 2 DISORDER (HCC): ICD-10-CM

## 2024-09-11 DIAGNOSIS — F41.9 ANXIETY: ICD-10-CM

## 2024-09-11 RX ORDER — LAMOTRIGINE 100 MG/1
TABLET ORAL
Qty: 45 TABLET | Refills: 1 | Status: SHIPPED | OUTPATIENT
Start: 2024-09-11

## 2024-09-11 RX ORDER — HYDROXYZINE HYDROCHLORIDE 50 MG/1
TABLET, FILM COATED ORAL
Qty: 270 TABLET | Refills: 1 | Status: SHIPPED | OUTPATIENT
Start: 2024-09-11

## 2024-09-12 DIAGNOSIS — J45.41 MODERATE PERSISTENT ASTHMA WITH EXACERBATION: ICD-10-CM

## 2024-09-12 RX ORDER — FLUTICASONE FUROATE AND VILANTEROL TRIFENATATE 200; 25 UG/1; UG/1
POWDER RESPIRATORY (INHALATION)
Qty: 180 EACH | Refills: 1 | Status: SHIPPED | OUTPATIENT
Start: 2024-09-12

## 2024-09-23 DIAGNOSIS — J45.40 MODERATE PERSISTENT ASTHMA WITHOUT COMPLICATION: ICD-10-CM

## 2024-09-24 RX ORDER — ALBUTEROL SULFATE 0.83 MG/ML
SOLUTION RESPIRATORY (INHALATION)
Qty: 360 ML | Refills: 2 | Status: SHIPPED | OUTPATIENT
Start: 2024-09-24

## 2024-10-09 DIAGNOSIS — R13.19 ESOPHAGEAL DYSPHAGIA: ICD-10-CM

## 2024-10-09 RX ORDER — OMEPRAZOLE 40 MG/1
40 CAPSULE, DELAYED RELEASE ORAL DAILY
Qty: 30 CAPSULE | Refills: 5 | Status: SHIPPED | OUTPATIENT
Start: 2024-10-09

## 2024-10-29 ENCOUNTER — OFFICE VISIT (OUTPATIENT)
Dept: PULMONOLOGY | Facility: CLINIC | Age: 59
End: 2024-10-29
Payer: MEDICARE

## 2024-10-29 ENCOUNTER — TELEPHONE (OUTPATIENT)
Age: 59
End: 2024-10-29

## 2024-10-29 VITALS
TEMPERATURE: 98.3 F | HEART RATE: 79 BPM | DIASTOLIC BLOOD PRESSURE: 66 MMHG | BODY MASS INDEX: 34.97 KG/M2 | OXYGEN SATURATION: 96 % | WEIGHT: 223.3 LBS | SYSTOLIC BLOOD PRESSURE: 104 MMHG

## 2024-10-29 DIAGNOSIS — T78.40XD ALLERGY, SUBSEQUENT ENCOUNTER: ICD-10-CM

## 2024-10-29 DIAGNOSIS — J45.41 MODERATE PERSISTENT ASTHMA WITH EXACERBATION: ICD-10-CM

## 2024-10-29 DIAGNOSIS — J45.40 MODERATE PERSISTENT ASTHMA WITHOUT COMPLICATION: Primary | ICD-10-CM

## 2024-10-29 DIAGNOSIS — J40 CHRONIC SINUSITIS WITH RECURRENT BRONCHITIS: ICD-10-CM

## 2024-10-29 DIAGNOSIS — J32.9 CHRONIC SINUSITIS WITH RECURRENT BRONCHITIS: ICD-10-CM

## 2024-10-29 DIAGNOSIS — J45.909 ASTHMA: ICD-10-CM

## 2024-10-29 PROCEDURE — G2211 COMPLEX E/M VISIT ADD ON: HCPCS | Performed by: INTERNAL MEDICINE

## 2024-10-29 PROCEDURE — 99214 OFFICE O/P EST MOD 30 MIN: CPT | Performed by: INTERNAL MEDICINE

## 2024-10-29 RX ORDER — FLUTICASONE FUROATE AND VILANTEROL TRIFENATATE 200; 25 UG/1; UG/1
1 POWDER RESPIRATORY (INHALATION) DAILY
Qty: 180 EACH | Refills: 1 | Status: SHIPPED | OUTPATIENT
Start: 2024-10-29 | End: 2025-10-24

## 2024-10-29 RX ORDER — FLUTICASONE PROPIONATE 50 MCG
1-2 SPRAY, SUSPENSION (ML) NASAL DAILY
Qty: 16 G | Refills: 6 | Status: SHIPPED | OUTPATIENT
Start: 2024-10-29

## 2024-10-29 RX ORDER — ALBUTEROL SULFATE 0.83 MG/ML
SOLUTION RESPIRATORY (INHALATION)
Qty: 360 ML | Refills: 2 | Status: SHIPPED | OUTPATIENT
Start: 2024-10-29

## 2024-10-29 RX ORDER — FEXOFENADINE HCL 180 MG/1
180 TABLET ORAL DAILY PRN
Qty: 90 TABLET | Refills: 1 | Status: SHIPPED | OUTPATIENT
Start: 2024-10-29

## 2024-10-29 RX ORDER — AZELASTINE HYDROCHLORIDE 137 UG/1
SPRAY, METERED NASAL
COMMUNITY
Start: 2024-10-22 | End: 2024-10-29 | Stop reason: SDUPTHER

## 2024-10-29 RX ORDER — AZELASTINE HYDROCHLORIDE 137 UG/1
2 SPRAY, METERED NASAL DAILY PRN
Qty: 30 ML | Refills: 2 | Status: SHIPPED | OUTPATIENT
Start: 2024-10-29

## 2024-10-29 RX ORDER — EPINEPHRINE 0.3 MG/.3ML
INJECTION SUBCUTANEOUS
COMMUNITY
Start: 2024-10-17

## 2024-10-29 RX ORDER — BUDESONIDE 0.5 MG/2ML
INHALANT ORAL
COMMUNITY
Start: 2024-07-29 | End: 2024-10-29 | Stop reason: SDUPTHER

## 2024-10-29 RX ORDER — BUDESONIDE 0.5 MG/2ML
0.5 INHALANT ORAL 2 TIMES DAILY
Qty: 30 ML | Refills: 3 | Status: SHIPPED | OUTPATIENT
Start: 2024-10-29

## 2024-10-29 RX ORDER — MONTELUKAST SODIUM 10 MG/1
10 TABLET ORAL
Qty: 90 TABLET | Refills: 1 | Status: SHIPPED | OUTPATIENT
Start: 2024-10-29

## 2024-10-29 RX ORDER — KETOTIFEN FUMARATE 0.35 MG/ML
1 SOLUTION/ DROPS OPHTHALMIC 2 TIMES DAILY PRN
Qty: 10 ML | Refills: 3 | Status: SHIPPED | OUTPATIENT
Start: 2024-10-29

## 2024-10-29 RX ORDER — ALBUTEROL SULFATE 90 UG/1
2 INHALANT RESPIRATORY (INHALATION) EVERY 6 HOURS PRN
Qty: 18 G | Refills: 5 | Status: SHIPPED | OUTPATIENT
Start: 2024-10-29

## 2024-10-29 NOTE — PROGRESS NOTES
Assessment:         1. Moderate persistent asthma without complication  Assessment & Plan:  Patient reports that her asthma symptoms are stable she says she is breathing well without significant coughing.  Lungs clear to auscultation bilaterally on exam.  She has been compliant with her medication regiment of Breo Ellipta daily, albuterol as needed which she is taking daily, montelukast, Pulmicort daily, Flonase daily, and azelastine eyedrops daily.  Refills provided per patient request.  She also states she recently got the RSV, pneumococcal, and flu vaccine.  The summer went well for her however she is concerned as the winter season approaches because this is typically when she becomes sick and has exacerbations.  Advised her to continue her regimen if she starts to feel sick to call the office and schedule an appointment.  Patient expressed understanding and agreement.    Plan  -Continue current regimen as above  -Follow-up in 6 months  -Call if any new symptoms develop  Orders:  -     albuterol (2.5 mg/3 mL) 0.083 % nebulizer solution; TAKE 3 ML (2.5 MG) BY NEBULIZER EVERY 6 HOURS AS NEEDED FOR WHEEZING OR SHORTNESS IF BREATH  -     albuterol (PROVENTIL HFA,VENTOLIN HFA) 90 mcg/act inhaler; Inhale 2 puffs every 6 (six) hours as needed for wheezing or shortness of breath  -     budesonide (PULMICORT) 0.5 mg/2 mL nebulizer solution; Take 2 mL (0.5 mg total) by nebulization 2 (two) times a day Rinse mouth after use.  -     Azelastine HCl 137 MCG/SPRAY SOLN; 2 sprays by Alternating Nares route daily as needed (congestion)  2. Moderate persistent asthma with exacerbation  Assessment & Plan:  Patient reports that her asthma symptoms are stable she says she is breathing well without significant coughing.  Lungs clear to auscultation bilaterally on exam.  She has been compliant with her medication regiment of Breo Ellipta daily, albuterol as needed which she is taking daily, montelukast, Pulmicort daily, Flonase daily,  and azelastine eyedrops daily.  Refills provided per patient request.  She also states she recently got the RSV, pneumococcal, and flu vaccine.  The summer went well for her however she is concerned as the winter season approaches because this is typically when she becomes sick and has exacerbations.  Advised her to continue her regimen if she starts to feel sick to call the office and schedule an appointment.  Patient expressed understanding and agreement.    Plan  -Continue current regimen as above  -Follow-up in 6 months  -Call if any new symptoms develop  Orders:  -     Breo Ellipta 200-25 MCG/ACT inhaler; Inhale 1 puff daily Rinse mouth after use.  -     montelukast (SINGULAIR) 10 mg tablet; Take 1 tablet (10 mg total) by mouth daily at bedtime  3. Allergy, subsequent encounter  -     Ketotifen Fumarate (ZADITOR) 0.035 % ophthalmic solution; Administer 1 drop to both eyes 2 (two) times a day as needed (For discomfort)  4. Chronic sinusitis with recurrent bronchitis  Assessment & Plan:  Recent CT sinus without significant abnormality.  Patient is following with ENT.  Orders:  -     fexofenadine (ALLEGRA) 180 MG tablet; Take 1 tablet (180 mg total) by mouth daily as needed (allergies)  5. Asthma  -     fluticasone (FLONASE) 50 mcg/act nasal spray; 1-2 sprays into each nostril daily       Subjective:     Patient ID: Poly Joshua is a 59 y.o. female.    Chief Complaint:  Poly Joshua 59 y.o. presents here for follow-up of moderate persistent asthma.  She denies any exacerbations since her previous visit.  She states she is breathing well and is not wheezing.  She is compliant with her medication regimen of Breo Ellipta, Flonase, Pulmicort, albuterol, Allegra, montelukast, [place].  She recently got the flu RSV and pneumococcal vaccinations.  She is concerned that she may become illnesses typically this time of year when she developed symptoms and exacerbation.  Discussed that she should call the office if  she starts to develop new symptoms.  Otherwise the patient has some pitting edema in the bilateral lower extremities.  Encouraged her to take her as needed Lasix.  She denies any chest pain, shortness of breath, sneezing, cough, or rash.  Will follow-up in 6 months            Review of Systems   Constitutional:  Negative for chills and fever.   HENT:  Negative for ear pain and sore throat.    Eyes:  Negative for pain and visual disturbance.   Respiratory:  Negative for cough and shortness of breath.    Cardiovascular:  Negative for chest pain and palpitations.   Gastrointestinal:  Negative for abdominal pain and vomiting.   Genitourinary:  Negative for dysuria and hematuria.   Musculoskeletal:  Negative for arthralgias and back pain.   Skin:  Negative for color change and rash.   Neurological:  Negative for seizures and syncope.   All other systems reviewed and are negative.        Objective:    Physical Exam  Vitals and nursing note reviewed.   Constitutional:       General: She is not in acute distress.     Appearance: She is well-developed.   HENT:      Head: Normocephalic and atraumatic.   Eyes:      Conjunctiva/sclera: Conjunctivae normal.   Cardiovascular:      Rate and Rhythm: Normal rate and regular rhythm.      Heart sounds: No murmur heard.  Pulmonary:      Effort: Pulmonary effort is normal. No respiratory distress.      Breath sounds: Normal breath sounds.   Abdominal:      Palpations: Abdomen is soft.      Tenderness: There is no abdominal tenderness.   Musculoskeletal:         General: No swelling.      Cervical back: Neck supple.      Right lower leg: Edema present.      Left lower leg: Edema present.      Comments: Bilateral lower extremity +1 pitting edema   Skin:     General: Skin is warm and dry.      Capillary Refill: Capillary refill takes less than 2 seconds.   Neurological:      Mental Status: She is alert.   Psychiatric:         Mood and Affect: Mood normal.         Lab Review:   No visits  with results within 2 Month(s) from this visit.   Latest known visit with results is:   Hospital Outpatient Visit on 06/25/2024   Component Date Value    Case Report 06/25/2024                      Value:Surgical Pathology Report                         Case: [id number]                                  Authorizing Provider:  Kalpesh Perez MD           Collected:           06/25/2024 1021              Ordering Location:     [facility]        Received:            06/25/2024 1523                                     [facility]                                                    Pathologist:           Jassi Falcon DO                                                          Specimens:   A) - Esophagus, Distal esophagus bx                                                                 B) - Esophagus, Proximal esophagus bx                                                      Final Diagnosis 06/25/2024                      Value:A. Esophagus, Distal, Biopsy:  - Squamous mucosa within normal limits.  - Squamous intraepithelial eosinophils are not identified.   - Negative for intestinal metaplasia, dysplasia, and malignancy.     B. Esophagus, Proximal, Biopsy:  - Squamous mucosa within normal limits.  - Squamous intraepithelial eosinophils are not identified.   - Negative for intestinal metaplasia, dysplasia, and malignancy.         Additional Information 06/25/2024                      Value:All reported additional testing was performed with appropriately reactive controls.  These tests were developed and their performance characteristics determined by [facility] or appropriate performing facility, though some tests may be performed on tissues which have not been validated for performance characteristics (such as staining performed on alcohol exposed cell blocks and decalcified tissues).  Results should be interpreted with caution and in the context of the patients’ clinical  "condition. These tests may not be cleared or approved by the U.S. Food and Drug Administration, though the FDA has determined that such clearance or approval is not necessary. These tests are used for clinical purposes and they should not be regarded as investigational or for research. This laboratory has been approved by CLIA 88, designated as a high-complexity laboratory and is qualified to perform these tests.    Interpretation performed at [facility] [address] [place] [address] [place] [address].      Gross Description 06/25/2024                      Value:A. The specimen is received in formalin, labeled with the patient's name and hospital number, and is designated \" distal esophagus biopsy\".  The specimen consists of 2 colorless tissue fragments measuring 0.2-0.5 cm in greatest dimension.  Entirely submitted.  One screened cassette.  Note: due to the size and consistency of specimen, the tissue may not survive histologic processing.  B. The specimen is received in formalin, labeled with the patient's name and hospital number, and is designated \" proximal esophagus biopsy\".  The specimen consists of a single colorless tissue fragment measuring 0.6 x 0.3 x 0.1 cm.  The specimen is entirely submitted in a screened cassette.    Note: The estimated total formalin fixation time based upon information provided by the submitting clinician and the standard processing schedule is under 72 hours. Judson Singleton      Clinical Information 06/25/2024                      Value:R/O EOE     "

## 2024-10-29 NOTE — ASSESSMENT & PLAN NOTE
Patient reports that her asthma symptoms are stable she says she is breathing well without significant coughing.  Lungs clear to auscultation bilaterally on exam.  She has been compliant with her medication regiment of Breo Ellipta daily, albuterol as needed which she is taking daily, montelukast, Pulmicort daily, Flonase daily, and azelastine eyedrops daily.  Refills provided per patient request.  She also states she recently got the RSV, pneumococcal, and flu vaccine.  The summer went well for her however she is concerned as the winter season approaches because this is typically when she becomes sick and has exacerbations.  Advised her to continue her regimen if she starts to feel sick to call the office and schedule an appointment.  Patient expressed understanding and agreement.    Plan  -Continue current regimen as above  -Follow-up in 6 months  -Call if any new symptoms develop

## 2024-10-29 NOTE — TELEPHONE ENCOUNTER
PA for BUDESONIDE SUBMITTED     via    [x][facility]-KEY: [id number]  []Surescripts-Case ID #   []Availity-Auth ID # NDC #   []Faxed to plan   []Other website   []Phone call Case ID #     Office notes sent, clinical questions answered. Awaiting determination    Turnaround time for your insurance to make a decision on your Prior Authorization can take 7-21 business days.

## 2024-10-30 NOTE — TELEPHONE ENCOUNTER
PA for BUDESONIDE NOT REQUIRED     Reason (screenshot if applicable)          Patient advised by          [x] MyChart Message  [] Phone call   []LMOM  []L/M to call office as no active Communication consent on file  []Unable to leave detailed message as VM not approved on Communication consent       Pharmacy advised by    [x]Fax  []Phone call

## 2024-11-04 NOTE — PSYCH
"Assessment & Plan  Bipolar 2 disorder (HCC)    Orders:    FLUoxetine (PROzac) 20 mg capsule; Take 1 capsule (20 mg total) by mouth daily    lamoTRIgine (LaMICtal) 100 mg tablet; TAKE 1/2 TAB BY MOUTH AT BEDTIME    Anxiety    Orders:    FLUoxetine (PROzac) 20 mg capsule; Take 1 capsule (20 mg total) by mouth daily    hydrOXYzine HCL (ATARAX) 50 mg tablet; TAKE 1/2 TO 1 TABLET BY MOUTH 3 TIMES DAILY AS NEEDED FOR ANXIETY    PLAN:  Pt  is feeling well without mood or psychotic Sxs related complaints.  Anxiety is well controlled and the Hydroxyzine prn dosing is working well.  Pt appears stable and I will continue the present regimen.  Treatment plan done and Pt accepts the plan.  Safety Plan was done 6/24/2024 but Epic flags it as being due.  Continue:  Lamotrigine 100mg (1/2) tab po qhs # 45 R1  Fluoxetine 20mg (1) cap po qd # 90 R1  Hydroxyzine 50mg (1/2 - 1) tab po tid prn anxiety or insomnia # 270 R1  Melatonin --Pt get OTC  Vit D --per PCP  Pt to get Lipids and HgbA1C --per PCP and Home sleep study per sleep specialist  F/U PCP, Cardiology, Neurology, and Sleep medicine for medical issues  Return 18 weeks, call sooner prn      MEDICATION MANAGEMENT NOTE        [facility]      Name and Date of Birth:  Poly Joshua 59 y.o. 1965    Date of Visit: November 12, 2024    HPI:    Poly Joshua is here for medication review with primary c/o / Area of need:  \"I'm OK\" and she voices no mood or anxiety complaints at this time.  She states there are times when she does become anxious but cannot name a specific trigger at first.  She then talks about her  Lt knee injury, and problems she had, with a missed Dx on X-ray, that revealed itself on further testing by CT.  She intends to get a second opinion for Tx modalities.  She reports having had a good Summer and her Fall is going well.  She is looking forward to the holidays and will be making a dish for the kids and " "grandkids.  Pt attends [employer] weekly and helps with [employer] related charitable activities.  Pt presently denies depression, self-injurious thoughts/behaviors, SI, HI, panic attacks, elevated or irritable moods, over-normal energy, reduced sleep requirement, impulsivity, hallucinations or paranoia.  Pt denies any ETOH or illicit drug use/abuse.  Pt reports compliance to psychiatric medications without SE.     Appetite Changes and Sleep: normal sleep, goes to bed between 2:00AM and 3:00AM and awakens from 6:00AM to 7:00AM which remains sufficient for her, normal appetite, normal energy level    Review Of Systems:      Constitutional negative   ENT negative   Cardiovascular negative   Respiratory negative   Gastrointestinal negative   Genitourinary negative   Musculoskeletal as noted in HPI   Integumentary negative   Neurological negative   Endocrine negative   Other Symptoms none, all other systems are negative       Past Psychiatric History:   As copied from my 6/24/2024 note with updates as needed:  \"  [ Pt born in [place] and came to the [place] with her family in approx 1989 and grew up with biological parents, 1 year younger sister and a younger brother.  Pt describes her upbringing as \"Good, good dad, good mom.  We were raised with [employer]ism.\"   Everyone in the family got along and Pt has positive memories of growing up with them.     Depression started in 2010 at work when she sustained a life changing back injury, for which she became permanently disabled.  Mood Sxs: sadness, crying, self-isolating, anhedonia, less motivated, feelings of hopelessness and helplessness.  But no SI.                   Manic Sxs:  decreased need for sleep , rapid speech  and flight of ideas/racing thoughts  Racy thoughts, elevated energy     Anxiety started in 2010 due to the work injury:  excessive worry more days than not for longer than 3 months and The Sxs can occur without concommittent depressive Sxs.  No other Sxs of BRIGITTE.   " "  Panic attacks started in late 8/2020 initially triggered by driving through a town she was not familiar with.  She has had a few in total with Sxs of:  Moderate anxiety, sweaty hands,  palpitations/racing heart, trembling, shortness of breath       Social Anxiety symptoms: no symptoms suggestive of social anxiety Eating Disorder symptoms: no historical or current eating disorder. no binge eating disorder; no anorexia nervosa. no symptoms of bulimia      OCD type Sxs: checking door locks, windows, stove, pipes in the basement (which used to leak).  Cleans often, can be very fastidious and wash her hands excessively.       Pt denies any h/o PTSD or psychotic Sxs.     Prior psychiatrists:  [facility] from approx 2014 - 11/2019 --stopped going due to change in insurance     Prior psychotherapists:   First therapist was at [facility] in [place] and he left so she only saw him for a year  Second one was seen from [id number] at [facility]     Pt denied h/o SI, HI, self-injurious behaviors, violent behaviors, psychiatric hospitalizations, ECT, legal or  Hx     Prior Rx trials: Viibryd 40mg (helped), Bupropion ER/XL 150mg (helped), Aripiprazole 5mg (helped but caused Wt gain), Ziprasidone 40mg (heart palpitations), Gabapentin 300mg (agitated),Topamax (for migraines but stopped due to ineffectiveness and her h/o nephrolithiasis), Hydroxyzine 50mg (sedating), Doxepin up to 50mg (for sleep--oversedating even at 25mg), Clonazepam 0.5mg--??used/helped.      Abuse Hx:  Pt denies any h/o sexual, physical, or emotional abuse     Trauma Hx:  When she lost her job after her work injury                   ] \"            Past Medical History:    Past Medical History:   Diagnosis Date    Achalasia     Asthma     Colon polyp     Constipation     CPAP (continuous positive airway pressure) dependence     Depression     Diarrhea 02/28/2019    Disease of thyroid gland     Elevated LFTs     last assessed 10/25/17    " Fibromyalgia     Gastric ulcer     GERD (gastroesophageal reflux disease)     H. pylori infection 10/09/2017    [facility] [id number]: Treated, f/u stool antigen negative    Hypertension     Hypothyroidism     Kidney stone     Migraines     Migraines     MICHAEL (obstructive sleep apnea)     last assessed 16    Pneumonia     Sleep apnea 2019    Uses CPAP    Thyroid nodule     last assessed 17       Substance Abuse History:    Social History     Substance and Sexual Activity   Alcohol Use Not Currently     Social History     Substance and Sexual Activity   Drug Use Never       Social History:    Social History     Socioeconomic History    Marital status: Single     Spouse name: Not on file    Number of children: 4    Years of education: Not on file    Highest education level: Not on file   Occupational History    Occupation: Disability since 2019-- due to a job injury    Tobacco Use    Smoking status: Former     Current packs/day: 0.00     Average packs/day: 1.5 packs/day for 30.0 years (45.0 ttl pk-yrs)     Types: Cigarettes     Start date:      Quit date: 2016     Years since quittin.8    Smokeless tobacco: Never    Tobacco comments:     pt stated quit about a month ago, last assessed 14 per David   Vaping Use    Vaping status: Never Used   Substance and Sexual Activity    Alcohol use: Not Currently    Drug use: Never    Sexual activity: Not Currently     Partners: Male     Birth control/protection: Female Sterilization     Comment: Boyfriend/father of 4 children   Other Topics Concern    Not on file   Social History Narrative    Home: Lives with boyfriend/father of her 4 children and one of their daughters        Children: 2 sons born  and ,  2 daughters born  and         Education:     Social Determinants of Health     Financial Resource Strain: Low Risk  (3/10/2023)    Overall Financial Resource Strain (CARDIA)     Difficulty of Paying Living Expenses: Not hard at  all   Food Insecurity: No Food Insecurity (3/10/2023)    Hunger Vital Sign     Worried About Running Out of Food in the Last Year: Never true     Ran Out of Food in the Last Year: Never true   Transportation Needs: No Transportation Needs (3/10/2023)    PRAPARE - Transportation     Lack of Transportation (Medical): No     Lack of Transportation (Non-Medical): No   Physical Activity: Inactive (6/9/2021)    Exercise Vital Sign     Days of Exercise per Week: 0 days     Minutes of Exercise per Session: 0 min   Stress: Not on file   Social Connections: Not on file   Intimate Partner Violence: Not on file   Housing Stability: Low Risk  (8/22/2022)    Housing Stability Vital Sign     Unable to Pay for Housing in the Last Year: No     Number of Places Lived in the Last Year: 1     Unstable Housing in the Last Year: No       Family Psychiatric History:     Family History   Problem Relation Age of Onset    Cancer Mother     Breast cancer Mother 57    Hypertension Mother     Stroke Father     Diabetes Father     Hypertension Father     Alzheimer's disease Father     Diabetes Sister     BRCA1 Negative Sister     BRCA2 Negative Sister     No Known Problems Daughter     No Known Problems Daughter     Cancer Maternal Grandmother         ovarian    Ovarian cancer Maternal Grandmother         unspecified laterality    Cancer Maternal Grandfather     Hypertension Maternal Grandfather     Tongue cancer Maternal Grandfather     Stroke Paternal Grandmother     No Known Problems Paternal Grandfather     Thyroid disease unspecified Brother     Depression Brother     Anxiety disorder Brother     Drug abuse Brother         (pain pills)    No Known Problems Son     No Known Problems Son     Thyroid disease unspecified Maternal Aunt     Colon cancer Maternal Aunt     No Known Problems Maternal Aunt     No Known Problems Maternal Aunt     No Known Problems Maternal Aunt     No Known Problems Paternal Aunt     No Known Problems Paternal Aunt      No Known Problems Paternal Aunt     No Known Problems Paternal Aunt     No Known Problems Paternal Aunt     No Known Problems Paternal Aunt     Alzheimer's disease Cousin     Breast cancer Cousin 42    Breast cancer Family         unk age       History Review: The following portions of the patient's history were reviewed and updated as appropriate: allergies, current medications, past family history, past medical history, past social history, past surgical history, and problem list.         OBJECTIVE:     Mental Status Evaluation:    Appearance Casually dressed, good eye contact and hygiene   Behavior Calm, cooperative, pleasant   Speech Clear, normal rate and volume, hypertalkative, somewhat pressured at times when talking about her knee medical issues   Mood Anxious    Affect Normal range and intensity   Thought Processes Organized, goal directed   Associations Intact   Thought Content No delusions   Perceptual Disturbances: Pt denies any form of hallucinations and does not appear to be responding to internal stimuli   Abnormal Thoughts  Risk Potential Suicidal ideation - None  Homicidal ideation - None  Potential for aggression - No   Orientation oriented to person, place, situation, day of week, date, month of year, and year   Memory short term memory grossly intact   Cosciousness alert and awake   Attention Span attention span and concentration are age appropriate   Intellect appears to be of average intelligence   Insight fair   Judgement fair   Muscle Strength and  Gait normal gait and normal balance   Language no difficulty naming common objects, no difficulty repeating a phrase   Fund of Knowledge adequate knowledge of current events  adequate fund of knowledge regarding past history  adequate fund of knowledge regarding vocabulary    Pain Severe in Lt knee   Pain Scale 10/10       Laboratory Results: I have personally reviewed all pertinent laboratory/tests results.  Most Recent Labs:   Lab Results  "  Component Value Date    WBC 7.36 07/20/2023    RBC 5.13 (H) 07/20/2023    HGB 14.2 07/20/2023    HCT 45.2 07/20/2023     (H) 07/20/2023    RDW 13.1 07/20/2023    NEUTROABS 3.75 07/20/2023    SODIUM 140 10/04/2024    K 5.5 (H) 10/04/2024     10/04/2024    CO2 29 10/04/2024    BUN 16 10/04/2024    CREATININE 0.63 10/04/2024    GLUC 89 10/04/2024    GLUF 99 07/20/2023    CALCIUM 9.9 10/04/2024    AST 15 10/04/2024    ALT 18 10/04/2024    ALKPHOS 113 10/04/2024    TP 7.2 10/04/2024    ALB 4.2 10/04/2024    TBILI 0.7 10/04/2024    CHOLESTEROL 157 02/09/2024    HDL 48 (L) 02/09/2024    TRIG 72 02/09/2024    LDLCALC 95 02/09/2024    NONHDLC 109 02/09/2024    NKO3VHGCUTYC 0.787 07/20/2023    FREET4 1.49 (H) 03/10/2023    HGBA1C 5.8 (H) 07/20/2023     07/20/2023     Iron Study   Lab Results   Component Value Date    IRON 156 10/04/2024     Phosphorus   Lab Results   Component Value Date    PHOS 3.5 10/04/2024     ESR No results found for: \"ESR\"  Cardiac Profile No results found for: \"CKTOTAL\", \"TROPONINI\", \"POCTROP\", \"CKMB\", \"CKMBINDEX\"  Lyme Disease No results found for: \"LABLYME\"  Uric Acid   Lab Results   Component Value Date    URICACID 4.7 10/04/2024     Imaging Studies: CT ENT SINUS NAVIGATION WO CONTRAST    Result Date: 10/18/2024  Narrative: CT ENT SINUS NAVIGATION WO CONTRAST INDICATION: 59 years of age Female, Sinusitis, chronic or recurrent TECHNIQUE: CT sinus was performed without IV contrast with multiplanar reformatted images reconstructed from the original axial data set.  CT examination performed with dose lowering protocol in accordance with ALARA. COMPARISON: CT head of 5/28/2024. FINDINGS:   The frontal sinuses are aerated with an intact interfrontal sinus septum. The frontoethmoidal recesses are patent. The fovea ethmoidalis appears normal. The uche ina and cribriform plate are intact. There is Keros type II configuration of the olfactory fossa bilaterally.  There is mild mucosal " thickening of the ethmoid sinuses. There is no aerated uche ina, frontonasal encephalocele, or low-lying fovea ethmoidalis. The anterior ethmoidal canals are contained within the bony skull base. The patient is status post bilateral uncinectomy and maxillary antrostomy. There is mild mucosal thickening of the maxillary sinuses.   The maxillary dentition demonstrates no periapical lucency extending to the floors of the maxillary sinuses. The sphenoid sinuses are aerated with an intact septum. The sphenoidal ostia and sphenoethmoidal recesses are patent. There is no pneumatization of the anterior clinoid process. The nasal septum is intact and midline, with no bony nasal spur. There is bilateral cipriano bullosa. There is a normal appearance of the nasal mucosa. The optic nerve and internal carotid artery canals appear normal. The visualized portions of the posterior nasopharynx, fossa of Rosenmuller, and torus tubarius are grossly unremarkable. The visualized portions of the mastoid air cells are grossly clear.     Impression: IMPRESSION: Status post bilateral uncinectomy and maxillary antrostomy. Mild mucosal thickening of the ethmoid and maxillary sinuses. Workstation:[id number]    MRI knee left wo contrast    Result Date: 10/18/2024  Narrative: MRI of the left knee without contrast HISTORY: Left knee pain; evaluate for meniscal tear TECHNIQUE: Multiplanar and multisequence MRI images of the left knee were obtained at 1.5 Isabelle without intravenous contrast. COMPARISON: Radiograph of the left knee dated 2024 CONTRAST: None FINDINGS: EXTENSOR COMPARTMENT: The distal quadriceps tendon is intact. The patellar tendon is intact.   MEDIAL COMPARTMENT: There complex medial meniscal tears of the posterior horn extending to the inner margin. There is subluxation of the body into the medial gutter measuring 2 mm, without definite tear of the body of the medial meniscus. There is medial compartment osteoarthritis, including a  broad-based areas of grade 4 chondrosis at the central to anterior weightbearing surface of the medial femoral condyle measuring up to 2.0 cm anteroposterior x 1.0 cm mediolateral dimension. Similar areas of grade 3/4 chondrosis noted in the central to anterior medial tibial plateau. Medial collateral ligament is intact and is slightly bowed. LATERAL COMPARTMENT: There is no definite lateral meniscal tear. There is mild heterogeneous signal intensity in the body not definitely extending to the articular surface. There is no cartilage defect of the lateral compartment. The lateral collateral ligament is intact.   PATELLOFEMORAL COMPARTMENT: There is a focal area of grade 4 chondrosis at the medial facet of the patella measuring up to 0.6 cm mediolateral dimension with mild to moderate underlying subchondral cystic change. There is an area of grade 3/4 chondrosis in the inferior trochlea measuring 0.8 cm craniocaudal dimension with underlying subchondral cystic change.   INTERCONDYLAR NOTCH: Anterior cruciate ligament is intact. Posterior cruciate ligament is intact. OSSEOUS STRUCTURES: There is moderate marrow edema signal in the medial femoral condyle and medial tibial plateau most pronounced centrally and anteriorly. Areas of low T1 signal intensity in the subchondral bone of the medial femoral condyle and medial tibial plateau, suggestive of superimposed small developing subchondral fracture lines.   JOINT SPACE/SOFT TISSUES: There is a moderate-sized joint effusion. There is a moderate-sized popliteal cyst.    Impression: IMPRESSION: 1. Moderate stress reactions of the medial femoral condyle and medial tibial plateau. Focal areas of low T1 signal intensity suggestive of underlying small or developing subchondral fracture lines. 2. Advanced medial compartment osteoarthritis, with broad-based areas of up to grade 4/3 chondrosis along the central to anterior weightbearing surfaces of the medial femoral condyle and  medial tibial plateau. 3. Patellofemoral osteoarthrosis with a focal area of grade 4 chondrosis at the medial facet of the patella. Grade 3/4 chondrosis in the central trochlea. 4. Medial meniscal tears at the posterior horn. Subluxation of the body into the medial gutter. 5. Moderate-sized joint effusion. Moderate-sized popliteal cyst. Significant Findings: [place] Act 112. A notification task was sent to Dr. Foster regarding impression #1 via the [employer] system on 10/18/2024 at 11:27 AM. Workstation:[id number]     Risks/Benefits      Risks, Benefits And Possible Side Effects Of Medications:    Risks, benefits, and possible side effects of medications explained to Poly and she verbalizes understanding and agreement for treatment.    Controlled Medication Discussion:     No recent records found for controlled prescriptions according to [place] Prescription Drug Monitoring Program    Visit Time    Visit Start Time: 11:45AM  Visit Stop Time: 12:28PM  Total Visit Duration:  As above minutes

## 2024-11-12 ENCOUNTER — OFFICE VISIT (OUTPATIENT)
Dept: PSYCHIATRY | Facility: CLINIC | Age: 59
End: 2024-11-12
Payer: MEDICARE

## 2024-11-12 VITALS — WEIGHT: 223.3 LBS | HEIGHT: 67 IN | BODY MASS INDEX: 35.05 KG/M2

## 2024-11-12 DIAGNOSIS — F41.9 ANXIETY: ICD-10-CM

## 2024-11-12 DIAGNOSIS — F31.81 BIPOLAR 2 DISORDER (HCC): ICD-10-CM

## 2024-11-12 PROCEDURE — 99214 OFFICE O/P EST MOD 30 MIN: CPT | Performed by: PHYSICIAN ASSISTANT

## 2024-11-12 RX ORDER — LAMOTRIGINE 100 MG/1
TABLET ORAL
Qty: 45 TABLET | Refills: 1 | Status: SHIPPED | OUTPATIENT
Start: 2024-11-12

## 2024-11-12 RX ORDER — HYDROXYZINE HYDROCHLORIDE 50 MG/1
TABLET, FILM COATED ORAL
Qty: 270 TABLET | Refills: 1 | Status: SHIPPED | OUTPATIENT
Start: 2024-11-12

## 2024-11-12 NOTE — BH TREATMENT PLAN
"TREATMENT PLAN (Medication Management Only)        [facility]    Name/Date of Birth/MRN:  Poly Joshua 59 y.o. 1965 MRN: [id number]  Date of Treatment Plan: November 12, 2024  Diagnosis/Diagnoses:   1. Bipolar 2 disorder (HCC)    2. Anxiety      Strengths/Personal Resources for Self-Care: \"I like to be relaxed, stay away from drama; Listen to my music; Cooking whatever I like; in my Hammock; my non-alcoholic pina colada; the sound of the rain, lightening, the sound of water\"  Area/Areas of need (in own words): \"I'm OK.\"  1. Long Term Goal:   Maintain mood stability and control of anxiety  Target Date:  3-6 months  Person/Persons responsible for completion of goal: Krishan  2.  Short Term Objective (s) - How will we reach this goal?:   A.  Provider new recommended medication/dosage changes and/or continue medication(s):  Pt  is feeling well without mood or psychotic Sxs related complaints.  Anxiety is well controlled and the Hydroxyzine prn dosing is working well.  Pt appears stable and I will continue the present regimen.  Treatment plan done and Pt accepts the plan.  Safety Plan was done 6/24/2024 but Epic flags it as being due.  Continue:  Lamotrigine 100mg (1/2) tab po qhs # 45 R1  Fluoxetine 20mg (1) cap po qd # 90 R1  Hydroxyzine 50mg (1/2 - 1) tab po tid prn anxiety or insomnia # 270 R1  Melatonin --Pt get OTC  Vit D --per PCP  Pt to get Lipids and HgbA1C --per PCP and Home sleep study per sleep specialist  F/U PCP, Cardiology, Neurology, and Sleep medicine for medical issues  Return 18 weeks, call sooner prn  .  Target Date:  3-6 months  Person/Persons Responsible for Completion of Goal: Krishan   Progress Towards Goals: stable, continuing treatment  Treatment Modality:  Medication mgt  Review due 180 days from date of this plan: 6 months - 5/12/2025  Expected length of service: ongoing treatment unless revised  My " Physician/PA/NP and I have developed this plan together and I agree to work on the goals and objectives. I understand the treatment goals that were developed for my treatment.  Electronic Signatures: on file (unless signed below)    Viky Lyn PA-C 11/12/24

## 2024-11-12 NOTE — ASSESSMENT & PLAN NOTE
Orders:    FLUoxetine (PROzac) 20 mg capsule; Take 1 capsule (20 mg total) by mouth daily    lamoTRIgine (LaMICtal) 100 mg tablet; TAKE 1/2 TAB BY MOUTH AT BEDTIME

## 2024-11-12 NOTE — ASSESSMENT & PLAN NOTE
Orders:    FLUoxetine (PROzac) 20 mg capsule; Take 1 capsule (20 mg total) by mouth daily    hydrOXYzine HCL (ATARAX) 50 mg tablet; TAKE 1/2 TO 1 TABLET BY MOUTH 3 TIMES DAILY AS NEEDED FOR ANXIETY

## 2024-12-16 ENCOUNTER — HOSPITAL ENCOUNTER (OUTPATIENT)
Dept: RADIOLOGY | Age: 59
Discharge: HOME | End: 2024-12-16
Payer: COMMERCIAL

## 2024-12-16 ENCOUNTER — TELEPHONE (OUTPATIENT)
Dept: PULMONOLOGY | Facility: CLINIC | Age: 59
End: 2024-12-16

## 2024-12-16 ENCOUNTER — APPOINTMENT (OUTPATIENT)
Dept: LAB | Age: 59
End: 2024-12-16
Payer: COMMERCIAL

## 2024-12-16 VITALS — BODY MASS INDEX: 35 KG/M2 | WEIGHT: 223 LBS | HEIGHT: 67 IN

## 2024-12-16 DIAGNOSIS — Z12.31 VISIT FOR SCREENING MAMMOGRAM: ICD-10-CM

## 2024-12-16 PROCEDURE — 77063 BREAST TOMOSYNTHESIS BI: CPT

## 2024-12-16 PROCEDURE — 77067 SCR MAMMO BI INCL CAD: CPT

## 2024-12-16 NOTE — TELEPHONE ENCOUNTER
Called  patient to schedule appointment for the  6M FU around 4/29/2025 from the recall list and was unable to leave a voicemail message call kept dropping.

## 2025-01-17 ENCOUNTER — OFFICE VISIT (OUTPATIENT)
Dept: GASTROENTEROLOGY | Facility: CLINIC | Age: 60
End: 2025-01-17
Payer: MEDICARE

## 2025-01-17 VITALS
DIASTOLIC BLOOD PRESSURE: 80 MMHG | HEIGHT: 67 IN | SYSTOLIC BLOOD PRESSURE: 104 MMHG | TEMPERATURE: 97.8 F | WEIGHT: 218 LBS | BODY MASS INDEX: 34.21 KG/M2

## 2025-01-17 DIAGNOSIS — K58.1 IRRITABLE BOWEL SYNDROME WITH CONSTIPATION: ICD-10-CM

## 2025-01-17 DIAGNOSIS — K22.0 ACHALASIA: ICD-10-CM

## 2025-01-17 DIAGNOSIS — K21.9 GASTROESOPHAGEAL REFLUX DISEASE WITHOUT ESOPHAGITIS: ICD-10-CM

## 2025-01-17 DIAGNOSIS — R10.13 DYSPEPSIA: Primary | ICD-10-CM

## 2025-01-17 DIAGNOSIS — D12.6 TUBULAR ADENOMA OF COLON: ICD-10-CM

## 2025-01-17 DIAGNOSIS — R10.13 EPIGASTRIC PAIN: ICD-10-CM

## 2025-01-17 DIAGNOSIS — E66.01 OBESITY, MORBID (HCC): ICD-10-CM

## 2025-01-17 DIAGNOSIS — R13.19 ESOPHAGEAL DYSPHAGIA: ICD-10-CM

## 2025-01-17 PROCEDURE — 99214 OFFICE O/P EST MOD 30 MIN: CPT | Performed by: INTERNAL MEDICINE

## 2025-01-17 RX ORDER — ESOMEPRAZOLE MAGNESIUM 40 MG/1
40 CAPSULE, DELAYED RELEASE ORAL
Qty: 200 CAPSULE | Refills: 1 | Status: SHIPPED | OUTPATIENT
Start: 2025-01-17

## 2025-01-17 NOTE — ASSESSMENT & PLAN NOTE
She is experiencing significant constipation  No improvement with MiraLAX  Linzess 145 mcg daily    Orders:    linaCLOtide 145 MCG CAPS; Take 1 capsule (145 mcg total) by mouth daily

## 2025-01-17 NOTE — PROGRESS NOTES
Name: Poly Joshua      : 1965      MRN: [id number]  Encounter Provider: Kalpesh Perez MD  Encounter Date: 2025   Encounter department: [facility] [place]  :  Assessment & Plan  Dyspepsia  For the most part she is doing well except intermittent epigastric discomfort  Continue Nexium 40 mg twice daily  Reflux precautions  Stool test for H. pylori antigen  Orders:    H. pylori antigen, stool; Future    Gastroesophageal reflux disease without esophagitis  Reflux precautions continue Nexium 40 mg twice daily         Achalasia  Status post Heller myotomy with Jaime fundoplication in   Her swallowing is stable       Esophageal dysphagia  Stable       Irritable bowel syndrome with constipation  She is experiencing significant constipation  No improvement with MiraLAX  Linzess 145 mcg daily    Orders:    linaCLOtide 145 MCG CAPS; Take 1 capsule (145 mcg total) by mouth daily    Tubular adenoma of colon  Recent colonoscopy was normal  Surveillance colonoscopy in        Obesity, morbid (HCC)  Discussed lifestyle change  Regular exercise and dietary modification       Epigastric pain    Orders:    esomeprazole (NexIUM) 40 MG capsule; Take 1 capsule (40 mg total) by mouth 2 (two) times a day before meals 30 minutes before breakfast and 30 minutes before dinner        History of Present Illness   HPI  Poly Joshua is a 59 y.o. female who presents for follow-up visit.  She had a history of achalasia status post Heller myotomy and Jaime fundoplication in .  Overall doing well.  Reflux symptoms well-controlled with esomeprazole 40 mg twice daily        Review of Systems   Constitutional:  Negative for chills and fever.   HENT:  Negative for ear pain and sore throat.    Eyes:  Negative for pain and visual disturbance.   Respiratory:  Negative for cough and shortness of breath.    Cardiovascular:  Negative for chest pain and palpitations.   Gastrointestinal:  Negative  "for abdominal pain and vomiting.   Genitourinary:  Negative for dysuria and hematuria.   Musculoskeletal:  Negative for arthralgias and back pain.   Skin:  Negative for color change and rash.   Neurological:  Negative for seizures and syncope.   All other systems reviewed and are negative.         Objective   /80 (BP Location: Left arm, Patient Position: Sitting, Cuff Size: Large)   Temp 97.8 °F (36.6 °C) (Tympanic)   Ht 5' 7\" (1.702 m)   Wt 98.9 kg (218 lb)   LMP 10/11/2019   BMI 34.14 kg/m²      Physical Exam  Vitals and nursing note reviewed.   Constitutional:       General: She is not in acute distress.     Appearance: She is well-developed.   HENT:      Head: Normocephalic and atraumatic.   Eyes:      Conjunctiva/sclera: Conjunctivae normal.   Cardiovascular:      Rate and Rhythm: Normal rate and regular rhythm.      Heart sounds: No murmur heard.  Pulmonary:      Effort: Pulmonary effort is normal. No respiratory distress.      Breath sounds: Normal breath sounds.   Abdominal:      Palpations: Abdomen is soft.      Tenderness: There is no abdominal tenderness.   Musculoskeletal:         General: No swelling.      Cervical back: Neck supple.   Skin:     General: Skin is warm and dry.      Capillary Refill: Capillary refill takes less than 2 seconds.   Neurological:      Mental Status: She is alert.   Psychiatric:         Mood and Affect: Mood normal.         "

## 2025-01-30 DIAGNOSIS — F31.81 BIPOLAR 2 DISORDER (HCC): ICD-10-CM

## 2025-01-30 DIAGNOSIS — F41.9 ANXIETY: ICD-10-CM

## 2025-01-31 DIAGNOSIS — J45.40 MODERATE PERSISTENT ASTHMA WITHOUT COMPLICATION: ICD-10-CM

## 2025-01-31 DIAGNOSIS — K58.1 IRRITABLE BOWEL SYNDROME WITH CONSTIPATION: ICD-10-CM

## 2025-01-31 RX ORDER — POLYETHYLENE GLYCOL 3350 17 G/17G
POWDER, FOR SOLUTION ORAL
Qty: 90 EACH | Refills: 1 | Status: SHIPPED | OUTPATIENT
Start: 2025-01-31

## 2025-01-31 NOTE — TELEPHONE ENCOUNTER
Refill must be reviewed and completed by the office or provider. The refill is unable to be approved or denied by the medication management   Last ordered by another provider (Telly Tracey) - Please review to see if the refill is appropriate.

## 2025-01-31 NOTE — TELEPHONE ENCOUNTER
Requested medication(s) are due for refill today: Yes  Patient has already received a courtesy refill: No  Other reason request has been forwarded to provider: Refill due.

## 2025-02-03 RX ORDER — BUDESONIDE 0.5 MG/2ML
0.5 INHALANT ORAL 2 TIMES DAILY
Qty: 60 ML | Refills: 5 | Status: SHIPPED | OUTPATIENT
Start: 2025-02-03

## 2025-02-13 DIAGNOSIS — J45.40 MODERATE PERSISTENT ASTHMA WITHOUT COMPLICATION: ICD-10-CM

## 2025-02-13 RX ORDER — ALBUTEROL SULFATE 0.83 MG/ML
SOLUTION RESPIRATORY (INHALATION)
Qty: 360 ML | Refills: 2 | Status: SHIPPED | OUTPATIENT
Start: 2025-02-13

## 2025-02-17 DIAGNOSIS — J45.41 MODERATE PERSISTENT ASTHMA WITH EXACERBATION: ICD-10-CM

## 2025-02-19 RX ORDER — MONTELUKAST SODIUM 10 MG/1
10 TABLET ORAL
Qty: 90 TABLET | Refills: 1 | Status: SHIPPED | OUTPATIENT
Start: 2025-02-19

## 2025-02-19 NOTE — TELEPHONE ENCOUNTER
Requested medication(s) are due for refill today: Yes  Patient has already received a courtesy refill: No  Other reason request has been forwarded to provider: Refill due

## 2025-03-11 NOTE — PSYCH
MEDICATION MANAGEMENT NOTE    Name: Poly Joshua      : 1965      MRN: [id number]  Encounter Provider: Viky Lyn PA-C  Encounter Date: 3/18/2025   Encounter department: [facility]    Insurance: Payor: [employer]  REP / Plan: [employer] ASSURED / Product Type: Medicare HMO /      Reason for Visit: No chief complaint on file.  :  Assessment & Plan  Bipolar 2 disorder (HCC)    Orders:    FLUoxetine (PROzac) 20 mg capsule; Take 1 capsule (20 mg total) by mouth daily    lamoTRIgine (LaMICtal) 100 mg tablet; TAKE 1/2 TAB BY MOUTH AT BEDTIME    TSH, 3rd generation; Future    Anxiety    Orders:    FLUoxetine (PROzac) 20 mg capsule; Take 1 capsule (20 mg total) by mouth daily    TSH, 3rd generation; Future    Elevated TSH    Orders:    TSH, 3rd generation; Future    PLAN:  Pt is having no current mood, anxiety, or psychotic Sxs related complaints, though remains isolative by choice, stays at home mostly and has minimal interaction with strangers.  She keeps a cordial relationship with fellow Worship members.   Pt appears stable and I will continue the present regimen.  Pt has 2 antihistamines in her medication record but knows NOT to take Allegra at the same day she takes Hydroxyzine.   Labwork reviewed and TSH was elevated-- Pt reports her private PCP increased the Levothyroxine.  I will repeat the TSH.  Treatment plan due next visit.  Safety Plan was done 2024 but Epic flags it as being due.  Continue:   Fluoxetine 20mg (1) cap po qd # 90    Note: this Rx was renewed for Qty 90 by [employer] worker Gaudencio Viveros on 2025 unbeknownst to me  Lamotrigine 100mg (1/2) tab po qhs # 45   Hydroxyzine 50mg (1/2 - 1) tab po tid prn anxiety or insomnia -- Pt states she is not needing more right now  Melatonin --Pt get OTC  Vit D --per PCP  Pt to get Lipids and HgbA1C --per PCP and Home sleep study per sleep specialist  Get  "TSH  F/U PCP (next month appt per Pt), Cardiology, Neurology, and Sleep medicine for medical issues  Return 5/9/2025, call sooner prn       Treatment Recommendations:    Educated about diagnosis and treatment modalities. Verbalizes understanding and agreement with the treatment plan.  Discussed self monitoring of symptoms, and symptom monitoring tools.  Discussed medications and if treatment adjustment was needed or desired.  Aware of 24 hour and weekend coverage for urgent situations accessed by calling [facility] main practice number  I am scheduling this patient out for greater than 3 months: No    Medications Risks/Benefits:      Risks, Benefits And Possible Side Effects Of Medications:    Risks, benefits, and possible side effects of medications explained to Poly and she (or legal representative) verbalizes understanding and agreement for treatment.    Controlled Medication Discussion:     Not applicable - controlled prescriptions are not prescribed by this practice      History of Present Illness     Pt presently reports a primary c/o \"          Pt is having no current mood, anxiety, or psychotic Sxs related complaints, though remains socially isolative by choice.  She otherwise has minimal interaction with strangers but goes out for Confucianism, her own errands, the mall, or the beach, family's homes, anywhere she feels like going.  She keeps a cordial relationship with fellow [employer]ucian[employer] members.  She does not have friends and does not want them so as not to have drama in her life.  She talks to her mom daily, also speaks to father, sister, niece and nephew, children, and grandchildren frequently.  Pt presently denies depression, self-injurious thoughts/behaviors, SI, HI, anxiety, panic attacks, elevated or irritable moods, over-normal energy, reduced sleep requirement, impulsivity, hallucinations or paranoia.  Pt reports compliance to psychiatric medications without SE.   Pt reports compliance " to psychiatric medications without SE.         Review Of Systems: A review of systems is obtained and is negative except for the pertinent positives listed in HPI/Subjective above.      Current Rating Scores:         Areas of Improvement:  Stable since last visit      Past Medical History:   Diagnosis Date    Achalasia     Asthma     Colon polyp     Constipation     CPAP (continuous positive airway pressure) dependence     Depression     Diarrhea 2019    Disease of thyroid gland     Elevated LFTs     last assessed 10/25/17    Fibromyalgia     Gastric ulcer     GERD (gastroesophageal reflux disease)     H. pylori infection 10/09/2017    Annotation - [id number]: Treated, f/u stool antigen negative    Hypertension     Hypothyroidism     Kidney stone     Migraines     Migraines     MICHAEL (obstructive sleep apnea)     last assessed 16    Pneumonia     Sleep apnea 2019    Uses CPAP    Thyroid nodule     last assessed 17        Past Surgical History:   Procedure Laterality Date    BREAST BIOPSY Right 10/31/2017    us bx     SECTION      COLONOSCOPY      ENDOMETRIAL ABLATION      MAMMO STEREOTACTIC BREAST BIOPSY LEFT (ALL INC) Left 2024    MYOTOMY HELLER LAPAROSCOPIC  2016    Dr. Magallanes, Coincident lorena fundoplication    NASAL SINUS SURGERY      OTHER SURGICAL HISTORY      achalasia repair    OVARIAN CYST REMOVAL      PILONIDAL CYST / SINUS EXCISION      GA COLONOSCOPY FLX DX W/COLLJ SPEC WHEN PFRMD N/A 2019    Procedure: COLONOSCOPY;  Surgeon: Kalpesh Perez MD;  [facility]: [facility];  Service: Gastroenterology    GA CYSTOURETHROSCOPY N/A 2017    Procedure: CYSTOSCOPY;  Surgeon: Reymundo Dill MD;  Location: [facility];  Service: UroGynecology           GA ESOPHAGOGASTRODUODENOSCOPY TRANSORAL DIAGNOSTIC N/A 2017    Procedure: ESOPHAGOGASTRODUODENOSCOPY (EGD);  Surgeon: Kalpesh Perez MD;  Location: [facility];  Service: Gastroenterology    GA ESOPHAGOGASTRODUODENOSCOPY  TRANSORAL DIAGNOSTIC N/A 04/25/2019    Procedure: ESOPHAGOGASTRODUODENOSCOPY (EGD);  Surgeon: Kalpesh Perez MD;  [facility];  Service: Gastroenterology    IL LITHOTRIPSY XTRCORP SHOCK WAVE Left 05/26/2022    Procedure: ESWL;  Surgeon: Markie Osullivan MD;  Location:  MAIN OR;  Service: Urology    IL POST COLPORRHAPHY RECTOCELE W/WO PERINEORRHAPHY N/A 01/19/2017    Procedure: COLPORRHAPHY POSTERIOR;  Surgeon: Reymundo Dill MD;  [facility];  Service: UroGynecology           IL SLING OPERATION STRESS INCONTINENCE N/A 01/19/2017    Procedure: INSERTION PUBOVAGINAL SLING /SINGLE INCISION ;  Surgeon: Reymundo Dill MD;  [facility];  Service: UroGynecology           TONSILLECTOMY      TUBAL LIGATION      UPPER GASTROINTESTINAL ENDOSCOPY      US GUIDED BREAST BIOPSY RIGHT COMPLETE Right 10/31/2017     Allergies:   Allergies   Allergen Reactions    Gabapentin Other (See Comments)     Annotation - [id number]: Neuropsych effects per patient.  Annotation - [id number]: Neuropsych effects per patient.  Annotation - [id number]: Neuropsych effects per patient.    Latex Dermatitis and Rash     Other reaction(s): Dermatitis    Penicillins Hives       Current Outpatient Medications   Medication Sig Dispense Refill    albuterol (2.5 mg/3 mL) 0.083 % nebulizer solution TAKE 3 ML (2.5 MG) BY NEBULIZER EVERY 6 HOURS AS NEEDED FOR WHEEZING OR SHORTNESS IF BREATH 360 mL 2    albuterol (PROVENTIL HFA,VENTOLIN HFA) 90 mcg/act inhaler Inhale 2 puffs every 6 (six) hours as needed for wheezing or shortness of breath 18 g 5    Alcohol Swabs (Alcohol Prep) 70 % PADS Use in the morning 100 each 1    atorvastatin (LIPITOR) 20 mg tablet TOME BOO TABLETA TODOS LOS VIZCARRA 90 tablet 1    Azelastine HCl 137 MCG/SPRAY SOLN 2 sprays by Alternating Nares route daily as needed (congestion) 30 mL 2    Blood Glucose Monitoring Suppl (FreeStyle Lite) w/Device KIT USE AS DIRECTED 1 kit 3    Botox 200 units SOLR       Breo Ellipta  200-25 MCG/ACT inhaler Inhale 1 puff daily Rinse mouth after use. 180 each 1    budesonide (PULMICORT) 0.5 mg/2 mL nebulizer solution TAKE 2 ML (0.5 MG TOTAL) BY NEBULIZATION 2 (TWO) TIMES A DAY RINSE MOUTH AFTER USE. 60 mL 5    carbamide peroxide (DEBROX) 6.5 % otic solution Administer 5 drops into both ears 2 (two) times a day 15 mL 0    cetaphil (CETAPHIL) lotion Apply topically as needed for dry skin 236 mL 0    cholecalciferol (VITAMIN D3) 1,000 units tablet TAKE 1 TABLET BY MOUTH EVERY DAY 90 tablet 3    EPINEPHrine (EPIPEN) 0.3 mg/0.3 mL SOAJ AS NEEDED FOR ALLERGIC REACTION. MYLAN GENERIC OK      esomeprazole (NexIUM) 40 MG capsule Take 1 capsule (40 mg total) by mouth 2 (two) times a day before meals 30 minutes before breakfast and 30 minutes before dinner 200 capsule 1    fexofenadine (ALLEGRA) 180 MG tablet Take 1 tablet (180 mg total) by mouth daily as needed (allergies) 90 tablet 1    FLUoxetine (PROzac) 20 mg capsule Take 1 capsule (20 mg total) by mouth daily 90 capsule 0    fluticasone (FLONASE) 50 mcg/act nasal spray 1-2 sprays into each nostril daily 16 g 6    furosemide (LASIX) 20 mg tablet Take 1 tablet (20 mg total) by mouth daily 90 tablet 3    glucose monitoring kit (FREESTYLE) monitoring kit Use 1 each as needed (prediabetes blood sugar checks) 1 each 0    ipratropium (ATROVENT) 0.02 % nebulizer solution Take 2.5 mL (0.5 mg total) by nebulization 2 (two) times a day 5 mL 2    Ketotifen Fumarate (ZADITOR) 0.035 % ophthalmic solution Administer 1 drop to both eyes 2 (two) times a day as needed (For discomfort) 10 mL 3    lamoTRIgine (LaMICtal) 100 mg tablet TAKE 1/2 TAB BY MOUTH AT BEDTIME 45 tablet 0    Lancets (freestyle) lancets Use as instructed 100 each 2    levothyroxine 100 mcg tablet TOME BOO TABLETA TODOS LOS VIZCARRA 90 tablet 3    linaCLOtide 145 MCG CAPS Take 1 capsule (145 mcg total) by mouth daily 30 capsule 3    metFORMIN (GLUCOPHAGE-XR) 500 mg 24 hr tablet Take 2 tablets (1,000 mg  total) by mouth every morning 180 tablet 3    montelukast (SINGULAIR) 10 mg tablet TAKE 1 TABLET (10 MG TOTAL) BY MOUTH DAILY AT BEDTIME (Patient taking differently: Take 10 mg by mouth daily at bedtime Per Pt on 3/18/2025-- the Pt is taking this prn) 90 tablet 1    polyethylene glycol (MIRALAX) 17 g TAKE 17 G BY MOUTH DAILY AS NEEDED FOR CONSTIPATION 90 each 1    Premarin vaginal cream INSERT 0.5 GRAMS INTO THE VAGINA A DIARIO 30 g 1    sucralfate (CARAFATE) 1 g/10 mL suspension TAKE 10 ML BY MOUTH 4 TIMES DAILY (Patient taking differently: Per Pt on 3/18/2025, she is taking prn) 420 mL 0    diazepam (VALIUM) 5 mg tablet TAKE 1 TO 2 TABLET BY MOUTH AS NEEDED 30 MINS PRIOR (Patient not taking: Reported on 11/12/2024)      Diclofenac Sodium (VOLTAREN) 1 % AS DIRECTED EXTERNALLY FOUR TIMES DAILY 30 DAYS (Patient not taking: Reported on 11/12/2024)      hydrOXYzine HCL (ATARAX) 50 mg tablet TAKE 1/2 TO 1 TABLET BY MOUTH 3 TIMES DAILY AS NEEDED FOR ANXIETY (Patient not taking: Reported on 3/18/2025) 270 tablet 1    ketoconazole (NIZORAL) 2 % cream Apply topically daily (Patient not taking: Reported on 11/12/2024) 15 g 0    lidocaine (LMX) 4 % cream 1 APPLICATION AS NEEDED EXTERNALLY THREE TIMES A DAY 30 DAY(S) (Patient not taking: Reported on 3/18/2025)      triamcinolone (KENALOG) 0.1 % cream APPLY TO AFFECTED AREA TOPICALLY TWICE DAILY FOR 14 DAYS. (Patient not taking: Reported on 3/18/2025)       No current facility-administered medications for this visit.       Substance Abuse History:    Social History     Substance and Sexual Activity   Alcohol Use Not Currently     Social History     Substance and Sexual Activity   Drug Use Never       Social History:    Social History     Socioeconomic History    Marital status: Single     Spouse name: Not on file    Number of children: 4    Years of education: Not on file    Highest education level: Not on file   Occupational History    Occupation: Disability since 8/2019-- due  to a job injury    Tobacco Use    Smoking status: Former     Current packs/day: 0.00     Average packs/day: 1.5 packs/day for 30.0 years (45.0 ttl pk-yrs)     Types: Cigarettes     Start date:      Quit date: 2016     Years since quittin.2    Smokeless tobacco: Never    Tobacco comments:     pt stated quit about a month ago, last assessed 14 per [employer]   Vaping Use    Vaping status: Never Used   Substance and Sexual Activity    Alcohol use: Not Currently    Drug use: Never    Sexual activity: Not Currently     Partners: Male     Birth control/protection: Female Sterilization     Comment: Boyfriend/father of 4 children   Other Topics Concern    Not on file   Social History Narrative    Home: Lives with boyfriend/father of her 4 children and one of their daughters        Children: 2 sons born  and ,  2 daughters born  and         Education:     Social Drivers of Health     Financial Resource Strain: Low Risk  (3/10/2023)    Overall Financial Resource Strain (CARDIA)     Difficulty of Paying Living Expenses: Not hard at all   Food Insecurity: No Food Insecurity (3/10/2023)    Hunger Vital Sign     Worried About Running Out of Food in the Last Year: Never true     Ran Out of Food in the Last Year: Never true   Transportation Needs: No Transportation Needs (3/10/2023)    PRAPARE - Transportation     Lack of Transportation (Medical): No     Lack of Transportation (Non-Medical): No   Physical Activity: Inactive (2021)    Exercise Vital Sign     Days of Exercise per Week: 0 days     Minutes of Exercise per Session: 0 min   Stress: Not on file   Social Connections: Not on file   Intimate Partner Violence: Not on file   Housing Stability: Low Risk  (2022)    Housing Stability Vital Sign     Unable to Pay for Housing in the Last Year: No     Number of Places Lived in the Last Year: 1     Unstable Housing in the Last Year: No       Family Psychiatric History:     Family History  "  Problem Relation Age of Onset    Cancer Mother     Breast cancer Mother 57    Hypertension Mother     Stroke Father     Diabetes Father     Hypertension Father     Alzheimer's disease Father     Diabetes Sister     BRCA1 Negative Sister     BRCA2 Negative Sister     No Known Problems Daughter     No Known Problems Daughter     Cancer Maternal Grandmother         ovarian    Ovarian cancer Maternal Grandmother         unspecified laterality    Cancer Maternal Grandfather     Hypertension Maternal Grandfather     Tongue cancer Maternal Grandfather     Stroke Paternal Grandmother     No Known Problems Paternal Grandfather     Thyroid disease unspecified Brother     Depression Brother     Anxiety disorder Brother     Drug abuse Brother         (pain pills)    No Known Problems Son     No Known Problems Son     Thyroid disease unspecified Maternal Aunt     Colon cancer Maternal Aunt     No Known Problems Maternal Aunt     No Known Problems Maternal Aunt     No Known Problems Maternal Aunt     No Known Problems Paternal Aunt     No Known Problems Paternal Aunt     No Known Problems Paternal Aunt     No Known Problems Paternal Aunt     No Known Problems Paternal Aunt     No Known Problems Paternal Aunt     Alzheimer's disease Cousin     Breast cancer Cousin 42    Breast cancer Family         unk age       Medical History Reviewed by provider this encounter:  Tobacco  Allergies  Meds  Problems  Med Hx  Surg Hx  Fam Hx          Objective   Ht 5' 7\" (1.702 m)   Wt 101 kg (222 lb 6.4 oz)   LMP 10/11/2019   BMI 34.83 kg/m²      Mental Status Evaluation:    Appearance age appropriate, casually dressed, dressed appropriately, partial eye contact   Behavior pleasant, cooperative, calm   Speech normal rate, normal pitch, spontaneous, fluent, coherent, somewhat rapid and hypertalkative at times, but not pressured   Mood euthymic   Affect normal range and intensity, appropriate   Thought Processes organized, goal directed, " circumstantial    Thought Content no overt delusions   Perceptual Disturbances: no auditory hallucinations, no visual hallucinations, does not appear responding to internal stimuli, Pt denies paranoia   Abnormal Thoughts  Risk Potential Suicidal ideation - None  Homicidal ideation - None  Potential for aggression - No   Orientation oriented to person, place, situation, day of the week, date, month of the year, and year   Memory short term memory grossly intact   Consciousness alert and awake   Attention Span Concentration Span attention span and concentration are age appropriate   Intellect appears to be of average intelligence   Insight fair   Judgement fair   Muscle Strength and  Gait normal gait and normal balance   Motor activity no abnormal movements   Language no difficulty naming common objects, no difficulty repeating a phrase   Fund of Knowledge adequate knowledge of current events  adequate fund of knowledge regarding past history  adequate fund of knowledge regarding vocabulary    Pain none   Pain Scale 0       Laboratory Results: I have personally reviewed all pertinent laboratory/tests results    Recent Labs (last 12 months):   Hospital Outpatient Visit on 06/25/2024   Component Date Value    Case Report 06/25/2024                      Value:Surgical Pathology Report                         Case: [id number]                                  Authorizing Provider:  Kalpesh Perez MD           Collected:           06/25/2024 1021              Ordering Location:     [facility]        Received:            06/25/2024 [facility]                                     [facility]                                                    Pathologist:           Jassi Falcon DO                                                          Specimens:   A) - Esophagus, Distal esophagus bx                                                                 B) - Esophagus, Proximal esophagus bx                          "                             Final Diagnosis 06/25/2024                      Value:A. Esophagus, Distal, Biopsy:  - Squamous mucosa within normal limits.  - Squamous intraepithelial eosinophils are not identified.   - Negative for intestinal metaplasia, dysplasia, and malignancy.     B. Esophagus, Proximal, Biopsy:  - Squamous mucosa within normal limits.  - Squamous intraepithelial eosinophils are not identified.   - Negative for intestinal metaplasia, dysplasia, and malignancy.         Additional Information 06/25/2024                      Value:All reported additional testing was performed with appropriately reactive controls.  These tests were developed and their performance characteristics determined by [facility] or appropriate performing facility, though some tests may be performed on tissues which have not been validated for performance characteristics (such as staining performed on alcohol exposed cell blocks and decalcified tissues).  Results should be interpreted with caution and in the context of the patients’ clinical condition. These tests may not be cleared or approved by the U.S. Food and Drug Administration, though the FDA has determined that such clearance or approval is not necessary. These tests are used for clinical purposes and they should not be regarded as investigational or for research. This laboratory has been approved by CLIA 88, designated as a high-complexity laboratory and is qualified to perform these tests.    Interpretation performed at [facility] [address] [place] [id number].      Gross Description 06/25/2024                      Value:A. The specimen is received in formalin, labeled with the patient's name and hospital number, and is designated \" distal esophagus biopsy\".  The specimen consists of 2 colorless tissue fragments measuring 0.2-0.5 cm in greatest dimension.  Entirely submitted.  One screened cassette.  " "Note: due to the size and consistency of specimen, the tissue may not survive histologic processing.  B. The specimen is received in formalin, labeled with the patient's name and hospital number, and is designated \" proximal esophagus biopsy\".  The specimen consists of a single colorless tissue fragment measuring 0.6 x 0.3 x 0.1 cm.  The specimen is entirely submitted in a screened cassette.    Note: The estimated total formalin fixation time based upon information provided by the submitting clinician and the standard processing schedule is under 72 hours. Judson Singleton      Clinical Information 06/25/2024                      Value:R/O EOE   Orders Only on 05/07/2024   Component Date Value    Right Eye Diabetic Retin* 05/07/2024 None     Left Eye Diabetic Retino* 05/07/2024 None        Suicide/Homicide Risk Assessment:    Risk of Harm to Self:  Historical Risk Factors include: history of anxiety, history of mood disorder  Protective Factors: no current suicidal ideation, access to mental health treatment, compliant with medications, having a desire to be alive, having a sense of purpose or meaning in life, personal beliefs, Orthodox beliefs discouraging suicide, stable living environment, supportive family  Weapons/Firearms: none. The following steps have been taken to ensure weapons are properly secured: not applicable  Based on today's assessment, Poly presents the following risk of harm to self: none    Risk of Harm to Others:  Historical Risk Factors include: none  Protective Factors: no current homicidal ideation, access to mental health treatment, compliant with medications, personal beliefs, Orthodox beliefs, supportive family  Weapons/Firearms: none. The following steps have been taken to ensure weapons are properly secured: not applicable  Based on today's assessment, Poly presents the following risk of harm to others: none    The following interventions are recommended: Continue medication " management. No other intervention changes indicated at this time.    Psychotherapy Provided:     Individual psychotherapy provided: No    Treatment Plan:    Completed and signed during the session: Not applicable - Treatment Plan not due at this session    Goals: Progress towards Treatment Plan goals - Yes, progressing, as evidenced by subjective findings in HPI/Subjective Section and in Assessment and Plan Section    Depression Follow-up Plan Completed: Yes    Note Share:    This note was shared with patient.    Administrative Statements       Visit Time  Visit Start Time: 11:56 AM  Visit Stop Time: 12:35 PM  Total Visit Duration:  39 minutes    Viky Lyn PA-C 03/18/25

## 2025-03-18 ENCOUNTER — OFFICE VISIT (OUTPATIENT)
Dept: PSYCHIATRY | Facility: CLINIC | Age: 60
End: 2025-03-18
Payer: MEDICARE

## 2025-03-18 VITALS — HEIGHT: 67 IN | BODY MASS INDEX: 34.91 KG/M2 | WEIGHT: 222.4 LBS

## 2025-03-18 DIAGNOSIS — F31.81 BIPOLAR 2 DISORDER (HCC): Primary | ICD-10-CM

## 2025-03-18 DIAGNOSIS — R79.89 ELEVATED TSH: ICD-10-CM

## 2025-03-18 DIAGNOSIS — F41.9 ANXIETY: ICD-10-CM

## 2025-03-18 PROCEDURE — 99214 OFFICE O/P EST MOD 30 MIN: CPT | Performed by: PHYSICIAN ASSISTANT

## 2025-03-18 RX ORDER — LAMOTRIGINE 100 MG/1
TABLET ORAL
Qty: 45 TABLET | Refills: 0 | Status: SHIPPED | OUTPATIENT
Start: 2025-03-18

## 2025-03-18 NOTE — ASSESSMENT & PLAN NOTE
Orders:    FLUoxetine (PROzac) 20 mg capsule; Take 1 capsule (20 mg total) by mouth daily    TSH, 3rd generation; Future

## 2025-03-18 NOTE — ASSESSMENT & PLAN NOTE
Orders:    FLUoxetine (PROzac) 20 mg capsule; Take 1 capsule (20 mg total) by mouth daily    lamoTRIgine (LaMICtal) 100 mg tablet; TAKE 1/2 TAB BY MOUTH AT BEDTIME    TSH, 3rd generation; Future

## 2025-03-24 DIAGNOSIS — J45.40 MODERATE PERSISTENT ASTHMA WITHOUT COMPLICATION: ICD-10-CM

## 2025-03-24 DIAGNOSIS — K58.1 IRRITABLE BOWEL SYNDROME WITH CONSTIPATION: ICD-10-CM

## 2025-03-24 RX ORDER — BUDESONIDE 0.5 MG/2ML
0.5 INHALANT ORAL 2 TIMES DAILY
Qty: 60 ML | Refills: 5 | OUTPATIENT
Start: 2025-03-24

## 2025-03-25 RX ORDER — LINACLOTIDE 145 UG/1
145 CAPSULE, GELATIN COATED ORAL DAILY
Qty: 90 CAPSULE | Refills: 1 | Status: SHIPPED | OUTPATIENT
Start: 2025-03-25

## 2025-05-02 DIAGNOSIS — J32.9 CHRONIC SINUSITIS WITH RECURRENT BRONCHITIS: ICD-10-CM

## 2025-05-02 DIAGNOSIS — J40 CHRONIC SINUSITIS WITH RECURRENT BRONCHITIS: ICD-10-CM

## 2025-05-02 RX ORDER — FEXOFENADINE HCL 180 MG/1
180 TABLET ORAL DAILY PRN
Qty: 90 TABLET | Refills: 1 | Status: SHIPPED | OUTPATIENT
Start: 2025-05-02

## 2025-05-20 ENCOUNTER — OFFICE VISIT (OUTPATIENT)
Dept: PSYCHIATRY | Facility: CLINIC | Age: 60
End: 2025-05-20
Payer: MEDICARE

## 2025-05-20 DIAGNOSIS — F41.9 ANXIETY: ICD-10-CM

## 2025-05-20 DIAGNOSIS — F31.81 BIPOLAR 2 DISORDER (HCC): ICD-10-CM

## 2025-05-20 LAB
LEFT EYE DIABETIC RETINOPATHY: NORMAL
RIGHT EYE DIABETIC RETINOPATHY: NORMAL

## 2025-05-20 PROCEDURE — 99214 OFFICE O/P EST MOD 30 MIN: CPT | Performed by: PHYSICIAN ASSISTANT

## 2025-05-20 RX ORDER — LAMOTRIGINE 100 MG/1
TABLET ORAL
Qty: 45 TABLET | Refills: 1 | Status: SHIPPED | OUTPATIENT
Start: 2025-05-20

## 2025-05-20 RX ORDER — HYDROXYZINE HYDROCHLORIDE 50 MG/1
TABLET, FILM COATED ORAL
Qty: 270 TABLET | Refills: 1 | Status: SHIPPED | OUTPATIENT
Start: 2025-05-20

## 2025-05-20 NOTE — PSYCH
MEDICATION MANAGEMENT NOTE    Name: Poly Joshua      : 1965      MRN: [id number]  Encounter Provider: Viky Lyn PA-C  Encounter Date: 2025   Encounter department: [facility]    Insurance: Payor: [employer]  REP / Plan: [employer] ASSURED / Product Type: Medicare HMO /      Reason for Visit: No chief complaint on file.  :  Assessment & Plan  Bipolar 2 disorder (HCC)    Orders:    FLUoxetine (PROzac) 20 mg capsule; Take 1 capsule (20 mg total) by mouth daily    lamoTRIgine (LaMICtal) 100 mg tablet; TAKE 1/2 TAB BY MOUTH AT BEDTIME    Anxiety    Orders:    FLUoxetine (PROzac) 20 mg capsule; Take 1 capsule (20 mg total) by mouth daily    hydrOXYzine HCL (ATARAX) 50 mg tablet; TAKE 1/2 TO 1 TABLET BY MOUTH 3 TIMES DAILY AS NEEDED FOR ANXIETY    PLAN:  Pt is feeling well, without complaints.  She is speaking rapidly but has no other manic type S/Sxs.  She feels her medicines are working adequately and appears stable.  I will continue the present regimen.   Treatment plan done and Pt accepts the plan.  Safety Plan was done 2024 but Epic flags it as being due.  Continue:   Fluoxetine 20mg (1) cap po qd # 90 R1   Lamotrigine 100mg (1/2) tab po qhs # 45 R1  Hydroxyzine 50mg (1/2 - 1) tab po tid prn anxiety or insomnia # 270 R1  Melatonin --Pt get OTC  Vit D --per PCP  Pt to get Lipids and HgbA1C --per PCP and Home sleep study per sleep specialist  F/U PCP, Cardiology, Neurology, and Sleep medicine for medical issues  Return 2025 -- Pt states she cannot return sooner, depends on a ride from her significant other Russel, call sooner prn       Treatment Recommendations:    Educated about diagnosis and treatment modalities. Verbalizes understanding and agreement with the treatment plan.  Discussed self monitoring of symptoms, and symptom monitoring tools.  Discussed medications and if treatment adjustment was needed or  "desired.  Aware of 24 hour and weekend coverage for urgent situations accessed by calling [facility] main practice number  I am scheduling this patient out for greater than 3 months: Yes - Patient's stability of symptoms warrant this length of time or no significant changes to treatment plan    Medications Risks/Benefits:      Risks, Benefits And Possible Side Effects Of Medications:    Risks, benefits, and possible side effects of medications explained to Poly and she (or legal representative) verbalizes understanding and agreement for treatment.    Controlled Medication Discussion:     No recent records found for controlled prescriptions according to [place] Prescription Drug Monitoring Program.      History of Present Illness      Pt presents for medication review with primary c/o / Area of need:  \"I'm OK, everything's going OK.\"  She states she had a nice mother's day and she continues attending Jew activities-- Sunday service, then Wednesday [employer] study and Friday women's convention.  She has no plans for the Summer -- does not plan anything far into the future due to general concern that something bad might happen to cause the plans to fall through-- ie weather events, or she could be sick, or an accident might befall her or anyone else.  Yet she denies paranoia and sticks to the activities she plans for the short term.  She is talking fast.  Pt reports good sleep, energy and appetite.  Pt presently denies depression, self-injurious thoughts/behaviors, SI, HI, anxiety, panic attacks, elevated or irritable moods, over-normal energy, reduced sleep requirement, impulsivity, hallucinations or paranoia.  Pt reports compliance to psychiatric medications without SE.       Review Of Systems: A review of systems is obtained and is negative except for the pertinent positives listed in HPI/Subjective above.      Current Rating Scores:         Areas of Improvement: reviewed in " "HPI/Subjective Section and reviewed in Assessment and Plan Section    Past Psychiatric History:   As copied from my 3/18/2025 note with updates as needed:  \"  [ Pt born in [place] and came to the [place] with her family in approx 1989 and grew up with biological parents, 1 year younger sister and a younger brother.  Pt describes her upbringing as \"Good, good dad, good mom.  We were raised with [place].\"   Everyone in the family got along and Pt has positive memories of growing up with them.     Depression started in 2010 at work when she sustained a life changing back injury, for which she became permanently disabled.  Mood Sxs: sadness, crying, self-isolating, anhedonia, less motivated, feelings of hopelessness and helplessness.  But no SI.                   Manic Sxs:  decreased need for sleep , rapid speech  and flight of ideas/racing thoughts  Racy thoughts, elevated energy     Anxiety started in 2010 due to the work injury:  excessive worry more days than not for longer than 3 months and The Sxs can occur without concommittent depressive Sxs.  No other Sxs of BRIGITTE.     Panic attacks started in late 8/2020 initially triggered by driving through a town she was not familiar with.  She has had a few in total with Sxs of:  Moderate anxiety, sweaty hands,  palpitations/racing heart, trembling, shortness of breath       Social Anxiety symptoms: no symptoms suggestive of social anxiety Eating Disorder symptoms: no historical or current eating disorder. no binge eating disorder; no anorexia nervosa. no symptoms of bulimia      OCD type Sxs: checking door locks, windows, stove, pipes in the basement (which used to leak).  Cleans often, can be very fastidious and wash her hands excessively.       Pt denies any h/o PTSD or psychotic Sxs.     Prior psychiatrists:  [facility] from approx 2014 - 11/2019 --stopped going due to change in insurance     Prior psychotherapists:   First therapist was at [facility] in [place] " "and he left so she only saw him for a year  Second one was seen from [id number] at [facility]     Pt denied h/o SI, HI, self-injurious behaviors, violent behaviors, psychiatric hospitalizations, ECT, legal or  Hx     Prior Rx trials: Viibryd 40mg (helped), Bupropion ER/XL 150mg (helped), Aripiprazole 5mg (helped but caused Wt gain), Ziprasidone 40mg (heart palpitations), Gabapentin 300mg (agitated),Topamax (for migraines but stopped due to ineffectiveness and her h/o nephrolithiasis), Hydroxyzine 50mg (sedating), Doxepin up to 50mg (for sleep--oversedating even at 25mg), Clonazepam 0.5mg--??used/helped.      Abuse Hx:  Pt denies any h/o sexual, physical, or emotional abuse     Trauma Hx:  When she lost her job after her work injury                   ] \"    Past Medical History:   Diagnosis Date    Achalasia     Asthma     Colon polyp     Constipation     CPAP (continuous positive airway pressure) dependence     Depression     Diarrhea 2019    Disease of thyroid gland     Elevated LFTs     last assessed 10/25/17    Fibromyalgia     Gastric ulcer     GERD (gastroesophageal reflux disease)     H. pylori infection 10/09/2017    [facility] - [id number]: Treated, f/u stool antigen negative    Hypertension     Hypothyroidism     Kidney stone     Migraines     Migraines     MICHAEL (obstructive sleep apnea)     last assessed 16    Pneumonia     Sleep apnea 2019    Uses CPAP    Thyroid nodule     last assessed 17     Past Surgical History:   Procedure Laterality Date    BREAST BIOPSY Right 10/31/2017    us bx     SECTION      COLONOSCOPY      ENDOMETRIAL ABLATION      MAMMO STEREOTACTIC BREAST BIOPSY LEFT (ALL INC) Left 2024    MYOTOMY HELLER LAPAROSCOPIC  2016    Dr. Magallanes, Coincident lorena fundoplication    NASAL SINUS SURGERY      OTHER SURGICAL HISTORY      achalasia repair    OVARIAN CYST REMOVAL      PILONIDAL CYST / SINUS EXCISION      ND COLONOSCOPY FLX DX W/COLLJ SPEC WHEN " PFRMD N/A 04/25/2019    Procedure: COLONOSCOPY;  Surgeon: Kalpesh Perez MD;  [facility];  Service: Gastroenterology    OH CYSTOURETHROSCOPY N/A 01/19/2017    Procedure: CYSTOSCOPY;  Surgeon: Reymundo Dill MD;  [facility];  Service: UroGynecology           OH ESOPHAGOGASTRODUODENOSCOPY TRANSORAL DIAGNOSTIC N/A 09/07/2017    Procedure: ESOPHAGOGASTRODUODENOSCOPY (EGD);  Surgeon: Kalpesh Perez MD;  Location: [facility];  Service: Gastroenterology    OH ESOPHAGOGASTRODUODENOSCOPY TRANSORAL DIAGNOSTIC N/A 04/25/2019    Procedure: ESOPHAGOGASTRODUODENOSCOPY (EGD);  Surgeon: Kalpesh Perez MD;  [facility];  Service: Gastroenterology    OH LITHOTRIPSY XTRCORP SHOCK WAVE Left 05/26/2022    Procedure: ESWL;  Surgeon: Markie Osullivan MD;  Location[facility];  Service: Urology    OH POST COLPORRHAPHY RECTOCELE W/WO PERINEORRHAPHY N/A 01/19/2017    Procedure: COLPORRHAPHY POSTERIOR;  Surgeon: Reymundo Dill MD;  [facility];  Service: UroGynecology           OH SLING OPERATION STRESS INCONTINENCE N/A 01/19/2017    Procedure: INSERTION PUBOVAGINAL SLING /SINGLE INCISION ;  Surgeon: Reymundo Dill MD;  [facility];  Service: UroGynecology           TONSILLECTOMY      TUBAL LIGATION      UPPER GASTROINTESTINAL ENDOSCOPY      US GUIDED BREAST BIOPSY RIGHT COMPLETE Right 10/31/2017     Allergies: Allergies[1]    Current Outpatient Medications   Medication Instructions    albuterol (2.5 mg/3 mL) 0.083 % nebulizer solution TAKE 3 ML (2.5 MG) BY NEBULIZER EVERY 6 HOURS AS NEEDED FOR WHEEZING OR SHORTNESS IF BREATH    albuterol (PROVENTIL HFA,VENTOLIN HFA) 90 mcg/act inhaler 2 puffs, Inhalation, Every 6 hours PRN    Alcohol Swabs (Alcohol Prep) 70 % PADS Does not apply, Daily    atorvastatin (LIPITOR) 20 mg tablet TOME BOO TABLETA TODOS LOS VIZCARRA    Azelastine HCl 137 MCG/SPRAY SOLN 2 sprays, Alternating Nares, Daily PRN    Blood Glucose Monitoring Suppl (FreeStyle Lite) w/Device KIT USE  AS DIRECTED    Botox 200 units SOLR     Breo Ellipta 200-25 MCG/ACT inhaler 1 puff, Inhalation, Daily, Rinse mouth after use.    budesonide (PULMICORT) 0.5 mg, Nebulization, 2 times daily, Rinse mouth after use.    carbamide peroxide (DEBROX) 6.5 % otic solution 5 drops, Both Ears, 2 times daily    cetaphil (CETAPHIL) lotion Topical, As needed    cholecalciferol (VITAMIN D3) 1,000 units tablet TAKE 1 TABLET BY MOUTH EVERY DAY    diazepam (VALIUM) 5 mg tablet TAKE 1 TO 2 TABLET BY MOUTH AS NEEDED 30 MINS PRIOR    Diclofenac Sodium (VOLTAREN) 1 %     EPINEPHrine (EPIPEN) 0.3 mg/0.3 mL SOAJ     esomeprazole (NEXIUM) 40 mg, Oral, 2 times daily before meals, 30 minutes before breakfast and 30 minutes before dinner    fexofenadine (ALLEGRA) 180 mg, Oral, Daily PRN    FLUoxetine (PROZAC) 20 mg, Oral, Daily    fluticasone (FLONASE) 50 mcg/act nasal spray 1-2 sprays, Nasal, Daily    furosemide (LASIX) 20 mg, Oral, Daily    glucose monitoring kit (FREESTYLE) monitoring kit 1 each, Does not apply, As needed    hydrOXYzine HCL (ATARAX) 50 mg tablet TAKE 1/2 TO 1 TABLET BY MOUTH 3 TIMES DAILY AS NEEDED FOR ANXIETY    ipratropium (ATROVENT) 0.5 mg, Nebulization, 2 times daily    ketoconazole (NIZORAL) 2 % cream Topical, Daily    Ketotifen Fumarate (ZADITOR) 0.035 % ophthalmic solution 1 drop, Both Eyes, 2 times daily PRN    lamoTRIgine (LaMICtal) 100 mg tablet TAKE 1/2 TAB BY MOUTH AT BEDTIME    Lancets (freestyle) lancets Use as instructed    levothyroxine 100 mcg tablet TOME BOO TABLETA TODOS LOS VIZCARAR    lidocaine (LMX) 4 % cream 1 APPLICATION AS NEEDED EXTERNALLY THREE TIMES A DAY 30 DAY(S)    Linzess 145 mcg, Oral, Daily    metFORMIN (GLUCOPHAGE-XR) 1,000 mg, Oral, Every morning    montelukast (SINGULAIR) 10 mg, Oral, Daily at bedtime    polyethylene glycol (MIRALAX) 17 g TAKE 17 G BY MOUTH DAILY AS NEEDED FOR CONSTIPATION    Premarin vaginal cream INSERT 0.5 GRAMS INTO THE VAGINA A DIARIO    sucralfate (CARAFATE) 1 g/10 mL  suspension TAKE 10 ML BY MOUTH 4 TIMES DAILY    triamcinolone (KENALOG) 0.1 % cream APPLY TO AFFECTED AREA TOPICALLY TWICE DAILY FOR 14 DAYS.        Substance Abuse History:    Tobacco, Alcohol and Drug Use History     Tobacco Use    Smoking status: Former     Current packs/day: 0.00     Average packs/day: 1.5 packs/day for 30.0 years (45.0 ttl pk-yrs)     Types: Cigarettes     Start date:      Quit date: 2016     Years since quittin.3    Smokeless tobacco: Never    Tobacco comments:     pt stated quit about a month ago, last assessed 14 per Allscriciro   Vaping Use    Vaping status: Never Used   Substance Use Topics    Alcohol use: Not Currently    Drug use: Never     Alcohol Use: Not At Risk (2022)    AUDIT-C     Frequency of Alcohol Consumption: Never     Average Number of Drinks: Patient does not drink     Frequency of Binge Drinking: Never       Social History:    Social History     Socioeconomic History    Marital status: Single     Spouse name: Not on file    Number of children: 4    Years of education: Not on file    Highest education level: Not on file   Occupational History    Occupation: Disability since 2019-- due to a job injury    Other Topics Concern    Not on file   Social History Narrative    Home: Lives with boyfriend/father of her 4 children and one of their daughters        Children: 2 sons born  and ,  2 daughters born  and         Education:        Family Psychiatric History:     Family History   Problem Relation Age of Onset    Cancer Mother     Breast cancer Mother 57    Hypertension Mother     Stroke Father     Diabetes Father     Hypertension Father     Alzheimer's disease Father     Diabetes Sister     BRCA1 Negative Sister     BRCA2 Negative Sister     No Known Problems Daughter     No Known Problems Daughter     Cancer Maternal Grandmother         ovarian    Ovarian cancer Maternal Grandmother         unspecified laterality    Cancer Maternal  Grandfather     Hypertension Maternal Grandfather     Tongue cancer Maternal Grandfather     Stroke Paternal Grandmother     No Known Problems Paternal Grandfather     Thyroid disease unspecified Brother     Depression Brother     Anxiety disorder Brother     Drug abuse Brother         (pain pills)    No Known Problems Son     No Known Problems Son     Thyroid disease unspecified Maternal Aunt     Colon cancer Maternal Aunt     No Known Problems Maternal Aunt     No Known Problems Maternal Aunt     No Known Problems Maternal Aunt     No Known Problems Paternal Aunt     No Known Problems Paternal Aunt     No Known Problems Paternal Aunt     No Known Problems Paternal Aunt     No Known Problems Paternal Aunt     No Known Problems Paternal Aunt     Alzheimer's disease Cousin     Breast cancer Cousin 42    Breast cancer Family         unk age       Medical History Reviewed by provider this encounter:  Tobacco  Allergies  Meds  Problems  Med Hx  Surg Hx  Fam Hx          Objective   LMP 10/11/2019      Mental Status Evaluation:    Appearance age appropriate, casually dressed, good eye contact   Behavior cooperative, calm   Speech normal volume, spontaneous, increased rate, hypertalkative at one point   Mood euthymic   Affect normal range and intensity   Thought Processes organized, goal directed   Thought Content no overt delusions   Perceptual Disturbances: no auditory hallucinations, no visual hallucinations, does not appear responding to internal stimuli   Abnormal Thoughts  Risk Potential Suicidal ideation - None  Homicidal ideation - None  Potential for aggression - No   Orientation A&O x 3    Memory recent and remote memory grossly intact   Consciousness alert and awake   Attention Span Concentration Span attention span and concentration are age appropriate   Intellect appears to be of average intelligence   Insight fair   Judgement fair   Muscle Strength and  Gait normal gait and normal balance   Motor  activity no abnormal movements   Language no difficulty naming common objects, no difficulty repeating a phrase   Fund of Knowledge adequate knowledge of current events  adequate fund of knowledge regarding past history  adequate fund of knowledge regarding vocabulary        Laboratory Results: I have personally reviewed all pertinent laboratory/tests results        Suicide/Homicide Risk Assessment:    Risk of Harm to Self:  Historical Risk Factors include: history of anxiety, history of mood disorder  Protective Factors: no current suicidal ideation, access to mental health treatment, compliant with medications, having a desire to be alive, having a sense of purpose or meaning in life, stable living environment, supportive family  Weapons/Firearms: none. The following steps have been taken to ensure weapons are properly secured: not applicable  Based on today's assessment, Poly presents the following risk of harm to self: minimal    Risk of Harm to Others:  Historical Risk Factors include: none  Protective Factors: no current homicidal ideation, access to mental health treatment, compliant with medications, personal beliefs, Jainism beliefs, safe and stable living environment, supportive family  Weapons/Firearms: none. The following steps have been taken to ensure weapons are properly secured: not applicable  Based on today's assessment, Poly presents the following risk of harm to others: minimal    The following interventions are recommended: Continue medication management. No other intervention changes indicated at this time.    Psychotherapy Provided:     Individual psychotherapy provided: No    Treatment Plan:    Completed and signed during the session: Yes - with Poly.    Goals: Progress towards Treatment Plan goals - Yes, progressing, as evidenced by subjective findings in HPI/Subjective Section and in Assessment and Plan Section    Depression Follow-up Plan Completed: Yes    Note  Share:        Administrative Statements       Visit Time  Visit Start Time: 12:04 PM  Visit Stop Time: 12:37 PM  Total Visit Duration: 33 minutes        Viky Lyn PA-C 05/20/25         [1]   Allergies  Allergen Reactions    Gabapentin Other (See Comments)     Annotation - [id number]: Neuropsych effects per patient.  Annotation - [id number]: Neuropsych effects per patient.  Annotation - [id number]: Neuropsych effects per patient.    Latex Dermatitis and Rash     Other reaction(s): Dermatitis    Penicillins Hives

## 2025-05-20 NOTE — BH TREATMENT PLAN
"TREATMENT PLAN (Medication Management Only)        [facility]    Name/Date of Birth/MRN:  Poly Joshua 60 y.o. 1965 MRN: [id number]  Date of Treatment Plan: May 20, 2025  Diagnosis/Diagnoses:   1. Bipolar 2 disorder (HCC)    2. Anxiety      Strengths/Personal Resources for Self-Care: \"Prayer\".  Area/Areas of need (in own words): \"I'm OK, everything's going OK. \".  1. Long Term Goal:     Target Date: Maintain mood stability and control of anxiety  Person/Persons responsible for completion of goal: Krishan  2.  Short Term Objective (s) - How will we reach this goal?:   A.  Provider new recommended medication/dosage changes and/or continue medication(s): Pt is feeling well, without complaints.  She is speaking rapidly but has no other manic type S/Sxs.  She feels her medicines are working adequately and appears stable.  I will continue the present regimen.   Treatment plan done and Pt accepts the plan.  Safety Plan was done 6/24/2024 but Epic flags it as being due.  Continue:   Fluoxetine 20mg (1) cap po qd # 90 R1   Lamotrigine 100mg (1/2) tab po qhs # 45 R1  Hydroxyzine 50mg (1/2 - 1) tab po tid prn anxiety or insomnia # 270 R1  Melatonin --Pt get OTC  Vit D --per PCP  Pt to get Lipids and HgbA1C --per PCP and Home sleep study per sleep specialist  F/U PCP, Cardiology, Neurology, and Sleep medicine for medical issues  Return 9/2/2025 -- Pt states she cannot return sooner, depends on a ride from her significant other Russel, call sooner prn       Target Date: 6 months - 11/20/2025  Person/Persons Responsible for Completion of Goal: Krishan   Progress Towards Goals: stable, continuing treatment  Treatment Modality: Medication mgt   Review due 180 days from date of this plan: 6 months - 11/20/2025  Expected length of service: ongoing treatment unless revised  My Physician/PA/NP and I have developed this plan together and I agree to work on " the goals and objectives. I understand the treatment goals that were developed for my treatment.  Electronic Signatures: on file (unless signed below)    Viky Lyn PA-C 05/20/25

## 2025-05-21 DIAGNOSIS — J45.40 MODERATE PERSISTENT ASTHMA WITHOUT COMPLICATION: ICD-10-CM

## 2025-05-22 RX ORDER — AZELASTINE HYDROCHLORIDE 137 UG/1
SPRAY, METERED NASAL
Qty: 30 ML | Refills: 5 | Status: SHIPPED | OUTPATIENT
Start: 2025-05-22

## 2025-06-19 DIAGNOSIS — T78.40XD ALLERGY, SUBSEQUENT ENCOUNTER: ICD-10-CM

## 2025-06-20 RX ORDER — KETOTIFEN FUMARATE 0.35 MG/ML
1 SOLUTION/ DROPS OPHTHALMIC 2 TIMES DAILY PRN
Qty: 10 ML | Refills: 3 | Status: SHIPPED | OUTPATIENT
Start: 2025-06-20

## 2025-06-21 DIAGNOSIS — R10.13 EPIGASTRIC PAIN: ICD-10-CM

## 2025-06-23 RX ORDER — ESOMEPRAZOLE MAGNESIUM 40 MG/1
40 CAPSULE, DELAYED RELEASE ORAL
Qty: 200 CAPSULE | Refills: 1 | Status: SHIPPED | OUTPATIENT
Start: 2025-06-23

## 2025-07-10 NOTE — TELEPHONE ENCOUNTER
Problem: Self Harm/Suicidality  Goal: Will have no self-injury during hospital stay  Description: INTERVENTIONS:  1.  Ensure constant observer at bedside with Q15M safety checks  2.  Maintain a safe environment  3.  Secure patient belongings  4.  Ensure family/visitors adhere to safety recommendations  5.  Ensure safety tray has been added to patient's diet order  6.  Every shift and PRN: Re-assess suicidal risk via Frequent Screener    Outcome: Progressing     Problem: Anxiety  Goal: Will report anxiety at manageable levels  Description: INTERVENTIONS:  1. Administer medication as ordered  2. Teach and rehearse alternative coping skills  3. Provide emotional support with 1:1 interaction with staff  Outcome: Progressing   Pt states no suicidal ideations, homicidal ideations, hallucinations. Pt. was not cooperative this evening, would not do admission.   Pt is aware of results

## (undated) DEVICE — SMOKE EVACUATION TUBING WITH 8 IN INTEGRAL WAND AND SPONGE GUARD: Brand: BUFFALO FILTER

## (undated) DEVICE — MEDI-VAC YANKAUER SUCTION HANDLE W/BULBOUS AND CONTROL VENT: Brand: CARDINAL HEALTH

## (undated) DEVICE — EXIDINE 4 PCT

## (undated) DEVICE — 2000CC GUARDIAN II: Brand: GUARDIAN

## (undated) DEVICE — ENDOCUFF VISION LRG GREEN ID 11.2

## (undated) DEVICE — CAUTERY TIP POLISHER: Brand: DEVON

## (undated) DEVICE — REM POLYHESIVE ADULT PATIENT RETURN ELECTRODE: Brand: VALLEYLAB

## (undated) DEVICE — WET SKIN PREP TRAY: Brand: MEDLINE INDUSTRIES, INC.

## (undated) DEVICE — ALLENTOWN DR  LUCENTE S LAP PK: Brand: CARDINAL HEALTH

## (undated) DEVICE — 3M™ STERI-DRAPE™ UNDER BUTTOCKS DRAPE WITH POUCH 1084: Brand: STERI-DRAPE™

## (undated) DEVICE — SINGLE-USE BIOPSY FORCEPS: Brand: RADIAL JAW 4

## (undated) DEVICE — X-RAY DETECTABLE SPONGES,16 PLY: Brand: VISTEC

## (undated) DEVICE — NEEDLE HYPO 22G X 1-1/2 IN

## (undated) DEVICE — SCD SEQUENTIAL COMPRESSION COMFORT SLEEVE MEDIUM KNEE LENGTH: Brand: KENDALL SCD

## (undated) DEVICE — TUBING SUCTION 5MM X 12 FT

## (undated) DEVICE — CATH FOLEY 18FR 5ML 2 WAY UNCOATED SILICONE

## (undated) DEVICE — GLOVE INDICATOR PI UNDERGLOVE SZ 7 BLUE

## (undated) DEVICE — SUT VICRYL 2-0 SH 27 IN UNDYED J417H

## (undated) DEVICE — SUT VICRYL 0 CT-1 36 IN J946H

## (undated) DEVICE — CURITY PLAIN PACKING STRIP: Brand: CURITY

## (undated) DEVICE — GLOVE INDICATOR PI UNDERGLOVE SZ 7.5 BLUE

## (undated) DEVICE — GLOVE SRG LF STRL BGL SKNSNS 7 PF

## (undated) DEVICE — VIAL DECANTER

## (undated) DEVICE — GLOVE SRG LF STRL BGL SKNSNS 7.5 PF

## (undated) DEVICE — BAG URINE DRAINAGE 2000ML ANTI RFLX LF

## (undated) DEVICE — INTENDED FOR TISSUE SEPARATION, AND OTHER PROCEDURES THAT REQUIRE A SHARP SURGICAL BLADE TO PUNCTURE OR CUT.: Brand: BARD-PARKER SAFETY BLADES SIZE 11, STERILE

## (undated) DEVICE — ELECTROSURGICAL DEVICE HOLSTER;FOR USE WITH MAXIMUM PEAK VOLTAGE OF 4000 V: Brand: FORCE TRIVERSE

## (undated) DEVICE — BULB SYRINGE,IRRIGATION WITH PROTECTIVE CAP: Brand: DOVER